# Patient Record
Sex: MALE | Race: BLACK OR AFRICAN AMERICAN | NOT HISPANIC OR LATINO | Employment: OTHER | RURAL
[De-identification: names, ages, dates, MRNs, and addresses within clinical notes are randomized per-mention and may not be internally consistent; named-entity substitution may affect disease eponyms.]

---

## 2020-01-14 ENCOUNTER — HISTORICAL (OUTPATIENT)
Dept: ADMINISTRATIVE | Facility: HOSPITAL | Age: 80
End: 2020-01-14

## 2020-03-12 ENCOUNTER — HISTORICAL (OUTPATIENT)
Dept: ADMINISTRATIVE | Facility: HOSPITAL | Age: 80
End: 2020-03-12

## 2020-03-12 LAB
BASOPHILS # BLD AUTO: 0.04 X10E3/UL (ref 0–0.2)
BASOPHILS NFR BLD AUTO: 0.7 % (ref 0–1)
EOSINOPHIL # BLD AUTO: 0.29 X10E3/UL (ref 0–0.5)
EOSINOPHIL NFR BLD AUTO: 5.2 % (ref 1–4)
ERYTHROCYTE [DISTWIDTH] IN BLOOD BY AUTOMATED COUNT: 14.8 % (ref 11.5–14.5)
EST. AVERAGE GLUCOSE BLD GHB EST-MCNC: 267 MG/DL
HBA1C MFR BLD HPLC: 10.6 %
HCT VFR BLD AUTO: 41.9 % (ref 40–54)
HGB BLD-MCNC: 13.3 G/DL (ref 13.5–18)
IMM GRANULOCYTES # BLD AUTO: 0.02 X10E3/UL (ref 0–0.04)
IMM GRANULOCYTES NFR BLD: 0.4 % (ref 0–0.4)
LYMPHOCYTES # BLD AUTO: 2.04 X10E3/UL (ref 1–4.8)
LYMPHOCYTES NFR BLD AUTO: 36.8 % (ref 27–41)
MCH RBC QN AUTO: 28.4 PG (ref 27–31)
MCHC RBC AUTO-ENTMCNC: 31.7 G/DL (ref 32–36)
MCV RBC AUTO: 89.5 FL (ref 80–96)
MONOCYTES # BLD AUTO: 0.54 X10E3/UL (ref 0–0.8)
MONOCYTES NFR BLD AUTO: 9.7 % (ref 2–6)
MPC BLD CALC-MCNC: 10 FL (ref 9.4–12.4)
NEUTROPHILS # BLD AUTO: 2.62 X10E3/UL (ref 1.8–7.7)
NEUTROPHILS NFR BLD AUTO: 47.2 % (ref 53–65)
NRBC # BLD AUTO: 0 X10E3/UL (ref 0–0)
NRBC, AUTO (.00): 0 /100 (ref 0–0)
PLATELET # BLD AUTO: 346 X10E3/UL (ref 150–400)
RBC # BLD AUTO: 4.68 X10E6/UL (ref 4.6–6.2)
WBC # BLD AUTO: 5.55 X10E3/UL (ref 4.5–11)

## 2020-05-04 LAB — TSH SERPL DL<=0.005 MIU/L-ACNC: NORMAL UIU/ML

## 2020-05-11 ENCOUNTER — HISTORICAL (OUTPATIENT)
Dept: ADMINISTRATIVE | Facility: HOSPITAL | Age: 80
End: 2020-05-11

## 2020-05-11 LAB
BASOPHILS # BLD AUTO: 0.02 X10E3/UL (ref 0–0.2)
BASOPHILS NFR BLD AUTO: 0.4 % (ref 0–1)
EOSINOPHIL # BLD AUTO: 0.25 X10E3/UL (ref 0–0.5)
EOSINOPHIL NFR BLD AUTO: 4.5 % (ref 1–4)
ERYTHROCYTE [DISTWIDTH] IN BLOOD BY AUTOMATED COUNT: 12.9 % (ref 11.5–14.5)
HCT VFR BLD AUTO: 42.4 % (ref 40–54)
HGB BLD-MCNC: 13.9 G/DL (ref 13.5–18)
IMM GRANULOCYTES # BLD AUTO: 0.01 X10E3/UL (ref 0–0.04)
IMM GRANULOCYTES NFR BLD: 0.2 % (ref 0–0.4)
LYMPHOCYTES # BLD AUTO: 2.24 X10E3/UL (ref 1–4.8)
LYMPHOCYTES NFR BLD AUTO: 40.3 % (ref 27–41)
MCH RBC QN AUTO: 28.9 PG (ref 27–31)
MCHC RBC AUTO-ENTMCNC: 32.8 G/DL (ref 32–36)
MCV RBC AUTO: 88.1 FL (ref 80–96)
MONOCYTES # BLD AUTO: 0.52 X10E3/UL (ref 0–0.8)
MONOCYTES NFR BLD AUTO: 9.4 % (ref 2–6)
MPC BLD CALC-MCNC: 9.8 FL (ref 9.4–12.4)
NEUTROPHILS # BLD AUTO: 2.52 X10E3/UL (ref 1.8–7.7)
NEUTROPHILS NFR BLD AUTO: 45.2 % (ref 53–65)
NRBC # BLD AUTO: 0 X10E3/UL (ref 0–0)
NRBC, AUTO (.00): 0 /100 (ref 0–0)
PLATELET # BLD AUTO: 327 X10E3/UL (ref 150–400)
RBC # BLD AUTO: 4.81 X10E6/UL (ref 4.6–6.2)
WBC # BLD AUTO: 5.56 X10E3/UL (ref 4.5–11)

## 2020-06-12 ENCOUNTER — HISTORICAL (OUTPATIENT)
Dept: ADMINISTRATIVE | Facility: HOSPITAL | Age: 80
End: 2020-06-12

## 2020-06-12 LAB
ALBUMIN SERPL BCP-MCNC: 3.5 G/DL (ref 3.5–5)
ALBUMIN/GLOB SERPL: 0.9 {RATIO}
ALP SERPL-CCNC: 117 U/L (ref 45–115)
ALT SERPL W P-5'-P-CCNC: 25 U/L (ref 16–61)
AST SERPL W P-5'-P-CCNC: 13 U/L (ref 15–37)
BILIRUB SERPL-MCNC: 0.3 MG/DL (ref 0–1.2)
BUN SERPL-MCNC: 10 MG/DL (ref 7–18)
BUN/CREAT SERPL: 13
CALCIUM SERPL-MCNC: 8.9 MG/DL (ref 8.5–10.1)
CHLORIDE SERPL-SCNC: 102 MMOL/L (ref 98–107)
CHOLEST SERPL-MCNC: 135 MG/DL
CHOLEST/HDLC SERPL: 4.4 {RATIO}
CO2 SERPL-SCNC: 29 MMOL/L (ref 21–32)
CREAT SERPL-MCNC: 0.8 MG/DL (ref 0.7–1.3)
EST. AVERAGE GLUCOSE BLD GHB EST-MCNC: 217 MG/DL
GLOBULIN SER-MCNC: 3.9 G/DL (ref 2–4)
GLUCOSE SERPL-MCNC: 164 MG/DL (ref 74–106)
HBA1C MFR BLD HPLC: 9.1 %
HDLC SERPL-MCNC: 31 MG/DL
LDLC SERPL CALC-MCNC: 88 MG/DL
POTASSIUM SERPL-SCNC: 4.2 MMOL/L (ref 3.5–5.1)
PROLACTIN SERPL-MCNC: 29.7 NG/ML
PROT SERPL-MCNC: 7.4 G/DL (ref 6.4–8.2)
SODIUM SERPL-SCNC: 135 MMOL/L (ref 136–145)
TRIGL SERPL-MCNC: 82 MG/DL

## 2020-06-23 ENCOUNTER — HISTORICAL (OUTPATIENT)
Dept: ADMINISTRATIVE | Facility: HOSPITAL | Age: 80
End: 2020-06-23

## 2020-06-24 LAB
BACTERIA #/AREA URNS HPF: ABNORMAL /HPF
BILIRUB UR QL STRIP: NEGATIVE MG/DL
CLARITY UR: CLEAR
COLOR UR: YELLOW
GLUCOSE UR STRIP-MCNC: NEGATIVE MG/DL
KETONES UR STRIP-SCNC: NEGATIVE MG/DL
LEUKOCYTE ESTERASE UR QL STRIP: NEGATIVE LEU/UL
NITRITE UR QL STRIP: NEGATIVE
PH UR STRIP: 5 PH UNITS (ref 5–8)
PROT UR QL STRIP: NEGATIVE MG/DL
RBC # UR STRIP: NEGATIVE ERY/UL
RBC #/AREA URNS HPF: ABNORMAL /HPF (ref 0–3)
SP GR UR STRIP: 1.02 (ref 1–1.03)
SQUAMOUS #/AREA URNS LPF: ABNORMAL /LPF
UROBILINOGEN UR STRIP-ACNC: 0.2 EU/DL
WBC #/AREA URNS HPF: ABNORMAL /HPF (ref 0–5)

## 2020-06-26 LAB
REPORT: NO GROWTH
REPORT: NORMAL

## 2020-07-07 ENCOUNTER — HISTORICAL (OUTPATIENT)
Dept: ADMINISTRATIVE | Facility: HOSPITAL | Age: 80
End: 2020-07-07

## 2020-07-07 LAB
ANION GAP SERPL CALCULATED.3IONS-SCNC: 12.2 MMOL/L (ref 7–16)
BASOPHILS # BLD AUTO: 0.04 X10E3/UL (ref 0–0.2)
BASOPHILS NFR BLD AUTO: 0.6 % (ref 0–1)
BUN SERPL-MCNC: 10 MG/DL (ref 7–18)
CALCIUM SERPL-MCNC: 9.4 MG/DL (ref 8.5–10.1)
CHLORIDE SERPL-SCNC: 100 MMOL/L (ref 98–107)
CO2 SERPL-SCNC: 25 MMOL/L (ref 21–32)
CREAT SERPL-MCNC: 1.01 MG/DL (ref 0.7–1.3)
EOSINOPHIL # BLD AUTO: 0.22 X10E3/UL (ref 0–0.5)
EOSINOPHIL NFR BLD AUTO: 3.4 % (ref 1–4)
ERYTHROCYTE [DISTWIDTH] IN BLOOD BY AUTOMATED COUNT: 12.5 % (ref 11.5–14.5)
GLUCOSE SERPL-MCNC: 192 MG/DL (ref 74–106)
HCT VFR BLD AUTO: 44.6 % (ref 40–54)
HGB BLD-MCNC: 15.1 G/DL (ref 13.5–18)
IMM GRANULOCYTES # BLD AUTO: 0.02 X10E3/UL (ref 0–0.04)
IMM GRANULOCYTES NFR BLD: 0.3 % (ref 0–0.4)
LYMPHOCYTES # BLD AUTO: 2.34 X10E3/UL (ref 1–4.8)
LYMPHOCYTES NFR BLD AUTO: 35.7 % (ref 27–41)
MCH RBC QN AUTO: 28.3 PG (ref 27–31)
MCHC RBC AUTO-ENTMCNC: 33.9 G/DL (ref 32–36)
MCV RBC AUTO: 83.7 FL (ref 80–96)
MONOCYTES # BLD AUTO: 0.52 X10E3/UL (ref 0–0.8)
MONOCYTES NFR BLD AUTO: 7.9 % (ref 2–6)
MPC BLD CALC-MCNC: 9.2 FL (ref 9.4–12.4)
NEUTROPHILS # BLD AUTO: 3.41 X10E3/UL (ref 1.8–7.7)
NEUTROPHILS NFR BLD AUTO: 52.1 % (ref 53–65)
NRBC # BLD AUTO: 0 X10E3/UL (ref 0–0)
NRBC, AUTO (.00): 0 /100 (ref 0–0)
PLATELET # BLD AUTO: 417 X10E3/UL (ref 150–400)
POTASSIUM SERPL-SCNC: 4.2 MMOL/L (ref 3.5–5.1)
RBC # BLD AUTO: 5.33 X10E6/UL (ref 4.6–6.2)
SODIUM SERPL-SCNC: 133 MMOL/L (ref 136–145)
WBC # BLD AUTO: 6.55 X10E3/UL (ref 4.5–11)

## 2020-07-16 ENCOUNTER — HISTORICAL (OUTPATIENT)
Dept: ADMINISTRATIVE | Facility: HOSPITAL | Age: 80
End: 2020-07-16

## 2020-07-16 LAB — TSH SERPL DL<=0.005 MIU/L-ACNC: 1.83 UIU/ML (ref 0.36–3.74)

## 2020-07-17 ENCOUNTER — HISTORICAL (OUTPATIENT)
Dept: ADMINISTRATIVE | Facility: HOSPITAL | Age: 80
End: 2020-07-17

## 2020-07-18 LAB — TROPONIN I SERPL-MCNC: 0.02 NG/ML (ref 0–0.06)

## 2020-07-26 ENCOUNTER — HISTORICAL (OUTPATIENT)
Dept: ADMINISTRATIVE | Facility: HOSPITAL | Age: 80
End: 2020-07-26

## 2020-07-26 LAB
ANION GAP SERPL CALCULATED.3IONS-SCNC: 11 MMOL/L
BASOPHILS # BLD AUTO: 0.03 X10E3/UL (ref 0–0.2)
BASOPHILS NFR BLD AUTO: 0.5 % (ref 0–1)
BUN SERPL-MCNC: 12 MG/DL (ref 7–18)
CALCIUM SERPL-MCNC: 8.8 MG/DL (ref 8.5–10.1)
CHLORIDE SERPL-SCNC: 99 MMOL/L (ref 98–107)
CO2 SERPL-SCNC: 27 MMOL/L (ref 21–32)
CREAT SERPL-MCNC: 1.15 MG/DL (ref 0.7–1.3)
EOSINOPHIL # BLD AUTO: 0.22 X10E3/UL (ref 0–0.5)
EOSINOPHIL NFR BLD AUTO: 3.8 % (ref 1–4)
ERYTHROCYTE [DISTWIDTH] IN BLOOD BY AUTOMATED COUNT: 13.3 % (ref 11.5–14.5)
GLUCOSE SERPL-MCNC: 237 MG/DL (ref 74–106)
HCT VFR BLD AUTO: 40.6 % (ref 40–54)
HGB BLD-MCNC: 13.5 G/DL (ref 13.5–18)
IMM GRANULOCYTES # BLD AUTO: 0.02 X10E3/UL (ref 0–0.04)
IMM GRANULOCYTES NFR BLD: 0.3 % (ref 0–0.4)
LACTATE SERPL-SCNC: 1.7 MMOL/L (ref 0.4–2)
LACTATE SERPL-SCNC: 2.3 MMOL/L (ref 0.4–2)
LYMPHOCYTES # BLD AUTO: 1.2 X10E3/UL (ref 1–4.8)
LYMPHOCYTES NFR BLD AUTO: 20.5 % (ref 27–41)
MCH RBC QN AUTO: 29.1 PG (ref 27–31)
MCHC RBC AUTO-ENTMCNC: 33.3 G/DL (ref 32–36)
MCV RBC AUTO: 87.5 FL (ref 80–96)
MONOCYTES # BLD AUTO: 0.54 X10E3/UL (ref 0–0.8)
MONOCYTES NFR BLD AUTO: 9.2 % (ref 2–6)
MPC BLD CALC-MCNC: 8.8 FL (ref 9.4–12.4)
NEUTROPHILS # BLD AUTO: 3.83 X10E3/UL (ref 1.8–7.7)
NEUTROPHILS NFR BLD AUTO: 65.7 % (ref 53–65)
NRBC # BLD AUTO: 0 X10E3/UL (ref 0–0)
NRBC, AUTO (.00): 0 /100 (ref 0–0)
PLATELET # BLD AUTO: 271 X10E3/UL (ref 150–400)
POTASSIUM SERPL-SCNC: 3.9 MMOL/L (ref 3.5–5.1)
RBC # BLD AUTO: 4.64 X10E6/UL (ref 4.6–6.2)
SODIUM SERPL-SCNC: 133 MMOL/L (ref 136–145)
WBC # BLD AUTO: 5.84 X10E3/UL (ref 4.5–11)

## 2020-08-01 LAB
REPORT: NORMAL

## 2020-08-10 ENCOUNTER — HISTORICAL (OUTPATIENT)
Dept: ADMINISTRATIVE | Facility: HOSPITAL | Age: 80
End: 2020-08-10

## 2020-08-10 LAB — SARS-COV+SARS-COV-2 AG RESP QL IA.RAPID: NEGATIVE

## 2020-09-04 ENCOUNTER — HISTORICAL (OUTPATIENT)
Dept: ADMINISTRATIVE | Facility: HOSPITAL | Age: 80
End: 2020-09-04

## 2020-09-04 LAB
EST. AVERAGE GLUCOSE BLD GHB EST-MCNC: 157 MG/DL
HBA1C MFR BLD HPLC: 7.3 %

## 2020-09-15 ENCOUNTER — HISTORICAL (OUTPATIENT)
Dept: ADMINISTRATIVE | Facility: HOSPITAL | Age: 80
End: 2020-09-15

## 2020-09-16 LAB — SARS-COV+SARS-COV-2 AG RESP QL IA.RAPID: NEGATIVE

## 2020-10-26 ENCOUNTER — HISTORICAL (OUTPATIENT)
Dept: ADMINISTRATIVE | Facility: HOSPITAL | Age: 80
End: 2020-10-26

## 2020-10-27 LAB — SARS-COV+SARS-COV-2 AG RESP QL IA.RAPID: NEGATIVE

## 2020-11-16 ENCOUNTER — HISTORICAL (OUTPATIENT)
Dept: ADMINISTRATIVE | Facility: HOSPITAL | Age: 80
End: 2020-11-16

## 2020-11-17 LAB — SARS-COV+SARS-COV-2 AG RESP QL IA.RAPID: NEGATIVE

## 2020-11-23 ENCOUNTER — HISTORICAL (OUTPATIENT)
Dept: ADMINISTRATIVE | Facility: HOSPITAL | Age: 80
End: 2020-11-23

## 2020-11-24 LAB — SARS-COV+SARS-COV-2 AG RESP QL IA.RAPID: NEGATIVE

## 2020-12-01 ENCOUNTER — HISTORICAL (OUTPATIENT)
Dept: ADMINISTRATIVE | Facility: HOSPITAL | Age: 80
End: 2020-12-01

## 2020-12-02 LAB — SARS-COV+SARS-COV-2 AG RESP QL IA.RAPID: NEGATIVE

## 2020-12-07 ENCOUNTER — HISTORICAL (OUTPATIENT)
Dept: ADMINISTRATIVE | Facility: HOSPITAL | Age: 80
End: 2020-12-07

## 2020-12-07 LAB — SARS-COV+SARS-COV-2 AG RESP QL IA.RAPID: NEGATIVE

## 2020-12-09 ENCOUNTER — HISTORICAL (OUTPATIENT)
Dept: ADMINISTRATIVE | Facility: HOSPITAL | Age: 80
End: 2020-12-09

## 2020-12-09 LAB
ALBUMIN SERPL BCP-MCNC: 3.3 G/DL (ref 3.5–5)
ALBUMIN/GLOB SERPL: 1 {RATIO}
ALP SERPL-CCNC: 104 U/L (ref 45–115)
ALT SERPL W P-5'-P-CCNC: 19 U/L (ref 16–61)
ANION GAP SERPL CALCULATED.3IONS-SCNC: 11.5 MMOL/L (ref 7–16)
AST SERPL W P-5'-P-CCNC: 17 U/L (ref 15–37)
BASOPHILS # BLD AUTO: 0.03 X10E3/UL (ref 0–0.2)
BASOPHILS NFR BLD AUTO: 0.5 % (ref 0–1)
BILIRUB SERPL-MCNC: 0.6 MG/DL (ref 0–1.2)
BUN SERPL-MCNC: 10 MG/DL (ref 7–18)
BUN/CREAT SERPL: 13
CALCIUM SERPL-MCNC: 8.5 MG/DL (ref 8.5–10.1)
CHLORIDE SERPL-SCNC: 96 MMOL/L (ref 98–107)
CHOLEST SERPL-MCNC: 123 MG/DL
CHOLEST/HDLC SERPL: 3.1 {RATIO}
CO2 SERPL-SCNC: 27 MMOL/L (ref 21–32)
CREAT SERPL-MCNC: 0.8 MG/DL (ref 0.7–1.3)
EOSINOPHIL # BLD AUTO: 0.16 X10E3/UL (ref 0–0.5)
EOSINOPHIL NFR BLD AUTO: 2.6 % (ref 1–4)
ERYTHROCYTE [DISTWIDTH] IN BLOOD BY AUTOMATED COUNT: 13.3 % (ref 11.5–14.5)
EST. AVERAGE GLUCOSE BLD GHB EST-MCNC: 150 MG/DL
GLOBULIN SER-MCNC: 3.3 G/DL (ref 2–4)
GLUCOSE SERPL-MCNC: 75 MG/DL (ref 74–106)
HBA1C MFR BLD HPLC: 7.1 %
HCT VFR BLD AUTO: 40.3 % (ref 40–54)
HDLC SERPL-MCNC: 40 MG/DL
HGB BLD-MCNC: 13.7 G/DL (ref 13.5–18)
IMM GRANULOCYTES # BLD AUTO: 0.02 X10E3/UL (ref 0–0.04)
IMM GRANULOCYTES NFR BLD: 0.3 % (ref 0–0.4)
LDLC SERPL CALC-MCNC: 69 MG/DL
LYMPHOCYTES # BLD AUTO: 2.17 X10E3/UL (ref 1–4.8)
LYMPHOCYTES NFR BLD AUTO: 35.9 % (ref 27–41)
MCH RBC QN AUTO: 29.1 PG (ref 27–31)
MCHC RBC AUTO-ENTMCNC: 34 G/DL (ref 32–36)
MCV RBC AUTO: 85.7 FL (ref 80–96)
MONOCYTES # BLD AUTO: 0.55 X10E3/UL (ref 0–0.8)
MONOCYTES NFR BLD AUTO: 9.1 % (ref 2–6)
MPC BLD CALC-MCNC: 9.3 FL (ref 9.4–12.4)
NEUTROPHILS # BLD AUTO: 3.12 X10E3/UL (ref 1.8–7.7)
NEUTROPHILS NFR BLD AUTO: 51.6 % (ref 53–65)
NRBC # BLD AUTO: 0 X10E3/UL (ref 0–0)
NRBC, AUTO (.00): 0 /100 (ref 0–0)
PLATELET # BLD AUTO: 337 X10E3/UL (ref 150–400)
POTASSIUM SERPL-SCNC: 4.5 MMOL/L (ref 3.5–5.1)
PROT SERPL-MCNC: 6.6 G/DL (ref 6.4–8.2)
RBC # BLD AUTO: 4.7 X10E6/UL (ref 4.6–6.2)
SODIUM SERPL-SCNC: 130 MMOL/L (ref 136–145)
TRIGL SERPL-MCNC: 69 MG/DL
WBC # BLD AUTO: 6.05 X10E3/UL (ref 4.5–11)

## 2020-12-14 ENCOUNTER — HISTORICAL (OUTPATIENT)
Dept: ADMINISTRATIVE | Facility: HOSPITAL | Age: 80
End: 2020-12-14

## 2020-12-15 LAB — SARS-COV+SARS-COV-2 AG RESP QL IA.RAPID: NEGATIVE

## 2020-12-16 ENCOUNTER — HISTORICAL (OUTPATIENT)
Dept: ADMINISTRATIVE | Facility: HOSPITAL | Age: 80
End: 2020-12-16

## 2020-12-16 LAB
ANION GAP SERPL CALCULATED.3IONS-SCNC: 13.3 MMOL/L (ref 7–16)
BUN SERPL-MCNC: 10 MG/DL (ref 7–18)
CALCIUM SERPL-MCNC: 9.2 MG/DL (ref 8.5–10.1)
CHLORIDE SERPL-SCNC: 102 MMOL/L (ref 98–107)
CO2 SERPL-SCNC: 26 MMOL/L (ref 21–32)
CREAT SERPL-MCNC: 0.79 MG/DL (ref 0.7–1.3)
GLUCOSE SERPL-MCNC: 83 MG/DL (ref 74–106)
POTASSIUM SERPL-SCNC: 4.3 MMOL/L (ref 3.5–5.1)
SODIUM SERPL-SCNC: 137 MMOL/L (ref 136–145)

## 2020-12-21 ENCOUNTER — HISTORICAL (OUTPATIENT)
Dept: ADMINISTRATIVE | Facility: HOSPITAL | Age: 80
End: 2020-12-21

## 2020-12-22 LAB — SARS-COV+SARS-COV-2 AG RESP QL IA.RAPID: NEGATIVE

## 2020-12-28 ENCOUNTER — HISTORICAL (OUTPATIENT)
Dept: ADMINISTRATIVE | Facility: HOSPITAL | Age: 80
End: 2020-12-28

## 2020-12-29 LAB — SARS-COV+SARS-COV-2 AG RESP QL IA.RAPID: POSITIVE

## 2021-01-04 ENCOUNTER — HISTORICAL (OUTPATIENT)
Dept: ADMINISTRATIVE | Facility: HOSPITAL | Age: 81
End: 2021-01-04

## 2021-01-04 LAB
ALBUMIN SERPL BCP-MCNC: 2.5 G/DL (ref 3.5–5)
ALBUMIN/GLOB SERPL: 0.6 {RATIO}
ALP SERPL-CCNC: 110 U/L (ref 45–115)
ALT SERPL W P-5'-P-CCNC: 39 U/L (ref 16–61)
ANION GAP SERPL CALCULATED.3IONS-SCNC: 11 MMOL/L (ref 7–16)
AST SERPL W P-5'-P-CCNC: 38 U/L (ref 15–37)
BASOPHILS # BLD AUTO: 0.01 X10E3/UL (ref 0–0.2)
BASOPHILS NFR BLD AUTO: 0.2 % (ref 0–1)
BILIRUB SERPL-MCNC: 0.4 MG/DL (ref 0–1.2)
BUN SERPL-MCNC: 15 MG/DL (ref 7–18)
BUN/CREAT SERPL: 20
CALCIUM SERPL-MCNC: 8.2 MG/DL (ref 8.5–10.1)
CHLORIDE SERPL-SCNC: 102 MMOL/L (ref 98–107)
CK SERPL-CCNC: 236 U/L (ref 39–308)
CO2 SERPL-SCNC: 28 MMOL/L (ref 21–32)
CREAT SERPL-MCNC: 0.76 MG/DL (ref 0.7–1.3)
CRP SERPL-MCNC: 18 MG/DL (ref 0–0.8)
EOSINOPHIL # BLD AUTO: 0.12 X10E3/UL (ref 0–0.5)
EOSINOPHIL NFR BLD AUTO: 1.9 % (ref 1–4)
ERYTHROCYTE [DISTWIDTH] IN BLOOD BY AUTOMATED COUNT: 13.5 % (ref 11.5–14.5)
FERRITIN SERPL-MCNC: 1315 NG/ML (ref 26–388)
GLOBULIN SER-MCNC: 3.9 G/DL (ref 2–4)
GLUCOSE SERPL-MCNC: 217 MG/DL (ref 74–106)
HCT VFR BLD AUTO: 40.8 % (ref 40–54)
HGB BLD-MCNC: 13.5 G/DL (ref 13.5–18)
IMM GRANULOCYTES # BLD AUTO: 0.03 X10E3/UL (ref 0–0.04)
IMM GRANULOCYTES NFR BLD: 0.5 % (ref 0–0.4)
LDH SERPL-CCNC: 283 U/L (ref 87–241)
LYMPHOCYTES # BLD AUTO: 1.17 X10E3/UL (ref 1–4.8)
LYMPHOCYTES NFR BLD AUTO: 18.7 % (ref 27–41)
MCH RBC QN AUTO: 28.6 PG (ref 27–31)
MCHC RBC AUTO-ENTMCNC: 33.1 G/DL (ref 32–36)
MCV RBC AUTO: 86.4 FL (ref 80–96)
MONOCYTES # BLD AUTO: 0.55 X10E3/UL (ref 0–0.8)
MONOCYTES NFR BLD AUTO: 8.8 % (ref 2–6)
MPC BLD CALC-MCNC: 9.5 FL (ref 9.4–12.4)
NEUTROPHILS # BLD AUTO: 4.37 X10E3/UL (ref 1.8–7.7)
NEUTROPHILS NFR BLD AUTO: 69.9 % (ref 53–65)
NRBC # BLD AUTO: 0 X10E3/UL (ref 0–0)
NRBC, AUTO (.00): 0 /100 (ref 0–0)
PLATELET # BLD AUTO: 414 X10E3/UL (ref 150–400)
POTASSIUM SERPL-SCNC: 4.9 MMOL/L (ref 3.5–5.1)
PROT SERPL-MCNC: 6.4 G/DL (ref 6.4–8.2)
RBC # BLD AUTO: 4.72 X10E6/UL (ref 4.6–6.2)
SODIUM SERPL-SCNC: 136 MMOL/L (ref 136–145)
WBC # BLD AUTO: 6.25 X10E3/UL (ref 4.5–11)

## 2021-01-29 ENCOUNTER — HISTORICAL (OUTPATIENT)
Dept: ADMINISTRATIVE | Facility: HOSPITAL | Age: 81
End: 2021-01-29

## 2021-01-29 LAB — TSH SERPL DL<=0.005 MIU/L-ACNC: 3.34 UIU/ML (ref 0.36–3.74)

## 2021-09-03 ENCOUNTER — HOSPITAL ENCOUNTER (EMERGENCY)
Facility: HOSPITAL | Age: 81
Discharge: NURSING, REHAB OR LONG-TERM CARE | End: 2021-09-03
Attending: EMERGENCY MEDICINE
Payer: MEDICARE

## 2021-09-03 VITALS
SYSTOLIC BLOOD PRESSURE: 137 MMHG | RESPIRATION RATE: 11 BRPM | WEIGHT: 213 LBS | DIASTOLIC BLOOD PRESSURE: 74 MMHG | BODY MASS INDEX: 31.55 KG/M2 | OXYGEN SATURATION: 96 % | HEART RATE: 42 BPM | HEIGHT: 69 IN

## 2021-09-03 DIAGNOSIS — F20.9 SCHIZOPHRENIA, UNSPECIFIED TYPE: Primary | ICD-10-CM

## 2021-09-03 DIAGNOSIS — R53.1 GENERALIZED WEAKNESS: ICD-10-CM

## 2021-09-03 LAB
ALBUMIN SERPL BCP-MCNC: 3 G/DL (ref 3.5–5)
ALBUMIN/GLOB SERPL: 0.8 {RATIO}
ALP SERPL-CCNC: 117 U/L (ref 45–115)
ALT SERPL W P-5'-P-CCNC: 23 U/L (ref 16–61)
ANION GAP SERPL CALCULATED.3IONS-SCNC: 11 MMOL/L (ref 7–16)
AST SERPL W P-5'-P-CCNC: 13 U/L (ref 15–37)
BASOPHILS # BLD AUTO: 0.04 K/UL (ref 0–0.2)
BASOPHILS NFR BLD AUTO: 0.5 % (ref 0–1)
BILIRUB SERPL-MCNC: 0.3 MG/DL (ref 0–1.2)
BILIRUB UR QL STRIP: NEGATIVE
BUN SERPL-MCNC: 14 MG/DL (ref 7–18)
BUN/CREAT SERPL: 16 (ref 6–20)
CALCIUM SERPL-MCNC: 8.7 MG/DL (ref 8.5–10.1)
CHLORIDE SERPL-SCNC: 96 MMOL/L (ref 98–107)
CLARITY UR: CLEAR
CO2 SERPL-SCNC: 30 MMOL/L (ref 21–32)
COLOR UR: NORMAL
CREAT SERPL-MCNC: 0.87 MG/DL (ref 0.7–1.3)
DIFFERENTIAL METHOD BLD: ABNORMAL
EOSINOPHIL # BLD AUTO: 0.26 K/UL (ref 0–0.5)
EOSINOPHIL NFR BLD AUTO: 3.5 % (ref 1–4)
ERYTHROCYTE [DISTWIDTH] IN BLOOD BY AUTOMATED COUNT: 13.4 % (ref 11.5–14.5)
GLOBULIN SER-MCNC: 3.9 G/DL (ref 2–4)
GLUCOSE SERPL-MCNC: 213 MG/DL (ref 74–106)
GLUCOSE UR STRIP-MCNC: NEGATIVE MG/DL
HCT VFR BLD AUTO: 38 % (ref 40–54)
HGB BLD-MCNC: 13 G/DL (ref 13.5–18)
IMM GRANULOCYTES # BLD AUTO: 0.03 K/UL (ref 0–0.04)
IMM GRANULOCYTES NFR BLD: 0.4 % (ref 0–0.4)
KETONES UR STRIP-SCNC: NEGATIVE MG/DL
LEUKOCYTE ESTERASE UR QL STRIP: NEGATIVE
LYMPHOCYTES # BLD AUTO: 1.92 K/UL (ref 1–4.8)
LYMPHOCYTES NFR BLD AUTO: 26.1 % (ref 27–41)
MAGNESIUM SERPL-MCNC: 1.7 MG/DL (ref 1.7–2.3)
MCH RBC QN AUTO: 29.4 PG (ref 27–31)
MCHC RBC AUTO-ENTMCNC: 34.2 G/DL (ref 32–36)
MCV RBC AUTO: 86 FL (ref 80–96)
MONOCYTES # BLD AUTO: 0.67 K/UL (ref 0–0.8)
MONOCYTES NFR BLD AUTO: 9.1 % (ref 2–6)
MPC BLD CALC-MCNC: 8.2 FL (ref 9.4–12.4)
NEUTROPHILS # BLD AUTO: 4.44 K/UL (ref 1.8–7.7)
NEUTROPHILS NFR BLD AUTO: 60.4 % (ref 53–65)
NITRITE UR QL STRIP: NEGATIVE
NRBC # BLD AUTO: 0 X10E3/UL
NRBC, AUTO (.00): 0 %
PH UR STRIP: 7 PH UNITS
PLATELET # BLD AUTO: 285 K/UL (ref 150–400)
POTASSIUM SERPL-SCNC: 3.8 MMOL/L (ref 3.5–5.1)
PROT SERPL-MCNC: 6.9 G/DL (ref 6.4–8.2)
PROT UR QL STRIP: NEGATIVE
RBC # BLD AUTO: 4.42 M/UL (ref 4.6–6.2)
RBC # UR STRIP: NEGATIVE /UL
SODIUM SERPL-SCNC: 133 MMOL/L (ref 136–145)
SP GR UR STRIP: 1.01
UROBILINOGEN UR STRIP-ACNC: 1 MG/DL
WBC # BLD AUTO: 7.36 K/UL (ref 4.5–11)

## 2021-09-03 PROCEDURE — 93005 ELECTROCARDIOGRAM TRACING: CPT

## 2021-09-03 PROCEDURE — 81003 URINALYSIS AUTO W/O SCOPE: CPT | Performed by: EMERGENCY MEDICINE

## 2021-09-03 PROCEDURE — 85025 COMPLETE CBC W/AUTO DIFF WBC: CPT | Performed by: EMERGENCY MEDICINE

## 2021-09-03 PROCEDURE — 99284 PR EMERGENCY DEPT VISIT,LEVEL IV: ICD-10-PCS | Mod: ,,, | Performed by: EMERGENCY MEDICINE

## 2021-09-03 PROCEDURE — 93010 ELECTROCARDIOGRAM REPORT: CPT | Mod: ,,, | Performed by: HOSPITALIST

## 2021-09-03 PROCEDURE — 36415 COLL VENOUS BLD VENIPUNCTURE: CPT | Performed by: EMERGENCY MEDICINE

## 2021-09-03 PROCEDURE — 83735 ASSAY OF MAGNESIUM: CPT | Performed by: EMERGENCY MEDICINE

## 2021-09-03 PROCEDURE — 80053 COMPREHEN METABOLIC PANEL: CPT | Performed by: EMERGENCY MEDICINE

## 2021-09-03 PROCEDURE — 93010 EKG 12-LEAD: ICD-10-PCS | Mod: ,,, | Performed by: HOSPITALIST

## 2021-09-03 PROCEDURE — 99285 EMERGENCY DEPT VISIT HI MDM: CPT

## 2021-09-03 PROCEDURE — 99284 EMERGENCY DEPT VISIT MOD MDM: CPT | Mod: ,,, | Performed by: EMERGENCY MEDICINE

## 2021-09-03 RX ORDER — LOSARTAN POTASSIUM 50 MG/1
50 TABLET ORAL DAILY
Status: ON HOLD | COMMUNITY
End: 2022-11-02 | Stop reason: HOSPADM

## 2021-09-03 RX ORDER — INSULIN LISPRO 100 [IU]/ML
1-5 INJECTION, SOLUTION INTRAVENOUS; SUBCUTANEOUS
Status: ON HOLD | COMMUNITY
End: 2022-05-16 | Stop reason: CLARIF

## 2021-09-03 RX ORDER — POTASSIUM CHLORIDE 20 MEQ/1
20 TABLET, EXTENDED RELEASE ORAL DAILY
Status: ON HOLD | COMMUNITY
End: 2022-01-28 | Stop reason: CLARIF

## 2021-09-03 RX ORDER — MEMANTINE HYDROCHLORIDE 10 MG/1
10 TABLET ORAL NIGHTLY
COMMUNITY

## 2021-09-03 RX ORDER — HYDRALAZINE HYDROCHLORIDE 10 MG/1
10 TABLET, FILM COATED ORAL EVERY 12 HOURS
Status: ON HOLD | COMMUNITY
End: 2022-02-08 | Stop reason: HOSPADM

## 2021-09-03 RX ORDER — OXCARBAZEPINE 150 MG/1
150 TABLET, FILM COATED ORAL 2 TIMES DAILY
Status: ON HOLD | COMMUNITY
End: 2022-01-28 | Stop reason: CLARIF

## 2021-09-03 RX ORDER — LEVOTHYROXINE SODIUM 50 UG/1
50 TABLET ORAL
Status: ON HOLD | COMMUNITY
End: 2022-02-08 | Stop reason: HOSPADM

## 2021-09-03 RX ORDER — POLYETHYLENE GLYCOL 3350 17 G/17G
17 POWDER, FOR SOLUTION ORAL 2 TIMES DAILY
COMMUNITY

## 2021-09-03 RX ORDER — FUROSEMIDE 40 MG/1
40 TABLET ORAL DAILY
Status: ON HOLD | COMMUNITY
Start: 2022-01-06 | End: 2022-06-29 | Stop reason: HOSPADM

## 2021-09-03 RX ORDER — RISPERIDONE 1 MG/1
1 TABLET ORAL 2 TIMES DAILY
COMMUNITY

## 2021-09-03 RX ORDER — OXCARBAZEPINE 300 MG/1
300 TABLET, FILM COATED ORAL DAILY
COMMUNITY

## 2021-10-25 ENCOUNTER — OFFICE VISIT (OUTPATIENT)
Dept: WOUND CARE | Facility: CLINIC | Age: 81
End: 2021-10-25
Attending: FAMILY MEDICINE
Payer: MEDICARE

## 2021-10-25 VITALS
HEART RATE: 76 BPM | DIASTOLIC BLOOD PRESSURE: 82 MMHG | TEMPERATURE: 98 F | RESPIRATION RATE: 18 BRPM | SYSTOLIC BLOOD PRESSURE: 140 MMHG

## 2021-10-25 DIAGNOSIS — L89.153 PRESSURE ULCER OF SACRAL REGION, STAGE 3: Primary | ICD-10-CM

## 2021-10-25 DIAGNOSIS — E11.9 TYPE 2 DIABETES MELLITUS WITHOUT COMPLICATION, WITHOUT LONG-TERM CURRENT USE OF INSULIN: ICD-10-CM

## 2021-10-25 DIAGNOSIS — F02.80 ALZHEIMER'S DISEASE: ICD-10-CM

## 2021-10-25 DIAGNOSIS — L89.90 PRESSURE INJURY OF SKIN, UNSPECIFIED INJURY STAGE, UNSPECIFIED LOCATION: ICD-10-CM

## 2021-10-25 DIAGNOSIS — F31.9 BIPOLAR AFFECTIVE DISORDER, REMISSION STATUS UNSPECIFIED: ICD-10-CM

## 2021-10-25 DIAGNOSIS — G30.9 ALZHEIMER'S DISEASE: ICD-10-CM

## 2021-10-25 PROCEDURE — 99214 OFFICE O/P EST MOD 30 MIN: CPT | Mod: S$PBB,,, | Performed by: FAMILY MEDICINE

## 2021-10-25 PROCEDURE — 99214 OFFICE O/P EST MOD 30 MIN: CPT | Mod: PBBFAC | Performed by: FAMILY MEDICINE

## 2021-10-25 PROCEDURE — 99214 PR OFFICE/OUTPT VISIT, EST, LEVL IV, 30-39 MIN: ICD-10-PCS | Mod: S$PBB,,, | Performed by: FAMILY MEDICINE

## 2021-11-13 ENCOUNTER — HOSPITAL ENCOUNTER (EMERGENCY)
Facility: HOSPITAL | Age: 81
Discharge: NURSING, REHAB OR LONG-TERM CARE | End: 2021-11-13
Attending: EMERGENCY MEDICINE
Payer: MEDICARE

## 2021-11-13 VITALS
BODY MASS INDEX: 29.62 KG/M2 | HEART RATE: 101 BPM | OXYGEN SATURATION: 99 % | HEIGHT: 69 IN | DIASTOLIC BLOOD PRESSURE: 95 MMHG | WEIGHT: 200 LBS | TEMPERATURE: 98 F | RESPIRATION RATE: 20 BRPM | SYSTOLIC BLOOD PRESSURE: 139 MMHG

## 2021-11-13 DIAGNOSIS — E11.9 DIABETES MELLITUS: ICD-10-CM

## 2021-11-13 DIAGNOSIS — G30.9 ALZHEIMER'S DISEASE: ICD-10-CM

## 2021-11-13 DIAGNOSIS — R33.9 URINARY RETENTION: Primary | ICD-10-CM

## 2021-11-13 DIAGNOSIS — F02.80 ALZHEIMER'S DISEASE: ICD-10-CM

## 2021-11-13 DIAGNOSIS — L89.90 PRESSURE ULCER: ICD-10-CM

## 2021-11-13 DIAGNOSIS — F31.9 BIPOLAR DISORDER: ICD-10-CM

## 2021-11-13 PROCEDURE — 99282 PR EMERGENCY DEPT VISIT,LEVEL II: ICD-10-PCS | Mod: ,,, | Performed by: EMERGENCY MEDICINE

## 2021-11-13 PROCEDURE — 99283 EMERGENCY DEPT VISIT LOW MDM: CPT

## 2021-11-13 PROCEDURE — 99282 EMERGENCY DEPT VISIT SF MDM: CPT | Mod: ,,, | Performed by: EMERGENCY MEDICINE

## 2021-11-15 ENCOUNTER — CLINICAL SUPPORT (OUTPATIENT)
Dept: WOUND CARE | Facility: CLINIC | Age: 81
End: 2021-11-15
Attending: FAMILY MEDICINE
Payer: MEDICARE

## 2021-11-15 VITALS
HEART RATE: 72 BPM | RESPIRATION RATE: 20 BRPM | SYSTOLIC BLOOD PRESSURE: 94 MMHG | DIASTOLIC BLOOD PRESSURE: 51 MMHG | TEMPERATURE: 97 F

## 2021-11-15 DIAGNOSIS — L89.90 PRESSURE INJURY OF SKIN, UNSPECIFIED INJURY STAGE, UNSPECIFIED LOCATION: ICD-10-CM

## 2021-11-15 DIAGNOSIS — L89.154 PRESSURE ULCER OF SACRAL REGION, STAGE 4: Primary | ICD-10-CM

## 2021-11-15 DIAGNOSIS — E11.9 TYPE 2 DIABETES MELLITUS WITHOUT COMPLICATION, WITHOUT LONG-TERM CURRENT USE OF INSULIN: ICD-10-CM

## 2021-11-15 DIAGNOSIS — F02.80 ALZHEIMER'S DISEASE: ICD-10-CM

## 2021-11-15 DIAGNOSIS — G30.9 ALZHEIMER'S DISEASE: ICD-10-CM

## 2021-11-15 DIAGNOSIS — F31.9 BIPOLAR AFFECTIVE DISORDER, REMISSION STATUS UNSPECIFIED: ICD-10-CM

## 2021-11-15 PROCEDURE — 99214 OFFICE O/P EST MOD 30 MIN: CPT | Mod: PBBFAC

## 2021-11-15 PROCEDURE — 99214 OFFICE O/P EST MOD 30 MIN: CPT | Mod: S$PBB,,, | Performed by: FAMILY MEDICINE

## 2021-11-15 PROCEDURE — 99214 PR OFFICE/OUTPT VISIT, EST, LEVL IV, 30-39 MIN: ICD-10-PCS | Mod: S$PBB,,, | Performed by: FAMILY MEDICINE

## 2021-12-11 ENCOUNTER — HOSPITAL ENCOUNTER (EMERGENCY)
Facility: HOSPITAL | Age: 81
Discharge: HOME | End: 2021-12-11
Payer: MEDICARE

## 2021-12-11 VITALS
SYSTOLIC BLOOD PRESSURE: 105 MMHG | HEART RATE: 76 BPM | TEMPERATURE: 99 F | DIASTOLIC BLOOD PRESSURE: 75 MMHG | OXYGEN SATURATION: 100 % | RESPIRATION RATE: 17 BRPM
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
## 2021-12-11 PROCEDURE — 99282 EMERGENCY DEPT VISIT SF MDM: CPT | Mod: ,,, | Performed by: NURSE PRACTITIONER

## 2021-12-11 PROCEDURE — 99282 PR EMERGENCY DEPT VISIT,LEVEL II: ICD-10-PCS | Mod: ,,, | Performed by: NURSE PRACTITIONER

## 2021-12-11 PROCEDURE — 99284 EMERGENCY DEPT VISIT MOD MDM: CPT | Mod: 25

## 2021-12-13 ENCOUNTER — OFFICE VISIT (OUTPATIENT)
Dept: WOUND CARE | Facility: CLINIC | Age: 81
End: 2021-12-13
Attending: FAMILY MEDICINE
Payer: MEDICARE

## 2021-12-13 VITALS
SYSTOLIC BLOOD PRESSURE: 110 MMHG | DIASTOLIC BLOOD PRESSURE: 77 MMHG | RESPIRATION RATE: 20 BRPM | TEMPERATURE: 98 F | HEART RATE: 123 BPM

## 2021-12-13 DIAGNOSIS — F02.80 ALZHEIMER'S DISEASE: Primary | ICD-10-CM

## 2021-12-13 DIAGNOSIS — I10 HYPERTENSION, UNSPECIFIED TYPE: ICD-10-CM

## 2021-12-13 DIAGNOSIS — G30.9 ALZHEIMER'S DISEASE: Primary | ICD-10-CM

## 2021-12-13 DIAGNOSIS — L89.90 PRESSURE INJURY OF SKIN, UNSPECIFIED INJURY STAGE, UNSPECIFIED LOCATION: ICD-10-CM

## 2021-12-13 DIAGNOSIS — F31.9 BIPOLAR AFFECTIVE DISORDER, REMISSION STATUS UNSPECIFIED: ICD-10-CM

## 2021-12-13 PROCEDURE — 99214 OFFICE O/P EST MOD 30 MIN: CPT | Mod: S$PBB,,, | Performed by: FAMILY MEDICINE

## 2021-12-13 PROCEDURE — 99214 PR OFFICE/OUTPT VISIT, EST, LEVL IV, 30-39 MIN: ICD-10-PCS | Mod: S$PBB,,, | Performed by: FAMILY MEDICINE

## 2021-12-13 PROCEDURE — 99214 OFFICE O/P EST MOD 30 MIN: CPT | Mod: PBBFAC | Performed by: FAMILY MEDICINE

## 2022-01-06 PROCEDURE — 85378 FIBRIN DEGRADE SEMIQUANT: CPT | Performed by: FAMILY MEDICINE

## 2022-01-07 ENCOUNTER — HOSPITAL ENCOUNTER (OUTPATIENT)
Dept: RADIOLOGY | Facility: HOSPITAL | Age: 82
Discharge: HOME | End: 2022-01-07
Attending: FAMILY MEDICINE
Payer: MEDICARE

## 2022-01-07 DIAGNOSIS — R79.89 ELEVATED D-DIMER: ICD-10-CM

## 2022-01-07 PROCEDURE — 93970 EXTREMITY STUDY: CPT | Mod: TC

## 2022-01-07 PROCEDURE — 93970 EXTREMITY STUDY: CPT | Mod: 26,,, | Performed by: STUDENT IN AN ORGANIZED HEALTH CARE EDUCATION/TRAINING PROGRAM

## 2022-01-07 PROCEDURE — 93970 US LOWER EXTREMITY VEINS BILATERAL: ICD-10-PCS | Mod: 26,,, | Performed by: STUDENT IN AN ORGANIZED HEALTH CARE EDUCATION/TRAINING PROGRAM

## 2022-01-19 RX ORDER — METOPROLOL SUCCINATE 25 MG/1
1 TABLET, EXTENDED RELEASE ORAL DAILY
COMMUNITY
Start: 2022-01-06 | End: 2022-05-31

## 2022-01-19 RX ORDER — TAMSULOSIN HYDROCHLORIDE 0.4 MG/1
1 CAPSULE ORAL DAILY
COMMUNITY
Start: 2022-01-04

## 2022-01-19 RX ORDER — NAPROXEN SODIUM 220 MG/1
81 TABLET, FILM COATED ORAL DAILY
Status: ON HOLD | COMMUNITY
End: 2022-01-28 | Stop reason: CLARIF

## 2022-01-19 RX ORDER — AMLODIPINE BESYLATE 2.5 MG/1
2.5 TABLET ORAL DAILY
Status: ON HOLD | COMMUNITY
End: 2022-01-28 | Stop reason: CLARIF

## 2022-01-19 RX ORDER — SILVER SULFADIAZINE 10 G/1000G
1 CREAM TOPICAL DAILY
Status: ON HOLD | COMMUNITY
Start: 2021-12-15 | End: 2022-01-28 | Stop reason: CLARIF

## 2022-01-19 RX ORDER — HYDROCHLOROTHIAZIDE 25 MG/1
25 TABLET ORAL 2 TIMES DAILY
Status: ON HOLD | COMMUNITY
End: 2022-01-28 | Stop reason: CLARIF

## 2022-01-19 RX ORDER — VERAPAMIL HYDROCHLORIDE 120 MG/1
120 TABLET, FILM COATED, EXTENDED RELEASE ORAL DAILY
Status: ON HOLD | COMMUNITY
End: 2022-01-28 | Stop reason: CLARIF

## 2022-01-19 NOTE — PATIENT INSTRUCTIONS
Clean with dakin's or baby shampoo and water    Apply skin barrier to wound edges    Pack with Dakins wet to dry     Cover with dry gauze,ABD,paper tape    Change daily and as needed    Turn protocol, elevate heels on pillows    Increase protein as tolerated     No sitting up in wheel chair

## 2022-01-20 ENCOUNTER — CLINICAL SUPPORT (OUTPATIENT)
Dept: WOUND CARE | Facility: CLINIC | Age: 82
End: 2022-01-20
Attending: SURGERY
Payer: MEDICARE

## 2022-01-20 VITALS
WEIGHT: 208 LBS | BODY MASS INDEX: 30.81 KG/M2 | SYSTOLIC BLOOD PRESSURE: 128 MMHG | HEIGHT: 69 IN | TEMPERATURE: 98 F | HEART RATE: 98 BPM | DIASTOLIC BLOOD PRESSURE: 70 MMHG

## 2022-01-20 DIAGNOSIS — E11.9 TYPE 2 DIABETES MELLITUS WITHOUT COMPLICATION, WITHOUT LONG-TERM CURRENT USE OF INSULIN: ICD-10-CM

## 2022-01-20 DIAGNOSIS — F02.80 ALZHEIMER'S DISEASE: ICD-10-CM

## 2022-01-20 DIAGNOSIS — L89.154 PRESSURE ULCER OF SACRAL REGION, STAGE 4: Primary | ICD-10-CM

## 2022-01-20 DIAGNOSIS — I10 HYPERTENSION, UNSPECIFIED TYPE: ICD-10-CM

## 2022-01-20 DIAGNOSIS — F31.9 BIPOLAR AFFECTIVE DISORDER, REMISSION STATUS UNSPECIFIED: ICD-10-CM

## 2022-01-20 DIAGNOSIS — G30.9 ALZHEIMER'S DISEASE: ICD-10-CM

## 2022-01-20 DIAGNOSIS — L89.90 PRESSURE INJURY OF SKIN, UNSPECIFIED INJURY STAGE, UNSPECIFIED LOCATION: ICD-10-CM

## 2022-01-20 PROCEDURE — 87070 CULTURE OTHR SPECIMN AEROBIC: CPT | Mod: ,,, | Performed by: CLINICAL MEDICAL LABORATORY

## 2022-01-20 PROCEDURE — 87070 CULTURE, WOUND: ICD-10-PCS | Mod: ,,, | Performed by: CLINICAL MEDICAL LABORATORY

## 2022-01-20 PROCEDURE — 99499 NO LOS: ICD-10-PCS | Mod: S$PBB,,, | Performed by: SURGERY

## 2022-01-20 PROCEDURE — 87075 CULTR BACTERIA EXCEPT BLOOD: CPT | Mod: ,,, | Performed by: CLINICAL MEDICAL LABORATORY

## 2022-01-20 PROCEDURE — 87075 CULTURE, ANAEROBE: ICD-10-PCS | Mod: ,,, | Performed by: CLINICAL MEDICAL LABORATORY

## 2022-01-20 PROCEDURE — 87186 CULTURE, WOUND: ICD-10-PCS | Mod: 91,,, | Performed by: CLINICAL MEDICAL LABORATORY

## 2022-01-20 PROCEDURE — 87076 CULTURE, ANAEROBE: ICD-10-PCS | Mod: ,,, | Performed by: CLINICAL MEDICAL LABORATORY

## 2022-01-20 PROCEDURE — 87077 CULTURE AEROBIC IDENTIFY: CPT | Mod: ,,, | Performed by: CLINICAL MEDICAL LABORATORY

## 2022-01-20 PROCEDURE — 11043 DBRDMT MUSC&/FSCA 1ST 20/<: CPT | Mod: S$PBB,,, | Performed by: SURGERY

## 2022-01-20 PROCEDURE — 11043 PR DEBRIDEMENT, SKIN, SUB-Q TISSUE,MUSCLE,=<20 SQ CM: ICD-10-PCS | Mod: S$PBB,,, | Performed by: SURGERY

## 2022-01-20 PROCEDURE — 99499 UNLISTED E&M SERVICE: CPT | Mod: S$PBB,,, | Performed by: SURGERY

## 2022-01-20 PROCEDURE — 11044 DBRDMT BONE 1ST 20 SQ CM/<: CPT | Mod: PBBFAC | Performed by: SURGERY

## 2022-01-20 PROCEDURE — 87186 SC STD MICRODIL/AGAR DIL: CPT | Mod: 91,,, | Performed by: CLINICAL MEDICAL LABORATORY

## 2022-01-20 PROCEDURE — 87076 CULTURE ANAEROBE IDENT EACH: CPT | Mod: ,,, | Performed by: CLINICAL MEDICAL LABORATORY

## 2022-01-20 PROCEDURE — 87077 CULTURE, WOUND: ICD-10-PCS | Mod: ,,, | Performed by: CLINICAL MEDICAL LABORATORY

## 2022-01-20 PROCEDURE — 99215 OFFICE O/P EST HI 40 MIN: CPT | Mod: PBBFAC | Performed by: SURGERY

## 2022-01-20 RX ORDER — ALBUTEROL SULFATE 90 UG/1
2 AEROSOL, METERED RESPIRATORY (INHALATION) 3 TIMES DAILY PRN
Status: ON HOLD | COMMUNITY
Start: 2021-12-16 | End: 2022-01-28 | Stop reason: CLARIF

## 2022-01-20 RX ORDER — INSULIN ASPART 100 [IU]/ML
4 INJECTION, SOLUTION INTRAVENOUS; SUBCUTANEOUS
COMMUNITY
Start: 2022-01-12 | End: 2022-01-20 | Stop reason: SDUPTHER

## 2022-01-20 RX ORDER — HYDROCODONE BITARTRATE AND ACETAMINOPHEN 7.5; 325 MG/1; MG/1
7.5 TABLET ORAL 2 TIMES DAILY PRN
Status: ON HOLD | COMMUNITY
Start: 2021-12-30 | End: 2022-02-08 | Stop reason: HOSPADM

## 2022-01-20 RX ORDER — PANTOPRAZOLE SODIUM 40 MG/1
1 TABLET, DELAYED RELEASE ORAL NIGHTLY
COMMUNITY
Start: 2022-01-07

## 2022-01-20 NOTE — PROGRESS NOTES
Debridement Performed for Assessment: Wound# 1  Performed By: Provider: Dr. Parker  Assistant: LUCERO Harding RN    Debridement: Surgical    Photo taken post procedure: Yes    Time-Out Taken: Yes  Level: Skin/Subcutaneous Tissue/ Muscle  Post Debridement Measurements  Length: (cm) 8.2  Width: (cm) 4.7  Depth: (cm) 2.8      Area: (cm²) 38.5  Percent Debrided: 100%  Total Area Debrided: (sq cm) 38.5    Tissue and other material debrided:  Adipose, Dermis, Epidermis, Subcutaneous, Muscle  Devitalized Tissue Debrided:Biofilm, Slough, Eschar, Fibrin  Instrument: Curette, Scissors, Forceps  Bleeding: Moderate  Hemostasis Achieved: Pressure  Procedural Pain: 3 of 10  Post Procedural Pain: 2 of 10  Response to Treatment: Procedure was tolerated well    Devitalized materials/tissue Removed  the following was removed during debridement  subcutaneous, muscle      Post Debridement Diagnosis  Post debridement diagnosis  Same as Pre-operative debridement diagnosis, No Complications noted.      Grafts or implants applied  Was a graft or implant applied?  No      Procedure assistant  Procedure assisted by:  Assistant is the same as nurse listed above      Complications related to procedure  Did any complication occure during procedure?  No complications noted during or after procedure.      Specimen  Specimen collect during procedure?  Culture specimens collected and sent to lab      Anaesthesia:  Anesthesia used  None      Blood Loss:  Blood loss during procedure  less than 6 cc

## 2022-01-21 NOTE — PROGRESS NOTES
General Surgery History and Physical      Patient ID: Bhargav Gao is a 81 y.o. male.    Chief Complaint: Pressure Ulcer (Coccyx Stage 4)      HPI:  81-year-old male with multiple medical problems including severe dementia.  Non mobile with a fairly large infected purulence sacral ulcer.  Foul smell currently there been putting a wet to dry in the area however area needs to be debrided today.  No fever chills per the nursing home.  Patient is a poor historian.    Review of Systems   Constitutional: Positive for activity change and appetite change. Negative for fatigue and fever.   HENT: Positive for trouble swallowing.    Respiratory: Negative for cough and shortness of breath.    Cardiovascular: Negative for chest pain and palpitations.   Gastrointestinal: Positive for constipation. Negative for abdominal distention, abdominal pain, blood in stool and diarrhea.   Genitourinary: Negative for flank pain.   Musculoskeletal: Negative for neck pain and neck stiffness.   Skin: Positive for wound.   Neurological: Positive for weakness.       Current Outpatient Medications   Medication Sig Dispense Refill    amLODIPine (NORVASC) 2.5 MG tablet Take 2.5 mg by mouth once daily.      aspirin 81 MG Chew Take 81 mg by mouth once daily.      calcium carbonate/vitamin D3 (CALTRATE 600 + D ORAL) Take by mouth 2 (two) times daily.      furosemide (LASIX) 40 MG tablet Take 40 mg by mouth 2 (two) times daily.      hydrALAZINE (APRESOLINE) 10 MG tablet Take 10 mg by mouth every 12 (twelve) hours.      hydroCHLOROthiazide (HYDRODIURIL) 25 MG tablet Take 25 mg by mouth 2 (two) times daily.      HYDROcodone-acetaminophen (NORCO) 7.5-325 mg per tablet Take 1 tablet by mouth 2 (two) times daily as needed for Pain. Take 30 min prior to dressing changes      insulin detemir U-100 (LEVEMIR) 100 unit/mL injection Inject 18 Units into the skin 2  (two) times daily.      insulin lispro 100 unit/mL injection Inject into the skin 3 (three) times daily before meals. Under 200 units - no units / 200-249 - 2 units / 250-299 3 units / 300-349 4 units / 350-399 6 units / 400 or > 8 units & notify medical      insulin  unit/mL injection Inject 15 Units into the skin once daily.      levothyroxine (SYNTHROID) 50 MCG tablet Take 50 mcg by mouth before breakfast.      losartan (COZAAR) 50 MG tablet Take 50 mg by mouth once daily.      memantine (NAMENDA) 10 MG Tab Take 10 mg by mouth nightly.      metoprolol succinate (TOPROL-XL) 25 MG 24 hr tablet Take 1 tablet by mouth once daily.      OXcarbazepine (TRILEPTAL) 150 MG Tab Take 150 mg by mouth 2 (two) times daily.      OXcarbazepine (TRILEPTAL) 300 MG Tab Take 150 mg by mouth 2 (two) times daily.      pantoprazole (PROTONIX) 40 MG tablet Take 1 tablet by mouth once daily.      polyethylene glycol (GLYCOLAX) 17 gram PwPk Take by mouth.      potassium chloride SA (K-DUR,KLOR-CON) 20 MEQ tablet Take 20 mEq by mouth once daily.      risperiDONE (RISPERDAL) 1 MG tablet Take 1 mg by mouth 2 (two) times daily.      saw palmetto frt/phytosterol 2 (PROSTATE SR ORAL) Take 60 mLs by mouth 2 (two) times daily.      SITagliptin (JANUVIA) 50 MG Tab Take by mouth once daily.      SSD 1 % cream Apply 1 application topically once daily.      tamsulosin (FLOMAX) 0.4 mg Cap Take 1 capsule by mouth once daily.      VENTOLIN HFA 90 mcg/actuation inhaler Inhale 2 puffs into the lungs 3 (three) times daily as needed for Shortness of Breath. Use with spacer      verapamiL (CALAN-SR) 120 MG CR tablet Take 120 mg by mouth once daily.       No current facility-administered medications for this visit.       Review of patient's allergies indicates:   Allergen Reactions    Metformin     Mycobacterium tuberculosis (tuberculin ppd)     Norvasc [amlodipine]        Past Medical History:   Diagnosis Date    Alzheimer's  disease     Bipolar disorder     BPH (benign prostatic hyperplasia)     Diabetes mellitus     Hyperlipidemia     Hypertension     Hypothyroidism     Idiopathic orofacial dystonia     Iron deficiency anemia secondary to blood loss (chronic)     Mild intellectual disabilities     Obesity     Pressure ulcer     Residual schizophrenia     Schizophrenia        History reviewed. No pertinent surgical history.    Family History   Family history unknown: Yes       Social History     Socioeconomic History    Marital status: Single   Occupational History    Occupation: disabled   Tobacco Use    Smoking status: Never Smoker    Smokeless tobacco: Never Used   Substance and Sexual Activity    Alcohol use: Not Currently    Drug use: Never    Sexual activity: Not Currently       Vitals:    01/20/22 1038   BP: 128/70   Pulse: 98   Temp: 97.9 °F (36.6 °C)       Physical Exam  Constitutional:       General: He is not in acute distress.     Appearance: He is ill-appearing.   HENT:      Head: Normocephalic.   Cardiovascular:      Rate and Rhythm: Normal rate and regular rhythm.      Pulses: Normal pulses.   Pulmonary:      Effort: Pulmonary effort is normal. No respiratory distress.      Breath sounds: Normal breath sounds.   Abdominal:      General: Abdomen is flat. There is no distension.      Palpations: Abdomen is soft.      Tenderness: There is no abdominal tenderness.   Skin:     General: Skin is warm.      Findings: Lesion (Sacral area has a large area of necrotic fibrinous material.  See the full wound care note for complete measurements) present.   Neurological:      Mental Status: Mental status is at baseline. He is disoriented.         Assessment & Plan:    Pressure ulcer of sacral region, stage 4  -     Culture, Anaerobe  -     Culture, Wound    Alzheimer's disease    Bipolar affective disorder, remission status unspecified    Pressure injury of skin, unspecified injury stage, unspecified  location    Hypertension, unspecified type    Type 2 diabetes mellitus without complication, without long-term current use of insulin         will debride the area today.  Risks and benefits explained.  All questions were answered

## 2022-01-24 LAB
BACTERIA SPEC ANAEROBE CULT: ABNORMAL
MICROORGANISM SPEC CULT: ABNORMAL

## 2022-01-27 ENCOUNTER — HOSPITAL ENCOUNTER (INPATIENT)
Facility: HOSPITAL | Age: 82
LOS: 12 days | DRG: 594 | End: 2022-02-08
Attending: FAMILY MEDICINE | Admitting: FAMILY MEDICINE
Payer: MEDICARE

## 2022-01-27 DIAGNOSIS — E11.9 DIABETES MELLITUS: ICD-10-CM

## 2022-01-27 DIAGNOSIS — F20.9 SCHIZOPHRENIA, UNSPECIFIED TYPE: ICD-10-CM

## 2022-01-27 DIAGNOSIS — F02.80 ALZHEIMER'S DISEASE: ICD-10-CM

## 2022-01-27 DIAGNOSIS — E11.9 TYPE 2 DIABETES MELLITUS WITHOUT COMPLICATION, WITHOUT LONG-TERM CURRENT USE OF INSULIN: ICD-10-CM

## 2022-01-27 DIAGNOSIS — L89.90 PRESSURE INJURY OF SKIN, UNSPECIFIED INJURY STAGE, UNSPECIFIED LOCATION: ICD-10-CM

## 2022-01-27 DIAGNOSIS — L89.159 PRESSURE INJURY OF SKIN OF SACRAL REGION, UNSPECIFIED INJURY STAGE: ICD-10-CM

## 2022-01-27 DIAGNOSIS — L89.90 PRESSURE ULCER: ICD-10-CM

## 2022-01-27 DIAGNOSIS — G30.9 ALZHEIMER'S DISEASE: ICD-10-CM

## 2022-01-27 DIAGNOSIS — S31.000A SACRAL WOUND: ICD-10-CM

## 2022-01-27 DIAGNOSIS — S31.000D WOUND OF SACRAL REGION, SUBSEQUENT ENCOUNTER: ICD-10-CM

## 2022-01-27 LAB — GLUCOSE SERPL-MCNC: 204 MG/DL (ref 70–105)

## 2022-01-27 PROCEDURE — 99222 PR INITIAL HOSPITAL CARE,LEVL II: ICD-10-PCS | Mod: ,,, | Performed by: HOSPITALIST

## 2022-01-27 PROCEDURE — C9399 UNCLASSIFIED DRUGS OR BIOLOG: HCPCS | Performed by: HOSPITALIST

## 2022-01-27 PROCEDURE — 82962 GLUCOSE BLOOD TEST: CPT

## 2022-01-27 PROCEDURE — 63600175 PHARM REV CODE 636 W HCPCS: Performed by: HOSPITALIST

## 2022-01-27 PROCEDURE — 99222 1ST HOSP IP/OBS MODERATE 55: CPT | Mod: ,,, | Performed by: HOSPITALIST

## 2022-01-27 PROCEDURE — 11000001 HC ACUTE MED/SURG PRIVATE ROOM

## 2022-01-27 RX ORDER — ACETAMINOPHEN 500 MG
1000 TABLET ORAL EVERY 6 HOURS PRN
Status: DISCONTINUED | OUTPATIENT
Start: 2022-01-27 | End: 2022-02-08 | Stop reason: HOSPADM

## 2022-01-27 RX ORDER — SIMETHICONE 80 MG
1 TABLET,CHEWABLE ORAL 3 TIMES DAILY PRN
Status: DISCONTINUED | OUTPATIENT
Start: 2022-01-27 | End: 2022-02-08 | Stop reason: HOSPADM

## 2022-01-27 RX ORDER — BISACODYL 5 MG
10 TABLET, DELAYED RELEASE (ENTERIC COATED) ORAL DAILY PRN
Status: DISCONTINUED | OUTPATIENT
Start: 2022-01-27 | End: 2022-02-08 | Stop reason: HOSPADM

## 2022-01-27 RX ORDER — ONDANSETRON 2 MG/ML
8 INJECTION INTRAMUSCULAR; INTRAVENOUS EVERY 6 HOURS PRN
Status: DISCONTINUED | OUTPATIENT
Start: 2022-01-27 | End: 2022-02-08 | Stop reason: HOSPADM

## 2022-01-27 RX ORDER — TRAZODONE HYDROCHLORIDE 50 MG/1
50 TABLET ORAL NIGHTLY PRN
Status: DISCONTINUED | OUTPATIENT
Start: 2022-01-27 | End: 2022-02-08 | Stop reason: HOSPADM

## 2022-01-27 RX ORDER — IBUPROFEN 200 MG
24 TABLET ORAL
Status: DISCONTINUED | OUTPATIENT
Start: 2022-01-27 | End: 2022-02-08 | Stop reason: HOSPADM

## 2022-01-27 RX ORDER — GUAIFENESIN/DEXTROMETHORPHAN 100-10MG/5
10 SYRUP ORAL EVERY 6 HOURS PRN
Status: DISCONTINUED | OUTPATIENT
Start: 2022-01-27 | End: 2022-02-08 | Stop reason: HOSPADM

## 2022-01-27 RX ORDER — INSULIN ASPART 100 [IU]/ML
1-10 INJECTION, SOLUTION INTRAVENOUS; SUBCUTANEOUS
Status: DISCONTINUED | OUTPATIENT
Start: 2022-01-27 | End: 2022-02-08 | Stop reason: HOSPADM

## 2022-01-27 RX ORDER — GLUCAGON 1 MG
1 KIT INJECTION
Status: DISCONTINUED | OUTPATIENT
Start: 2022-01-27 | End: 2022-02-08 | Stop reason: HOSPADM

## 2022-01-27 RX ORDER — IBUPROFEN 200 MG
16 TABLET ORAL
Status: DISCONTINUED | OUTPATIENT
Start: 2022-01-27 | End: 2022-02-08 | Stop reason: HOSPADM

## 2022-01-27 RX ADMIN — INSULIN DETEMIR 12 UNITS: 100 INJECTION, SOLUTION SUBCUTANEOUS at 10:01

## 2022-01-28 PROBLEM — F20.9 SCHIZOPHRENIA: Status: ACTIVE | Noted: 2022-01-28

## 2022-01-28 LAB
ANION GAP SERPL CALCULATED.3IONS-SCNC: 12 MMOL/L (ref 7–16)
BASOPHILS # BLD AUTO: 0.02 K/UL (ref 0–0.2)
BASOPHILS NFR BLD AUTO: 0.1 % (ref 0–1)
BUN SERPL-MCNC: 13 MG/DL (ref 7–18)
BUN/CREAT SERPL: 16 (ref 6–20)
CALCIUM SERPL-MCNC: 9.9 MG/DL (ref 8.5–10.1)
CHLORIDE SERPL-SCNC: 102 MMOL/L (ref 98–107)
CO2 SERPL-SCNC: 27 MMOL/L (ref 21–32)
CREAT SERPL-MCNC: 0.8 MG/DL (ref 0.7–1.3)
DIFFERENTIAL METHOD BLD: ABNORMAL
EOSINOPHIL # BLD AUTO: 0.03 K/UL (ref 0–0.5)
EOSINOPHIL NFR BLD AUTO: 0.2 % (ref 1–4)
ERYTHROCYTE [DISTWIDTH] IN BLOOD BY AUTOMATED COUNT: 14.4 % (ref 11.5–14.5)
GLUCOSE SERPL-MCNC: 193 MG/DL (ref 70–105)
GLUCOSE SERPL-MCNC: 195 MG/DL (ref 70–105)
GLUCOSE SERPL-MCNC: 204 MG/DL (ref 74–106)
GLUCOSE SERPL-MCNC: 239 MG/DL (ref 70–105)
GLUCOSE SERPL-MCNC: 278 MG/DL (ref 70–105)
HCT VFR BLD AUTO: 35.2 % (ref 40–54)
HGB BLD-MCNC: 11.7 G/DL (ref 13.5–18)
IMM GRANULOCYTES # BLD AUTO: 0.11 K/UL (ref 0–0.04)
IMM GRANULOCYTES NFR BLD: 0.8 % (ref 0–0.4)
LYMPHOCYTES # BLD AUTO: 2.11 K/UL (ref 1–4.8)
LYMPHOCYTES NFR BLD AUTO: 15.3 % (ref 27–41)
MCH RBC QN AUTO: 28.4 PG (ref 27–31)
MCHC RBC AUTO-ENTMCNC: 33.2 G/DL (ref 32–36)
MCV RBC AUTO: 85.4 FL (ref 80–96)
MONOCYTES # BLD AUTO: 0.96 K/UL (ref 0–0.8)
MONOCYTES NFR BLD AUTO: 7 % (ref 2–6)
MPC BLD CALC-MCNC: 7.7 FL (ref 9.4–12.4)
NEUTROPHILS # BLD AUTO: 10.56 K/UL (ref 1.8–7.7)
NEUTROPHILS NFR BLD AUTO: 76.6 % (ref 53–65)
NRBC # BLD AUTO: 0 X10E3/UL
NRBC, AUTO (.00): 0 %
PLATELET # BLD AUTO: 368 K/UL (ref 150–400)
POTASSIUM SERPL-SCNC: 4.5 MMOL/L (ref 3.5–5.1)
RBC # BLD AUTO: 4.12 M/UL (ref 4.6–6.2)
SODIUM SERPL-SCNC: 136 MMOL/L (ref 136–145)
WBC # BLD AUTO: 13.79 K/UL (ref 4.5–11)

## 2022-01-28 PROCEDURE — C9399 UNCLASSIFIED DRUGS OR BIOLOG: HCPCS | Performed by: FAMILY MEDICINE

## 2022-01-28 PROCEDURE — 85025 COMPLETE CBC W/AUTO DIFF WBC: CPT | Performed by: FAMILY MEDICINE

## 2022-01-28 PROCEDURE — 99232 PR SUBSEQUENT HOSPITAL CARE,LEVL II: ICD-10-PCS | Mod: ,,, | Performed by: FAMILY MEDICINE

## 2022-01-28 PROCEDURE — 99232 SBSQ HOSP IP/OBS MODERATE 35: CPT | Mod: ,,, | Performed by: FAMILY MEDICINE

## 2022-01-28 PROCEDURE — 63600175 PHARM REV CODE 636 W HCPCS: Performed by: FAMILY MEDICINE

## 2022-01-28 PROCEDURE — 80048 BASIC METABOLIC PNL TOTAL CA: CPT | Performed by: FAMILY MEDICINE

## 2022-01-28 PROCEDURE — A6212 FOAM DRG <=16 SQ IN W/BORDER: HCPCS

## 2022-01-28 PROCEDURE — 63600175 PHARM REV CODE 636 W HCPCS: Performed by: HOSPITALIST

## 2022-01-28 PROCEDURE — 25000003 PHARM REV CODE 250: Performed by: FAMILY MEDICINE

## 2022-01-28 PROCEDURE — 27200155 *HC SET PRIMARY NONVENTED

## 2022-01-28 PROCEDURE — 25000003 PHARM REV CODE 250: Performed by: HOSPITALIST

## 2022-01-28 PROCEDURE — 82962 GLUCOSE BLOOD TEST: CPT

## 2022-01-28 PROCEDURE — 11000001 HC ACUTE MED/SURG PRIVATE ROOM

## 2022-01-28 PROCEDURE — 36415 COLL VENOUS BLD VENIPUNCTURE: CPT | Performed by: FAMILY MEDICINE

## 2022-01-28 RX ORDER — POTASSIUM CHLORIDE 20 MEQ/1
20 TABLET, EXTENDED RELEASE ORAL DAILY
Status: DISCONTINUED | OUTPATIENT
Start: 2022-01-28 | End: 2022-02-08 | Stop reason: HOSPADM

## 2022-01-28 RX ORDER — PANTOPRAZOLE SODIUM 40 MG/1
40 TABLET, DELAYED RELEASE ORAL DAILY
Status: DISCONTINUED | OUTPATIENT
Start: 2022-01-28 | End: 2022-02-08 | Stop reason: HOSPADM

## 2022-01-28 RX ORDER — OXCARBAZEPINE 150 MG/1
150 TABLET, FILM COATED ORAL 2 TIMES DAILY
Status: DISCONTINUED | OUTPATIENT
Start: 2022-01-28 | End: 2022-02-08 | Stop reason: HOSPADM

## 2022-01-28 RX ORDER — METOPROLOL SUCCINATE 25 MG/1
25 TABLET, EXTENDED RELEASE ORAL DAILY
Status: DISCONTINUED | OUTPATIENT
Start: 2022-01-28 | End: 2022-02-08 | Stop reason: HOSPADM

## 2022-01-28 RX ORDER — AMLODIPINE BESYLATE 2.5 MG/1
2.5 TABLET ORAL DAILY
Status: DISCONTINUED | OUTPATIENT
Start: 2022-01-28 | End: 2022-01-28

## 2022-01-28 RX ORDER — TAMSULOSIN HYDROCHLORIDE 0.4 MG/1
1 CAPSULE ORAL DAILY
Status: DISCONTINUED | OUTPATIENT
Start: 2022-01-28 | End: 2022-02-08 | Stop reason: HOSPADM

## 2022-01-28 RX ORDER — POLYETHYLENE GLYCOL 3350 17 G/17G
17 POWDER, FOR SOLUTION ORAL ONCE
Status: COMPLETED | OUTPATIENT
Start: 2022-01-28 | End: 2022-01-28

## 2022-01-28 RX ORDER — LEVOTHYROXINE SODIUM 50 UG/1
50 TABLET ORAL
Status: DISCONTINUED | OUTPATIENT
Start: 2022-01-28 | End: 2022-02-08 | Stop reason: HOSPADM

## 2022-01-28 RX ORDER — RISPERIDONE 1 MG/1
1 TABLET ORAL 2 TIMES DAILY
Status: DISCONTINUED | OUTPATIENT
Start: 2022-01-28 | End: 2022-02-08 | Stop reason: HOSPADM

## 2022-01-28 RX ORDER — OXCARBAZEPINE 150 MG/1
150 TABLET, FILM COATED ORAL 2 TIMES DAILY
Status: DISCONTINUED | OUTPATIENT
Start: 2022-01-28 | End: 2022-01-28

## 2022-01-28 RX ORDER — MEMANTINE HYDROCHLORIDE 5 MG/1
10 TABLET ORAL NIGHTLY
Status: DISCONTINUED | OUTPATIENT
Start: 2022-01-28 | End: 2022-02-08 | Stop reason: HOSPADM

## 2022-01-28 RX ORDER — HYDRALAZINE HYDROCHLORIDE 10 MG/1
10 TABLET, FILM COATED ORAL EVERY 12 HOURS
Status: DISCONTINUED | OUTPATIENT
Start: 2022-01-28 | End: 2022-02-08 | Stop reason: HOSPADM

## 2022-01-28 RX ORDER — LOSARTAN POTASSIUM 50 MG/1
50 TABLET ORAL 2 TIMES DAILY
Status: DISCONTINUED | OUTPATIENT
Start: 2022-01-28 | End: 2022-02-08 | Stop reason: HOSPADM

## 2022-01-28 RX ORDER — NAPROXEN SODIUM 220 MG/1
81 TABLET, FILM COATED ORAL DAILY
Status: DISCONTINUED | OUTPATIENT
Start: 2022-01-28 | End: 2022-02-08 | Stop reason: HOSPADM

## 2022-01-28 RX ORDER — HYDROCHLOROTHIAZIDE 12.5 MG/1
25 TABLET ORAL 2 TIMES DAILY
Status: DISCONTINUED | OUTPATIENT
Start: 2022-01-28 | End: 2022-02-08 | Stop reason: HOSPADM

## 2022-01-28 RX ADMIN — POLYETHYLENE GLYCOL 3350 17 G: 17 POWDER, FOR SOLUTION ORAL at 02:01

## 2022-01-28 RX ADMIN — SITAGLIPTIN 50 MG: 25 TABLET, FILM COATED ORAL at 09:01

## 2022-01-28 RX ADMIN — RISPERIDONE 1 MG: 1 TABLET ORAL at 09:01

## 2022-01-28 RX ADMIN — VANCOMYCIN HYDROCHLORIDE 2000 MG: 1 INJECTION, POWDER, LYOPHILIZED, FOR SOLUTION INTRAVENOUS at 09:01

## 2022-01-28 RX ADMIN — PIPERACILLIN AND TAZOBACTAM 4.5 G: 4; .5 INJECTION, POWDER, FOR SOLUTION INTRAVENOUS at 05:01

## 2022-01-28 RX ADMIN — PIPERACILLIN AND TAZOBACTAM 4.5 G: 4; .5 INJECTION, POWDER, FOR SOLUTION INTRAVENOUS at 10:01

## 2022-01-28 RX ADMIN — LEVOTHYROXINE SODIUM 50 MCG: 25 TABLET ORAL at 05:01

## 2022-01-28 RX ADMIN — HYDRALAZINE HYDROCHLORIDE 10 MG: 10 TABLET ORAL at 09:01

## 2022-01-28 RX ADMIN — HYDROCHLOROTHIAZIDE 25 MG: 12.5 TABLET ORAL at 09:01

## 2022-01-28 RX ADMIN — LOSARTAN POTASSIUM 50 MG: 50 TABLET, FILM COATED ORAL at 02:01

## 2022-01-28 RX ADMIN — OXCARBAZEPINE 150 MG: 150 TABLET, FILM COATED ORAL at 09:01

## 2022-01-28 RX ADMIN — INSULIN ASPART 4 UNITS: 100 INJECTION, SOLUTION INTRAVENOUS; SUBCUTANEOUS at 05:01

## 2022-01-28 RX ADMIN — PIPERACILLIN AND TAZOBACTAM 4.5 G: 4; .5 INJECTION, POWDER, FOR SOLUTION INTRAVENOUS at 01:01

## 2022-01-28 RX ADMIN — INSULIN DETEMIR 15 UNITS: 100 INJECTION, SOLUTION SUBCUTANEOUS at 09:01

## 2022-01-28 RX ADMIN — INSULIN ASPART 6 UNITS: 100 INJECTION, SOLUTION INTRAVENOUS; SUBCUTANEOUS at 11:01

## 2022-01-28 RX ADMIN — INSULIN ASPART 2 UNITS: 100 INJECTION, SOLUTION INTRAVENOUS; SUBCUTANEOUS at 07:01

## 2022-01-28 RX ADMIN — MEMANTINE 10 MG: 5 TABLET ORAL at 02:01

## 2022-01-28 RX ADMIN — ASPIRIN 81 MG CHEWABLE TABLET 81 MG: 81 TABLET CHEWABLE at 09:01

## 2022-01-28 RX ADMIN — PIPERACILLIN SODIUM AND TAZOBACTAM SODIUM 4.5 G: 4; .5 INJECTION, POWDER, LYOPHILIZED, FOR SOLUTION INTRAVENOUS at 02:01

## 2022-01-28 RX ADMIN — MEMANTINE 10 MG: 5 TABLET ORAL at 09:01

## 2022-01-28 RX ADMIN — METOPROLOL SUCCINATE 25 MG: 25 TABLET, EXTENDED RELEASE ORAL at 09:01

## 2022-01-28 RX ADMIN — POTASSIUM CHLORIDE 20 MEQ: 20 TABLET, EXTENDED RELEASE ORAL at 09:01

## 2022-01-28 RX ADMIN — TAMSULOSIN HYDROCHLORIDE 0.4 MG: 0.4 CAPSULE ORAL at 09:01

## 2022-01-28 RX ADMIN — PANTOPRAZOLE SODIUM 40 MG: 40 TABLET, DELAYED RELEASE ORAL at 09:01

## 2022-01-28 RX ADMIN — LOSARTAN POTASSIUM 50 MG: 50 TABLET, FILM COATED ORAL at 09:01

## 2022-01-28 NOTE — HPI
80 yo (appears younger) M presents to [facility] as a direct admit from [facility] for worsening sacral decubiti.  Patient has been treated as OP by [facility] wound care but is not getting better.  Khan has been placed to help with healing.  Patient is alert but unable to give me any information due to his baseline mental deficiencies.  He says ouch but denies pain.  He shakes his head yes or no but not sure if appropriately answering my questions.  Shook his head no to wanting watch a basketball game on TV but also shook his head no to watching Jessica Bellamy dancing around and singing in [employer].  He seemed interested in [employer]  catching alligators but I'm just not sure how much he is comprehending.  He can life all of his ext to gravity but very weak and does not walk or feed himself.      [employer] but some mention of constipation in records so will start a stool softner due to his wounds.

## 2022-01-28 NOTE — ASSESSMENT & PLAN NOTE
Patient on multiple antihypertensive agents.  On both amlodopine and verapamil.  Will stop verapamil with patient on BB.

## 2022-01-28 NOTE — PROGRESS NOTES
[facility]  Progress Note    Patient Name: Bhargav Gao  MRN: [id number]  Patient Class: IP- Inpatient   Admission Date: 1/27/2022  Length of Stay: 1 days  Attending Physician: Donato Whitlock MD  Primary Care Provider: Kerry Lin MD        Subjective:     Principal Problem:Pressure ulcer        HPI:  80 yo (appears younger) M presents to [facility] as a direct admit from [facility] for worsening sacral decubiti.  Patient has been treated as OP by [facility] wound care but is not getting better.  Khan has been placed to help with healing.  Patient is alert but unable to give me any information due to his baseline mental deficiencies.  He says ouch but denies pain.  He shakes his head yes or no but not sure if appropriately answering my questions.  Shook his head no to wanting watch a basketball game on TV but also shook his head no to watching [employer] dancing around and singing in [employer].  He seemed interested in [employer]  catching alligators but I'm just not sure how much he is comprehending.  He can life all of his ext to gravity but very weak and does not walk or feed himself.      [facility] but some mention of constipation in records so will start a stool softner due to his wounds.        Overview/Hospital Course:  1/28 Patient nonverbal with me. Sacral wound. Surgery consult. IV Zosyn      Interval History: Patient without complaints.     Review of Systems   Constitutional: Negative for fatigue and fever.   Respiratory: Negative for shortness of breath.    Cardiovascular: Negative for chest pain.   Skin: Positive for wound.   Psychiatric/Behavioral: Positive for confusion.     Objective:     Vital Signs (Most Recent):  Temp: 97.6 °F (36.4 °C) (01/28/22 0400)  Pulse: 93 (01/28/22 0400)  Resp: 20 (01/28/22 0400)  BP: 99/62 (01/28/22 0400)  SpO2: 100 % (01/28/22 0400) Vital Signs (24h Range):  Temp:  [97.5 °F (36.4 °C)-98.7 °F (37.1 °C)] 97.6 °F (36.4 °C)  Pulse:   [85-93] 93  Resp:  [20-21] 20  SpO2:  [100 %] 100 %  BP: ()/(59-62) 99/62     Weight: 97.4 kg (214 lb 11.7 oz)  Body mass index is 31.71 kg/m².    Intake/Output Summary (Last 24 hours) at 1/28/2022 0657  Last data filed at 1/28/2022 0613  Gross per 24 hour   Intake --   Output 703 ml   Net -703 ml      Physical Exam  Vitals and nursing note reviewed.   Constitutional:       Appearance: Normal appearance.   Cardiovascular:      Rate and Rhythm: Regular rhythm.      Heart sounds: Normal heart sounds.   Pulmonary:      Breath sounds: Normal breath sounds.         Significant Labs:   All pertinent labs within the past 24 hours have been reviewed.  BMP:   Recent Labs   Lab 01/26/22  1100   *   *   K 4.1   CL 99   CO2 29   BUN 19*   CREATININE 0.66*   CALCIUM 9.3     CBC:   Recent Labs   Lab 01/26/22  1100   WBC 8.70   HGB 10.5*   HCT 32.4*   *       Significant Imaging: I have reviewed all pertinent imaging results/findings within the past 24 hours.  none      Assessment/Plan:      * Pressure ulcer  IV antibiotics  Wound care        Schizophrenia  Continue psychotropic medication        Hypertension  Patient on multiple antihypertensive agents.  On both amlodopine and verapamil.  Will stop verapamil with patient on BB.        Diabetes mellitus  Basal/bolus insulin while in hospital        Alzheimer's disease  Continue psychotropic meds      VTE Risk Mitigation (From admission, onward)    None          Discharge Planning   YOSSI:      Code Status: DNR   Is the patient medically ready for discharge?:     Reason for patient still in hospital (select all that apply): Other (specify) IV Zosyn. Wound care. Surgery consult                     Donato Whitlock MD  Department of Hospital Medicine   [facility]

## 2022-01-28 NOTE — SUBJECTIVE & OBJECTIVE
Past Medical History:   Diagnosis Date    Alzheimer's disease     Bipolar disorder     BPH (benign prostatic hyperplasia)     Diabetes mellitus     Hyperlipidemia     Hypertension     Hypothyroidism     Idiopathic orofacial dystonia     Iron deficiency anemia secondary to blood loss (chronic)     Mild intellectual disabilities     Obesity     Pressure ulcer     Residual schizophrenia     Schizophrenia        History reviewed. No pertinent surgical history.    Review of patient's allergies indicates:   Allergen Reactions    Metformin     Mycobacterium tuberculosis (tuberculin ppd)     Norvasc [amlodipine]        No current facility-administered medications on file prior to encounter.     Current Outpatient Medications on File Prior to Encounter   Medication Sig    calcium carbonate/vitamin D3 (CALTRATE 600 + D ORAL) Take by mouth 2 (two) times daily.    furosemide (LASIX) 40 MG tablet Take 40 mg by mouth once daily.    hydrALAZINE (APRESOLINE) 10 MG tablet Take 10 mg by mouth every 12 (twelve) hours.    insulin detemir U-100 (LEVEMIR) 100 unit/mL injection Inject 18 Units into the skin 2 (two) times daily.    levothyroxine (SYNTHROID) 50 MCG tablet Take 50 mcg by mouth before breakfast.    memantine (NAMENDA) 10 MG Tab Take 10 mg by mouth nightly.    OXcarbazepine (TRILEPTAL) 150 MG Tab Take 150 mg by mouth 2 (two) times daily.    OXcarbazepine (TRILEPTAL) 300 MG Tab Take 150 mg by mouth 2 (two) times daily.    pantoprazole (PROTONIX) 40 MG tablet Take 1 tablet by mouth once daily.    risperiDONE (RISPERDAL) 1 MG tablet Take 1 mg by mouth 2 (two) times daily.    SITagliptin (JANUVIA) 50 MG Tab Take by mouth once daily.    SSD 1 % cream Apply 1 application topically once daily.    tamsulosin (FLOMAX) 0.4 mg Cap Take 1 capsule by mouth once daily.    amLODIPine (NORVASC) 2.5 MG tablet Take 2.5 mg by mouth once daily.    aspirin 81 MG Chew Take 81 mg by mouth once daily.     hydroCHLOROthiazide (HYDRODIURIL) 25 MG tablet Take 25 mg by mouth 2 (two) times daily.    HYDROcodone-acetaminophen (NORCO) 7.5-325 mg per tablet Take 7.5 tablets by mouth 2 (two) times daily as needed for Pain.    insulin lispro 100 unit/mL injection Inject into the skin 3 (three) times daily before meals. Under 200 units - no units / 200-249 - 2 units / 250-299 3 units / 300-349 4 units / 350-399 6 units / 400 or > 8 units & notify medical    insulin  unit/mL injection Inject 15 Units into the skin once daily.    losartan (COZAAR) 50 MG tablet Take 50 mg by mouth 2 (two) times a day.    metoprolol succinate (TOPROL-XL) 25 MG 24 hr tablet Take 1 tablet by mouth once daily.    polyethylene glycol (GLYCOLAX) 17 gram PwPk Take by mouth.    potassium chloride SA (K-DUR,KLOR-CON) 20 MEQ tablet Take 20 mEq by mouth once daily.    saw palmetto frt/phytosterol 2 (PROSTATE SR ORAL) Take 60 mLs by mouth 2 (two) times daily.    VENTOLIN HFA 90 mcg/actuation inhaler Inhale 2 puffs into the lungs 3 (three) times daily as needed for Shortness of Breath. Use with spacer    verapamiL (CALAN-SR) 120 MG CR tablet Take 120 mg by mouth once daily.     Family History     Family history is unknown by patient.        Tobacco Use    Smoking status: Never Smoker    Smokeless tobacco: Never Used   Substance and Sexual Activity    Alcohol use: Not Currently    Drug use: Never    Sexual activity: Not Currently     Review of Systems   Unable to perform ROS: Dementia     Objective:     Vital Signs (Most Recent):  Temp: 98.7 °F (37.1 °C) (01/27/22 2337)  Pulse: 85 (01/27/22 2337)  Resp: (!) 21 (01/27/22 2337)  BP: 134/62 (01/27/22 2337)  SpO2: 100 % (01/27/22 2337) Vital Signs (24h Range):  Temp:  [97.5 °F (36.4 °C)-98.7 °F (37.1 °C)] 98.7 °F (37.1 °C)  Pulse:  [85] 85  Resp:  [20-21] 21  SpO2:  [100 %] 100 %  BP: (101-134)/(59-62) 134/62     Weight: 97.4 kg (214 lb 11.7 oz)  Body mass index is 31.71 kg/m².    Physical  Exam  Vitals and nursing note reviewed. Exam conducted with a chaperone present.   Constitutional:       Appearance: He is obese. He is not ill-appearing.   HENT:      Head: Atraumatic.      Mouth/Throat:      Mouth: Mucous membranes are moist.      Pharynx: Oropharynx is clear.   Eyes:      Conjunctiva/sclera: Conjunctivae normal.      Pupils: Pupils are equal, round, and reactive to light.   Cardiovascular:      Rate and Rhythm: Normal rate and regular rhythm.      Pulses: Normal pulses.      Heart sounds: Normal heart sounds.   Pulmonary:      Effort: Pulmonary effort is normal.      Breath sounds: Normal breath sounds.   Abdominal:      General: Abdomen is flat. Bowel sounds are normal. There is no distension.      Tenderness: There is no abdominal tenderness. There is no guarding.   Musculoskeletal:         General: Signs of injury present. No tenderness.      Cervical back: Neck supple.      Right lower leg: No edema.      Left lower leg: No edema.   Skin:     General: Skin is warm and dry.      Capillary Refill: Capillary refill takes 2 to 3 seconds.      Coloration: Skin is not jaundiced or pale.      Findings: Lesion present.   Neurological:      Mental Status: He is alert. He is disoriented.           CRANIAL NERVES     CN III, IV, VI   Pupils are equal, round, and reactive to light.       Significant Labs: All pertinent labs within the past 24 hours have been reviewed.    Significant Imaging: I have reviewed all pertinent imaging results/findings within the past 24 hours.

## 2022-01-28 NOTE — H&P
[facility] Medicine  History & Physical    Patient Name: Bhargav Gao  MRN: [id number]  Patient Class: IP- Inpatient  Admission Date: 1/27/2022  Attending Physician: Donato Whitlock MD   Primary Care Provider: Kerry Lin MD         Patient information was obtained from patient, past medical records and ER records.     Subjective:     Principal Problem:Pressure ulcer    Chief Complaint: No chief complaint on file.       HPI: 80 yo (appears younger) M presents to [facility] as a direct admit from [facility] for worsening sacral decubiti.  Patient has been treated as OP by [facility] wound care but is not getting better.  Khan has been placed to help with healing.  Patient is alert but unable to give me any information due to his baseline mental deficiencies.  He says ouch but denies pain.  He shakes his head yes or no but not sure if appropriately answering my questions.  Shook his head no to wanting watch a basketball game on TV but also shook his head no to watching Jessica Josesito dancing around and singing in [employer].  He seemed interested in [employer]  catching alligators but I'm just not sure how much he is comprehending.  He can life all of his ext to gravity but very weak and does not walk or feed himself.      ROS UTO but some mention of constipation in records so will start a stool softner due to his wounds.        Past Medical History:   Diagnosis Date    Alzheimer's disease     Bipolar disorder     BPH (benign prostatic hyperplasia)     Diabetes mellitus     Hyperlipidemia     Hypertension     Hypothyroidism     Idiopathic orofacial dystonia     Iron deficiency anemia secondary to blood loss (chronic)     Mild intellectual disabilities     Obesity     Pressure ulcer     Residual schizophrenia     Schizophrenia        History reviewed. No pertinent surgical history.    Review of patient's allergies indicates:   Allergen Reactions    Metformin     Mycobacterium  tuberculosis (tuberculin ppd)     Norvasc [amlodipine]        No current facility-administered medications on file prior to encounter.     Current Outpatient Medications on File Prior to Encounter   Medication Sig    calcium carbonate/vitamin D3 (CALTRATE 600 + D ORAL) Take by mouth 2 (two) times daily.    furosemide (LASIX) 40 MG tablet Take 40 mg by mouth once daily.    hydrALAZINE (APRESOLINE) 10 MG tablet Take 10 mg by mouth every 12 (twelve) hours.    insulin detemir U-100 (LEVEMIR) 100 unit/mL injection Inject 18 Units into the skin 2 (two) times daily.    levothyroxine (SYNTHROID) 50 MCG tablet Take 50 mcg by mouth before breakfast.    memantine (NAMENDA) 10 MG Tab Take 10 mg by mouth nightly.    OXcarbazepine (TRILEPTAL) 150 MG Tab Take 150 mg by mouth 2 (two) times daily.    OXcarbazepine (TRILEPTAL) 300 MG Tab Take 150 mg by mouth 2 (two) times daily.    pantoprazole (PROTONIX) 40 MG tablet Take 1 tablet by mouth once daily.    risperiDONE (RISPERDAL) 1 MG tablet Take 1 mg by mouth 2 (two) times daily.    SITagliptin (JANUVIA) 50 MG Tab Take by mouth once daily.    SSD 1 % cream Apply 1 application topically once daily.    tamsulosin (FLOMAX) 0.4 mg Cap Take 1 capsule by mouth once daily.    amLODIPine (NORVASC) 2.5 MG tablet Take 2.5 mg by mouth once daily.    aspirin 81 MG Chew Take 81 mg by mouth once daily.    hydroCHLOROthiazide (HYDRODIURIL) 25 MG tablet Take 25 mg by mouth 2 (two) times daily.    HYDROcodone-acetaminophen (NORCO) 7.5-325 mg per tablet Take 7.5 tablets by mouth 2 (two) times daily as needed for Pain.    insulin lispro 100 unit/mL injection Inject into the skin 3 (three) times daily before meals. Under 200 units - no units / 200-249 - 2 units / 250-299 3 units / 300-349 4 units / 350-399 6 units / 400 or > 8 units & notify medical    insulin  unit/mL injection Inject 15 Units into the skin once daily.    losartan (COZAAR) 50 MG tablet Take 50 mg by mouth 2  (two) times a day.    metoprolol succinate (TOPROL-XL) 25 MG 24 hr tablet Take 1 tablet by mouth once daily.    polyethylene glycol (GLYCOLAX) 17 gram PwPk Take by mouth.    potassium chloride SA (K-DUR,KLOR-CON) 20 MEQ tablet Take 20 mEq by mouth once daily.    saw palmetto frt/phytosterol 2 (PROSTATE SR ORAL) Take 60 mLs by mouth 2 (two) times daily.    VENTOLIN HFA 90 mcg/actuation inhaler Inhale 2 puffs into the lungs 3 (three) times daily as needed for Shortness of Breath. Use with spacer    verapamiL (CALAN-SR) 120 MG CR tablet Take 120 mg by mouth once daily.     Family History     Family history is unknown by patient.        Tobacco Use    Smoking status: Never Smoker    Smokeless tobacco: Never Used   Substance and Sexual Activity    Alcohol use: Not Currently    Drug use: Never    Sexual activity: Not Currently     Review of Systems   Unable to perform ROS: Dementia     Objective:     Vital Signs (Most Recent):  Temp: 98.7 °F (37.1 °C) (01/27/22 2337)  Pulse: 85 (01/27/22 2337)  Resp: (!) 21 (01/27/22 2337)  BP: 134/62 (01/27/22 2337)  SpO2: 100 % (01/27/22 2337) Vital Signs (24h Range):  Temp:  [97.5 °F (36.4 °C)-98.7 °F (37.1 °C)] 98.7 °F (37.1 °C)  Pulse:  [85] 85  Resp:  [20-21] 21  SpO2:  [100 %] 100 %  BP: (101-134)/(59-62) 134/62     Weight: 97.4 kg (214 lb 11.7 oz)  Body mass index is 31.71 kg/m².    Physical Exam  Vitals and nursing note reviewed. Exam conducted with a chaperone present.   Constitutional:       Appearance: He is obese. He is not ill-appearing.   HENT:      Head: Atraumatic.      Mouth/Throat:      Mouth: Mucous membranes are moist.      Pharynx: Oropharynx is clear.   Eyes:      Conjunctiva/sclera: Conjunctivae normal.      Pupils: Pupils are equal, round, and reactive to light.   Cardiovascular:      Rate and Rhythm: Normal rate and regular rhythm.      Pulses: Normal pulses.      Heart sounds: Normal heart sounds.   Pulmonary:      Effort: Pulmonary effort is normal.       Breath sounds: Normal breath sounds.   Abdominal:      General: Abdomen is flat. Bowel sounds are normal. There is no distension.      Tenderness: There is no abdominal tenderness. There is no guarding.   Musculoskeletal:         General: Signs of injury present. No tenderness.      Cervical back: Neck supple.      Right lower leg: No edema.      Left lower leg: No edema.   Skin:     General: Skin is warm and dry.      Capillary Refill: Capillary refill takes 2 to 3 seconds.      Coloration: Skin is not jaundiced or pale.      Findings: Lesion present.   Neurological:      Mental Status: He is alert. He is disoriented.           CRANIAL NERVES     CN III, IV, VI   Pupils are equal, round, and reactive to light.       Significant Labs: All pertinent labs within the past 24 hours have been reviewed.    Significant Imaging: I have reviewed all pertinent imaging results/findings within the past 24 hours.    Assessment/Plan:     * Pressure ulcer  IV antibiotics  Wound care    Hypertension  Patient is on multiple antihypertensives.  On both verapamil and amlodipine.  Will stop verapamil.  He is also on HCT, BB, hydralazine and lasix.      Schizophrenia with comorbidity of dementia  Continue currently psychotropic meds.         Diabetes mellitus  Basal/bolus insulin while in hospital  Continue Januvia.            VTE Risk Mitigation (From admission, onward)    None             Linda Davis MD  Department of Hospital Medicine   [facility]

## 2022-01-28 NOTE — HOSPITAL COURSE
1/28 Patient nonverbal with me. Sacral wound. Surgery consult. IV Zosyn  1/29 Patient speaks. Sacral wound. IV Zosyn and IV Vanc. Surgery/Wound consult  1/30 Patient being fed. Sacral wound. IV Zosyn and IV Vanc. Surgery and wound consults  02/07 Resting in bed. Chest is clear.  Appetite is good. Sacral wound looks clean. Antibiotics dcd. Probable dc tomorrow.  02/08 Patient will be discharged today to [facility]. Wounds look good.

## 2022-01-28 NOTE — PLAN OF CARE
[facility]  Initial Discharge Assessment       Primary Care Provider: Kerry Lin MD    Admission Diagnosis: Sacral wound [S31.000A]    Admission Date: 1/27/2022  Expected Discharge Date:     Discharge Barriers Identified: None    Payor: MEDICARE / Plan: MEDICARE PART A & B / Product Type: Government /     No emergency contact information on file.    Discharge Plan A: Return to nursing home  Discharge Plan B: Return to Nursing Home    No Pharmacies Listed    Initial Assessment (most recent)     Adult Discharge Assessment - 01/28/22 1027        Discharge Assessment    Assessment Type Discharge Planning Assessment     Source of Information family     If unable to respond/provide information was family/caregiver contacted? Yes     Contact Name/Number Jennifer dickinson [phone]     Communicated YOSSI with patient/caregiver Date not available/Unable to determine     Lives With facility resident     Do you expect to return to your current living situation? Yes     Equipment Currently Used at Home --   per NH    Discharge Plan A Return to nursing home     Discharge Plan B Return to Nursing Home     DME Needed Upon Discharge  none     Discharge Plan discussed with: --   alok    Discharge Barriers Identified None                 SS spoke with pt's Jennifer dickinson. Pt is a resident at [facility]. Discharge plan is to return back to [facility] at discharge. Password set and is Travis Jimenez Informed of IDT meeting and will try to attend if able. SS following.

## 2022-01-28 NOTE — SUBJECTIVE & OBJECTIVE
Interval History: Patient without complaints.     Review of Systems   Constitutional: Negative for fatigue and fever.   Respiratory: Negative for shortness of breath.    Cardiovascular: Negative for chest pain.   Skin: Positive for wound.   Psychiatric/Behavioral: Positive for confusion.     Objective:     Vital Signs (Most Recent):  Temp: 97.6 °F (36.4 °C) (01/28/22 0400)  Pulse: 93 (01/28/22 0400)  Resp: 20 (01/28/22 0400)  BP: 99/62 (01/28/22 0400)  SpO2: 100 % (01/28/22 0400) Vital Signs (24h Range):  Temp:  [97.5 °F (36.4 °C)-98.7 °F (37.1 °C)] 97.6 °F (36.4 °C)  Pulse:  [85-93] 93  Resp:  [20-21] 20  SpO2:  [100 %] 100 %  BP: ()/(59-62) 99/62     Weight: 97.4 kg (214 lb 11.7 oz)  Body mass index is 31.71 kg/m².    Intake/Output Summary (Last 24 hours) at 1/28/2022 0657  Last data filed at 1/28/2022 0613  Gross per 24 hour   Intake --   Output 703 ml   Net -703 ml      Physical Exam  Vitals and nursing note reviewed.   Constitutional:       Appearance: Normal appearance.   Cardiovascular:      Rate and Rhythm: Regular rhythm.      Heart sounds: Normal heart sounds.   Pulmonary:      Breath sounds: Normal breath sounds.         Significant Labs:   All pertinent labs within the past 24 hours have been reviewed.  BMP:   Recent Labs   Lab 01/26/22  1100   *   *   K 4.1   CL 99   CO2 29   BUN 19*   CREATININE 0.66*   CALCIUM 9.3     CBC:   Recent Labs   Lab 01/26/22  1100   WBC 8.70   HGB 10.5*   HCT 32.4*   *       Significant Imaging: I have reviewed all pertinent imaging results/findings within the past 24 hours.  none

## 2022-01-28 NOTE — NURSING
1900-Rec'd to room 145 via [facility] x 2. Transferred to room bed. On room air  Arms drawn toward body. Able to assist with straighten them out.Hosp. gown applied. Khan patent/draining w/o diff. Duoderms noted to R/L hips for protection. Sacral dsg noted-D/I. Foam boots noted to BLE. Able to follow simple commands/requests. Reposition for comfort. Safety measures on-going    1915-20g jelco inserted to R FA x 1 attempt. Good blood returrn-Flushes w/o -secured with taped. Safety measures on-going      2115-Dr. Davis-on unit-assess pt. No new order

## 2022-01-29 LAB
GLUCOSE SERPL-MCNC: 141 MG/DL (ref 70–105)
GLUCOSE SERPL-MCNC: 160 MG/DL (ref 70–105)
GLUCOSE SERPL-MCNC: 187 MG/DL (ref 70–105)
GLUCOSE SERPL-MCNC: 188 MG/DL (ref 70–105)
VANCOMYCIN TROUGH SERPL-MCNC: 8.7 ΜG/ML (ref 10–20)

## 2022-01-29 PROCEDURE — 11000001 HC ACUTE MED/SURG PRIVATE ROOM

## 2022-01-29 PROCEDURE — 80202 ASSAY OF VANCOMYCIN: CPT | Performed by: FAMILY MEDICINE

## 2022-01-29 PROCEDURE — 63600175 PHARM REV CODE 636 W HCPCS: Performed by: HOSPITALIST

## 2022-01-29 PROCEDURE — 99232 PR SUBSEQUENT HOSPITAL CARE,LEVL II: ICD-10-PCS | Mod: ,,, | Performed by: FAMILY MEDICINE

## 2022-01-29 PROCEDURE — 82962 GLUCOSE BLOOD TEST: CPT

## 2022-01-29 PROCEDURE — C9399 UNCLASSIFIED DRUGS OR BIOLOG: HCPCS | Performed by: FAMILY MEDICINE

## 2022-01-29 PROCEDURE — 25000003 PHARM REV CODE 250: Performed by: HOSPITALIST

## 2022-01-29 PROCEDURE — 99232 SBSQ HOSP IP/OBS MODERATE 35: CPT | Mod: ,,, | Performed by: FAMILY MEDICINE

## 2022-01-29 PROCEDURE — 96372 THER/PROPH/DIAG INJ SC/IM: CPT

## 2022-01-29 PROCEDURE — 63600175 PHARM REV CODE 636 W HCPCS: Performed by: FAMILY MEDICINE

## 2022-01-29 PROCEDURE — 25000003 PHARM REV CODE 250: Performed by: FAMILY MEDICINE

## 2022-01-29 RX ADMIN — RISPERIDONE 1 MG: 1 TABLET ORAL at 09:01

## 2022-01-29 RX ADMIN — POTASSIUM CHLORIDE 20 MEQ: 20 TABLET, EXTENDED RELEASE ORAL at 08:01

## 2022-01-29 RX ADMIN — PANTOPRAZOLE SODIUM 40 MG: 40 TABLET, DELAYED RELEASE ORAL at 08:01

## 2022-01-29 RX ADMIN — OXCARBAZEPINE 150 MG: 150 TABLET, FILM COATED ORAL at 08:01

## 2022-01-29 RX ADMIN — SITAGLIPTIN 50 MG: 25 TABLET, FILM COATED ORAL at 08:01

## 2022-01-29 RX ADMIN — HYDROCHLOROTHIAZIDE 25 MG: 12.5 TABLET ORAL at 08:01

## 2022-01-29 RX ADMIN — METOPROLOL SUCCINATE 25 MG: 25 TABLET, EXTENDED RELEASE ORAL at 08:01

## 2022-01-29 RX ADMIN — LOSARTAN POTASSIUM 50 MG: 50 TABLET, FILM COATED ORAL at 08:01

## 2022-01-29 RX ADMIN — OXCARBAZEPINE 150 MG: 150 TABLET, FILM COATED ORAL at 09:01

## 2022-01-29 RX ADMIN — PIPERACILLIN AND TAZOBACTAM 4.5 G: 4; .5 INJECTION, POWDER, FOR SOLUTION INTRAVENOUS at 03:01

## 2022-01-29 RX ADMIN — LOSARTAN POTASSIUM 50 MG: 50 TABLET, FILM COATED ORAL at 09:01

## 2022-01-29 RX ADMIN — PIPERACILLIN AND TAZOBACTAM 4.5 G: 4; .5 INJECTION, POWDER, FOR SOLUTION INTRAVENOUS at 08:01

## 2022-01-29 RX ADMIN — INSULIN DETEMIR 15 UNITS: 100 INJECTION, SOLUTION SUBCUTANEOUS at 09:01

## 2022-01-29 RX ADMIN — INSULIN ASPART 2 UNITS: 100 INJECTION, SOLUTION INTRAVENOUS; SUBCUTANEOUS at 05:01

## 2022-01-29 RX ADMIN — HYDRALAZINE HYDROCHLORIDE 10 MG: 10 TABLET ORAL at 09:01

## 2022-01-29 RX ADMIN — VANCOMYCIN HYDROCHLORIDE 2000 MG: 1 INJECTION, POWDER, LYOPHILIZED, FOR SOLUTION INTRAVENOUS at 02:01

## 2022-01-29 RX ADMIN — LEVOTHYROXINE SODIUM 50 MCG: 25 TABLET ORAL at 05:01

## 2022-01-29 RX ADMIN — INSULIN ASPART 1 UNITS: 100 INJECTION, SOLUTION INTRAVENOUS; SUBCUTANEOUS at 10:01

## 2022-01-29 RX ADMIN — ASPIRIN 81 MG CHEWABLE TABLET 81 MG: 81 TABLET CHEWABLE at 08:01

## 2022-01-29 RX ADMIN — MEMANTINE 10 MG: 5 TABLET ORAL at 09:01

## 2022-01-29 RX ADMIN — HYDROCHLOROTHIAZIDE 25 MG: 12.5 TABLET ORAL at 09:01

## 2022-01-29 RX ADMIN — RISPERIDONE 1 MG: 1 TABLET ORAL at 08:01

## 2022-01-29 RX ADMIN — HYDRALAZINE HYDROCHLORIDE 10 MG: 10 TABLET ORAL at 08:01

## 2022-01-29 RX ADMIN — PIPERACILLIN AND TAZOBACTAM 4.5 G: 4; .5 INJECTION, POWDER, FOR SOLUTION INTRAVENOUS at 10:01

## 2022-01-29 RX ADMIN — TAMSULOSIN HYDROCHLORIDE 0.4 MG: 0.4 CAPSULE ORAL at 08:01

## 2022-01-29 RX ADMIN — INSULIN ASPART 2 UNITS: 100 INJECTION, SOLUTION INTRAVENOUS; SUBCUTANEOUS at 01:01

## 2022-01-29 RX ADMIN — VANCOMYCIN HYDROCHLORIDE 2000 MG: 1 INJECTION, POWDER, LYOPHILIZED, FOR SOLUTION INTRAVENOUS at 09:01

## 2022-01-29 NOTE — PLAN OF CARE
Problem: Impaired Wound Healing  Goal: Optimal Wound Healing  Outcome: Ongoing, Progressing     Problem: Skin Injury Risk Increased  Goal: Skin Health and Integrity  Outcome: Ongoing, Progressing     Problem: Adult Inpatient Plan of Care  Goal: Plan of Care Review  Outcome: Ongoing, Progressing

## 2022-01-29 NOTE — PROGRESS NOTES
[facility] Medicine  Progress Note    Patient Name: Bhargav Gao  MRN: [id number]  Patient Class: IP- Inpatient   Admission Date: 1/27/2022  Length of Stay: 2 days  Attending Physician: Donato Whitlock MD  Primary Care Provider: Kerry Lin MD        Subjective:     Principal Problem:Pressure ulcer        HPI:  82 yo (appears younger) M presents to [facility] as a direct admit from [facility] for worsening sacral decubiti.  Patient has been treated as OP by [facility] wound care but is not getting better.  Khan has been placed to help with healing.  Patient is alert but unable to give me any information due to his baseline mental deficiencies.  He says ouch but denies pain.  He shakes his head yes or no but not sure if appropriately answering my questions.  Shook his head no to wanting watch a basketball game on TV but also shook his head no to watching JessicaBlued dancing around and singing in [employer].  He seemed interested in [employer]  catching alligators but I'm just not sure how much he is comprehending.  He can life all of his ext to gravity but very weak and does not walk or feed himself.      ROS UTO but some mention of constipation in records so will start a stool softner due to his wounds.        Overview/Hospital Course:  1/28 Patient nonverbal with me. Sacral wound. Surgery consult. IV Zosyn  1/29 Patient speaks. Sacral wound. IV Zosyn and IV Vanc. Surgery/Wound consult      Interval History: Patient speaks.     Review of Systems   Constitutional: Negative for fatigue and fever.   Respiratory: Negative for shortness of breath.    Cardiovascular: Negative for chest pain.   Skin: Positive for wound.     Objective:     Vital Signs (Most Recent):  Temp: 98.2 °F (36.8 °C) (01/29/22 0439)  Pulse: 65 (01/29/22 0439)  Resp: 20 (01/29/22 0439)  BP: 117/67 (01/29/22 0439)  SpO2: 99 % (01/29/22 0439) Vital Signs (24h Range):  Temp:  [97.8 °F (36.6 °C)-98.6 °F (37 °C)] 98.2 °F (36.8  °C)  Pulse:  [] 65  Resp:  [18-20] 20  SpO2:  [98 %-99 %] 99 %  BP: (100-122)/(50-77) 117/67     Weight: 97.4 kg (214 lb 11.7 oz)  Body mass index is 31.71 kg/m².    Intake/Output Summary (Last 24 hours) at 1/29/2022 0825  Last data filed at 1/28/2022 1900  Gross per 24 hour   Intake 1210 ml   Output 300 ml   Net 910 ml      Physical Exam  Vitals and nursing note reviewed.   Constitutional:       Appearance: Normal appearance.   Cardiovascular:      Rate and Rhythm: Regular rhythm.   Pulmonary:      Breath sounds: Normal breath sounds.   Musculoskeletal:         General: Deformity present.      Comments: Upper extremity contracture         Significant Labs:   All pertinent labs within the past 24 hours have been reviewed.  BMP:   Recent Labs   Lab 01/28/22  0855   *      K 4.5      CO2 27   BUN 13   CREATININE 0.80   CALCIUM 9.9     CBC:   Recent Labs   Lab 01/28/22  0856   WBC 13.79*   HGB 11.7*   HCT 35.2*          Significant Imaging: I have reviewed all pertinent imaging results/findings within the past 24 hours.  none      Assessment/Plan:      * Pressure ulcer  IV antibiotics  Wound care        Schizophrenia  Continue psychotropic medication        Hypertension  Patient on multiple antihypertensive agents.  On both amlodopine and verapamil.  Will stop verapamil with patient on BB.        Diabetes mellitus  Basal/bolus insulin while in hospital        Alzheimer's disease  Continue psychotropic meds        VTE Risk Mitigation (From admission, onward)    None          Discharge Planning   YOSSI:      Code Status: DNR   Is the patient medically ready for discharge?:     Reason for patient still in hospital (select all that apply): Other (specify) IV antibiotics, Wound care  Discharge Plan A: Return to nursing home                  Donato Whitlock MD  Department of Hospital Medicine   [facility]

## 2022-01-29 NOTE — PLAN OF CARE
Problem: Impaired Wound Healing  Goal: Optimal Wound Healing  Outcome: Ongoing, Progressing     Problem: Adult Inpatient Plan of Care  Goal: Plan of Care Review  Outcome: Ongoing, Progressing     Problem: Diabetes Comorbidity  Goal: Blood Glucose Level Within Targeted Range  Outcome: Ongoing, Progressing

## 2022-01-29 NOTE — SUBJECTIVE & OBJECTIVE
Interval History: Patient speaks.     Review of Systems   Constitutional: Negative for fatigue and fever.   Respiratory: Negative for shortness of breath.    Cardiovascular: Negative for chest pain.   Skin: Positive for wound.     Objective:     Vital Signs (Most Recent):  Temp: 98.2 °F (36.8 °C) (01/29/22 0439)  Pulse: 65 (01/29/22 0439)  Resp: 20 (01/29/22 0439)  BP: 117/67 (01/29/22 0439)  SpO2: 99 % (01/29/22 0439) Vital Signs (24h Range):  Temp:  [97.8 °F (36.6 °C)-98.6 °F (37 °C)] 98.2 °F (36.8 °C)  Pulse:  [] 65  Resp:  [18-20] 20  SpO2:  [98 %-99 %] 99 %  BP: (100-122)/(50-77) 117/67     Weight: 97.4 kg (214 lb 11.7 oz)  Body mass index is 31.71 kg/m².    Intake/Output Summary (Last 24 hours) at 1/29/2022 0825  Last data filed at 1/28/2022 1900  Gross per 24 hour   Intake 1210 ml   Output 300 ml   Net 910 ml      Physical Exam  Vitals and nursing note reviewed.   Constitutional:       Appearance: Normal appearance.   Cardiovascular:      Rate and Rhythm: Regular rhythm.   Pulmonary:      Breath sounds: Normal breath sounds.   Musculoskeletal:         General: Deformity present.      Comments: Upper extremity contracture         Significant Labs:   All pertinent labs within the past 24 hours have been reviewed.  BMP:   Recent Labs   Lab 01/28/22  0855   *      K 4.5      CO2 27   BUN 13   CREATININE 0.80   CALCIUM 9.9     CBC:   Recent Labs   Lab 01/28/22  0856   WBC 13.79*   HGB 11.7*   HCT 35.2*          Significant Imaging: I have reviewed all pertinent imaging results/findings within the past 24 hours.  none

## 2022-01-30 LAB
ANION GAP SERPL CALCULATED.3IONS-SCNC: 10 MMOL/L (ref 7–16)
BASOPHILS # BLD AUTO: 0.03 K/UL (ref 0–0.2)
BASOPHILS NFR BLD AUTO: 0.4 % (ref 0–1)
BUN SERPL-MCNC: 8 MG/DL (ref 7–18)
BUN/CREAT SERPL: 16 (ref 6–20)
CALCIUM SERPL-MCNC: 8.7 MG/DL (ref 8.5–10.1)
CHLORIDE SERPL-SCNC: 99 MMOL/L (ref 98–107)
CO2 SERPL-SCNC: 28 MMOL/L (ref 21–32)
CREAT SERPL-MCNC: 0.51 MG/DL (ref 0.7–1.3)
DIFFERENTIAL METHOD BLD: ABNORMAL
EOSINOPHIL # BLD AUTO: 0.26 K/UL (ref 0–0.5)
EOSINOPHIL NFR BLD AUTO: 3.4 % (ref 1–4)
ERYTHROCYTE [DISTWIDTH] IN BLOOD BY AUTOMATED COUNT: 14.3 % (ref 11.5–14.5)
GLUCOSE SERPL-MCNC: 121 MG/DL (ref 74–106)
GLUCOSE SERPL-MCNC: 139 MG/DL (ref 70–105)
GLUCOSE SERPL-MCNC: 179 MG/DL (ref 70–105)
GLUCOSE SERPL-MCNC: 201 MG/DL (ref 70–105)
GLUCOSE SERPL-MCNC: 234 MG/DL (ref 70–105)
HCT VFR BLD AUTO: 32.2 % (ref 40–54)
HGB BLD-MCNC: 10.7 G/DL (ref 13.5–18)
IMM GRANULOCYTES # BLD AUTO: 0.06 K/UL (ref 0–0.04)
IMM GRANULOCYTES NFR BLD: 0.8 % (ref 0–0.4)
LYMPHOCYTES # BLD AUTO: 2.14 K/UL (ref 1–4.8)
LYMPHOCYTES NFR BLD AUTO: 28.3 % (ref 27–41)
MCH RBC QN AUTO: 28.3 PG (ref 27–31)
MCHC RBC AUTO-ENTMCNC: 33.2 G/DL (ref 32–36)
MCV RBC AUTO: 85.2 FL (ref 80–96)
MONOCYTES # BLD AUTO: 0.7 K/UL (ref 0–0.8)
MONOCYTES NFR BLD AUTO: 9.2 % (ref 2–6)
MPC BLD CALC-MCNC: 8.3 FL (ref 9.4–12.4)
NEUTROPHILS # BLD AUTO: 4.38 K/UL (ref 1.8–7.7)
NEUTROPHILS NFR BLD AUTO: 57.9 % (ref 53–65)
NRBC # BLD AUTO: 0 X10E3/UL
NRBC, AUTO (.00): 0 %
PLATELET # BLD AUTO: 405 K/UL (ref 150–400)
POTASSIUM SERPL-SCNC: 3.6 MMOL/L (ref 3.5–5.1)
RBC # BLD AUTO: 3.78 M/UL (ref 4.6–6.2)
SODIUM SERPL-SCNC: 133 MMOL/L (ref 136–145)
WBC # BLD AUTO: 7.57 K/UL (ref 4.5–11)

## 2022-01-30 PROCEDURE — 99232 PR SUBSEQUENT HOSPITAL CARE,LEVL II: ICD-10-PCS | Mod: ,,, | Performed by: FAMILY MEDICINE

## 2022-01-30 PROCEDURE — 99232 SBSQ HOSP IP/OBS MODERATE 35: CPT | Mod: ,,, | Performed by: FAMILY MEDICINE

## 2022-01-30 PROCEDURE — 85025 COMPLETE CBC W/AUTO DIFF WBC: CPT | Performed by: FAMILY MEDICINE

## 2022-01-30 PROCEDURE — 25000003 PHARM REV CODE 250: Performed by: FAMILY MEDICINE

## 2022-01-30 PROCEDURE — 36415 COLL VENOUS BLD VENIPUNCTURE: CPT | Performed by: FAMILY MEDICINE

## 2022-01-30 PROCEDURE — 63600175 PHARM REV CODE 636 W HCPCS: Performed by: FAMILY MEDICINE

## 2022-01-30 PROCEDURE — C9399 UNCLASSIFIED DRUGS OR BIOLOG: HCPCS | Performed by: FAMILY MEDICINE

## 2022-01-30 PROCEDURE — 80048 BASIC METABOLIC PNL TOTAL CA: CPT | Performed by: FAMILY MEDICINE

## 2022-01-30 PROCEDURE — 63600175 PHARM REV CODE 636 W HCPCS: Performed by: HOSPITALIST

## 2022-01-30 PROCEDURE — 11000001 HC ACUTE MED/SURG PRIVATE ROOM

## 2022-01-30 PROCEDURE — 82962 GLUCOSE BLOOD TEST: CPT

## 2022-01-30 PROCEDURE — 96372 THER/PROPH/DIAG INJ SC/IM: CPT

## 2022-01-30 PROCEDURE — 25000003 PHARM REV CODE 250: Performed by: HOSPITALIST

## 2022-01-30 RX ADMIN — VANCOMYCIN HYDROCHLORIDE 2000 MG: 1 INJECTION, POWDER, LYOPHILIZED, FOR SOLUTION INTRAVENOUS at 10:01

## 2022-01-30 RX ADMIN — RISPERIDONE 1 MG: 1 TABLET ORAL at 08:01

## 2022-01-30 RX ADMIN — OXCARBAZEPINE 150 MG: 150 TABLET, FILM COATED ORAL at 09:01

## 2022-01-30 RX ADMIN — MEMANTINE 10 MG: 5 TABLET ORAL at 09:01

## 2022-01-30 RX ADMIN — METOPROLOL SUCCINATE 25 MG: 25 TABLET, EXTENDED RELEASE ORAL at 08:01

## 2022-01-30 RX ADMIN — PIPERACILLIN AND TAZOBACTAM 4.5 G: 4; .5 INJECTION, POWDER, FOR SOLUTION INTRAVENOUS at 10:01

## 2022-01-30 RX ADMIN — HYDRALAZINE HYDROCHLORIDE 10 MG: 10 TABLET ORAL at 08:01

## 2022-01-30 RX ADMIN — LOSARTAN POTASSIUM 50 MG: 50 TABLET, FILM COATED ORAL at 09:01

## 2022-01-30 RX ADMIN — PIPERACILLIN AND TAZOBACTAM 4.5 G: 4; .5 INJECTION, POWDER, FOR SOLUTION INTRAVENOUS at 03:01

## 2022-01-30 RX ADMIN — HYDROCHLOROTHIAZIDE 25 MG: 12.5 TABLET ORAL at 08:01

## 2022-01-30 RX ADMIN — LEVOTHYROXINE SODIUM 50 MCG: 25 TABLET ORAL at 05:01

## 2022-01-30 RX ADMIN — VANCOMYCIN HYDROCHLORIDE 2000 MG: 1 INJECTION, POWDER, LYOPHILIZED, FOR SOLUTION INTRAVENOUS at 09:01

## 2022-01-30 RX ADMIN — TAMSULOSIN HYDROCHLORIDE 0.4 MG: 0.4 CAPSULE ORAL at 08:01

## 2022-01-30 RX ADMIN — PIPERACILLIN AND TAZOBACTAM 4.5 G: 4; .5 INJECTION, POWDER, FOR SOLUTION INTRAVENOUS at 05:01

## 2022-01-30 RX ADMIN — INSULIN ASPART 2 UNITS: 100 INJECTION, SOLUTION INTRAVENOUS; SUBCUTANEOUS at 09:01

## 2022-01-30 RX ADMIN — LOSARTAN POTASSIUM 50 MG: 50 TABLET, FILM COATED ORAL at 08:01

## 2022-01-30 RX ADMIN — POTASSIUM CHLORIDE 20 MEQ: 20 TABLET, EXTENDED RELEASE ORAL at 08:01

## 2022-01-30 RX ADMIN — HYDROCHLOROTHIAZIDE 25 MG: 12.5 TABLET ORAL at 09:01

## 2022-01-30 RX ADMIN — INSULIN ASPART 2 UNITS: 100 INJECTION, SOLUTION INTRAVENOUS; SUBCUTANEOUS at 12:01

## 2022-01-30 RX ADMIN — PANTOPRAZOLE SODIUM 40 MG: 40 TABLET, DELAYED RELEASE ORAL at 08:01

## 2022-01-30 RX ADMIN — OXCARBAZEPINE 150 MG: 150 TABLET, FILM COATED ORAL at 08:01

## 2022-01-30 RX ADMIN — ASPIRIN 81 MG CHEWABLE TABLET 81 MG: 81 TABLET CHEWABLE at 08:01

## 2022-01-30 RX ADMIN — SITAGLIPTIN 50 MG: 25 TABLET, FILM COATED ORAL at 08:01

## 2022-01-30 RX ADMIN — INSULIN ASPART 4 UNITS: 100 INJECTION, SOLUTION INTRAVENOUS; SUBCUTANEOUS at 04:01

## 2022-01-30 RX ADMIN — RISPERIDONE 1 MG: 1 TABLET ORAL at 09:01

## 2022-01-30 RX ADMIN — INSULIN DETEMIR 15 UNITS: 100 INJECTION, SOLUTION SUBCUTANEOUS at 09:01

## 2022-01-30 RX ADMIN — HYDRALAZINE HYDROCHLORIDE 10 MG: 10 TABLET ORAL at 09:01

## 2022-01-30 NOTE — PLAN OF CARE
Problem: Impaired Wound Healing  Goal: Optimal Wound Healing  Outcome: Ongoing, Progressing     Problem: Skin Injury Risk Increased  Goal: Skin Health and Integrity  Outcome: Ongoing, Progressing     Problem: Fall Injury Risk  Goal: Absence of Fall and Fall-Related Injury  Outcome: Ongoing, Progressing     Problem: Infection  Goal: Absence of Infection Signs and Symptoms  Outcome: Ongoing, Progressing

## 2022-01-30 NOTE — SUBJECTIVE & OBJECTIVE
Interval History: Patient is nonverbal    Review of Systems   Constitutional: Negative for fatigue and fever.   Respiratory: Negative for shortness of breath.    Cardiovascular: Negative for chest pain.     Objective:     Vital Signs (Most Recent):  Temp: 97.9 °F (36.6 °C) (01/30/22 0410)  Pulse: (!) 57 (01/30/22 0822)  Resp: 18 (01/30/22 0410)  BP: (!) 144/75 (01/30/22 0822)  SpO2: 99 % (01/30/22 0410) Vital Signs (24h Range):  Temp:  [97.6 °F (36.4 °C)-98.9 °F (37.2 °C)] 97.9 °F (36.6 °C)  Pulse:  [57-81] 57  Resp:  [18-20] 18  SpO2:  [99 %-100 %] 99 %  BP: (117-165)/(58-86) 144/75     Weight: 97.4 kg (214 lb 11.7 oz)  Body mass index is 31.71 kg/m².    Intake/Output Summary (Last 24 hours) at 1/30/2022 0901  Last data filed at 1/30/2022 0300  Gross per 24 hour   Intake 480 ml   Output 2200 ml   Net -1720 ml      Physical Exam  Vitals and nursing note reviewed.   Constitutional:       Appearance: Normal appearance.   Cardiovascular:      Rate and Rhythm: Regular rhythm.      Heart sounds: Normal heart sounds.   Musculoskeletal:         General: Deformity present.      Comments: Upper extremity contracture         Significant Labs:   All pertinent labs within the past 24 hours have been reviewed.  BMP:   Recent Labs   Lab 01/30/22  0715   *   *   K 3.6   CL 99   CO2 28   BUN 8   CREATININE 0.51*   CALCIUM 8.7     CBC: No results for input(s): WBC, HGB, HCT, PLT in the last 48 hours.    Significant Imaging: I have reviewed all pertinent imaging results/findings within the past 24 hours.  none

## 2022-01-30 NOTE — PROGRESS NOTES
Pharmacy assisting in the management of vancomycin therapy for this 81 year-old male with suspected skin and soft tissue infection. Goal trough is 10-15 mcg/mL. Current estimated CrCl ~83, Scr=0.8, BUN=13. Patient was started on vancomycin 2000 mg (20 mg/kg) every 18 hours. Trough resulted 1/29 PM as 8.7. Will increase vancomycin frequency to 2000 mg IV every 12 hours. Trough ordered for 1/31 at 2100 before 4th dose of new interval. Pharmacy will continue to follow and make necessary adjustments.     Lilian Granados, PharmD  Ext. [phone]

## 2022-01-30 NOTE — PLAN OF CARE
Problem: Impaired Wound Healing  Goal: Optimal Wound Healing  Outcome: Ongoing, Progressing     Problem: Skin Injury Risk Increased  Goal: Skin Health and Integrity  Outcome: Ongoing, Progressing  Intervention: Optimize Skin Protection  Flowsheets (Taken 1/30/2022 8559)  Pressure Reduction Techniques: heels elevated off bed  Pressure Reduction Devices: positioning supports utilized  Skin Protection:   incontinence pads utilized   adhesive use limited   tubing/devices free from skin contact  Head of Bed (HOB) Positioning: HOB at 30 degrees     Problem: Adult Inpatient Plan of Care  Goal: Absence of Hospital-Acquired Illness or Injury  Outcome: Ongoing, Progressing

## 2022-01-30 NOTE — PROGRESS NOTES
[facility] Medicine  Progress Note    Patient Name: Bhargav Gao  MRN: [id number]  Patient Class: IP- Inpatient   Admission Date: 1/27/2022  Length of Stay: 3 days  Attending Physician: Donato Whitlock MD  Primary Care Provider: Kerry Lin MD        Subjective:     Principal Problem:Pressure ulcer        HPI:  80 yo (appears younger) M presents to [facility] as a direct admit from [facility] for worsening sacral decubiti.  Patient has been treated as OP by [facility] wound care but is not getting better.  Khan has been placed to help with healing.  Patient is alert but unable to give me any information due to his baseline mental deficiencies.  He says ouch but denies pain.  He shakes his head yes or no but not sure if appropriately answering my questions.  Shook his head no to wanting watch a basketball game on TV but also shook his head no to watching [employer] dancing around and singing in [employer].  He seemed interested in [employer]  catching alligators but I'm just not sure how much he is comprehending.  He can life all of his ext to gravity but very weak and does not walk or feed himself.      ROS UTO but some mention of constipation in records so will start a stool softner due to his wounds.        Overview/Hospital Course:  1/28 Patient nonverbal with me. Sacral wound. Surgery consult. IV Zosyn  1/29 Patient speaks. Sacral wound. IV Zosyn and IV Vanc. Surgery/Wound consult  1/30 Patient being fed. Sacral wound. IV Zosyn and IV Vanc. Surgery and wound consults      Interval History: Patient is nonverbal    Review of Systems   Constitutional: Negative for fatigue and fever.   Respiratory: Negative for shortness of breath.    Cardiovascular: Negative for chest pain.     Objective:     Vital Signs (Most Recent):  Temp: 97.9 °F (36.6 °C) (01/30/22 0410)  Pulse: (!) 57 (01/30/22 0822)  Resp: 18 (01/30/22 0410)  BP: (!) 144/75 (01/30/22 0822)  SpO2: 99 % (01/30/22 0410) Vital Signs  (24h Range):  Temp:  [97.6 °F (36.4 °C)-98.9 °F (37.2 °C)] 97.9 °F (36.6 °C)  Pulse:  [57-81] 57  Resp:  [18-20] 18  SpO2:  [99 %-100 %] 99 %  BP: (117-165)/(58-86) 144/75     Weight: 97.4 kg (214 lb 11.7 oz)  Body mass index is 31.71 kg/m².    Intake/Output Summary (Last 24 hours) at 1/30/2022 0901  Last data filed at 1/30/2022 0300  Gross per 24 hour   Intake 480 ml   Output 2200 ml   Net -1720 ml      Physical Exam  Vitals and nursing note reviewed.   Constitutional:       Appearance: Normal appearance.   Cardiovascular:      Rate and Rhythm: Regular rhythm.      Heart sounds: Normal heart sounds.   Musculoskeletal:         General: Deformity present.      Comments: Upper extremity contracture         Significant Labs:   All pertinent labs within the past 24 hours have been reviewed.  BMP:   Recent Labs   Lab 01/30/22  0715   *   *   K 3.6   CL 99   CO2 28   BUN 8   CREATININE 0.51*   CALCIUM 8.7     CBC: No results for input(s): WBC, HGB, HCT, PLT in the last 48 hours.    Significant Imaging: I have reviewed all pertinent imaging results/findings within the past 24 hours.  none      Assessment/Plan:      * Pressure ulcer  IV antibiotics  Wound care        Schizophrenia  Continue psychotropic medication        Hypertension  Patient on multiple antihypertensive agents.  On both amlodopine and verapamil.  Will stop verapamil with patient on BB.        Diabetes mellitus  Basal/bolus insulin while in hospital        Alzheimer's disease  Continue psychotropic meds        VTE Risk Mitigation (From admission, onward)    None          Discharge Planning   YOSSI:      Code Status: DNR   Is the patient medically ready for discharge?:     Reason for patient still in hospital (select all that apply): Other (specify) IV antibiotics and wound care.   Discharge Plan A: Return to nursing home                  Donato Whitlock MD  Department of Hospital Medicine   [facility]

## 2022-01-31 LAB
ANION GAP SERPL CALCULATED.3IONS-SCNC: 12 MMOL/L (ref 7–16)
BASOPHILS # BLD AUTO: 0.03 K/UL (ref 0–0.2)
BASOPHILS NFR BLD AUTO: 0.3 % (ref 0–1)
BUN SERPL-MCNC: 7 MG/DL (ref 7–18)
BUN/CREAT SERPL: 13 (ref 6–20)
CALCIUM SERPL-MCNC: 9 MG/DL (ref 8.5–10.1)
CHLORIDE SERPL-SCNC: 97 MMOL/L (ref 98–107)
CO2 SERPL-SCNC: 27 MMOL/L (ref 21–32)
CREAT SERPL-MCNC: 0.52 MG/DL (ref 0.7–1.3)
DIFFERENTIAL METHOD BLD: ABNORMAL
EOSINOPHIL # BLD AUTO: 0.19 K/UL (ref 0–0.5)
EOSINOPHIL NFR BLD AUTO: 2.2 % (ref 1–4)
ERYTHROCYTE [DISTWIDTH] IN BLOOD BY AUTOMATED COUNT: 14.2 % (ref 11.5–14.5)
GLUCOSE SERPL-MCNC: 152 MG/DL (ref 74–106)
GLUCOSE SERPL-MCNC: 161 MG/DL (ref 70–105)
GLUCOSE SERPL-MCNC: 182 MG/DL (ref 70–105)
GLUCOSE SERPL-MCNC: 201 MG/DL (ref 70–105)
GLUCOSE SERPL-MCNC: 256 MG/DL (ref 70–105)
HCT VFR BLD AUTO: 32.2 % (ref 40–54)
HGB BLD-MCNC: 10.5 G/DL (ref 13.5–18)
IMM GRANULOCYTES # BLD AUTO: 0.05 K/UL (ref 0–0.04)
IMM GRANULOCYTES NFR BLD: 0.6 % (ref 0–0.4)
LYMPHOCYTES # BLD AUTO: 2.61 K/UL (ref 1–4.8)
LYMPHOCYTES NFR BLD AUTO: 29.7 % (ref 27–41)
MCH RBC QN AUTO: 28.2 PG (ref 27–31)
MCHC RBC AUTO-ENTMCNC: 32.6 G/DL (ref 32–36)
MCV RBC AUTO: 86.6 FL (ref 80–96)
MONOCYTES # BLD AUTO: 0.83 K/UL (ref 0–0.8)
MONOCYTES NFR BLD AUTO: 9.4 % (ref 2–6)
MPC BLD CALC-MCNC: 8 FL (ref 9.4–12.4)
NEUTROPHILS # BLD AUTO: 5.09 K/UL (ref 1.8–7.7)
NEUTROPHILS NFR BLD AUTO: 57.8 % (ref 53–65)
NRBC # BLD AUTO: 0 X10E3/UL
NRBC, AUTO (.00): 0 %
PLATELET # BLD AUTO: 406 K/UL (ref 150–400)
POTASSIUM SERPL-SCNC: 3.9 MMOL/L (ref 3.5–5.1)
RBC # BLD AUTO: 3.72 M/UL (ref 4.6–6.2)
SODIUM SERPL-SCNC: 132 MMOL/L (ref 136–145)
WBC # BLD AUTO: 8.8 K/UL (ref 4.5–11)

## 2022-01-31 PROCEDURE — 85025 COMPLETE CBC W/AUTO DIFF WBC: CPT | Performed by: FAMILY MEDICINE

## 2022-01-31 PROCEDURE — 80048 BASIC METABOLIC PNL TOTAL CA: CPT | Performed by: FAMILY MEDICINE

## 2022-01-31 PROCEDURE — 99233 SBSQ HOSP IP/OBS HIGH 50: CPT | Mod: ,,, | Performed by: INTERNAL MEDICINE

## 2022-01-31 PROCEDURE — 63600175 PHARM REV CODE 636 W HCPCS: Performed by: HOSPITALIST

## 2022-01-31 PROCEDURE — 63600175 PHARM REV CODE 636 W HCPCS: Performed by: FAMILY MEDICINE

## 2022-01-31 PROCEDURE — 25000003 PHARM REV CODE 250: Performed by: FAMILY MEDICINE

## 2022-01-31 PROCEDURE — 36415 COLL VENOUS BLD VENIPUNCTURE: CPT | Performed by: FAMILY MEDICINE

## 2022-01-31 PROCEDURE — 11000001 HC ACUTE MED/SURG PRIVATE ROOM

## 2022-01-31 PROCEDURE — C9399 UNCLASSIFIED DRUGS OR BIOLOG: HCPCS | Performed by: FAMILY MEDICINE

## 2022-01-31 PROCEDURE — 25000003 PHARM REV CODE 250: Performed by: HOSPITALIST

## 2022-01-31 PROCEDURE — 99233 PR SUBSEQUENT HOSPITAL CARE,LEVL III: ICD-10-PCS | Mod: ,,, | Performed by: INTERNAL MEDICINE

## 2022-01-31 PROCEDURE — 82962 GLUCOSE BLOOD TEST: CPT

## 2022-01-31 RX ADMIN — LOSARTAN POTASSIUM 50 MG: 50 TABLET, FILM COATED ORAL at 08:01

## 2022-01-31 RX ADMIN — PIPERACILLIN AND TAZOBACTAM 4.5 G: 4; .5 INJECTION, POWDER, FOR SOLUTION INTRAVENOUS at 10:01

## 2022-01-31 RX ADMIN — INSULIN ASPART 3 UNITS: 100 INJECTION, SOLUTION INTRAVENOUS; SUBCUTANEOUS at 08:01

## 2022-01-31 RX ADMIN — RISPERIDONE 1 MG: 1 TABLET ORAL at 09:01

## 2022-01-31 RX ADMIN — TAMSULOSIN HYDROCHLORIDE 0.4 MG: 0.4 CAPSULE ORAL at 08:01

## 2022-01-31 RX ADMIN — HYDRALAZINE HYDROCHLORIDE 10 MG: 10 TABLET ORAL at 08:01

## 2022-01-31 RX ADMIN — PANTOPRAZOLE SODIUM 40 MG: 40 TABLET, DELAYED RELEASE ORAL at 08:01

## 2022-01-31 RX ADMIN — PIPERACILLIN AND TAZOBACTAM 4.5 G: 4; .5 INJECTION, POWDER, FOR SOLUTION INTRAVENOUS at 02:01

## 2022-01-31 RX ADMIN — VANCOMYCIN HYDROCHLORIDE 2000 MG: 1 INJECTION, POWDER, LYOPHILIZED, FOR SOLUTION INTRAVENOUS at 11:01

## 2022-01-31 RX ADMIN — INSULIN ASPART 2 UNITS: 100 INJECTION, SOLUTION INTRAVENOUS; SUBCUTANEOUS at 05:01

## 2022-01-31 RX ADMIN — ASPIRIN 81 MG CHEWABLE TABLET 81 MG: 81 TABLET CHEWABLE at 08:01

## 2022-01-31 RX ADMIN — HYDROCHLOROTHIAZIDE 25 MG: 12.5 TABLET ORAL at 08:01

## 2022-01-31 RX ADMIN — POTASSIUM CHLORIDE 20 MEQ: 20 TABLET, EXTENDED RELEASE ORAL at 08:01

## 2022-01-31 RX ADMIN — INSULIN ASPART 4 UNITS: 100 INJECTION, SOLUTION INTRAVENOUS; SUBCUTANEOUS at 06:01

## 2022-01-31 RX ADMIN — PIPERACILLIN AND TAZOBACTAM 4.5 G: 4; .5 INJECTION, POWDER, FOR SOLUTION INTRAVENOUS at 05:01

## 2022-01-31 RX ADMIN — INSULIN DETEMIR 15 UNITS: 100 INJECTION, SOLUTION SUBCUTANEOUS at 08:01

## 2022-01-31 RX ADMIN — OXCARBAZEPINE 150 MG: 150 TABLET, FILM COATED ORAL at 08:01

## 2022-01-31 RX ADMIN — MEMANTINE 10 MG: 5 TABLET ORAL at 08:01

## 2022-01-31 RX ADMIN — RISPERIDONE 1 MG: 1 TABLET ORAL at 08:01

## 2022-01-31 RX ADMIN — LEVOTHYROXINE SODIUM 50 MCG: 25 TABLET ORAL at 05:01

## 2022-01-31 RX ADMIN — METOPROLOL SUCCINATE 25 MG: 25 TABLET, EXTENDED RELEASE ORAL at 08:01

## 2022-01-31 RX ADMIN — SITAGLIPTIN 50 MG: 25 TABLET, FILM COATED ORAL at 08:01

## 2022-01-31 NOTE — PROGRESS NOTES
[facility] Medicine  Progress Note    Patient Name: Bhargav Gao  MRN: [id number]  Patient Class: IP- Inpatient   Admission Date: 1/27/2022  Length of Stay: 4 days  Attending Physician: Duyen Bermudez,*  Primary Care Provider: Kerry Lin MD        Subjective:     Principal Problem:Pressure ulcer        HPI:  80 yo (appears younger) M presents to [facility] as a direct admit from [facility] for worsening sacral decubiti.  Patient has been treated as OP by [facility] wound care but is not getting better.  Khan has been placed to help with healing.  Patient is alert but unable to give me any information due to his baseline mental deficiencies.  He says ouch but denies pain.  He shakes his head yes or no but not sure if appropriately answering my questions.  Shook his head no to wanting watch a basketball game on TV but also shook his head no to watching [employer] dancing around and singing in [employer].  He seemed interested in [employer]  catching alligators but I'm just not sure how much he is comprehending.  He can life all of his ext to gravity but very weak and does not walk or feed himself.      ROS UTO but some mention of constipation in records so will start a stool softner due to his wounds.        Overview/Hospital Course:  1/28 Patient nonverbal with me. Sacral wound. Surgery consult. IV Zosyn  1/29 Patient speaks. Sacral wound. IV Zosyn and IV Vanc. Surgery/Wound consult  1/30 Patient being fed. Sacral wound. IV Zosyn and IV Vanc. Surgery and wound consults      Interval History:  Patient is stable per his nurse, no acute issues, he has not had fever.  Eats when fed and has a good appetite.    Review of Systems   Unable to perform ROS: Dementia     Objective:     Vital Signs (Most Recent):  Temp: 96.5 °F (35.8 °C) (01/31/22 1200)  Pulse: 62 (01/31/22 0010)  Resp: 18 (01/31/22 1200)  BP: 113/60 (01/31/22 1200)  SpO2: (!) 92 % (01/31/22 1200) Vital Signs (24h Range):  Temp:   [96.1 °F (35.6 °C)-98.5 °F (36.9 °C)] 96.5 °F (35.8 °C)  Pulse:  [62-65] 62  Resp:  [18-20] 18  SpO2:  [92 %-100 %] 92 %  BP: (113-140)/(60-81) 113/60     Weight: 97.4 kg (214 lb 11.7 oz)  Body mass index is 31.71 kg/m².    Intake/Output Summary (Last 24 hours) at 1/31/2022 1615  Last data filed at 1/31/2022 1300  Gross per 24 hour   Intake 340 ml   Output 3500 ml   Net -3160 ml      Physical Exam  Constitutional:       General: He is not in acute distress (Patient nonverbal but he is awake and makes eye contact and smiles).     Appearance: He is obese. He is not toxic-appearing.   HENT:      Mouth/Throat:      Mouth: Mucous membranes are moist.      Pharynx: No oropharyngeal exudate.   Eyes:      General: No scleral icterus.        Right eye: No discharge.         Left eye: No discharge.   Cardiovascular:      Rate and Rhythm: Regular rhythm.      Heart sounds: Normal heart sounds. No murmur heard.      Pulmonary:      Effort: No respiratory distress.      Breath sounds: No wheezing, rhonchi or rales.   Abdominal:      General: Bowel sounds are normal. There is no distension.      Palpations: Abdomen is soft. There is no mass.      Tenderness: There is no abdominal tenderness.   Musculoskeletal:      Right lower leg: No edema.      Left lower leg: No edema.   Skin:     Coloration: Skin is not jaundiced.      Findings: Lesion present. No rash. Bruising:  photograph of sacral decubitus wound noted in chart, it appears to have been debrided, scant slough and mostly healthy pink granulating tissue.   Neurological:      Mental Status: He is alert.         Significant Labs:   All pertinent labs within the past 24 hours have been reviewed.  BMP:   Recent Labs   Lab 01/31/22  0547   *   *   K 3.9   CL 97*   CO2 27   BUN 7   CREATININE 0.52*   CALCIUM 9.0     CBC:   Recent Labs   Lab 01/30/22  0922 01/31/22  0937   WBC 7.57 8.80   HGB 10.7* 10.5*   HCT 32.2* 32.2*   * 406*     CMP:   Recent Labs   Lab  01/30/22  0715 01/31/22  0547   * 132*   K 3.6 3.9   CL 99 97*   CO2 28 27   * 152*   BUN 8 7   CREATININE 0.51* 0.52*   CALCIUM 8.7 9.0   ANIONGAP 10 12   EGFRNONAA 166 162       Significant Imaging: I have reviewed all pertinent imaging results/findings within the past 24 hours.      Assessment/Plan:      * Pressure ulcer  IV antibiotics  Wound care    1/31 sacral wound culture positive for numerous organisms including Bacteroides, Acinetobacter, Proteus, and Enterococcus faecalis which was ampicillin susceptible.  Based on all the susceptibility profile as we can discontinue vancomycin and continue the patient on Zosyn.  Continue local wound care.        Sacral wound  1/31 see above in pressure ulcer section      Schizophrenia  Continue psychotropic medication        Hypertension  Patient on multiple antihypertensive agents.  On both amlodopine and verapamil.  Will stop verapamil with patient on BB.      1/31 hypertension currently controlled, continue current management.      Diabetes mellitus  Basal/bolus insulin while in hospital    1/31 blood sugars have been acceptable, all order to 100. Continue current management.        Alzheimer's disease  Continue psychotropic meds        VTE Risk Mitigation (From admission, onward)    None          Discharge Planning   YOSSI:      Code Status: DNR   Is the patient medically ready for discharge?:     Reason for patient still in hospital (select all that apply): Treatment  Discharge Plan A: Return to nursing home                  Duyen Bermudez MD  Department of Hospital Medicine   [facility]

## 2022-01-31 NOTE — PLAN OF CARE
Problem: Impaired Wound Healing  Goal: Optimal Wound Healing  Outcome: Ongoing, Progressing     Problem: Skin Injury Risk Increased  Goal: Skin Health and Integrity  Outcome: Ongoing, Progressing     Problem: Diabetes Comorbidity  Goal: Blood Glucose Level Within Targeted Range  Outcome: Ongoing, Progressing     Problem: Fall Injury Risk  Goal: Absence of Fall and Fall-Related Injury  Outcome: Ongoing, Progressing

## 2022-01-31 NOTE — PROGRESS NOTES
Wound care note;  Patient skin was evaluated today  Patient in bed, eyes closed. Alert.   Sacral wound with pink tissue, scattered areas of tan slough noted at distal aspect. Outer edges dry with pink discoloration.Has undermining from 8 to 12 clock measuring 4 cm.  Khan catheter in place  Left lower buttock with abrasion, dry skin   Bilateral hips with dark brown discoloration  Bilateral heels with out pressure injury noted  Cont Dakins moist gauze to sacral wound.  Applied aquacel foam border to bilateral hips   Turn protocol  Waffle boots  On low air loss mattress   Wound care team to follow

## 2022-01-31 NOTE — ASSESSMENT & PLAN NOTE
IV antibiotics  Wound care    1/31 sacral wound culture positive for numerous organisms including Bacteroides, Acinetobacter, Proteus, and Enterococcus faecalis which was ampicillin susceptible.  Based on all the susceptibility profile as we can discontinue vancomycin and continue the patient on Zosyn.  Continue local wound care.

## 2022-01-31 NOTE — SUBJECTIVE & OBJECTIVE
Interval History:  Patient is stable per his nurse, no acute issues, he has not had fever.  Eats when fed and has a good appetite.    Review of Systems   Unable to perform ROS: Dementia     Objective:     Vital Signs (Most Recent):  Temp: 96.5 °F (35.8 °C) (01/31/22 1200)  Pulse: 62 (01/31/22 0010)  Resp: 18 (01/31/22 1200)  BP: 113/60 (01/31/22 1200)  SpO2: (!) 92 % (01/31/22 1200) Vital Signs (24h Range):  Temp:  [96.1 °F (35.6 °C)-98.5 °F (36.9 °C)] 96.5 °F (35.8 °C)  Pulse:  [62-65] 62  Resp:  [18-20] 18  SpO2:  [92 %-100 %] 92 %  BP: (113-140)/(60-81) 113/60     Weight: 97.4 kg (214 lb 11.7 oz)  Body mass index is 31.71 kg/m².    Intake/Output Summary (Last 24 hours) at 1/31/2022 1615  Last data filed at 1/31/2022 1300  Gross per 24 hour   Intake 340 ml   Output 3500 ml   Net -3160 ml      Physical Exam  Constitutional:       General: He is not in acute distress (Patient nonverbal but he is awake and makes eye contact and smiles).     Appearance: He is obese. He is not toxic-appearing.   HENT:      Mouth/Throat:      Mouth: Mucous membranes are moist.      Pharynx: No oropharyngeal exudate.   Eyes:      General: No scleral icterus.        Right eye: No discharge.         Left eye: No discharge.   Cardiovascular:      Rate and Rhythm: Regular rhythm.      Heart sounds: Normal heart sounds. No murmur heard.      Pulmonary:      Effort: No respiratory distress.      Breath sounds: No wheezing, rhonchi or rales.   Abdominal:      General: Bowel sounds are normal. There is no distension.      Palpations: Abdomen is soft. There is no mass.      Tenderness: There is no abdominal tenderness.   Musculoskeletal:      Right lower leg: No edema.      Left lower leg: No edema.   Skin:     Coloration: Skin is not jaundiced.      Findings: Lesion present. No rash. Bruising:  photograph of sacral decubitus wound noted in chart, it appears to have been debrided, scant slough and mostly healthy pink granulating tissue.    Neurological:      Mental Status: He is alert.         Significant Labs:   All pertinent labs within the past 24 hours have been reviewed.  BMP:   Recent Labs   Lab 01/31/22  0547   *   *   K 3.9   CL 97*   CO2 27   BUN 7   CREATININE 0.52*   CALCIUM 9.0     CBC:   Recent Labs   Lab 01/30/22  0922 01/31/22  0937   WBC 7.57 8.80   HGB 10.7* 10.5*   HCT 32.2* 32.2*   * 406*     CMP:   Recent Labs   Lab 01/30/22  0715 01/31/22  0547   * 132*   K 3.6 3.9   CL 99 97*   CO2 28 27   * 152*   BUN 8 7   CREATININE 0.51* 0.52*   CALCIUM 8.7 9.0   ANIONGAP 10 12   EGFRNONAA 166 162       Significant Imaging: I have reviewed all pertinent imaging results/findings within the past 24 hours.

## 2022-01-31 NOTE — ASSESSMENT & PLAN NOTE
Patient on multiple antihypertensive agents.  On both amlodopine and verapamil.  Will stop verapamil with patient on BB.      1/31 hypertension currently controlled, continue current management.

## 2022-01-31 NOTE — PROGRESS NOTES
[facility]  First Assessment Note         Reason for Assessment  Reason For Assessment: consult new admit  Nutrition Risk Screen: no indicators present  Malnutrition  Is Patient Malnourished: No  Nutrition Diagnosis  Increased protein Needs   related to Decreased/ impaired skin integrity as evidenced by wounds    Nutrition Diagnosis Status: Chronic/ continues      Nutrition Risk  Level of Risk/Frequency of Follow-up: high   Chewing or Swallowing Difficulty?: Swallowing difficulty  Estimated/Assessed Needs  RMR ([place]-St. Jeor Equation): 1669.38 Activity Factor: 1 Injury Factor: 1.2     Temp: 96.5 °F (35.8 °C)Axillary  Weight Used For Calorie Calculations: 97 kg (213 lb 13.5 oz)   Energy Need Method: [place]-St Jeor Energy Calorie Requirements (kcal): 1998  Weight Used For Protein Calculations: 79 kg (174 lb 2.6 oz) (adjusted body wt)  Protein Requirements:   Estimated Fluid Requirement Method: RDA Method Fluid Requirements (mL): 1998  RDA Method (mL): 1998     Nutrition Prescription / Recommendations  Recommendation/Intervention: continue pureed diet with nectar liquids  Goals: pt will meet estimated nutritional needs; wound healing  Nutrition Goal Status: new  Communication of RD Recs: reviewed with physician  Current Diet Order: pureed with nectar thick liquids  Nutrition Order Comments: 100% meal  intake documented  Recommended Diet: Puree  With nectar liquids  Recommended Oral Supplement: No Oral Supplements  Is Nutrition Support Recommended: No  Is Education Recommended: No  Monitor and Evaluation  % current Intake: P.O. intake of 75 - 100 %  % intake to meet estimated needs: 75 - 100 %  Food and Nutrient Intake: energy intake,food and beverage intake  Food and Nutrient Adminstration: diet order  Knowledge/Beliefs/Attitudes: food and nutrition knowledge/skill  Anthropometric Measurements: height/length,body mass index,weight,weight change  Biochemical Data, Medical Tests  "and Procedures: electrolyte and renal panel,glucose/endocrine profile  Nutrition-Focused Physical Findings: skin  Oral Calories (kcal): 2400  Oral Protein (gm): 100  Energy Calories Required: meeting needs  Protein Required: meeting needs  Tolerance: tolerating  Current Medical Diagnosis and Past Medical History  Diagnosis: diabetes diagnosis/complications,psychological disorder  Past Medical History:   Diagnosis Date    Alzheimer's disease     Bipolar disorder     BPH (benign prostatic hyperplasia)     Diabetes mellitus     Hyperlipidemia     Hypertension     Hypothyroidism     Idiopathic orofacial dystonia     Iron deficiency anemia secondary to blood loss (chronic)     Mild intellectual disabilities     Obesity     Pressure ulcer     Schizophrenia      Nutrition/Diet History  Food Allergies: NKFA  Lab/Procedures/Meds  Recent Labs   Lab 01/31/22  0547   *   K 3.9   BUN 7   CREATININE 0.52*   *   CALCIUM 9.0   CL 97*     Last A1c:   Lab Results   Component Value Date    HGBA1C 8.3 (H) 12/09/2021     Lab Results   Component Value Date    RBC 3.72 (L) 01/31/2022    HGB 10.5 (L) 01/31/2022    HCT 32.2 (L) 01/31/2022    MCV 86.6 01/31/2022    MCH 28.2 01/31/2022    MCHC 32.6 01/31/2022    TIBC 174 (L) 12/09/2021     Pertinent Labs Reviewed: reviewed  Pertinent Labs Comments: Hct 32.2, Na 132, Glu 152, Alb 2.2  Pertinent Medications Reviewed: reviewed  Pertinent Medications Comments: Hctz, insulin  Anthropometrics  Temp: 96.5 °F (35.8 °C)  Height Method: Stated  Height: 5' 9" (175.3 cm)  Height (inches): 69 in  Weight Method: Bed Scale  Weight: 97.4 kg (214 lb 11.7 oz)  Weight (lb): 214.73 lb  Ideal Body Weight (IBW), Male: 160 lb  % Ideal Body Weight, Male (lb): 134.21 %  BMI (Calculated): 31.7     Nutrition by Nursing     Intake (%): 100%  Diet/Feeding Assistance: assisted with feeding  Diet/Feeding Tolerance: good  Last Bowel Movement: 01/30/22              Nutrition Follow-Up     Assessment " and Plan  No new Assessment & Plan notes have been filed under this hospital service since the last note was generated.  Service: Nutrition

## 2022-01-31 NOTE — ASSESSMENT & PLAN NOTE
Basal/bolus insulin while in hospital    1/31 blood sugars have been acceptable, all order to 100. Continue current management.

## 2022-02-01 LAB
GLUCOSE SERPL-MCNC: 139 MG/DL (ref 70–105)
GLUCOSE SERPL-MCNC: 153 MG/DL (ref 70–105)
GLUCOSE SERPL-MCNC: 165 MG/DL (ref 70–105)
GLUCOSE SERPL-MCNC: 187 MG/DL (ref 70–105)

## 2022-02-01 PROCEDURE — 99232 PR SUBSEQUENT HOSPITAL CARE,LEVL II: ICD-10-PCS | Mod: ,,, | Performed by: INTERNAL MEDICINE

## 2022-02-01 PROCEDURE — 63600175 PHARM REV CODE 636 W HCPCS: Performed by: FAMILY MEDICINE

## 2022-02-01 PROCEDURE — 82962 GLUCOSE BLOOD TEST: CPT

## 2022-02-01 PROCEDURE — 11000001 HC ACUTE MED/SURG PRIVATE ROOM

## 2022-02-01 PROCEDURE — 99223 PR INITIAL HOSPITAL CARE,LEVL III: ICD-10-PCS | Mod: ,,, | Performed by: SURGERY

## 2022-02-01 PROCEDURE — 99223 1ST HOSP IP/OBS HIGH 75: CPT | Mod: ,,, | Performed by: SURGERY

## 2022-02-01 PROCEDURE — 99232 SBSQ HOSP IP/OBS MODERATE 35: CPT | Mod: ,,, | Performed by: INTERNAL MEDICINE

## 2022-02-01 PROCEDURE — 63600175 PHARM REV CODE 636 W HCPCS: Performed by: HOSPITALIST

## 2022-02-01 PROCEDURE — C9399 UNCLASSIFIED DRUGS OR BIOLOG: HCPCS | Performed by: FAMILY MEDICINE

## 2022-02-01 PROCEDURE — 25000003 PHARM REV CODE 250: Performed by: HOSPITALIST

## 2022-02-01 RX ADMIN — POTASSIUM CHLORIDE 20 MEQ: 20 TABLET, EXTENDED RELEASE ORAL at 09:02

## 2022-02-01 RX ADMIN — PANTOPRAZOLE SODIUM 40 MG: 40 TABLET, DELAYED RELEASE ORAL at 09:02

## 2022-02-01 RX ADMIN — SITAGLIPTIN 50 MG: 25 TABLET, FILM COATED ORAL at 09:02

## 2022-02-01 RX ADMIN — INSULIN ASPART 2 UNITS: 100 INJECTION, SOLUTION INTRAVENOUS; SUBCUTANEOUS at 05:02

## 2022-02-01 RX ADMIN — TAMSULOSIN HYDROCHLORIDE 0.4 MG: 0.4 CAPSULE ORAL at 09:02

## 2022-02-01 RX ADMIN — HYDRALAZINE HYDROCHLORIDE 10 MG: 10 TABLET ORAL at 08:02

## 2022-02-01 RX ADMIN — ASPIRIN 81 MG CHEWABLE TABLET 81 MG: 81 TABLET CHEWABLE at 09:02

## 2022-02-01 RX ADMIN — HYDROCHLOROTHIAZIDE 25 MG: 12.5 TABLET ORAL at 08:02

## 2022-02-01 RX ADMIN — PIPERACILLIN AND TAZOBACTAM 4.5 G: 4; .5 INJECTION, POWDER, FOR SOLUTION INTRAVENOUS at 05:02

## 2022-02-01 RX ADMIN — LEVOTHYROXINE SODIUM 50 MCG: 25 TABLET ORAL at 05:02

## 2022-02-01 RX ADMIN — LOSARTAN POTASSIUM 50 MG: 50 TABLET, FILM COATED ORAL at 09:02

## 2022-02-01 RX ADMIN — PIPERACILLIN AND TAZOBACTAM 4.5 G: 4; .5 INJECTION, POWDER, FOR SOLUTION INTRAVENOUS at 02:02

## 2022-02-01 RX ADMIN — RISPERIDONE 1 MG: 1 TABLET ORAL at 08:02

## 2022-02-01 RX ADMIN — METOPROLOL SUCCINATE 25 MG: 25 TABLET, EXTENDED RELEASE ORAL at 09:02

## 2022-02-01 RX ADMIN — LOSARTAN POTASSIUM 50 MG: 50 TABLET, FILM COATED ORAL at 08:02

## 2022-02-01 RX ADMIN — OXCARBAZEPINE 150 MG: 150 TABLET, FILM COATED ORAL at 08:02

## 2022-02-01 RX ADMIN — INSULIN ASPART 2 UNITS: 100 INJECTION, SOLUTION INTRAVENOUS; SUBCUTANEOUS at 12:02

## 2022-02-01 RX ADMIN — PIPERACILLIN AND TAZOBACTAM 4.5 G: 4; .5 INJECTION, POWDER, FOR SOLUTION INTRAVENOUS at 09:02

## 2022-02-01 RX ADMIN — INSULIN DETEMIR 15 UNITS: 100 INJECTION, SOLUTION SUBCUTANEOUS at 08:02

## 2022-02-01 RX ADMIN — HYDRALAZINE HYDROCHLORIDE 10 MG: 10 TABLET ORAL at 09:02

## 2022-02-01 RX ADMIN — HYDROCHLOROTHIAZIDE 25 MG: 12.5 TABLET ORAL at 09:02

## 2022-02-01 RX ADMIN — RISPERIDONE 1 MG: 1 TABLET ORAL at 09:02

## 2022-02-01 RX ADMIN — MEMANTINE 10 MG: 5 TABLET ORAL at 08:02

## 2022-02-01 RX ADMIN — OXCARBAZEPINE 150 MG: 150 TABLET, FILM COATED ORAL at 09:02

## 2022-02-01 NOTE — SUBJECTIVE & OBJECTIVE
Interval History:  Patient remains stable per his nurse, no acute issues, he has not had fever.  Continues to eat when fed.  Seen by wound care surgeon today and wound felt to be nice and clean with no need for debridement.    Review of Systems   Unable to perform ROS: Dementia     Objective:     Vital Signs (Most Recent):  Temp: 97.2 °F (36.2 °C) (02/01/22 1200)  Pulse: 71 (02/01/22 1200)  Resp: 14 (02/01/22 1200)  BP: (!) 143/83 (02/01/22 1200)  SpO2: 99 % (02/01/22 1200) Vital Signs (24h Range):  Temp:  [97.2 °F (36.2 °C)-97.6 °F (36.4 °C)] 97.2 °F (36.2 °C)  Pulse:  [64-97] 71  Resp:  [14-52] 14  SpO2:  [99 %] 99 %  BP: (115-143)/(52-83) 143/83     Weight: 93.4 kg (206 lb)  Body mass index is 30.42 kg/m².    Intake/Output Summary (Last 24 hours) at 2/1/2022 1336  Last data filed at 2/1/2022 0500  Gross per 24 hour   Intake --   Output 2100 ml   Net -2100 ml      Physical Exam  Constitutional:       General: He is not in acute distress (Patient said I am all right today when asked him how he was doing).     Appearance: He is obese. He is not toxic-appearing.   HENT:      Mouth/Throat:      Mouth: Mucous membranes are moist.      Pharynx: No oropharyngeal exudate.   Eyes:      General: No scleral icterus.        Right eye: No discharge.         Left eye: No discharge.   Cardiovascular:      Rate and Rhythm: Regular rhythm.      Heart sounds: Normal heart sounds. No murmur heard.      Pulmonary:      Effort: No respiratory distress.      Breath sounds: No wheezing, rhonchi or rales.   Abdominal:      General: Bowel sounds are normal. There is no distension.      Palpations: Abdomen is soft. There is no mass.      Tenderness: There is no abdominal tenderness.   Musculoskeletal:      Right lower leg: No edema.      Left lower leg: No edema.   Skin:     Coloration: Skin is not jaundiced.      Findings: Lesion present. No rash. Bruising:  photograph of sacral decubitus wound noted in chart, it appears to have been  debrided, scant slough and mostly healthy pink granulating tissue.   Neurological:      Mental Status: He is alert.         Significant Labs:   All pertinent labs within the past 24 hours have been reviewed.  BMP:   Recent Labs   Lab 01/31/22  0547   *   *   K 3.9   CL 97*   CO2 27   BUN 7   CREATININE 0.52*   CALCIUM 9.0     CBC:   Recent Labs   Lab 01/31/22  0937   WBC 8.80   HGB 10.5*   HCT 32.2*   *     CMP:   Recent Labs   Lab 01/31/22  0547   *   K 3.9   CL 97*   CO2 27   *   BUN 7   CREATININE 0.52*   CALCIUM 9.0   ANIONGAP 12   EGFRNONAA 162       Significant Imaging: I have reviewed all pertinent imaging results/findings within the past 24 hours.

## 2022-02-01 NOTE — PROGRESS NOTES
[facility] Medicine  Progress Note    Patient Name: Bhargav Gao  MRN: [id number]  Patient Class: IP- Inpatient   Admission Date: 1/27/2022  Length of Stay: 5 days  Attending Physician: Duyen Bermudez,*  Primary Care Provider: Kerry Lin MD        Subjective:     Principal Problem:Pressure ulcer        HPI:  82 yo (appears younger) M presents to [facility] as a direct admit from [facility] for worsening sacral decubiti.  Patient has been treated as OP by [facility] wound care but is not getting better.  Khan has been placed to help with healing.  Patient is alert but unable to give me any information due to his baseline mental deficiencies.  He says ouch but denies pain.  He shakes his head yes or no but not sure if appropriately answering my questions.  Shook his head no to wanting watch a basketball game on TV but also shook his head no to watching [employer] dancing around and singing in [employer] movie.  He seemed interested in [employer]  catching alligators but I'm just not sure how much he is comprehending.  He can life all of his ext to gravity but very weak and does not walk or feed himself.      ROS UTO but some mention of constipation in records so will start a stool softner due to his wounds.        Overview/Hospital Course:  1/28 Patient nonverbal with me. Sacral wound. Surgery consult. IV Zosyn  1/29 Patient speaks. Sacral wound. IV Zosyn and IV Vanc. Surgery/Wound consult  1/30 Patient being fed. Sacral wound. IV Zosyn and IV Vanc. Surgery and wound consults      Interval History:  Patient remains stable per his nurse, no acute issues, he has not had fever.  Continues to eat when fed.  Seen by wound care surgeon today and wound felt to be nice and clean with no need for debridement.    Review of Systems   Unable to perform ROS: Dementia     Objective:     Vital Signs (Most Recent):  Temp: 97.2 °F (36.2 °C) (02/01/22 1200)  Pulse: 71 (02/01/22 1200)  Resp: 14 (02/01/22  1200)  BP: (!) 143/83 (02/01/22 1200)  SpO2: 99 % (02/01/22 1200) Vital Signs (24h Range):  Temp:  [97.2 °F (36.2 °C)-97.6 °F (36.4 °C)] 97.2 °F (36.2 °C)  Pulse:  [64-97] 71  Resp:  [14-52] 14  SpO2:  [99 %] 99 %  BP: (115-143)/(52-83) 143/83     Weight: 93.4 kg (206 lb)  Body mass index is 30.42 kg/m².    Intake/Output Summary (Last 24 hours) at 2/1/2022 1336  Last data filed at 2/1/2022 0500  Gross per 24 hour   Intake --   Output 2100 ml   Net -2100 ml      Physical Exam  Constitutional:       General: He is not in acute distress (Patient said I am all right today when asked him how he was doing).     Appearance: He is obese. He is not toxic-appearing.   HENT:      Mouth/Throat:      Mouth: Mucous membranes are moist.      Pharynx: No oropharyngeal exudate.   Eyes:      General: No scleral icterus.        Right eye: No discharge.         Left eye: No discharge.   Cardiovascular:      Rate and Rhythm: Regular rhythm.      Heart sounds: Normal heart sounds. No murmur heard.      Pulmonary:      Effort: No respiratory distress.      Breath sounds: No wheezing, rhonchi or rales.   Abdominal:      General: Bowel sounds are normal. There is no distension.      Palpations: Abdomen is soft. There is no mass.      Tenderness: There is no abdominal tenderness.   Musculoskeletal:      Right lower leg: No edema.      Left lower leg: No edema.   Skin:     Coloration: Skin is not jaundiced.      Findings: Lesion present. No rash. Bruising:  photograph of sacral decubitus wound noted in chart, it appears to have been debrided, scant slough and mostly healthy pink granulating tissue.   Neurological:      Mental Status: He is alert.         Significant Labs:   All pertinent labs within the past 24 hours have been reviewed.  BMP:   Recent Labs   Lab 01/31/22  0547   *   *   K 3.9   CL 97*   CO2 27   BUN 7   CREATININE 0.52*   CALCIUM 9.0     CBC:   Recent Labs   Lab 01/31/22  0937   WBC 8.80   HGB 10.5*   HCT 32.2*    *     CMP:   Recent Labs   Lab 01/31/22  0547   *   K 3.9   CL 97*   CO2 27   *   BUN 7   CREATININE 0.52*   CALCIUM 9.0   ANIONGAP 12   EGFRNONAA 162       Significant Imaging: I have reviewed all pertinent imaging results/findings within the past 24 hours.      Assessment/Plan:      * Pressure ulcer  IV antibiotics  Wound care    1/31 sacral wound culture positive for numerous organisms including Bacteroides, Acinetobacter, Proteus, and Enterococcus faecalis which was ampicillin susceptible.  Based on all the susceptibility profile as we can discontinue vancomycin and continue the patient on Zosyn.  Continue local wound care.    2/1 continue wound care, antibiotic therapy for few more days.        Sacral wound  1/31 see above in pressure ulcer section      Schizophrenia  Continue psychotropic medication        Hypertension  Patient on multiple antihypertensive agents.  On both amlodopine and verapamil.  Will stop verapamil with patient on BB.      1/31 hypertension currently controlled, continue current management.      Diabetes mellitus  Basal/bolus insulin while in hospital    1/31 blood sugars have been acceptable, all order to 100. Continue current management.        Alzheimer's disease  Continue psychotropic meds        VTE Risk Mitigation (From admission, onward)    None          Discharge Planning   YOSSI:      Code Status: DNR   Is the patient medically ready for discharge?:     Reason for patient still in hospital (select all that apply): Treatment  Discharge Plan A: Return to nursing home                  Duyen Bermudez MD  Department of Hospital Medicine   [facility]

## 2022-02-01 NOTE — PLAN OF CARE
Problem: Impaired Wound Healing  Goal: Optimal Wound Healing  Outcome: Ongoing, Progressing     Problem: Skin Injury Risk Increased  Goal: Skin Health and Integrity  Outcome: Ongoing, Progressing  Continue current plan of care.

## 2022-02-01 NOTE — CONSULTS
[facility]  General Surgery  Consult Note    Inpatient consult to Wound Care (New Patient Evaluation)  Consult performed by: Sabas Parker MD  Consult ordered by: Donato Whitlock MD  Reason for consult: Sacral wound  Assessment/Recommendations: Will continue Dakin's wet to dry to the area.  Overall looks pretty clean and healthy no need for surgical debridement at this time.        Subjective:     Chief Complaint/Reason for Admission:  Infected sacral wound    History of Present Illness:  81-year-old male who was debrided in [facility] last week cultures came back multiple poly organisms [facility].  Patient is dementia with nursing home and is a poor historian.    No current facility-administered medications on file prior to encounter.     Current Outpatient Medications on File Prior to Encounter   Medication Sig    calcium carbonate/vitamin D3 (CALTRATE 600 + D ORAL) Take by mouth 2 (two) times daily.    furosemide (LASIX) 40 MG tablet Take 40 mg by mouth once daily.    hydrALAZINE (APRESOLINE) 10 MG tablet Take 10 mg by mouth every 12 (twelve) hours.    insulin detemir U-100 (LEVEMIR) 100 unit/mL injection Inject 18 Units into the skin 2 (two) times daily.    levothyroxine (SYNTHROID) 50 MCG tablet Take 50 mcg by mouth before breakfast.    losartan (COZAAR) 50 MG tablet Take 50 mg by mouth 2 (two) times a day.    memantine (NAMENDA) 10 MG Tab Take 10 mg by mouth nightly.    metoprolol succinate (TOPROL-XL) 25 MG 24 hr tablet Take 1 tablet by mouth once daily.    OXcarbazepine (TRILEPTAL) 150 MG Tab Take 450 mg by mouth 2 (two) times daily.    pantoprazole (PROTONIX) 40 MG tablet Take 1 tablet by mouth once daily.    polyethylene glycol (GLYCOLAX) 17 gram PwPk Take 17 g by mouth 2 (two) times a day.    risperiDONE (RISPERDAL) 1 MG tablet Take 1 mg by mouth 2 (two) times daily.    SITagliptin  (JANUVIA) 50 MG Tab Take by mouth once daily.    tamsulosin (FLOMAX) 0.4 mg Cap Take 1 capsule by mouth once daily.    apixaban (ELIQUIS) 5 mg Tab Take 5 mg by mouth 2 (two) times daily.    HYDROcodone-acetaminophen (NORCO) 7.5-325 mg per tablet Take 7.5 tablets by mouth 2 (two) times daily as needed for Pain.    insulin lispro 100 unit/mL injection Inject into the skin 3 (three) times daily before meals. Under 200 units - no units / 200-249 - 2 units / 250-299 3 units / 300-349 4 units / 350-399 6 units / 400 or > 8 units & notify medical       Review of patient's allergies indicates:   Allergen Reactions    Metformin     Mycobacterium tuberculosis (tuberculin ppd)     Norvasc [amlodipine]        Past Medical History:   Diagnosis Date    Alzheimer's disease     Bipolar disorder     BPH (benign prostatic hyperplasia)     Diabetes mellitus     Hyperlipidemia     Hypertension     Hypothyroidism     Idiopathic orofacial dystonia     Iron deficiency anemia secondary to blood loss (chronic)     Mild intellectual disabilities     Obesity     Pressure ulcer     Schizophrenia      History reviewed. No pertinent surgical history.  Family History     Family history is unknown by patient.        Tobacco Use    Smoking status: Never Smoker    Smokeless tobacco: Never Used   Substance and Sexual Activity    Alcohol use: Not Currently    Drug use: Never    Sexual activity: Not Currently     Review of Systems   Constitutional: Positive for unexpected weight change. Negative for activity change, appetite change, fatigue and fever.   HENT: Negative for trouble swallowing.    Respiratory: Negative for cough and shortness of breath.    Cardiovascular: Negative for chest pain and palpitations.   Gastrointestinal: Negative for abdominal distention, abdominal pain, blood in stool, constipation and diarrhea.   Genitourinary: Negative for flank pain.   Musculoskeletal: Negative for neck pain and neck stiffness.    Skin: Positive for wound.   Neurological: Positive for weakness.     Objective:     Vital Signs (Most Recent):  Temp: 97.5 °F (36.4 °C) (02/01/22 0800)  Pulse: 97 (02/01/22 0800)  Resp: (!) 52 (02/01/22 0800)  BP: (!) 141/70 (02/01/22 0800)  SpO2: 99 % (02/01/22 0800) Vital Signs (24h Range):  Temp:  [97.4 °F (36.3 °C)-97.6 °F (36.4 °C)] 97.5 °F (36.4 °C)  Pulse:  [64-97] 97  Resp:  [16-52] 52  SpO2:  [99 %] 99 %  BP: (115-141)/(52-77) 141/70     Weight: 93.4 kg (206 lb)  Body mass index is 30.42 kg/m².      Intake/Output Summary (Last 24 hours) at 2/1/2022 1219  Last data filed at 2/1/2022 0500  Gross per 24 hour   Intake 100 ml   Output 3300 ml   Net -3200 ml       Physical Exam  Constitutional:       General: He is not in acute distress.  HENT:      Head: Normocephalic.   Cardiovascular:      Rate and Rhythm: Normal rate and regular rhythm.      Pulses: Normal pulses.   Pulmonary:      Effort: Pulmonary effort is normal. No respiratory distress.      Breath sounds: Normal breath sounds.   Abdominal:      General: Abdomen is flat. There is no distension.      Palpations: Abdomen is soft.      Tenderness: There is no abdominal tenderness.   Musculoskeletal:         General: Normal range of motion.   Skin:     General: Skin is warm.      Findings: Lesion present.   Neurological:      General: No focal deficit present.      Mental Status: Mental status is at baseline. He is disoriented.         Significant Labs:  CBC:   Recent Labs   Lab 01/31/22  0937   WBC 8.80   RBC 3.72*   HGB 10.5*   HCT 32.2*   *   MCV 86.6   MCH 28.2   MCHC 32.6     CMP:   Recent Labs   Lab 01/31/22  0547   *   CALCIUM 9.0   *   K 3.9   CO2 27   CL 97*   BUN 7   CREATININE 0.52*       Significant Diagnostics:  None    Assessment/Plan:     Active Diagnoses:    Diagnosis Date Noted POA    PRINCIPAL PROBLEM:  Pressure ulcer [L89.90]  Yes    Schizophrenia [F20.9] 01/28/2022 Yes    Sacral wound [S31.000A]  Yes     Hypertension [I10]  Yes    Diabetes mellitus [E11.9]  Yes    Alzheimer's disease [G30.9, F02.80]  Yes      Problems Resolved During this Admission:       Thank you for your consult. I will follow-up with patient. Please contact us if you have any additional questions.     Continue Dakin's wet to dry to the sacral  area.  Continue antibiotics and local wound care.  No need for surgical intervention at this time    Sabas Parker MD  General Surgery  [facility]

## 2022-02-01 NOTE — ASSESSMENT & PLAN NOTE
IV antibiotics  Wound care    1/31 sacral wound culture positive for numerous organisms including Bacteroides, Acinetobacter, Proteus, and Enterococcus faecalis which was ampicillin susceptible.  Based on all the susceptibility profile as we can discontinue vancomycin and continue the patient on Zosyn.  Continue local wound care.    2/1 continue wound care, antibiotic therapy for few more days.

## 2022-02-01 NOTE — PLAN OF CARE
Problem: Impaired Wound Healing  Goal: Optimal Wound Healing  Outcome: Ongoing, Progressing  Intervention: Promote Wound Healing  Flowsheets (Taken 2/1/2022 1212)  Oral Nutrition Promotion:   safe use of adaptive equipment encouraged   rest periods promoted     Problem: Skin Injury Risk Increased  Goal: Skin Health and Integrity  Outcome: Ongoing, Progressing  Intervention: Optimize Skin Protection  Flowsheets (Taken 2/1/2022 1212)  Pressure Reduction Techniques:   pressure points protected   weight shift assistance provided  Pressure Reduction Devices: positioning supports utilized  Head of Bed (HOB) Positioning: HOB at 30-45 degrees     Problem: Adult Inpatient Plan of Care  Goal: Patient-Specific Goal (Individualized)  Outcome: Ongoing, Progressing

## 2022-02-01 NOTE — PLAN OF CARE
Problem: Fall Injury Risk  Goal: Absence of Fall and Fall-Related Injury  Outcome: Ongoing, Progressing     Problem: Infection  Goal: Absence of Infection Signs and Symptoms  Outcome: Ongoing, Progressing     Problem: Diabetes Comorbidity  Goal: Blood Glucose Level Within Targeted Range  Outcome: Ongoing, Progressing

## 2022-02-02 LAB
ANION GAP SERPL CALCULATED.3IONS-SCNC: 13 MMOL/L (ref 7–16)
BASOPHILS # BLD AUTO: 0.03 K/UL (ref 0–0.2)
BASOPHILS NFR BLD AUTO: 0.3 % (ref 0–1)
BUN SERPL-MCNC: 14 MG/DL (ref 7–18)
BUN/CREAT SERPL: 16 (ref 6–20)
CALCIUM SERPL-MCNC: 9.7 MG/DL (ref 8.5–10.1)
CHLORIDE SERPL-SCNC: 101 MMOL/L (ref 98–107)
CO2 SERPL-SCNC: 28 MMOL/L (ref 21–32)
CREAT SERPL-MCNC: 0.88 MG/DL (ref 0.7–1.3)
DIFFERENTIAL METHOD BLD: ABNORMAL
EOSINOPHIL # BLD AUTO: 0.23 K/UL (ref 0–0.5)
EOSINOPHIL NFR BLD AUTO: 2.5 % (ref 1–4)
ERYTHROCYTE [DISTWIDTH] IN BLOOD BY AUTOMATED COUNT: 14.7 % (ref 11.5–14.5)
GLUCOSE SERPL-MCNC: 113 MG/DL (ref 70–105)
GLUCOSE SERPL-MCNC: 160 MG/DL (ref 70–105)
GLUCOSE SERPL-MCNC: 163 MG/DL (ref 70–105)
GLUCOSE SERPL-MCNC: 180 MG/DL (ref 70–105)
GLUCOSE SERPL-MCNC: 96 MG/DL (ref 74–106)
HCT VFR BLD AUTO: 31.5 % (ref 40–54)
HGB BLD-MCNC: 10.7 G/DL (ref 13.5–18)
IMM GRANULOCYTES # BLD AUTO: 0.07 K/UL (ref 0–0.04)
IMM GRANULOCYTES NFR BLD: 0.7 % (ref 0–0.4)
LYMPHOCYTES # BLD AUTO: 2.69 K/UL (ref 1–4.8)
LYMPHOCYTES NFR BLD AUTO: 28.8 % (ref 27–41)
MCH RBC QN AUTO: 28.7 PG (ref 27–31)
MCHC RBC AUTO-ENTMCNC: 34 G/DL (ref 32–36)
MCV RBC AUTO: 84.5 FL (ref 80–96)
MONOCYTES # BLD AUTO: 0.98 K/UL (ref 0–0.8)
MONOCYTES NFR BLD AUTO: 10.5 % (ref 2–6)
MPC BLD CALC-MCNC: 8.1 FL (ref 9.4–12.4)
NEUTROPHILS # BLD AUTO: 5.34 K/UL (ref 1.8–7.7)
NEUTROPHILS NFR BLD AUTO: 57.2 % (ref 53–65)
NRBC # BLD AUTO: 0 X10E3/UL
NRBC, AUTO (.00): 0 %
PLATELET # BLD AUTO: 373 K/UL (ref 150–400)
POTASSIUM SERPL-SCNC: 3.7 MMOL/L (ref 3.5–5.1)
RBC # BLD AUTO: 3.73 M/UL (ref 4.6–6.2)
SODIUM SERPL-SCNC: 138 MMOL/L (ref 136–145)
WBC # BLD AUTO: 9.34 K/UL (ref 4.5–11)

## 2022-02-02 PROCEDURE — 25000003 PHARM REV CODE 250: Performed by: HOSPITALIST

## 2022-02-02 PROCEDURE — 99232 SBSQ HOSP IP/OBS MODERATE 35: CPT | Mod: ,,, | Performed by: INTERNAL MEDICINE

## 2022-02-02 PROCEDURE — 36415 COLL VENOUS BLD VENIPUNCTURE: CPT | Performed by: INTERNAL MEDICINE

## 2022-02-02 PROCEDURE — 11000001 HC ACUTE MED/SURG PRIVATE ROOM

## 2022-02-02 PROCEDURE — 99232 PR SUBSEQUENT HOSPITAL CARE,LEVL II: ICD-10-PCS | Mod: ,,, | Performed by: INTERNAL MEDICINE

## 2022-02-02 PROCEDURE — 85025 COMPLETE CBC W/AUTO DIFF WBC: CPT | Performed by: INTERNAL MEDICINE

## 2022-02-02 PROCEDURE — 63600175 PHARM REV CODE 636 W HCPCS: Performed by: FAMILY MEDICINE

## 2022-02-02 PROCEDURE — C9399 UNCLASSIFIED DRUGS OR BIOLOG: HCPCS | Performed by: FAMILY MEDICINE

## 2022-02-02 PROCEDURE — 82962 GLUCOSE BLOOD TEST: CPT

## 2022-02-02 PROCEDURE — 80048 BASIC METABOLIC PNL TOTAL CA: CPT | Performed by: INTERNAL MEDICINE

## 2022-02-02 PROCEDURE — 63600175 PHARM REV CODE 636 W HCPCS: Performed by: HOSPITALIST

## 2022-02-02 RX ADMIN — HYDROCHLOROTHIAZIDE 25 MG: 12.5 TABLET ORAL at 08:02

## 2022-02-02 RX ADMIN — INSULIN DETEMIR 15 UNITS: 100 INJECTION, SOLUTION SUBCUTANEOUS at 08:02

## 2022-02-02 RX ADMIN — ASPIRIN 81 MG CHEWABLE TABLET 81 MG: 81 TABLET CHEWABLE at 08:02

## 2022-02-02 RX ADMIN — LOSARTAN POTASSIUM 50 MG: 50 TABLET, FILM COATED ORAL at 08:02

## 2022-02-02 RX ADMIN — MEMANTINE 10 MG: 5 TABLET ORAL at 08:02

## 2022-02-02 RX ADMIN — INSULIN ASPART 2 UNITS: 100 INJECTION, SOLUTION INTRAVENOUS; SUBCUTANEOUS at 12:02

## 2022-02-02 RX ADMIN — LEVOTHYROXINE SODIUM 50 MCG: 25 TABLET ORAL at 06:02

## 2022-02-02 RX ADMIN — RISPERIDONE 1 MG: 1 TABLET ORAL at 08:02

## 2022-02-02 RX ADMIN — PIPERACILLIN AND TAZOBACTAM 4.5 G: 4; .5 INJECTION, POWDER, FOR SOLUTION INTRAVENOUS at 10:02

## 2022-02-02 RX ADMIN — HYDRALAZINE HYDROCHLORIDE 10 MG: 10 TABLET ORAL at 08:02

## 2022-02-02 RX ADMIN — POTASSIUM CHLORIDE 20 MEQ: 20 TABLET, EXTENDED RELEASE ORAL at 08:02

## 2022-02-02 RX ADMIN — INSULIN ASPART 2 UNITS: 100 INJECTION, SOLUTION INTRAVENOUS; SUBCUTANEOUS at 04:02

## 2022-02-02 RX ADMIN — PIPERACILLIN AND TAZOBACTAM 4.5 G: 4; .5 INJECTION, POWDER, FOR SOLUTION INTRAVENOUS at 02:02

## 2022-02-02 RX ADMIN — PIPERACILLIN AND TAZOBACTAM 4.5 G: 4; .5 INJECTION, POWDER, FOR SOLUTION INTRAVENOUS at 06:02

## 2022-02-02 RX ADMIN — PANTOPRAZOLE SODIUM 40 MG: 40 TABLET, DELAYED RELEASE ORAL at 08:02

## 2022-02-02 RX ADMIN — OXCARBAZEPINE 150 MG: 150 TABLET, FILM COATED ORAL at 08:02

## 2022-02-02 RX ADMIN — METOPROLOL SUCCINATE 25 MG: 25 TABLET, EXTENDED RELEASE ORAL at 08:02

## 2022-02-02 RX ADMIN — TAMSULOSIN HYDROCHLORIDE 0.4 MG: 0.4 CAPSULE ORAL at 08:02

## 2022-02-02 RX ADMIN — SITAGLIPTIN 50 MG: 25 TABLET, FILM COATED ORAL at 08:02

## 2022-02-02 NOTE — PLAN OF CARE
Problem: Impaired Wound Healing  Goal: Optimal Wound Healing  Outcome: Ongoing, Progressing     Problem: Skin Injury Risk Increased  Goal: Skin Health and Integrity  Outcome: Ongoing, Progressing     Problem: Adult Inpatient Plan of Care  Goal: Plan of Care Review  Outcome: Ongoing, Progressing  Goal: Patient-Specific Goal (Individualized)  Outcome: Ongoing, Progressing  Goal: Absence of Hospital-Acquired Illness or Injury  Outcome: Ongoing, Progressing  Goal: Optimal Comfort and Wellbeing  Outcome: Ongoing, Progressing  Goal: Readiness for Transition of Care  Outcome: Ongoing, Progressing     Problem: Diabetes Comorbidity  Goal: Blood Glucose Level Within Targeted Range  Outcome: Ongoing, Progressing     Problem: Fall Injury Risk  Goal: Absence of Fall and Fall-Related Injury  Outcome: Ongoing, Progressing     Problem: Infection  Goal: Absence of Infection Signs and Symptoms  Outcome: Ongoing, Progressing

## 2022-02-02 NOTE — PROGRESS NOTES
Wound care note;  Patient skin was evaluated today  Patient in bed, alert.  Dr Parker assessed sacral wound today.  Sacral with red slow granular wound bed, areas of tan slough at proximal aspect. Undermining at 12 clock. Cont Dakins moist gauze daily  Cont turn protocol  Waffle boots  On low air loss mattress  Wound care team cont to evaluate

## 2022-02-02 NOTE — PLAN OF CARE
Problem: Impaired Wound Healing  Goal: Optimal Wound Healing  Outcome: Ongoing, Progressing     Problem: Skin Injury Risk Increased  Goal: Skin Health and Integrity  Outcome: Ongoing, Progressing

## 2022-02-02 NOTE — ASSESSMENT & PLAN NOTE
Patient on multiple antihypertensive agents.  On both amlodopine and verapamil.  Will stop verapamil with patient on BB.      1/31 hypertension currently controlled, continue current management.    2/2 blood pressure well controlled

## 2022-02-02 NOTE — PROGRESS NOTES
[facility] Medicine  Progress Note    Patient Name: Bhargav Gao  MRN: [id number]  Patient Class: IP- Inpatient   Admission Date: 1/27/2022  Length of Stay: 6 days  Attending Physician: Duyen Bermudez,*  Primary Care Provider: Kerry Lin MD        Subjective:     Principal Problem:Pressure ulcer        HPI:  82 yo (appears younger) M presents to [facility] direct [facility] for worsening sacral decubiti.  Patient has been treated as OP by [facility] wound care but is not getting better.  Khan has been placed to help with healing.  Patient is alert but unable to give me any information due to his baseline mental deficiencies.  He says ouch but denies pain.  He shakes his head yes or no but not sure if appropriately answering my questions.  Shook his head no to wanting watch a basketball game on TV but also shook his head no to watching [employer] dancing around and singing in [employer].  He seemed interested in [employer]  catching alligators but I'm just not sure how much he is comprehending.  He can life all of his ext to gravity but very weak and does not walk or feed himself.      ROS UTO but some mention of constipation in records so will start a stool softner due to his wounds.        Overview/Hospital Course:  1/28 Patient nonverbal with me. Sacral wound. Surgery consult. IV Zosyn  1/29 Patient speaks. Sacral wound. IV Zosyn and IV Vanc. Surgery/Wound consult  1/30 Patient being fed. Sacral wound. IV Zosyn and IV Vanc. Surgery and wound consults      Interval History:  Patient indicates that he feels fine, no complaints.  Per nurse no issues.     Review of Systems   Unable to perform ROS: Dementia     Objective:     Vital Signs (Most Recent):  Temp: 98 °F (36.7 °C) (02/02/22 1100)  Pulse: 62 (02/02/22 1100)  Resp: 16 (02/02/22 1100)  BP: 93/60 (02/02/22 1100)  SpO2: 100 % (02/02/22 1100) Vital Signs (24h Range):  Temp:  [96.8 °F (36 °C)-98 °F (36.7 °C)] 98 °F  (36.7 °C)  Pulse:  [62-76] 62  Resp:  [16-19] 16  SpO2:  [94 %-100 %] 100 %  BP: ()/(60-98) 93/60     Weight: 93.4 kg (206 lb)  Body mass index is 30.42 kg/m².    Intake/Output Summary (Last 24 hours) at 2/2/2022 1418  Last data filed at 2/2/2022 1300  Gross per 24 hour   Intake 12 ml   Output 1650 ml   Net -1638 ml      Physical Exam  Constitutional:       General: He is not in acute distress (Patient said I am all right today when asked him how he was doing).     Appearance: He is obese. He is not toxic-appearing.   HENT:      Mouth/Throat:      Mouth: Mucous membranes are moist.      Pharynx: No oropharyngeal exudate.   Eyes:      General: No scleral icterus.        Right eye: No discharge.         Left eye: No discharge.   Cardiovascular:      Rate and Rhythm: Regular rhythm.      Heart sounds: Normal heart sounds. No murmur heard.      Pulmonary:      Effort: No respiratory distress.      Breath sounds: No wheezing, rhonchi or rales.   Abdominal:      General: Bowel sounds are normal. There is no distension.      Palpations: Abdomen is soft. There is no mass.      Tenderness: There is no abdominal tenderness.   Musculoskeletal:      Right lower leg: No edema.      Left lower leg: No edema.   Skin:     Coloration: Skin is not jaundiced.      Findings: Lesion present. No rash. Bruising:  photograph of sacral decubitus wound noted in chart, it appears to have been debrided, scant slough and mostly healthy pink granulating tissue.   Neurological:      Mental Status: He is alert.         Significant Labs:   All pertinent labs within the past 24 hours have been reviewed.  BMP:   Recent Labs   Lab 02/02/22 0827   GLU 96      K 3.7      CO2 28   BUN 14   CREATININE 0.88   CALCIUM 9.7     CBC:   Recent Labs   Lab 02/02/22 0827   WBC 9.34   HGB 10.7*   HCT 31.5*        CMP:   Recent Labs   Lab 02/02/22 0827      K 3.7      CO2 28   GLU 96   BUN 14   CREATININE 0.88   CALCIUM 9.7    ANIONGAP 13   EGFRNONAA 88       Significant Imaging: I have reviewed all pertinent imaging results/findings within the past 24 hours.      Assessment/Plan:      * Pressure ulcer  IV antibiotics  Wound care    1/31 sacral wound culture positive for numerous organisms including Bacteroides, Acinetobacter, Proteus, and Enterococcus faecalis which was ampicillin susceptible.  Based on all the susceptibility profile as we can discontinue vancomycin and continue the patient on Zosyn.  Continue local wound care.    2/1 continue wound care, antibiotic therapy for few more days.        Sacral wound  1/31 see above in pressure ulcer section      Schizophrenia  Continue psychotropic medication        Hypertension  Patient on multiple antihypertensive agents.  On both amlodopine and verapamil.  Will stop verapamil with patient on BB.      1/31 hypertension currently controlled, continue current management.    2/2 blood pressure well controlled      Diabetes mellitus  Basal/bolus insulin while in hospital    1/31 blood sugars have been acceptable, all order to 100. Continue current management.        Alzheimer's disease  Continue psychotropic meds        VTE Risk Mitigation (From admission, onward)    None          Discharge Planning   YOSSI:      Code Status: DNR   Is the patient medically ready for discharge?:     Reason for patient still in hospital (select all that apply): Treatment  Discharge Plan A: Return to nursing home                  Duyen Bermudez MD  Department of Hospital Medicine   [facility]

## 2022-02-02 NOTE — SUBJECTIVE & OBJECTIVE
Interval History:  Patient indicates that he feels fine, no complaints.  Per nurse no issues.     Review of Systems   Unable to perform ROS: Dementia     Objective:     Vital Signs (Most Recent):  Temp: 98 °F (36.7 °C) (02/02/22 1100)  Pulse: 62 (02/02/22 1100)  Resp: 16 (02/02/22 1100)  BP: 93/60 (02/02/22 1100)  SpO2: 100 % (02/02/22 1100) Vital Signs (24h Range):  Temp:  [96.8 °F (36 °C)-98 °F (36.7 °C)] 98 °F (36.7 °C)  Pulse:  [62-76] 62  Resp:  [16-19] 16  SpO2:  [94 %-100 %] 100 %  BP: ()/(60-98) 93/60     Weight: 93.4 kg (206 lb)  Body mass index is 30.42 kg/m².    Intake/Output Summary (Last 24 hours) at 2/2/2022 1418  Last data filed at 2/2/2022 1300  Gross per 24 hour   Intake 12 ml   Output 1650 ml   Net -1638 ml      Physical Exam  Constitutional:       General: He is not in acute distress (Patient said I am all right today when asked him how he was doing).     Appearance: He is obese. He is not toxic-appearing.   HENT:      Mouth/Throat:      Mouth: Mucous membranes are moist.      Pharynx: No oropharyngeal exudate.   Eyes:      General: No scleral icterus.        Right eye: No discharge.         Left eye: No discharge.   Cardiovascular:      Rate and Rhythm: Regular rhythm.      Heart sounds: Normal heart sounds. No murmur heard.      Pulmonary:      Effort: No respiratory distress.      Breath sounds: No wheezing, rhonchi or rales.   Abdominal:      General: Bowel sounds are normal. There is no distension.      Palpations: Abdomen is soft. There is no mass.      Tenderness: There is no abdominal tenderness.   Musculoskeletal:      Right lower leg: No edema.      Left lower leg: No edema.   Skin:     Coloration: Skin is not jaundiced.      Findings: Lesion present. No rash. Bruising:  photograph of sacral decubitus wound noted in chart, it appears to have been debrided, scant slough and mostly healthy pink granulating tissue.   Neurological:      Mental Status: He is alert.         Significant  Labs:   All pertinent labs within the past 24 hours have been reviewed.  BMP:   Recent Labs   Lab 02/02/22  0827   GLU 96      K 3.7      CO2 28   BUN 14   CREATININE 0.88   CALCIUM 9.7     CBC:   Recent Labs   Lab 02/02/22  0827   WBC 9.34   HGB 10.7*   HCT 31.5*        CMP:   Recent Labs   Lab 02/02/22  0827      K 3.7      CO2 28   GLU 96   BUN 14   CREATININE 0.88   CALCIUM 9.7   ANIONGAP 13   EGFRNONAA 88       Significant Imaging: I have reviewed all pertinent imaging results/findings within the past 24 hours.

## 2022-02-03 LAB
GLUCOSE SERPL-MCNC: 142 MG/DL (ref 70–105)
GLUCOSE SERPL-MCNC: 154 MG/DL (ref 70–105)
GLUCOSE SERPL-MCNC: 157 MG/DL (ref 70–105)
GLUCOSE SERPL-MCNC: 94 MG/DL (ref 70–105)

## 2022-02-03 PROCEDURE — 82962 GLUCOSE BLOOD TEST: CPT

## 2022-02-03 PROCEDURE — 99232 PR SUBSEQUENT HOSPITAL CARE,LEVL II: ICD-10-PCS | Mod: ,,, | Performed by: INTERNAL MEDICINE

## 2022-02-03 PROCEDURE — 11000001 HC ACUTE MED/SURG PRIVATE ROOM

## 2022-02-03 PROCEDURE — C9399 UNCLASSIFIED DRUGS OR BIOLOG: HCPCS | Performed by: FAMILY MEDICINE

## 2022-02-03 PROCEDURE — 99232 SBSQ HOSP IP/OBS MODERATE 35: CPT | Mod: ,,, | Performed by: INTERNAL MEDICINE

## 2022-02-03 PROCEDURE — 63600175 PHARM REV CODE 636 W HCPCS: Performed by: FAMILY MEDICINE

## 2022-02-03 PROCEDURE — 25000003 PHARM REV CODE 250: Performed by: HOSPITALIST

## 2022-02-03 PROCEDURE — 63600175 PHARM REV CODE 636 W HCPCS: Performed by: HOSPITALIST

## 2022-02-03 RX ADMIN — OXCARBAZEPINE 150 MG: 150 TABLET, FILM COATED ORAL at 09:02

## 2022-02-03 RX ADMIN — LOSARTAN POTASSIUM 50 MG: 50 TABLET, FILM COATED ORAL at 09:02

## 2022-02-03 RX ADMIN — HYDRALAZINE HYDROCHLORIDE 10 MG: 10 TABLET ORAL at 09:02

## 2022-02-03 RX ADMIN — PIPERACILLIN AND TAZOBACTAM 4.5 G: 4; .5 INJECTION, POWDER, FOR SOLUTION INTRAVENOUS at 10:02

## 2022-02-03 RX ADMIN — SITAGLIPTIN 50 MG: 25 TABLET, FILM COATED ORAL at 09:02

## 2022-02-03 RX ADMIN — INSULIN DETEMIR 15 UNITS: 100 INJECTION, SOLUTION SUBCUTANEOUS at 09:02

## 2022-02-03 RX ADMIN — PIPERACILLIN AND TAZOBACTAM 4.5 G: 4; .5 INJECTION, POWDER, FOR SOLUTION INTRAVENOUS at 06:02

## 2022-02-03 RX ADMIN — PIPERACILLIN AND TAZOBACTAM 4.5 G: 4; .5 INJECTION, POWDER, FOR SOLUTION INTRAVENOUS at 02:02

## 2022-02-03 RX ADMIN — RISPERIDONE 1 MG: 1 TABLET ORAL at 09:02

## 2022-02-03 RX ADMIN — METOPROLOL SUCCINATE 25 MG: 25 TABLET, EXTENDED RELEASE ORAL at 09:02

## 2022-02-03 RX ADMIN — ASPIRIN 81 MG CHEWABLE TABLET 81 MG: 81 TABLET CHEWABLE at 09:02

## 2022-02-03 RX ADMIN — LEVOTHYROXINE SODIUM 50 MCG: 25 TABLET ORAL at 06:02

## 2022-02-03 RX ADMIN — TAMSULOSIN HYDROCHLORIDE 0.4 MG: 0.4 CAPSULE ORAL at 09:02

## 2022-02-03 RX ADMIN — POTASSIUM CHLORIDE 20 MEQ: 20 TABLET, EXTENDED RELEASE ORAL at 09:02

## 2022-02-03 RX ADMIN — INSULIN ASPART 2 UNITS: 100 INJECTION, SOLUTION INTRAVENOUS; SUBCUTANEOUS at 12:02

## 2022-02-03 RX ADMIN — HYDROCHLOROTHIAZIDE 25 MG: 12.5 TABLET ORAL at 09:02

## 2022-02-03 RX ADMIN — MEMANTINE 10 MG: 5 TABLET ORAL at 09:02

## 2022-02-03 RX ADMIN — PANTOPRAZOLE SODIUM 40 MG: 40 TABLET, DELAYED RELEASE ORAL at 09:02

## 2022-02-03 NOTE — SUBJECTIVE & OBJECTIVE
Interval History:  Patient with no new issues indicated and nurse reports no problems.  He is afebrile.      Review of Systems   Unable to perform ROS: Dementia     Objective:     Vital Signs (Most Recent):  Temp: 98.4 °F (36.9 °C) (02/03/22 1100)  Pulse: 77 (02/03/22 1100)  Resp: 16 (02/03/22 1100)  BP: 114/66 (02/03/22 1100)  SpO2: 100 % (02/03/22 1100) Vital Signs (24h Range):  Temp:  [97.2 °F (36.2 °C)-98.4 °F (36.9 °C)] 98.4 °F (36.9 °C)  Pulse:  [59-95] 77  Resp:  [16-20] 16  SpO2:  [99 %-100 %] 100 %  BP: (102-135)/(58-71) 114/66     Weight: 93.4 kg (206 lb)  Body mass index is 30.42 kg/m².    Intake/Output Summary (Last 24 hours) at 2/3/2022 1410  Last data filed at 2/3/2022 0800  Gross per 24 hour   Intake 250 ml   Output 1550 ml   Net -1300 ml      Physical Exam  Constitutional:       General: He is not in acute distress (Patient said I am all right today when asked him how he was doing).     Appearance: He is obese. He is not toxic-appearing.   HENT:      Mouth/Throat:      Mouth: Mucous membranes are moist.      Pharynx: No oropharyngeal exudate.   Eyes:      General: No scleral icterus.        Right eye: No discharge.         Left eye: No discharge.   Cardiovascular:      Rate and Rhythm: Regular rhythm.      Heart sounds: Normal heart sounds. No murmur heard.      Pulmonary:      Effort: No respiratory distress.      Breath sounds: No wheezing, rhonchi or rales.   Abdominal:      General: Bowel sounds are normal. There is no distension.      Palpations: Abdomen is soft. There is no mass.      Tenderness: There is no abdominal tenderness.   Musculoskeletal:      Right lower leg: No edema.      Left lower leg: No edema.   Skin:     Coloration: Skin is not jaundiced.      Findings: Lesion present. No rash. Bruising:  photograph of sacral decubitus wound noted in chart, it appears to have been debrided, scant slough and mostly healthy pink granulating tissue.   Neurological:      Mental Status: He is  alert.         Significant Labs:   All pertinent labs within the past 24 hours have been reviewed.  BMP:   Recent Labs   Lab 02/02/22  0827   GLU 96      K 3.7      CO2 28   BUN 14   CREATININE 0.88   CALCIUM 9.7     CBC:   Recent Labs   Lab 02/02/22  0827   WBC 9.34   HGB 10.7*   HCT 31.5*        CMP:   Recent Labs   Lab 02/02/22  0827      K 3.7      CO2 28   GLU 96   BUN 14   CREATININE 0.88   CALCIUM 9.7   ANIONGAP 13   EGFRNONAA 88       Significant Imaging: I have reviewed all pertinent imaging results/findings within the past 24 hours.

## 2022-02-03 NOTE — PROGRESS NOTES
[facility] Medicine  Progress Note    Patient Name: Bhargav Gao  MRN: [id number]  Patient Class: IP- Inpatient   Admission Date: 1/27/2022  Length of Stay: 7 days  Attending Physician: Duyen Bermudez,*  Primary Care Provider: Kerry Lin MD        Subjective:     Principal Problem:Pressure ulcer        HPI:  80 yo (appears younger) M presents to [facility] as a direct admit [facility] for worsening sacral decubiti.  Patient has been treated as OP by [facility] wound care but is not getting better.  Khan has been placed to help with healing.  Patient is alert but unable to give me any information due to his baseline mental deficiencies.  He says ouch but denies pain.  He shakes his head yes or no but not sure if appropriately answering my questions.  Shook his head no to wanting watch a basketball game on TV but also shook his head no to watching [employer] dancing around and singing in "[employer] movie.  He seemed interested in [employer]  catching alligators but I'm just not sure how much he is comprehending.  He can life all of his ext to gravity but very weak and does not walk or feed himself.      ROS UTO but some mention of constipation in records so will start a stool softner due to his wounds.        Overview/Hospital Course:  1/28 Patient nonverbal with me. Sacral wound. Surgery consult. IV Zosyn  1/29 Patient speaks. Sacral wound. IV Zosyn and IV Vanc. Surgery/Wound consult  1/30 Patient being fed. Sacral wound. IV Zosyn and IV Vanc. Surgery and wound consults      Interval History:  Patient with no new issues indicated and nurse reports no problems.  He is afebrile.      Review of Systems   Unable to perform ROS: Dementia     Objective:     Vital Signs (Most Recent):  Temp: 98.4 °F (36.9 °C) (02/03/22 1100)  Pulse: 77 (02/03/22 1100)  Resp: 16 (02/03/22 1100)  BP: 114/66 (02/03/22 1100)  SpO2: 100 % (02/03/22 1100) Vital Signs (24h Range):  Temp:  [97.2 °F (36.2 °C)-98.4 °F  (36.9 °C)] 98.4 °F (36.9 °C)  Pulse:  [59-95] 77  Resp:  [16-20] 16  SpO2:  [99 %-100 %] 100 %  BP: (102-135)/(58-71) 114/66     Weight: 93.4 kg (206 lb)  Body mass index is 30.42 kg/m².    Intake/Output Summary (Last 24 hours) at 2/3/2022 1410  Last data filed at 2/3/2022 0800  Gross per 24 hour   Intake 250 ml   Output 1550 ml   Net -1300 ml      Physical Exam  Constitutional:       General: He is not in acute distress (Patient said I am all right today when asked him how he was doing).     Appearance: He is obese. He is not toxic-appearing.   HENT:      Mouth/Throat:      Mouth: Mucous membranes are moist.      Pharynx: No oropharyngeal exudate.   Eyes:      General: No scleral icterus.        Right eye: No discharge.         Left eye: No discharge.   Cardiovascular:      Rate and Rhythm: Regular rhythm.      Heart sounds: Normal heart sounds. No murmur heard.      Pulmonary:      Effort: No respiratory distress.      Breath sounds: No wheezing, rhonchi or rales.   Abdominal:      General: Bowel sounds are normal. There is no distension.      Palpations: Abdomen is soft. There is no mass.      Tenderness: There is no abdominal tenderness.   Musculoskeletal:      Right lower leg: No edema.      Left lower leg: No edema.   Skin:     Coloration: Skin is not jaundiced.      Findings: Lesion present. No rash. Bruising:  photograph of sacral decubitus wound noted in chart, it appears to have been debrided, scant slough and mostly healthy pink granulating tissue.   Neurological:      Mental Status: He is alert.         Significant Labs:   All pertinent labs within the past 24 hours have been reviewed.  BMP:   Recent Labs   Lab 02/02/22 0827   GLU 96      K 3.7      CO2 28   BUN 14   CREATININE 0.88   CALCIUM 9.7     CBC:   Recent Labs   Lab 02/02/22 0827   WBC 9.34   HGB 10.7*   HCT 31.5*        CMP:   Recent Labs   Lab 02/02/22 0827      K 3.7      CO2 28   GLU 96   BUN 14    CREATININE 0.88   CALCIUM 9.7   ANIONGAP 13   EGFRNONAA 88       Significant Imaging: I have reviewed all pertinent imaging results/findings within the past 24 hours.      Assessment/Plan:      * Pressure ulcer  IV antibiotics  Wound care    1/31 sacral wound culture positive for numerous organisms including Bacteroides, Acinetobacter, Proteus, and Enterococcus faecalis which was ampicillin susceptible.  Based on all the susceptibility profile as we can discontinue vancomycin and continue the patient on Zosyn.  Continue local wound care.    2/1 continue wound care, antibiotic therapy for few more days.    2/3 no change in treatment plan for now        Sacral wound  1/31 see above in pressure ulcer section      Schizophrenia  Continue psychotropic medication        Hypertension  Patient on multiple antihypertensive agents.  On both amlodopine and verapamil.  Will stop verapamil with patient on BB.      1/31 hypertension currently controlled, continue current management.    2/2 blood pressure well controlled      Diabetes mellitus  Basal/bolus insulin while in hospital    1/31 blood sugars have been acceptable, all order to 100. Continue current management.        Alzheimer's disease  Continue psychotropic meds        VTE Risk Mitigation (From admission, onward)    None          Discharge Planning   YOSSI:      Code Status: DNR   Is the patient medically ready for discharge?:     Reason for patient still in hospital (select all that apply): Treatment  Discharge Plan A: Return to nursing home                  Duyen Bermudez MD  Department of Hospital Medicine   [facility]

## 2022-02-04 LAB
GLUCOSE SERPL-MCNC: 112 MG/DL (ref 70–105)
GLUCOSE SERPL-MCNC: 124 MG/DL (ref 70–105)
GLUCOSE SERPL-MCNC: 278 MG/DL (ref 70–105)

## 2022-02-04 PROCEDURE — 25000003 PHARM REV CODE 250: Performed by: HOSPITALIST

## 2022-02-04 PROCEDURE — 99232 SBSQ HOSP IP/OBS MODERATE 35: CPT | Mod: ,,, | Performed by: INTERNAL MEDICINE

## 2022-02-04 PROCEDURE — 63600175 PHARM REV CODE 636 W HCPCS: Performed by: FAMILY MEDICINE

## 2022-02-04 PROCEDURE — C9399 UNCLASSIFIED DRUGS OR BIOLOG: HCPCS | Performed by: FAMILY MEDICINE

## 2022-02-04 PROCEDURE — 63600175 PHARM REV CODE 636 W HCPCS: Performed by: HOSPITALIST

## 2022-02-04 PROCEDURE — 99232 PR SUBSEQUENT HOSPITAL CARE,LEVL II: ICD-10-PCS | Mod: ,,, | Performed by: INTERNAL MEDICINE

## 2022-02-04 PROCEDURE — 82962 GLUCOSE BLOOD TEST: CPT

## 2022-02-04 PROCEDURE — 11000001 HC ACUTE MED/SURG PRIVATE ROOM

## 2022-02-04 RX ADMIN — PIPERACILLIN AND TAZOBACTAM 4.5 G: 4; .5 INJECTION, POWDER, FOR SOLUTION INTRAVENOUS at 10:02

## 2022-02-04 RX ADMIN — OXCARBAZEPINE 150 MG: 150 TABLET, FILM COATED ORAL at 09:02

## 2022-02-04 RX ADMIN — HYDROCHLOROTHIAZIDE 25 MG: 12.5 TABLET ORAL at 09:02

## 2022-02-04 RX ADMIN — PANTOPRAZOLE SODIUM 40 MG: 40 TABLET, DELAYED RELEASE ORAL at 09:02

## 2022-02-04 RX ADMIN — TAMSULOSIN HYDROCHLORIDE 0.4 MG: 0.4 CAPSULE ORAL at 09:02

## 2022-02-04 RX ADMIN — RISPERIDONE 1 MG: 1 TABLET ORAL at 09:02

## 2022-02-04 RX ADMIN — INSULIN DETEMIR 15 UNITS: 100 INJECTION, SOLUTION SUBCUTANEOUS at 09:02

## 2022-02-04 RX ADMIN — METOPROLOL SUCCINATE 25 MG: 25 TABLET, EXTENDED RELEASE ORAL at 09:02

## 2022-02-04 RX ADMIN — MEMANTINE 10 MG: 5 TABLET ORAL at 09:02

## 2022-02-04 RX ADMIN — POTASSIUM CHLORIDE 20 MEQ: 20 TABLET, EXTENDED RELEASE ORAL at 09:02

## 2022-02-04 RX ADMIN — HYDRALAZINE HYDROCHLORIDE 10 MG: 10 TABLET ORAL at 09:02

## 2022-02-04 RX ADMIN — PIPERACILLIN AND TAZOBACTAM 4.5 G: 4; .5 INJECTION, POWDER, FOR SOLUTION INTRAVENOUS at 03:02

## 2022-02-04 RX ADMIN — ASPIRIN 81 MG CHEWABLE TABLET 81 MG: 81 TABLET CHEWABLE at 09:02

## 2022-02-04 RX ADMIN — LOSARTAN POTASSIUM 50 MG: 50 TABLET, FILM COATED ORAL at 09:02

## 2022-02-04 RX ADMIN — LEVOTHYROXINE SODIUM 50 MCG: 25 TABLET ORAL at 06:02

## 2022-02-04 RX ADMIN — ACETAMINOPHEN 1000 MG: 500 TABLET ORAL at 03:02

## 2022-02-04 RX ADMIN — SITAGLIPTIN 50 MG: 25 TABLET, FILM COATED ORAL at 09:02

## 2022-02-04 RX ADMIN — PIPERACILLIN AND TAZOBACTAM 4.5 G: 4; .5 INJECTION, POWDER, FOR SOLUTION INTRAVENOUS at 06:02

## 2022-02-04 NOTE — SUBJECTIVE & OBJECTIVE
Interval History:  Patient doing well today, no new issues indicated by nurse.  He is afebrile.      Review of Systems   Unable to perform ROS: Dementia     Objective:     Vital Signs (Most Recent):  Temp: 96.9 °F (36.1 °C) (02/04/22 1200)  Pulse: 72 (02/04/22 1200)  Resp: 18 (02/04/22 1200)  BP: 134/74 (02/04/22 0500)  SpO2: 98 % (02/04/22 1200) Vital Signs (24h Range):  Temp:  [96.9 °F (36.1 °C)-97.9 °F (36.6 °C)] 96.9 °F (36.1 °C)  Pulse:  [61-72] 72  Resp:  [16-20] 18  SpO2:  [97 %-100 %] 98 %  BP: (134-139)/(70-75) 134/74     Weight: 94.8 kg (209 lb)  Body mass index is 30.86 kg/m².    Intake/Output Summary (Last 24 hours) at 2/4/2022 1344  Last data filed at 2/4/2022 0300  Gross per 24 hour   Intake --   Output 1200 ml   Net -1200 ml      Physical Exam  Constitutional:       General: He is not in acute distress (Patient said I am all right today when asked him how he was doing).     Appearance: He is obese. He is not toxic-appearing.   HENT:      Mouth/Throat:      Mouth: Mucous membranes are moist.      Pharynx: No oropharyngeal exudate.   Eyes:      General: No scleral icterus.        Right eye: No discharge.         Left eye: No discharge.   Cardiovascular:      Rate and Rhythm: Regular rhythm.      Heart sounds: Normal heart sounds. No murmur heard.      Pulmonary:      Effort: No respiratory distress.      Breath sounds: No wheezing, rhonchi or rales.   Abdominal:      General: Bowel sounds are normal. There is no distension.      Palpations: Abdomen is soft. There is no mass.      Tenderness: There is no abdominal tenderness.   Musculoskeletal:      Right lower leg: No edema.      Left lower leg: No edema.   Skin:     Coloration: Skin is not jaundiced.      Findings: Lesion present. No rash. Bruising:  photograph of sacral decubitus wound noted in chart, it appears to have been debrided, scant slough and mostly healthy pink granulating tissue.   Neurological:      Mental Status: He is alert.          Significant Labs:   All pertinent labs within the past 24 hours have been reviewed.  BMP:   No results for input(s): GLU, NA, K, CL, CO2, BUN, CREATININE, CALCIUM, MG in the last 48 hours.  CBC:   No results for input(s): WBC, HGB, HCT, PLT in the last 48 hours.  CMP:   No results for input(s): NA, K, CL, CO2, GLU, BUN, CREATININE, CALCIUM, PROT, ALBUMIN, BILITOT, ALKPHOS, AST, ALT, ANIONGAP, EGFRNONAA in the last 48 hours.    Invalid input(s): [employer]    Significant Imaging: I have reviewed all pertinent imaging results/findings within the past 24 hours.

## 2022-02-04 NOTE — PLAN OF CARE
"Pt hollering out off and on. When nurse asked pt if something was wrong, he nodded yes. I asked him where his pain was and he stated "my legs". Gave Tylenol 500 mg 2 by mouth crushed in apple sauce. He tolerated/swallowed well. Gave sips of water. Will continue to monitor.   "

## 2022-02-04 NOTE — CARE UPDATE
Secure chat with Mar SIMMONS RE Keeping IV as long as healthy.  IV flushes and infuses without any problems.  Mar SIMMONS okay IV to remain thru 02/05/2022.  Attempted x2 to obtain new site.  Infiltrated both times.

## 2022-02-04 NOTE — ASSESSMENT & PLAN NOTE
Basal/bolus insulin while in hospital    1/31 blood sugars have been acceptable, all order to 100. Continue current management.    2/4 Blood sugar well controlled.

## 2022-02-04 NOTE — PROGRESS NOTES
[facility] Hospital Medicine  Progress Note    Patient Name: Bhargav Gao  MRN: [id number]  Patient Class: IP- Inpatient   Admission Date: 1/27/2022  Length of Stay: 8 days  Attending Physician: Duyen Bermudez,*  Primary Care Provider: Kerry Lin MD        Subjective:     Principal Problem:Pressure ulcer        HPI:  80 yo (appears younger) M presents to [facility] as a direct admit from [facility] for worsening sacral decubiti.  Patient has been treated as OP by [facility] wound care but is not getting better.  Khan has been placed to help with healing.  Patient is alert but unable to give me any information due to his baseline mental deficiencies.  He says ouch but denies pain.  He shakes his head yes or no but not sure if appropriately answering my questions.  Shook his head no to wanting watch a basketball game on TV but also shook his head no to watching [employer] dancing around and singing in [employer].  He seemed interested in [employer]  catching alligators but I'm just not sure how much he is comprehending.  He can life all of his ext to gravity but very weak and does not walk or feed himself.      ROS UTO but some mention of constipation in records so will start a stool softner due to his wounds.        Overview/Hospital Course:  1/28 Patient nonverbal with me. Sacral wound. Surgery consult. IV Zosyn  1/29 Patient speaks. Sacral wound. IV Zosyn and IV Vanc. Surgery/Wound consult  1/30 Patient being fed. Sacral wound. IV Zosyn and IV Vanc. Surgery and wound consults      Interval History:  Patient doing well today, no new issues indicated by nurse.  He is afebrile.      Review of Systems   Unable to perform ROS: Dementia     Objective:     Vital Signs (Most Recent):  Temp: 96.9 °F (36.1 °C) (02/04/22 1200)  Pulse: 72 (02/04/22 1200)  Resp: 18 (02/04/22 1200)  BP: 134/74 (02/04/22 0500)  SpO2: 98 % (02/04/22 1200) Vital Signs (24h Range):  Temp:  [96.9 °F (36.1 °C)-97.9 °F (36.6  °C)] 96.9 °F (36.1 °C)  Pulse:  [61-72] 72  Resp:  [16-20] 18  SpO2:  [97 %-100 %] 98 %  BP: (134-139)/(70-75) 134/74     Weight: 94.8 kg (209 lb)  Body mass index is 30.86 kg/m².    Intake/Output Summary (Last 24 hours) at 2/4/2022 1344  Last data filed at 2/4/2022 0300  Gross per 24 hour   Intake --   Output 1200 ml   Net -1200 ml      Physical Exam  Constitutional:       General: He is not in acute distress (Patient said I am all right today when asked him how he was doing).     Appearance: He is obese. He is not toxic-appearing.   HENT:      Mouth/Throat:      Mouth: Mucous membranes are moist.      Pharynx: No oropharyngeal exudate.   Eyes:      General: No scleral icterus.        Right eye: No discharge.         Left eye: No discharge.   Cardiovascular:      Rate and Rhythm: Regular rhythm.      Heart sounds: Normal heart sounds. No murmur heard.      Pulmonary:      Effort: No respiratory distress.      Breath sounds: No wheezing, rhonchi or rales.   Abdominal:      General: Bowel sounds are normal. There is no distension.      Palpations: Abdomen is soft. There is no mass.      Tenderness: There is no abdominal tenderness.   Musculoskeletal:      Right lower leg: No edema.      Left lower leg: No edema.   Skin:     Coloration: Skin is not jaundiced.      Findings: Lesion present. No rash. Bruising:  photograph of sacral decubitus wound noted in chart, it appears to have been debrided, scant slough and mostly healthy pink granulating tissue.   Neurological:      Mental Status: He is alert.         Significant Labs:   All pertinent labs within the past 24 hours have been reviewed.  BMP:   No results for input(s): GLU, NA, K, CL, CO2, BUN, CREATININE, CALCIUM, MG in the last 48 hours.  CBC:   No results for input(s): WBC, HGB, HCT, PLT in the last 48 hours.  CMP:   No results for input(s): NA, K, CL, CO2, GLU, BUN, CREATININE, CALCIUM, PROT, ALBUMIN, BILITOT, ALKPHOS, AST, ALT, ANIONGAP, EGFRNONAA in the last  48 hours.    Invalid input(s): SHINEAARON    Significant Imaging: I have reviewed all pertinent imaging results/findings within the past 24 hours.      Assessment/Plan:      * Pressure ulcer  IV antibiotics  Wound care    1/31 sacral wound culture positive for numerous organisms including Bacteroides, Acinetobacter, Proteus, and Enterococcus faecalis which was ampicillin susceptible.  Based on all the susceptibility profile as we can discontinue vancomycin and continue the patient on Zosyn.  Continue local wound care.    2/1 continue wound care, antibiotic therapy for few more days.    2/3 no change in treatment plan for now        Sacral wound  1/31 see above in pressure ulcer section      Schizophrenia  Continue psychotropic medication        Hypertension  Patient on multiple antihypertensive agents.  On both amlodopine and verapamil.  Will stop verapamil with patient on BB.      1/31 hypertension currently controlled, continue current management.    2/2 blood pressure well controlled    2/4 BP remains well controlled.      Diabetes mellitus  Basal/bolus insulin while in hospital    1/31 blood sugars have been acceptable, all order to 100. Continue current management.    2/4 Blood sugar well controlled.        Alzheimer's disease  Continue psychotropic meds        VTE Risk Mitigation (From admission, onward)    None          Discharge Planning   YOSSI:      Code Status: DNR   Is the patient medically ready for discharge?:     Reason for patient still in hospital (select all that apply): Treatment  Discharge Plan A: Return to nursing home                  Duyen Bermudez MD  Department of Hospital Medicine   [facility]

## 2022-02-04 NOTE — ASSESSMENT & PLAN NOTE
Patient on multiple antihypertensive agents.  On both amlodopine and verapamil.  Will stop verapamil with patient on BB.      1/31 hypertension currently controlled, continue current management.    2/2 blood pressure well controlled    2/4 BP remains well controlled.

## 2022-02-04 NOTE — PLAN OF CARE
Problem: Impaired Wound Healing  Goal: Optimal Wound Healing  2/3/2022 1921 by Liseth Jefferson RN  Outcome: Ongoing, Progressing  2/3/2022 1823 by Liseth Jefferson RN  Outcome: Ongoing, Progressing

## 2022-02-05 LAB
GLUCOSE SERPL-MCNC: 111 MG/DL (ref 70–105)
GLUCOSE SERPL-MCNC: 144 MG/DL (ref 70–105)
GLUCOSE SERPL-MCNC: 152 MG/DL (ref 70–105)
GLUCOSE SERPL-MCNC: 158 MG/DL (ref 70–105)

## 2022-02-05 PROCEDURE — 11000001 HC ACUTE MED/SURG PRIVATE ROOM

## 2022-02-05 PROCEDURE — 99232 SBSQ HOSP IP/OBS MODERATE 35: CPT | Mod: ,,, | Performed by: INTERNAL MEDICINE

## 2022-02-05 PROCEDURE — 25000003 PHARM REV CODE 250: Performed by: HOSPITALIST

## 2022-02-05 PROCEDURE — 63600175 PHARM REV CODE 636 W HCPCS: Performed by: FAMILY MEDICINE

## 2022-02-05 PROCEDURE — 82962 GLUCOSE BLOOD TEST: CPT

## 2022-02-05 PROCEDURE — C9399 UNCLASSIFIED DRUGS OR BIOLOG: HCPCS | Performed by: FAMILY MEDICINE

## 2022-02-05 PROCEDURE — 63600175 PHARM REV CODE 636 W HCPCS: Performed by: HOSPITALIST

## 2022-02-05 PROCEDURE — 99232 PR SUBSEQUENT HOSPITAL CARE,LEVL II: ICD-10-PCS | Mod: ,,, | Performed by: INTERNAL MEDICINE

## 2022-02-05 RX ADMIN — SITAGLIPTIN 50 MG: 25 TABLET, FILM COATED ORAL at 08:02

## 2022-02-05 RX ADMIN — RISPERIDONE 1 MG: 1 TABLET ORAL at 08:02

## 2022-02-05 RX ADMIN — ASPIRIN 81 MG CHEWABLE TABLET 81 MG: 81 TABLET CHEWABLE at 08:02

## 2022-02-05 RX ADMIN — PIPERACILLIN AND TAZOBACTAM 4.5 G: 4; .5 INJECTION, POWDER, FOR SOLUTION INTRAVENOUS at 05:02

## 2022-02-05 RX ADMIN — PIPERACILLIN AND TAZOBACTAM 4.5 G: 4; .5 INJECTION, POWDER, FOR SOLUTION INTRAVENOUS at 09:02

## 2022-02-05 RX ADMIN — HYDRALAZINE HYDROCHLORIDE 10 MG: 10 TABLET ORAL at 08:02

## 2022-02-05 RX ADMIN — INSULIN DETEMIR 15 UNITS: 100 INJECTION, SOLUTION SUBCUTANEOUS at 08:02

## 2022-02-05 RX ADMIN — LEVOTHYROXINE SODIUM 50 MCG: 25 TABLET ORAL at 05:02

## 2022-02-05 RX ADMIN — INSULIN ASPART 2 UNITS: 100 INJECTION, SOLUTION INTRAVENOUS; SUBCUTANEOUS at 12:02

## 2022-02-05 RX ADMIN — METOPROLOL SUCCINATE 25 MG: 25 TABLET, EXTENDED RELEASE ORAL at 08:02

## 2022-02-05 RX ADMIN — PANTOPRAZOLE SODIUM 40 MG: 40 TABLET, DELAYED RELEASE ORAL at 08:02

## 2022-02-05 RX ADMIN — HYDROCHLOROTHIAZIDE 25 MG: 12.5 TABLET ORAL at 08:02

## 2022-02-05 RX ADMIN — TAMSULOSIN HYDROCHLORIDE 0.4 MG: 0.4 CAPSULE ORAL at 08:02

## 2022-02-05 RX ADMIN — POTASSIUM CHLORIDE 20 MEQ: 20 TABLET, EXTENDED RELEASE ORAL at 08:02

## 2022-02-05 RX ADMIN — MEMANTINE 10 MG: 5 TABLET ORAL at 08:02

## 2022-02-05 RX ADMIN — PIPERACILLIN AND TAZOBACTAM 4.5 G: 4; .5 INJECTION, POWDER, FOR SOLUTION INTRAVENOUS at 02:02

## 2022-02-05 RX ADMIN — OXCARBAZEPINE 150 MG: 150 TABLET, FILM COATED ORAL at 08:02

## 2022-02-05 RX ADMIN — LOSARTAN POTASSIUM 50 MG: 50 TABLET, FILM COATED ORAL at 08:02

## 2022-02-05 NOTE — ASSESSMENT & PLAN NOTE
IV antibiotics  Wound care    1/31 sacral wound culture positive for numerous organisms including Bacteroides, Acinetobacter, Proteus, and Enterococcus faecalis which was ampicillin susceptible.  Based on all the susceptibility profile as we can discontinue vancomycin and continue the patient on Zosyn.  Continue local wound care.    2/1 continue wound care, antibiotic therapy for few more days.    2/3 no change in treatment plan for now    2/5 continuing with Zosyn and local wound care.  Recheck labs tomorrow.

## 2022-02-05 NOTE — PROGRESS NOTES
[facility] Medicine  Progress Note    Patient Name: Bhargav Gao  MRN: [id number]  Patient Class: IP- Inpatient   Admission Date: 1/27/2022  Length of Stay: 9 days  Attending Physician: Duyen Bermudez,*  Primary Care Provider: Kerry Lin MD        Subjective:     Principal Problem:Pressure ulcer        HPI:  80 yo (appears younger) M presents to [facility] as a direct admit from [facility] for worsening sacral decubiti.  Patient has been treated as OP by [facility] wound care but is not getting better.  Khan has been placed to help with healing.  Patient is alert but unable to give me any information due to his baseline mental deficiencies.  He says ouch but denies pain.  He shakes his head yes or no but not sure if appropriately answering my questions.  Shook his head no to wanting watch a basketball game on TV but also shook his head no to watching [employer] dancing around and singing in [employer] movie.  He seemed interested in [employer]  catching alligators but I'm just not sure how much he is comprehending.  He can life all of his ext to gravity but very weak and does not walk or feed himself.      ROS UTO but some mention of constipation in records so will start a stool softner due to his wounds.        Overview/Hospital Course:  1/28 Patient nonverbal with me. Sacral wound. Surgery consult. IV Zosyn  1/29 Patient speaks. Sacral wound. IV Zosyn and IV Vanc. Surgery/Wound consult  1/30 Patient being fed. Sacral wound. IV Zosyn and IV Vanc. Surgery and wound consults      Interval History:  Patient stable per nurses, indicates that he is doing fine when I asked him.  No fever.  Continues have a good appetite once fed.      Review of Systems   Unable to perform ROS: Dementia     Objective:     Vital Signs (Most Recent):  Temp: 97.9 °F (36.6 °C) (02/05/22 0800)  Pulse: 88 (02/05/22 0800)  Resp: 17 (02/05/22 0800)  BP: 120/65 (02/05/22 0800)  SpO2: 100 % (02/05/22 0800) Vital Signs  (24h Range):  Temp:  [97.6 °F (36.4 °C)-98.4 °F (36.9 °C)] 97.9 °F (36.6 °C)  Pulse:  [76-88] 88  Resp:  [17-20] 17  SpO2:  [98 %-100 %] 100 %  BP: ()/(65-80) 120/65     Weight: 94.8 kg (209 lb)  Body mass index is 30.86 kg/m².    Intake/Output Summary (Last 24 hours) at 2/5/2022 1259  Last data filed at 2/5/2022 0937  Gross per 24 hour   Intake 340 ml   Output 900 ml   Net -560 ml      Physical Exam  Constitutional:       General: He is not in acute distress (Patient said I am all right today when asked him how he was doing).     Appearance: He is obese. He is not toxic-appearing.   HENT:      Mouth/Throat:      Mouth: Mucous membranes are moist.      Pharynx: No oropharyngeal exudate.   Eyes:      General: No scleral icterus.        Right eye: No discharge.         Left eye: No discharge.   Cardiovascular:      Rate and Rhythm: Regular rhythm.      Heart sounds: Normal heart sounds. No murmur heard.      Pulmonary:      Effort: No respiratory distress.      Breath sounds: No wheezing, rhonchi or rales.   Abdominal:      General: Bowel sounds are normal. There is no distension.      Palpations: Abdomen is soft. There is no mass.      Tenderness: There is no abdominal tenderness.   Musculoskeletal:      Right lower leg: No edema.      Left lower leg: No edema.   Skin:     Coloration: Skin is not jaundiced.      Findings: Lesion present. No rash. Bruising:  photograph of sacral decubitus wound noted in chart, it appears to have been debrided, scant slough and mostly healthy pink granulating tissue.   Neurological:      Mental Status: He is alert.         Significant Labs:   All pertinent labs within the past 24 hours have been reviewed.  BMP:   No results for input(s): GLU, NA, K, CL, CO2, BUN, CREATININE, CALCIUM, MG in the last 48 hours.  CBC:   No results for input(s): WBC, HGB, HCT, PLT in the last 48 hours.  CMP:   No results for input(s): NA, K, CL, CO2, GLU, BUN, CREATININE, CALCIUM, PROT, ALBUMIN,  BILITOT, ALKPHOS, AST, ALT, ANIONGAP, EGFRNONAA in the last 48 hours.    Invalid input(s): [employer]    Significant Imaging: I have reviewed all pertinent imaging results/findings within the past 24 hours.      Assessment/Plan:      * Pressure ulcer  IV antibiotics  Wound care    1/31 sacral wound culture positive for numerous organisms including Bacteroides, Acinetobacter, Proteus, and Enterococcus faecalis which was ampicillin susceptible.  Based on all the susceptibility profile as we can discontinue vancomycin and continue the patient on Zosyn.  Continue local wound care.    2/1 continue wound care, antibiotic therapy for few more days.    2/3 no change in treatment plan for now    2/5 continuing with Zosyn and local wound care.  Recheck labs tomorrow.        Sacral wound  1/31 see above in pressure ulcer section      Schizophrenia  Continue psychotropic medication        Hypertension  Patient on multiple antihypertensive agents.  On both amlodopine and verapamil.  Will stop verapamil with patient on BB.      1/31 hypertension currently controlled, continue current management.    2/2 blood pressure well controlled    2/4 BP remains well controlled.      Diabetes mellitus  Basal/bolus insulin while in hospital    1/31 blood sugars have been acceptable, all order to 100. Continue current management.    2/4 Blood sugar well controlled.        Alzheimer's disease  Continue psychotropic meds        VTE Risk Mitigation (From admission, onward)    None          Discharge Planning   YOSSI:      Code Status: DNR   Is the patient medically ready for discharge?:     Reason for patient still in hospital (select all that apply): Treatment  Discharge Plan A: Return to nursing home                  Duyen Bermudez MD  Department of Hospital Medicine   [facility]

## 2022-02-05 NOTE — SUBJECTIVE & OBJECTIVE
Interval History:  Patient stable per nurses, indicates that he is doing fine when I asked him.  No fever.  Continues have a good appetite once fed.      Review of Systems   Unable to perform ROS: Dementia     Objective:     Vital Signs (Most Recent):  Temp: 97.9 °F (36.6 °C) (02/05/22 0800)  Pulse: 88 (02/05/22 0800)  Resp: 17 (02/05/22 0800)  BP: 120/65 (02/05/22 0800)  SpO2: 100 % (02/05/22 0800) Vital Signs (24h Range):  Temp:  [97.6 °F (36.4 °C)-98.4 °F (36.9 °C)] 97.9 °F (36.6 °C)  Pulse:  [76-88] 88  Resp:  [17-20] 17  SpO2:  [98 %-100 %] 100 %  BP: ()/(65-80) 120/65     Weight: 94.8 kg (209 lb)  Body mass index is 30.86 kg/m².    Intake/Output Summary (Last 24 hours) at 2/5/2022 1259  Last data filed at 2/5/2022 0937  Gross per 24 hour   Intake 340 ml   Output 900 ml   Net -560 ml      Physical Exam  Constitutional:       General: He is not in acute distress (Patient said I am all right today when asked him how he was doing).     Appearance: He is obese. He is not toxic-appearing.   HENT:      Mouth/Throat:      Mouth: Mucous membranes are moist.      Pharynx: No oropharyngeal exudate.   Eyes:      General: No scleral icterus.        Right eye: No discharge.         Left eye: No discharge.   Cardiovascular:      Rate and Rhythm: Regular rhythm.      Heart sounds: Normal heart sounds. No murmur heard.      Pulmonary:      Effort: No respiratory distress.      Breath sounds: No wheezing, rhonchi or rales.   Abdominal:      General: Bowel sounds are normal. There is no distension.      Palpations: Abdomen is soft. There is no mass.      Tenderness: There is no abdominal tenderness.   Musculoskeletal:      Right lower leg: No edema.      Left lower leg: No edema.   Skin:     Coloration: Skin is not jaundiced.      Findings: Lesion present. No rash. Bruising:  photograph of sacral decubitus wound noted in chart, it appears to have been debrided, scant slough and mostly healthy pink granulating tissue.    Neurological:      Mental Status: He is alert.         Significant Labs:   All pertinent labs within the past 24 hours have been reviewed.  BMP:   No results for input(s): GLU, NA, K, CL, CO2, BUN, CREATININE, CALCIUM, MG in the last 48 hours.  CBC:   No results for input(s): WBC, HGB, HCT, PLT in the last 48 hours.  CMP:   No results for input(s): NA, K, CL, CO2, GLU, BUN, CREATININE, CALCIUM, PROT, ALBUMIN, BILITOT, ALKPHOS, AST, ALT, ANIONGAP, EGFRNONAA in the last 48 hours.    Invalid input(s): ESTGFA[place]    Significant Imaging: I have reviewed all pertinent imaging results/findings within the past 24 hours.

## 2022-02-06 LAB
ANION GAP SERPL CALCULATED.3IONS-SCNC: 11 MMOL/L (ref 7–16)
BASOPHILS # BLD AUTO: 0.04 K/UL (ref 0–0.2)
BASOPHILS NFR BLD AUTO: 0.6 % (ref 0–1)
BUN SERPL-MCNC: 12 MG/DL (ref 7–18)
BUN/CREAT SERPL: 17 (ref 6–20)
CALCIUM SERPL-MCNC: 9.3 MG/DL (ref 8.5–10.1)
CHLORIDE SERPL-SCNC: 100 MMOL/L (ref 98–107)
CO2 SERPL-SCNC: 30 MMOL/L (ref 21–32)
CREAT SERPL-MCNC: 0.72 MG/DL (ref 0.7–1.3)
DIFFERENTIAL METHOD BLD: ABNORMAL
EOSINOPHIL # BLD AUTO: 0.34 K/UL (ref 0–0.5)
EOSINOPHIL NFR BLD AUTO: 5 % (ref 1–4)
ERYTHROCYTE [DISTWIDTH] IN BLOOD BY AUTOMATED COUNT: 15 % (ref 11.5–14.5)
GLUCOSE SERPL-MCNC: 105 MG/DL (ref 70–105)
GLUCOSE SERPL-MCNC: 112 MG/DL (ref 74–106)
GLUCOSE SERPL-MCNC: 121 MG/DL (ref 70–105)
GLUCOSE SERPL-MCNC: 123 MG/DL (ref 70–105)
GLUCOSE SERPL-MCNC: 180 MG/DL (ref 70–105)
HCT VFR BLD AUTO: 34.4 % (ref 40–54)
HGB BLD-MCNC: 11.1 G/DL (ref 13.5–18)
IMM GRANULOCYTES # BLD AUTO: 0.04 K/UL (ref 0–0.04)
IMM GRANULOCYTES NFR BLD: 0.6 % (ref 0–0.4)
LYMPHOCYTES # BLD AUTO: 2.23 K/UL (ref 1–4.8)
LYMPHOCYTES NFR BLD AUTO: 32.9 % (ref 27–41)
MCH RBC QN AUTO: 28.2 PG (ref 27–31)
MCHC RBC AUTO-ENTMCNC: 32.3 G/DL (ref 32–36)
MCV RBC AUTO: 87.3 FL (ref 80–96)
MONOCYTES # BLD AUTO: 0.74 K/UL (ref 0–0.8)
MONOCYTES NFR BLD AUTO: 10.9 % (ref 2–6)
MPC BLD CALC-MCNC: 7.9 FL (ref 9.4–12.4)
NEUTROPHILS # BLD AUTO: 3.38 K/UL (ref 1.8–7.7)
NEUTROPHILS NFR BLD AUTO: 50 % (ref 53–65)
NRBC # BLD AUTO: 0 X10E3/UL
NRBC, AUTO (.00): 0 %
PLATELET # BLD AUTO: 368 K/UL (ref 150–400)
POTASSIUM SERPL-SCNC: 3.6 MMOL/L (ref 3.5–5.1)
RBC # BLD AUTO: 3.94 M/UL (ref 4.6–6.2)
SODIUM SERPL-SCNC: 137 MMOL/L (ref 136–145)
WBC # BLD AUTO: 6.77 K/UL (ref 4.5–11)

## 2022-02-06 PROCEDURE — 63600175 PHARM REV CODE 636 W HCPCS: Performed by: HOSPITALIST

## 2022-02-06 PROCEDURE — 25000003 PHARM REV CODE 250: Performed by: HOSPITALIST

## 2022-02-06 PROCEDURE — 80048 BASIC METABOLIC PNL TOTAL CA: CPT | Performed by: INTERNAL MEDICINE

## 2022-02-06 PROCEDURE — 82962 GLUCOSE BLOOD TEST: CPT

## 2022-02-06 PROCEDURE — 85025 COMPLETE CBC W/AUTO DIFF WBC: CPT | Performed by: INTERNAL MEDICINE

## 2022-02-06 PROCEDURE — 36415 COLL VENOUS BLD VENIPUNCTURE: CPT | Performed by: INTERNAL MEDICINE

## 2022-02-06 PROCEDURE — 99232 SBSQ HOSP IP/OBS MODERATE 35: CPT | Mod: ,,, | Performed by: INTERNAL MEDICINE

## 2022-02-06 PROCEDURE — 63600175 PHARM REV CODE 636 W HCPCS: Performed by: FAMILY MEDICINE

## 2022-02-06 PROCEDURE — 99232 PR SUBSEQUENT HOSPITAL CARE,LEVL II: ICD-10-PCS | Mod: ,,, | Performed by: INTERNAL MEDICINE

## 2022-02-06 PROCEDURE — C9399 UNCLASSIFIED DRUGS OR BIOLOG: HCPCS | Performed by: FAMILY MEDICINE

## 2022-02-06 PROCEDURE — 11000001 HC ACUTE MED/SURG PRIVATE ROOM

## 2022-02-06 RX ADMIN — LEVOTHYROXINE SODIUM 50 MCG: 25 TABLET ORAL at 05:02

## 2022-02-06 RX ADMIN — HYDROCHLOROTHIAZIDE 25 MG: 12.5 TABLET ORAL at 09:02

## 2022-02-06 RX ADMIN — PIPERACILLIN AND TAZOBACTAM 4.5 G: 4; .5 INJECTION, POWDER, FOR SOLUTION INTRAVENOUS at 10:02

## 2022-02-06 RX ADMIN — INSULIN ASPART 1 UNITS: 100 INJECTION, SOLUTION INTRAVENOUS; SUBCUTANEOUS at 10:02

## 2022-02-06 RX ADMIN — PANTOPRAZOLE SODIUM 40 MG: 40 TABLET, DELAYED RELEASE ORAL at 09:02

## 2022-02-06 RX ADMIN — INSULIN DETEMIR 15 UNITS: 100 INJECTION, SOLUTION SUBCUTANEOUS at 10:02

## 2022-02-06 RX ADMIN — OXCARBAZEPINE 150 MG: 150 TABLET, FILM COATED ORAL at 10:02

## 2022-02-06 RX ADMIN — SITAGLIPTIN 50 MG: 25 TABLET, FILM COATED ORAL at 09:02

## 2022-02-06 RX ADMIN — OXCARBAZEPINE 150 MG: 150 TABLET, FILM COATED ORAL at 09:02

## 2022-02-06 RX ADMIN — MEMANTINE 10 MG: 5 TABLET ORAL at 10:02

## 2022-02-06 RX ADMIN — METOPROLOL SUCCINATE 25 MG: 25 TABLET, EXTENDED RELEASE ORAL at 09:02

## 2022-02-06 RX ADMIN — TAMSULOSIN HYDROCHLORIDE 0.4 MG: 0.4 CAPSULE ORAL at 09:02

## 2022-02-06 RX ADMIN — RISPERIDONE 1 MG: 1 TABLET ORAL at 09:02

## 2022-02-06 RX ADMIN — LOSARTAN POTASSIUM 50 MG: 50 TABLET, FILM COATED ORAL at 09:02

## 2022-02-06 RX ADMIN — PIPERACILLIN AND TAZOBACTAM 4.5 G: 4; .5 INJECTION, POWDER, FOR SOLUTION INTRAVENOUS at 05:02

## 2022-02-06 RX ADMIN — HYDRALAZINE HYDROCHLORIDE 10 MG: 10 TABLET ORAL at 09:02

## 2022-02-06 RX ADMIN — ASPIRIN 81 MG CHEWABLE TABLET 81 MG: 81 TABLET CHEWABLE at 09:02

## 2022-02-06 RX ADMIN — RISPERIDONE 1 MG: 1 TABLET ORAL at 11:02

## 2022-02-06 RX ADMIN — PIPERACILLIN AND TAZOBACTAM 4.5 G: 4; .5 INJECTION, POWDER, FOR SOLUTION INTRAVENOUS at 02:02

## 2022-02-06 RX ADMIN — POTASSIUM CHLORIDE 20 MEQ: 20 TABLET, EXTENDED RELEASE ORAL at 09:02

## 2022-02-06 NOTE — PROGRESS NOTES
[facility] Medicine  Progress Note    Patient Name: Bhargav Gao  MRN: [id number]  Patient Class: IP- Inpatient   Admission Date: 1/27/2022  Length of Stay: 10 days  Attending Physician: Duyen Bermudez,*  Primary Care Provider: Kerry Lin MD        Subjective:     Principal Problem:Pressure ulcer        HPI:  82 yo (appears younger) M [facility] sacral decubiti.  Patient has been treated as OP by [facility] wound care but is not getting better.  Khan has been placed to help with healing.  Patient is alert but unable to give me any information due to his baseline mental deficiencies.  He says ouch but denies pain.  He shakes his head yes or no but not sure if appropriately answering my questions.  Shook his head no to wanting watch a basketball game on TV but also shook his head no to watching JessicaNOBOT dancing around and singing in [employer] movie.  He seemed interested in [employer]  catching alligators but I'm just not sure how much he is comprehending.  He can life all of his ext to gravity but very weak and does not walk or feed himself.      ROS UTO but some mention of constipation in records so will start a stool softner due to his wounds.        Overview/Hospital Course:  1/28 Patient nonverbal with me. Sacral wound. Surgery consult. IV Zosyn  1/29 Patient speaks. Sacral wound. IV Zosyn and IV Vanc. Surgery/Wound consult  1/30 Patient being fed. Sacral wound. IV Zosyn and IV Vanc. Surgery and wound consults      Interval History:  Patient continues to do well, no complaints at all, afebrile, vital signs have been stable.  He nods that he is feeling fine.    Review of Systems   Unable to perform ROS: Dementia     Objective:     Vital Signs (Most Recent):  Temp: 98.2 °F (36.8 °C) (02/06/22 1200)  Pulse: 63 (02/06/22 1200)  Resp: 17 (02/06/22 1200)  BP: 118/65 (02/06/22 1200)  SpO2: 100 % (02/06/22 1200) Vital Signs (24h  Range):  Temp:  [98.1 °F (36.7 °C)-98.2 °F (36.8 °C)] 98.2 °F (36.8 °C)  Pulse:  [63-88] 63  Resp:  [17-18] 17  SpO2:  [97 %-100 %] 100 %  BP: (107-120)/(62-72) 118/65     Weight: 93.5 kg (206 lb 3.2 oz)  Body mass index is 30.45 kg/m².    Intake/Output Summary (Last 24 hours) at 2/6/2022 1255  Last data filed at 2/6/2022 0536  Gross per 24 hour   Intake --   Output 700 ml   Net -700 ml      Physical Exam  Constitutional:       General: He is not in acute distress (Patient said I am all right today when asked him how he was doing).     Appearance: He is obese. He is not toxic-appearing.   HENT:      Mouth/Throat:      Mouth: Mucous membranes are moist.      Pharynx: No oropharyngeal exudate.   Eyes:      General: No scleral icterus.        Right eye: No discharge.         Left eye: No discharge.   Cardiovascular:      Rate and Rhythm: Regular rhythm.      Heart sounds: Normal heart sounds. No murmur heard.      Pulmonary:      Effort: No respiratory distress.      Breath sounds: No wheezing, rhonchi or rales.   Abdominal:      General: Bowel sounds are normal. There is no distension.      Palpations: Abdomen is soft. There is no mass.      Tenderness: There is no abdominal tenderness.   Musculoskeletal:      Right lower leg: No edema.      Left lower leg: No edema.   Skin:     Coloration: Skin is not jaundiced.      Findings: Lesion present. No rash. Bruising:  photograph of sacral decubitus wound noted in chart, it appears to have been debrided, scant slough and mostly healthy pink granulating tissue.   Neurological:      Mental Status: He is alert.         Significant Labs:   All pertinent labs within the past 24 hours have been reviewed.  BMP:   Recent Labs   Lab 02/06/22  0307   *      K 3.6      CO2 30   BUN 12   CREATININE 0.72   CALCIUM 9.3     CBC:   Recent Labs   Lab 02/06/22 0307   WBC 6.77   HGB 11.1*   HCT 34.4*        CMP:   Recent Labs   Lab 02/06/22 0307      K 3.6       CO2 30   *   BUN 12   CREATININE 0.72   CALCIUM 9.3   ANIONGAP 11   EGFRNONAA 111       Significant Imaging: I have reviewed all pertinent imaging results/findings within the past 24 hours.      Assessment/Plan:      * Pressure ulcer  IV antibiotics  Wound care    1/31 sacral wound culture positive for numerous organisms including Bacteroides, Acinetobacter, Proteus, and Enterococcus faecalis which was ampicillin susceptible.  Based on all the susceptibility profile as we can discontinue vancomycin and continue the patient on Zosyn.  Continue local wound care.    2/1 continue wound care, antibiotic therapy for few more days.    2/3 no change in treatment plan for now    2/5 continuing with Zosyn and local wound care.  Recheck labs tomorrow.    2/6 10 days antibiotic therapy completed so far.  Will review status of wound with wound care team tomorrow to decide whether not we need to continue Zosyn.  CBC and BMP today more less normal.      Sacral wound  1/31 see above in pressure ulcer section      Schizophrenia  Continue psychotropic medication        Hypertension  Patient on multiple antihypertensive agents.  On both amlodopine and verapamil.  Will stop verapamil with patient on BB.      1/31 hypertension currently controlled, continue current management.    2/2 blood pressure well controlled    2/4 BP remains well controlled.    2/6 blood pressure well controlled      Diabetes mellitus  Basal/bolus insulin while in hospital    1/31 blood sugars have been acceptable, all order to 100. Continue current management.    2/4 Blood sugar well controlled.        Alzheimer's disease  Continue psychotropic meds        VTE Risk Mitigation (From admission, onward)    None          Discharge Planning   YOSSI:      Code Status: DNR   Is the patient medically ready for discharge?:     Reason for patient still in hospital (select all that apply): Treatment  Discharge Plan A: Return to nursing home                   Duyen Bermudez MD  Department of Hospital Medicine   [facility]

## 2022-02-06 NOTE — ASSESSMENT & PLAN NOTE
Patient on multiple antihypertensive agents.  On both amlodopine and verapamil.  Will stop verapamil with patient on BB.      1/31 hypertension currently controlled, continue current management.    2/2 blood pressure well controlled    2/4 BP remains well controlled.    2/6 blood pressure well controlled

## 2022-02-06 NOTE — PLAN OF CARE
Problem: Infection  Goal: Absence of Infection Signs and Symptoms  Outcome: Ongoing, Progressing     Problem: Fall Injury Risk  Goal: Absence of Fall and Fall-Related Injury  Outcome: Ongoing, Progressing     Problem: Diabetes Comorbidity  Goal: Blood Glucose Level Within Targeted Range  Outcome: Ongoing, Progressing

## 2022-02-06 NOTE — ASSESSMENT & PLAN NOTE
IV antibiotics  Wound care    1/31 sacral wound culture positive for numerous organisms including Bacteroides, Acinetobacter, Proteus, and Enterococcus faecalis which was ampicillin susceptible.  Based on all the susceptibility profile as we can discontinue vancomycin and continue the patient on Zosyn.  Continue local wound care.    2/1 continue wound care, antibiotic therapy for few more days.    2/3 no change in treatment plan for now    2/5 continuing with Zosyn and local wound care.  Recheck labs tomorrow.    2/6 10 days antibiotic therapy completed so far.  Will review status of wound with wound care team tomorrow to decide whether not we need to continue Zosyn.  CBC and BMP today more less normal.

## 2022-02-06 NOTE — SUBJECTIVE & OBJECTIVE
Interval History:  Patient continues to do well, no complaints at all, afebrile, vital signs have been stable.  He nods that he is feeling fine.    Review of Systems   Unable to perform ROS: Dementia     Objective:     Vital Signs (Most Recent):  Temp: 98.2 °F (36.8 °C) (02/06/22 1200)  Pulse: 63 (02/06/22 1200)  Resp: 17 (02/06/22 1200)  BP: 118/65 (02/06/22 1200)  SpO2: 100 % (02/06/22 1200) Vital Signs (24h Range):  Temp:  [98.1 °F (36.7 °C)-98.2 °F (36.8 °C)] 98.2 °F (36.8 °C)  Pulse:  [63-88] 63  Resp:  [17-18] 17  SpO2:  [97 %-100 %] 100 %  BP: (107-120)/(62-72) 118/65     Weight: 93.5 kg (206 lb 3.2 oz)  Body mass index is 30.45 kg/m².    Intake/Output Summary (Last 24 hours) at 2/6/2022 1255  Last data filed at 2/6/2022 0536  Gross per 24 hour   Intake --   Output 700 ml   Net -700 ml      Physical Exam  Constitutional:       General: He is not in acute distress (Patient said I am all right today when asked him how he was doing).     Appearance: He is obese. He is not toxic-appearing.   HENT:      Mouth/Throat:      Mouth: Mucous membranes are moist.      Pharynx: No oropharyngeal exudate.   Eyes:      General: No scleral icterus.        Right eye: No discharge.         Left eye: No discharge.   Cardiovascular:      Rate and Rhythm: Regular rhythm.      Heart sounds: Normal heart sounds. No murmur heard.      Pulmonary:      Effort: No respiratory distress.      Breath sounds: No wheezing, rhonchi or rales.   Abdominal:      General: Bowel sounds are normal. There is no distension.      Palpations: Abdomen is soft. There is no mass.      Tenderness: There is no abdominal tenderness.   Musculoskeletal:      Right lower leg: No edema.      Left lower leg: No edema.   Skin:     Coloration: Skin is not jaundiced.      Findings: Lesion present. No rash. Bruising:  photograph of sacral decubitus wound noted in chart, it appears to have been debrided, scant slough and mostly healthy pink granulating tissue.    Neurological:      Mental Status: He is alert.         Significant Labs:   All pertinent labs within the past 24 hours have been reviewed.  BMP:   Recent Labs   Lab 02/06/22  0307   *      K 3.6      CO2 30   BUN 12   CREATININE 0.72   CALCIUM 9.3     CBC:   Recent Labs   Lab 02/06/22 0307   WBC 6.77   HGB 11.1*   HCT 34.4*        CMP:   Recent Labs   Lab 02/06/22  0307      K 3.6      CO2 30   *   BUN 12   CREATININE 0.72   CALCIUM 9.3   ANIONGAP 11   EGFRNONAA 111       Significant Imaging: I have reviewed all pertinent imaging results/findings within the past 24 hours.

## 2022-02-07 LAB
GLUCOSE SERPL-MCNC: 114 MG/DL (ref 70–105)
GLUCOSE SERPL-MCNC: 137 MG/DL (ref 70–105)
GLUCOSE SERPL-MCNC: 177 MG/DL (ref 70–105)

## 2022-02-07 PROCEDURE — C9399 UNCLASSIFIED DRUGS OR BIOLOG: HCPCS | Performed by: FAMILY MEDICINE

## 2022-02-07 PROCEDURE — 63600175 PHARM REV CODE 636 W HCPCS: Performed by: FAMILY MEDICINE

## 2022-02-07 PROCEDURE — 82962 GLUCOSE BLOOD TEST: CPT

## 2022-02-07 PROCEDURE — 63600175 PHARM REV CODE 636 W HCPCS: Performed by: HOSPITALIST

## 2022-02-07 PROCEDURE — 99232 PR SUBSEQUENT HOSPITAL CARE,LEVL II: ICD-10-PCS | Mod: ,,, | Performed by: INTERNAL MEDICINE

## 2022-02-07 PROCEDURE — 11000001 HC ACUTE MED/SURG PRIVATE ROOM

## 2022-02-07 PROCEDURE — 25000003 PHARM REV CODE 250: Performed by: HOSPITALIST

## 2022-02-07 PROCEDURE — 99232 SBSQ HOSP IP/OBS MODERATE 35: CPT | Mod: ,,, | Performed by: INTERNAL MEDICINE

## 2022-02-07 RX ADMIN — SITAGLIPTIN 50 MG: 25 TABLET, FILM COATED ORAL at 09:02

## 2022-02-07 RX ADMIN — HYDROCHLOROTHIAZIDE 25 MG: 12.5 TABLET ORAL at 09:02

## 2022-02-07 RX ADMIN — PIPERACILLIN AND TAZOBACTAM 4.5 G: 4; .5 INJECTION, POWDER, FOR SOLUTION INTRAVENOUS at 06:02

## 2022-02-07 RX ADMIN — TAMSULOSIN HYDROCHLORIDE 0.4 MG: 0.4 CAPSULE ORAL at 09:02

## 2022-02-07 RX ADMIN — PANTOPRAZOLE SODIUM 40 MG: 40 TABLET, DELAYED RELEASE ORAL at 09:02

## 2022-02-07 RX ADMIN — HYDRALAZINE HYDROCHLORIDE 10 MG: 10 TABLET ORAL at 10:02

## 2022-02-07 RX ADMIN — HYDRALAZINE HYDROCHLORIDE 10 MG: 10 TABLET ORAL at 09:02

## 2022-02-07 RX ADMIN — INSULIN ASPART 2 UNITS: 100 INJECTION, SOLUTION INTRAVENOUS; SUBCUTANEOUS at 01:02

## 2022-02-07 RX ADMIN — POTASSIUM CHLORIDE 20 MEQ: 20 TABLET, EXTENDED RELEASE ORAL at 09:02

## 2022-02-07 RX ADMIN — RISPERIDONE 1 MG: 1 TABLET ORAL at 09:02

## 2022-02-07 RX ADMIN — INSULIN DETEMIR 15 UNITS: 100 INJECTION, SOLUTION SUBCUTANEOUS at 10:02

## 2022-02-07 RX ADMIN — OXCARBAZEPINE 150 MG: 150 TABLET, FILM COATED ORAL at 09:02

## 2022-02-07 RX ADMIN — MEMANTINE 10 MG: 5 TABLET ORAL at 10:02

## 2022-02-07 RX ADMIN — PIPERACILLIN AND TAZOBACTAM 4.5 G: 4; .5 INJECTION, POWDER, FOR SOLUTION INTRAVENOUS at 01:02

## 2022-02-07 RX ADMIN — LOSARTAN POTASSIUM 50 MG: 50 TABLET, FILM COATED ORAL at 09:02

## 2022-02-07 RX ADMIN — OXCARBAZEPINE 150 MG: 150 TABLET, FILM COATED ORAL at 10:02

## 2022-02-07 RX ADMIN — RISPERIDONE 1 MG: 1 TABLET ORAL at 10:02

## 2022-02-07 RX ADMIN — METOPROLOL SUCCINATE 25 MG: 25 TABLET, EXTENDED RELEASE ORAL at 09:02

## 2022-02-07 RX ADMIN — ASPIRIN 81 MG CHEWABLE TABLET 81 MG: 81 TABLET CHEWABLE at 09:02

## 2022-02-07 RX ADMIN — INSULIN ASPART 2 UNITS: 100 INJECTION, SOLUTION INTRAVENOUS; SUBCUTANEOUS at 05:02

## 2022-02-07 RX ADMIN — LOSARTAN POTASSIUM 50 MG: 50 TABLET, FILM COATED ORAL at 10:02

## 2022-02-07 RX ADMIN — LEVOTHYROXINE SODIUM 50 MCG: 25 TABLET ORAL at 06:02

## 2022-02-07 RX ADMIN — HYDROCHLOROTHIAZIDE 25 MG: 12.5 TABLET ORAL at 10:02

## 2022-02-07 NOTE — PROGRESS NOTES
[facility]  Adult Nutrition  Follow-up Note         Reason for Assessment  Reason For Assessment: RD follow-up  Nutrition Risk Screen: no indicators present  Malnutrition  Is Patient Malnourished: No  Nutrition Diagnosis  Increased protein Needs   related to Decreased/ impaired skin integrity as evidenced by wounds    Nutrition Diagnosis Status: Improving      Nutrition Risk  Level of Risk/Frequency of Follow-up: high   Chewing or Swallowing Difficulty?: Swallowing difficulty  Estimated/Assessed Needs  RMR (Benton-St. Jeor Equation): 1630.7 Activity Factor: 1 Injury Factor: 1.2     Temp: 97.4 °F (36.3 °C)Axillary  Weight Used For Calorie Calculations: 93.5 kg (206 lb 2.1 oz)   Energy Need Method: Benton-St Jeor Energy Calorie Requirements (kcal): 1956  Weight Used For Protein Calculations: 79 kg (174 lb 2.6 oz) (adjusted body wt)  Protein Requirements:   Estimated Fluid Requirement Method: RDA Method Fluid Requirements (mL): 1998  RDA Method (mL): 1956     Nutrition Prescription / Recommendations  Recommendation/Intervention: continue pureed diet with nectar liquids  Goals: wound healing; pt will meet estimated nutritioal needs  Nutrition Goal Status: progressing towards goal  Communication of RD Recs: reviewed with physician  Current Diet Order: pureed with nectar thick liquids  Nutrition Order Comments: 100% meal intake documented  Recommended Diet: Puree  Recommended Oral Supplement: No Oral Supplements  Is Nutrition Support Recommended: No  Is Education Recommended: No  Monitor and Evaluation  % current Intake: P.O. intake of 75 - 100 %  % intake to meet estimated needs: 75 - 100 %  Food and Nutrient Intake: energy intake,food and beverage intake  Food and Nutrient Adminstration: diet order  Knowledge/Beliefs/Attitudes: food and nutrition knowledge/skill  Anthropometric Measurements: height/length,weight,weight change,body mass index  Biochemical Data, Medical Tests and Procedures:  "electrolyte and renal panel,glucose/endocrine profile  Nutrition-Focused Physical Findings: skin  Oral Calories (kcal): 2400  Oral Protein (gm): 100  Energy Calories Required: meeting needs  Protein Required: meeting needs  Fluid Required: meeting needs  Tolerance: tolerating  Current Medical Diagnosis and Past Medical History  Diagnosis: diabetes diagnosis/complications,psychological disorder  Past Medical History:   Diagnosis Date    Alzheimer's disease     Bipolar disorder     BPH (benign prostatic hyperplasia)     Diabetes mellitus     Hyperlipidemia     Hypertension     Hypothyroidism     Idiopathic orofacial dystonia     Iron deficiency anemia secondary to blood loss (chronic)     Mild intellectual disabilities     Obesity     Pressure ulcer     Schizophrenia      Nutrition/Diet History  Food Allergies: NKFA  Lab/Procedures/Meds  Recent Labs   Lab 02/06/22  0307      K 3.6   BUN 12   CREATININE 0.72   *   CALCIUM 9.3        Last A1c:   Lab Results   Component Value Date    HGBA1C 8.3 (H) 12/09/2021     Lab Results   Component Value Date    RBC 3.94 (L) 02/06/2022    HGB 11.1 (L) 02/06/2022    HCT 34.4 (L) 02/06/2022    MCV 87.3 02/06/2022    MCH 28.2 02/06/2022    MCHC 32.3 02/06/2022    TIBC 174 (L) 12/09/2021     Pertinent Labs Reviewed: reviewed  Pertinent Labs Comments: Hct 34.4, Glu 112, Alb 2.2  Pertinent Medications Reviewed: reviewed  Pertinent Medications Comments: Hctz, insulin  Anthropometrics  Temp: 97.4 °F (36.3 °C)  Height Method: Stated  Height: 5' 9" (175.3 cm)  Height (inches): 69 in  Weight Method: Bed Scale  Weight: 93.5 kg (206 lb 3.2 oz)  Weight (lb): 206.2 lb  Ideal Body Weight (IBW), Male: 160 lb  % Ideal Body Weight, Male (lb): 134.21 %  BMI (Calculated): 30.4     Nutrition by Nursing     Intake (%): 100%  Diet/Feeding Assistance: total feed  Diet/Feeding Tolerance: good  Last Bowel Movement: 02/07/22              Nutrition Follow-Up  RD Follow-up?: " Yes  Assessment and Plan  No new Assessment & Plan notes have been filed under this hospital service since the last note was generated.  Service: Nutrition

## 2022-02-08 VITALS
SYSTOLIC BLOOD PRESSURE: 125 MMHG | RESPIRATION RATE: 18 BRPM | TEMPERATURE: 97 F | OXYGEN SATURATION: 98 % | HEIGHT: 69 IN | DIASTOLIC BLOOD PRESSURE: 82 MMHG | HEART RATE: 67 BPM | WEIGHT: 206.19 LBS | BODY MASS INDEX: 30.54 KG/M2

## 2022-02-08 LAB
GLUCOSE SERPL-MCNC: 144 MG/DL (ref 70–105)
GLUCOSE SERPL-MCNC: 162 MG/DL (ref 70–105)
GLUCOSE SERPL-MCNC: 97 MG/DL (ref 70–105)

## 2022-02-08 PROCEDURE — 25000003 PHARM REV CODE 250: Performed by: HOSPITALIST

## 2022-02-08 PROCEDURE — 99239 HOSP IP/OBS DSCHRG MGMT >30: CPT | Mod: ,,, | Performed by: FAMILY MEDICINE

## 2022-02-08 PROCEDURE — 99239 PR HOSPITAL DISCHARGE DAY,>30 MIN: ICD-10-PCS | Mod: ,,, | Performed by: FAMILY MEDICINE

## 2022-02-08 PROCEDURE — 82962 GLUCOSE BLOOD TEST: CPT

## 2022-02-08 RX ORDER — LEVOTHYROXINE SODIUM 50 UG/1
50 TABLET ORAL
Qty: 30 TABLET | Refills: 11 | Status: SHIPPED | OUTPATIENT
Start: 2022-02-09 | End: 2023-02-09

## 2022-02-08 RX ORDER — POTASSIUM CHLORIDE 20 MEQ/1
20 TABLET, EXTENDED RELEASE ORAL DAILY
Qty: 30 TABLET | Refills: 0 | Status: ON HOLD | OUTPATIENT
Start: 2022-02-08 | End: 2022-05-16 | Stop reason: ALTCHOICE

## 2022-02-08 RX ADMIN — PANTOPRAZOLE SODIUM 40 MG: 40 TABLET, DELAYED RELEASE ORAL at 09:02

## 2022-02-08 RX ADMIN — LEVOTHYROXINE SODIUM 50 MCG: 25 TABLET ORAL at 06:02

## 2022-02-08 RX ADMIN — ACETAMINOPHEN 1000 MG: 500 TABLET ORAL at 10:02

## 2022-02-08 RX ADMIN — POTASSIUM CHLORIDE 20 MEQ: 20 TABLET, EXTENDED RELEASE ORAL at 09:02

## 2022-02-08 RX ADMIN — METOPROLOL SUCCINATE 25 MG: 25 TABLET, EXTENDED RELEASE ORAL at 09:02

## 2022-02-08 RX ADMIN — HYDRALAZINE HYDROCHLORIDE 10 MG: 10 TABLET ORAL at 09:02

## 2022-02-08 RX ADMIN — LOSARTAN POTASSIUM 50 MG: 50 TABLET, FILM COATED ORAL at 09:02

## 2022-02-08 RX ADMIN — ASPIRIN 81 MG CHEWABLE TABLET 81 MG: 81 TABLET CHEWABLE at 09:02

## 2022-02-08 RX ADMIN — TAMSULOSIN HYDROCHLORIDE 0.4 MG: 0.4 CAPSULE ORAL at 09:02

## 2022-02-08 RX ADMIN — HYDROCHLOROTHIAZIDE 25 MG: 12.5 TABLET ORAL at 09:02

## 2022-02-08 RX ADMIN — OXCARBAZEPINE 150 MG: 150 TABLET, FILM COATED ORAL at 09:02

## 2022-02-08 RX ADMIN — RISPERIDONE 1 MG: 1 TABLET ORAL at 09:02

## 2022-02-08 RX ADMIN — SITAGLIPTIN 50 MG: 25 TABLET, FILM COATED ORAL at 09:02

## 2022-02-08 NOTE — DISCHARGE INSTRUCTIONS
Nutrition:  Pureed diabetic diet with nectar thick liquids  Ensure pudding with meals  Valorie Ellison RD, LD

## 2022-02-08 NOTE — ASSESSMENT & PLAN NOTE
-- DO NOT REPLY / DO NOT REPLY ALL --  -- Message is from the [facility]--    General Patient Message      Reason for Call: Patient is requesting a call back in regards to a call she received from the provider.     Caller Information       Type Contact Phone    11/10/2021 12:36 PM CST Phone (Incoming) Michelle Maher (Self) [phone] (M)          Alternative phone number: n/a    Turnaround time given to caller:   \"This message will be sent to [state Provider's name]. The clinical team will fulfill your request as soon as they review your message.\"     IV antibiotics  Wound care    1/31 sacral wound culture positive for numerous organisms including Bacteroides, Acinetobacter, Proteus, and Enterococcus faecalis which was ampicillin susceptible.  Based on all the susceptibility profile as we can discontinue vancomycin and continue the patient on Zosyn.  Continue local wound care.    2/1 continue wound care, antibiotic therapy for few more days.    2/3 no change in treatment plan for now    2/5 continuing with Zosyn and local wound care.  Recheck labs tomorrow.    2/6 10 days antibiotic therapy completed so far.  Will review status of wound with wound care team tomorrow to decide whether not we need to continue Zosyn.  CBC and BMP today more less normal.    02/08 Improved wounds

## 2022-02-08 NOTE — PLAN OF CARE
Problem: Fall Injury Risk  Goal: Absence of Fall and Fall-Related Injury  Outcome: Ongoing, Progressing     Problem: Infection  Goal: Absence of Infection Signs and Symptoms  Outcome: Ongoing, Progressing

## 2022-02-08 NOTE — NURSING
[facility], safety measures in effect.

## 2022-02-08 NOTE — PROGRESS NOTES
Wound care note;  Sacral wound with area of dark discoloration noted at 6 clock, cont Dakins moist gauze. Edges dry.   On low air loss mattress  Khan catheter patent

## 2022-02-08 NOTE — PLAN OF CARE
[facility]  Discharge Final Note    Primary Care Provider: Kerry Lin MD    Expected Discharge Date: 2/8/2022    Final Discharge Note (most recent)     Final Note - 02/08/22 1023        Final Note    Assessment Type Final Discharge Note     Anticipated Discharge Disposition Discharged to Nursing Facility Certified under Medicaid but not Medicare        Post-Acute Status    Post-Acute Authorization Placement     Patient choice form signed by patient/caregiver List with quality metrics by geographic area provided;List from CMS Compare;List from System Post-Acute Care     Discharge Delays None known at this time                 Important Message from Medicare  Important Message from Medicare regarding Discharge Appeal Rights: Given to patient/caregiver,Explained to patient/caregiver,Signed/date by patient/caregiver     Date IMM was signed: 02/08/22  Time IMM was signed: 1020     Follow-up providers     [facility]   Specialty: Wound Care    [address] [place] [id number]   Phone: [phone]       Next Steps: Follow up in 2 week(s)    Instructions: Appt: 2/22/2022 @ 10:30    #[phone]          After-discharge care            Destination     [facility]   Service: Nursing Home    [facility] [address]   Phone: [phone]                     Pt to discharge back to NH today. Niece notified and IM obtained. Packet left with nurse

## 2022-02-08 NOTE — DISCHARGE SUMMARY
[facility] Medicine  Discharge Summary      Patient Name: Bhargav Gao  MRN: [id number]  Patient Class: IP- Inpatient  Admission Date: 1/27/2022  Hospital Length of Stay: 12 days  Discharge Date and Time: 02/08/2022  Attending Physician: Donato Whitlock MD   Discharging Provider: Donato Whitlock MD  Primary Care Provider: Kerry Lin MD      HPI:   82 yo (appears younger) M presents to [facility] as a direct admit from [facility] for worsening sacral decubiti.  Patient has been treated as OP by [facility] wound care but is not getting better.  Khan has been placed to help with healing.  Patient is alert but unable to give me any information due to his baseline mental deficiencies.  He says ouch but denies pain.  He shakes his head yes or no but not sure if appropriately answering my questions.  Shook his head no to wanting watch a basketball game on TV but also shook his head no to watching [employer] dancing around and singing in [employer].  He seemed interested in [employer]  catching alligators but I'm just not sure how much he is comprehending.  He can life all of his ext to gravity but very weak and does not walk or feed himself.      ROS UTO but some mention of constipation in records so will start a stool softner due to his wounds.        * No surgery found *      Hospital Course:   1/28 Patient nonverbal with me. Sacral wound. Surgery consult. IV Zosyn  1/29 Patient speaks. Sacral wound. IV Zosyn and IV Vanc. Surgery/Wound consult  1/30 Patient being fed. Sacral wound. IV Zosyn and IV Vanc. Surgery and wound consults  02/07 Resting in bed. Chest is clear.  Appetite is good. Sacral wound looks clean. Antibiotics dcd. Probable dc tomorrow.  02/08 Patient will be discharged today to [facility]. Wounds look good.        Goals of Care Treatment Preferences:  Code Status: DNR      Consults:   Consults (From admission, onward)        Status Ordering Provider     Inpatient  consult to General Surgery  Once        Provider:  (Not yet assigned)    Acknowledged TIM PRO consult to case management  Once        Provider:  (Not yet assigned)    Completed YUMIKO SOTO          * Pressure ulcer  IV antibiotics  Wound care    1/31 sacral wound culture positive for numerous organisms including Bacteroides, Acinetobacter, Proteus, and Enterococcus faecalis which was ampicillin susceptible.  Based on all the susceptibility profile as we can discontinue vancomycin and continue the patient on Zosyn.  Continue local wound care.    2/1 continue wound care, antibiotic therapy for few more days.    2/3 no change in treatment plan for now    2/5 continuing with Zosyn and local wound care.  Recheck labs tomorrow.    2/6 10 days antibiotic therapy completed so far.  Will review status of wound with wound care team tomorrow to decide whether not we need to continue Zosyn.  CBC and BMP today more less normal.    02/08 Improved wounds        Final Active Diagnoses:    Diagnosis Date Noted POA    PRINCIPAL PROBLEM:  Pressure ulcer [L89.90]  Yes    Schizophrenia [F20.9] 01/28/2022 Yes    Sacral wound [S31.000A]  Yes    Hypertension [I10]  Yes    Diabetes mellitus [E11.9]  Yes    Alzheimer's disease [G30.9, F02.80]  Yes      Problems Resolved During this Admission:       Discharged Condition: good    Disposition:     Follow Up:   Contact information for after-discharge care     Destination     [facility]    Service: Nursing Home  Contact information:  [address] [place] [address] [id number] [phone]                           Patient Instructions:   No discharge procedures on file.    Significant Diagnostic Studies: none    Pending Diagnostic Studies:     None         Medications:  Reconciled Home Medications:      Medication List      CHANGE how you take these medications    insulin detemir U-100 100 unit/mL injection  Commonly known as: Levemir  Inject  15 Units into the skin every evening.  What changed:   · how much to take  · when to take this     SITagliptin 50 MG Tab  Commonly known as: JANUVIA  Take 1 tablet (50 mg total) by mouth once daily.  What changed: how much to take        CONTINUE taking these medications    CALTRATE 600 + D ORAL  Take by mouth 2 (two) times daily.     ELIQUIS 5 mg Tab  Generic drug: apixaban  Take 5 mg by mouth 2 (two) times daily.     furosemide 40 MG tablet  Commonly known as: LASIX  Take 40 mg by mouth once daily.     insulin lispro 100 unit/mL injection  Inject into the skin 3 (three) times daily before meals. Under 200 units - no units / 200-249 - 2 units / 250-299 3 units / 300-349 4 units / 350-399 6 units / 400 or > 8 units & notify medical     levothyroxine 50 MCG tablet  Commonly known as: SYNTHROID  Take 1 tablet (50 mcg total) by mouth before breakfast.  Start taking on: February 9, 2022     losartan 50 MG tablet  Commonly known as: COZAAR  Take 50 mg by mouth 2 (two) times a day.     memantine 10 MG Tab  Commonly known as: NAMENDA  Take 10 mg by mouth nightly.     metoprolol succinate 25 MG 24 hr tablet  Commonly known as: TOPROL-XL  Take 1 tablet by mouth once daily.     OXcarbazepine 150 MG Tab  Commonly known as: TRILEPTAL  Take 450 mg by mouth 2 (two) times daily.     pantoprazole 40 MG tablet  Commonly known as: PROTONIX  Take 1 tablet by mouth once daily.     polyethylene glycol 17 gram Pwpk  Commonly known as: GLYCOLAX  Take 17 g by mouth 2 (two) times a day.     potassium chloride SA 20 MEQ tablet  Commonly known as: K-DUR,KLOR-CON  Take 1 tablet (20 mEq total) by mouth once daily.     risperiDONE 1 MG tablet  Commonly known as: RISPERDAL  Take 1 mg by mouth 2 (two) times daily.     tamsulosin 0.4 mg Cap  Commonly known as: FLOMAX  Take 1 capsule by mouth once daily.        STOP taking these medications    hydrALAZINE 10 MG tablet  Commonly known as: APRESOLINE     HYDROcodone-acetaminophen 7.5-325 mg per  tablet  Commonly known as: NORCO            Indwelling Lines/Drains at time of discharge:   Lines/Drains/Airways     Drain                 Urethral Catheter 02/07/22 1903 Silicone 16 Fr. <1 day                Time spent on the discharge of patient: 35 minutes         Donato Whitlock MD  Department of Hospital Medicine  [facility]

## 2022-02-10 NOTE — PROGRESS NOTES
Wound care note;  Patient skin was evaluated today  Patient in bed, alert, moaning   Sacral wound with red slow granular tissue, no odor noted. Cont Dakins moist gauze daily.  Left posterior achilles  heel with area of dark scab noted, open to air.  Khan catheter   Right heel with out pressure injury.  Has dark brown discoloration to bilateral hips, aquacel foam border intact  Cont turn protocol  Waffle boots  On low air loss mattress

## 2022-03-03 ENCOUNTER — HOSPITAL ENCOUNTER (EMERGENCY)
Facility: HOSPITAL | Age: 82
Discharge: NURSING, REHAB OR LONG-TERM CARE | End: 2022-03-04
Attending: EMERGENCY MEDICINE
Payer: MEDICARE

## 2022-03-03 DIAGNOSIS — R31.9 HEMATURIA SYNDROME: Primary | ICD-10-CM

## 2022-03-03 PROCEDURE — 99284 EMERGENCY DEPT VISIT MOD MDM: CPT

## 2022-03-03 PROCEDURE — 99283 PR EMERGENCY DEPT VISIT,LEVEL III: ICD-10-PCS | Mod: ,,, | Performed by: EMERGENCY MEDICINE

## 2022-03-03 PROCEDURE — 99283 EMERGENCY DEPT VISIT LOW MDM: CPT | Mod: ,,, | Performed by: EMERGENCY MEDICINE

## 2022-03-04 VITALS
BODY MASS INDEX: 30.07 KG/M2 | DIASTOLIC BLOOD PRESSURE: 74 MMHG | TEMPERATURE: 98 F | SYSTOLIC BLOOD PRESSURE: 125 MMHG | HEART RATE: 77 BPM | OXYGEN SATURATION: 99 % | WEIGHT: 203 LBS | HEIGHT: 69 IN | RESPIRATION RATE: 18 BRPM

## 2022-03-04 LAB
ALBUMIN SERPL BCP-MCNC: 3 G/DL (ref 3.5–5)
ALBUMIN/GLOB SERPL: 0.8 {RATIO}
ALP SERPL-CCNC: 119 U/L (ref 45–115)
ALT SERPL W P-5'-P-CCNC: 17 U/L (ref 16–61)
AMORPH PHOS CRY #/AREA URNS LPF: ABNORMAL /LPF
ANION GAP SERPL CALCULATED.3IONS-SCNC: 14 MMOL/L (ref 7–16)
AST SERPL W P-5'-P-CCNC: 14 U/L (ref 15–37)
BACTERIA #/AREA URNS HPF: ABNORMAL /HPF
BASOPHILS # BLD AUTO: 0.03 K/UL (ref 0–0.2)
BASOPHILS NFR BLD AUTO: 0.2 % (ref 0–1)
BILIRUB SERPL-MCNC: 0.3 MG/DL (ref 0–1.2)
BILIRUB UR QL STRIP: ABNORMAL
BUN SERPL-MCNC: 27 MG/DL (ref 7–18)
BUN/CREAT SERPL: 19 (ref 6–20)
CALCIUM SERPL-MCNC: 9.5 MG/DL (ref 8.5–10.1)
CHLORIDE SERPL-SCNC: 98 MMOL/L (ref 98–107)
CLARITY UR: CLEAR
CO2 SERPL-SCNC: 26 MMOL/L (ref 21–32)
COLOR UR: ABNORMAL
CREAT SERPL-MCNC: 1.41 MG/DL (ref 0.7–1.3)
DIFFERENTIAL METHOD BLD: ABNORMAL
EOSINOPHIL # BLD AUTO: 0.08 K/UL (ref 0–0.5)
EOSINOPHIL NFR BLD AUTO: 0.5 % (ref 1–4)
ERYTHROCYTE [DISTWIDTH] IN BLOOD BY AUTOMATED COUNT: 14.7 % (ref 11.5–14.5)
GLOBULIN SER-MCNC: 3.9 G/DL (ref 2–4)
GLUCOSE SERPL-MCNC: 206 MG/DL (ref 74–106)
GLUCOSE UR STRIP-MCNC: 100 MG/DL
HCT VFR BLD AUTO: 39.2 % (ref 40–54)
HGB BLD-MCNC: 12.7 G/DL (ref 13.5–18)
IMM GRANULOCYTES # BLD AUTO: 0.11 K/UL (ref 0–0.04)
IMM GRANULOCYTES NFR BLD: 0.7 % (ref 0–0.4)
KETONES UR STRIP-SCNC: 15 MG/DL
LEUKOCYTE ESTERASE UR QL STRIP: ABNORMAL
LYMPHOCYTES # BLD AUTO: 2.58 K/UL (ref 1–4.8)
LYMPHOCYTES NFR BLD AUTO: 16.6 % (ref 27–41)
MCH RBC QN AUTO: 28.6 PG (ref 27–31)
MCHC RBC AUTO-ENTMCNC: 32.4 G/DL (ref 32–36)
MCV RBC AUTO: 88.3 FL (ref 80–96)
MONOCYTES # BLD AUTO: 1.49 K/UL (ref 0–0.8)
MONOCYTES NFR BLD AUTO: 9.6 % (ref 2–6)
MPC BLD CALC-MCNC: 8.1 FL (ref 9.4–12.4)
NEUTROPHILS # BLD AUTO: 11.26 K/UL (ref 1.8–7.7)
NEUTROPHILS NFR BLD AUTO: 72.4 % (ref 53–65)
NITRITE UR QL STRIP: POSITIVE
NRBC # BLD AUTO: 0 X10E3/UL
NRBC, AUTO (.00): 0 %
PH UR STRIP: 8 PH UNITS
PLATELET # BLD AUTO: 409 K/UL (ref 150–400)
POTASSIUM SERPL-SCNC: 5 MMOL/L (ref 3.5–5.1)
PROT SERPL-MCNC: 6.9 G/DL (ref 6.4–8.2)
PROT UR QL STRIP: >=300
RBC # BLD AUTO: 4.44 M/UL (ref 4.6–6.2)
RBC # UR STRIP: ABNORMAL /UL
RBC #/AREA URNS HPF: ABNORMAL /HPF
SODIUM SERPL-SCNC: 133 MMOL/L (ref 136–145)
SP GR UR STRIP: 1.01
UROBILINOGEN UR STRIP-ACNC: 4 MG/DL
WBC # BLD AUTO: 15.55 K/UL (ref 4.5–11)
WBC #/AREA URNS HPF: ABNORMAL /HPF

## 2022-03-04 PROCEDURE — 36415 COLL VENOUS BLD VENIPUNCTURE: CPT | Performed by: EMERGENCY MEDICINE

## 2022-03-04 PROCEDURE — 87086 URINE CULTURE/COLONY COUNT: CPT | Performed by: EMERGENCY MEDICINE

## 2022-03-04 PROCEDURE — 81001 URINALYSIS AUTO W/SCOPE: CPT | Performed by: EMERGENCY MEDICINE

## 2022-03-04 PROCEDURE — 85025 COMPLETE CBC W/AUTO DIFF WBC: CPT | Performed by: EMERGENCY MEDICINE

## 2022-03-04 PROCEDURE — 80053 COMPREHEN METABOLIC PANEL: CPT | Performed by: EMERGENCY MEDICINE

## 2022-03-04 PROCEDURE — 87077 CULTURE AEROBIC IDENTIFY: CPT | Performed by: EMERGENCY MEDICINE

## 2022-03-04 NOTE — ED NOTES
Continuous irrigation complete at this time with pale pink to yellow color noted of UOP.  Made aware at this time.

## 2022-03-04 NOTE — ED NOTES
Report called to [facility] regarding pt d/c from ER and transport back to nursing facility and spoke with nurse Calderon Xavier.

## 2022-03-04 NOTE — ED NOTES
Continuous Irrigation initiated at this time after receiving supplies from supervisor with 0.9% NS irrigation bag connected to triple lumen urethral catheter and pink-tinged return noted to 4000cc Capacity Catheter Drainage Bag attached. Will continue to monitor.

## 2022-03-06 LAB — UA COMPLETE W REFLEX CULTURE PNL UR: ABNORMAL

## 2022-05-16 ENCOUNTER — HOSPITAL ENCOUNTER (INPATIENT)
Facility: HOSPITAL | Age: 82
LOS: 15 days | Discharge: NURSING, REHAB OR LONG-TERM CARE | DRG: 982 | End: 2022-05-31
Attending: HOSPITALIST | Admitting: HOSPITALIST
Payer: MEDICARE

## 2022-05-16 DIAGNOSIS — L89.154 PRESSURE INJURY OF SACRAL REGION, STAGE 4: ICD-10-CM

## 2022-05-16 DIAGNOSIS — S31.000A SACRAL WOUND: ICD-10-CM

## 2022-05-16 DIAGNOSIS — L89.153 PRESSURE INJURY OF SACRAL REGION, STAGE 3: ICD-10-CM

## 2022-05-16 DIAGNOSIS — S31.000A WOUND OF SACRAL REGION, INITIAL ENCOUNTER: ICD-10-CM

## 2022-05-16 DIAGNOSIS — T14.8XXA INFECTED WOUND: ICD-10-CM

## 2022-05-16 DIAGNOSIS — L08.9 INFECTED WOUND: ICD-10-CM

## 2022-05-16 DIAGNOSIS — S31.000D WOUND OF SACRAL REGION, SUBSEQUENT ENCOUNTER: ICD-10-CM

## 2022-05-16 DIAGNOSIS — E11.9 DIABETES MELLITUS: ICD-10-CM

## 2022-05-16 PROBLEM — E87.1 HYPONATREMIA: Status: ACTIVE | Noted: 2022-05-16

## 2022-05-16 LAB
GLUCOSE SERPL-MCNC: 114 MG/DL (ref 70–105)
GLUCOSE SERPL-MCNC: 85 MG/DL (ref 70–105)
GLUCOSE SERPL-MCNC: 93 MG/DL (ref 70–105)
NT-PROBNP SERPL-MCNC: 135 PG/ML (ref 1–450)

## 2022-05-16 PROCEDURE — 11000001 HC ACUTE MED/SURG PRIVATE ROOM

## 2022-05-16 PROCEDURE — 96372 THER/PROPH/DIAG INJ SC/IM: CPT

## 2022-05-16 PROCEDURE — 25000003 PHARM REV CODE 250: Performed by: INTERNAL MEDICINE

## 2022-05-16 PROCEDURE — 99223 PR INITIAL HOSPITAL CARE,LEVL III: ICD-10-PCS | Mod: ,,, | Performed by: FAMILY MEDICINE

## 2022-05-16 PROCEDURE — 83880 ASSAY OF NATRIURETIC PEPTIDE: CPT | Performed by: NURSE PRACTITIONER

## 2022-05-16 PROCEDURE — 99223 1ST HOSP IP/OBS HIGH 75: CPT | Mod: ,,, | Performed by: INTERNAL MEDICINE

## 2022-05-16 PROCEDURE — 25000003 PHARM REV CODE 250: Performed by: NURSE PRACTITIONER

## 2022-05-16 PROCEDURE — 36415 COLL VENOUS BLD VENIPUNCTURE: CPT | Performed by: NURSE PRACTITIONER

## 2022-05-16 PROCEDURE — 63700000 PHARM REV CODE 250 ALT 637 W/O HCPCS: Performed by: INTERNAL MEDICINE

## 2022-05-16 PROCEDURE — 82962 GLUCOSE BLOOD TEST: CPT

## 2022-05-16 PROCEDURE — 99223 PR INITIAL HOSPITAL CARE,LEVL III: ICD-10-PCS | Mod: ,,, | Performed by: INTERNAL MEDICINE

## 2022-05-16 PROCEDURE — 25000003 PHARM REV CODE 250: Performed by: HOSPITALIST

## 2022-05-16 PROCEDURE — 99223 1ST HOSP IP/OBS HIGH 75: CPT | Mod: ,,, | Performed by: FAMILY MEDICINE

## 2022-05-16 RX ORDER — TAMSULOSIN HYDROCHLORIDE 0.4 MG/1
1 CAPSULE ORAL DAILY
Status: DISCONTINUED | OUTPATIENT
Start: 2022-05-16 | End: 2022-05-31 | Stop reason: HOSPADM

## 2022-05-16 RX ORDER — MUPIROCIN 20 MG/G
OINTMENT TOPICAL 2 TIMES DAILY
Status: DISPENSED | OUTPATIENT
Start: 2022-05-16 | End: 2022-05-21

## 2022-05-16 RX ORDER — IBUPROFEN 200 MG
24 TABLET ORAL
Status: DISCONTINUED | OUTPATIENT
Start: 2022-05-16 | End: 2022-05-31 | Stop reason: HOSPADM

## 2022-05-16 RX ORDER — INSULIN LISPRO 100 [IU]/ML
1-5 INJECTION, SOLUTION INTRAVENOUS; SUBCUTANEOUS
Status: ON HOLD | COMMUNITY
End: 2022-05-31 | Stop reason: HOSPADM

## 2022-05-16 RX ORDER — MEMANTINE HYDROCHLORIDE 5 MG/1
10 TABLET ORAL NIGHTLY
Status: DISCONTINUED | OUTPATIENT
Start: 2022-05-16 | End: 2022-05-31 | Stop reason: HOSPADM

## 2022-05-16 RX ORDER — ACETAMINOPHEN 325 MG/1
650 TABLET ORAL EVERY 4 HOURS PRN
Status: DISCONTINUED | OUTPATIENT
Start: 2022-05-16 | End: 2022-05-31 | Stop reason: HOSPADM

## 2022-05-16 RX ORDER — LOSARTAN POTASSIUM 50 MG/1
50 TABLET ORAL DAILY
Status: DISCONTINUED | OUTPATIENT
Start: 2022-05-17 | End: 2022-05-31 | Stop reason: HOSPADM

## 2022-05-16 RX ORDER — HYDROCODONE BITARTRATE AND ACETAMINOPHEN 7.5; 325 MG/1; MG/1
1 TABLET ORAL 2 TIMES DAILY
Status: ON HOLD | COMMUNITY
End: 2022-05-31 | Stop reason: HOSPADM

## 2022-05-16 RX ORDER — METOPROLOL SUCCINATE 25 MG/1
25 TABLET, EXTENDED RELEASE ORAL DAILY
Status: DISCONTINUED | OUTPATIENT
Start: 2022-05-17 | End: 2022-05-25

## 2022-05-16 RX ORDER — POLYETHYLENE GLYCOL 3350 17 G/17G
17 POWDER, FOR SOLUTION ORAL 2 TIMES DAILY
Status: DISCONTINUED | OUTPATIENT
Start: 2022-05-16 | End: 2022-05-31 | Stop reason: HOSPADM

## 2022-05-16 RX ORDER — LACTULOSE 10 G/15ML
20 SOLUTION ORAL; RECTAL DAILY
Status: ON HOLD | COMMUNITY
End: 2022-11-02 | Stop reason: HOSPADM

## 2022-05-16 RX ORDER — HYDROCODONE BITARTRATE AND ACETAMINOPHEN 5; 325 MG/1; MG/1
1 TABLET ORAL 2 TIMES DAILY PRN
Status: DISCONTINUED | OUTPATIENT
Start: 2022-05-16 | End: 2022-05-27

## 2022-05-16 RX ORDER — PANTOPRAZOLE SODIUM 40 MG/1
40 TABLET, DELAYED RELEASE ORAL DAILY
Status: DISCONTINUED | OUTPATIENT
Start: 2022-05-16 | End: 2022-05-25

## 2022-05-16 RX ORDER — INSULIN ASPART 100 [IU]/ML
0-5 INJECTION, SOLUTION INTRAVENOUS; SUBCUTANEOUS
Status: DISCONTINUED | OUTPATIENT
Start: 2022-05-16 | End: 2022-05-31 | Stop reason: HOSPADM

## 2022-05-16 RX ORDER — LEVOTHYROXINE SODIUM 50 UG/1
50 TABLET ORAL
Status: DISCONTINUED | OUTPATIENT
Start: 2022-05-17 | End: 2022-05-31 | Stop reason: HOSPADM

## 2022-05-16 RX ORDER — FLUCONAZOLE 100 MG/1
200 TABLET ORAL DAILY
Status: COMPLETED | OUTPATIENT
Start: 2022-05-16 | End: 2022-05-16

## 2022-05-16 RX ORDER — RISPERIDONE 1 MG/1
1 TABLET ORAL 2 TIMES DAILY
Status: DISCONTINUED | OUTPATIENT
Start: 2022-05-16 | End: 2022-05-31 | Stop reason: HOSPADM

## 2022-05-16 RX ORDER — FAMOTIDINE 20 MG/1
20 TABLET, FILM COATED ORAL 2 TIMES DAILY
Status: DISCONTINUED | OUTPATIENT
Start: 2022-05-16 | End: 2022-05-16

## 2022-05-16 RX ORDER — HYDROCODONE BITARTRATE AND ACETAMINOPHEN 7.5; 325 MG/1; MG/1
1 TABLET ORAL DAILY
Status: ON HOLD | COMMUNITY
End: 2022-05-31 | Stop reason: HOSPADM

## 2022-05-16 RX ORDER — POLYETHYLENE GLYCOL 3350 17 G/17G
17 POWDER, FOR SOLUTION ORAL DAILY
Status: DISCONTINUED | OUTPATIENT
Start: 2022-05-16 | End: 2022-05-16

## 2022-05-16 RX ORDER — OXCARBAZEPINE 150 MG/1
450 TABLET, FILM COATED ORAL 2 TIMES DAILY
Status: DISCONTINUED | OUTPATIENT
Start: 2022-05-16 | End: 2022-05-31 | Stop reason: HOSPADM

## 2022-05-16 RX ORDER — GLUCAGON 1 MG
1 KIT INJECTION
Status: DISCONTINUED | OUTPATIENT
Start: 2022-05-16 | End: 2022-05-31 | Stop reason: HOSPADM

## 2022-05-16 RX ORDER — ACETAMINOPHEN 325 MG/1
650 TABLET ORAL EVERY 8 HOURS PRN
Status: DISCONTINUED | OUTPATIENT
Start: 2022-05-16 | End: 2022-05-31 | Stop reason: HOSPADM

## 2022-05-16 RX ORDER — NYSTATIN 100000 [USP'U]/ML
500000 SUSPENSION ORAL 4 TIMES DAILY
Status: COMPLETED | OUTPATIENT
Start: 2022-05-16 | End: 2022-05-21

## 2022-05-16 RX ORDER — L. ACIDOPHILUS/L.BULGARICUS 100MM CELL
1 GRANULES IN PACKET (EA) ORAL 3 TIMES DAILY
Status: DISCONTINUED | OUTPATIENT
Start: 2022-05-16 | End: 2022-05-17

## 2022-05-16 RX ORDER — LACTULOSE 10 G/15ML
20 SOLUTION ORAL DAILY
Status: DISCONTINUED | OUTPATIENT
Start: 2022-05-16 | End: 2022-05-31 | Stop reason: HOSPADM

## 2022-05-16 RX ORDER — IBUPROFEN 200 MG
16 TABLET ORAL
Status: DISCONTINUED | OUTPATIENT
Start: 2022-05-16 | End: 2022-05-31 | Stop reason: HOSPADM

## 2022-05-16 RX ORDER — FUROSEMIDE 40 MG/1
40 TABLET ORAL DAILY
Status: DISCONTINUED | OUTPATIENT
Start: 2022-05-17 | End: 2022-05-31 | Stop reason: HOSPADM

## 2022-05-16 RX ORDER — FAMOTIDINE 10 MG/ML
20 INJECTION INTRAVENOUS 2 TIMES DAILY
Status: DISCONTINUED | OUTPATIENT
Start: 2022-05-16 | End: 2022-05-16

## 2022-05-16 RX ADMIN — POLYETHYLENE GLYCOL 3350 17 G: 17 POWDER, FOR SOLUTION ORAL at 09:05

## 2022-05-16 RX ADMIN — APIXABAN 5 MG: 5 TABLET, FILM COATED ORAL at 02:05

## 2022-05-16 RX ADMIN — RISPERIDONE 1 MG: 1 TABLET ORAL at 09:05

## 2022-05-16 RX ADMIN — APIXABAN 5 MG: 5 TABLET, FILM COATED ORAL at 10:05

## 2022-05-16 RX ADMIN — PANTOPRAZOLE SODIUM 40 MG: 40 TABLET, DELAYED RELEASE ORAL at 02:05

## 2022-05-16 RX ADMIN — LACTULOSE 20 G: 20 SOLUTION ORAL at 02:05

## 2022-05-16 RX ADMIN — TAMSULOSIN HYDROCHLORIDE 0.4 MG: 0.4 CAPSULE ORAL at 02:05

## 2022-05-16 RX ADMIN — NYSTATIN 500000 UNITS: 100000 SUSPENSION ORAL at 06:05

## 2022-05-16 RX ADMIN — OXCARBAZEPINE 450 MG: 150 TABLET, FILM COATED ORAL at 09:05

## 2022-05-16 RX ADMIN — THERA TABS 1 TABLET: TAB at 02:05

## 2022-05-16 RX ADMIN — MEMANTINE 10 MG: 5 TABLET ORAL at 09:05

## 2022-05-16 RX ADMIN — NYSTATIN 500000 UNITS: 100000 SUSPENSION ORAL at 09:05

## 2022-05-16 RX ADMIN — POLYETHYLENE GLYCOL 3350 17 G: 17 POWDER, FOR SOLUTION ORAL at 02:05

## 2022-05-16 RX ADMIN — FLUCONAZOLE 200 MG: 100 TABLET ORAL at 06:05

## 2022-05-16 RX ADMIN — MUPIROCIN: 20 OINTMENT TOPICAL at 02:05

## 2022-05-16 RX ADMIN — MUPIROCIN: 20 OINTMENT TOPICAL at 09:05

## 2022-05-16 NOTE — NURSING
5/16/22@2863 Dr. Whitlock and EliotNP in room and aware patient has no iv access and states he will put in order for PICC/Midline placement d/t inability to gain peripheral iv access.

## 2022-05-16 NOTE — PLAN OF CARE
Problem: Adult Inpatient Plan of Care  Goal: Plan of Care Review  Outcome: Ongoing, Progressing  Goal: Patient-Specific Goal (Individualized)  Outcome: Ongoing, Progressing  Goal: Absence of Hospital-Acquired Illness or Injury  Outcome: Ongoing, Progressing  Goal: Optimal Comfort and Wellbeing  Outcome: Ongoing, Progressing  Goal: Readiness for Transition of Care  Outcome: Ongoing, Progressing     Problem: Diabetes Comorbidity  Goal: Blood Glucose Level Within Targeted Range  Outcome: Ongoing, Progressing     Problem: Impaired Wound Healing  Goal: Optimal Wound Healing  Outcome: Ongoing, Progressing     Problem: Skin Injury Risk Increased  Goal: Skin Health and Integrity  Outcome: Ongoing, Progressing     Problem: Fall Injury Risk  Goal: Absence of Fall and Fall-Related Injury  Outcome: Ongoing, Progressing     Problem: Infection  Goal: Absence of Infection Signs and Symptoms  Outcome: Ongoing, Progressing

## 2022-05-16 NOTE — SUBJECTIVE & OBJECTIVE
Past Medical History:   Diagnosis Date    Alzheimer's disease     Bipolar disorder     BPH (benign prostatic hyperplasia)     Diabetes mellitus     Hyperlipidemia     Hypertension     Hypothyroidism     Idiopathic orofacial dystonia     Iron deficiency anemia secondary to blood loss (chronic)     Mild intellectual disabilities     Obesity     Pressure ulcer     Schizophrenia        No past surgical history on file.    Review of patient's allergies indicates:   Allergen Reactions    Metformin     Mycobacterium tuberculosis (tuberculin ppd)     Norvasc [amlodipine]        No current facility-administered medications on file prior to encounter.     Current Outpatient Medications on File Prior to Encounter   Medication Sig    apixaban (ELIQUIS) 5 mg Tab Take 5 mg by mouth 2 (two) times daily.    calcium carbonate/vitamin D3 (CALTRATE 600 + D ORAL) Take 1 tablet by mouth 2 (two) times daily.    furosemide (LASIX) 40 MG tablet Take 40 mg by mouth once daily. Hold for SBP < 110 or DBP < 60    HYDROcodone-acetaminophen (NORCO) 7.5-325 mg per tablet Take 1 tablet by mouth 2 (two) times a day. Hold if increased drowsiness or O2 sat < 95%    HYDROcodone-acetaminophen (NORCO) 7.5-325 mg per tablet Take 1 tablet by mouth once daily. 30 minutes before wound care    insulin detemir U-100 (LEVEMIR) 100 unit/mL injection Inject 15 Units into the skin every evening.    insulin lispro 100 unit/mL injection Inject 1-5 Units into the skin 3 (three) times daily before meals. 201-250 = 1 unit, 251-300 = 2 units, 351-400 = 4 units, > 400 = 5 units    lactulose (CHRONULAC) 10 gram/15 mL solution Take 20 g by mouth once daily.    levothyroxine (SYNTHROID) 50 MCG tablet Take 1 tablet (50 mcg total) by mouth before breakfast.    losartan (COZAAR) 50 MG tablet Take 50 mg by mouth once daily. Hold for SBP < 110 or DBP < 60    memantine (NAMENDA) 10 MG Tab Take 10 mg by mouth nightly.    metoprolol succinate (TOPROL-XL) 25 MG 24 hr tablet Take 1  tablet by mouth once daily. Hold for SBP < 110 or DBP < 60 or pulse < 50    OXcarbazepine (TRILEPTAL) 150 MG Tab Take 450 mg by mouth 2 (two) times daily.    pantoprazole (PROTONIX) 40 MG tablet Take 1 tablet by mouth once daily.    polyethylene glycol (GLYCOLAX) 17 gram PwPk Take 17 g by mouth 2 (two) times a day.    risperiDONE (RISPERDAL) 1 MG tablet Take 1 mg by mouth 2 (two) times daily.    SITagliptin (JANUVIA) 50 MG Tab Take 1 tablet (50 mg total) by mouth once daily.    tamsulosin (FLOMAX) 0.4 mg Cap Take 1 capsule by mouth once daily.    [DISCONTINUED] insulin lispro 100 unit/mL injection Inject 1-5 Units into the skin 3 (three) times daily before meals. 201-250 = 1 unit, 251-300 = 2 units, 351-400 = 4 units, > 400 = 5 units    [DISCONTINUED] potassium chloride SA (K-DUR,KLOR-CON) 20 MEQ tablet Take 1 tablet (20 mEq total) by mouth once daily.     Family History       Family history is unknown by patient.          Tobacco Use    Smoking status: Never Smoker    Smokeless tobacco: Never Used   Substance and Sexual Activity    Alcohol use: Not Currently    Drug use: Never    Sexual activity: Not Currently     Review of Systems   Unable to perform ROS: Psychiatric disorder   Objective:     Vital Signs (Most Recent):  Temp: 96.2 °F (35.7 °C) (05/16/22 1120)  Pulse: 65 (05/16/22 1120)  Resp: 16 (05/16/22 1120)  BP: (!) 161/84 (05/16/22 1120)  SpO2: 100 % (05/16/22 1120)   Vital Signs (24h Range):  Temp:  [96.2 °F (35.7 °C)] 96.2 °F (35.7 °C)  Pulse:  [65] 65  Resp:  [16] 16  SpO2:  [100 %] 100 %  BP: (161)/(84) 161/84     Weight: 98.8 kg (217 lb 13 oz)  Body mass index is 37.39 kg/m².    Physical Exam  HENT:      Head: Normocephalic.      Mouth/Throat:      Mouth: Mucous membranes are moist.      Comments: Thrush noted  Eyes:      Pupils: Pupils are equal, round, and reactive to light.   Cardiovascular:      Rate and Rhythm: Normal rate and regular rhythm.      Heart sounds: Normal heart sounds.   Pulmonary:       Effort: Pulmonary effort is normal.      Breath sounds: Normal breath sounds.   Abdominal:      General: Bowel sounds are normal.      Palpations: Abdomen is soft.   Genitourinary:     Comments: Indwelling anne catheter         Skin:     General: Skin is warm and dry.      Comments: Sacral wound stage 3     No odor     Looks clean     Neurological:      Comments: Can say his name Bhargav    But he cannot answer any questions for ROS         CRANIAL NERVES     CN III, IV, VI   Pupils are equal, round, and reactive to light.     Significant Labs: All pertinent labs within the past 24 hours have been reviewed.  Recent Lab Results         05/16/22  1304   05/16/22  0900        Anion Gap   11       BUN   10       BUN/CREAT RATIO   23       Calcium   8.9       Chloride   90       CO2   29       Creatinine   0.44       eGFR if non    197       Glucose   63       POC Glucose 85         Potassium   4.0       Sodium   126               Significant Imaging: I have reviewed all pertinent imaging results/findings within the past 24 hours.

## 2022-05-16 NOTE — PROGRESS NOTES
Pharmacist Intervention IV to PO Note    Bhargav Gao is a 81 y.o. male being treated with IV medication famotidine    Patient Data:    Vital Signs (Most Recent):    Vital Signs (72h Range):        CBC:  No results for input(s): WBC, RBC, HGB, HCT, PLT, MCV, MCH, MCHC in the last 168 hours.  CMP:     Recent Labs   Lab 05/12/22  1025   GLU 99   CALCIUM 9.0   *   K 4.0   CO2 31   CL 89*   BUN 11   CREATININE 0.51*       Dietary Orders:  Diet Orders  Report           None            Based on the following criteria, this patient qualifies for intravenous to oral conversion:  [x] The patients gastrointestinal tract is functioning (tolerating medications via oral or enteral route for 24 hours and tolerating food or enteral feeds for 24 hours).    IV medication famotidine will be changed to oral medication famotidine    Pharmacist's Name: Henna Leigh  Pharmacist's Extension: [phone]

## 2022-05-16 NOTE — PROGRESS NOTES
Pharmacokinetic Initial Assessment: IV Vancomycin    Assessment/Plan:    Initiate intravenous vancomycin as 2000 mg IV q24h  Desired empiric serum trough concentration is 10 to 20 mcg/mL  Draw vancomycin trough level 30 min prior to fourth dose on 5/19 at approximately 1930  Pharmacy will continue to follow and monitor vancomycin.      Please contact pharmacy at extension [phone] with any questions regarding this assessment.     Patient brief summary:  Bhargav Gao is a 81 y.o. male initiated on antimicrobial therapy with IV Vancomycin for treatment of suspected skin & soft tissue infection    Drug Allergies:   Review of patient's allergies indicates:   Allergen Reactions    Metformin     Mycobacterium tuberculosis (tuberculin ppd)     Norvasc [amlodipine]        Actual Body Weight:   98.8 kg    Renal Function:   Estimated Creatinine Clearance: 139.7 mL/min (A) (based on SCr of 0.44 mg/dL (L)).,     Dialysis Method (if applicable):  N/A    CBC (last 72 hours):  No results for input(s): WHITE BLOOD CELL COUNT, HEMOGLOBIN, HEMATOCRIT, PLATELETS, GRAN%, LYMPH%, MONO%, EOSINOPHIL%, BASOPHIL%, DIFFERENTIAL METHOD in the last 72 hours.    Metabolic Panel (last 72 hours):  Recent Labs   Lab Result Units 05/16/22  0900   Sodium mmol/L 126*   Potassium mmol/L 4.0   Chloride mmol/L 90*   CO2 mmol/L 29   Glucose mg/dL 63*   BUN mg/dL 10   Creatinine mg/dL 0.44*       Drug levels (last 3 results):  No results for input(s): VANCOMYCINRA, VANCOMYCINPE, VANCOMYCINTR in the last 72 hours.    Microbiologic Results:  Microbiology Results (last 7 days)       ** No results found for the last 168 hours. **

## 2022-05-16 NOTE — HPI
Mr. Bhargav Gao is an 82 yo AA resident of [facility] with medical history of hypertension, diabetes mellitus, hypothyroidism, cataracts, DVT, schizophrenia, mild intellectual disabilities, drug induced subacute dyskinesia, bipolar disorder and sacral wound. Mr. Gao was sent for admission due to sacral wound. Mr. Gao can say his name but he is unable to provide any further info. Records show wound culture from 05/03 grew ecoli, acinetobacter baumannii ,K. Pneumoniae, MRSA and e. Faecalis. Was given 7 days of zyvox and invanz. Acinetobacter not sensitive to ertapenem. Sent to [facility] for IV abx and wound care.

## 2022-05-16 NOTE — CONSULTS
INFECTIOUS DISEASE CONSULT NOTE   Admit Date: 5/16/2022  CONSULT FOR :  Multiple organisms from sacral wound culture, advised need for antibiotics  Chief Complaint:  Sacral wound    HPI:      History obtained from review of the patient's records as he has advanced dementia and was not able to contribute to history.  He is a resident of a nursing facility and has a chronic sacral decubitus ulcer.  His also cultured positive for numerous organisms including MRSA and multidrug resistant Acinetobacter couple weeks ago.  At his nursing facility he was treated with a 7 day course of linezolid and ertapenem however it was felt that he may benefit from additional care in our facility and so he was transferred here today.  There is no report of any fever or other constitutional symptoms.  His appetite is fair.  He has been noted to have diarrhea in the hospital and also has been on both lactulose and MiraLax.  I am asked to advise on the need for antibiotic therapy as it is felt that his wound looks relatively  healthy.    ROS:      Unable to obtain due to dementia    PMHx:      Past Medical History:   Diagnosis Date    Alzheimer's disease     Bipolar disorder     BPH (benign prostatic hyperplasia)     Diabetes mellitus     Hyperlipidemia     Hypertension     Hypothyroidism     Idiopathic orofacial dystonia     Iron deficiency anemia secondary to blood loss (chronic)     Mild intellectual disabilities     Obesity     Pressure ulcer     Schizophrenia         PMSx:      No past surgical history on file.     Social Hx:      Social History     Socioeconomic History    Marital status: Single   Occupational History    Occupation: disabled   Tobacco Use    Smoking status: Never Smoker    Smokeless tobacco: Never Used   Substance and Sexual Activity    Alcohol use: Not Currently    Drug use: Never    Sexual activity: Not Currently        Family Hx:      Family History   Family history unknown: Yes     `  Review of patient's allergies indicates:   Allergen Reactions    Metformin     Mycobacterium tuberculosis (tuberculin ppd)     Norvasc [amlodipine]        Medications:      Current Facility-Administered Medications   Medication    acetaminophen tablet 650 mg    acetaminophen tablet 650 mg    apixaban tablet 5 mg    dextrose 50% injection 12.5 g    dextrose 50% injection 25 g    [START ON 5/17/2022] furosemide tablet 40 mg    glucagon (human recombinant) injection 1 mg    glucose chewable tablet 16 g    glucose chewable tablet 24 g    HYDROcodone-acetaminophen 5-325 mg per tablet 1 tablet    insulin aspart U-100 injection 0-5 Units    [START ON 5/17/2022] insulin detemir U-100 injection 15 Units    lactulose 20 gram/30 mL solution Soln 20 g    [START ON 5/17/2022] levothyroxine tablet 50 mcg    [START ON 5/17/2022] losartan tablet 50 mg    memantine tablet 10 mg    [START ON 5/17/2022] metoprolol succinate (TOPROL-XL) 24 hr tablet 25 mg    multivitamin tablet    mupirocin 2 % ointment    OXcarbazepine tablet 450 mg    pantoprazole EC tablet 40 mg    polyethylene glycol packet 17 g    risperiDONE tablet 1 mg    tamsulosin 24 hr capsule 0.4 mg       VITALS:      Vital Signs Range (Last 24H):  Temp:  [96.2 °F (35.7 °C)]   Pulse:  [65]   Resp:  [16]   BP: (161)/(84)   SpO2:  [100 %]     I & O (Last 24H):  No intake or output data in the 24 hours ending 05/16/22 1640    Physical Exam   Constitutional: Pt is oriented to person only, nontoxic appearing  Head: Normocephalic and atraumatic.   Eyes, nose and throat:  Pale moist mucosa, anicteric and acyanotic, DIANE, whitish exudates over his tongue, scant on buccal mucosa, no pharyngeal exudates  Neck: no cervical lymphadenopathy  Cardiovascular: Normal rate and regular rhythm.  S1 and S2 appreciated by ascultation, no murmurs  Pulmonary/Chest: Effort normal, no crepitations or wheezes auscultated   Abdomen:  Soft, not distended, normal bowel  sounds. Soft. Non-tender.  Neurological: Pt is alert and oriented to person, place, and time.  No obvious focal deficits.  Extremities: No edema. No clubbing or cyanosis  Skin: Warm and dry. No rashes.  Sacral ulcer examined it is probably 6 cm wide and healthy pink granulating tissue covering the entire base, no purulence or necrotic areas.  Psychiatric:  Confused    Laboratory Data:  Reviewed and noted in plan where applicable- Please see chart for full laboratory data.    No results for input(s): CPK, CPKMB, TROPONINI, MB in the last 24 hours. No results for input(s): POCTGLUCOSE in the last 24 hours.     No results found for: INR, PROTIME    Lab Results   Component Value Date    WBC 7.30 03/08/2022    HGB 11.1 (L) 03/08/2022    HCT 34.8 (L) 03/08/2022    MCV 89.0 03/08/2022     03/08/2022       BMP  Recent Labs   Lab 05/16/22  0900   GLU 63*   *   K 4.0   CL 90*   CO2 29   BUN 10   CREATININE 0.44*   CALCIUM 8.9         Radiology:  Reviewed and noted in plan where applicable- Please see chart for full radiology data.      ASSESSMENT/PLAN:     ACTIVE PROBLEMS:    Patient Active Problem List   Diagnosis    Alzheimer's disease    Pressure ulcer    Diabetes mellitus    Hypertension    Schizophrenia    Sacral wound    Hyponatremia       ASSESSMENT:  1. Chronic sacral decubitus ulcer which clinically is not infected.  2. Oral candidiasis      PLAN:  1. Systemic antibiotic therapy not indicated at this time  2. Fluconazole 200 mg x 1  3. Nystatin swish and swallow for the next 5 days  4. Add a probiotic    Thank you very much for the consult.  Call again p.r.n..    Discussed with Dr. Whitlock

## 2022-05-16 NOTE — ASSESSMENT & PLAN NOTE
05/16  -records show wound culture that grew  Culture, Wound [[id number]] (Abnormal)  Collected: 05/05/22 0920   Order Status: Completed Specimen: Wound from Sacral Updated: 05/11/22 1318    Culture, Wound/Abscess Light Growth Acinetobacter baumannii complex/haemolyticus Abnormal     Comment: Corrected result: Previously reported as Gram-negative Bacilli on 5/9/2022 at 1312 CDT.        Light Growth Escherichia coli Abnormal      Light Growth Klebsiella pneumoniae Abnormal     Comment: Corrected result: Previously reported as Gram-negative Bacilli on 5/8/2022 at 0843 CDT.        Heavy Growth Methicillin resistant Staphylococcus aureus Abnormal     Comment: Corrected result: Previously reported as Gram-positive Cocci on 5/8/2022 at 0843 CDT.   Gram-positive Cocci has been updated to reportable.        Heavy Growth Enterococcus faecalis Abnormal      Was treated with invanz and zyvox for 7 days  Acinetobacter not sensitive to invanz and zyvox    -ID consult placed for antibiotic recommendations

## 2022-05-16 NOTE — ASSESSMENT & PLAN NOTE
05/16  -continue home dose of levemir 15 units daily with low dose SSI coverage  -accuchecks ac and hs   -a1c on 03/02 was 6.5

## 2022-05-16 NOTE — H&P
[facility] Medicine  History & Physical    Patient Name: Bhargav Gao  MRN: [id number]  Patient Class: IP- Inpatient  Admission Date: 5/16/2022  Attending Physician: Donato Whitlock MD   Primary Care Provider: Kerry Lin MD         Patient information was obtained from past medical records and ER records.     Subjective:     Principal Problem:Sacral wound    Chief Complaint:   Chief Complaint   Patient presents with    Wound Infection        HPI: Mr. Bhargav Gao is an 80 yo AAM resident of [facility] with medical history of hypertension, diabetes mellitus, hypothyroidism, cataracts, DVT, schizophrenia, mild intellectual disabilities, drug induced subacute dyskinesia, bipolar disorder and sacral wound. Mr. Gao was sent for admission due to sacral wound. Mr. Gao can say his name but he is unable to provide any further info. Records show wound culture from 05/03 grew ecoli, acinetobacter baumannii ,K. Pneumoniae, MRSA and e. Faecalis. Was given 7 days of zyvox and invanz. Acinetobacter not sensitive to ertapenem. Sent to [facility] for IV abx and wound care.      Past Medical History:   Diagnosis Date    Alzheimer's disease     Bipolar disorder     BPH (benign prostatic hyperplasia)     Diabetes mellitus     Hyperlipidemia     Hypertension     Hypothyroidism     Idiopathic orofacial dystonia     Iron deficiency anemia secondary to blood loss (chronic)     Mild intellectual disabilities     Obesity     Pressure ulcer     Schizophrenia        No past surgical history on file.    Review of patient's allergies indicates:   Allergen Reactions    Metformin     Mycobacterium tuberculosis (tuberculin ppd)     Norvasc [amlodipine]        No current facility-administered medications on file prior to encounter.     Current Outpatient Medications on File Prior to Encounter   Medication Sig    apixaban (ELIQUIS) 5 mg Tab Take 5 mg by mouth 2 (two) times  daily.    calcium carbonate/vitamin D3 (CALTRATE 600 + D ORAL) Take 1 tablet by mouth 2 (two) times daily.    furosemide (LASIX) 40 MG tablet Take 40 mg by mouth once daily. Hold for SBP < 110 or DBP < 60    HYDROcodone-acetaminophen (NORCO) 7.5-325 mg per tablet Take 1 tablet by mouth 2 (two) times a day. Hold if increased drowsiness or O2 sat < 95%    HYDROcodone-acetaminophen (NORCO) 7.5-325 mg per tablet Take 1 tablet by mouth once daily. 30 minutes before wound care    insulin detemir U-100 (LEVEMIR) 100 unit/mL injection Inject 15 Units into the skin every evening.    insulin lispro 100 unit/mL injection Inject 1-5 Units into the skin 3 (three) times daily before meals. 201-250 = 1 unit, 251-300 = 2 units, 351-400 = 4 units, > 400 = 5 units    lactulose (CHRONULAC) 10 gram/15 mL solution Take 20 g by mouth once daily.    levothyroxine (SYNTHROID) 50 MCG tablet Take 1 tablet (50 mcg total) by mouth before breakfast.    losartan (COZAAR) 50 MG tablet Take 50 mg by mouth once daily. Hold for SBP < 110 or DBP < 60    memantine (NAMENDA) 10 MG Tab Take 10 mg by mouth nightly.    metoprolol succinate (TOPROL-XL) 25 MG 24 hr tablet Take 1 tablet by mouth once daily. Hold for SBP < 110 or DBP < 60 or pulse < 50    OXcarbazepine (TRILEPTAL) 150 MG Tab Take 450 mg by mouth 2 (two) times daily.    pantoprazole (PROTONIX) 40 MG tablet Take 1 tablet by mouth once daily.    polyethylene glycol (GLYCOLAX) 17 gram PwPk Take 17 g by mouth 2 (two) times a day.    risperiDONE (RISPERDAL) 1 MG tablet Take 1 mg by mouth 2 (two) times daily.    SITagliptin (JANUVIA) 50 MG Tab Take 1 tablet (50 mg total) by mouth once daily.    tamsulosin (FLOMAX) 0.4 mg Cap Take 1 capsule by mouth once daily.    [DISCONTINUED] insulin lispro 100 unit/mL injection Inject 1-5 Units into the skin 3 (three) times daily before meals. 201-250 = 1 unit, 251-300 = 2 units, 351-400 = 4 units, > 400 = 5 units    [DISCONTINUED] potassium  chloride SA (K-DUR,KLOR-CON) 20 MEQ tablet Take 1 tablet (20 mEq total) by mouth once daily.     Family History       Family history is unknown by patient.          Tobacco Use    Smoking status: Never Smoker    Smokeless tobacco: Never Used   Substance and Sexual Activity    Alcohol use: Not Currently    Drug use: Never    Sexual activity: Not Currently     Review of Systems   Unable to perform ROS: Psychiatric disorder   Objective:     Vital Signs (Most Recent):  Temp: 96.2 °F (35.7 °C) (05/16/22 1120)  Pulse: 65 (05/16/22 1120)  Resp: 16 (05/16/22 1120)  BP: (!) 161/84 (05/16/22 1120)  SpO2: 100 % (05/16/22 1120)   Vital Signs (24h Range):  Temp:  [96.2 °F (35.7 °C)] 96.2 °F (35.7 °C)  Pulse:  [65] 65  Resp:  [16] 16  SpO2:  [100 %] 100 %  BP: (161)/(84) 161/84     Weight: 98.8 kg (217 lb 13 oz)  Body mass index is 37.39 kg/m².    Physical Exam  HENT:      Head: Normocephalic.      Mouth/Throat:      Mouth: Mucous membranes are moist.      Comments: Thrush noted  Eyes:      Pupils: Pupils are equal, round, and reactive to light.   Cardiovascular:      Rate and Rhythm: Normal rate and regular rhythm.      Heart sounds: Normal heart sounds.   Pulmonary:      Effort: Pulmonary effort is normal.      Breath sounds: Normal breath sounds.   Abdominal:      General: Bowel sounds are normal.      Palpations: Abdomen is soft.   Genitourinary:     Comments: Indwelling anne catheter         Skin:     General: Skin is warm and dry.      Comments: Sacral wound stage 3     No odor     Looks clean     Neurological:      Comments: Can say his name Bhargav    But he cannot answer any questions for ROS         CRANIAL NERVES     CN III, IV, VI   Pupils are equal, round, and reactive to light.     Significant Labs: All pertinent labs within the past 24 hours have been reviewed.  Recent Lab Results         05/16/22  1304   05/16/22  0900        Anion Gap   11       BUN   10       BUN/CREAT RATIO   23       Calcium   8.9        Chloride   90       CO2   29       Creatinine   0.44       eGFR if non    197       Glucose   63       POC Glucose 85         Potassium   4.0       Sodium   126               Significant Imaging: I have reviewed all pertinent imaging results/findings within the past 24 hours.    Assessment/Plan:     * Sacral wound  05/16  -records show wound culture that grew  Culture, Wound [[id number]] (Abnormal)  Collected: 05/05/22 0920   Order Status: Completed Specimen: Wound from Sacral Updated: 05/11/22 1318    Culture, Wound/Abscess Light Growth Acinetobacter baumannii complex/haemolyticus Abnormal     Comment: Corrected result: Previously reported as Gram-negative Bacilli on 5/9/2022 at 1312 CDT.        Light Growth Escherichia coli Abnormal      Light Growth Klebsiella pneumoniae Abnormal     Comment: Corrected result: Previously reported as Gram-negative Bacilli on 5/8/2022 at 0843 CDT.        Heavy Growth Methicillin resistant Staphylococcus aureus Abnormal     Comment: Corrected result: Previously reported as Gram-positive Cocci on 5/8/2022 at 0843 CDT.   Gram-positive Cocci has been updated to reportable.        Heavy Growth Enterococcus faecalis Abnormal      Was treated with invanz and zyvox for 7 days  Acinetobacter not sensitive to invanz and zyvox    -ID consult placed for antibiotic recommendations    Hyponatremia  05/16   -records show chlorthalidone was recently discontinued   -NA is 126      Hypertension  05/16  -continue home meds of losartan and metoprolol    Diabetes mellitus  05/16  -continue home dose of levemir 15 units daily with low dose SSI coverage  -accuchecks ac and hs   -a1c on 03/02 was 6.5        Alzheimer's disease    05/16 continue namenda      VTE Risk Mitigation (From admission, onward)         Ordered     apixaban tablet 5 mg  2 times daily         05/16/22 1202     IP VTE HIGH RISK PATIENT  Once         05/16/22 1152     Place sequential compression device  Until  discontinued         05/16/22 1152                   LIEN Suarez  Department of Hospital Medicine   [facility]

## 2022-05-17 LAB
ALBUMIN SERPL BCP-MCNC: 2.8 G/DL (ref 3.5–5)
ALBUMIN/GLOB SERPL: 0.7 {RATIO}
ALP SERPL-CCNC: 94 U/L (ref 45–115)
ALT SERPL W P-5'-P-CCNC: 19 U/L (ref 16–61)
ANION GAP SERPL CALCULATED.3IONS-SCNC: 13 MMOL/L (ref 7–16)
AST SERPL W P-5'-P-CCNC: 24 U/L (ref 15–37)
BILIRUB SERPL-MCNC: 0.2 MG/DL (ref 0–1.2)
BUN SERPL-MCNC: 9 MG/DL (ref 7–18)
BUN/CREAT SERPL: 24 (ref 6–20)
CALCIUM SERPL-MCNC: 9.1 MG/DL (ref 8.5–10.1)
CHLORIDE SERPL-SCNC: 91 MMOL/L (ref 98–107)
CO2 SERPL-SCNC: 27 MMOL/L (ref 21–32)
CREAT SERPL-MCNC: 0.38 MG/DL (ref 0.7–1.3)
GLOBULIN SER-MCNC: 3.8 G/DL (ref 2–4)
GLUCOSE SERPL-MCNC: 104 MG/DL (ref 70–105)
GLUCOSE SERPL-MCNC: 106 MG/DL (ref 70–105)
GLUCOSE SERPL-MCNC: 78 MG/DL (ref 74–106)
GLUCOSE SERPL-MCNC: 87 MG/DL (ref 70–105)
GLUCOSE SERPL-MCNC: 88 MG/DL (ref 70–105)
POTASSIUM SERPL-SCNC: 3.8 MMOL/L (ref 3.5–5.1)
PROT SERPL-MCNC: 6.6 G/DL (ref 6.4–8.2)
SODIUM SERPL-SCNC: 127 MMOL/L (ref 136–145)

## 2022-05-17 PROCEDURE — 63700000 PHARM REV CODE 250 ALT 637 W/O HCPCS: Performed by: FAMILY MEDICINE

## 2022-05-17 PROCEDURE — 99232 SBSQ HOSP IP/OBS MODERATE 35: CPT | Mod: ,,, | Performed by: FAMILY MEDICINE

## 2022-05-17 PROCEDURE — 25000003 PHARM REV CODE 250: Performed by: INTERNAL MEDICINE

## 2022-05-17 PROCEDURE — C9399 UNCLASSIFIED DRUGS OR BIOLOG: HCPCS | Performed by: NURSE PRACTITIONER

## 2022-05-17 PROCEDURE — 99232 PR SUBSEQUENT HOSPITAL CARE,LEVL II: ICD-10-PCS | Mod: ,,, | Performed by: FAMILY MEDICINE

## 2022-05-17 PROCEDURE — 25000003 PHARM REV CODE 250: Performed by: FAMILY MEDICINE

## 2022-05-17 PROCEDURE — 25000003 PHARM REV CODE 250: Performed by: NURSE PRACTITIONER

## 2022-05-17 PROCEDURE — 36415 COLL VENOUS BLD VENIPUNCTURE: CPT | Performed by: NURSE PRACTITIONER

## 2022-05-17 PROCEDURE — 63600175 PHARM REV CODE 636 W HCPCS: Performed by: NURSE PRACTITIONER

## 2022-05-17 PROCEDURE — 99232 SBSQ HOSP IP/OBS MODERATE 35: CPT | Mod: ,,, | Performed by: NURSE PRACTITIONER

## 2022-05-17 PROCEDURE — 82962 GLUCOSE BLOOD TEST: CPT

## 2022-05-17 PROCEDURE — 99232 PR SUBSEQUENT HOSPITAL CARE,LEVL II: ICD-10-PCS | Mod: ,,, | Performed by: NURSE PRACTITIONER

## 2022-05-17 PROCEDURE — 96372 THER/PROPH/DIAG INJ SC/IM: CPT

## 2022-05-17 PROCEDURE — 11000001 HC ACUTE MED/SURG PRIVATE ROOM

## 2022-05-17 PROCEDURE — 80053 COMPREHEN METABOLIC PANEL: CPT | Performed by: NURSE PRACTITIONER

## 2022-05-17 RX ORDER — LACTOBACILLUS ACIDOPHILUS 500MM CELL
1 CAPSULE ORAL
Status: DISCONTINUED | OUTPATIENT
Start: 2022-05-17 | End: 2022-05-31 | Stop reason: HOSPADM

## 2022-05-17 RX ORDER — FLUCONAZOLE 100 MG/1
200 TABLET ORAL DAILY
Status: DISCONTINUED | OUTPATIENT
Start: 2022-05-17 | End: 2022-05-31 | Stop reason: HOSPADM

## 2022-05-17 RX ADMIN — NYSTATIN 500000 UNITS: 100000 SUSPENSION ORAL at 08:05

## 2022-05-17 RX ADMIN — NYSTATIN 500000 UNITS: 100000 SUSPENSION ORAL at 04:05

## 2022-05-17 RX ADMIN — FLUCONAZOLE 200 MG: 100 TABLET ORAL at 04:05

## 2022-05-17 RX ADMIN — MUPIROCIN: 20 OINTMENT TOPICAL at 08:05

## 2022-05-17 RX ADMIN — METOPROLOL SUCCINATE 25 MG: 25 TABLET, FILM COATED, EXTENDED RELEASE ORAL at 08:05

## 2022-05-17 RX ADMIN — Medication 1 CAPSULE: at 04:05

## 2022-05-17 RX ADMIN — LEVOTHYROXINE SODIUM 50 MCG: 50 TABLET ORAL at 05:05

## 2022-05-17 RX ADMIN — APIXABAN 5 MG: 5 TABLET, FILM COATED ORAL at 08:05

## 2022-05-17 RX ADMIN — POLYETHYLENE GLYCOL 3350 17 G: 17 POWDER, FOR SOLUTION ORAL at 08:05

## 2022-05-17 RX ADMIN — TAMSULOSIN HYDROCHLORIDE 0.4 MG: 0.4 CAPSULE ORAL at 09:05

## 2022-05-17 RX ADMIN — RISPERIDONE 1 MG: 1 TABLET ORAL at 08:05

## 2022-05-17 RX ADMIN — PANTOPRAZOLE SODIUM 40 MG: 40 TABLET, DELAYED RELEASE ORAL at 08:05

## 2022-05-17 RX ADMIN — NYSTATIN 500000 UNITS: 100000 SUSPENSION ORAL at 01:05

## 2022-05-17 RX ADMIN — LOSARTAN POTASSIUM 50 MG: 50 TABLET, FILM COATED ORAL at 08:05

## 2022-05-17 RX ADMIN — FUROSEMIDE 40 MG: 40 TABLET ORAL at 08:05

## 2022-05-17 RX ADMIN — THERA TABS 1 TABLET: TAB at 08:05

## 2022-05-17 RX ADMIN — MEMANTINE 10 MG: 5 TABLET ORAL at 08:05

## 2022-05-17 RX ADMIN — OXCARBAZEPINE 450 MG: 150 TABLET, FILM COATED ORAL at 08:05

## 2022-05-17 RX ADMIN — Medication 1 CAPSULE: at 08:05

## 2022-05-17 RX ADMIN — INSULIN DETEMIR 15 UNITS: 100 INJECTION, SOLUTION SUBCUTANEOUS at 08:05

## 2022-05-17 RX ADMIN — Medication 1 CAPSULE: at 01:05

## 2022-05-17 NOTE — CONSULTS
[facility]  Wound Care  Progress Note    Patient Name: Bhargav Gao  MRN: [id number]  Admission Date: 5/16/2022  Attending Physician: Donato Whitlock MD    Location of Wounds:  Stage 3 pressure ulcer     Past Medical History:   Diagnosis Date    Alzheimer's disease     Bipolar disorder     BPH (benign prostatic hyperplasia)     Diabetes mellitus     Hyperlipidemia     Hypertension     Hypothyroidism     Idiopathic orofacial dystonia     Iron deficiency anemia secondary to blood loss (chronic)     Mild intellectual disabilities     Obesity     Pressure ulcer     Schizophrenia         Subjective:     HPI:  Bhargav Gao is a 81 y.o. male with wound to sacral. Patient is incontinent of bowel.  Past medical history includes hypertension, diabetes mellitus, hypothyroidism, cataracts, DVT, schizophrenia, mild intellectual disabilities, drug induced subacute dyskinesia, bipolar disorder and sacral wound. He was admitted for IV antibiotics and wound care. Pertinent factors that delay wound healing include multiple co-morbidities, heavy drainage, excessive wound moisture and macerated tissue, advanced age, fragile skin and no protective sensation.     Review of Systems   Unable to perform ROS: Other     Objective:     Vital Signs (Most Recent):  Temp: (!) 95.9 °F (35.5 °C) (05/17/22 0800)  Pulse: 62 (05/17/22 0854)  Resp: 15 (05/17/22 0800)  BP: (!) 163/82 (05/17/22 0854)  SpO2: 100 % (05/17/22 0800) Vital Signs (24h Range):  Temp:  [95.9 °F (35.5 °C)-98.5 °F (36.9 °C)] 95.9 °F (35.5 °C)  Pulse:  [55-78] 62  Resp:  [15-18] 15  SpO2:  [98 %-100 %] 100 %  BP: (114-163)/(69-86) 163/82     Weight: 98.8 kg (217 lb 13 oz)  Body mass index is 37.39 kg/m².  No data recorded    Physical Exam  Vitals reviewed.   Constitutional:       Appearance: Normal appearance.   HENT:      Head: Normocephalic.   Cardiovascular:      Rate and Rhythm: Normal rate and regular rhythm.      Pulses: Normal pulses.       Heart sounds: Normal heart sounds.   Pulmonary:      Effort: Pulmonary effort is normal.      Breath sounds: Normal breath sounds.   Skin:     Findings: Erythema present.      Comments: Wound, see wound assessment and pictures   Neurological:      Mental Status: Mental status is at baseline.      Motor: Weakness present.            Altered Skin Integrity 05/16/22 1115 Sacral spine #1 (Active)   05/16/22 1115   Altered Skin Integrity Present on Admission: yes   Side:    Orientation:    Location: Sacral spine   Wound Number: #1   Is this injury device related?:    Primary Wound Type:    Description of Altered Skin Integrity:    Removal Indication and Assessment:    Wound Outcome:    (Retired) Wound Length (cm):    (Retired) Wound Width (cm):    (Retired) Depth (cm):    Wound Description (Comments):    Removal Indications:    Description of Altered Skin Integrity Full thickness tissue loss with exposed bone, tendon, or muscle. Often includes undermining and tunneling. May extend into muscle and/or supporting structures. 05/16/22 1622   Dressing Appearance Dry;Intact 05/16/22 1622   Drainage Amount Scant 05/16/22 1622   Drainage Characteristics/Odor No odor;Serosanguineous 05/16/22 1622   Appearance Red;Granulating 05/16/22 1622   Black (%), Wound Tissue Color 0 % 05/16/22 1622   Red (%), Wound Tissue Color 100 % 05/16/22 1622   Yellow (%), Wound Tissue Color 0 % 05/16/22 1622   Periwound Area Intact 05/16/22 1622   Wound Edges Defined 05/16/22 1622            Altered Skin Integrity 05/16/22 1115 Right anterior Greater trochanter Other (comment) Purple or maroon localized area of discolored intact skin or non-intact skin or a blood-filled blister. (Active)   05/16/22 1115   Altered Skin Integrity Present on Admission: yes   Side: Right   Orientation: anterior   Location: Greater trochanter   Wound Number:    Is this injury device related?: No   Primary Wound Type: Other   Description of Altered Skin Integrity: Purple or  maroon localized area of discolored intact skin or non-intact skin or a blood-filled blister.   Removal Indication and Assessment:    Wound Outcome:    (Retired) Wound Length (cm):    (Retired) Wound Width (cm):    (Retired) Depth (cm):    Wound Description (Comments):    Removal Indications:    Description of Altered Skin Integrity Purple or maroon localized area of discolored intact skin or non-intact skin or a blood-filled blister. 05/16/22 1620   Dressing Appearance Dry;Intact 05/16/22 1620   Drainage Characteristics/Odor No odor 05/16/22 1620   Appearance Purple;Maroon 05/16/22 1620   Tissue loss description Not applicable 05/16/22 1620            Altered Skin Integrity 05/16/22 1115 Left anterior Greater trochanter Other (comment) Purple or maroon localized area of discolored intact skin or non-intact skin or a blood-filled blister. (Active)   05/16/22 1115   Altered Skin Integrity Present on Admission: yes   Side: Left   Orientation: anterior   Location: Greater trochanter   Wound Number:    Is this injury device related?: No   Primary Wound Type: Other   Description of Altered Skin Integrity: Purple or maroon localized area of discolored intact skin or non-intact skin or a blood-filled blister.   Removal Indication and Assessment:    Wound Outcome:    (Retired) Wound Length (cm):    (Retired) Wound Width (cm):    (Retired) Depth (cm):    Wound Description (Comments):    Removal Indications:    Description of Altered Skin Integrity Purple or maroon localized area of discolored intact skin or non-intact skin or a blood-filled blister. 05/16/22 1621   Dressing Appearance Dry;Intact 05/16/22 1621   Drainage Amount None 05/16/22 1621   Appearance Purple;Maroon 05/16/22 1621            Altered Skin Integrity 05/16/22 1115 Left medial Foot #1 Intact skin with non-blanchable redness of localized area (Active)   05/16/22 1115   Altered Skin Integrity Present on Admission:    Side: Left   Orientation: medial    Location: Foot   Wound Number: #1   Is this injury device related?: No   Primary Wound Type:    Description of Altered Skin Integrity: Intact skin with non-blanchable redness of localized area   Removal Indication and Assessment:    Wound Outcome:    (Retired) Wound Length (cm):    (Retired) Wound Width (cm):    (Retired) Depth (cm):    Wound Description (Comments):    Removal Indications:    Description of Altered Skin Integrity Intact skin with non-blanchable redness of localized area 05/16/22 1115   Dressing Appearance Open to air 05/16/22 1115       Assessment and Plan      Wound Assessment:  Stage 3 pressure ulcer, sacral    Wound Care Plan:   1. Clean wound with baby shampoo and water or vashe  2. Apply sensicare around wound  3. Pack with vashe moistened 4x4s.   4. Cover and secure with abd pad and paper tape  5. Change daily  6. Turn every two hours  7. Low air loss mattress  8. Keep pressure off wound  9. TAP system   10. Consider NPWT and colostomy.           Signature:  LIEN Elise  Wound Care    Date of encounter: 05/17/2022

## 2022-05-17 NOTE — PLAN OF CARE
[facility]  Discharge Final Note    Primary Care Provider: Kerry Lin MD    Expected Discharge Date:     Final Discharge Note (most recent)     Final Note - 05/17/22 0846        Final Note    Assessment Type Final Discharge Note     Anticipated Discharge Disposition Discharged to Nursing Facility Certified under Medicaid but not Medicare        Post-Acute Status    Post-Acute Authorization Placement     Post-Acute Placement Status Set-up Complete/Auth obtained     Patient choice form signed by patient/caregiver List with quality metrics by geographic area provided;List from CMS Compare;List from System Post-Acute Care     Discharge Delays None known at this time                 Important Message from Medicare          Pt discharging back to NH today.

## 2022-05-17 NOTE — PLAN OF CARE
[facility]  Initial Discharge Assessment       Primary Care Provider: Kerry Lin MD    Admission Diagnosis: Infected wound [T14.8XXA, L08.9]    Admission Date: 5/16/2022  Expected Discharge Date:     Discharge Barriers Identified: None    Payor: MEDICARE / Plan: MEDICARE PART A & B / Product Type: Government /     Extended Emergency Contact Information  Primary Emergency Contact: Jennifer Rudolph  Mobile Phone: [phone]  Relation: Relative    Discharge Plan A: Return to nursing home  Discharge Plan B: Return to Nursing Home    No Pharmacies Listed    Initial Assessment (most recent)     Adult Discharge Assessment - 05/17/22 0839        Discharge Assessment    Assessment Type Discharge Planning Assessment     Source of Information family     Communicated YOSSI with patient/caregiver Date not available/Unable to determine     Lives With facility resident     Do you expect to return to your current living situation? Yes     Do you have help at home or someone to help you manage your care at home? Yes     Equipment Currently Used at Home --   per nursing home    Readmission within 30 days? No     Patient currently being followed by outpatient case management? No     Do you have prescription coverage? Yes     Do you have any problems affording any of your prescribed medications? Yes     Discharge Plan A Return to nursing home     Discharge Plan B Return to Nursing Home     DME Needed Upon Discharge  none     Discharge Plan discussed with: --   aguila Rudolph    Discharge Barriers Identified None                 SS was notified pt is ready for discharge today back to NH. SS spoke with pt's Jennifer dickinson and notified of discharge. SS called and spoke with Todd Rice. SS will take packet to floor.

## 2022-05-17 NOTE — PT/OT/SLP PROGRESS
Physical Therapy Screen      PT screen completed. Pt is a LTC resident where he has been completely dependent and requiring total care. PT not warranted at this time.          Erin Malhotra, PT, DPT

## 2022-05-17 NOTE — SUBJECTIVE & OBJECTIVE
Interval History: Patient nonverbal    Review of Systems   Constitutional:  Negative for fever.   Respiratory:  Negative for shortness of breath.    Cardiovascular:  Negative for chest pain.   Objective:     Vital Signs (Most Recent):  Temp: (!) 95.9 °F (35.5 °C) (05/17/22 0800)  Pulse: 62 (05/17/22 0854)  Resp: 15 (05/17/22 0800)  BP: (!) 163/82 (05/17/22 0854)  SpO2: 100 % (05/17/22 0800)   Vital Signs (24h Range):  Temp:  [95.9 °F (35.5 °C)-98.5 °F (36.9 °C)] 95.9 °F (35.5 °C)  Pulse:  [55-78] 62  Resp:  [15-18] 15  SpO2:  [98 %-100 %] 100 %  BP: (114-163)/(69-86) 163/82     Weight: 98.8 kg (217 lb 13 oz)  Body mass index is 37.39 kg/m².  No intake or output data in the 24 hours ending 05/17/22 1034   Physical Exam  Vitals and nursing note reviewed.   Cardiovascular:      Rate and Rhythm: Regular rhythm.      Heart sounds: Normal heart sounds.   Pulmonary:      Breath sounds: Normal breath sounds.   Abdominal:      Palpations: Abdomen is soft.   Skin:     Comments: Sacral ulcer       Significant Labs: All pertinent labs within the past 24 hours have been reviewed.  BMP:   Recent Labs   Lab 05/17/22  0304   GLU 78   *   K 3.8   CL 91*   CO2 27   BUN 9   CREATININE 0.38*   CALCIUM 9.1     CBC: No results for input(s): WBC, HGB, HCT, PLT in the last 48 hours.  POCT Glucose: No results for input(s): POCTGLUCOSE in the last 48 hours.    Significant Imaging:  none

## 2022-05-17 NOTE — PT/OT/SLP PROGRESS
Occupational Therapy Screen      Patient Name:  Bhargav Gao   MRN:  [id number]     OT screen performed instead of formal evaluation. Pt is a NH resident admitted with Sacral Wound. Pt requires total care per nursing staff and appears to be dependent at baseline due to limited use of (B) UE  . No skilled OT indicated.    5/17/2022

## 2022-05-17 NOTE — PROGRESS NOTES
[facility]  Progress Note    Patient Name: Bhargav Gao  MRN: [id number]  Patient Class: IP- Inpatient   Admission Date: 5/16/2022  Length of Stay: 1 days  Attending Physician: Donato Whitlock MD  Primary Care Provider: Kerry Lin MD        Subjective:     Principal Problem:Sacral wound        HPI:  Mr. Bhargav Gao is an 82 yo AAM resident of [facility] with medical history of hypertension, diabetes mellitus, hypothyroidism, cataracts, DVT, schizophrenia, mild intellectual disabilities, drug induced subacute dyskinesia, bipolar disorder and sacral wound. Mr. Gao was sent for admission due to sacral wound. Mr. Gao can say his name but he is unable to provide any further info. Records show wound culture from 05/03 grew ecoli, acinetobacter baumannii ,K. Pneumoniae, MRSA and e. Faecalis. Was given 7 days of zyvox and invanz. Acinetobacter not sensitive to ertapenem. Sent to [facility] for IV abx and wound care.      Overview/Hospital Course:  05/17 Patient without complaints. Sacral ulcer clean but gets contaminated with stool. Will speak with family about colostomy.      Interval History: Patient nonverbal    Review of Systems   Constitutional:  Negative for fever.   Respiratory:  Negative for shortness of breath.    Cardiovascular:  Negative for chest pain.   Objective:     Vital Signs (Most Recent):  Temp: (!) 95.9 °F (35.5 °C) (05/17/22 0800)  Pulse: 62 (05/17/22 0854)  Resp: 15 (05/17/22 0800)  BP: (!) 163/82 (05/17/22 0854)  SpO2: 100 % (05/17/22 0800)   Vital Signs (24h Range):  Temp:  [95.9 °F (35.5 °C)-98.5 °F (36.9 °C)] 95.9 °F (35.5 °C)  Pulse:  [55-78] 62  Resp:  [15-18] 15  SpO2:  [98 %-100 %] 100 %  BP: (114-163)/(69-86) 163/82     Weight: 98.8 kg (217 lb 13 oz)  Body mass index is 37.39 kg/m².  No intake or output data in the 24 hours ending 05/17/22 1034   Physical Exam  Vitals and nursing note reviewed.   Cardiovascular:      Rate and  Rhythm: Regular rhythm.      Heart sounds: Normal heart sounds.   Pulmonary:      Breath sounds: Normal breath sounds.   Abdominal:      Palpations: Abdomen is soft.   Skin:     Comments: Sacral ulcer       Significant Labs: All pertinent labs within the past 24 hours have been reviewed.  BMP:   Recent Labs   Lab 05/17/22  0304   GLU 78   *   K 3.8   CL 91*   CO2 27   BUN 9   CREATININE 0.38*   CALCIUM 9.1     CBC: No results for input(s): WBC, HGB, HCT, PLT in the last 48 hours.  POCT Glucose: No results for input(s): POCTGLUCOSE in the last 48 hours.    Significant Imaging:  none      Assessment/Plan:      * Sacral wound  05/16  -records show wound culture that grew  Culture, Wound [[id number]] (Abnormal)  Collected: 05/05/22 0920   Order Status: Completed Specimen: Wound from Sacral Updated: 05/11/22 1318    Culture, Wound/Abscess Light Growth Acinetobacter baumannii complex/haemolyticus Abnormal     Comment: Corrected result: Previously reported as Gram-negative Bacilli on 5/9/2022 at 1312 CDT.        Light Growth Escherichia coli Abnormal      Light Growth Klebsiella pneumoniae Abnormal     Comment: Corrected result: Previously reported as Gram-negative Bacilli on 5/8/2022 at 0843 CDT.        Heavy Growth Methicillin resistant Staphylococcus aureus Abnormal     Comment: Corrected result: Previously reported as Gram-positive Cocci on 5/8/2022 at 0843 CDT.   Gram-positive Cocci has been updated to reportable.        Heavy Growth Enterococcus faecalis Abnormal      Was treated with invanz and zyvox for 7 days  Acinetobacter not sensitive to invanz and zyvox    -ID consult placed for antibiotic recommendations    Hyponatremia  05/16   -records show chlorthalidone was recently discontinued   -NA is 126      Hypertension  05/16  -continue home meds of losartan and metoprolol    Diabetes mellitus  05/16  -continue home dose of levemir 15 units daily with low dose SSI coverage  -accuchecks ac and hs   -a1c on  03/02 was 6.5        Alzheimer's disease    05/16 continue namenda      VTE Risk Mitigation (From admission, onward)         Ordered     apixaban tablet 5 mg  2 times daily         05/16/22 1202     IP VTE HIGH RISK PATIENT  Once         05/16/22 1152     Place sequential compression device  Until discontinued         05/16/22 1152                Discharge Planning   YOSSI:      Code Status: DNR   Is the patient medically ready for discharge?:     Reason for patient still in hospital (select all that apply): Other (specify) woundf care       Discharge Plan A: Return to nursing home   Discharge Delays: None known at this time              Donato Whitlock MD  Department of Hospital Medicine   [facility]

## 2022-05-17 NOTE — HOSPITAL COURSE
05/17 Patient without complaints. Sacral ulcer clean but gets contaminated with stool. Will speak with family about colostomy.  5/18 Spoke with patient's niece. She is agreeable to colostomy. Will consult Dr. Felix  05/23 Awake and resting in bed. Smiles at me. No complaints. Chest is clear. Heart RRR. Continue wound care to sacrum.  05/30 Resting in bed. No distress noted. Nonverbal. CNA reports Mr. Gao eating 100% of meals served. Colostomy with stool noted. Indwelling anne catheter due to sacral wound. Continue wound care.  05/31 Mr. Gao is stable and able to discharge back to [facility] today. Continue Colostomy and wound care. Appts made for follow up with PCP, [facility] and with Dr. Marcelino Felix. Medication reconciliation done.

## 2022-05-17 NOTE — NURSING
Appetite  is excellent. No sign of aspiration with nectar thick liq. Cooperative. Noted active movements rue through the shift. No status changes noted since initial assess this shift.

## 2022-05-18 LAB
ALBUMIN SERPL BCP-MCNC: 2.7 G/DL (ref 3.5–5)
ALBUMIN/GLOB SERPL: 0.8 {RATIO}
ALP SERPL-CCNC: 90 U/L (ref 45–115)
ALT SERPL W P-5'-P-CCNC: 18 U/L (ref 16–61)
ANION GAP SERPL CALCULATED.3IONS-SCNC: 11 MMOL/L (ref 7–16)
AST SERPL W P-5'-P-CCNC: 23 U/L (ref 15–37)
BILIRUB SERPL-MCNC: 0.2 MG/DL (ref 0–1.2)
BUN SERPL-MCNC: 9 MG/DL (ref 7–18)
BUN/CREAT SERPL: 15 (ref 6–20)
CALCIUM SERPL-MCNC: 9 MG/DL (ref 8.5–10.1)
CHLORIDE SERPL-SCNC: 92 MMOL/L (ref 98–107)
CO2 SERPL-SCNC: 29 MMOL/L (ref 21–32)
CREAT SERPL-MCNC: 0.61 MG/DL (ref 0.7–1.3)
GLOBULIN SER-MCNC: 3.6 G/DL (ref 2–4)
GLUCOSE SERPL-MCNC: 129 MG/DL (ref 70–105)
GLUCOSE SERPL-MCNC: 131 MG/DL (ref 70–105)
GLUCOSE SERPL-MCNC: 83 MG/DL (ref 74–106)
GLUCOSE SERPL-MCNC: 97 MG/DL (ref 70–105)
POTASSIUM SERPL-SCNC: 4 MMOL/L (ref 3.5–5.1)
PROT SERPL-MCNC: 6.3 G/DL (ref 6.4–8.2)
SODIUM SERPL-SCNC: 128 MMOL/L (ref 136–145)

## 2022-05-18 PROCEDURE — 63700000 PHARM REV CODE 250 ALT 637 W/O HCPCS: Performed by: FAMILY MEDICINE

## 2022-05-18 PROCEDURE — 82962 GLUCOSE BLOOD TEST: CPT

## 2022-05-18 PROCEDURE — 99222 PR INITIAL HOSPITAL CARE,LEVL II: ICD-10-PCS | Mod: ,,, | Performed by: NURSE PRACTITIONER

## 2022-05-18 PROCEDURE — 63600175 PHARM REV CODE 636 W HCPCS: Performed by: FAMILY MEDICINE

## 2022-05-18 PROCEDURE — 63600175 PHARM REV CODE 636 W HCPCS: Performed by: NURSE PRACTITIONER

## 2022-05-18 PROCEDURE — 11000001 HC ACUTE MED/SURG PRIVATE ROOM

## 2022-05-18 PROCEDURE — 25000003 PHARM REV CODE 250: Performed by: NURSE PRACTITIONER

## 2022-05-18 PROCEDURE — 80053 COMPREHEN METABOLIC PANEL: CPT | Performed by: NURSE PRACTITIONER

## 2022-05-18 PROCEDURE — 99232 PR SUBSEQUENT HOSPITAL CARE,LEVL II: ICD-10-PCS | Mod: ,,, | Performed by: FAMILY MEDICINE

## 2022-05-18 PROCEDURE — 25000003 PHARM REV CODE 250: Performed by: FAMILY MEDICINE

## 2022-05-18 PROCEDURE — 82040 ASSAY OF SERUM ALBUMIN: CPT | Performed by: NURSE PRACTITIONER

## 2022-05-18 PROCEDURE — 25000003 PHARM REV CODE 250: Performed by: INTERNAL MEDICINE

## 2022-05-18 PROCEDURE — 99222 1ST HOSP IP/OBS MODERATE 55: CPT | Mod: ,,, | Performed by: NURSE PRACTITIONER

## 2022-05-18 PROCEDURE — C9399 UNCLASSIFIED DRUGS OR BIOLOG: HCPCS | Performed by: NURSE PRACTITIONER

## 2022-05-18 PROCEDURE — 36415 COLL VENOUS BLD VENIPUNCTURE: CPT | Performed by: FAMILY MEDICINE

## 2022-05-18 PROCEDURE — 36415 COLL VENOUS BLD VENIPUNCTURE: CPT | Performed by: NURSE PRACTITIONER

## 2022-05-18 PROCEDURE — 99232 SBSQ HOSP IP/OBS MODERATE 35: CPT | Mod: ,,, | Performed by: FAMILY MEDICINE

## 2022-05-18 RX ORDER — ENOXAPARIN SODIUM 100 MG/ML
40 INJECTION SUBCUTANEOUS EVERY 24 HOURS
Status: DISCONTINUED | OUTPATIENT
Start: 2022-05-18 | End: 2022-05-22

## 2022-05-18 RX ADMIN — Medication 1 CAPSULE: at 05:05

## 2022-05-18 RX ADMIN — LACTULOSE 20 G: 20 SOLUTION ORAL at 09:05

## 2022-05-18 RX ADMIN — NYSTATIN 500000 UNITS: 100000 SUSPENSION ORAL at 05:05

## 2022-05-18 RX ADMIN — LEVOTHYROXINE SODIUM 50 MCG: 50 TABLET ORAL at 06:05

## 2022-05-18 RX ADMIN — RISPERIDONE 1 MG: 1 TABLET ORAL at 08:05

## 2022-05-18 RX ADMIN — TAMSULOSIN HYDROCHLORIDE 0.4 MG: 0.4 CAPSULE ORAL at 09:05

## 2022-05-18 RX ADMIN — POLYETHYLENE GLYCOL 3350 17 G: 17 POWDER, FOR SOLUTION ORAL at 08:05

## 2022-05-18 RX ADMIN — RISPERIDONE 1 MG: 1 TABLET ORAL at 09:05

## 2022-05-18 RX ADMIN — NYSTATIN 500000 UNITS: 100000 SUSPENSION ORAL at 09:05

## 2022-05-18 RX ADMIN — INSULIN DETEMIR 15 UNITS: 100 INJECTION, SOLUTION SUBCUTANEOUS at 09:05

## 2022-05-18 RX ADMIN — NYSTATIN 500000 UNITS: 100000 SUSPENSION ORAL at 08:05

## 2022-05-18 RX ADMIN — THERA TABS 1 TABLET: TAB at 09:05

## 2022-05-18 RX ADMIN — LOSARTAN POTASSIUM 50 MG: 50 TABLET, FILM COATED ORAL at 09:05

## 2022-05-18 RX ADMIN — ENOXAPARIN SODIUM 40 MG: 40 INJECTION SUBCUTANEOUS at 05:05

## 2022-05-18 RX ADMIN — OXCARBAZEPINE 450 MG: 150 TABLET, FILM COATED ORAL at 08:05

## 2022-05-18 RX ADMIN — Medication 1 CAPSULE: at 12:05

## 2022-05-18 RX ADMIN — APIXABAN 5 MG: 5 TABLET, FILM COATED ORAL at 09:05

## 2022-05-18 RX ADMIN — METOPROLOL SUCCINATE 25 MG: 25 TABLET, FILM COATED, EXTENDED RELEASE ORAL at 09:05

## 2022-05-18 RX ADMIN — NYSTATIN 500000 UNITS: 100000 SUSPENSION ORAL at 12:05

## 2022-05-18 RX ADMIN — POLYETHYLENE GLYCOL 3350 17 G: 17 POWDER, FOR SOLUTION ORAL at 09:05

## 2022-05-18 RX ADMIN — OXCARBAZEPINE 450 MG: 150 TABLET, FILM COATED ORAL at 09:05

## 2022-05-18 RX ADMIN — PANTOPRAZOLE SODIUM 40 MG: 40 TABLET, DELAYED RELEASE ORAL at 09:05

## 2022-05-18 RX ADMIN — MUPIROCIN: 20 OINTMENT TOPICAL at 09:05

## 2022-05-18 RX ADMIN — MEMANTINE 10 MG: 5 TABLET ORAL at 08:05

## 2022-05-18 RX ADMIN — FUROSEMIDE 40 MG: 40 TABLET ORAL at 09:05

## 2022-05-18 RX ADMIN — Medication 1 CAPSULE: at 09:05

## 2022-05-18 RX ADMIN — FLUCONAZOLE 200 MG: 100 TABLET ORAL at 09:05

## 2022-05-18 NOTE — PROGRESS NOTES
[facility]  Adult Nutrition  First Assessment Note         Reason for Assessment  Reason For Assessment: consult  Nutrition Risk Screen: no indicators present  Malnutrition  Is Patient Malnourished: No  Nutrition Diagnosis  Increased protein Needs   related to Decreased/ impaired skin integrity as evidenced by wounds    Nutrition Diagnosis Status: New      Nutrition Risk  Level of Risk/Frequency of Follow-up: high   Chewing or Swallowing Difficulty?: Swallowing difficulty  Estimated/Assessed Needs  RMR (Venus-St. Jeor Equation): 1604 Activity Factor: 1 Injury Factor: 1.2     Temp: 97.9 °F (36.6 °C)Axillary  Weight Used For Calorie Calculations: 98.8 kg (217 lb 13 oz)   Energy Need Method: Venus-St Jeor Energy Calorie Requirements (kcal): 1925  Weight Used For Protein Calculations: 67 kg (147 lb 11.3 oz)  Protein Requirements:        RDA Method (mL): 1925     Nutrition Prescription / Recommendations  Recommendation/Intervention: continue pureed diet; will add glucerna and epi to all meal trays to  assist in meeting est nutr needs and wound healing  Goals: pt will  meet est nutr needs; wound healing  Nutrition Goal Status: new  Current Diet Order: pureed  Nutrition Order Comments: 50% meal intake documented  Recommended Diet: Puree  Recommended Oral Supplement: Epi [90 kcals, 2.5g Protein, 10g Carbs(3g Sugar), 7g L-Arginine, 7g L-Glutamine, Vitamin C 300mg, 9.5mg Zinc] three times daily and Glucerna Shake [220 kcals, 10g Protein, 26g Carbs(4g Fiber, 7g Sugar), 9g Fat] three times daily  Is Nutrition Support Recommended: No  Is Education Recommended: No  Monitor and Evaluation  % current Intake: P.O. intake of 50 - 75 %  % intake to meet estimated needs: 75 - 100 %  Food and Nutrient Intake: energy intake, food and beverage intake  Food and Nutrient Adminstration: diet order  Anthropometric Measurements: height/length, weight, weight change, body mass index  Biochemical Data, Medical Tests  "and Procedures: electrolyte and renal panel, glucose/endocrine profile  Nutrition-Focused Physical Findings: skin  Oral Calories (kcal): 1200  Oral Protein (gm): 50  Energy Calories Required: not meeting needs  Protein Required: not meeting needs  Tolerance: tolerating  Current Medical Diagnosis and Past Medical History  Diagnosis: psychological disorder, diabetes diagnosis/complications  Past Medical History:   Diagnosis Date    Alzheimer's disease     Bipolar disorder     BPH (benign prostatic hyperplasia)     Diabetes mellitus     Hyperlipidemia     Hypertension     Hypothyroidism     Idiopathic orofacial dystonia     Iron deficiency anemia secondary to blood loss (chronic)     Mild intellectual disabilities     Obesity     Pressure ulcer     Schizophrenia      Nutrition/Diet History  Food Allergies: NKFA  Lab/Procedures/Meds  Recent Labs   Lab 05/18/22  0403   *   K 4.0   BUN 9   CREATININE 0.61*   GLU 83   CALCIUM 9.0   ALBUMIN 2.7*   CL 92*   ALT 18   AST 23     Last A1c:   Lab Results   Component Value Date    HGBA1C 6.5 03/02/2022     Lab Results   Component Value Date    RBC 3.91 (L) 03/08/2022    HGB 11.1 (L) 03/08/2022    HCT 34.8 (L) 03/08/2022    MCV 89.0 03/08/2022    MCH 28.4 03/08/2022    MCHC 31.9 (L) 03/08/2022    TIBC 174 (L) 12/09/2021     Pertinent Labs Reviewed: reviewed  Pertinent Labs Comments: Na 128, Alb 2.7, Hct 34.8  Pertinent Medications Reviewed: reviewed  Pertinent Medications Comments: insulin, lasix, MVI  Anthropometrics  Temp: 97.9 °F (36.6 °C)  Height Method: Stated  Height: 5' 4" (162.6 cm)  Height (inches): 64 in  Weight Method: Bed Scale  Weight: 98.8 kg (217 lb 13 oz)  Weight (lb): 217.82 lb  Ideal Body Weight (IBW), Male: 130 lb  % Ideal Body Weight, Male (lb): 167.55 %  BMI (Calculated): 37.4  BMI Grade: 35 - 39.9 - obesity - grade II     Nutrition by Nursing  Diet/Nutrition Received: consistent carb/diabetic diet  Intake (%): 50%  Diet/Feeding Assistance: " total feed  Diet/Feeding Tolerance: good  Last Bowel Movement: 05/18/22              Nutrition Follow-Up  RD Follow-up?: Yes  Assessment and Plan  No new Assessment & Plan notes have been filed under this hospital service since the last note was generated.  Service: Nutrition

## 2022-05-18 NOTE — SUBJECTIVE & OBJECTIVE
Interval History: Patient speaks    Review of Systems   Constitutional:  Negative for fatigue and fever.   Respiratory:  Negative for cough and shortness of breath.    Cardiovascular:  Negative for chest pain.   Gastrointestinal:  Negative for abdominal distention.   Skin:  Positive for wound.   Objective:     Vital Signs (Most Recent):  Temp: 97.9 °F (36.6 °C) (05/18/22 0800)  Pulse: 63 (05/18/22 0800)  Resp: 18 (05/18/22 0800)  BP: (!) 147/74 (05/18/22 0800)  SpO2: 99 % (05/18/22 0800)   Vital Signs (24h Range):  Temp:  [96.6 °F (35.9 °C)-98.7 °F (37.1 °C)] 97.9 °F (36.6 °C)  Pulse:  [56-69] 63  Resp:  [14-18] 18  SpO2:  [98 %-100 %] 99 %  BP: (145-173)/(69-88) 147/74     Weight: 98.8 kg (217 lb 13 oz)  Body mass index is 37.39 kg/m².    Intake/Output Summary (Last 24 hours) at 5/18/2022 1044  Last data filed at 5/17/2022 2000  Gross per 24 hour   Intake --   Output 1050 ml   Net -1050 ml      Physical Exam  Vitals and nursing note reviewed.   Constitutional:       Appearance: Normal appearance.   Cardiovascular:      Rate and Rhythm: Regular rhythm.      Heart sounds: Normal heart sounds.   Pulmonary:      Breath sounds: Normal breath sounds.   Abdominal:      Palpations: Abdomen is soft.   Skin:     Findings: Lesion present.       Significant Labs: All pertinent labs within the past 24 hours have been reviewed.  BMP:   Recent Labs   Lab 05/18/22  0403   GLU 83   *   K 4.0   CL 92*   CO2 29   BUN 9   CREATININE 0.61*   CALCIUM 9.0     CBC: No results for input(s): WBC, HGB, HCT, PLT in the last 48 hours.    Significant Imaging:  none

## 2022-05-18 NOTE — ASSESSMENT & PLAN NOTE
05/18  Hold eliquis for 48 hours prior to lovenox, can bridge with theraputic lovenox if needed  Diverting colostomy timing per dr joseph  Will update alok [phone]

## 2022-05-18 NOTE — SUBJECTIVE & OBJECTIVE
No current facility-administered medications on file prior to encounter.     Current Outpatient Medications on File Prior to Encounter   Medication Sig    apixaban (ELIQUIS) 5 mg Tab Take 5 mg by mouth 2 (two) times daily.    calcium carbonate/vitamin D3 (CALTRATE 600 + D ORAL) Take 1 tablet by mouth 2 (two) times daily.    furosemide (LASIX) 40 MG tablet Take 40 mg by mouth once daily. Hold for SBP < 110 or DBP < 60    HYDROcodone-acetaminophen (NORCO) 7.5-325 mg per tablet Take 1 tablet by mouth 2 (two) times a day. Hold if increased drowsiness or O2 sat < 95%    HYDROcodone-acetaminophen (NORCO) 7.5-325 mg per tablet Take 1 tablet by mouth once daily. 30 minutes before wound care    insulin detemir U-100 (LEVEMIR) 100 unit/mL injection Inject 15 Units into the skin every evening.    insulin lispro 100 unit/mL injection Inject 1-5 Units into the skin 3 (three) times daily before meals. 201-250 = 1 unit, 251-300 = 2 units, 351-400 = 4 units, > 400 = 5 units    lactulose (CHRONULAC) 10 gram/15 mL solution Take 20 g by mouth once daily.    levothyroxine (SYNTHROID) 50 MCG tablet Take 1 tablet (50 mcg total) by mouth before breakfast.    losartan (COZAAR) 50 MG tablet Take 50 mg by mouth once daily. Hold for SBP < 110 or DBP < 60    memantine (NAMENDA) 10 MG Tab Take 10 mg by mouth nightly.    metoprolol succinate (TOPROL-XL) 25 MG 24 hr tablet Take 1 tablet by mouth once daily. Hold for SBP < 110 or DBP < 60 or pulse < 50    OXcarbazepine (TRILEPTAL) 150 MG Tab Take 450 mg by mouth 2 (two) times daily.    pantoprazole (PROTONIX) 40 MG tablet Take 1 tablet by mouth once daily.    polyethylene glycol (GLYCOLAX) 17 gram PwPk Take 17 g by mouth 2 (two) times a day.    risperiDONE (RISPERDAL) 1 MG tablet Take 1 mg by mouth 2 (two) times daily.    SITagliptin (JANUVIA) 50 MG Tab Take 1 tablet (50 mg total) by mouth once daily.    tamsulosin (FLOMAX) 0.4 mg Cap Take 1 capsule by mouth once daily.       Review of patient's  allergies indicates:   Allergen Reactions    Metformin     Mycobacterium tuberculosis (tuberculin ppd)     Norvasc [amlodipine]        Past Medical History:   Diagnosis Date    Alzheimer's disease     Bipolar disorder     BPH (benign prostatic hyperplasia)     Diabetes mellitus     Hyperlipidemia     Hypertension     Hypothyroidism     Idiopathic orofacial dystonia     Iron deficiency anemia secondary to blood loss (chronic)     Mild intellectual disabilities     Obesity     Pressure ulcer     Schizophrenia      No past surgical history on file.  Family History       Family history is unknown by patient.          Tobacco Use    Smoking status: Never Smoker    Smokeless tobacco: Never Used   Substance and Sexual Activity    Alcohol use: Not Currently    Drug use: Never    Sexual activity: Not Currently     Review of Systems   Unable to perform ROS: Dementia   Objective:     Vital Signs (Most Recent):  Temp: 97.3 °F (36.3 °C) (05/18/22 1200)  Pulse: 75 (05/18/22 1200)  Resp: 18 (05/18/22 1200)  BP: (!) 163/78 (05/18/22 1200)  SpO2: 98 % (05/18/22 1200)   Vital Signs (24h Range):  Temp:  [96.8 °F (36 °C)-98.7 °F (37.1 °C)] 97.3 °F (36.3 °C)  Pulse:  [56-75] 75  Resp:  [14-18] 18  SpO2:  [98 %-100 %] 98 %  BP: (145-173)/(69-84) 163/78     Weight: 98.8 kg (217 lb 13 oz)  Body mass index is 37.39 kg/m².    Physical Exam  Vitals and nursing note reviewed. Exam conducted with a chaperone present.   Constitutional:       Appearance: He is ill-appearing.   HENT:      Head: Normocephalic.      Mouth/Throat:      Mouth: Mucous membranes are moist.   Pulmonary:      Effort: Pulmonary effort is normal.   Abdominal:      Palpations: Abdomen is soft.      Tenderness: There is no abdominal tenderness.   Genitourinary:     Comments: anne  Musculoskeletal:      Comments: Bilateral foot drop  Limited ROM   Skin:     General: Skin is warm and dry.   Neurological:      Mental Status: He is disoriented.      Comments: Verbalizes very  little        Significant Labs:  I have reviewed all pertinent lab results within the past 24 hours.  CBC: No results for input(s): WBC, RBC, HGB, HCT, PLT, MCV, MCH, MCHC in the last 168 hours.  BMP:   Recent Labs   Lab 05/18/22  0403   GLU 83   *   K 4.0   CL 92*   CO2 29   BUN 9   CREATININE 0.61*   CALCIUM 9.0     CMP:   Recent Labs   Lab 05/18/22  0403   GLU 83   CALCIUM 9.0   ALBUMIN 2.7*   PROT 6.3*   *   K 4.0   CO2 29   CL 92*   BUN 9   CREATININE 0.61*   ALKPHOS 90   ALT 18   AST 23   BILITOT 0.2     LFTs:   Recent Labs   Lab 05/18/22  0403   ALT 18   AST 23   ALKPHOS 90   BILITOT 0.2   PROT 6.3*   ALBUMIN 2.7*     Coagulation: No results for input(s): LABPROT, INR, APTT in the last 168 hours.    Significant Diagnostics:  I have reviewed all pertinent imaging results/findings within the past 24 hours.

## 2022-05-18 NOTE — PROGRESS NOTES
[facility]  Progress Note    Patient Name: Bhargav Gao  MRN: [id number]  Patient Class: IP- Inpatient   Admission Date: 5/16/2022  Length of Stay: 2 days  Attending Physician: Donato Whitlock MD  Primary Care Provider: Kerry Lin MD        Subjective:     Principal Problem:Sacral wound        HPI:  Mr. Bhargav Gao is an 82 yo AAM resident of [facility] with medical history of hypertension, diabetes mellitus, hypothyroidism, cataracts, DVT, schizophrenia, mild intellectual disabilities, drug induced subacute dyskinesia, bipolar disorder and sacral wound. Mr. Gao was sent for admission due to sacral wound. Mr. Gao can say his name but he is unable to provide any further info. Records show wound culture from 05/03 grew ecoli, acinetobacter baumannii ,K. Pneumoniae, MRSA and e. Faecalis. Was given 7 days of zyvox and invanz. Acinetobacter not sensitive to ertapenem. Sent to [facility] for IV abx and wound care.      Overview/Hospital Course:  05/17 Patient without complaints. Sacral ulcer clean but gets contaminated with stool. Will speak with family about colostomy.  5/18 Spoke with patient's niece. She is agreeable to colostomy. Will consult Dr. Felix      Interval History: Patient speaks    Review of Systems   Constitutional:  Negative for fatigue and fever.   Respiratory:  Negative for cough and shortness of breath.    Cardiovascular:  Negative for chest pain.   Gastrointestinal:  Negative for abdominal distention.   Skin:  Positive for wound.   Objective:     Vital Signs (Most Recent):  Temp: 97.9 °F (36.6 °C) (05/18/22 0800)  Pulse: 63 (05/18/22 0800)  Resp: 18 (05/18/22 0800)  BP: (!) 147/74 (05/18/22 0800)  SpO2: 99 % (05/18/22 0800)   Vital Signs (24h Range):  Temp:  [96.6 °F (35.9 °C)-98.7 °F (37.1 °C)] 97.9 °F (36.6 °C)  Pulse:  [56-69] 63  Resp:  [14-18] 18  SpO2:  [98 %-100 %] 99 %  BP: (145-173)/(69-88) 147/74     Weight: 98.8 kg (217 lb  13 oz)  Body mass index is 37.39 kg/m².    Intake/Output Summary (Last 24 hours) at 5/18/2022 1044  Last data filed at 5/17/2022 2000  Gross per 24 hour   Intake --   Output 1050 ml   Net -1050 ml      Physical Exam  Vitals and nursing note reviewed.   Constitutional:       Appearance: Normal appearance.   Cardiovascular:      Rate and Rhythm: Regular rhythm.      Heart sounds: Normal heart sounds.   Pulmonary:      Breath sounds: Normal breath sounds.   Abdominal:      Palpations: Abdomen is soft.   Skin:     Findings: Lesion present.       Significant Labs: All pertinent labs within the past 24 hours have been reviewed.  BMP:   Recent Labs   Lab 05/18/22  0403   GLU 83   *   K 4.0   CL 92*   CO2 29   BUN 9   CREATININE 0.61*   CALCIUM 9.0     CBC: No results for input(s): WBC, HGB, HCT, PLT in the last 48 hours.    Significant Imaging:  none      Assessment/Plan:      * Sacral wound  05/16  -records show wound culture that grew  Culture, Wound [[id number]] (Abnormal)  Collected: 05/05/22 0920   Order Status: Completed Specimen: Wound from Sacral Updated: 05/11/22 1318    Culture, Wound/Abscess Light Growth Acinetobacter baumannii complex/haemolyticus Abnormal     Comment: Corrected result: Previously reported as Gram-negative Bacilli on 5/9/2022 at 1312 CDT.        Light Growth Escherichia coli Abnormal      Light Growth Klebsiella pneumoniae Abnormal     Comment: Corrected result: Previously reported as Gram-negative Bacilli on 5/8/2022 at 0843 CDT.        Heavy Growth Methicillin resistant Staphylococcus aureus Abnormal     Comment: Corrected result: Previously reported as Gram-positive Cocci on 5/8/2022 at 0843 CDT.   Gram-positive Cocci has been updated to reportable.        Heavy Growth Enterococcus faecalis Abnormal      Was treated with invanz and zyvox for 7 days  Acinetobacter not sensitive to invanz and zyvox    -ID consult placed for antibiotic recommendations    Hyponatremia  05/16   -records show  chlorthalidone was recently discontinued   -NA is 126      Hypertension  05/16  -continue home meds of losartan and metoprolol    Diabetes mellitus  05/16  -continue home dose of levemir 15 units daily with low dose SSI coverage  -accuchecks ac and hs   -a1c on 03/02 was 6.5        Alzheimer's disease    05/16 continue namenda      VTE Risk Mitigation (From admission, onward)         Ordered     apixaban tablet 5 mg  2 times daily         05/16/22 1202     IP VTE HIGH RISK PATIENT  Once         05/16/22 1152     Place sequential compression device  Until discontinued         05/16/22 1152                Discharge Planning   YOSSI:      Code Status: DNR   Is the patient medically ready for discharge?:     Reason for patient still in hospital (select all that apply): Other (specify) Wound care. Surg consult  Discharge Plan A: Return to nursing home   Discharge Delays: None known at this time              Donato Whitlock MD  Department of Hospital Medicine   [facility]

## 2022-05-18 NOTE — HPI
Nursing home resident  admitted with sacral wound for iv abx, incontinent bedbound with DM, HTN, Schizophrenia, DVT on eliquis  General surgery consulted for diverting colostomy

## 2022-05-19 LAB
ALBUMIN SERPL BCP-MCNC: 2.7 G/DL (ref 3.5–5)
ALBUMIN/GLOB SERPL: 0.7 {RATIO}
ALP SERPL-CCNC: 89 U/L (ref 45–115)
ALT SERPL W P-5'-P-CCNC: 19 U/L (ref 16–61)
ANION GAP SERPL CALCULATED.3IONS-SCNC: 17 MMOL/L (ref 7–16)
AST SERPL W P-5'-P-CCNC: 36 U/L (ref 15–37)
BILIRUB SERPL-MCNC: 0.3 MG/DL (ref 0–1.2)
BUN SERPL-MCNC: 9 MG/DL (ref 7–18)
BUN/CREAT SERPL: 14 (ref 6–20)
CALCIUM SERPL-MCNC: 8.2 MG/DL (ref 8.5–10.1)
CHLORIDE SERPL-SCNC: 96 MMOL/L (ref 98–107)
CO2 SERPL-SCNC: 20 MMOL/L (ref 21–32)
CREAT SERPL-MCNC: 0.64 MG/DL (ref 0.7–1.3)
GLOBULIN SER-MCNC: 4 G/DL (ref 2–4)
GLUCOSE SERPL-MCNC: 103 MG/DL (ref 74–106)
GLUCOSE SERPL-MCNC: 104 MG/DL (ref 70–105)
GLUCOSE SERPL-MCNC: 117 MG/DL (ref 70–105)
GLUCOSE SERPL-MCNC: 136 MG/DL (ref 70–105)
GLUCOSE SERPL-MCNC: 150 MG/DL (ref 70–105)
GLUCOSE SERPL-MCNC: 183 MG/DL (ref 70–105)
GLUCOSE SERPL-MCNC: 193 MG/DL (ref 70–105)
POTASSIUM SERPL-SCNC: 4.8 MMOL/L (ref 3.5–5.1)
PROT SERPL-MCNC: 6.7 G/DL (ref 6.4–8.2)
SODIUM SERPL-SCNC: 128 MMOL/L (ref 136–145)

## 2022-05-19 PROCEDURE — 25000003 PHARM REV CODE 250: Performed by: FAMILY MEDICINE

## 2022-05-19 PROCEDURE — 63600175 PHARM REV CODE 636 W HCPCS: Performed by: NURSE PRACTITIONER

## 2022-05-19 PROCEDURE — 25000003 PHARM REV CODE 250: Performed by: NURSE PRACTITIONER

## 2022-05-19 PROCEDURE — C9399 UNCLASSIFIED DRUGS OR BIOLOG: HCPCS | Performed by: NURSE PRACTITIONER

## 2022-05-19 PROCEDURE — 82962 GLUCOSE BLOOD TEST: CPT

## 2022-05-19 PROCEDURE — 63600175 PHARM REV CODE 636 W HCPCS: Performed by: FAMILY MEDICINE

## 2022-05-19 PROCEDURE — 80053 COMPREHEN METABOLIC PANEL: CPT | Performed by: NURSE PRACTITIONER

## 2022-05-19 PROCEDURE — 99232 PR SUBSEQUENT HOSPITAL CARE,LEVL II: ICD-10-PCS | Mod: ,,, | Performed by: FAMILY MEDICINE

## 2022-05-19 PROCEDURE — 63700000 PHARM REV CODE 250 ALT 637 W/O HCPCS: Performed by: FAMILY MEDICINE

## 2022-05-19 PROCEDURE — 25000003 PHARM REV CODE 250: Performed by: INTERNAL MEDICINE

## 2022-05-19 PROCEDURE — 11000001 HC ACUTE MED/SURG PRIVATE ROOM

## 2022-05-19 PROCEDURE — 99232 SBSQ HOSP IP/OBS MODERATE 35: CPT | Mod: ,,, | Performed by: FAMILY MEDICINE

## 2022-05-19 PROCEDURE — 36415 COLL VENOUS BLD VENIPUNCTURE: CPT

## 2022-05-19 RX ADMIN — ENOXAPARIN SODIUM 40 MG: 40 INJECTION SUBCUTANEOUS at 06:05

## 2022-05-19 RX ADMIN — FUROSEMIDE 40 MG: 40 TABLET ORAL at 08:05

## 2022-05-19 RX ADMIN — OXCARBAZEPINE 450 MG: 150 TABLET, FILM COATED ORAL at 08:05

## 2022-05-19 RX ADMIN — Medication 1 CAPSULE: at 12:05

## 2022-05-19 RX ADMIN — FLUCONAZOLE 200 MG: 100 TABLET ORAL at 08:05

## 2022-05-19 RX ADMIN — MUPIROCIN: 20 OINTMENT TOPICAL at 08:05

## 2022-05-19 RX ADMIN — INSULIN DETEMIR 15 UNITS: 100 INJECTION, SOLUTION SUBCUTANEOUS at 09:05

## 2022-05-19 RX ADMIN — POLYETHYLENE GLYCOL 3350 17 G: 17 POWDER, FOR SOLUTION ORAL at 08:05

## 2022-05-19 RX ADMIN — NYSTATIN 500000 UNITS: 100000 SUSPENSION ORAL at 12:05

## 2022-05-19 RX ADMIN — HYDROCODONE BITARTRATE AND ACETAMINOPHEN 1 TABLET: 5; 325 TABLET ORAL at 10:05

## 2022-05-19 RX ADMIN — NYSTATIN 500000 UNITS: 100000 SUSPENSION ORAL at 08:05

## 2022-05-19 RX ADMIN — RISPERIDONE 1 MG: 1 TABLET ORAL at 08:05

## 2022-05-19 RX ADMIN — LEVOTHYROXINE SODIUM 50 MCG: 50 TABLET ORAL at 05:05

## 2022-05-19 RX ADMIN — NYSTATIN 500000 UNITS: 100000 SUSPENSION ORAL at 06:05

## 2022-05-19 RX ADMIN — THERA TABS 1 TABLET: TAB at 08:05

## 2022-05-19 RX ADMIN — PANTOPRAZOLE SODIUM 40 MG: 40 TABLET, DELAYED RELEASE ORAL at 08:05

## 2022-05-19 RX ADMIN — LOSARTAN POTASSIUM 50 MG: 50 TABLET, FILM COATED ORAL at 08:05

## 2022-05-19 RX ADMIN — Medication 1 CAPSULE: at 08:05

## 2022-05-19 RX ADMIN — TAMSULOSIN HYDROCHLORIDE 0.4 MG: 0.4 CAPSULE ORAL at 08:05

## 2022-05-19 RX ADMIN — Medication 1 CAPSULE: at 06:05

## 2022-05-19 RX ADMIN — MEMANTINE 10 MG: 5 TABLET ORAL at 08:05

## 2022-05-19 RX ADMIN — METOPROLOL SUCCINATE 25 MG: 25 TABLET, FILM COATED, EXTENDED RELEASE ORAL at 08:05

## 2022-05-19 RX ADMIN — LACTULOSE 20 G: 20 SOLUTION ORAL at 08:05

## 2022-05-20 LAB
ALBUMIN SERPL BCP-MCNC: 3 G/DL (ref 3.5–5)
ALBUMIN/GLOB SERPL: 0.8 {RATIO}
ALP SERPL-CCNC: 77 U/L (ref 45–115)
ALT SERPL W P-5'-P-CCNC: 26 U/L (ref 16–61)
ANION GAP SERPL CALCULATED.3IONS-SCNC: 15 MMOL/L (ref 7–16)
AST SERPL W P-5'-P-CCNC: 22 U/L (ref 15–37)
BILIRUB SERPL-MCNC: 0.1 MG/DL (ref 0–1.2)
BUN SERPL-MCNC: 9 MG/DL (ref 7–18)
BUN/CREAT SERPL: 20 (ref 6–20)
CALCIUM SERPL-MCNC: 9.4 MG/DL (ref 8.5–10.1)
CHLORIDE SERPL-SCNC: 97 MMOL/L (ref 98–107)
CO2 SERPL-SCNC: 26 MMOL/L (ref 21–32)
CREAT SERPL-MCNC: 0.46 MG/DL (ref 0.7–1.3)
GLOBULIN SER-MCNC: 3.8 G/DL (ref 2–4)
GLUCOSE SERPL-MCNC: 112 MG/DL (ref 70–105)
GLUCOSE SERPL-MCNC: 112 MG/DL (ref 74–106)
GLUCOSE SERPL-MCNC: 123 MG/DL (ref 70–105)
GLUCOSE SERPL-MCNC: 159 MG/DL (ref 70–105)
GLUCOSE SERPL-MCNC: 203 MG/DL (ref 70–105)
POTASSIUM SERPL-SCNC: 4.6 MMOL/L (ref 3.5–5.1)
PROT SERPL-MCNC: 6.8 G/DL (ref 6.4–8.2)
SODIUM SERPL-SCNC: 133 MMOL/L (ref 136–145)

## 2022-05-20 PROCEDURE — 63600175 PHARM REV CODE 636 W HCPCS: Performed by: NURSE PRACTITIONER

## 2022-05-20 PROCEDURE — 11000001 HC ACUTE MED/SURG PRIVATE ROOM

## 2022-05-20 PROCEDURE — 36415 COLL VENOUS BLD VENIPUNCTURE: CPT | Performed by: NURSE PRACTITIONER

## 2022-05-20 PROCEDURE — C9399 UNCLASSIFIED DRUGS OR BIOLOG: HCPCS | Performed by: NURSE PRACTITIONER

## 2022-05-20 PROCEDURE — 87070 CULTURE OTHR SPECIMN AEROBIC: CPT | Performed by: NURSE PRACTITIONER

## 2022-05-20 PROCEDURE — 25000003 PHARM REV CODE 250: Performed by: NURSE PRACTITIONER

## 2022-05-20 PROCEDURE — 99232 SBSQ HOSP IP/OBS MODERATE 35: CPT | Mod: ,,, | Performed by: FAMILY MEDICINE

## 2022-05-20 PROCEDURE — 25000003 PHARM REV CODE 250: Performed by: INTERNAL MEDICINE

## 2022-05-20 PROCEDURE — 82962 GLUCOSE BLOOD TEST: CPT

## 2022-05-20 PROCEDURE — 87075 CULTR BACTERIA EXCEPT BLOOD: CPT | Performed by: NURSE PRACTITIONER

## 2022-05-20 PROCEDURE — 25000003 PHARM REV CODE 250: Performed by: FAMILY MEDICINE

## 2022-05-20 PROCEDURE — 63700000 PHARM REV CODE 250 ALT 637 W/O HCPCS: Performed by: FAMILY MEDICINE

## 2022-05-20 PROCEDURE — 99232 PR SUBSEQUENT HOSPITAL CARE,LEVL II: ICD-10-PCS | Mod: ,,, | Performed by: FAMILY MEDICINE

## 2022-05-20 PROCEDURE — 80053 COMPREHEN METABOLIC PANEL: CPT | Performed by: NURSE PRACTITIONER

## 2022-05-20 PROCEDURE — 63600175 PHARM REV CODE 636 W HCPCS: Performed by: FAMILY MEDICINE

## 2022-05-20 RX ADMIN — FLUCONAZOLE 200 MG: 100 TABLET ORAL at 09:05

## 2022-05-20 RX ADMIN — NYSTATIN 500000 UNITS: 100000 SUSPENSION ORAL at 09:05

## 2022-05-20 RX ADMIN — Medication 1 CAPSULE: at 12:05

## 2022-05-20 RX ADMIN — MEMANTINE 10 MG: 5 TABLET ORAL at 09:05

## 2022-05-20 RX ADMIN — FUROSEMIDE 40 MG: 40 TABLET ORAL at 09:05

## 2022-05-20 RX ADMIN — LEVOTHYROXINE SODIUM 50 MCG: 50 TABLET ORAL at 05:05

## 2022-05-20 RX ADMIN — NYSTATIN 500000 UNITS: 100000 SUSPENSION ORAL at 12:05

## 2022-05-20 RX ADMIN — Medication 1 CAPSULE: at 05:05

## 2022-05-20 RX ADMIN — METOPROLOL SUCCINATE 25 MG: 25 TABLET, FILM COATED, EXTENDED RELEASE ORAL at 09:05

## 2022-05-20 RX ADMIN — MUPIROCIN: 20 OINTMENT TOPICAL at 09:05

## 2022-05-20 RX ADMIN — HYDROCODONE BITARTRATE AND ACETAMINOPHEN 1 TABLET: 5; 325 TABLET ORAL at 09:05

## 2022-05-20 RX ADMIN — THERA TABS 1 TABLET: TAB at 09:05

## 2022-05-20 RX ADMIN — Medication 1 CAPSULE: at 09:05

## 2022-05-20 RX ADMIN — ENOXAPARIN SODIUM 40 MG: 40 INJECTION SUBCUTANEOUS at 05:05

## 2022-05-20 RX ADMIN — LOSARTAN POTASSIUM 50 MG: 50 TABLET, FILM COATED ORAL at 09:05

## 2022-05-20 RX ADMIN — INSULIN ASPART 1 UNITS: 100 INJECTION, SOLUTION INTRAVENOUS; SUBCUTANEOUS at 10:05

## 2022-05-20 RX ADMIN — OXCARBAZEPINE 450 MG: 150 TABLET, FILM COATED ORAL at 09:05

## 2022-05-20 RX ADMIN — RISPERIDONE 1 MG: 1 TABLET ORAL at 09:05

## 2022-05-20 RX ADMIN — INSULIN DETEMIR 15 UNITS: 100 INJECTION, SOLUTION SUBCUTANEOUS at 09:05

## 2022-05-20 RX ADMIN — PANTOPRAZOLE SODIUM 40 MG: 40 TABLET, DELAYED RELEASE ORAL at 09:05

## 2022-05-20 RX ADMIN — POLYETHYLENE GLYCOL 3350 17 G: 17 POWDER, FOR SOLUTION ORAL at 09:05

## 2022-05-20 RX ADMIN — TAMSULOSIN HYDROCHLORIDE 0.4 MG: 0.4 CAPSULE ORAL at 09:05

## 2022-05-20 RX ADMIN — NYSTATIN 500000 UNITS: 100000 SUSPENSION ORAL at 05:05

## 2022-05-20 NOTE — ASSESSMENT & PLAN NOTE
05/16  -records show wound culture that grew  Culture, Wound [[id number]] (Abnormal)  Collected: 05/05/22 0920   Order Status: Completed Specimen: Wound from Sacral Updated: 05/11/22 1318    Culture, Wound/Abscess Light Growth Acinetobacter baumannii complex/haemolyticus Abnormal     Comment: Corrected result: Previously reported as Gram-negative Bacilli on 5/9/2022 at 1312 CDT.        Light Growth Escherichia coli Abnormal      Light Growth Klebsiella pneumoniae Abnormal     Comment: Corrected result: Previously reported as Gram-negative Bacilli on 5/8/2022 at 0843 CDT.        Heavy Growth Methicillin resistant Staphylococcus aureus Abnormal     Comment: Corrected result: Previously reported as Gram-positive Cocci on 5/8/2022 at 0843 CDT.   Gram-positive Cocci has been updated to reportable.        Heavy Growth Enterococcus faecalis Abnormal      Was treated with invanz and zyvox for 7 days  Acinetobacter not sensitive to invanz and zyvox    -ID consult placed for antibiotic recommendations    5/19  -plans for colostomy to aid in wound care. Will discuss with surgery. Holding eliquis.

## 2022-05-20 NOTE — PROGRESS NOTES
[facility] - [facility] Medicine  Progress Note    Patient Name: Bhargav Gao  MRN: [id number]  Patient Class: IP- Inpatient   Admission Date: 5/16/2022  Length of Stay: 3 days  Attending Physician: Hipolito Nance Jr., MD  Primary Care Provider: Kerry Lin MD        Subjective:     Principal Problem:Sacral wound        HPI:  Mr. Bhargav Gao is an 82 yo AAM resident of [facility] with medical history of hypertension, diabetes mellitus, hypothyroidism, cataracts, DVT, schizophrenia, mild intellectual disabilities, drug induced subacute dyskinesia, bipolar disorder and sacral wound. Mr. Gao was sent for admission due to sacral wound. Mr. Gao can say his name but he is unable to provide any further info. Records show wound culture from 05/03 grew ecoli, acinetobacter baumannii ,K. Pneumoniae, MRSA and e. Faecalis. Was given 7 days of zyvox and invanz. Acinetobacter not sensitive to ertapenem. Sent to [facility] for IV abx and wound care.      Overview/Hospital Course:  05/17 Patient without complaints. Sacral ulcer clean but gets contaminated with stool. Will speak with family about colostomy.  5/18 Spoke with patient's niece. She is agreeable to colostomy. Will consult Dr. Felix      Interval History: Poorly interactive for me today.     Review of Systems   Unable to perform ROS: Other Sleeping soundly  Objective:     Vital Signs (Most Recent):  Temp: 96.6 °F (35.9 °C) (05/19/22 1600)  Pulse: 75 (05/19/22 1600)  Resp: 19 (05/19/22 1600)  BP: 119/60 (05/19/22 1600)  SpO2: 100 % (05/19/22 1600) Vital Signs (24h Range):  Temp:  [96.6 °F (35.9 °C)-97.9 °F (36.6 °C)] 96.6 °F (35.9 °C)  Pulse:  [53-75] 75  Resp:  [18-19] 19  SpO2:  [94 %-100 %] 100 %  BP: (119-198)/(60-93) 119/60     Weight: 99.3 kg (219 lb)  Body mass index is 37.59 kg/m².    Intake/Output Summary (Last 24 hours) at 5/19/2022 2031  Last data filed at 5/19/2022 1800  Gross per 24 hour   Intake --   Output 625 ml    Net -625 ml      Physical Exam  Vitals reviewed.   Constitutional:       Comments: somnolent   Cardiovascular:      Rate and Rhythm: Normal rate and regular rhythm.      Heart sounds: Normal heart sounds.   Pulmonary:      Effort: Pulmonary effort is normal. No respiratory distress.      Breath sounds: Normal breath sounds.   Abdominal:      General: Abdomen is flat. Bowel sounds are normal. There is no distension.      Palpations: Abdomen is soft.      Tenderness: There is no abdominal tenderness.   Skin:     General: Skin is warm and dry.       Significant Labs: All pertinent labs within the past 24 hours have been reviewed.  BMP:   Recent Labs   Lab 05/19/22  0417      *   K 4.8   CL 96*   CO2 20*   BUN 9   CREATININE 0.64*   CALCIUM 8.2*     CBC: No results for input(s): WBC, HGB, HCT, PLT in the last 48 hours.    Significant Imaging: I have reviewed all pertinent imaging results/findings within the past 24 hours.      Assessment/Plan:      * Sacral wound  05/16  -records show wound culture that grew  Culture, Wound [[id number]] (Abnormal)  Collected: 05/05/22 0920   Order Status: Completed Specimen: Wound from Sacral Updated: 05/11/22 1318    Culture, Wound/Abscess Light Growth Acinetobacter baumannii complex/haemolyticus Abnormal     Comment: Corrected result: Previously reported as Gram-negative Bacilli on 5/9/2022 at 1312 CDT.        Light Growth Escherichia coli Abnormal      Light Growth Klebsiella pneumoniae Abnormal     Comment: Corrected result: Previously reported as Gram-negative Bacilli on 5/8/2022 at 0843 CDT.        Heavy Growth Methicillin resistant Staphylococcus aureus Abnormal     Comment: Corrected result: Previously reported as Gram-positive Cocci on 5/8/2022 at 0843 CDT.   Gram-positive Cocci has been updated to reportable.        Heavy Growth Enterococcus faecalis Abnormal      Was treated with invanz and zyvox for 7 days  Acinetobacter not sensitive to invanz and zyvox    -ID  consult placed for antibiotic recommendations    5/19  -plans for colostomy to aid in wound care. Will discuss with surgery. Holding eliquis.     Hyponatremia  Up to 128 from 126. Chlorthalidone recently d/david.       Hypertension  05/16  -continue home meds of losartan and metoprolol    Diabetes mellitus  05/16  -continue home dose of levemir 15 units daily with low dose SSI coverage  -accuchecks ac and hs   -a1c on 03/02 was 6.5        Alzheimer's disease    05/16 continue namenda      VTE Risk Mitigation (From admission, onward)         Ordered     enoxaparin injection 40 mg  Daily         05/18/22 1457     IP VTE HIGH RISK PATIENT  Once         05/16/22 1152     Place sequential compression device  Until discontinued         05/16/22 1152                Discharge Planning   YOSSI:      Code Status: DNR   Is the patient medically ready for discharge?:     Reason for patient still in hospital (select all that apply): Treatment  Discharge Plan A: Return to nursing home   Discharge Delays: None known at this time              Hipolito Nance Jr, MD  Department of Hospital Medicine   [facility]

## 2022-05-20 NOTE — SUBJECTIVE & OBJECTIVE
Interval History: Poorly interactive for me today.     Review of Systems   Unable to perform ROS: Other Sleeping soundly  Objective:     Vital Signs (Most Recent):  Temp: 96.6 °F (35.9 °C) (05/19/22 1600)  Pulse: 75 (05/19/22 1600)  Resp: 19 (05/19/22 1600)  BP: 119/60 (05/19/22 1600)  SpO2: 100 % (05/19/22 1600) Vital Signs (24h Range):  Temp:  [96.6 °F (35.9 °C)-97.9 °F (36.6 °C)] 96.6 °F (35.9 °C)  Pulse:  [53-75] 75  Resp:  [18-19] 19  SpO2:  [94 %-100 %] 100 %  BP: (119-198)/(60-93) 119/60     Weight: 99.3 kg (219 lb)  Body mass index is 37.59 kg/m².    Intake/Output Summary (Last 24 hours) at 5/19/2022 2031  Last data filed at 5/19/2022 1800  Gross per 24 hour   Intake --   Output 625 ml   Net -625 ml      Physical Exam  Vitals reviewed.   Constitutional:       Comments: somnolent   Cardiovascular:      Rate and Rhythm: Normal rate and regular rhythm.      Heart sounds: Normal heart sounds.   Pulmonary:      Effort: Pulmonary effort is normal. No respiratory distress.      Breath sounds: Normal breath sounds.   Abdominal:      General: Abdomen is flat. Bowel sounds are normal. There is no distension.      Palpations: Abdomen is soft.      Tenderness: There is no abdominal tenderness.   Skin:     General: Skin is warm and dry.       Significant Labs: All pertinent labs within the past 24 hours have been reviewed.  BMP:   Recent Labs   Lab 05/19/22  0417      *   K 4.8   CL 96*   CO2 20*   BUN 9   CREATININE 0.64*   CALCIUM 8.2*     CBC: No results for input(s): WBC, HGB, HCT, PLT in the last 48 hours.    Significant Imaging: I have reviewed all pertinent imaging results/findings within the past 24 hours.

## 2022-05-20 NOTE — NURSING
Wound care note: green drainage noted on old dressing from sacral. Wound cultures ordered per LIEN Parmar.

## 2022-05-21 PROBLEM — E87.1 HYPONATREMIA: Status: RESOLVED | Noted: 2022-05-16 | Resolved: 2022-05-21

## 2022-05-21 LAB
ALBUMIN SERPL BCP-MCNC: 3.2 G/DL (ref 3.5–5)
ALBUMIN/GLOB SERPL: 0.8 {RATIO}
ALP SERPL-CCNC: 79 U/L (ref 45–115)
ALT SERPL W P-5'-P-CCNC: 29 U/L (ref 16–61)
ANION GAP SERPL CALCULATED.3IONS-SCNC: 17 MMOL/L (ref 7–16)
AST SERPL W P-5'-P-CCNC: 21 U/L (ref 15–37)
BILIRUB SERPL-MCNC: 0.2 MG/DL (ref 0–1.2)
BUN SERPL-MCNC: 15 MG/DL (ref 7–18)
BUN/CREAT SERPL: 27 (ref 6–20)
CALCIUM SERPL-MCNC: 9.7 MG/DL (ref 8.5–10.1)
CHLORIDE SERPL-SCNC: 97 MMOL/L (ref 98–107)
CO2 SERPL-SCNC: 27 MMOL/L (ref 21–32)
CREAT SERPL-MCNC: 0.56 MG/DL (ref 0.7–1.3)
GLOBULIN SER-MCNC: 3.9 G/DL (ref 2–4)
GLUCOSE SERPL-MCNC: 105 MG/DL (ref 74–106)
GLUCOSE SERPL-MCNC: 110 MG/DL (ref 70–105)
GLUCOSE SERPL-MCNC: 148 MG/DL (ref 70–105)
GLUCOSE SERPL-MCNC: 158 MG/DL (ref 70–105)
GLUCOSE SERPL-MCNC: 170 MG/DL (ref 70–105)
GLUCOSE SERPL-MCNC: 214 MG/DL (ref 70–105)
POTASSIUM SERPL-SCNC: 4.3 MMOL/L (ref 3.5–5.1)
PROT SERPL-MCNC: 7.1 G/DL (ref 6.4–8.2)
SODIUM SERPL-SCNC: 137 MMOL/L (ref 136–145)

## 2022-05-21 PROCEDURE — 84075 ASSAY ALKALINE PHOSPHATASE: CPT | Performed by: NURSE PRACTITIONER

## 2022-05-21 PROCEDURE — 25000003 PHARM REV CODE 250: Performed by: FAMILY MEDICINE

## 2022-05-21 PROCEDURE — 63600175 PHARM REV CODE 636 W HCPCS: Performed by: FAMILY MEDICINE

## 2022-05-21 PROCEDURE — 82040 ASSAY OF SERUM ALBUMIN: CPT | Performed by: NURSE PRACTITIONER

## 2022-05-21 PROCEDURE — 36415 COLL VENOUS BLD VENIPUNCTURE: CPT | Performed by: NURSE PRACTITIONER

## 2022-05-21 PROCEDURE — 63600175 PHARM REV CODE 636 W HCPCS: Performed by: NURSE PRACTITIONER

## 2022-05-21 PROCEDURE — 25000003 PHARM REV CODE 250: Performed by: NURSE PRACTITIONER

## 2022-05-21 PROCEDURE — 11000001 HC ACUTE MED/SURG PRIVATE ROOM

## 2022-05-21 PROCEDURE — 25000003 PHARM REV CODE 250: Performed by: INTERNAL MEDICINE

## 2022-05-21 PROCEDURE — 82962 GLUCOSE BLOOD TEST: CPT

## 2022-05-21 PROCEDURE — 99232 SBSQ HOSP IP/OBS MODERATE 35: CPT | Mod: ,,, | Performed by: FAMILY MEDICINE

## 2022-05-21 PROCEDURE — C9399 UNCLASSIFIED DRUGS OR BIOLOG: HCPCS | Performed by: NURSE PRACTITIONER

## 2022-05-21 PROCEDURE — 63700000 PHARM REV CODE 250 ALT 637 W/O HCPCS: Performed by: FAMILY MEDICINE

## 2022-05-21 PROCEDURE — 36415 COLL VENOUS BLD VENIPUNCTURE: CPT | Performed by: FAMILY MEDICINE

## 2022-05-21 PROCEDURE — 80053 COMPREHEN METABOLIC PANEL: CPT | Performed by: NURSE PRACTITIONER

## 2022-05-21 PROCEDURE — 99232 PR SUBSEQUENT HOSPITAL CARE,LEVL II: ICD-10-PCS | Mod: ,,, | Performed by: FAMILY MEDICINE

## 2022-05-21 RX ADMIN — OXCARBAZEPINE 450 MG: 150 TABLET, FILM COATED ORAL at 10:05

## 2022-05-21 RX ADMIN — NYSTATIN 500000 UNITS: 100000 SUSPENSION ORAL at 02:05

## 2022-05-21 RX ADMIN — INSULIN DETEMIR 15 UNITS: 100 INJECTION, SOLUTION SUBCUTANEOUS at 10:05

## 2022-05-21 RX ADMIN — RISPERIDONE 1 MG: 1 TABLET ORAL at 10:05

## 2022-05-21 RX ADMIN — HYDROCODONE BITARTRATE AND ACETAMINOPHEN 1 TABLET: 5; 325 TABLET ORAL at 10:05

## 2022-05-21 RX ADMIN — RISPERIDONE 1 MG: 1 TABLET ORAL at 09:05

## 2022-05-21 RX ADMIN — FLUCONAZOLE 200 MG: 100 TABLET ORAL at 10:05

## 2022-05-21 RX ADMIN — LOSARTAN POTASSIUM 50 MG: 50 TABLET, FILM COATED ORAL at 10:05

## 2022-05-21 RX ADMIN — Medication 1 CAPSULE: at 04:05

## 2022-05-21 RX ADMIN — POLYETHYLENE GLYCOL 3350 17 G: 17 POWDER, FOR SOLUTION ORAL at 09:05

## 2022-05-21 RX ADMIN — LEVOTHYROXINE SODIUM 50 MCG: 50 TABLET ORAL at 06:05

## 2022-05-21 RX ADMIN — OXCARBAZEPINE 450 MG: 150 TABLET, FILM COATED ORAL at 09:05

## 2022-05-21 RX ADMIN — NYSTATIN 500000 UNITS: 100000 SUSPENSION ORAL at 10:05

## 2022-05-21 RX ADMIN — POLYETHYLENE GLYCOL 3350 17 G: 17 POWDER, FOR SOLUTION ORAL at 10:05

## 2022-05-21 RX ADMIN — THERA TABS 1 TABLET: TAB at 10:05

## 2022-05-21 RX ADMIN — LACTULOSE 20 G: 20 SOLUTION ORAL at 10:05

## 2022-05-21 RX ADMIN — METOPROLOL SUCCINATE 25 MG: 25 TABLET, FILM COATED, EXTENDED RELEASE ORAL at 10:05

## 2022-05-21 RX ADMIN — Medication 1 CAPSULE: at 10:05

## 2022-05-21 RX ADMIN — TAMSULOSIN HYDROCHLORIDE 0.4 MG: 0.4 CAPSULE ORAL at 10:05

## 2022-05-21 RX ADMIN — PANTOPRAZOLE SODIUM 40 MG: 40 TABLET, DELAYED RELEASE ORAL at 10:05

## 2022-05-21 RX ADMIN — FUROSEMIDE 40 MG: 40 TABLET ORAL at 10:05

## 2022-05-21 RX ADMIN — Medication 1 CAPSULE: at 02:05

## 2022-05-21 RX ADMIN — ACETAMINOPHEN 650 MG: 325 TABLET, FILM COATED ORAL at 02:05

## 2022-05-21 RX ADMIN — MEMANTINE 10 MG: 5 TABLET ORAL at 09:05

## 2022-05-21 RX ADMIN — ENOXAPARIN SODIUM 40 MG: 40 INJECTION SUBCUTANEOUS at 04:05

## 2022-05-21 NOTE — PROGRESS NOTES
[facility] - [facility] Medicine  Progress Note    Patient Name: Bhargav Gao  MRN: [id number]  Patient Class: IP- Inpatient   Admission Date: 5/16/2022  Length of Stay: 5 days  Attending Physician: Hipolito Nance Jr., MD  Primary Care Provider: Kerry Lin MD        Subjective:     Principal Problem:Sacral wound        HPI:  Mr. Bhargav Gao is an 82 yo AAM resident of [facility] with medical history of hypertension, diabetes mellitus, hypothyroidism, cataracts, DVT, schizophrenia, mild intellectual disabilities, drug induced subacute dyskinesia, bipolar disorder and sacral wound. Mr. Gao was sent for admission due to sacral wound. Mr. Gao can say his name but he is unable to provide any further info. Records show wound culture from 05/03 grew ecoli, acinetobacter baumannii ,K. Pneumoniae, MRSA and e. Faecalis. Was given 7 days of zyvox and invanz. Acinetobacter not sensitive to ertapenem. Sent to [facility] for IV abx and wound care.      Overview/Hospital Course:  05/17 Patient without complaints. Sacral ulcer clean but gets contaminated with stool. Will speak with family about colostomy.  5/18 Spoke with patient's niece. She is agreeable to colostomy. Will consult Dr. Isidro Holland History: Alert and talking today. No complaints.     Review of Systems   Respiratory:  Negative for shortness of breath.    Cardiovascular:  Negative for chest pain.   Gastrointestinal:  Negative for abdominal pain.   Objective:     Vital Signs (Most Recent):  Temp: 98.4 °F (36.9 °C) (05/21/22 1600)  Pulse: 83 (05/21/22 1600)  Resp: 16 (05/21/22 1600)  BP: 113/69 (05/21/22 1600)  SpO2: 99 % (05/21/22 1600)   Vital Signs (24h Range):  Temp:  [97 °F (36.1 °C)-98.4 °F (36.9 °C)] 98.4 °F (36.9 °C)  Pulse:  [73-88] 83  Resp:  [16-18] 16  SpO2:  [95 %-99 %] 99 %  BP: (113-188)/(69-86) 113/69     Weight: 99.3 kg (219 lb)  Body mass index is 37.59 kg/m².    Intake/Output Summary (Last 24 hours)  at 5/21/2022 1730  Last data filed at 5/21/2022 0600  Gross per 24 hour   Intake --   Output 1100 ml   Net -1100 ml        Physical Exam  Vitals reviewed.   Constitutional:       General: He is not in acute distress.     Appearance: He is obese. He is not toxic-appearing.   Cardiovascular:      Rate and Rhythm: Normal rate and regular rhythm.      Heart sounds: Normal heart sounds.   Pulmonary:      Effort: Pulmonary effort is normal. No respiratory distress.      Breath sounds: Normal breath sounds. No wheezing.   Abdominal:      General: Abdomen is flat. Bowel sounds are normal. There is no distension.      Palpations: Abdomen is soft.      Tenderness: There is no abdominal tenderness.   Skin:     General: Skin is warm and dry.   Neurological:      Mental Status: He is alert.       Significant Labs: All pertinent labs within the past 24 hours have been reviewed.  BMP:   Recent Labs   Lab 05/21/22  0426         K 4.3   CL 97*   CO2 27   BUN 15   CREATININE 0.56*   CALCIUM 9.7       CBC: No results for input(s): WBC, HGB, HCT, PLT in the last 48 hours.    Significant Imaging: I have reviewed all pertinent imaging results/findings within the past 24 hours.      Assessment/Plan:      * Sacral wound  05/16  -records show wound culture that grew  Culture, Wound [[id number]] (Abnormal)  Collected: 05/05/22 0920   Order Status: Completed Specimen: Wound from Sacral Updated: 05/11/22 1318    Culture, Wound/Abscess Light Growth Acinetobacter baumannii complex/haemolyticus Abnormal     Comment: Corrected result: Previously reported as Gram-negative Bacilli on 5/9/2022 at 1312 CDT.        Light Growth Escherichia coli Abnormal      Light Growth Klebsiella pneumoniae Abnormal     Comment: Corrected result: Previously reported as Gram-negative Bacilli on 5/8/2022 at 0843 CDT.        Heavy Growth Methicillin resistant Staphylococcus aureus Abnormal     Comment: Corrected result: Previously reported as Gram-positive  Cocci on 5/8/2022 at 0843 CDT.   Gram-positive Cocci has been updated to reportable.        Heavy Growth Enterococcus faecalis Abnormal      Was treated with invanz and zyvox for 7 days  Acinetobacter not sensitive to invanz and zyvox    -ID consult placed for antibiotic recommendations    5/19  -plans for colostomy to aid in wound care. Will discuss with surgery.    5/21 - discussed with surgery. Plan colostomy Monday.     Hypertension  05/16  -continue home meds of losartan and metoprolol    Diabetes mellitus  05/16  -continue home dose of levemir 15 units daily with low dose SSI coverage  -accuchecks ac and hs   -a1c on 03/02 was 6.5        Alzheimer's disease    05/16 continue namenda      VTE Risk Mitigation (From admission, onward)         Ordered     enoxaparin injection 40 mg  Daily         05/18/22 1457     IP VTE HIGH RISK PATIENT  Once         05/16/22 1152     Place sequential compression device  Until discontinued         05/16/22 1152                Discharge Planning   YOSSI:      Code Status: DNR   Is the patient medically ready for discharge?:     Reason for patient still in hospital (select all that apply): Treatment  Discharge Plan A: Return to nursing home   Discharge Delays: None known at this time              Hipolito Nance Jr, MD  Department of Hospital Medicine   [facility] - [facility]

## 2022-05-21 NOTE — SUBJECTIVE & OBJECTIVE
Interval History: Alert and talking today. No complaints.     Review of Systems   Respiratory:  Negative for shortness of breath.    Cardiovascular:  Negative for chest pain.   Gastrointestinal:  Negative for abdominal pain.   Objective:     Vital Signs (Most Recent):  Temp: 98.4 °F (36.9 °C) (05/21/22 1600)  Pulse: 83 (05/21/22 1600)  Resp: 16 (05/21/22 1600)  BP: 113/69 (05/21/22 1600)  SpO2: 99 % (05/21/22 1600)   Vital Signs (24h Range):  Temp:  [97 °F (36.1 °C)-98.4 °F (36.9 °C)] 98.4 °F (36.9 °C)  Pulse:  [73-88] 83  Resp:  [16-18] 16  SpO2:  [95 %-99 %] 99 %  BP: (113-188)/(69-86) 113/69     Weight: 99.3 kg (219 lb)  Body mass index is 37.59 kg/m².    Intake/Output Summary (Last 24 hours) at 5/21/2022 1730  Last data filed at 5/21/2022 0600  Gross per 24 hour   Intake --   Output 1100 ml   Net -1100 ml        Physical Exam  Vitals reviewed.   Constitutional:       General: He is not in acute distress.     Appearance: He is obese. He is not toxic-appearing.   Cardiovascular:      Rate and Rhythm: Normal rate and regular rhythm.      Heart sounds: Normal heart sounds.   Pulmonary:      Effort: Pulmonary effort is normal. No respiratory distress.      Breath sounds: Normal breath sounds. No wheezing.   Abdominal:      General: Abdomen is flat. Bowel sounds are normal. There is no distension.      Palpations: Abdomen is soft.      Tenderness: There is no abdominal tenderness.   Skin:     General: Skin is warm and dry.   Neurological:      Mental Status: He is alert.       Significant Labs: All pertinent labs within the past 24 hours have been reviewed.  BMP:   Recent Labs   Lab 05/21/22  0426         K 4.3   CL 97*   CO2 27   BUN 15   CREATININE 0.56*   CALCIUM 9.7       CBC: No results for input(s): WBC, HGB, HCT, PLT in the last 48 hours.    Significant Imaging: I have reviewed all pertinent imaging results/findings within the past 24 hours.

## 2022-05-21 NOTE — ASSESSMENT & PLAN NOTE
05/16  -records show wound culture that grew  Culture, Wound [[id number]] (Abnormal)  Collected: 05/05/22 0920   Order Status: Completed Specimen: Wound from Sacral Updated: 05/11/22 1318    Culture, Wound/Abscess Light Growth Acinetobacter baumannii complex/haemolyticus Abnormal     Comment: Corrected result: Previously reported as Gram-negative Bacilli on 5/9/2022 at 1312 CDT.        Light Growth Escherichia coli Abnormal      Light Growth Klebsiella pneumoniae Abnormal     Comment: Corrected result: Previously reported as Gram-negative Bacilli on 5/8/2022 at 0843 CDT.        Heavy Growth Methicillin resistant Staphylococcus aureus Abnormal     Comment: Corrected result: Previously reported as Gram-positive Cocci on 5/8/2022 at 0843 CDT.   Gram-positive Cocci has been updated to reportable.        Heavy Growth Enterococcus faecalis Abnormal      Was treated with invanz and zyvox for 7 days  Acinetobacter not sensitive to invanz and zyvox    -ID consult placed for antibiotic recommendations    5/19  -plans for colostomy to aid in wound care. Will discuss with surgery.    5/21 - discussed with surgery. Plan colostomy Monday.

## 2022-05-21 NOTE — ASSESSMENT & PLAN NOTE
05/16  -records show wound culture that grew  Culture, Wound [[id number]] (Abnormal)  Collected: 05/05/22 0920   Order Status: Completed Specimen: Wound from Sacral Updated: 05/11/22 1318    Culture, Wound/Abscess Light Growth Acinetobacter baumannii complex/haemolyticus Abnormal     Comment: Corrected result: Previously reported as Gram-negative Bacilli on 5/9/2022 at 1312 CDT.        Light Growth Escherichia coli Abnormal      Light Growth Klebsiella pneumoniae Abnormal     Comment: Corrected result: Previously reported as Gram-negative Bacilli on 5/8/2022 at 0843 CDT.        Heavy Growth Methicillin resistant Staphylococcus aureus Abnormal     Comment: Corrected result: Previously reported as Gram-positive Cocci on 5/8/2022 at 0843 CDT.   Gram-positive Cocci has been updated to reportable.        Heavy Growth Enterococcus faecalis Abnormal      Was treated with invanz and zyvox for 7 days  Acinetobacter not sensitive to invanz and zyvox    -ID consult placed for antibiotic recommendations    5/19  -plans for colostomy to aid in wound care. Will discuss with surgery. Holding eliquis.

## 2022-05-21 NOTE — SUBJECTIVE & OBJECTIVE
Interval History: Alert and talking today. No complaints.     Review of Systems   Respiratory:  Negative for shortness of breath.    Cardiovascular:  Negative for chest pain.   Gastrointestinal:  Negative for abdominal pain.   Objective:     Vital Signs (Most Recent):  Temp: 97 °F (36.1 °C) (05/20/22 1600)  Pulse: 67 (05/20/22 1600)  Resp: 14 (05/20/22 1600)  BP: (!) 160/94 (05/20/22 1600)  SpO2: 99 % (05/20/22 1600)   Vital Signs (24h Range):  Temp:  [96.8 °F (36 °C)-97.5 °F (36.4 °C)] 97 °F (36.1 °C)  Pulse:  [66-82] 67  Resp:  [14-18] 14  SpO2:  [95 %-99 %] 99 %  BP: (116-160)/(67-94) 160/94     Weight: 99.3 kg (219 lb)  Body mass index is 37.59 kg/m².    Intake/Output Summary (Last 24 hours) at 5/20/2022 2020  Last data filed at 5/20/2022 0400  Gross per 24 hour   Intake --   Output 300 ml   Net -300 ml      Physical Exam  Vitals reviewed.   Constitutional:       General: He is not in acute distress.     Appearance: He is obese. He is not toxic-appearing.   Cardiovascular:      Rate and Rhythm: Normal rate and regular rhythm.      Heart sounds: Normal heart sounds.   Pulmonary:      Effort: Pulmonary effort is normal. No respiratory distress.      Breath sounds: Normal breath sounds. No wheezing.   Abdominal:      General: Abdomen is flat. Bowel sounds are normal. There is no distension.      Palpations: Abdomen is soft.      Tenderness: There is no abdominal tenderness.   Skin:     General: Skin is warm and dry.   Neurological:      Mental Status: He is alert.       Significant Labs: All pertinent labs within the past 24 hours have been reviewed.  BMP:   Recent Labs   Lab 05/20/22  0540   *   *   K 4.6   CL 97*   CO2 26   BUN 9   CREATININE 0.46*   CALCIUM 9.4     CBC: No results for input(s): WBC, HGB, HCT, PLT in the last 48 hours.    Significant Imaging: I have reviewed all pertinent imaging results/findings within the past 24 hours.

## 2022-05-21 NOTE — PROGRESS NOTES
[facility] - [facility] Medicine  Progress Note    Patient Name: Bhargav Gao  MRN: [id number]  Patient Class: IP- Inpatient   Admission Date: 5/16/2022  Length of Stay: 4 days  Attending Physician: Hipolito Nance Jr., MD  Primary Care Provider: Kerry Lin MD        Subjective:     Principal Problem:Sacral wound        HPI:  Mr. Bhargav Gao is an 82 yo AAM resident of [facility] with medical history of hypertension, diabetes mellitus, hypothyroidism, cataracts, DVT, schizophrenia, mild intellectual disabilities, drug induced subacute dyskinesia, bipolar disorder and sacral wound. Mr. Gao was sent for admission due to sacral wound. Mr. Gao can say his name but he is unable to provide any further info. Records show wound culture from 05/03 grew ecoli, acinetobacter baumannii ,K. Pneumoniae, MRSA and e. Faecalis. Was given 7 days of zyvox and invanz. Acinetobacter not sensitive to ertapenem. Sent to [facility] for IV abx and wound care.      Overview/Hospital Course:  05/17 Patient without complaints. Sacral ulcer clean but gets contaminated with stool. Will speak with family about colostomy.  5/18 Spoke with patient's niece. She is agreeable to colostomy. Will consult Dr. Isidro Holland History: Alert and talking today. No complaints.     Review of Systems   Respiratory:  Negative for shortness of breath.    Cardiovascular:  Negative for chest pain.   Gastrointestinal:  Negative for abdominal pain.   Objective:     Vital Signs (Most Recent):  Temp: 97 °F (36.1 °C) (05/20/22 1600)  Pulse: 67 (05/20/22 1600)  Resp: 14 (05/20/22 1600)  BP: (!) 160/94 (05/20/22 1600)  SpO2: 99 % (05/20/22 1600)   Vital Signs (24h Range):  Temp:  [96.8 °F (36 °C)-97.5 °F (36.4 °C)] 97 °F (36.1 °C)  Pulse:  [66-82] 67  Resp:  [14-18] 14  SpO2:  [95 %-99 %] 99 %  BP: (116-160)/(67-94) 160/94     Weight: 99.3 kg (219 lb)  Body mass index is 37.59 kg/m².    Intake/Output Summary (Last 24 hours)  at 5/20/2022 2020  Last data filed at 5/20/2022 0400  Gross per 24 hour   Intake --   Output 300 ml   Net -300 ml      Physical Exam  Vitals reviewed.   Constitutional:       General: He is not in acute distress.     Appearance: He is obese. He is not toxic-appearing.   Cardiovascular:      Rate and Rhythm: Normal rate and regular rhythm.      Heart sounds: Normal heart sounds.   Pulmonary:      Effort: Pulmonary effort is normal. No respiratory distress.      Breath sounds: Normal breath sounds. No wheezing.   Abdominal:      General: Abdomen is flat. Bowel sounds are normal. There is no distension.      Palpations: Abdomen is soft.      Tenderness: There is no abdominal tenderness.   Skin:     General: Skin is warm and dry.   Neurological:      Mental Status: He is alert.       Significant Labs: All pertinent labs within the past 24 hours have been reviewed.  BMP:   Recent Labs   Lab 05/20/22  0540   *   *   K 4.6   CL 97*   CO2 26   BUN 9   CREATININE 0.46*   CALCIUM 9.4     CBC: No results for input(s): WBC, HGB, HCT, PLT in the last 48 hours.    Significant Imaging: I have reviewed all pertinent imaging results/findings within the past 24 hours.      Assessment/Plan:      * Sacral wound  05/16  -records show wound culture that grew  Culture, Wound [[id number]] (Abnormal)  Collected: 05/05/22 0920   Order Status: Completed Specimen: Wound from Sacral Updated: 05/11/22 1318    Culture, Wound/Abscess Light Growth Acinetobacter baumannii complex/haemolyticus Abnormal     Comment: Corrected result: Previously reported as Gram-negative Bacilli on 5/9/2022 at 1312 CDT.        Light Growth Escherichia coli Abnormal      Light Growth Klebsiella pneumoniae Abnormal     Comment: Corrected result: Previously reported as Gram-negative Bacilli on 5/8/2022 at 0843 CDT.        Heavy Growth Methicillin resistant Staphylococcus aureus Abnormal     Comment: Corrected result: Previously reported as Gram-positive Cocci  on 5/8/2022 at 0843 CDT.   Gram-positive Cocci has been updated to reportable.        Heavy Growth Enterococcus faecalis Abnormal      Was treated with invanz and zyvox for 7 days  Acinetobacter not sensitive to invanz and zyvox    -ID consult placed for antibiotic recommendations    5/19  -plans for colostomy to aid in wound care. Will discuss with surgery. Holding eliquis.     Hyponatremia  Up to 128 from 126. Chlorthalidone recently d/david.       Hypertension  05/16  -continue home meds of losartan and metoprolol    Diabetes mellitus  05/16  -continue home dose of levemir 15 units daily with low dose SSI coverage  -accuchecks ac and hs   -a1c on 03/02 was 6.5        Alzheimer's disease    05/16 continue namenda      VTE Risk Mitigation (From admission, onward)         Ordered     enoxaparin injection 40 mg  Daily         05/18/22 1457     IP VTE HIGH RISK PATIENT  Once         05/16/22 1152     Place sequential compression device  Until discontinued         05/16/22 1152                Discharge Planning   YOSSI:      Code Status: DNR   Is the patient medically ready for discharge?:     Reason for patient still in hospital (select all that apply): Treatment  Discharge Plan A: Return to nursing home   Discharge Delays: None known at this time              Hipolito Nance Jr, MD  Department of Hospital Medicine   [facility]

## 2022-05-22 LAB
GLUCOSE SERPL-MCNC: 128 MG/DL (ref 70–105)
GLUCOSE SERPL-MCNC: 154 MG/DL (ref 70–105)
GLUCOSE SERPL-MCNC: 158 MG/DL (ref 70–105)
GLUCOSE SERPL-MCNC: 81 MG/DL (ref 70–105)

## 2022-05-22 PROCEDURE — 99232 SBSQ HOSP IP/OBS MODERATE 35: CPT | Mod: ,,, | Performed by: FAMILY MEDICINE

## 2022-05-22 PROCEDURE — 11000001 HC ACUTE MED/SURG PRIVATE ROOM

## 2022-05-22 PROCEDURE — 25000003 PHARM REV CODE 250: Performed by: NURSE PRACTITIONER

## 2022-05-22 PROCEDURE — 63600175 PHARM REV CODE 636 W HCPCS: Performed by: NURSE PRACTITIONER

## 2022-05-22 PROCEDURE — 99232 PR SUBSEQUENT HOSPITAL CARE,LEVL II: ICD-10-PCS | Mod: ,,, | Performed by: FAMILY MEDICINE

## 2022-05-22 PROCEDURE — 63700000 PHARM REV CODE 250 ALT 637 W/O HCPCS: Performed by: FAMILY MEDICINE

## 2022-05-22 PROCEDURE — 25000003 PHARM REV CODE 250: Performed by: FAMILY MEDICINE

## 2022-05-22 PROCEDURE — C9399 UNCLASSIFIED DRUGS OR BIOLOG: HCPCS | Performed by: NURSE PRACTITIONER

## 2022-05-22 PROCEDURE — 82962 GLUCOSE BLOOD TEST: CPT

## 2022-05-22 RX ADMIN — INSULIN DETEMIR 15 UNITS: 100 INJECTION, SOLUTION SUBCUTANEOUS at 09:05

## 2022-05-22 RX ADMIN — FUROSEMIDE 40 MG: 40 TABLET ORAL at 09:05

## 2022-05-22 RX ADMIN — THERA TABS 1 TABLET: TAB at 09:05

## 2022-05-22 RX ADMIN — POLYETHYLENE GLYCOL 3350 17 G: 17 POWDER, FOR SOLUTION ORAL at 09:05

## 2022-05-22 RX ADMIN — RISPERIDONE 1 MG: 1 TABLET ORAL at 09:05

## 2022-05-22 RX ADMIN — MEMANTINE 10 MG: 5 TABLET ORAL at 09:05

## 2022-05-22 RX ADMIN — Medication 1 CAPSULE: at 09:05

## 2022-05-22 RX ADMIN — METOPROLOL SUCCINATE 25 MG: 25 TABLET, FILM COATED, EXTENDED RELEASE ORAL at 09:05

## 2022-05-22 RX ADMIN — Medication 1 CAPSULE: at 05:05

## 2022-05-22 RX ADMIN — LACTULOSE 20 G: 20 SOLUTION ORAL at 09:05

## 2022-05-22 RX ADMIN — PANTOPRAZOLE SODIUM 40 MG: 40 TABLET, DELAYED RELEASE ORAL at 09:05

## 2022-05-22 RX ADMIN — LEVOTHYROXINE SODIUM 50 MCG: 50 TABLET ORAL at 05:05

## 2022-05-22 RX ADMIN — LOSARTAN POTASSIUM 50 MG: 50 TABLET, FILM COATED ORAL at 09:05

## 2022-05-22 RX ADMIN — OXCARBAZEPINE 450 MG: 150 TABLET, FILM COATED ORAL at 09:05

## 2022-05-22 RX ADMIN — FLUCONAZOLE 200 MG: 100 TABLET ORAL at 09:05

## 2022-05-22 RX ADMIN — TAMSULOSIN HYDROCHLORIDE 0.4 MG: 0.4 CAPSULE ORAL at 09:05

## 2022-05-22 RX ADMIN — Medication 1 CAPSULE: at 02:05

## 2022-05-22 NOTE — SUBJECTIVE & OBJECTIVE
Interval History: Alert but not much to say to me today.     Review of Systems   Unable to perform ROS: Psychiatric disorder   Objective:     Vital Signs (Most Recent):  Temp: 98.1 °F (36.7 °C) (05/22/22 1600)  Pulse: 89 (05/22/22 1600)  Resp: 18 (05/22/22 1600)  BP: 126/76 (05/22/22 1600)  SpO2: 97 % (05/22/22 1600) Vital Signs (24h Range):  Temp:  [97 °F (36.1 °C)-98.6 °F (37 °C)] 98.1 °F (36.7 °C)  Pulse:  [63-89] 89  Resp:  [15-18] 18  SpO2:  [95 %-99 %] 97 %  BP: (105-193)/() 126/76     Weight: 99.3 kg (219 lb)  Body mass index is 37.59 kg/m².    Intake/Output Summary (Last 24 hours) at 5/22/2022 1722  Last data filed at 5/22/2022 0200  Gross per 24 hour   Intake --   Output 300 ml   Net -300 ml      Physical Exam  Vitals reviewed.   Constitutional:       Appearance: He is not ill-appearing or toxic-appearing.   Cardiovascular:      Rate and Rhythm: Normal rate and regular rhythm.      Heart sounds: No murmur heard.  Pulmonary:      Effort: Pulmonary effort is normal. No respiratory distress.      Breath sounds: Normal breath sounds. No wheezing.   Abdominal:      General: Abdomen is flat. Bowel sounds are normal. There is no distension.      Palpations: Abdomen is soft.   Skin:     General: Skin is warm and dry.   Neurological:      Mental Status: He is alert.       Significant Labs: All pertinent labs within the past 24 hours have been reviewed.  BMP:   Recent Labs   Lab 05/21/22  0426         K 4.3   CL 97*   CO2 27   BUN 15   CREATININE 0.56*   CALCIUM 9.7     CBC: No results for input(s): WBC, HGB, HCT, PLT in the last 48 hours.    Significant Imaging: I have reviewed all pertinent imaging results/findings within the past 24 hours.

## 2022-05-22 NOTE — PROGRESS NOTES
[facility] Medicine  Progress Note    Patient Name: Bhargav Gao  MRN: [id number]  Patient Class: IP- Inpatient   Admission Date: 5/16/2022  Length of Stay: 6 days  Attending Physician: Hipolito Nance Jr., MD  Primary Care Provider: Kerry Lin MD        Subjective:     Principal Problem:Sacral wound        HPI:  Mr. Bhargav Gao is an 82 yo AAM resident of [facility] with medical history of hypertension, diabetes mellitus, hypothyroidism, cataracts, DVT, schizophrenia, mild intellectual disabilities, drug induced subacute dyskinesia, bipolar disorder and sacral wound. Mr. Gao was sent for admission due to sacral wound. Mr. Gao can say his name but he is unable to provide any further info. Records show wound culture from 05/03 grew ecoli, acinetobacter baumannii ,K. Pneumoniae, MRSA and e. Faecalis. Was given 7 days of zyvox and invanz. Acinetobacter not sensitive to ertapenem. Sent to [facility] for IV abx and wound care.      Overview/Hospital Course:  05/17 Patient without complaints. Sacral ulcer clean but gets contaminated with stool. Will speak with family about colostomy.  5/18 Spoke with patient's niece. She is agreeable to colostomy. Will consult Dr. Felix      Interval History: Alert but not much to say to me today.     Review of Systems   Unable to perform ROS: Psychiatric disorder   Objective:     Vital Signs (Most Recent):  Temp: 98.1 °F (36.7 °C) (05/22/22 1600)  Pulse: 89 (05/22/22 1600)  Resp: 18 (05/22/22 1600)  BP: 126/76 (05/22/22 1600)  SpO2: 97 % (05/22/22 1600) Vital Signs (24h Range):  Temp:  [97 °F (36.1 °C)-98.6 °F (37 °C)] 98.1 °F (36.7 °C)  Pulse:  [63-89] 89  Resp:  [15-18] 18  SpO2:  [95 %-99 %] 97 %  BP: (105-193)/() 126/76     Weight: 99.3 kg (219 lb)  Body mass index is 37.59 kg/m².    Intake/Output Summary (Last 24 hours) at 5/22/2022 1722  Last data filed at 5/22/2022 0200  Gross per 24 hour   Intake --   Output 300 ml    Net -300 ml      Physical Exam  Vitals reviewed.   Constitutional:       Appearance: He is not ill-appearing or toxic-appearing.   Cardiovascular:      Rate and Rhythm: Normal rate and regular rhythm.      Heart sounds: No murmur heard.  Pulmonary:      Effort: Pulmonary effort is normal. No respiratory distress.      Breath sounds: Normal breath sounds. No wheezing.   Abdominal:      General: Abdomen is flat. Bowel sounds are normal. There is no distension.      Palpations: Abdomen is soft.   Skin:     General: Skin is warm and dry.   Neurological:      Mental Status: He is alert.       Significant Labs: All pertinent labs within the past 24 hours have been reviewed.  BMP:   Recent Labs   Lab 05/21/22  0426         K 4.3   CL 97*   CO2 27   BUN 15   CREATININE 0.56*   CALCIUM 9.7     CBC: No results for input(s): WBC, HGB, HCT, PLT in the last 48 hours.    Significant Imaging: I have reviewed all pertinent imaging results/findings within the past 24 hours.      Assessment/Plan:      * Sacral wound  05/16  -records show wound culture that grew  Culture, Wound [[id number]] (Abnormal)  Collected: 05/05/22 0920   Order Status: Completed Specimen: Wound from Sacral Updated: 05/11/22 1318    Culture, Wound/Abscess Light Growth Acinetobacter baumannii complex/haemolyticus Abnormal     Comment: Corrected result: Previously reported as Gram-negative Bacilli on 5/9/2022 at 1312 CDT.        Light Growth Escherichia coli Abnormal      Light Growth Klebsiella pneumoniae Abnormal     Comment: Corrected result: Previously reported as Gram-negative Bacilli on 5/8/2022 at 0843 CDT.        Heavy Growth Methicillin resistant Staphylococcus aureus Abnormal     Comment: Corrected result: Previously reported as Gram-positive Cocci on 5/8/2022 at 0843 CDT.   Gram-positive Cocci has been updated to reportable.        Heavy Growth Enterococcus faecalis Abnormal      Was treated with invanz and zyvox for 7 days  Acinetobacter  not sensitive to invanz and zyvox    -ID consult placed for antibiotic recommendations    5/19  -plans for colostomy to aid in wound care. Will discuss with surgery.    5/21 - discussed with surgery. Plan colostomy Monday.     5/22 - colostomy tomorrow.     Hypertension  05/16  -continue home meds of losartan and metoprolol    Diabetes mellitus  05/16  -continue home dose of levemir 15 units daily with low dose SSI coverage  -accuchecks ac and hs   -a1c on 03/02 was 6.5    5/22 - glucoses remain acceptable.       Alzheimer's disease    05/16 continue namenda      VTE Risk Mitigation (From admission, onward)         Ordered     enoxaparin injection 40 mg  Daily         05/18/22 1457     IP VTE HIGH RISK PATIENT  Once         05/16/22 1152     Place sequential compression device  Until discontinued         05/16/22 1152                Discharge Planning   YOSSI:      Code Status: DNR   Is the patient medically ready for discharge?:     Reason for patient still in hospital (select all that apply): Treatment  Discharge Plan A: Return to nursing home   Discharge Delays: None known at this time              Hipolito Nance Jr, MD  Department of Hospital Medicine   [facility]

## 2022-05-22 NOTE — ASSESSMENT & PLAN NOTE
05/16  -continue home dose of levemir 15 units daily with low dose SSI coverage  -accuchecks ac and hs   -a1c on 03/02 was 6.5    5/22 - glucoses remain acceptable.

## 2022-05-22 NOTE — ASSESSMENT & PLAN NOTE
05/16  -records show wound culture that grew  Culture, Wound [[id number]] (Abnormal)  Collected: 05/05/22 0920   Order Status: Completed Specimen: Wound from Sacral Updated: 05/11/22 1318    Culture, Wound/Abscess Light Growth Acinetobacter baumannii complex/haemolyticus Abnormal     Comment: Corrected result: Previously reported as Gram-negative Bacilli on 5/9/2022 at 1312 CDT.        Light Growth Escherichia coli Abnormal      Light Growth Klebsiella pneumoniae Abnormal     Comment: Corrected result: Previously reported as Gram-negative Bacilli on 5/8/2022 at 0843 CDT.        Heavy Growth Methicillin resistant Staphylococcus aureus Abnormal     Comment: Corrected result: Previously reported as Gram-positive Cocci on 5/8/2022 at 0843 CDT.   Gram-positive Cocci has been updated to reportable.        Heavy Growth Enterococcus faecalis Abnormal      Was treated with invanz and zyvox for 7 days  Acinetobacter not sensitive to invanz and zyvox    -ID consult placed for antibiotic recommendations    5/19  -plans for colostomy to aid in wound care. Will discuss with surgery.    5/21 - discussed with surgery. Plan colostomy Monday.     5/22 - colostomy tomorrow.

## 2022-05-23 LAB
GLUCOSE SERPL-MCNC: 100 MG/DL (ref 70–105)
GLUCOSE SERPL-MCNC: 119 MG/DL (ref 70–105)
GLUCOSE SERPL-MCNC: 143 MG/DL (ref 70–105)
GLUCOSE SERPL-MCNC: 82 MG/DL (ref 70–105)
GLUCOSE SERPL-MCNC: 99 MG/DL (ref 70–105)

## 2022-05-23 PROCEDURE — C9399 UNCLASSIFIED DRUGS OR BIOLOG: HCPCS | Performed by: NURSE PRACTITIONER

## 2022-05-23 PROCEDURE — 99232 PR SUBSEQUENT HOSPITAL CARE,LEVL II: ICD-10-PCS | Mod: ,,, | Performed by: FAMILY MEDICINE

## 2022-05-23 PROCEDURE — 25000003 PHARM REV CODE 250: Performed by: NURSE PRACTITIONER

## 2022-05-23 PROCEDURE — 82962 GLUCOSE BLOOD TEST: CPT

## 2022-05-23 PROCEDURE — 99232 SBSQ HOSP IP/OBS MODERATE 35: CPT | Mod: ,,, | Performed by: FAMILY MEDICINE

## 2022-05-23 PROCEDURE — 11000001 HC ACUTE MED/SURG PRIVATE ROOM

## 2022-05-23 PROCEDURE — 25000003 PHARM REV CODE 250: Performed by: FAMILY MEDICINE

## 2022-05-23 PROCEDURE — 63600175 PHARM REV CODE 636 W HCPCS: Performed by: NURSE PRACTITIONER

## 2022-05-23 PROCEDURE — 63700000 PHARM REV CODE 250 ALT 637 W/O HCPCS: Performed by: FAMILY MEDICINE

## 2022-05-23 RX ADMIN — FUROSEMIDE 40 MG: 40 TABLET ORAL at 08:05

## 2022-05-23 RX ADMIN — METOPROLOL SUCCINATE 25 MG: 25 TABLET, FILM COATED, EXTENDED RELEASE ORAL at 08:05

## 2022-05-23 RX ADMIN — Medication 1 CAPSULE: at 08:05

## 2022-05-23 RX ADMIN — Medication 1 CAPSULE: at 05:05

## 2022-05-23 RX ADMIN — TAMSULOSIN HYDROCHLORIDE 0.4 MG: 0.4 CAPSULE ORAL at 08:05

## 2022-05-23 RX ADMIN — POLYETHYLENE GLYCOL 3350 17 G: 17 POWDER, FOR SOLUTION ORAL at 08:05

## 2022-05-23 RX ADMIN — Medication 1 CAPSULE: at 12:05

## 2022-05-23 RX ADMIN — MEMANTINE 10 MG: 5 TABLET ORAL at 09:05

## 2022-05-23 RX ADMIN — HYDROCODONE BITARTRATE AND ACETAMINOPHEN 1 TABLET: 5; 325 TABLET ORAL at 12:05

## 2022-05-23 RX ADMIN — OXCARBAZEPINE 450 MG: 150 TABLET, FILM COATED ORAL at 09:05

## 2022-05-23 RX ADMIN — FLUCONAZOLE 200 MG: 100 TABLET ORAL at 08:05

## 2022-05-23 RX ADMIN — RISPERIDONE 1 MG: 1 TABLET ORAL at 08:05

## 2022-05-23 RX ADMIN — LEVOTHYROXINE SODIUM 50 MCG: 50 TABLET ORAL at 05:05

## 2022-05-23 RX ADMIN — THERA TABS 1 TABLET: TAB at 08:05

## 2022-05-23 RX ADMIN — LOSARTAN POTASSIUM 50 MG: 50 TABLET, FILM COATED ORAL at 08:05

## 2022-05-23 RX ADMIN — PANTOPRAZOLE SODIUM 40 MG: 40 TABLET, DELAYED RELEASE ORAL at 08:05

## 2022-05-23 RX ADMIN — LACTULOSE 20 G: 20 SOLUTION ORAL at 08:05

## 2022-05-23 RX ADMIN — POLYETHYLENE GLYCOL 3350 17 G: 17 POWDER, FOR SOLUTION ORAL at 09:05

## 2022-05-23 RX ADMIN — OXCARBAZEPINE 450 MG: 150 TABLET, FILM COATED ORAL at 08:05

## 2022-05-23 RX ADMIN — RISPERIDONE 1 MG: 1 TABLET ORAL at 09:05

## 2022-05-23 RX ADMIN — INSULIN DETEMIR 15 UNITS: 100 INJECTION, SOLUTION SUBCUTANEOUS at 09:05

## 2022-05-23 NOTE — SUBJECTIVE & OBJECTIVE
Interval History: 05/23 Awake and resting in bed. Smiles at me. No complaints. Chest is clear. Heart RRR. Continue wound care to sacrum.    Review of Systems   Reason unable to perform ROS: psychiatric disorder.   Objective:     Vital Signs (Most Recent):  Temp: 98.2 °F (36.8 °C) (05/23/22 1200)  Pulse: 94 (05/23/22 1200)  Resp: 18 (05/23/22 1200)  BP: (!) 157/85 (05/23/22 1200)  SpO2: 100 % (05/23/22 1200)   Vital Signs (24h Range):  Temp:  [97.5 °F (36.4 °C)-98.3 °F (36.8 °C)] 98.2 °F (36.8 °C)  Pulse:  [68-94] 94  Resp:  [16-18] 18  SpO2:  [97 %-100 %] 100 %  BP: (126-166)/() 157/85     Weight: 100 kg (220 lb 7.4 oz)  Body mass index is 37.84 kg/m².    Intake/Output Summary (Last 24 hours) at 5/23/2022 1236  Last data filed at 5/23/2022 0600  Gross per 24 hour   Intake --   Output 900 ml   Net -900 ml      Physical Exam  HENT:      Head: Normocephalic.      Mouth/Throat:      Mouth: Mucous membranes are moist.   Cardiovascular:      Rate and Rhythm: Normal rate.      Heart sounds: Normal heart sounds.   Pulmonary:      Breath sounds: Normal breath sounds.   Abdominal:      General: Bowel sounds are normal.      Palpations: Abdomen is soft.   Skin:     Comments: Sacral ulcer         Significant Labs: All pertinent labs within the past 24 hours have been reviewed.  Recent Lab Results  (Last 5 results in the past 24 hours)        05/23/22  1132   05/23/22  0905   05/23/22  0543   05/22/22  2122   05/22/22  1447        POC Glucose 100   119   143   158   154                              Significant Imaging: I have reviewed all pertinent imaging results/findings within the past 24 hours.

## 2022-05-23 NOTE — SUBJECTIVE & OBJECTIVE
Interval History: No reported problems. Colostomy today.     Review of Systems   Unable to perform ROS: Psychiatric disorder   Objective:     Vital Signs (Most Recent):  Temp: 97.5 °F (36.4 °C) (05/23/22 0800)  Pulse: 68 (05/23/22 0800)  Resp: 18 (05/23/22 0800)  BP: (!) 164/87 (05/23/22 0800)  SpO2: 100 % (05/23/22 0800)   Vital Signs (24h Range):  Temp:  [97.5 °F (36.4 °C)-98.3 °F (36.8 °C)] 97.5 °F (36.4 °C)  Pulse:  [68-89] 68  Resp:  [15-18] 18  SpO2:  [95 %-100 %] 100 %  BP: (126-193)/() 164/87     Weight: 100 kg (220 lb 7.4 oz)  Body mass index is 37.84 kg/m².    Intake/Output Summary (Last 24 hours) at 5/23/2022 1033  Last data filed at 5/23/2022 0600  Gross per 24 hour   Intake --   Output 900 ml   Net -900 ml      Physical Exam  Vitals reviewed.   Constitutional:       Appearance: He is not ill-appearing or toxic-appearing.   Cardiovascular:      Rate and Rhythm: Normal rate and regular rhythm.      Heart sounds: No murmur heard.  Pulmonary:      Effort: Pulmonary effort is normal. No respiratory distress.      Breath sounds: Normal breath sounds. No wheezing.   Abdominal:      General: Abdomen is flat. Bowel sounds are normal. There is no distension.      Palpations: Abdomen is soft.   Skin:     General: Skin is warm and dry.   Neurological:      Mental Status: He is alert.       Significant Labs: All pertinent labs within the past 24 hours have been reviewed.  BMP: No results for input(s): GLU, NA, K, CL, CO2, BUN, CREATININE, CALCIUM, MG in the last 48 hours.  CBC: No results for input(s): WBC, HGB, HCT, PLT in the last 48 hours.    Significant Imaging: I have reviewed all pertinent imaging results/findings within the past 24 hours.

## 2022-05-23 NOTE — PROGRESS NOTES
[facility]  Progress Note    Patient Name: Bhargav Gao  MRN: [id number]  Patient Class: IP- Inpatient   Admission Date: 5/16/2022  Length of Stay: 7 days  Attending Physician: Hipolito Nance Jr., MD  Primary Care Provider: Kerry Lin MD        Subjective:     Principal Problem:Sacral wound        HPI:  Mr. Bhargav Gao is an 82 yo AAM resident of [facility] with medical history of hypertension, diabetes mellitus, hypothyroidism, cataracts, DVT, schizophrenia, mild intellectual disabilities, drug induced subacute dyskinesia, bipolar disorder and sacral wound. Mr. Gao was sent for admission due to sacral wound. Mr. Gao can say his name but he is unable to provide any further info. Records show wound culture from 05/03 grew ecoli, acinetobacter baumannii ,K. Pneumoniae, MRSA and e. Faecalis. Was given 7 days of zyvox and invanz. Acinetobacter not sensitive to ertapenem. Sent to [facility] for IV abx and wound care.      Overview/Hospital Course:  05/17 Patient without complaints. Sacral ulcer clean but gets contaminated with stool. Will speak with family about colostomy.  5/18 Spoke with patient's niece. She is agreeable to colostomy. Will consult Dr. Felix      Interval History: No reported problems. Colostomy today.     Review of Systems   Unable to perform ROS: Psychiatric disorder   Objective:     Vital Signs (Most Recent):  Temp: 97.5 °F (36.4 °C) (05/23/22 0800)  Pulse: 68 (05/23/22 0800)  Resp: 18 (05/23/22 0800)  BP: (!) 164/87 (05/23/22 0800)  SpO2: 100 % (05/23/22 0800)   Vital Signs (24h Range):  Temp:  [97.5 °F (36.4 °C)-98.3 °F (36.8 °C)] 97.5 °F (36.4 °C)  Pulse:  [68-89] 68  Resp:  [15-18] 18  SpO2:  [95 %-100 %] 100 %  BP: (126-193)/() 164/87     Weight: 100 kg (220 lb 7.4 oz)  Body mass index is 37.84 kg/m².    Intake/Output Summary (Last 24 hours) at 5/23/2022 1033  Last data filed at 5/23/2022 0600  Gross per 24 hour   Intake --    Output 900 ml   Net -900 ml      Physical Exam  Vitals reviewed.   Constitutional:       Appearance: He is not ill-appearing or toxic-appearing.   Cardiovascular:      Rate and Rhythm: Normal rate and regular rhythm.      Heart sounds: No murmur heard.  Pulmonary:      Effort: Pulmonary effort is normal. No respiratory distress.      Breath sounds: Normal breath sounds. No wheezing.   Abdominal:      General: Abdomen is flat. Bowel sounds are normal. There is no distension.      Palpations: Abdomen is soft.   Skin:     General: Skin is warm and dry.   Neurological:      Mental Status: He is alert.       Significant Labs: All pertinent labs within the past 24 hours have been reviewed.  BMP: No results for input(s): GLU, NA, K, CL, CO2, BUN, CREATININE, CALCIUM, MG in the last 48 hours.  CBC: No results for input(s): WBC, HGB, HCT, PLT in the last 48 hours.    Significant Imaging: I have reviewed all pertinent imaging results/findings within the past 24 hours.      Assessment/Plan:      * Sacral wound  05/16  -records show wound culture that grew  Culture, Wound [[id number]] (Abnormal)  Collected: 05/05/22 0920   Order Status: Completed Specimen: Wound from Sacral Updated: 05/11/22 1318    Culture, Wound/Abscess Light Growth Acinetobacter baumannii complex/haemolyticus Abnormal     Comment: Corrected result: Previously reported as Gram-negative Bacilli on 5/9/2022 at 1312 CDT.        Light Growth Escherichia coli Abnormal      Light Growth Klebsiella pneumoniae Abnormal     Comment: Corrected result: Previously reported as Gram-negative Bacilli on 5/8/2022 at 0843 CDT.        Heavy Growth Methicillin resistant Staphylococcus aureus Abnormal     Comment: Corrected result: Previously reported as Gram-positive Cocci on 5/8/2022 at 0843 CDT.   Gram-positive Cocci has been updated to reportable.        Heavy Growth Enterococcus faecalis Abnormal      Was treated with invanz and zyvox for 7 days  Acinetobacter not  sensitive to invanz and zyvox    -ID consult placed for antibiotic recommendations    5/19  -plans for colostomy to aid in wound care. Will discuss with surgery.    5/21 - discussed with surgery. Plan colostomy Monday.     5/22 - colostomy tomorrow.     5/23 - colostomy today. Continue wound care.    Hypertension  05/16  -continue home meds of losartan and metoprolol    Diabetes mellitus  05/16  -continue home dose of levemir 15 units daily with low dose SSI coverage  -accuchecks ac and hs   -a1c on 03/02 was 6.5    5/22 - glucoses remain acceptable.       Alzheimer's disease    05/16 continue namenda      VTE Risk Mitigation (From admission, onward)         Ordered     IP VTE HIGH RISK PATIENT  Once         05/16/22 1152     Place sequential compression device  Until discontinued         05/16/22 1152                Discharge Planning   YOSSI:      Code Status: DNR   Is the patient medically ready for discharge?:     Reason for patient still in hospital (select all that apply): Treatment  Discharge Plan A: Return to nursing home   Discharge Delays: None known at this time              Hipolito Nance Jr, MD  Department of Hospital Medicine   [facility]

## 2022-05-23 NOTE — ASSESSMENT & PLAN NOTE
05/16  -records show wound culture that grew  Culture, Wound [[id number]] (Abnormal)  Collected: 05/05/22 0920   Order Status: Completed Specimen: Wound from Sacral Updated: 05/11/22 1318    Culture, Wound/Abscess Light Growth Acinetobacter baumannii complex/haemolyticus Abnormal     Comment: Corrected result: Previously reported as Gram-negative Bacilli on 5/9/2022 at 1312 CDT.        Light Growth Escherichia coli Abnormal      Light Growth Klebsiella pneumoniae Abnormal     Comment: Corrected result: Previously reported as Gram-negative Bacilli on 5/8/2022 at 0843 CDT.        Heavy Growth Methicillin resistant Staphylococcus aureus Abnormal     Comment: Corrected result: Previously reported as Gram-positive Cocci on 5/8/2022 at 0843 CDT.   Gram-positive Cocci has been updated to reportable.        Heavy Growth Enterococcus faecalis Abnormal      Was treated with invanz and zyvox for 7 days  Acinetobacter not sensitive to invanz and zyvox    -ID consult placed for antibiotic recommendations    5/19  -plans for colostomy to aid in wound care. Will discuss with surgery.    5/21 - discussed with surgery. Plan colostomy Monday.     5/22 - colostomy tomorrow.     5/23 - colostomy today. Continue wound care.

## 2022-05-24 ENCOUNTER — ANESTHESIA (OUTPATIENT)
Dept: SURGERY | Facility: HOSPITAL | Age: 82
End: 2022-05-24
Payer: MEDICARE

## 2022-05-24 ENCOUNTER — ANESTHESIA EVENT (OUTPATIENT)
Dept: SURGERY | Facility: HOSPITAL | Age: 82
End: 2022-05-24
Payer: MEDICARE

## 2022-05-24 LAB
BACTERIA SPEC ANAEROBE CULT: NORMAL
GLUCOSE SERPL-MCNC: 145 MG/DL (ref 70–105)
GLUCOSE SERPL-MCNC: 79 MG/DL (ref 70–105)
MICROORGANISM SPEC CULT: ABNORMAL

## 2022-05-24 PROCEDURE — 27000510 HC BLANKET BAIR HUGGER ANY SIZE: Performed by: ANESTHESIOLOGY

## 2022-05-24 PROCEDURE — 27000260 *HC AIRWAY ORAL: Performed by: ANESTHESIOLOGY

## 2022-05-24 PROCEDURE — S0030 INJECTION, METRONIDAZOLE: HCPCS | Performed by: NURSE ANESTHETIST, CERTIFIED REGISTERED

## 2022-05-24 PROCEDURE — 63600175 PHARM REV CODE 636 W HCPCS: Performed by: NURSE ANESTHETIST, CERTIFIED REGISTERED

## 2022-05-24 PROCEDURE — D9220A PRA ANESTHESIA: ICD-10-PCS | Mod: CRNA,,, | Performed by: NURSE ANESTHETIST, CERTIFIED REGISTERED

## 2022-05-24 PROCEDURE — 63700000 PHARM REV CODE 250 ALT 637 W/O HCPCS: Performed by: FAMILY MEDICINE

## 2022-05-24 PROCEDURE — 27000655: Performed by: ANESTHESIOLOGY

## 2022-05-24 PROCEDURE — 27000177 HC AIRWAY, LARYNGEAL MASK: Performed by: ANESTHESIOLOGY

## 2022-05-24 PROCEDURE — D9220A PRA ANESTHESIA: Mod: ANES,,, | Performed by: ANESTHESIOLOGY

## 2022-05-24 PROCEDURE — 25000003 PHARM REV CODE 250: Performed by: NURSE ANESTHETIST, CERTIFIED REGISTERED

## 2022-05-24 PROCEDURE — 27000165 HC TUBE, ETT CUFFED: Performed by: ANESTHESIOLOGY

## 2022-05-24 PROCEDURE — 99232 SBSQ HOSP IP/OBS MODERATE 35: CPT | Mod: ,,, | Performed by: FAMILY MEDICINE

## 2022-05-24 PROCEDURE — 27000689 HC BLADE LARYNGOSCOPE ANY SIZE: Performed by: ANESTHESIOLOGY

## 2022-05-24 PROCEDURE — 25000003 PHARM REV CODE 250: Performed by: NURSE PRACTITIONER

## 2022-05-24 PROCEDURE — 82962 GLUCOSE BLOOD TEST: CPT

## 2022-05-24 PROCEDURE — C9399 UNCLASSIFIED DRUGS OR BIOLOG: HCPCS | Performed by: NURSE PRACTITIONER

## 2022-05-24 PROCEDURE — 99223 1ST HOSP IP/OBS HIGH 75: CPT | Mod: ,,, | Performed by: SURGERY

## 2022-05-24 PROCEDURE — 63600175 PHARM REV CODE 636 W HCPCS: Performed by: NURSE PRACTITIONER

## 2022-05-24 PROCEDURE — D9220A PRA ANESTHESIA: Mod: CRNA,,, | Performed by: NURSE ANESTHETIST, CERTIFIED REGISTERED

## 2022-05-24 PROCEDURE — 25000003 PHARM REV CODE 250: Performed by: FAMILY MEDICINE

## 2022-05-24 PROCEDURE — 27000716 HC OXISENSOR PROBE, ANY SIZE: Performed by: ANESTHESIOLOGY

## 2022-05-24 PROCEDURE — 99232 PR SUBSEQUENT HOSPITAL CARE,LEVL II: ICD-10-PCS | Mod: ,,, | Performed by: FAMILY MEDICINE

## 2022-05-24 PROCEDURE — D9220A PRA ANESTHESIA: ICD-10-PCS | Mod: ANES,,, | Performed by: ANESTHESIOLOGY

## 2022-05-24 PROCEDURE — 11000001 HC ACUTE MED/SURG PRIVATE ROOM

## 2022-05-24 PROCEDURE — 99223 PR INITIAL HOSPITAL CARE,LEVL III: ICD-10-PCS | Mod: ,,, | Performed by: SURGERY

## 2022-05-24 RX ORDER — ONDANSETRON 2 MG/ML
INJECTION INTRAMUSCULAR; INTRAVENOUS
Status: DISCONTINUED | OUTPATIENT
Start: 2022-05-24 | End: 2022-05-24

## 2022-05-24 RX ORDER — ROCURONIUM BROMIDE 10 MG/ML
INJECTION, SOLUTION INTRAVENOUS
Status: DISCONTINUED | OUTPATIENT
Start: 2022-05-24 | End: 2022-05-24

## 2022-05-24 RX ORDER — PHENYLEPHRINE HYDROCHLORIDE 10 MG/ML
INJECTION INTRAVENOUS
Status: DISCONTINUED | OUTPATIENT
Start: 2022-05-24 | End: 2022-05-24

## 2022-05-24 RX ORDER — EPHEDRINE SULFATE 50 MG/ML
INJECTION, SOLUTION INTRAVENOUS
Status: DISCONTINUED | OUTPATIENT
Start: 2022-05-24 | End: 2022-05-24

## 2022-05-24 RX ORDER — FENTANYL CITRATE 50 UG/ML
INJECTION, SOLUTION INTRAMUSCULAR; INTRAVENOUS
Status: DISCONTINUED | OUTPATIENT
Start: 2022-05-24 | End: 2022-05-24

## 2022-05-24 RX ORDER — CEFAZOLIN SODIUM 1 G/3ML
INJECTION, POWDER, FOR SOLUTION INTRAMUSCULAR; INTRAVENOUS
Status: DISCONTINUED | OUTPATIENT
Start: 2022-05-24 | End: 2022-05-24

## 2022-05-24 RX ORDER — LIDOCAINE HYDROCHLORIDE 20 MG/ML
INJECTION, SOLUTION EPIDURAL; INFILTRATION; INTRACAUDAL; PERINEURAL
Status: DISCONTINUED | OUTPATIENT
Start: 2022-05-24 | End: 2022-05-24

## 2022-05-24 RX ORDER — PROPOFOL 10 MG/ML
VIAL (ML) INTRAVENOUS
Status: DISCONTINUED | OUTPATIENT
Start: 2022-05-24 | End: 2022-05-24

## 2022-05-24 RX ORDER — METRONIDAZOLE 500 MG/100ML
INJECTION, SOLUTION INTRAVENOUS
Status: DISCONTINUED | OUTPATIENT
Start: 2022-05-24 | End: 2022-05-24

## 2022-05-24 RX ADMIN — CEFAZOLIN 2 G: 1 INJECTION, POWDER, FOR SOLUTION INTRAMUSCULAR; INTRAVENOUS; PARENTERAL at 11:05

## 2022-05-24 RX ADMIN — FENTANYL CITRATE 50 MCG: 50 INJECTION INTRAMUSCULAR; INTRAVENOUS at 11:05

## 2022-05-24 RX ADMIN — SUGAMMADEX 200 MG: 100 INJECTION, SOLUTION INTRAVENOUS at 12:05

## 2022-05-24 RX ADMIN — TAMSULOSIN HYDROCHLORIDE 0.4 MG: 0.4 CAPSULE ORAL at 08:05

## 2022-05-24 RX ADMIN — FUROSEMIDE 40 MG: 40 TABLET ORAL at 08:05

## 2022-05-24 RX ADMIN — Medication 1 CAPSULE: at 06:05

## 2022-05-24 RX ADMIN — POLYETHYLENE GLYCOL 3350 17 G: 17 POWDER, FOR SOLUTION ORAL at 09:05

## 2022-05-24 RX ADMIN — RISPERIDONE 1 MG: 1 TABLET ORAL at 09:05

## 2022-05-24 RX ADMIN — PANTOPRAZOLE SODIUM 40 MG: 40 TABLET, DELAYED RELEASE ORAL at 08:05

## 2022-05-24 RX ADMIN — FLUCONAZOLE 200 MG: 100 TABLET ORAL at 08:05

## 2022-05-24 RX ADMIN — METRONIDAZOLE 500 MG: 500 INJECTION, SOLUTION INTRAVENOUS at 11:05

## 2022-05-24 RX ADMIN — PHENYLEPHRINE HYDROCHLORIDE 100 MCG: 10 INJECTION INTRAVENOUS at 11:05

## 2022-05-24 RX ADMIN — Medication 1 CAPSULE: at 08:05

## 2022-05-24 RX ADMIN — INSULIN DETEMIR 15 UNITS: 100 INJECTION, SOLUTION SUBCUTANEOUS at 08:05

## 2022-05-24 RX ADMIN — SODIUM CHLORIDE: 9 INJECTION, SOLUTION INTRAVENOUS at 11:05

## 2022-05-24 RX ADMIN — PROPOFOL 150 MG: 10 INJECTION, EMULSION INTRAVENOUS at 11:05

## 2022-05-24 RX ADMIN — LOSARTAN POTASSIUM 50 MG: 50 TABLET, FILM COATED ORAL at 08:05

## 2022-05-24 RX ADMIN — EPHEDRINE SULFATE 25 MG: 50 INJECTION INTRAVENOUS at 12:05

## 2022-05-24 RX ADMIN — ROCURONIUM BROMIDE 50 MG: 10 INJECTION, SOLUTION INTRAVENOUS at 11:05

## 2022-05-24 RX ADMIN — OXCARBAZEPINE 450 MG: 150 TABLET, FILM COATED ORAL at 08:05

## 2022-05-24 RX ADMIN — METOPROLOL SUCCINATE 25 MG: 25 TABLET, FILM COATED, EXTENDED RELEASE ORAL at 08:05

## 2022-05-24 RX ADMIN — LACTULOSE 20 G: 20 SOLUTION ORAL at 08:05

## 2022-05-24 RX ADMIN — EPHEDRINE SULFATE 25 MG: 50 INJECTION INTRAVENOUS at 11:05

## 2022-05-24 RX ADMIN — MEMANTINE 10 MG: 5 TABLET ORAL at 09:05

## 2022-05-24 RX ADMIN — THERA TABS 1 TABLET: TAB at 08:05

## 2022-05-24 RX ADMIN — OXCARBAZEPINE 450 MG: 150 TABLET, FILM COATED ORAL at 09:05

## 2022-05-24 RX ADMIN — RISPERIDONE 1 MG: 1 TABLET ORAL at 08:05

## 2022-05-24 RX ADMIN — POLYETHYLENE GLYCOL 3350 17 G: 17 POWDER, FOR SOLUTION ORAL at 08:05

## 2022-05-24 RX ADMIN — ONDANSETRON 4 MG: 2 INJECTION INTRAMUSCULAR; INTRAVENOUS at 11:05

## 2022-05-24 RX ADMIN — LIDOCAINE HYDROCHLORIDE 100 MG: 20 INJECTION, SOLUTION INTRAVENOUS at 11:05

## 2022-05-24 NOTE — ANESTHESIA PREPROCEDURE EVALUATION
"                                                                                                             05/24/2022  Bhargav Gao is a 81 y.o., male.      Pre-op Assessment    I have reviewed the Patient Summary Reports.    I have reviewed the NPO Status.   I have reviewed the Medications.     Review of Systems         Anesthesia Plan  Type of Anesthesia, risks & benefits discussed:    Anesthesia Type: Gen ETT  Intra-op Monitoring Plan: Standard ASA Monitors  Post Op Pain Control Plan: IV/PO Opioids PRN  Induction:  IV  Informed Consent: Informed consent signed with the Patient representative and all parties understand the risks and agree with anesthesia plan.  All questions answered.   ASA Score: 4    Ready For Surgery From Anesthesia Perspective.     .  No known anesthetic complications  Allergies   Metformin  Not Specified  9/3/2021    Mycobacterium Tuberculosis (tuberculin Ppd)  Not Specified  9/3/2021    Norvasc [Amlodipine]  Not Specified  9/3/2021      Hct 35  5/16/22 CXR: "  1. Mildly increasing bilateral infiltrates versus edema versus progression of chronic interstitial lung disease.  Upright PA and lateral chest x-ray recommended when feasible  2. Trachea is again displaced to the right near the thoracic inlet possibly related to thyroid pathology as seen on multiple prior studies    Hypertension Schizophrenia   Hypothyroidism Idiopathic orofacial dystonia   Mild intellectual disabilities Pressure ulcer   Iron deficiency anemia secondary to blood loss (chronic) BPH (benign prostatic hyperplasia)   Alzheimer's disease Hyperlipidemia   Obesity Bipolar disorder   Diabetes mellitus      Airway exam deferred (COVID precautions)  "

## 2022-05-24 NOTE — NURSING
Left floor via hospital stretcher to be transported to surgery, no distress noted at the present time.

## 2022-05-24 NOTE — SUBJECTIVE & OBJECTIVE
Interval History: No reported problems. For colostomy today. Was unable to get done yesterday due to emergencies.     Review of Systems   Unable to perform ROS: Psychiatric disorder   Objective:     Vital Signs (Most Recent):  Temp: 98.2 °F (36.8 °C) (05/24/22 0800)  Pulse: 66 (05/24/22 0800)  Resp: 18 (05/24/22 0800)  BP: (!) 151/86 (05/24/22 0800)  SpO2: 98 % (05/24/22 0800)   Vital Signs (24h Range):  Temp:  [97.6 °F (36.4 °C)-98.6 °F (37 °C)] 97.6 °F (36.4 °C)  Pulse:  [61-81] 67  Resp:  [0-18] 11  SpO2:  [92 %-100 %] 99 %  BP: ()/(35-86) 140/71     Weight: 100 kg (220 lb 7.4 oz)  Body mass index is 37.84 kg/m².    Intake/Output Summary (Last 24 hours) at 5/24/2022 1536  Last data filed at 5/24/2022 1140  Gross per 24 hour   Intake 220 ml   Output 1400 ml   Net -1180 ml      Physical Exam  HENT:      Head: Normocephalic.      Mouth/Throat:      Mouth: Mucous membranes are moist.   Cardiovascular:      Rate and Rhythm: Normal rate.      Heart sounds: Normal heart sounds.   Pulmonary:      Breath sounds: Normal breath sounds.   Abdominal:      General: Bowel sounds are normal.      Palpations: Abdomen is soft.   Skin:     Comments: Sacral ulcer         Significant Labs: All pertinent labs within the past 24 hours have been reviewed.  BMP: No results for input(s): GLU, NA, K, CL, CO2, BUN, CREATININE, CALCIUM, MG in the last 48 hours.  CBC: No results for input(s): WBC, HGB, HCT, PLT in the last 48 hours.    Significant Imaging: I have reviewed all pertinent imaging results/findings within the past 24 hours.

## 2022-05-24 NOTE — HOSPITAL COURSE
Patient has a sacral wound    Needs diverting colostomy for appropriate wound care and keep keeping sacral wounds clean    Family agreeable

## 2022-05-24 NOTE — ANESTHESIA PROCEDURE NOTES
Intubation    Date/Time: 5/24/2022 11:43 AM  Performed by: Alex Linda CRNA  Authorized by: Alden Babcock MD     Intubation:     Induction:  Intravenous    Intubated:  Postinduction    Mask Ventilation:  Easy mask    Attempts:  1    Attempted By:  CRNA    Method of Intubation:  Direct    Blade:  Nikki 4    Laryngeal View Grade: Grade I - full view of cords      Difficult Airway Encountered?: No      Complications:  None    Airway Device:  Oral endotracheal tube    Airway Device Size:  7.0    Style/Cuff Inflation:  Cuffed    Inflation Amount (mL):  7    Tube secured:  21    Secured at:  The lips    Placement Verified By:  Capnometry    Complicating Factors:  None    Findings Post-Intubation:  BS equal bilateral and atraumatic/condition of teeth unchanged

## 2022-05-24 NOTE — PROGRESS NOTES
[facility] Medicine  Progress Note    Patient Name: Bhargav Gao  MRN: [id number]  Patient Class: IP- Inpatient   Admission Date: 5/16/2022  Length of Stay: 8 days  Attending Physician: Hipolito Nance Jr., MD  Primary Care Provider: Kerry Lin MD        Subjective:     Principal Problem:Sacral wound        HPI:  Mr. Bhargav Gao is an 80 yo AAM resident of [facility] with medical history of hypertension, diabetes mellitus, hypothyroidism, cataracts, DVT, schizophrenia, mild intellectual disabilities, drug induced subacute dyskinesia, bipolar disorder and sacral wound. Mr. Gao was sent for admission due to sacral wound. Mr. Gao can say his name but he is unable to provide any further info. Records show wound culture from 05/03 grew ecoli, acinetobacter baumannii ,K. Pneumoniae, MRSA and e. Faecalis. Was given 7 days of zyvox and invanz. Acinetobacter not sensitive to ertapenem. Sent to [facility] for IV abx and wound care.      Overview/Hospital Course:  05/17 Patient without complaints. Sacral ulcer clean but gets contaminated with stool. Will speak with family about colostomy.  5/18 Spoke with patient's niece. She is agreeable to colostomy. Will consult Dr. Felix  05/23 Awake and resting in bed. Smiles at me. No complaints. Chest is clear. Heart RRR. Continue wound care to sacrum.      Interval History: 05/23 Awake and resting in bed. Smiles at me. No complaints. Chest is clear. Heart RRR. Continue wound care to sacrum.    Review of Systems   Reason unable to perform ROS: psychiatric disorder.   Objective:     Vital Signs (Most Recent):  Temp: 98.2 °F (36.8 °C) (05/23/22 1200)  Pulse: 94 (05/23/22 1200)  Resp: 18 (05/23/22 1200)  BP: (!) 157/85 (05/23/22 1200)  SpO2: 100 % (05/23/22 1200)   Vital Signs (24h Range):  Temp:  [97.5 °F (36.4 °C)-98.3 °F (36.8 °C)] 98.2 °F (36.8 °C)  Pulse:  [68-94] 94  Resp:  [16-18] 18  SpO2:  [97 %-100 %] 100 %  BP:  (126-166)/() 157/85     Weight: 100 kg (220 lb 7.4 oz)  Body mass index is 37.84 kg/m².    Intake/Output Summary (Last 24 hours) at 5/23/2022 1236  Last data filed at 5/23/2022 0600  Gross per 24 hour   Intake --   Output 900 ml   Net -900 ml      Physical Exam  HENT:      Head: Normocephalic.      Mouth/Throat:      Mouth: Mucous membranes are moist.   Cardiovascular:      Rate and Rhythm: Normal rate.      Heart sounds: Normal heart sounds.   Pulmonary:      Breath sounds: Normal breath sounds.   Abdominal:      General: Bowel sounds are normal.      Palpations: Abdomen is soft.   Skin:     Comments: Sacral ulcer         Significant Labs: All pertinent labs within the past 24 hours have been reviewed.  Recent Lab Results  (Last 5 results in the past 24 hours)        05/23/22  1132   05/23/22  0905   05/23/22  0543   05/22/22  2122   05/22/22  1447        POC Glucose 100   119   143   158   154                              Significant Imaging: I have reviewed all pertinent imaging results/findings within the past 24 hours.      Assessment/Plan:      * Sacral wound  05/16  -records show wound culture that grew  Culture, Wound [[id number]] (Abnormal)  Collected: 05/05/22 0920   Order Status: Completed Specimen: Wound from Sacral Updated: 05/11/22 1318    Culture, Wound/Abscess Light Growth Acinetobacter baumannii complex/haemolyticus Abnormal     Comment: Corrected result: Previously reported as Gram-negative Bacilli on 5/9/2022 at 1312 CDT.        Light Growth Escherichia coli Abnormal      Light Growth Klebsiella pneumoniae Abnormal     Comment: Corrected result: Previously reported as Gram-negative Bacilli on 5/8/2022 at 0843 CDT.        Heavy Growth Methicillin resistant Staphylococcus aureus Abnormal     Comment: Corrected result: Previously reported as Gram-positive Cocci on 5/8/2022 at 0843 CDT.   Gram-positive Cocci has been updated to reportable.        Heavy Growth Enterococcus faecalis Abnormal       Was treated with invanz and zyvox for 7 days  Acinetobacter not sensitive to invanz and zyvox    -ID consult placed for antibiotic recommendations    5/19  -plans for colostomy to aid in wound care. Will discuss with surgery.    5/21 - discussed with surgery. Plan colostomy Monday.     5/22 - colostomy tomorrow.     5/23 - colostomy today. Continue wound care.    Hypertension  05/16  -continue home meds of losartan and metoprolol    Diabetes mellitus  05/16  -continue home dose of levemir 15 units daily with low dose SSI coverage  -accuchecks ac and hs   -a1c on 03/02 was 6.5    5/22 - glucoses remain acceptable.       Pressure ulcer        Alzheimer's disease    05/16 continue namenda      VTE Risk Mitigation (From admission, onward)         Ordered     IP VTE HIGH RISK PATIENT  Once         05/16/22 1152     Place sequential compression device  Until discontinued         05/16/22 1152                Discharge Planning   YOSSI:      Code Status: Prior   Is the patient medically ready for discharge?:     Reason for patient still in hospital (select all that apply): Treatment  Discharge Plan A: Return to nursing home   Discharge Delays: None known at this time              Hipolito Nance Jr, MD  Department of Hospital Medicine   [facility]

## 2022-05-24 NOTE — CONSULTS
[facility]  General Surgery  Consult Note    Patient Name: Bhargav Gao  MRN: [id number]  Code Status: Prior  Admission Date: 5/16/2022  Hospital Length of Stay: 8 days  Attending Physician: Hipolito Nance Jr., MD  Primary Care Provider: Kerry Lin MD    Patient information was obtained from relative(s) and ER records.     Consults  Subjective:     Principal Problem: Sacral wound    History of Present Illness: Nursing home resident  admitted with sacral wound for iv abx, incontinent bedbound with DM, HTN, Schizophrenia, DVT on eliquis  General surgery consulted for diverting colostomy      No current facility-administered medications on file prior to encounter.     Current Outpatient Medications on File Prior to Encounter   Medication Sig    apixaban (ELIQUIS) 5 mg Tab Take 5 mg by mouth 2 (two) times daily.    calcium carbonate/vitamin D3 (CALTRATE 600 + D ORAL) Take 1 tablet by mouth 2 (two) times daily.    furosemide (LASIX) 40 MG tablet Take 40 mg by mouth once daily. Hold for SBP < 110 or DBP < 60    HYDROcodone-acetaminophen (NORCO) 7.5-325 mg per tablet Take 1 tablet by mouth 2 (two) times a day. Hold if increased drowsiness or O2 sat < 95%    HYDROcodone-acetaminophen (NORCO) 7.5-325 mg per tablet Take 1 tablet by mouth once daily. 30 minutes before wound care    insulin detemir U-100 (LEVEMIR) 100 unit/mL injection Inject 15 Units into the skin every evening.    insulin lispro 100 unit/mL injection Inject 1-5 Units into the skin 3 (three) times daily before meals. 201-250 = 1 unit, 251-300 = 2 units, 351-400 = 4 units, > 400 = 5 units    lactulose (CHRONULAC) 10 gram/15 mL solution Take 20 g by mouth once daily.    levothyroxine (SYNTHROID) 50 MCG tablet Take 1 tablet (50 mcg total) by mouth before breakfast.    losartan (COZAAR) 50 MG tablet Take 50 mg by mouth once daily. Hold for SBP < 110 or DBP < 60    memantine (NAMENDA) 10 MG Tab Take 10 mg by mouth nightly.     metoprolol succinate (TOPROL-XL) 25 MG 24 hr tablet Take 1 tablet by mouth once daily. Hold for SBP < 110 or DBP < 60 or pulse < 50    OXcarbazepine (TRILEPTAL) 150 MG Tab Take 450 mg by mouth 2 (two) times daily.    pantoprazole (PROTONIX) 40 MG tablet Take 1 tablet by mouth once daily.    polyethylene glycol (GLYCOLAX) 17 gram PwPk Take 17 g by mouth 2 (two) times a day.    risperiDONE (RISPERDAL) 1 MG tablet Take 1 mg by mouth 2 (two) times daily.    SITagliptin (JANUVIA) 50 MG Tab Take 1 tablet (50 mg total) by mouth once daily.    tamsulosin (FLOMAX) 0.4 mg Cap Take 1 capsule by mouth once daily.       Review of patient's allergies indicates:   Allergen Reactions    Metformin     Mycobacterium tuberculosis (tuberculin ppd)     Norvasc [amlodipine]        Past Medical History:   Diagnosis Date    Alzheimer's disease     Bipolar disorder     BPH (benign prostatic hyperplasia)     Diabetes mellitus     Hyperlipidemia     Hypertension     Hypothyroidism     Idiopathic orofacial dystonia     Iron deficiency anemia secondary to blood loss (chronic)     Mild intellectual disabilities     Obesity     Pressure ulcer     Schizophrenia      No past surgical history on file.  Family History       Family history is unknown by patient.          Tobacco Use    Smoking status: Never Smoker    Smokeless tobacco: Never Used   Substance and Sexual Activity    Alcohol use: Not Currently    Drug use: Never    Sexual activity: Not Currently     Review of Systems  Objective:     Vital Signs (Most Recent):  Temp: 98.2 °F (36.8 °C) (05/24/22 0800)  Pulse: 66 (05/24/22 0800)  Resp: 18 (05/24/22 0800)  BP: (!) 151/86 (05/24/22 0800)  SpO2: 98 % (05/24/22 0800)   Vital Signs (24h Range):  Temp:  [98.2 °F (36.8 °C)-98.6 °F (37 °C)] 98.2 °F (36.8 °C)  Pulse:  [66-94] 66  Resp:  [18] 18  SpO2:  [95 %-100 %] 98 %  BP: (108-157)/(72-86) 151/86     Weight: 100 kg (220 lb 7.4 oz)  Body mass index is 37.84  kg/m².    Physical Exam  Constitutional:       Comments: Makes eye contact but will not communicate   HENT:      Head: Normocephalic.   Cardiovascular:      Rate and Rhythm: Normal rate.   Pulmonary:      Effort: No respiratory distress.      Breath sounds: No stridor.   Abdominal:      Comments: No previous abdominal incisions   Skin:     General: Skin is warm.       Significant Labs:  I have reviewed all pertinent lab results within the past 24 hours.  CBC: No results for input(s): WBC, RBC, HGB, HCT, PLT, MCV, MCH, MCHC in the last 168 hours.  BMP:   Recent Labs   Lab 05/21/22  0426         K 4.3   CL 97*   CO2 27   BUN 15   CREATININE 0.56*   CALCIUM 9.7       Significant Diagnostics:  I have reviewed all pertinent imaging results/findings within the past 24 hours.      Assessment/Plan:     * Sacral wound  05/18  Hold eliquis for 48 hours prior to lovenox, can bridge with theraputic lovenox if needed  Diverting colostomy timing per dr felix  Will update niece [phone]    Pressure ulcer  Plan is placement of colostomy      VTE Risk Mitigation (From admission, onward)         Ordered     [Order Hold - Suspended Admission]  IP VTE HIGH RISK PATIENT  Once   (On hold at [facility] since 05/24/22 1026)    05/16/22 1152     [Order Hold - Suspended Admission]  Place sequential compression device  Until discontinued   (On hold at [facility] since 05/24/22 1026)    05/16/22 1152                Thank you for your consult. I will follow-up with patient. Please contact us if you have any additional questions.    Edilma Felix MD  General Surgery  [facility]

## 2022-05-24 NOTE — ANESTHESIA POSTPROCEDURE EVALUATION
Anesthesia Post Evaluation    Patient: Bhargav Gao    Procedure(s) Performed: Procedure(s) (LRB):  CREATION, COLOSTOMY (N/A)    Final Anesthesia Type: general      Patient location during evaluation: PACU  Patient participation: Yes- Able to Participate  Level of consciousness: awake and alert  Post-procedure vital signs: reviewed and stable  Pain management: adequate  Airway patency: patent    PONV status at discharge: No PONV  Anesthetic complications: no      Cardiovascular status: hemodynamically stable  Respiratory status: unassisted  Hydration status: euvolemic  Follow-up not needed.          Vitals Value Taken Time   /71 05/24/22 1350   Temp 36.4 °C (97.6 °F) 05/24/22 1239   Pulse 67 05/24/22 1355   Resp 11 05/24/22 1355   SpO2 99 % 05/24/22 1355         Event Time   Out of Recovery 13:55:00         Pain/Helen Score: Pain Rating Prior to Med Admin: 4 (5/23/2022 12:51 AM)  Pain Rating Post Med Admin: 0 (5/23/2022  1:51 AM)  Helen Score: 10 (5/24/2022  1:45 PM)

## 2022-05-24 NOTE — SUBJECTIVE & OBJECTIVE
No current facility-administered medications on file prior to encounter.     Current Outpatient Medications on File Prior to Encounter   Medication Sig    apixaban (ELIQUIS) 5 mg Tab Take 5 mg by mouth 2 (two) times daily.    calcium carbonate/vitamin D3 (CALTRATE 600 + D ORAL) Take 1 tablet by mouth 2 (two) times daily.    furosemide (LASIX) 40 MG tablet Take 40 mg by mouth once daily. Hold for SBP < 110 or DBP < 60    HYDROcodone-acetaminophen (NORCO) 7.5-325 mg per tablet Take 1 tablet by mouth 2 (two) times a day. Hold if increased drowsiness or O2 sat < 95%    HYDROcodone-acetaminophen (NORCO) 7.5-325 mg per tablet Take 1 tablet by mouth once daily. 30 minutes before wound care    insulin detemir U-100 (LEVEMIR) 100 unit/mL injection Inject 15 Units into the skin every evening.    insulin lispro 100 unit/mL injection Inject 1-5 Units into the skin 3 (three) times daily before meals. 201-250 = 1 unit, 251-300 = 2 units, 351-400 = 4 units, > 400 = 5 units    lactulose (CHRONULAC) 10 gram/15 mL solution Take 20 g by mouth once daily.    levothyroxine (SYNTHROID) 50 MCG tablet Take 1 tablet (50 mcg total) by mouth before breakfast.    losartan (COZAAR) 50 MG tablet Take 50 mg by mouth once daily. Hold for SBP < 110 or DBP < 60    memantine (NAMENDA) 10 MG Tab Take 10 mg by mouth nightly.    metoprolol succinate (TOPROL-XL) 25 MG 24 hr tablet Take 1 tablet by mouth once daily. Hold for SBP < 110 or DBP < 60 or pulse < 50    OXcarbazepine (TRILEPTAL) 150 MG Tab Take 450 mg by mouth 2 (two) times daily.    pantoprazole (PROTONIX) 40 MG tablet Take 1 tablet by mouth once daily.    polyethylene glycol (GLYCOLAX) 17 gram PwPk Take 17 g by mouth 2 (two) times a day.    risperiDONE (RISPERDAL) 1 MG tablet Take 1 mg by mouth 2 (two) times daily.    SITagliptin (JANUVIA) 50 MG Tab Take 1 tablet (50 mg total) by mouth once daily.    tamsulosin (FLOMAX) 0.4 mg Cap Take 1 capsule by mouth once daily.       Review of patient's  allergies indicates:   Allergen Reactions    Metformin     Mycobacterium tuberculosis (tuberculin ppd)     Norvasc [amlodipine]        Past Medical History:   Diagnosis Date    Alzheimer's disease     Bipolar disorder     BPH (benign prostatic hyperplasia)     Diabetes mellitus     Hyperlipidemia     Hypertension     Hypothyroidism     Idiopathic orofacial dystonia     Iron deficiency anemia secondary to blood loss (chronic)     Mild intellectual disabilities     Obesity     Pressure ulcer     Schizophrenia      No past surgical history on file.  Family History       Family history is unknown by patient.          Tobacco Use    Smoking status: Never Smoker    Smokeless tobacco: Never Used   Substance and Sexual Activity    Alcohol use: Not Currently    Drug use: Never    Sexual activity: Not Currently     Review of Systems  Objective:     Vital Signs (Most Recent):  Temp: 98.2 °F (36.8 °C) (05/24/22 0800)  Pulse: 66 (05/24/22 0800)  Resp: 18 (05/24/22 0800)  BP: (!) 151/86 (05/24/22 0800)  SpO2: 98 % (05/24/22 0800)   Vital Signs (24h Range):  Temp:  [98.2 °F (36.8 °C)-98.6 °F (37 °C)] 98.2 °F (36.8 °C)  Pulse:  [66-94] 66  Resp:  [18] 18  SpO2:  [95 %-100 %] 98 %  BP: (108-157)/(72-86) 151/86     Weight: 100 kg (220 lb 7.4 oz)  Body mass index is 37.84 kg/m².    Physical Exam  Constitutional:       Comments: Makes eye contact but will not communicate   HENT:      Head: Normocephalic.   Cardiovascular:      Rate and Rhythm: Normal rate.   Pulmonary:      Effort: No respiratory distress.      Breath sounds: No stridor.   Abdominal:      Comments: No previous abdominal incisions   Skin:     General: Skin is warm.       Significant Labs:  I have reviewed all pertinent lab results within the past 24 hours.  CBC: No results for input(s): WBC, RBC, HGB, HCT, PLT, MCV, MCH, MCHC in the last 168 hours.  BMP:   Recent Labs   Lab 05/21/22  0426         K 4.3   CL 97*   CO2 27   BUN 15   CREATININE 0.56*   CALCIUM  9.7       Significant Diagnostics:  I have reviewed all pertinent imaging results/findings within the past 24 hours.

## 2022-05-24 NOTE — NURSING
Returned to room from Surgery, no distress noted. Colostomy bag noted to left lower quadrant. Scant amount of blood in colostomy bag. Comfort measures offered.

## 2022-05-24 NOTE — TRANSFER OF CARE
Anesthesia Transfer of Care Note    Patient: Bhargav Gao    Procedure(s) Performed: Procedure(s) (LRB):  CREATION, COLOSTOMY (N/A)    Patient location: PACU    Anesthesia Type: general    Transport from OR: Transported from OR on room air with adequate spontaneous ventilation    Post pain: adequate analgesia    Post assessment: no apparent anesthetic complications and tolerated procedure well    Post vital signs: stable    Level of consciousness: responds to stimulation    Nausea/Vomiting: no nausea/vomiting    Complications: none    Transfer of care protocol was followedComments: Report Given to PACU rn VSS      Last vitals:   Visit Vitals  BP (!) 99/48   Pulse 68   Temp 36.4 °C (97.6 °F) (Oral)   SpO2 95%

## 2022-05-24 NOTE — PROGRESS NOTES
Wound care note;  Patient skin evaluated today  Patient in bed, alert, looking around.  Sacral wound with mild odor noted. Small amount of serous drainage, scant amount of green drainage from old dressing.  Has LLQ colostomy , surgery today. Dark red stoma with red tubing noted . No stool noted in pouch  Cont vashe moist gauze   Cont turn protocol  Waffle boots  On low air loss mattress  Wound care team to follow

## 2022-05-25 LAB
GLUCOSE SERPL-MCNC: 179 MG/DL (ref 70–105)
GLUCOSE SERPL-MCNC: 186 MG/DL (ref 70–105)
GLUCOSE SERPL-MCNC: 210 MG/DL (ref 70–105)
GLUCOSE SERPL-MCNC: 218 MG/DL (ref 70–105)
GLUCOSE SERPL-MCNC: 263 MG/DL (ref 70–105)

## 2022-05-25 PROCEDURE — 11000001 HC ACUTE MED/SURG PRIVATE ROOM

## 2022-05-25 PROCEDURE — 99232 PR SUBSEQUENT HOSPITAL CARE,LEVL II: ICD-10-PCS | Mod: ,,, | Performed by: FAMILY MEDICINE

## 2022-05-25 PROCEDURE — 25000003 PHARM REV CODE 250: Performed by: NURSE PRACTITIONER

## 2022-05-25 PROCEDURE — 63700000 PHARM REV CODE 250 ALT 637 W/O HCPCS: Performed by: FAMILY MEDICINE

## 2022-05-25 PROCEDURE — 82962 GLUCOSE BLOOD TEST: CPT

## 2022-05-25 PROCEDURE — 25000003 PHARM REV CODE 250: Performed by: FAMILY MEDICINE

## 2022-05-25 PROCEDURE — 99232 SBSQ HOSP IP/OBS MODERATE 35: CPT | Mod: ,,, | Performed by: FAMILY MEDICINE

## 2022-05-25 PROCEDURE — C9399 UNCLASSIFIED DRUGS OR BIOLOG: HCPCS | Performed by: NURSE PRACTITIONER

## 2022-05-25 PROCEDURE — 92610 EVALUATE SWALLOWING FUNCTION: CPT

## 2022-05-25 PROCEDURE — 63600175 PHARM REV CODE 636 W HCPCS: Performed by: NURSE PRACTITIONER

## 2022-05-25 RX ORDER — PANTOPRAZOLE SODIUM 40 MG/1
40 FOR SUSPENSION ORAL DAILY
Status: DISCONTINUED | OUTPATIENT
Start: 2022-05-25 | End: 2022-05-31 | Stop reason: HOSPADM

## 2022-05-25 RX ORDER — METOPROLOL TARTRATE 25 MG/1
12.5 TABLET ORAL 2 TIMES DAILY
Status: DISCONTINUED | OUTPATIENT
Start: 2022-05-25 | End: 2022-05-31 | Stop reason: HOSPADM

## 2022-05-25 RX ADMIN — Medication 1 CAPSULE: at 12:05

## 2022-05-25 RX ADMIN — METOPROLOL TARTRATE 12.5 MG: 25 TABLET, FILM COATED ORAL at 08:05

## 2022-05-25 RX ADMIN — INSULIN DETEMIR 15 UNITS: 100 INJECTION, SOLUTION SUBCUTANEOUS at 09:05

## 2022-05-25 RX ADMIN — POLYETHYLENE GLYCOL 3350 17 G: 17 POWDER, FOR SOLUTION ORAL at 08:05

## 2022-05-25 RX ADMIN — INSULIN ASPART 1 UNITS: 100 INJECTION, SOLUTION INTRAVENOUS; SUBCUTANEOUS at 01:05

## 2022-05-25 RX ADMIN — THERA TABS 1 TABLET: TAB at 08:05

## 2022-05-25 RX ADMIN — FUROSEMIDE 40 MG: 40 TABLET ORAL at 08:05

## 2022-05-25 RX ADMIN — FLUCONAZOLE 200 MG: 100 TABLET ORAL at 09:05

## 2022-05-25 RX ADMIN — OXCARBAZEPINE 450 MG: 150 TABLET, FILM COATED ORAL at 08:05

## 2022-05-25 RX ADMIN — LEVOTHYROXINE SODIUM 50 MCG: 50 TABLET ORAL at 06:05

## 2022-05-25 RX ADMIN — RISPERIDONE 1 MG: 1 TABLET ORAL at 08:05

## 2022-05-25 RX ADMIN — LACTULOSE 20 G: 20 SOLUTION ORAL at 08:05

## 2022-05-25 RX ADMIN — Medication 1 CAPSULE: at 04:05

## 2022-05-25 RX ADMIN — MEMANTINE 10 MG: 5 TABLET ORAL at 08:05

## 2022-05-25 RX ADMIN — PANTOPRAZOLE SODIUM 40 MG: 40 GRANULE, DELAYED RELEASE ORAL at 10:05

## 2022-05-25 RX ADMIN — INSULIN ASPART 2 UNITS: 100 INJECTION, SOLUTION INTRAVENOUS; SUBCUTANEOUS at 06:05

## 2022-05-25 RX ADMIN — LOSARTAN POTASSIUM 50 MG: 50 TABLET, FILM COATED ORAL at 08:05

## 2022-05-25 RX ADMIN — INSULIN ASPART 2 UNITS: 100 INJECTION, SOLUTION INTRAVENOUS; SUBCUTANEOUS at 12:05

## 2022-05-25 RX ADMIN — TAMSULOSIN HYDROCHLORIDE 0.4 MG: 0.4 CAPSULE ORAL at 08:05

## 2022-05-25 RX ADMIN — Medication 1 CAPSULE: at 08:05

## 2022-05-25 NOTE — NURSING
Metoprolol was held due to patient can not take medication in whole, per Nurse practioner Santiago Valencia

## 2022-05-25 NOTE — PT/OT/SLP EVAL
Speech Language Pathology Evaluation  Bedside Swallow    Patient Name:  Bhargav Gao   MRN:  [id number]  Admitting Diagnosis: Sacral wound    Recommendations:                 General Recommendations:  NPO; Repeat BSE in AM  Diet recommendations:  NPO, NPO   Aspiration Precautions: NPO   General Precautions: Standard, contact, NPO  Communication strategies:  yes/no questions only and provide increased time to answer    History:     Past Medical History:   Diagnosis Date    Alzheimer's disease     Bipolar disorder     BPH (benign prostatic hyperplasia)     Diabetes mellitus     Hyperlipidemia     Hypertension     Hypothyroidism     Idiopathic orofacial dystonia     Iron deficiency anemia secondary to blood loss (chronic)     Mild intellectual disabilities     Obesity     Pressure ulcer     Schizophrenia        Past Surgical History:   Procedure Laterality Date    COLOSTOMY N/A 5/24/2022    Procedure: CREATION, COLOSTOMY;  Surgeon: Edilma Felix MD;  Location: [facility];  Service: General;  Laterality: N/A;  diverting colostomy dr felix tuesday       Social History: Patient lives at nursing facility.    Prior Intubation HX:  None reported    Modified Barium Swallow: N/A    Chest X-Rays: See chart    Prior diet: Pureed diet with nectar consistency liquids.    Occupation/hobbies/homemaking: None reported.    Subjective     Patient lying in bed.  HOB elevated by SLP.  Patient goals: None stated     Pain/Comfort:  · Pain Rating 1: 0/10    Respiratory Status: Room air    Objective:     Oral Musculature Evaluation  · Oral Musculature Comments: Unable to complete oral-motor examination due to patient did not follow commands.  Pt is edentulous.    Bedside Swallow Eval:   Consistencies Assessed:  · Puree Pt exhibited repeated coughs with second administration of pudding consistency.     Oral Phase:   · WFL    Pharyngeal Phase:   · coughing/choking   · Wet vocal quality after swallow    Compensatory  Strategies  · None    Treatment: ST not recommended. Recommend repeat BSE in am; NPO until further assessment of swallowing.     Assessment:     Bhargav Gao is a 81 y.o. male with an SLP diagnosis of Dysphagia. Nurse reported patient tolerated crushed meds and diet this morning.  He presents with excessive coughing with pudding consistency.  Rec NPO and repeat BSE in am.     Goals:   Multidisciplinary Problems     SLP Goals     Not on file                Plan:     · Patient to be seen:      · Plan of Care expires:     · Plan of Care reviewed with:  other (see comments) (Nurse)   · SLP Follow-Up:  Yes       Discharge recommendations:  nursing facility, basic   Barriers to Discharge:  Level of Skilled Assistance Needed Pt requires extensive assistance for ADLS.     Time Tracking:     SLP Treatment Date:      Speech Start Time:     Speech Stop Time:        Speech Total Time (min):       Billable Minutes: Eval Swallow and Oral Function 10 minutes    05/25/2022

## 2022-05-26 LAB
GLUCOSE SERPL-MCNC: 111 MG/DL (ref 70–105)
GLUCOSE SERPL-MCNC: 119 MG/DL (ref 70–105)
GLUCOSE SERPL-MCNC: 159 MG/DL (ref 70–105)
GLUCOSE SERPL-MCNC: 177 MG/DL (ref 70–105)

## 2022-05-26 PROCEDURE — 25000003 PHARM REV CODE 250: Performed by: FAMILY MEDICINE

## 2022-05-26 PROCEDURE — 92610 EVALUATE SWALLOWING FUNCTION: CPT

## 2022-05-26 PROCEDURE — 11000001 HC ACUTE MED/SURG PRIVATE ROOM

## 2022-05-26 PROCEDURE — 63600175 PHARM REV CODE 636 W HCPCS: Performed by: NURSE PRACTITIONER

## 2022-05-26 PROCEDURE — 99232 PR SUBSEQUENT HOSPITAL CARE,LEVL II: ICD-10-PCS | Mod: ,,, | Performed by: NURSE PRACTITIONER

## 2022-05-26 PROCEDURE — 63700000 PHARM REV CODE 250 ALT 637 W/O HCPCS: Performed by: FAMILY MEDICINE

## 2022-05-26 PROCEDURE — C9399 UNCLASSIFIED DRUGS OR BIOLOG: HCPCS | Performed by: NURSE PRACTITIONER

## 2022-05-26 PROCEDURE — 82962 GLUCOSE BLOOD TEST: CPT

## 2022-05-26 PROCEDURE — 99232 PR SUBSEQUENT HOSPITAL CARE,LEVL II: ICD-10-PCS | Mod: ,,, | Performed by: FAMILY MEDICINE

## 2022-05-26 PROCEDURE — 25000003 PHARM REV CODE 250: Performed by: NURSE PRACTITIONER

## 2022-05-26 PROCEDURE — 99232 SBSQ HOSP IP/OBS MODERATE 35: CPT | Mod: ,,, | Performed by: FAMILY MEDICINE

## 2022-05-26 PROCEDURE — 99232 SBSQ HOSP IP/OBS MODERATE 35: CPT | Mod: ,,, | Performed by: NURSE PRACTITIONER

## 2022-05-26 RX ORDER — ACETIC ACID 0.25 G/100ML
IRRIGANT IRRIGATION DAILY PRN
Status: DISCONTINUED | OUTPATIENT
Start: 2022-05-27 | End: 2022-05-27

## 2022-05-26 RX ORDER — MICONAZOLE NITRATE 2 %
POWDER (GRAM) TOPICAL 2 TIMES DAILY
Status: DISCONTINUED | OUTPATIENT
Start: 2022-05-26 | End: 2022-05-31 | Stop reason: HOSPADM

## 2022-05-26 RX ADMIN — Medication 1 CAPSULE: at 08:05

## 2022-05-26 RX ADMIN — RISPERIDONE 1 MG: 1 TABLET ORAL at 08:05

## 2022-05-26 RX ADMIN — LOSARTAN POTASSIUM 50 MG: 50 TABLET, FILM COATED ORAL at 08:05

## 2022-05-26 RX ADMIN — OXCARBAZEPINE 450 MG: 150 TABLET, FILM COATED ORAL at 08:05

## 2022-05-26 RX ADMIN — Medication 1 CAPSULE: at 04:05

## 2022-05-26 RX ADMIN — LACTULOSE 20 G: 20 SOLUTION ORAL at 08:05

## 2022-05-26 RX ADMIN — FLUCONAZOLE 200 MG: 100 TABLET ORAL at 08:05

## 2022-05-26 RX ADMIN — TAMSULOSIN HYDROCHLORIDE 0.4 MG: 0.4 CAPSULE ORAL at 08:05

## 2022-05-26 RX ADMIN — METOPROLOL TARTRATE 12.5 MG: 25 TABLET, FILM COATED ORAL at 08:05

## 2022-05-26 RX ADMIN — THERA TABS 1 TABLET: TAB at 08:05

## 2022-05-26 RX ADMIN — PANTOPRAZOLE SODIUM 40 MG: 40 GRANULE, DELAYED RELEASE ORAL at 08:05

## 2022-05-26 RX ADMIN — INSULIN DETEMIR 15 UNITS: 100 INJECTION, SOLUTION SUBCUTANEOUS at 08:05

## 2022-05-26 RX ADMIN — LEVOTHYROXINE SODIUM 50 MCG: 50 TABLET ORAL at 05:05

## 2022-05-26 RX ADMIN — Medication 1 CAPSULE: at 12:05

## 2022-05-26 RX ADMIN — FUROSEMIDE 40 MG: 40 TABLET ORAL at 08:05

## 2022-05-26 RX ADMIN — MICONAZOLE NITRATE: 20 POWDER TOPICAL at 09:05

## 2022-05-26 RX ADMIN — POLYETHYLENE GLYCOL 3350 17 G: 17 POWDER, FOR SOLUTION ORAL at 08:05

## 2022-05-26 RX ADMIN — MEMANTINE 10 MG: 5 TABLET ORAL at 08:05

## 2022-05-26 NOTE — SUBJECTIVE & OBJECTIVE
Interval History: S/p colostomy, Doing well.     Review of Systems   Unable to perform ROS: Psychiatric disorder   Objective:     Vital Signs (Most Recent):  Temp: 97.2 °F (36.2 °C) (05/25/22 1600)  Pulse: 82 (05/25/22 1600)  Resp: 20 (05/25/22 1600)  BP: (!) 161/82 (05/25/22 1600)  SpO2: 98 % (05/25/22 1600)   Vital Signs (24h Range):  Temp:  [97.2 °F (36.2 °C)-98.6 °F (37 °C)] 97.2 °F (36.2 °C)  Pulse:  [80-84] 82  Resp:  [20] 20  SpO2:  [98 %-100 %] 98 %  BP: (152-167)/(80-91) 161/82     Weight: 100 kg (220 lb 7.4 oz)  Body mass index is 37.84 kg/m².    Intake/Output Summary (Last 24 hours) at 5/25/2022 2011  Last data filed at 5/25/2022 1715  Gross per 24 hour   Intake 240 ml   Output 600 ml   Net -360 ml      Physical Exam  Vitals reviewed.   Constitutional:       Appearance: He is not ill-appearing.   Cardiovascular:      Rate and Rhythm: Normal rate and regular rhythm.      Heart sounds: Normal heart sounds.   Pulmonary:      Effort: Pulmonary effort is normal. No respiratory distress.      Breath sounds: Normal breath sounds.   Abdominal:      General: Abdomen is flat. Bowel sounds are normal. There is no distension.      Palpations: Abdomen is soft.      Tenderness: There is no abdominal tenderness. There is no guarding.      Comments: Gas in colostomy bag.    Neurological:      Mental Status: He is alert.       Significant Labs: All pertinent labs within the past 24 hours have been reviewed.  BMP: No results for input(s): GLU, NA, K, CL, CO2, BUN, CREATININE, CALCIUM, MG in the last 48 hours.  CBC: No results for input(s): WBC, HGB, HCT, PLT in the last 48 hours.    Significant Imaging: I have reviewed all pertinent imaging results/findings within the past 24 hours.

## 2022-05-26 NOTE — PROGRESS NOTES
[facility]  Wound Care  Progress Note    Patient Name: Bhargav Gao  MRN: [id number]  Admission Date: 5/16/2022  Attending Physician: Hipolito Nance Jr., MD    Past Medical History:   Diagnosis Date    Alzheimer's disease     Bipolar disorder     BPH (benign prostatic hyperplasia)     Diabetes mellitus     Hyperlipidemia     Hypertension     Hypothyroidism     Idiopathic orofacial dystonia     Iron deficiency anemia secondary to blood loss (chronic)     Mild intellectual disabilities     Obesity     Pressure ulcer     Schizophrenia         Subjective:     HPI:  Bhargav Gao is a 80 yo male with infected sacral wound. Past medical history includes hypertension, diabetes mellitus, hypothyroidism, cataracts, DVT, schizophrenia, mild intellectual disabilities, drug induced subacute dyskinesia, bipolar disorder and sacral wound. He was admitted for IV antibiotics and wound care. Pertinent factors that delay wound healing include multiple co-morbidities, heavy drainage, excessive wound moisture and macerated tissue, advanced age, fragile skin and no protective sensation.        Review of Systems   Constitutional: Positive for activity change. Negative for chills and fever.   Respiratory: Negative for chest tightness and shortness of breath.    Cardiovascular: Positive for leg swelling. Negative for chest pain and palpitations.   Musculoskeletal: Positive for arthralgias and joint swelling.   Skin: Positive for wound.        wound   Neurological: Positive for weakness and numbness.   Psychiatric/Behavioral: Negative for agitation, behavioral problems, confusion and self-injury.     Objective:     Vital Signs (Most Recent):  Temp: 97.9 °F (36.6 °C) (05/26/22 0800)  Pulse: 63 (05/26/22 0847)  Resp: 15 (05/26/22 0800)  BP: (!) 177/89 (05/26/22 0847)  SpO2: 97 % (05/26/22 0800) Vital Signs (24h Range):  Temp:  [97.2 °F (36.2 °C)-98.4 °F (36.9 °C)] 97.9 °F (36.6 °C)  Pulse:  [] 63  Resp:   [15-20] 15  SpO2:  [96 %-98 %] 97 %  BP: (139-177)/(73-90) 177/89     Weight: 100 kg (220 lb 7.4 oz)  Body mass index is 37.84 kg/m².  No data recorded    Physical Exam  Vitals reviewed.   Constitutional:       Appearance: Normal appearance.   HENT:      Head: Normocephalic.   Cardiovascular:      Rate and Rhythm: Normal rate and regular rhythm.      Pulses: Normal pulses.      Heart sounds: Normal heart sounds.   Pulmonary:      Effort: Pulmonary effort is normal.      Breath sounds: Normal breath sounds.   Skin:     Findings: Erythema present.      Comments: Wound, see wound assessment and pictures   Neurological:      Mental Status: He is alert. Mental status is at baseline.      Motor: Weakness present.               Altered Skin Integrity 05/16/22 1115 Sacral spine #1 (Active)   05/16/22 1115   Altered Skin Integrity Present on Admission: yes   Side:    Orientation:    Location: Sacral spine   Wound Number: #1   Is this injury device related?:    Primary Wound Type:    Description of Altered Skin Integrity:    Removal Indication and Assessment:    Wound Outcome:    (Retired) Wound Length (cm):    (Retired) Wound Width (cm):    (Retired) Depth (cm):    Wound Description (Comments):    Removal Indications:    Wound Image    05/26/22 1400   Description of Altered Skin Integrity Full thickness tissue loss. Subcutaneous fat may be visible but bone, tendon or muscle are not exposed 05/26/22 1400   Dressing Appearance Moist drainage 05/27/22 1200   Drainage Amount Small 05/27/22 1200   Drainage Characteristics/Odor Green;Serous;Other (see comments) 05/27/22 1200   Appearance Dressing in place, unable to visualize 05/31/22 0900   Tissue loss description Full thickness 05/27/22 1200   Black (%), Wound Tissue Color 0 % 05/26/22 1400   Red (%), Wound Tissue Color 98 % 05/26/22 1400   Yellow (%), Wound Tissue Color 0 % 05/26/22 1400   Periwound Area Moist;Excoriated 05/27/22 1200   Wound Edges Defined;Open 05/27/22 1200    Wound Length (cm) 7 cm 05/26/22 1400   Wound Width (cm) 4 cm 05/26/22 1400   Wound Depth (cm) 1 cm 05/26/22 1400   Wound Volume (cm^3) 28 cm^3 05/26/22 1400   Wound Surface Area (cm^2) 28 cm^2 05/26/22 1400   Care Cleansed with:;Wound cleanser 05/30/22 1730   Dressing Changed;Gauze, wet to dry;Gauze 05/30/22 1730   Periwound Care Moisture barrier applied 05/27/22 1200   Dressing Change Due 05/28/22 05/27/22 1200   Number of days: 15            Altered Skin Integrity 05/17/22 1000 Left medial Thigh Incontinence associated dermatitis Intact skin with non-blanchable redness of localized area (Active)   05/17/22 1000   Altered Skin Integrity Present on Admission: yes   Side: Left   Orientation: medial   Location: Thigh   Wound Number:    Is this injury device related?:    Primary Wound Type: Incontinence   Description of Altered Skin Integrity: Intact skin with non-blanchable redness of localized area   Removal Indication and Assessment:    Wound Outcome:    (Retired) Wound Length (cm):    (Retired) Wound Width (cm):    (Retired) Depth (cm):    Wound Description (Comments):    Removal Indications:    Wound Image    05/26/22 1400   Description of Altered Skin Integrity Intact skin with non-blanchable redness of localized area 05/25/22 2010   Dressing Appearance Open to air;Dry 05/31/22 0900   Appearance Moist 05/27/22 1200   Periwound Area Moist;Redness 05/27/22 1200   Care Soap and water;Cleansed with: 05/27/22 1200   Dressing Changed 05/21/22 0701   Number of days: 14            Altered Skin Integrity 05/17/22 1000 Right medial Thigh Incontinence associated dermatitis Intact skin with non-blanchable redness of localized area (Active)   05/17/22 1000   Altered Skin Integrity Present on Admission: yes   Side: Right   Orientation: medial   Location: Thigh   Wound Number:    Is this injury device related?:    Primary Wound Type: Incontinence   Description of Altered Skin Integrity: Intact skin with non-blanchable redness  of localized area   Removal Indication and Assessment:    Wound Outcome:    (Retired) Wound Length (cm):    (Retired) Wound Width (cm):    (Retired) Depth (cm):    Wound Description (Comments):    Removal Indications:    Wound Image    05/26/22 1400   Description of Altered Skin Integrity Intact skin with non-blanchable redness of localized area 05/17/22 1806   Dressing Appearance Open to air;Dry 05/31/22 0900   Appearance Moist 05/27/22 1200   Periwound Area Redness;Moist 05/27/22 1200   Care Cleansed with:;Soap and water 05/26/22 1400   Dressing Changed 05/21/22 0701   Number of days: 14            Incision/Site 05/24/22 1227 Left Abdomen medial (Active)   05/24/22 1227   Present Prior to Hospital Arrival?:    Side: Left   Location: Abdomen   Orientation: medial   Incision Type:    Closure Method:    Additional Comments:    Removal Indication and Assessment:    Wound Outcome:    Removal Indications:    Incision WDL WDL 05/28/22 0730   Dressing Appearance Dry;Intact;Clean 05/31/22 0900   Drainage Amount None 05/25/22 2010   Appearance Intact;Pink 05/25/22 2010   Number of days: 7         Assessment and Plan    Pressure injury of sacral region, stage 4  Clean wound with baby shampoo and water or vashe  Pack with vashe soaked 4x4s  Cover and secure with abd pad and paper tape  Change daily  Turn every two hours  Low air loss mattress  Keep pressure off wound  TAP system   IV antibiotics  Colostomy        Signature:  LIEN Elise  Wound Care    Date of encounter: 05/26/2022

## 2022-05-26 NOTE — PROGRESS NOTES
Wound care note;  Patient skin was evaluated today with BOUBACAR EMMANUEL.  Sacral wound with pale pink tissue center dark , Mild odor noted, Lite green drainage from old dressing   Applied vashe gauze today. To start Acetic acid in am.  Bilateral heels with out pressure injury noted.   LLQ colostomy with 4 in wafer/pouch, Dark serosanguinous drainage noted in pouch. No stool at present.   Cont turn protocol  Waffle boots  On low air loss mattress  Wound care team to follow

## 2022-05-26 NOTE — PROGRESS NOTES
[facility]  Adult Nutrition  Follow-up Note         Reason for Assessment  Reason For Assessment: RD follow-up  Nutrition Risk Screen: large or nonhealing wound, burn or pressure injury  Malnutrition  Is Patient Malnourished: No  Nutrition Diagnosis  Inadequate protein intake   related to Decreased/ impaired skin integrity as evidenced by wounds    Nutrition Diagnosis Status: Improving      Nutrition Risk  Level of Risk/Frequency of Follow-up: high   Chewing or Swallowing Difficulty?: Swallowing difficulty  Estimated/Assessed Needs  RMR ([place]-St. Jeor Equation): 1616 Activity Factor: 1 Injury Factor: 1.2     Temp: 97.2 °F (36.2 °C)Axillary  Weight Used For Calorie Calculations: 98.8 kg (217 lb 13 oz)   Energy Need Method: [place]-St Jeor Energy Calorie Requirements (kcal): 1925  Weight Used For Protein Calculations: 67 kg (147 lb 11.3 oz)  Protein Requirements:        RDA Method (mL): 1925     Nutrition Prescription / Recommendations  Recommendation/Intervention: continue current diet and supplements  Goals: pt will meet est  utr needs; wound healing  Nutrition Goal Status: progressing towards goal  Communication of RD Recs: discussed on rounds  Current Diet Order: pureed  Nutrition Order Comments: 50-75% meal intake  Current Nutrition Support Frequency Ordered: glucerna and epi  Recommended Diet: Puree  Recommended Oral Supplement: Epi [90 kcals, 2.5g Protein, 10g Carbs(3g Sugar), 7g L-Arginine, 7g L-Glutamine, Vitamin C 300mg, 9.5mg Zinc] three times daily and Glucerna Shake [220 kcals, 10g Protein, 26g Carbs(4g Fiber, 7g Sugar), 9g Fat] three times daily  Is Nutrition Support Recommended: No  Is Education Recommended: No  Monitor and Evaluation  % current Intake: P.O. intake of 50 - 75 %  % intake to meet estimated needs: 50 - 75 %  Food and Nutrient Intake: energy intake, food and beverage intake  Food and Nutrient Adminstration: diet order  Anthropometric Measurements:  "height/length, weight, weight change, body mass index  Biochemical Data, Medical Tests and Procedures: electrolyte and renal panel, glucose/endocrine profile  Nutrition-Focused Physical Findings: skin  Oral Calories (kcal): 2000  Oral Protein (gm): 80  Energy Calories Required: meeting needs  Protein Required: meeting needs  Tolerance: tolerating  Current Medical Diagnosis and Past Medical History  Diagnosis: psychological disorder, diabetes diagnosis/complications  Past Medical History:   Diagnosis Date    Alzheimer's disease     Bipolar disorder     BPH (benign prostatic hyperplasia)     Diabetes mellitus     Hyperlipidemia     Hypertension     Hypothyroidism     Idiopathic orofacial dystonia     Iron deficiency anemia secondary to blood loss (chronic)     Mild intellectual disabilities     Obesity     Pressure ulcer     Schizophrenia      Nutrition/Diet History  Spiritual, Cultural Beliefs, Jewish Practices, Values that Affect Care: no  Food Allergies: NKFA  Lab/Procedures/Meds  No results for input(s): NA, K, BUN, CREATININE, GLU, CALCIUM, ALBUMIN, CL, ALT, AST, PHOS in the last 72 hours.  Last A1c:   Lab Results   Component Value Date    HGBA1C 6.5 03/02/2022     Lab Results   Component Value Date    RBC 3.91 (L) 03/08/2022    HGB 11.1 (L) 03/08/2022    HCT 34.8 (L) 03/08/2022    MCV 89.0 03/08/2022    MCH 28.4 03/08/2022    MCHC 31.9 (L) 03/08/2022    TIBC 174 (L) 12/09/2021     Pertinent Labs Reviewed: reviewed  Pertinent Labs Comments: Na 128, Alb 2.7, Hct 34.8  Pertinent Medications Reviewed: reviewed  Pertinent Medications Comments: insulin, lasix, MVI  Anthropometrics  Temp: 97.2 °F (36.2 °C)  Height Method: Stated  Height: 5' 4" (162.6 cm)  Height (inches): 64 in  Weight Method: Bed Scale  Weight: 100 kg (220 lb 7.4 oz)  Weight (lb): 220.46 lb  Ideal Body Weight (IBW), Male: 130 lb  % Ideal Body Weight, Male (lb): 167.55 %  BMI (Calculated): 37.8  BMI Grade: 35 - 39.9 - obesity - grade " II     Nutrition by Nursing  Diet/Nutrition Received: consistent carb/diabetic diet  Intake (%): 100%  Diet/Feeding Assistance:  (npo)  Diet/Feeding Tolerance: good  Last Bowel Movement: 05/25/22              Nutrition Follow-Up  RD Follow-up?: Yes  Assessment and Plan  No new Assessment & Plan notes have been filed under this hospital service since the last note was generated.  Service: Nutrition

## 2022-05-26 NOTE — ASSESSMENT & PLAN NOTE
05/16  -records show wound culture that grew  Culture, Wound [[id number]] (Abnormal)  Collected: 05/05/22 0920   Order Status: Completed Specimen: Wound from Sacral Updated: 05/11/22 1318    Culture, Wound/Abscess Light Growth Acinetobacter baumannii complex/haemolyticus Abnormal     Comment: Corrected result: Previously reported as Gram-negative Bacilli on 5/9/2022 at 1312 CDT.        Light Growth Escherichia coli Abnormal      Light Growth Klebsiella pneumoniae Abnormal     Comment: Corrected result: Previously reported as Gram-negative Bacilli on 5/8/2022 at 0843 CDT.        Heavy Growth Methicillin resistant Staphylococcus aureus Abnormal     Comment: Corrected result: Previously reported as Gram-positive Cocci on 5/8/2022 at 0843 CDT.   Gram-positive Cocci has been updated to reportable.        Heavy Growth Enterococcus faecalis Abnormal      Was treated with invanz and zyvox for 7 days  Acinetobacter not sensitive to invanz and zyvox    -ID consult placed for antibiotic recommendations    5/19  -plans for colostomy to aid in wound care. Will discuss with surgery.    5/21 - discussed with surgery. Plan colostomy Monday.     5/22 - colostomy tomorrow.     5/23 - colostomy today. Continue wound care.    5/25 - POD 1 colostomy, doing well.

## 2022-05-26 NOTE — PLAN OF CARE
Problem: Adult Inpatient Plan of Care  Goal: Plan of Care Review  Outcome: Ongoing, Not Progressing  Goal: Patient-Specific Goal (Individualized)  Outcome: Ongoing, Not Progressing  Goal: Absence of Hospital-Acquired Illness or Injury  Outcome: Ongoing, Not Progressing  Goal: Optimal Comfort and Wellbeing  Outcome: Ongoing, Not Progressing  Goal: Readiness for Transition of Care  Outcome: Ongoing, Not Progressing

## 2022-05-26 NOTE — PT/OT/SLP EVAL
Speech Language Pathology Evaluation  Bedside Swallow    Patient Name:  Bhargav Gao   MRN:  [id number]  Admitting Diagnosis: Sacral wound    Recommendations:                 General Recommendations:  Follow-up not indicated  Diet recommendations:  Puree, Honey Thick   Aspiration Precautions: 1 bite/sip at a time, Alternating bites/sips, Assistance with meals and Assistance with thickening liquids, Check for pocketing/oral residue, Feed only when awake/alert, HOB to 90 degrees, Meds crushed in puree, Meds whole buried in puree, Remain upright 30 minutes post meal and Small bites/sips   General Precautions: Standard, contact, NPO  Communication strategies:  Pt did not answer questions    History:     Past Medical History:   Diagnosis Date    Alzheimer's disease     Bipolar disorder     BPH (benign prostatic hyperplasia)     Diabetes mellitus     Hyperlipidemia     Hypertension     Hypothyroidism     Idiopathic orofacial dystonia     Iron deficiency anemia secondary to blood loss (chronic)     Mild intellectual disabilities     Obesity     Pressure ulcer     Schizophrenia        Past Surgical History:   Procedure Laterality Date    COLOSTOMY N/A 5/24/2022    Procedure: CREATION, COLOSTOMY;  Surgeon: Edilma Felix MD;  Location: [facility] [place];  Service: General;  Laterality: N/A;  diverting colostomy dr felix tuesday       Social History: Patient lives in nursing home.    Prior Intubation HX:  n/a    Modified Barium Swallow: n/a    Chest X-Rays: See chart    Prior diet: Pureed diet.    Occupation/hobbies/homemaking: none stated.    Subjective     Patient lying in bed awake. Patient shook his head no when presented with food and water. Patient required encouragement to accept trials.   Patient goals: none stated     Pain/Comfort:  · Pain Rating 1:  (Pt did not answer)    Respiratory Status: Room air    Objective:     Oral Musculature Evaluation  · Oral Musculature: unable to assess due to poor  participation/comprehension  · Dentition: edentulous  · Oral Musculature Comments: Unable to complete oral-motor examination due to patient did not follow commands.  Pt is edentulous.    Bedside Swallow Eval:   Consistencies Assessed:  · Honey thick liquids    · Puree       Oral Phase:   · WFL  · Poor oral acceptance    Pharyngeal Phase:   · no overt clinical signs/symptoms of aspiration  · no overt clinical signs/symptoms of pharyngeal dysphagia    Compensatory Strategies  · None    Treatment: Recommend Pureed diet with honey thickened liquids. Therapy not indicated.     Assessment:     Bhargav Gao is a 81 y.o. male with an SLP diagnosis of Dysphagia.  He presents with trouble swallowing pills. Recommend crush pills.    Goals:   Multidisciplinary Problems     SLP Goals     Not on file                Plan:     · Patient to be seen:      · Plan of Care expires:     · Plan of Care reviewed with:  patient   · SLP Follow-Up:  No       Discharge recommendations:  nursing facility, basic   Barriers to Discharge:  Decreased Care Giver Support    Time Tracking:     SLP Treatment Date:      Speech Start Time:  1015  Speech Stop Time:  1035     Speech Total Time (min):  20 min    Billable Minutes: Eval Swallow and Oral Function 20 05/26/2022

## 2022-05-26 NOTE — PROGRESS NOTES
[facility]  Progress Note    Patient Name: Bhargav Gao  MRN: [id number]  Patient Class: IP- Inpatient   Admission Date: 5/16/2022  Length of Stay: 9 days  Attending Physician: Hipolito Nance Jr., MD  Primary Care Provider: Kerry Lin MD        Subjective:     Principal Problem:Sacral wound        HPI:  Mr. Bhargav Gao is an 80 yo AAM resident of [facility] with medical history of hypertension, diabetes mellitus, hypothyroidism, cataracts, DVT, schizophrenia, mild intellectual disabilities, drug induced subacute dyskinesia, bipolar disorder and sacral wound. Mr. Gao was sent for admission due to sacral wound. Mr. Gao can say his name but he is unable to provide any further info. Records show wound culture from 05/03 grew ecoli, acinetobacter baumannii ,K. Pneumoniae, MRSA and e. Faecalis. Was given 7 days of zyvox and invanz. Acinetobacter not sensitive to ertapenem. Sent to [facility] for IV abx and wound care.      Overview/Hospital Course:  05/17 Patient without complaints. Sacral ulcer clean but gets contaminated with stool. Will speak with family about colostomy.  5/18 Spoke with patient's niece. She is agreeable to colostomy. Will consult Dr. Felix  05/23 Awake and resting in bed. Smiles at me. No complaints. Chest is clear. Heart RRR. Continue wound care to sacrum.      Interval History: S/p colostomy, Doing well.     Review of Systems   Unable to perform ROS: Psychiatric disorder   Objective:     Vital Signs (Most Recent):  Temp: 97.2 °F (36.2 °C) (05/25/22 1600)  Pulse: 82 (05/25/22 1600)  Resp: 20 (05/25/22 1600)  BP: (!) 161/82 (05/25/22 1600)  SpO2: 98 % (05/25/22 1600)   Vital Signs (24h Range):  Temp:  [97.2 °F (36.2 °C)-98.6 °F (37 °C)] 97.2 °F (36.2 °C)  Pulse:  [80-84] 82  Resp:  [20] 20  SpO2:  [98 %-100 %] 98 %  BP: (152-167)/(80-91) 161/82     Weight: 100 kg (220 lb 7.4 oz)  Body mass index is 37.84 kg/m².    Intake/Output Summary  (Last 24 hours) at 5/25/2022 2011  Last data filed at 5/25/2022 1715  Gross per 24 hour   Intake 240 ml   Output 600 ml   Net -360 ml      Physical Exam  Vitals reviewed.   Constitutional:       Appearance: He is not ill-appearing.   Cardiovascular:      Rate and Rhythm: Normal rate and regular rhythm.      Heart sounds: Normal heart sounds.   Pulmonary:      Effort: Pulmonary effort is normal. No respiratory distress.      Breath sounds: Normal breath sounds.   Abdominal:      General: Abdomen is flat. Bowel sounds are normal. There is no distension.      Palpations: Abdomen is soft.      Tenderness: There is no abdominal tenderness. There is no guarding.      Comments: Gas in colostomy bag.    Neurological:      Mental Status: He is alert.       Significant Labs: All pertinent labs within the past 24 hours have been reviewed.  BMP: No results for input(s): GLU, NA, K, CL, CO2, BUN, CREATININE, CALCIUM, MG in the last 48 hours.  CBC: No results for input(s): WBC, HGB, HCT, PLT in the last 48 hours.    Significant Imaging: I have reviewed all pertinent imaging results/findings within the past 24 hours.      Assessment/Plan:      * Sacral wound  05/16  -records show wound culture that grew  Culture, Wound [[id number]] (Abnormal)  Collected: 05/05/22 0920   Order Status: Completed Specimen: Wound from Sacral Updated: 05/11/22 1318    Culture, Wound/Abscess Light Growth Acinetobacter baumannii complex/haemolyticus Abnormal     Comment: Corrected result: Previously reported as Gram-negative Bacilli on 5/9/2022 at 1312 CDT.        Light Growth Escherichia coli Abnormal      Light Growth Klebsiella pneumoniae Abnormal     Comment: Corrected result: Previously reported as Gram-negative Bacilli on 5/8/2022 at 0843 CDT.        Heavy Growth Methicillin resistant Staphylococcus aureus Abnormal     Comment: Corrected result: Previously reported as Gram-positive Cocci on 5/8/2022 at 0843 CDT.   Gram-positive Cocci has been  updated to reportable.        Heavy Growth Enterococcus faecalis Abnormal      Was treated with invanz and zyvox for 7 days  Acinetobacter not sensitive to invanz and zyvox    -ID consult placed for antibiotic recommendations    5/19  -plans for colostomy to aid in wound care. Will discuss with surgery.    5/21 - discussed with surgery. Plan colostomy Monday.     5/22 - colostomy tomorrow.     5/23 - colostomy today. Continue wound care.    5/25 - POD 1 colostomy, doing well.     Hypertension  05/16  -continue home meds of losartan and metoprolol    Diabetes mellitus  05/16  -continue home dose of levemir 15 units daily with low dose SSI coverage  -accuchecks ac and hs   -a1c on 03/02 was 6.5    5/22 - glucoses remain acceptable.       Pressure ulcer        Alzheimer's disease    05/16 continue namenda      VTE Risk Mitigation (From admission, onward)         Ordered     IP VTE HIGH RISK PATIENT  Once         05/16/22 1152     Place sequential compression device  Until discontinued         05/16/22 1152                Discharge Planning   YOSSI:      Code Status: Prior   Is the patient medically ready for discharge?:     Reason for patient still in hospital (select all that apply): Treatment  Discharge Plan A: Return to nursing home   Discharge Delays: None known at this time              Hipolito Nance Jr, MD  Department of Hospital Medicine   [facility]

## 2022-05-26 NOTE — PROGRESS NOTES
[facility] Medicine  Progress Note    Patient Name: Bhargav Gao  MRN: [id number]  Patient Class: IP- Inpatient   Admission Date: 5/16/2022  Length of Stay: 9 days  Attending Physician: Hipolito Nance Jr., MD  Primary Care Provider: Kerry Lin MD        Subjective:     Principal Problem:Sacral wound        HPI:  Mr. Bhargav Gao is an 82 yo AAM resident of [facility] with medical history of hypertension, diabetes mellitus, hypothyroidism, cataracts, DVT, schizophrenia, mild intellectual disabilities, drug induced subacute dyskinesia, bipolar disorder and sacral wound. Mr. Gao was sent for admission due to sacral wound. Mr. Gao can say his name but he is unable to provide any further info. Records show wound culture from 05/03 grew ecoli, acinetobacter baumannii ,K. Pneumoniae, MRSA and e. Faecalis. Was given 7 days of zyvox and invanz. Acinetobacter not sensitive to ertapenem. Sent to [facility] for IV abx and wound care.      Overview/Hospital Course:  05/17 Patient without complaints. Sacral ulcer clean but gets contaminated with stool. Will speak with family about colostomy.  5/18 Spoke with patient's niece. She is agreeable to colostomy. Will consult Dr. Felix  05/23 Awake and resting in bed. Smiles at me. No complaints. Chest is clear. Heart RRR. Continue wound care to sacrum.      Interval History: No reported problems. For colostomy today. Was unable to get done yesterday due to emergencies.     Review of Systems   Unable to perform ROS: Psychiatric disorder   Objective:     Vital Signs (Most Recent):  Temp: 98.2 °F (36.8 °C) (05/24/22 0800)  Pulse: 66 (05/24/22 0800)  Resp: 18 (05/24/22 0800)  BP: (!) 151/86 (05/24/22 0800)  SpO2: 98 % (05/24/22 0800)   Vital Signs (24h Range):  Temp:  [97.6 °F (36.4 °C)-98.6 °F (37 °C)] 97.6 °F (36.4 °C)  Pulse:  [61-81] 67  Resp:  [0-18] 11  SpO2:  [92 %-100 %] 99 %  BP: ()/(35-86) 140/71     Weight: 100 kg (220  lb 7.4 oz)  Body mass index is 37.84 kg/m².    Intake/Output Summary (Last 24 hours) at 5/24/2022 1536  Last data filed at 5/24/2022 1140  Gross per 24 hour   Intake 220 ml   Output 1400 ml   Net -1180 ml      Physical Exam  HENT:      Head: Normocephalic.      Mouth/Throat:      Mouth: Mucous membranes are moist.   Cardiovascular:      Rate and Rhythm: Normal rate.      Heart sounds: Normal heart sounds.   Pulmonary:      Breath sounds: Normal breath sounds.   Abdominal:      General: Bowel sounds are normal.      Palpations: Abdomen is soft.   Skin:     Comments: Sacral ulcer         Significant Labs: All pertinent labs within the past 24 hours have been reviewed.  BMP: No results for input(s): GLU, NA, K, CL, CO2, BUN, CREATININE, CALCIUM, MG in the last 48 hours.  CBC: No results for input(s): WBC, HGB, HCT, PLT in the last 48 hours.    Significant Imaging: I have reviewed all pertinent imaging results/findings within the past 24 hours.      Assessment/Plan:      * Sacral wound  05/16  -records show wound culture that grew  Culture, Wound [[id number]] (Abnormal)  Collected: 05/05/22 0920   Order Status: Completed Specimen: Wound from Sacral Updated: 05/11/22 1318    Culture, Wound/Abscess Light Growth Acinetobacter baumannii complex/haemolyticus Abnormal     Comment: Corrected result: Previously reported as Gram-negative Bacilli on 5/9/2022 at 1312 CDT.        Light Growth Escherichia coli Abnormal      Light Growth Klebsiella pneumoniae Abnormal     Comment: Corrected result: Previously reported as Gram-negative Bacilli on 5/8/2022 at 0843 CDT.        Heavy Growth Methicillin resistant Staphylococcus aureus Abnormal     Comment: Corrected result: Previously reported as Gram-positive Cocci on 5/8/2022 at 0843 CDT.   Gram-positive Cocci has been updated to reportable.        Heavy Growth Enterococcus faecalis Abnormal      Was treated with invanz and zyvox for 7 days  Acinetobacter not sensitive to invanz and  zyvox    -ID consult placed for antibiotic recommendations    5/19  -plans for colostomy to aid in wound care. Will discuss with surgery.    5/21 - discussed with surgery. Plan colostomy Monday.     5/22 - colostomy tomorrow.     5/23 - colostomy today. Continue wound care.    Hypertension  05/16  -continue home meds of losartan and metoprolol    Diabetes mellitus  05/16  -continue home dose of levemir 15 units daily with low dose SSI coverage  -accuchecks ac and hs   -a1c on 03/02 was 6.5    5/22 - glucoses remain acceptable.       Pressure ulcer        Alzheimer's disease    05/16 continue namenda      VTE Risk Mitigation (From admission, onward)         Ordered     IP VTE HIGH RISK PATIENT  Once         05/16/22 1152     Place sequential compression device  Until discontinued         05/16/22 1152                Discharge Planning   YOSSI:      Code Status: Prior   Is the patient medically ready for discharge?:     Reason for patient still in hospital (select all that apply): Treatment  Discharge Plan A: Return to nursing home   Discharge Delays: None known at this time              Hipolito Nance Jr, MD  Department of Hospital Medicine   [facility]

## 2022-05-27 LAB
GLUCOSE SERPL-MCNC: 123 MG/DL (ref 70–105)
GLUCOSE SERPL-MCNC: 154 MG/DL (ref 70–105)
GLUCOSE SERPL-MCNC: 166 MG/DL (ref 70–105)
GLUCOSE SERPL-MCNC: 204 MG/DL (ref 70–105)

## 2022-05-27 PROCEDURE — 63600175 PHARM REV CODE 636 W HCPCS: Performed by: NURSE PRACTITIONER

## 2022-05-27 PROCEDURE — 82962 GLUCOSE BLOOD TEST: CPT

## 2022-05-27 PROCEDURE — 99232 PR SUBSEQUENT HOSPITAL CARE,LEVL II: ICD-10-PCS | Mod: ,,, | Performed by: INTERNAL MEDICINE

## 2022-05-27 PROCEDURE — 25000003 PHARM REV CODE 250: Performed by: FAMILY MEDICINE

## 2022-05-27 PROCEDURE — 25000003 PHARM REV CODE 250: Performed by: NURSE PRACTITIONER

## 2022-05-27 PROCEDURE — C9399 UNCLASSIFIED DRUGS OR BIOLOG: HCPCS | Performed by: NURSE PRACTITIONER

## 2022-05-27 PROCEDURE — 63700000 PHARM REV CODE 250 ALT 637 W/O HCPCS: Performed by: FAMILY MEDICINE

## 2022-05-27 PROCEDURE — 99232 SBSQ HOSP IP/OBS MODERATE 35: CPT | Mod: ,,, | Performed by: INTERNAL MEDICINE

## 2022-05-27 PROCEDURE — 11000001 HC ACUTE MED/SURG PRIVATE ROOM

## 2022-05-27 PROCEDURE — 25000003 PHARM REV CODE 250: Performed by: INTERNAL MEDICINE

## 2022-05-27 RX ORDER — ACETIC ACID 0.25 G/100ML
IRRIGANT IRRIGATION DAILY PRN
Status: DISCONTINUED | OUTPATIENT
Start: 2022-05-27 | End: 2022-05-31 | Stop reason: HOSPADM

## 2022-05-27 RX ADMIN — RISPERIDONE 1 MG: 1 TABLET ORAL at 10:05

## 2022-05-27 RX ADMIN — METOPROLOL TARTRATE 12.5 MG: 25 TABLET, FILM COATED ORAL at 08:05

## 2022-05-27 RX ADMIN — PANTOPRAZOLE SODIUM 40 MG: 40 GRANULE, DELAYED RELEASE ORAL at 10:05

## 2022-05-27 RX ADMIN — OXCARBAZEPINE 450 MG: 150 TABLET, FILM COATED ORAL at 10:05

## 2022-05-27 RX ADMIN — MEMANTINE 10 MG: 5 TABLET ORAL at 08:05

## 2022-05-27 RX ADMIN — LOSARTAN POTASSIUM 50 MG: 50 TABLET, FILM COATED ORAL at 10:05

## 2022-05-27 RX ADMIN — LACTULOSE 20 G: 20 SOLUTION ORAL at 10:05

## 2022-05-27 RX ADMIN — FUROSEMIDE 40 MG: 40 TABLET ORAL at 10:05

## 2022-05-27 RX ADMIN — RISPERIDONE 1 MG: 1 TABLET ORAL at 08:05

## 2022-05-27 RX ADMIN — POLYETHYLENE GLYCOL 3350 17 G: 17 POWDER, FOR SOLUTION ORAL at 10:05

## 2022-05-27 RX ADMIN — ACETIC ACID: 250 IRRIGANT IRRIGATION at 11:05

## 2022-05-27 RX ADMIN — MICONAZOLE NITRATE: 20 POWDER TOPICAL at 11:05

## 2022-05-27 RX ADMIN — Medication 1 CAPSULE: at 10:05

## 2022-05-27 RX ADMIN — Medication 1 CAPSULE: at 06:05

## 2022-05-27 RX ADMIN — OXCARBAZEPINE 450 MG: 150 TABLET, FILM COATED ORAL at 08:05

## 2022-05-27 RX ADMIN — METOPROLOL TARTRATE 12.5 MG: 25 TABLET, FILM COATED ORAL at 10:05

## 2022-05-27 RX ADMIN — Medication 1 CAPSULE: at 01:05

## 2022-05-27 RX ADMIN — TAMSULOSIN HYDROCHLORIDE 0.4 MG: 0.4 CAPSULE ORAL at 10:05

## 2022-05-27 RX ADMIN — POLYETHYLENE GLYCOL 3350 17 G: 17 POWDER, FOR SOLUTION ORAL at 08:05

## 2022-05-27 RX ADMIN — ACETIC ACID: 250 IRRIGANT IRRIGATION at 04:05

## 2022-05-27 RX ADMIN — THERA TABS 1 TABLET: TAB at 10:05

## 2022-05-27 RX ADMIN — FLUCONAZOLE 200 MG: 100 TABLET ORAL at 10:05

## 2022-05-27 RX ADMIN — MICONAZOLE NITRATE: 20 POWDER TOPICAL at 09:05

## 2022-05-27 RX ADMIN — LEVOTHYROXINE SODIUM 50 MCG: 50 TABLET ORAL at 05:05

## 2022-05-27 RX ADMIN — INSULIN DETEMIR 15 UNITS: 100 INJECTION, SOLUTION SUBCUTANEOUS at 10:05

## 2022-05-27 NOTE — PROGRESS NOTES
Wound care note;  Patient skin was evaluated today  Sacral wound with lite green drainage from old dressing. Started Acetic acid moist gauze today  LLQ colostomy with dark red moist stoma, red bumper noted . Re pouched with 2 3/4 wafer and pouch . No stool noted in pouch, dark water drainage noted   Cont turn protocol  Waffle boots  On low air loss mattress

## 2022-05-27 NOTE — PROGRESS NOTES
[facility] Medicine  Progress Note    Patient Name: Bhargav Gao  MRN: [id number]  Patient Class: IP- Inpatient   Admission Date: 5/16/2022  Length of Stay: 10 days  Attending Physician: Hipolito Nance Jr., MD  Primary Care Provider: Kerry Lin MD        Subjective:     Principal Problem:Sacral wound        HPI:  Mr. Bhargav Gao is an 80 yo AAM resident of [facility] with medical history of hypertension, diabetes mellitus, hypothyroidism, cataracts, DVT, schizophrenia, mild intellectual disabilities, drug induced subacute dyskinesia, bipolar disorder and sacral wound. Mr. Gao was sent for admission due to sacral wound. Mr. Gao can say his name but he is unable to provide any further info. Records show wound culture from 05/03 grew ecoli, acinetobacter baumannii ,K. Pneumoniae, MRSA and e. Faecalis. Was given 7 days of zyvox and invanz. Acinetobacter not sensitive to ertapenem. Sent to [facility] for IV abx and wound care.      Overview/Hospital Course:  05/17 Patient without complaints. Sacral ulcer clean but gets contaminated with stool. Will speak with family about colostomy.  5/18 Spoke with patient's niece. She is agreeable to colostomy. Will consult Dr. Felix  05/23 Awake and resting in bed. Smiles at me. No complaints. Chest is clear. Heart RRR. Continue wound care to sacrum.      Interval History: No reported problems. Minimal ostomy output so far.     Review of Systems   Unable to perform ROS: Psychiatric disorder   Objective:     Vital Signs (Most Recent):  Temp: 97.2 °F (36.2 °C) (05/26/22 1200)  Pulse: 63 (05/26/22 1200)  Resp: 16 (05/26/22 1200)  BP: (!) 152/81 (05/26/22 1200)  SpO2: 100 % (05/26/22 1200)   Vital Signs (24h Range):  Temp:  [97.2 °F (36.2 °C)-98.4 °F (36.9 °C)] 97.2 °F (36.2 °C)  Pulse:  [] 63  Resp:  [15-18] 16  SpO2:  [96 %-100 %] 100 %  BP: (139-177)/(73-90) 152/81     Weight: 100 kg (220 lb 7.4 oz)  Body mass index is 37.84  kg/m².    Intake/Output Summary (Last 24 hours) at 5/26/2022 1944  Last data filed at 5/26/2022 1800  Gross per 24 hour   Intake 0 ml   Output 1150 ml   Net -1150 ml      Physical Exam  Vitals reviewed.   Constitutional:       Appearance: He is not ill-appearing.   Cardiovascular:      Rate and Rhythm: Normal rate and regular rhythm.      Heart sounds: Normal heart sounds.   Pulmonary:      Effort: Pulmonary effort is normal. No respiratory distress.      Breath sounds: Normal breath sounds.   Abdominal:      General: Abdomen is flat. Bowel sounds are normal. There is no distension.      Palpations: Abdomen is soft.      Tenderness: There is no abdominal tenderness. There is no guarding.      Comments: Slight serosanguinous drainage in bag.    Neurological:      Mental Status: He is alert.       Significant Labs: All pertinent labs within the past 24 hours have been reviewed.  BMP: No results for input(s): GLU, NA, K, CL, CO2, BUN, CREATININE, CALCIUM, MG in the last 48 hours.  CBC: No results for input(s): WBC, HGB, HCT, PLT in the last 48 hours.    Significant Imaging: I have reviewed all pertinent imaging results/findings within the past 24 hours.      Assessment/Plan:      * Sacral wound  05/16  -records show wound culture that grew  Culture, Wound [[id number]] (Abnormal)  Collected: 05/05/22 0920   Order Status: Completed Specimen: Wound from Sacral Updated: 05/11/22 1318    Culture, Wound/Abscess Light Growth Acinetobacter baumannii complex/haemolyticus Abnormal     Comment: Corrected result: Previously reported as Gram-negative Bacilli on 5/9/2022 at 1312 CDT.        Light Growth Escherichia coli Abnormal      Light Growth Klebsiella pneumoniae Abnormal     Comment: Corrected result: Previously reported as Gram-negative Bacilli on 5/8/2022 at 0843 CDT.        Heavy Growth Methicillin resistant Staphylococcus aureus Abnormal     Comment: Corrected result: Previously reported as Gram-positive Cocci on 5/8/2022  at 0843 CDT.   Gram-positive Cocci has been updated to reportable.        Heavy Growth Enterococcus faecalis Abnormal      Was treated with invanz and zyvox for 7 days  Acinetobacter not sensitive to invanz and zyvox    -ID consult placed for antibiotic recommendations    5/19  -plans for colostomy to aid in wound care. Will discuss with surgery.    5/21 - discussed with surgery. Plan colostomy Monday.     5/22 - colostomy tomorrow.     5/23 - colostomy today. Continue wound care.    5/25 - POD 1 colostomy, doing well.     5/26 - No stool output in ostomy bag yet. Wound care to sacral wound continues.     Hypertension  05/16  -continue home meds of losartan and metoprolol    Diabetes mellitus  05/16  -continue home dose of levemir 15 units daily with low dose SSI coverage  -accuchecks ac and hs   -a1c on 03/02 was 6.5    5/22 - glucoses remain acceptable.       Pressure ulcer        Alzheimer's disease    05/16 continue namenda      VTE Risk Mitigation (From admission, onward)         Ordered     IP VTE HIGH RISK PATIENT  Once         05/16/22 1152     Place sequential compression device  Until discontinued         05/16/22 1152                Discharge Planning   YOSSI:      Code Status: Prior   Is the patient medically ready for discharge?:     Reason for patient still in hospital (select all that apply): Treatment  Discharge Plan A: Return to nursing home   Discharge Delays: None known at this time              Hipolito Nance Jr, MD  Department of Hospital Medicine   [facility]

## 2022-05-27 NOTE — PROGRESS NOTES
[facility] Medicine  Progress Note    Patient Name: Bhargav Gao  MRN: [id number]  Patient Class: IP- Inpatient   Admission Date: 5/16/2022  Length of Stay: 11 days  Attending Physician: Hipolito Epps MD  Primary Care Provider: Kerry Lin MD        Subjective:     Principal Problem:Sacral wound        HPI:  Mr. Bhargav Gao is an 82 yo AAM resident of [facility] with medical history of hypertension, diabetes mellitus, hypothyroidism, cataracts, DVT, schizophrenia, mild intellectual disabilities, drug induced subacute dyskinesia, bipolar disorder and sacral wound. Mr. Gao was sent for admission due to sacral wound. Mr. Gao can say his name but he is unable to provide any further info. Records show wound culture from 05/03 grew ecoli, acinetobacter baumannii ,K. Pneumoniae, MRSA and e. Faecalis. Was given 7 days of zyvox and invanz. Acinetobacter not sensitive to ertapenem. Sent to [facility] for IV abx and wound care.      Overview/Hospital Course:  05/17 Patient without complaints. Sacral ulcer clean but gets contaminated with stool. Will speak with family about colostomy.  5/18 Spoke with patient's niece. She is agreeable to colostomy. Will consult Dr. Felix  05/23 Awake and resting in bed. Smiles at me. No complaints. Chest is clear. Heart RRR. Continue wound care to sacrum.    05/27/2022.  Patient seen and evaluated at the bedside today.  Patient basically nonverbal.  Lungs are clear with poor effort  Cardiovascular S1-S2 regular rate rhythm  Abdomen is soft positive bowel sounds nontender  Extremities nonedematous.  Patient is status post colostomy bag due to sacral wound.  Patient continues to have psychological issues, and patient may need a mental health evaluation early next week.  Otherwise will continue current treatment.      Assessment/Plan:      * Sacral wound  05/16  -records show wound culture that grew  Culture, Wound [[id number]] (Abnormal)   Collected: 05/05/22 0920   Order Status: Completed Specimen: Wound from Sacral Updated: 05/11/22 1318    Culture, Wound/Abscess Light Growth Acinetobacter baumannii complex/haemolyticus Abnormal     Comment: Corrected result: Previously reported as Gram-negative Bacilli on 5/9/2022 at 1312 CDT.        Light Growth Escherichia coli Abnormal      Light Growth Klebsiella pneumoniae Abnormal     Comment: Corrected result: Previously reported as Gram-negative Bacilli on 5/8/2022 at 0843 CDT.        Heavy Growth Methicillin resistant Staphylococcus aureus Abnormal     Comment: Corrected result: Previously reported as Gram-positive Cocci on 5/8/2022 at 0843 CDT.   Gram-positive Cocci has been updated to reportable.        Heavy Growth Enterococcus faecalis Abnormal      Was treated with invanz and zyvox for 7 days  Acinetobacter not sensitive to invanz and zyvox    -ID consult placed for antibiotic recommendations    5/19  -plans for colostomy to aid in wound care. Will discuss with surgery.    5/21 - discussed with surgery. Plan colostomy Monday.     5/22 - colostomy tomorrow.     5/23 - colostomy today. Continue wound care.    5/25 - POD 1 colostomy, doing well.     5/26 - No stool output in ostomy bag yet. Wound care to sacral wound continues.     Hypertension  05/16  -continue home meds of losartan and metoprolol    Diabetes mellitus  05/16  -continue home dose of levemir 15 units daily with low dose SSI coverage  -accuchecks ac and hs   -a1c on 03/02 was 6.5    5/22 - glucoses remain acceptable.       Pressure ulcer        Alzheimer's disease    05/16 continue namenda    VTE Risk Mitigation (From admission, onward)         Ordered     IP VTE HIGH RISK PATIENT  Once         05/16/22 1152     Place sequential compression device  Until discontinued         05/16/22 1152                Discharge Planning   YOSSI:      Code Status: Prior   Is the patient medically ready for discharge?:     Reason for patient still in  hospital (select all that apply): Treatment  Discharge Plan A: Return to nursing home   Discharge Delays: None known at this time              Hipolito Epps MD  Department of Hospital Medicine    - [facility]

## 2022-05-27 NOTE — ASSESSMENT & PLAN NOTE
05/16  -records show wound culture that grew  Culture, Wound [[id number]] (Abnormal)  Collected: 05/05/22 0920   Order Status: Completed Specimen: Wound from Sacral Updated: 05/11/22 1318    Culture, Wound/Abscess Light Growth Acinetobacter baumannii complex/haemolyticus Abnormal     Comment: Corrected result: Previously reported as Gram-negative Bacilli on 5/9/2022 at 1312 CDT.        Light Growth Escherichia coli Abnormal      Light Growth Klebsiella pneumoniae Abnormal     Comment: Corrected result: Previously reported as Gram-negative Bacilli on 5/8/2022 at 0843 CDT.        Heavy Growth Methicillin resistant Staphylococcus aureus Abnormal     Comment: Corrected result: Previously reported as Gram-positive Cocci on 5/8/2022 at 0843 CDT.   Gram-positive Cocci has been updated to reportable.        Heavy Growth Enterococcus faecalis Abnormal      Was treated with invanz and zyvox for 7 days  Acinetobacter not sensitive to invanz and zyvox    -ID consult placed for antibiotic recommendations    5/19  -plans for colostomy to aid in wound care. Will discuss with surgery.    5/21 - discussed with surgery. Plan colostomy Monday.     5/22 - colostomy tomorrow.     5/23 - colostomy today. Continue wound care.    5/25 - POD 1 colostomy, doing well.     5/26 - No stool output in ostomy bag yet. Wound care to sacral wound continues.

## 2022-05-27 NOTE — SUBJECTIVE & OBJECTIVE
Interval History: No reported problems. Minimal ostomy output so far.     Review of Systems   Unable to perform ROS: Psychiatric disorder   Objective:     Vital Signs (Most Recent):  Temp: 97.2 °F (36.2 °C) (05/26/22 1200)  Pulse: 63 (05/26/22 1200)  Resp: 16 (05/26/22 1200)  BP: (!) 152/81 (05/26/22 1200)  SpO2: 100 % (05/26/22 1200)   Vital Signs (24h Range):  Temp:  [97.2 °F (36.2 °C)-98.4 °F (36.9 °C)] 97.2 °F (36.2 °C)  Pulse:  [] 63  Resp:  [15-18] 16  SpO2:  [96 %-100 %] 100 %  BP: (139-177)/(73-90) 152/81     Weight: 100 kg (220 lb 7.4 oz)  Body mass index is 37.84 kg/m².    Intake/Output Summary (Last 24 hours) at 5/26/2022 1944  Last data filed at 5/26/2022 1800  Gross per 24 hour   Intake 0 ml   Output 1150 ml   Net -1150 ml      Physical Exam  Vitals reviewed.   Constitutional:       Appearance: He is not ill-appearing.   Cardiovascular:      Rate and Rhythm: Normal rate and regular rhythm.      Heart sounds: Normal heart sounds.   Pulmonary:      Effort: Pulmonary effort is normal. No respiratory distress.      Breath sounds: Normal breath sounds.   Abdominal:      General: Abdomen is flat. Bowel sounds are normal. There is no distension.      Palpations: Abdomen is soft.      Tenderness: There is no abdominal tenderness. There is no guarding.      Comments: Slight serosanguinous drainage in bag.    Neurological:      Mental Status: He is alert.       Significant Labs: All pertinent labs within the past 24 hours have been reviewed.  BMP: No results for input(s): GLU, NA, K, CL, CO2, BUN, CREATININE, CALCIUM, MG in the last 48 hours.  CBC: No results for input(s): WBC, HGB, HCT, PLT in the last 48 hours.    Significant Imaging: I have reviewed all pertinent imaging results/findings within the past 24 hours.

## 2022-05-28 LAB
GLUCOSE SERPL-MCNC: 121 MG/DL (ref 70–105)
GLUCOSE SERPL-MCNC: 145 MG/DL (ref 70–105)
GLUCOSE SERPL-MCNC: 177 MG/DL (ref 70–105)
GLUCOSE SERPL-MCNC: 185 MG/DL (ref 70–105)

## 2022-05-28 PROCEDURE — 99232 SBSQ HOSP IP/OBS MODERATE 35: CPT | Mod: ,,, | Performed by: INTERNAL MEDICINE

## 2022-05-28 PROCEDURE — C9399 UNCLASSIFIED DRUGS OR BIOLOG: HCPCS | Performed by: NURSE PRACTITIONER

## 2022-05-28 PROCEDURE — 25000003 PHARM REV CODE 250: Performed by: FAMILY MEDICINE

## 2022-05-28 PROCEDURE — 11000001 HC ACUTE MED/SURG PRIVATE ROOM

## 2022-05-28 PROCEDURE — 82962 GLUCOSE BLOOD TEST: CPT

## 2022-05-28 PROCEDURE — 63600175 PHARM REV CODE 636 W HCPCS: Performed by: NURSE PRACTITIONER

## 2022-05-28 PROCEDURE — 63700000 PHARM REV CODE 250 ALT 637 W/O HCPCS: Performed by: FAMILY MEDICINE

## 2022-05-28 PROCEDURE — 25000003 PHARM REV CODE 250: Performed by: NURSE PRACTITIONER

## 2022-05-28 PROCEDURE — 99232 PR SUBSEQUENT HOSPITAL CARE,LEVL II: ICD-10-PCS | Mod: ,,, | Performed by: INTERNAL MEDICINE

## 2022-05-28 RX ADMIN — TAMSULOSIN HYDROCHLORIDE 0.4 MG: 0.4 CAPSULE ORAL at 09:05

## 2022-05-28 RX ADMIN — OXCARBAZEPINE 450 MG: 150 TABLET, FILM COATED ORAL at 09:05

## 2022-05-28 RX ADMIN — LACTULOSE 20 G: 20 SOLUTION ORAL at 09:05

## 2022-05-28 RX ADMIN — Medication 1 CAPSULE: at 09:05

## 2022-05-28 RX ADMIN — RISPERIDONE 1 MG: 1 TABLET ORAL at 09:05

## 2022-05-28 RX ADMIN — MICONAZOLE NITRATE: 20 POWDER TOPICAL at 10:05

## 2022-05-28 RX ADMIN — FLUCONAZOLE 200 MG: 100 TABLET ORAL at 09:05

## 2022-05-28 RX ADMIN — Medication 1 CAPSULE: at 01:05

## 2022-05-28 RX ADMIN — PANTOPRAZOLE SODIUM 40 MG: 40 GRANULE, DELAYED RELEASE ORAL at 09:05

## 2022-05-28 RX ADMIN — METOPROLOL TARTRATE 12.5 MG: 25 TABLET, FILM COATED ORAL at 09:05

## 2022-05-28 RX ADMIN — POLYETHYLENE GLYCOL 3350 17 G: 17 POWDER, FOR SOLUTION ORAL at 09:05

## 2022-05-28 RX ADMIN — LEVOTHYROXINE SODIUM 50 MCG: 50 TABLET ORAL at 05:05

## 2022-05-28 RX ADMIN — Medication 1 CAPSULE: at 05:05

## 2022-05-28 RX ADMIN — INSULIN DETEMIR 15 UNITS: 100 INJECTION, SOLUTION SUBCUTANEOUS at 09:05

## 2022-05-28 RX ADMIN — THERA TABS 1 TABLET: TAB at 09:05

## 2022-05-28 RX ADMIN — MICONAZOLE NITRATE: 20 POWDER TOPICAL at 09:05

## 2022-05-28 RX ADMIN — MEMANTINE 10 MG: 5 TABLET ORAL at 09:05

## 2022-05-28 RX ADMIN — FUROSEMIDE 40 MG: 40 TABLET ORAL at 09:05

## 2022-05-28 RX ADMIN — LOSARTAN POTASSIUM 50 MG: 50 TABLET, FILM COATED ORAL at 09:05

## 2022-05-28 NOTE — PLAN OF CARE
Problem: Adult Inpatient Plan of Care  Goal: Absence of Hospital-Acquired Illness or Injury  Intervention: Identify and Manage Fall Risk  5/28/2022 1217 by JACOB Stratton (Taken 5/28/2022 1217)  Safety Promotion/Fall Prevention:   assistive device/personal item within reach   medications reviewed  5/28/2022 1213 by JACOB Strattonheets (Taken 5/28/2022 1213)  Safety Promotion/Fall Prevention:   assistive device/personal item within reach   medications reviewed     Problem: Diabetes Comorbidity  Goal: Blood Glucose Level Within Targeted Range  Intervention: Monitor and Manage Glycemia  5/28/2022 1217 by JACOB Stratton (Taken 5/28/2022 1217)  Glycemic Management: blood glucose monitored  5/28/2022 1213 by JACOB Stratton (Taken 5/28/2022 1213)  Glycemic Management: blood glucose monitored     Problem: Skin Injury Risk Increased  Goal: Skin Health and Integrity  Intervention: Optimize Skin Protection  5/28/2022 1217 by JACOB Stratton (Taken 5/28/2022 1217)  Pressure Reduction Techniques: pressure points protected  Pressure Reduction Devices: specialty bed utilized  Skin Protection: incontinence pads utilized  Head of Bed (HOB) Positioning: HOB at 30-45 degrees  5/28/2022 1213 by JACOB Stratton (Taken 5/28/2022 1213)  Pressure Reduction Techniques:   positioned off wounds   pressure points protected  Pressure Reduction Devices: specialty bed utilized  Skin Protection: incontinence pads utilized  Head of Bed (HOB) Positioning: HOB at 30 degrees     Problem: Fall Injury Risk  Goal: Absence of Fall and Fall-Related Injury  Intervention: Identify and Manage Contributors  5/28/2022 1217 by JACOB Stratton (Taken 5/28/2022 1217)  Self-Care Promotion: BADL personal objects within reach  Medication Review/Management: medications reviewed  5/28/2022 1213 by JACOB Stratton (Taken  5/28/2022 1213)  Self-Care Promotion: BADL personal objects within reach  Medication Review/Management: medications reviewed

## 2022-05-28 NOTE — PROGRESS NOTES
[facility]  Progress Note    Patient Name: Bhargav Gao  MRN: [id number]  Patient Class: IP- Inpatient   Admission Date: 5/16/2022  Length of Stay: 12 days  Attending Physician: Hipolito Epps MD  Primary Care Provider: Kerry Lin MD        Subjective:     Principal Problem:Sacral wound        HPI:  Mr. Bhargav Gao is an 80 yo AAM resident of [facility] with medical history of hypertension, diabetes mellitus, hypothyroidism, cataracts, DVT, schizophrenia, mild intellectual disabilities, drug induced subacute dyskinesia, bipolar disorder and sacral wound. Mr. Gao was sent for admission due to sacral wound. Mr. Gao can say his name but he is unable to provide any further info. Records show wound culture from 05/03 grew ecoli, acinetobacter baumannii ,K. Pneumoniae, MRSA and e. Faecalis. Was given 7 days of zyvox and invanz. Acinetobacter not sensitive to ertapenem. Sent to [facility] for IV abx and wound care.      Overview/Hospital Course:  05/17 Patient without complaints. Sacral ulcer clean but gets contaminated with stool. Will speak with family about colostomy.  5/18 Spoke with patient's niece. She is agreeable to colostomy. Will consult Dr. Felix  05/23 Awake and resting in bed. Smiles at me. No complaints. Chest is clear. Heart RRR. Continue wound care to sacrum.    05/27/2022.  Patient seen and evaluated at the bedside today.  Patient basically nonverbal.  Lungs are clear with poor effort  Cardiovascular S1-S2 regular rate rhythm  Abdomen is soft positive bowel sounds nontender  Extremities nonedematous.  Patient is status post colostomy bag due to sacral wound.  Patient continues to have psychological issues, and patient may need a mental health evaluation early next week.  Otherwise will continue current treatment.        Date of service is 05/28/2022  Patient is confuse and attempting to speak but I can understand with the seen.  He does  have some psychological issues that may need to be addressed at the [facility]  Lungs are clear no crackles or wheezing  Cardiovascular S1-S2 regular rate rhythm  Abdomen is soft positive bowel sounds obese nontender extremities are nonedematous  Patient with a history of a full-thickness infected decubitus ulcer, he status post wound care, and he is status post colostomy.  Patient should be ready for discharge next week            Assessment/Plan:      * Sacral wound  05/16  -records show wound culture that grew  Culture, Wound [[id number]] (Abnormal)  Collected: 05/05/22 0920   Order Status: Completed Specimen: Wound from Sacral Updated: 05/11/22 1318    Culture, Wound/Abscess Light Growth Acinetobacter baumannii complex/haemolyticus Abnormal     Comment: Corrected result: Previously reported as Gram-negative Bacilli on 5/9/2022 at 1312 CDT.        Light Growth Escherichia coli Abnormal      Light Growth Klebsiella pneumoniae Abnormal     Comment: Corrected result: Previously reported as Gram-negative Bacilli on 5/8/2022 at 0843 CDT.        Heavy Growth Methicillin resistant Staphylococcus aureus Abnormal     Comment: Corrected result: Previously reported as Gram-positive Cocci on 5/8/2022 at 0843 CDT.   Gram-positive Cocci has been updated to reportable.        Heavy Growth Enterococcus faecalis Abnormal      Was treated with invanz and zyvox for 7 days  Acinetobacter not sensitive to invanz and zyvox    -ID consult placed for antibiotic recommendations    5/19  -plans for colostomy to aid in wound care. Will discuss with surgery.    5/21 - discussed with surgery. Plan colostomy Monday.     5/22 - colostomy tomorrow.     5/23 - colostomy today. Continue wound care.    5/25 - POD 1 colostomy, doing well.     5/26 - No stool output in ostomy bag yet. Wound care to sacral wound continues.     Hypertension  05/16  -continue home meds of losartan and metoprolol    Diabetes mellitus  05/16  -continue home dose of  levemir 15 units daily with low dose SSI coverage  -accuchecks ac and hs   -a1c on 03/02 was 6.5    5/22 - glucoses remain acceptable.       Pressure ulcer        Alzheimer's disease    05/16 continue namenda    VTE Risk Mitigation (From admission, onward)         Ordered     IP VTE HIGH RISK PATIENT  Once         05/16/22 1152     Place sequential compression device  Until discontinued         05/16/22 1152                Discharge Planning   YOSSI:      Code Status: Prior   Is the patient medically ready for discharge?:     Reason for patient still in hospital (select all that apply): Treatment  Discharge Plan A: Return to nursing home   Discharge Delays: None known at this time              Hipolito Epps MD  Department of Hospital Medicine   [facility]

## 2022-05-28 NOTE — PLAN OF CARE
Problem: Adult Inpatient Plan of Care  Goal: Absence of Hospital-Acquired Illness or Injury  Intervention: Identify and Manage Fall Risk  Flowsheets (Taken 5/28/2022 1213)  Safety Promotion/Fall Prevention:   assistive device/personal item within reach   medications reviewed     Problem: Diabetes Comorbidity  Goal: Blood Glucose Level Within Targeted Range  Intervention: Monitor and Manage Glycemia  Flowsheets (Taken 5/28/2022 1213)  Glycemic Management: blood glucose monitored     Problem: Skin Injury Risk Increased  Goal: Skin Health and Integrity  Intervention: Optimize Skin Protection  Flowsheets (Taken 5/28/2022 1213)  Pressure Reduction Techniques:   positioned off wounds   pressure points protected  Pressure Reduction Devices: specialty bed utilized  Skin Protection: incontinence pads utilized  Head of Bed (HOB) Positioning: HOB at 30 degrees     Problem: Fall Injury Risk  Goal: Absence of Fall and Fall-Related Injury  Intervention: Identify and Manage Contributors  Flowsheets (Taken 5/28/2022 1213)  Self-Care Promotion: BADL personal objects within reach  Medication Review/Management: medications reviewed

## 2022-05-29 LAB
GLUCOSE SERPL-MCNC: 111 MG/DL (ref 70–105)
GLUCOSE SERPL-MCNC: 130 MG/DL (ref 70–105)
GLUCOSE SERPL-MCNC: 150 MG/DL (ref 70–105)
GLUCOSE SERPL-MCNC: 174 MG/DL (ref 70–105)

## 2022-05-29 PROCEDURE — 99232 PR SUBSEQUENT HOSPITAL CARE,LEVL II: ICD-10-PCS | Mod: ,,, | Performed by: INTERNAL MEDICINE

## 2022-05-29 PROCEDURE — 82962 GLUCOSE BLOOD TEST: CPT

## 2022-05-29 PROCEDURE — 25000003 PHARM REV CODE 250: Performed by: FAMILY MEDICINE

## 2022-05-29 PROCEDURE — C9399 UNCLASSIFIED DRUGS OR BIOLOG: HCPCS | Performed by: NURSE PRACTITIONER

## 2022-05-29 PROCEDURE — 11000001 HC ACUTE MED/SURG PRIVATE ROOM

## 2022-05-29 PROCEDURE — 63600175 PHARM REV CODE 636 W HCPCS: Performed by: NURSE PRACTITIONER

## 2022-05-29 PROCEDURE — 25000003 PHARM REV CODE 250: Performed by: NURSE PRACTITIONER

## 2022-05-29 PROCEDURE — 63700000 PHARM REV CODE 250 ALT 637 W/O HCPCS: Performed by: FAMILY MEDICINE

## 2022-05-29 PROCEDURE — 99232 SBSQ HOSP IP/OBS MODERATE 35: CPT | Mod: ,,, | Performed by: INTERNAL MEDICINE

## 2022-05-29 RX ADMIN — MICONAZOLE NITRATE: 20 POWDER TOPICAL at 08:05

## 2022-05-29 RX ADMIN — RISPERIDONE 1 MG: 1 TABLET ORAL at 08:05

## 2022-05-29 RX ADMIN — Medication 1 CAPSULE: at 08:05

## 2022-05-29 RX ADMIN — PANTOPRAZOLE SODIUM 40 MG: 40 GRANULE, DELAYED RELEASE ORAL at 08:05

## 2022-05-29 RX ADMIN — METOPROLOL TARTRATE 12.5 MG: 25 TABLET, FILM COATED ORAL at 08:05

## 2022-05-29 RX ADMIN — LACTULOSE 20 G: 20 SOLUTION ORAL at 08:05

## 2022-05-29 RX ADMIN — POLYETHYLENE GLYCOL 3350 17 G: 17 POWDER, FOR SOLUTION ORAL at 08:05

## 2022-05-29 RX ADMIN — TAMSULOSIN HYDROCHLORIDE 0.4 MG: 0.4 CAPSULE ORAL at 08:05

## 2022-05-29 RX ADMIN — Medication 1 CAPSULE: at 12:05

## 2022-05-29 RX ADMIN — FUROSEMIDE 40 MG: 40 TABLET ORAL at 08:05

## 2022-05-29 RX ADMIN — OXCARBAZEPINE 450 MG: 150 TABLET, FILM COATED ORAL at 08:05

## 2022-05-29 RX ADMIN — THERA TABS 1 TABLET: TAB at 08:05

## 2022-05-29 RX ADMIN — FLUCONAZOLE 200 MG: 100 TABLET ORAL at 08:05

## 2022-05-29 RX ADMIN — LOSARTAN POTASSIUM 50 MG: 50 TABLET, FILM COATED ORAL at 08:05

## 2022-05-29 RX ADMIN — ACETAMINOPHEN 650 MG: 325 TABLET, FILM COATED ORAL at 08:05

## 2022-05-29 RX ADMIN — LEVOTHYROXINE SODIUM 50 MCG: 50 TABLET ORAL at 05:05

## 2022-05-29 RX ADMIN — Medication 1 CAPSULE: at 06:05

## 2022-05-29 RX ADMIN — MEMANTINE 10 MG: 5 TABLET ORAL at 08:05

## 2022-05-29 RX ADMIN — INSULIN DETEMIR 15 UNITS: 100 INJECTION, SOLUTION SUBCUTANEOUS at 08:05

## 2022-05-29 NOTE — PLAN OF CARE
Problem: Adult Inpatient Plan of Care  Goal: Absence of Hospital-Acquired Illness or Injury  Intervention: Identify and Manage Fall Risk  Flowsheets (Taken 5/29/2022 1430)  Safety Promotion/Fall Prevention:   assistive device/personal item within reach   medications reviewed     Problem: Diabetes Comorbidity  Goal: Blood Glucose Level Within Targeted Range  Intervention: Monitor and Manage Glycemia  Flowsheets (Taken 5/29/2022 1430)  Glycemic Management: blood glucose monitored     Problem: Skin Injury Risk Increased  Goal: Skin Health and Integrity  Intervention: Optimize Skin Protection  Flowsheets (Taken 5/29/2022 1430)  Pressure Reduction Techniques: pressure points protected  Pressure Reduction Devices: specialty bed utilized  Skin Protection: incontinence pads utilized  Head of Bed (HOB) Positioning: HOB at 30 degrees     Problem: Fall Injury Risk  Goal: Absence of Fall and Fall-Related Injury  Intervention: Identify and Manage Contributors  Flowsheets (Taken 5/29/2022 1430)  Self-Care Promotion: BADL personal objects within reach  Medication Review/Management: medications reviewed

## 2022-05-29 NOTE — PROGRESS NOTES
[facility]  Progress Note    Patient Name: Bhargav Gao  MRN: [id number]  Patient Class: IP- Inpatient   Admission Date: 5/16/2022  Length of Stay: 13 days  Attending Physician: Hipolito Epps MD  Primary Care Provider: Kerry Lin MD        Subjective:     Principal Problem:Sacral wound        HPI:  Mr. Bhargav Gao is an 80 yo AAM resident of [facility] with medical history of hypertension, diabetes mellitus, hypothyroidism, cataracts, DVT, schizophrenia, mild intellectual disabilities, drug induced subacute dyskinesia, bipolar disorder and sacral wound. Mr. Gao was sent for admission due to sacral wound. Mr. Gao can say his name but he is unable to provide any further info. Records show wound culture from 05/03 grew ecoli, acinetobacter baumannii ,K. Pneumoniae, MRSA and e. Faecalis. Was given 7 days of zyvox and invanz. Acinetobacter not sensitive to ertapenem. Sent to [facility] for IV abx and wound care.      Overview/Hospital Course:  05/17 Patient without complaints. Sacral ulcer clean but gets contaminated with stool. Will speak with family about colostomy.  5/18 Spoke with patient's niece. She is agreeable to colostomy. Will consult Dr. Felix  05/23 Awake and resting in bed. Smiles at me. No complaints. Chest is clear. Heart RRR. Continue wound care to sacrum.    05/27/2022.  Patient seen and evaluated at the bedside today.  Patient basically nonverbal.  Lungs are clear with poor effort  Cardiovascular S1-S2 regular rate rhythm  Abdomen is soft positive bowel sounds nontender  Extremities nonedematous.  Patient is status post colostomy bag due to sacral wound.  Patient continues to have psychological issues, and patient may need a mental health evaluation early next week.  Otherwise will continue current treatment.        Date of service is 05/29/2022  Patient less confused today.    He is afebrile hemodynamically stable..  He does have some  psychological issues that may need to be addressed at the [facility]  Lungs are clear no crackles or wheezing  Cardiovascular S1-S2 regular rate rhythm  Abdomen is soft positive bowel sounds obese nontender extremities are nonedematous  Patient with a history of a full-thickness infected decubitus ulcer, he status post wound care, and he is status post colostomy.  Patient should be ready for discharge next week.  Patient may need an evaluation by Mental Health due to psychological issues.            Assessment/Plan:      * Sacral wound  05/16  -records show wound culture that grew  Culture, Wound [[id number]] (Abnormal)  Collected: 05/05/22 0920   Order Status: Completed Specimen: Wound from Sacral Updated: 05/11/22 1318    Culture, Wound/Abscess Light Growth Acinetobacter baumannii complex/haemolyticus Abnormal     Comment: Corrected result: Previously reported as Gram-negative Bacilli on 5/9/2022 at 1312 CDT.        Light Growth Escherichia coli Abnormal      Light Growth Klebsiella pneumoniae Abnormal     Comment: Corrected result: Previously reported as Gram-negative Bacilli on 5/8/2022 at 0843 CDT.        Heavy Growth Methicillin resistant Staphylococcus aureus Abnormal     Comment: Corrected result: Previously reported as Gram-positive Cocci on 5/8/2022 at 0843 CDT.   Gram-positive Cocci has been updated to reportable.        Heavy Growth Enterococcus faecalis Abnormal      Was treated with invanz and zyvox for 7 days  Acinetobacter not sensitive to invanz and zyvox    -ID consult placed for antibiotic recommendations    5/19  -plans for colostomy to aid in wound care. Will discuss with surgery.    5/21 - discussed with surgery. Plan colostomy Monday.     5/22 - colostomy tomorrow.     5/23 - colostomy today. Continue wound care.    5/25 - POD 1 colostomy, doing well.     5/26 - No stool output in ostomy bag yet. Wound care to sacral wound continues.     Hypertension  05/16  -continue home meds of losartan  and metoprolol    Diabetes mellitus  05/16  -continue home dose of levemir 15 units daily with low dose SSI coverage  -accuchecks ac and hs   -a1c on 03/02 was 6.5    5/22 - glucoses remain acceptable.       Pressure ulcer        Alzheimer's disease    05/16 continue namenda    VTE Risk Mitigation (From admission, onward)         Ordered     IP VTE HIGH RISK PATIENT  Once         05/16/22 1152     Place sequential compression device  Until discontinued         05/16/22 1152                Discharge Planning   YOSSI:      Code Status: Prior   Is the patient medically ready for discharge?:     Reason for patient still in hospital (select all that apply): Treatment  Discharge Plan A: Return to nursing home   Discharge Delays: None known at this time              Hipolito Epps MD  Department of Hospital Medicine   [facility]

## 2022-05-30 LAB
GLUCOSE SERPL-MCNC: 107 MG/DL (ref 70–105)
GLUCOSE SERPL-MCNC: 159 MG/DL (ref 70–105)
GLUCOSE SERPL-MCNC: 182 MG/DL (ref 70–105)
GLUCOSE SERPL-MCNC: 229 MG/DL (ref 70–105)

## 2022-05-30 PROCEDURE — 63600175 PHARM REV CODE 636 W HCPCS: Performed by: NURSE PRACTITIONER

## 2022-05-30 PROCEDURE — 82962 GLUCOSE BLOOD TEST: CPT

## 2022-05-30 PROCEDURE — 63700000 PHARM REV CODE 250 ALT 637 W/O HCPCS: Performed by: FAMILY MEDICINE

## 2022-05-30 PROCEDURE — C9399 UNCLASSIFIED DRUGS OR BIOLOG: HCPCS | Performed by: NURSE PRACTITIONER

## 2022-05-30 PROCEDURE — 25000003 PHARM REV CODE 250: Performed by: FAMILY MEDICINE

## 2022-05-30 PROCEDURE — 11000001 HC ACUTE MED/SURG PRIVATE ROOM

## 2022-05-30 PROCEDURE — 99232 PR SUBSEQUENT HOSPITAL CARE,LEVL II: ICD-10-PCS | Mod: ,,, | Performed by: FAMILY MEDICINE

## 2022-05-30 PROCEDURE — 99232 SBSQ HOSP IP/OBS MODERATE 35: CPT | Mod: ,,, | Performed by: FAMILY MEDICINE

## 2022-05-30 PROCEDURE — 25000003 PHARM REV CODE 250: Performed by: NURSE PRACTITIONER

## 2022-05-30 RX ADMIN — METOPROLOL TARTRATE 12.5 MG: 25 TABLET, FILM COATED ORAL at 09:05

## 2022-05-30 RX ADMIN — LACTULOSE 20 G: 20 SOLUTION ORAL at 08:05

## 2022-05-30 RX ADMIN — FLUCONAZOLE 200 MG: 100 TABLET ORAL at 08:05

## 2022-05-30 RX ADMIN — RISPERIDONE 1 MG: 1 TABLET ORAL at 08:05

## 2022-05-30 RX ADMIN — LOSARTAN POTASSIUM 50 MG: 50 TABLET, FILM COATED ORAL at 08:05

## 2022-05-30 RX ADMIN — OXCARBAZEPINE 450 MG: 150 TABLET, FILM COATED ORAL at 08:05

## 2022-05-30 RX ADMIN — Medication 1 CAPSULE: at 04:05

## 2022-05-30 RX ADMIN — MICONAZOLE NITRATE: 20 POWDER TOPICAL at 09:05

## 2022-05-30 RX ADMIN — Medication 1 CAPSULE: at 12:05

## 2022-05-30 RX ADMIN — OXCARBAZEPINE 450 MG: 150 TABLET, FILM COATED ORAL at 09:05

## 2022-05-30 RX ADMIN — INSULIN DETEMIR 15 UNITS: 100 INJECTION, SOLUTION SUBCUTANEOUS at 08:05

## 2022-05-30 RX ADMIN — POLYETHYLENE GLYCOL 3350 17 G: 17 POWDER, FOR SOLUTION ORAL at 08:05

## 2022-05-30 RX ADMIN — POLYETHYLENE GLYCOL 3350 17 G: 17 POWDER, FOR SOLUTION ORAL at 09:05

## 2022-05-30 RX ADMIN — Medication 1 CAPSULE: at 08:05

## 2022-05-30 RX ADMIN — METOPROLOL TARTRATE 12.5 MG: 25 TABLET, FILM COATED ORAL at 08:05

## 2022-05-30 RX ADMIN — MEMANTINE 10 MG: 5 TABLET ORAL at 09:05

## 2022-05-30 RX ADMIN — TAMSULOSIN HYDROCHLORIDE 0.4 MG: 0.4 CAPSULE ORAL at 08:05

## 2022-05-30 RX ADMIN — MICONAZOLE NITRATE: 20 POWDER TOPICAL at 08:05

## 2022-05-30 RX ADMIN — THERA TABS 1 TABLET: TAB at 08:05

## 2022-05-30 RX ADMIN — PANTOPRAZOLE SODIUM 40 MG: 40 GRANULE, DELAYED RELEASE ORAL at 08:05

## 2022-05-30 RX ADMIN — FUROSEMIDE 40 MG: 40 TABLET ORAL at 08:05

## 2022-05-30 RX ADMIN — RISPERIDONE 1 MG: 1 TABLET ORAL at 09:05

## 2022-05-30 RX ADMIN — LEVOTHYROXINE SODIUM 50 MCG: 50 TABLET ORAL at 05:05

## 2022-05-30 NOTE — PLAN OF CARE
Problem: Adult Inpatient Plan of Care  Goal: Absence of Hospital-Acquired Illness or Injury  Intervention: Identify and Manage Fall Risk  Flowsheets (Taken 5/30/2022 1341)  Safety Promotion/Fall Prevention:   assistive device/personal item within reach   medications reviewed   bed alarm set     Problem: Diabetes Comorbidity  Goal: Blood Glucose Level Within Targeted Range  Intervention: Monitor and Manage Glycemia  Flowsheets (Taken 5/30/2022 1341)  Glycemic Management: blood glucose monitored     Problem: Skin Injury Risk Increased  Goal: Skin Health and Integrity  Intervention: Optimize Skin Protection  Flowsheets (Taken 5/30/2022 1341)  Pressure Reduction Techniques: pressure points protected  Pressure Reduction Devices:   specialty bed utilized   heel offloading device utilized  Skin Protection: incontinence pads utilized  Head of Bed (HOB) Positioning: HOB at 30 degrees     Problem: Fall Injury Risk  Goal: Absence of Fall and Fall-Related Injury  Intervention: Identify and Manage Contributors  Flowsheets (Taken 5/30/2022 1341)  Self-Care Promotion: BADL personal objects within reach  Medication Review/Management: medications reviewed

## 2022-05-30 NOTE — PROGRESS NOTES
[facility]  Progress Note    Patient Name: Bhargav Gao  MRN: [id number]  Patient Class: IP- Inpatient   Admission Date: 5/16/2022  Length of Stay: 14 days  Attending Physician: Donato Whitlock MD  Primary Care Provider: Kerry Lin MD        Subjective:     Principal Problem:Sacral wound        HPI:  Mr. Bhargav Gao is an 82 yo AAM resident of [facility] with medical history of hypertension, diabetes mellitus, hypothyroidism, cataracts, DVT, schizophrenia, mild intellectual disabilities, drug induced subacute dyskinesia, bipolar disorder and sacral wound. Mr. Gao was sent for admission due to sacral wound. Mr. Gao can say his name but he is unable to provide any further info. Records show wound culture from 05/03 grew ecoli, acinetobacter baumannii ,K. Pneumoniae, MRSA and e. Faecalis. Was given 7 days of zyvox and invanz. Acinetobacter not sensitive to ertapenem. Sent to [facility] for IV abx and wound care.      Overview/Hospital Course:  05/17 Patient without complaints. Sacral ulcer clean but gets contaminated with stool. Will speak with family about colostomy.  5/18 Spoke with patient's niece. She is agreeable to colostomy. Will consult Dr. Felix  05/23 Awake and resting in bed. Smiles at me. No complaints. Chest is clear. Heart RRR. Continue wound care to sacrum.  05/30 Resting in bed. No distress noted. Nonverbal. CNA reports Mr. Gao eating 100% of meals served. Colostomy with stool noted. Indwelling anne catheter due to sacral wound. Continue wound care.      No new subjective & objective note has been filed under this hospital service since the last note was generated.      Assessment/Plan:      * Sacral wound  05/16  -records show wound culture that grew  Culture, Wound [[id number]] (Abnormal)  Collected: 05/05/22 0920   Order Status: Completed Specimen: Wound from Sacral Updated: 05/11/22 1318    Culture, Wound/Abscess Light Growth  Acinetobacter baumannii complex/haemolyticus Abnormal     Comment: Corrected result: Previously reported as Gram-negative Bacilli on 5/9/2022 at 1312 CDT.        Light Growth Escherichia coli Abnormal      Light Growth Klebsiella pneumoniae Abnormal     Comment: Corrected result: Previously reported as Gram-negative Bacilli on 5/8/2022 at 0843 CDT.        Heavy Growth Methicillin resistant Staphylococcus aureus Abnormal     Comment: Corrected result: Previously reported as Gram-positive Cocci on 5/8/2022 at 0843 CDT.   Gram-positive Cocci has been updated to reportable.        Heavy Growth Enterococcus faecalis Abnormal      Was treated with invanz and zyvox for 7 days  Acinetobacter not sensitive to invanz and zyvox    -ID consult placed for antibiotic recommendations    5/19  -plans for colostomy to aid in wound care. Will discuss with surgery.    5/21 - discussed with surgery. Plan colostomy Monday.     5/22 - colostomy tomorrow.     5/23 - colostomy today. Continue wound care.    5/25 - POD 1 colostomy, doing well.     5/26 - No stool output in ostomy bag yet. Wound care to sacral wound continues.     Hypertension  05/16  -continue home meds of losartan and metoprolol    Diabetes mellitus  05/16  -continue home dose of levemir 15 units daily with low dose SSI coverage  -accuchecks ac and hs   -a1c on 03/02 was 6.5    5/22 - glucoses remain acceptable.       Pressure ulcer        Alzheimer's disease    05/16 continue namenda      VTE Risk Mitigation (From admission, onward)         Ordered     IP VTE HIGH RISK PATIENT  Once         05/16/22 1152     Place sequential compression device  Until discontinued         05/16/22 1152                Discharge Planning   YOSSI:      Code Status: Prior   Is the patient medically ready for discharge?:     Reason for patient still in hospital (select all that apply): Treatment  Discharge Plan A: Return to nursing home   Discharge Delays: None known at this  time              Annie Marsh, SUSSYP  Department of [facility] Medicine   [facility]

## 2022-05-31 VITALS
HEIGHT: 64 IN | OXYGEN SATURATION: 96 % | RESPIRATION RATE: 20 BRPM | TEMPERATURE: 97 F | BODY MASS INDEX: 34.4 KG/M2 | HEART RATE: 66 BPM | SYSTOLIC BLOOD PRESSURE: 157 MMHG | DIASTOLIC BLOOD PRESSURE: 67 MMHG | WEIGHT: 201.5 LBS

## 2022-05-31 LAB
GLUCOSE SERPL-MCNC: 126 MG/DL (ref 70–105)
GLUCOSE SERPL-MCNC: 175 MG/DL (ref 70–105)

## 2022-05-31 PROCEDURE — 25000003 PHARM REV CODE 250: Performed by: NURSE PRACTITIONER

## 2022-05-31 PROCEDURE — 63600175 PHARM REV CODE 636 W HCPCS: Performed by: FAMILY MEDICINE

## 2022-05-31 PROCEDURE — 82962 GLUCOSE BLOOD TEST: CPT

## 2022-05-31 PROCEDURE — 25000003 PHARM REV CODE 250: Performed by: FAMILY MEDICINE

## 2022-05-31 PROCEDURE — 63700000 PHARM REV CODE 250 ALT 637 W/O HCPCS: Performed by: FAMILY MEDICINE

## 2022-05-31 PROCEDURE — 99239 PR HOSPITAL DISCHARGE DAY,>30 MIN: ICD-10-PCS | Mod: ,,, | Performed by: FAMILY MEDICINE

## 2022-05-31 PROCEDURE — 99239 HOSP IP/OBS DSCHRG MGMT >30: CPT | Mod: ,,, | Performed by: FAMILY MEDICINE

## 2022-05-31 PROCEDURE — C9399 UNCLASSIFIED DRUGS OR BIOLOG: HCPCS | Performed by: FAMILY MEDICINE

## 2022-05-31 RX ORDER — METOPROLOL TARTRATE 25 MG/1
12.5 TABLET, FILM COATED ORAL 2 TIMES DAILY
Qty: 30 TABLET | Refills: 11 | Status: ON HOLD
Start: 2022-05-31 | End: 2022-11-02 | Stop reason: HOSPADM

## 2022-05-31 RX ORDER — ACETAMINOPHEN 325 MG/1
650 TABLET ORAL EVERY 4 HOURS PRN
Refills: 0 | Status: ON HOLD
Start: 2022-05-31 | End: 2022-06-29 | Stop reason: HOSPADM

## 2022-05-31 RX ORDER — INSULIN ASPART 100 [IU]/ML
0-5 INJECTION, SOLUTION INTRAVENOUS; SUBCUTANEOUS
Status: ON HOLD
Start: 2022-05-31 | End: 2022-10-25 | Stop reason: CLARIF

## 2022-05-31 RX ORDER — ACETAMINOPHEN 325 MG/1
650 TABLET ORAL EVERY 8 HOURS PRN
Refills: 0 | Status: ON HOLD
Start: 2022-05-31 | End: 2022-07-05 | Stop reason: HOSPADM

## 2022-05-31 RX ADMIN — FUROSEMIDE 40 MG: 40 TABLET ORAL at 08:05

## 2022-05-31 RX ADMIN — Medication 1 CAPSULE: at 12:05

## 2022-05-31 RX ADMIN — PANTOPRAZOLE SODIUM 40 MG: 40 GRANULE, DELAYED RELEASE ORAL at 08:05

## 2022-05-31 RX ADMIN — LOSARTAN POTASSIUM 50 MG: 50 TABLET, FILM COATED ORAL at 08:05

## 2022-05-31 RX ADMIN — Medication 1 CAPSULE: at 08:05

## 2022-05-31 RX ADMIN — RISPERIDONE 1 MG: 1 TABLET ORAL at 08:05

## 2022-05-31 RX ADMIN — OXCARBAZEPINE 450 MG: 150 TABLET, FILM COATED ORAL at 08:05

## 2022-05-31 RX ADMIN — LACTULOSE 20 G: 20 SOLUTION ORAL at 08:05

## 2022-05-31 RX ADMIN — METOPROLOL TARTRATE 12.5 MG: 25 TABLET, FILM COATED ORAL at 08:05

## 2022-05-31 RX ADMIN — THERA TABS 1 TABLET: TAB at 08:05

## 2022-05-31 RX ADMIN — TAMSULOSIN HYDROCHLORIDE 0.4 MG: 0.4 CAPSULE ORAL at 08:05

## 2022-05-31 RX ADMIN — FLUCONAZOLE 200 MG: 100 TABLET ORAL at 08:05

## 2022-05-31 RX ADMIN — LEVOTHYROXINE SODIUM 50 MCG: 50 TABLET ORAL at 06:05

## 2022-05-31 RX ADMIN — POLYETHYLENE GLYCOL 3350 17 G: 17 POWDER, FOR SOLUTION ORAL at 08:05

## 2022-05-31 RX ADMIN — MICONAZOLE NITRATE: 20 POWDER TOPICAL at 08:05

## 2022-05-31 RX ADMIN — INSULIN DETEMIR 20 UNITS: 100 INJECTION, SOLUTION SUBCUTANEOUS at 08:05

## 2022-05-31 NOTE — DISCHARGE SUMMARY
[facility] Medicine  Discharge Summary      Patient Name: Bhargav Gao  MRN: [id number]  Patient Class: IP- Inpatient  Admission Date: 5/16/2022  Hospital Length of Stay: 15 days  Discharge Date and Time:  05/31/2022 10:34 AM  Attending Physician: Donato Whitlock MD   Discharging Provider: LIEN Suarez  Primary Care Provider: Kerry Lin MD      HPI:   Mr. Bhargav Gao is an 82 yo AAM resident of [facility] with medical history of hypertension, diabetes mellitus, hypothyroidism, cataracts, DVT, schizophrenia, mild intellectual disabilities, drug induced subacute dyskinesia, bipolar disorder and sacral wound. Mr. Gao was sent for admission due to sacral wound. Mr. Gao can say his name but he is unable to provide any further info. Records show wound culture from 05/03 grew ecoli, acinetobacter baumannii ,K. Pneumoniae, MRSA and e. Faecalis. Was given 7 days of zyvox and invanz. Acinetobacter not sensitive to ertapenem. Sent to [facility] for IV abx and wound care.      * No surgery found *      Hospital Course:   05/17 Patient without complaints. Sacral ulcer clean but gets contaminated with stool. Will speak with family about colostomy.  5/18 Spoke with patient's niece. She is agreeable to colostomy. Will consult Dr. Felix  05/23 Awake and resting in bed. Smiles at me. No complaints. Chest is clear. Heart RRR. Continue wound care to sacrum.  05/30 Resting in bed. No distress noted. Nonverbal. CNA reports Mr. Gao eating 100% of meals served. Colostomy with stool noted. Indwelling anne catheter due to sacral wound. Continue wound care.  05/31 Mr. Gao is stable and able to discharge back to [facility] today. Continue Colostomy and wound care. Appts made for follow up with PCP, [facility] and with Dr. Marcelino Felix. Medication reconciliation done.       Goals of Care Treatment Preferences:  Code Status: DNR      Consults:    Consults (From admission, onward)        Status Ordering Provider     Inpatient consult to General Surgery  Once        Provider:  Edilma Felix MD    Completed YUMIKO SOTO     Inpatient consult to Infectious Diseases  Once        Provider:  (Not yet assigned)    CRUZ Ashley          * Sacral wound  05/16  -records show wound culture that grew  Culture, Wound [[id number]] (Abnormal)  Collected: 05/05/22 0920   Order Status: Completed Specimen: Wound from Sacral Updated: 05/11/22 1318    Culture, Wound/Abscess Light Growth Acinetobacter baumannii complex/haemolyticus Abnormal     Comment: Corrected result: Previously reported as Gram-negative Bacilli on 5/9/2022 at 1312 CDT.        Light Growth Escherichia coli Abnormal      Light Growth Klebsiella pneumoniae Abnormal     Comment: Corrected result: Previously reported as Gram-negative Bacilli on 5/8/2022 at 0843 CDT.        Heavy Growth Methicillin resistant Staphylococcus aureus Abnormal     Comment: Corrected result: Previously reported as Gram-positive Cocci on 5/8/2022 at 0843 CDT.   Gram-positive Cocci has been updated to reportable.        Heavy Growth Enterococcus faecalis Abnormal      Was treated with invanz and zyvox for 7 days  Acinetobacter not sensitive to invanz and zyvox    -ID consult placed for antibiotic recommendations    5/19  -plans for colostomy to aid in wound care. Will discuss with surgery.    5/21 - discussed with surgery. Plan colostomy Monday.     5/22 - colostomy tomorrow.     5/23 - colostomy today. Continue wound care.    5/25 - POD 1 colostomy, doing well.     5/26 - No stool output in ostomy bag yet. Wound care to sacral wound continues.     05/31 Continue wound care- continue colostomy care    Pressure ulcer    05/31 s/p diverting colostomy  Continue wound care per [facility]  Follow up appts made with [facility] and with Dr. Marcelino Felix re retention sutures/colostomy      Final Active  Diagnoses:    Diagnosis Date Noted POA    PRINCIPAL PROBLEM:  Sacral wound [S31.000A]  Yes    Hypertension [I10]  Yes    Diabetes mellitus [E11.9]  Yes    Alzheimer's disease [G30.9, F02.80]  Yes    Pressure ulcer [L89.90]  Yes      Problems Resolved During this Admission:    Diagnosis Date Noted Date Resolved POA    Hyponatremia [E87.1] 05/16/2022 05/21/2022 Yes       Discharged Condition: stable    Disposition: Skilled Nursing Facility    Follow Up:   Follow-up Information     Kerry Lin MD Follow up in 1 week(s).    Specialty: Family Medicine  Contact information:  [address] [facility] [address]  [phone]             [facility] - [facility]ound Care Follow up.    Specialty: Wound Care  Why: make appt  Contact information:  [address] [place] [id number] [phone]           Edilma Felix MD Follow up.    Specialty: General Surgery  Why: make appt  Contact information:  [address] [place] [address] [facility] [address]  [phone]                       Patient Instructions:   No discharge procedures on file.    Significant Diagnostic Studies: Labs: All labs within the past 24 hours have been reviewed    Pending Diagnostic Studies:     Procedure Component Value Units Date/Time    EXTRA TUBES [[id number]] Collected: 05/21/22 0546    Order Status: Sent Lab Status: In process Updated: 05/21/22 0546    Specimen: Blood, Venous     Narrative:      The following orders were created for panel order EXTRA TUBES.  Procedure                               Abnormality         Status                     ---------                               -----------         ------                     Lavender Top Hold[[id number]]                                In process                   Please view results for these tests on the individual orders.    EXTRA TUBES [[id number]] Collected: 05/18/22 0444    Order Status: Sent Lab Status: In process Updated: 05/18/22 0444     Specimen: Blood, Venous     Narrative:      The following orders were created for panel order EXTRA TUBES.  Procedure                               Abnormality         Status                     ---------                               -----------         ------                     Lavender Top Hold[[id number]]                                In process                   Please view results for these tests on the individual orders.         Medications:  Reconciled Home Medications:      Medication List      START taking these medications    * acetaminophen 325 MG tablet  Commonly known as: TYLENOL  Take 2 tablets (650 mg total) by mouth every 4 (four) hours as needed.     * acetaminophen 325 MG tablet  Commonly known as: TYLENOL  Take 2 tablets (650 mg total) by mouth every 8 (eight) hours as needed for Temperature greater than (or equal to 101 degree F).     insulin aspart U-100 100 unit/mL injection  Commonly known as: NovoLOG  Inject 0-5 Units into the skin before meals and at bedtime as needed. **LOW CORRECTION DOSE**  Blood Glucose  mg/dL                  Pre-meal                2200  151-200                0 unit                      0 unit  201-250                2 units                    1 unit  251-300                3 units                    1 unit  301-350                4 units                    2 units  >350                     5 units                    3 units  Administer subcutaneously if needed at times designated by monitoring schedule.   DO NOT HOLD correction dose insulin for patients who are  NPO.     metoprolol tartrate 25 MG tablet  Commonly known as: LOPRESSOR  Take 0.5 tablets (12.5 mg total) by mouth 2 (two) times daily.     multivitamin Tab  Take 1 tablet by mouth once daily.  Start taking on: June 1, 2022         * This list has 2 medication(s) that are the same as other medications prescribed for you. Read the directions carefully, and ask your doctor or other care provider to review  them with you.            CHANGE how you take these medications    insulin detemir U-100 100 unit/mL injection  Commonly known as: Levemir  Inject 20 Units into the skin once daily.  Start taking on: June 1, 2022  What changed:   · how much to take  · when to take this        CONTINUE taking these medications    CALTRATE 600 + D ORAL  Take 1 tablet by mouth 2 (two) times daily.     ELIQUIS 5 mg Tab  Generic drug: apixaban  Take 5 mg by mouth 2 (two) times daily.     furosemide 40 MG tablet  Commonly known as: LASIX  Take 40 mg by mouth once daily. Hold for SBP < 110 or DBP < 60     lactulose 10 gram/15 mL solution  Commonly known as: CHRONULAC  Take 20 g by mouth once daily.     levothyroxine 50 MCG tablet  Commonly known as: SYNTHROID  Take 1 tablet (50 mcg total) by mouth before breakfast.     losartan 50 MG tablet  Commonly known as: COZAAR  Take 50 mg by mouth once daily. Hold for SBP < 110 or DBP < 60     memantine 10 MG Tab  Commonly known as: NAMENDA  Take 10 mg by mouth nightly.     OXcarbazepine 150 MG Tab  Commonly known as: TRILEPTAL  Take 450 mg by mouth 2 (two) times daily.     pantoprazole 40 MG tablet  Commonly known as: PROTONIX  Take 1 tablet by mouth once daily.     polyethylene glycol 17 gram Pwpk  Commonly known as: GLYCOLAX  Take 17 g by mouth 2 (two) times a day.     risperiDONE 1 MG tablet  Commonly known as: RISPERDAL  Take 1 mg by mouth 2 (two) times daily.     tamsulosin 0.4 mg Cap  Commonly known as: FLOMAX  Take 1 capsule by mouth once daily.        STOP taking these medications    HYDROcodone-acetaminophen 7.5-325 mg per tablet  Commonly known as: NORCO     insulin lispro 100 unit/mL injection     metoprolol succinate 25 MG 24 hr tablet  Commonly known as: TOPROL-XL     SITagliptin 50 MG Tab  Commonly known as: SAMANTHAUVIA            Indwelling Lines/Drains at time of discharge:   Lines/Drains/Airways     Drain  Duration                Urethral Catheter 03/03/22 7360 Triple-lumen 20 Fr. 88  days                Time spent on the discharge of patient: 35 minutes         LIEN Suarez  Department of Hospital Medicine  [facility]

## 2022-05-31 NOTE — PROGRESS NOTES
Patient discharged to [facility]. Patient has dressing to sacrum, anne, and colostomy. Patient is stable. NAD noted. Patient taken off unit via stretcher by [facility] Staff.

## 2022-05-31 NOTE — ASSESSMENT & PLAN NOTE
05/31 s/p diverting colostomy  Continue wound care per [facility]  Follow up appts made with [facility] and with Dr. Marcelino Felix re retention sutures/colostomy

## 2022-05-31 NOTE — ASSESSMENT & PLAN NOTE
Clean wound with baby shampoo and water or vashe  Pack with vashe soaked 4x4s  Cover and secure with abd pad and paper tape  Change daily  Turn every two hours  Low air loss mattress  Keep pressure off wound  TAP system   IV antibiotics  Colostomy

## 2022-05-31 NOTE — ASSESSMENT & PLAN NOTE
05/16  -records show wound culture that grew  Culture, Wound [[id number]] (Abnormal)  Collected: 05/05/22 0920   Order Status: Completed Specimen: Wound from Sacral Updated: 05/11/22 1318    Culture, Wound/Abscess Light Growth Acinetobacter baumannii complex/haemolyticus Abnormal     Comment: Corrected result: Previously reported as Gram-negative Bacilli on 5/9/2022 at 1312 CDT.        Light Growth Escherichia coli Abnormal      Light Growth Klebsiella pneumoniae Abnormal     Comment: Corrected result: Previously reported as Gram-negative Bacilli on 5/8/2022 at 0843 CDT.        Heavy Growth Methicillin resistant Staphylococcus aureus Abnormal     Comment: Corrected result: Previously reported as Gram-positive Cocci on 5/8/2022 at 0843 CDT.   Gram-positive Cocci has been updated to reportable.        Heavy Growth Enterococcus faecalis Abnormal      Was treated with invanz and zyvox for 7 days  Acinetobacter not sensitive to invanz and zyvox    -ID consult placed for antibiotic recommendations    5/19  -plans for colostomy to aid in wound care. Will discuss with surgery.    5/21 - discussed with surgery. Plan colostomy Monday.     5/22 - colostomy tomorrow.     5/23 - colostomy today. Continue wound care.    5/25 - POD 1 colostomy, doing well.     5/26 - No stool output in ostomy bag yet. Wound care to sacral wound continues.     05/31 Continue wound care- continue colostomy care

## 2022-05-31 NOTE — PLAN OF CARE
Problem: Adult Inpatient Plan of Care  Goal: Absence of Hospital-Acquired Illness or Injury  Intervention: Identify and Manage Fall Risk  5/31/2022 1123 by JACOB Stratton (Taken 5/31/2022 1123)  Safety Promotion/Fall Prevention:   assistive device/personal item within reach   medications reviewed  5/31/2022 1120 by JACOB Stratton (Taken 5/31/2022 1120)  Safety Promotion/Fall Prevention:   assistive device/personal item within reach   medications reviewed     Problem: Diabetes Comorbidity  Goal: Blood Glucose Level Within Targeted Range  Intervention: Monitor and Manage Glycemia  5/31/2022 1123 by JACOB Stratton (Taken 5/31/2022 1123)  Glycemic Management: blood glucose monitored  5/31/2022 1120 by JACOB Stratton (Taken 5/31/2022 1120)  Glycemic Management: blood glucose monitored     Problem: Skin Injury Risk Increased  Goal: Skin Health and Integrity  Intervention: Optimize Skin Protection  5/31/2022 1123 by JACOB Stratton (Taken 5/31/2022 1123)  Pressure Reduction Techniques: pressure points protected  Pressure Reduction Devices: specialty bed utilized  Skin Protection: incontinence pads utilized  Head of Bed (HOB) Positioning: HOB at 30 degrees  5/31/2022 1120 by JACOB Stratton (Taken 5/31/2022 1120)  Pressure Reduction Techniques: pressure points protected  Pressure Reduction Devices: specialty bed utilized  Skin Protection: incontinence pads utilized  Head of Bed (HOB) Positioning: HOB at 30 degrees     Problem: Fall Injury Risk  Goal: Absence of Fall and Fall-Related Injury  Intervention: Identify and Manage Contributors  5/31/2022 1123 by JACOB Stratton (Taken 5/31/2022 1123)  Self-Care Promotion: BADL personal objects within reach  Medication Review/Management: medications reviewed  5/31/2022 1120 by JACOB Stratton (Taken 5/31/2022 1120)  Self-Care  Promotion: BADL personal objects within reach  Medication Review/Management: medications reviewed

## 2022-05-31 NOTE — NURSING
Patient discharged to [facility]. Patient has dressing to sacrum, has anne and colostomy. Patient is stable. NAD noted. Patient is taken off unit via stretcher by [facility] Staff.

## 2022-05-31 NOTE — PLAN OF CARE
Problem: Adult Inpatient Plan of Care  Goal: Absence of Hospital-Acquired Illness or Injury  Intervention: Identify and Manage Fall Risk  Flowsheets (Taken 5/31/2022 1120)  Safety Promotion/Fall Prevention:   assistive device/personal item within reach   medications reviewed     Problem: Diabetes Comorbidity  Goal: Blood Glucose Level Within Targeted Range  Intervention: Monitor and Manage Glycemia  Flowsheets (Taken 5/31/2022 1120)  Glycemic Management: blood glucose monitored     Problem: Skin Injury Risk Increased  Goal: Skin Health and Integrity  Intervention: Optimize Skin Protection  Flowsheets (Taken 5/31/2022 1120)  Pressure Reduction Techniques: pressure points protected  Pressure Reduction Devices: specialty bed utilized  Skin Protection: incontinence pads utilized  Head of Bed (HOB) Positioning: HOB at 30 degrees     Problem: Fall Injury Risk  Goal: Absence of Fall and Fall-Related Injury  Intervention: Identify and Manage Contributors  Flowsheets (Taken 5/31/2022 1120)  Self-Care Promotion: BADL personal objects within reach  Medication Review/Management: medications reviewed

## 2022-06-28 ENCOUNTER — HOSPITAL ENCOUNTER (EMERGENCY)
Facility: HOSPITAL | Age: 82
Discharge: ANOTHER HOSPITAL | End: 2022-06-28
Attending: EMERGENCY MEDICINE
Payer: MEDICARE

## 2022-06-28 ENCOUNTER — HOSPITAL ENCOUNTER (INPATIENT)
Facility: HOSPITAL | Age: 82
LOS: 1 days | Discharge: OTHER INSTITUTION | DRG: 690 | End: 2022-06-29
Attending: EMERGENCY MEDICINE | Admitting: EMERGENCY MEDICINE
Payer: MEDICARE

## 2022-06-28 VITALS
SYSTOLIC BLOOD PRESSURE: 106 MMHG | WEIGHT: 203 LBS | DIASTOLIC BLOOD PRESSURE: 58 MMHG | HEART RATE: 84 BPM | TEMPERATURE: 98 F | HEIGHT: 64 IN | OXYGEN SATURATION: 99 % | RESPIRATION RATE: 11 BRPM | BODY MASS INDEX: 34.66 KG/M2

## 2022-06-28 DIAGNOSIS — N39.0 ACUTE URINARY TRACT INFECTION: ICD-10-CM

## 2022-06-28 DIAGNOSIS — R53.1 GENERALIZED WEAKNESS: ICD-10-CM

## 2022-06-28 DIAGNOSIS — E86.0 DEHYDRATION: ICD-10-CM

## 2022-06-28 DIAGNOSIS — N39.0 URINARY TRACT INFECTION WITHOUT HEMATURIA, SITE UNSPECIFIED: Primary | ICD-10-CM

## 2022-06-28 DIAGNOSIS — R50.9 FEVER: ICD-10-CM

## 2022-06-28 DIAGNOSIS — N39.0 UTI (URINARY TRACT INFECTION): ICD-10-CM

## 2022-06-28 LAB
ALBUMIN SERPL BCP-MCNC: 2.3 G/DL (ref 3.5–5)
ALBUMIN/GLOB SERPL: 0.6 {RATIO}
ALP SERPL-CCNC: 109 U/L (ref 45–115)
ALT SERPL W P-5'-P-CCNC: 24 U/L (ref 16–61)
ANION GAP SERPL CALCULATED.3IONS-SCNC: 11 MMOL/L (ref 7–16)
ANISOCYTOSIS BLD QL SMEAR: ABNORMAL
AST SERPL W P-5'-P-CCNC: 29 U/L (ref 15–37)
BACTERIA #/AREA URNS HPF: ABNORMAL /HPF
BASOPHILS # BLD AUTO: 0.04 K/UL (ref 0–0.2)
BASOPHILS NFR BLD AUTO: 0.2 % (ref 0–1)
BILIRUB SERPL-MCNC: 0.3 MG/DL (ref 0–1.2)
BILIRUB UR QL STRIP: NEGATIVE
BUN SERPL-MCNC: 25 MG/DL (ref 7–18)
BUN/CREAT SERPL: 38 (ref 6–20)
CALCIUM SERPL-MCNC: 9.2 MG/DL (ref 8.5–10.1)
CHLORIDE SERPL-SCNC: 99 MMOL/L (ref 98–107)
CLARITY UR: ABNORMAL
CO2 SERPL-SCNC: 28 MMOL/L (ref 21–32)
COLOR UR: YELLOW
CREAT SERPL-MCNC: 0.65 MG/DL (ref 0.7–1.3)
DIFFERENTIAL METHOD BLD: ABNORMAL
EOSINOPHIL # BLD AUTO: 0.16 K/UL (ref 0–0.5)
EOSINOPHIL NFR BLD AUTO: 1 % (ref 1–4)
ERYTHROCYTE [DISTWIDTH] IN BLOOD BY AUTOMATED COUNT: 15.3 % (ref 11.5–14.5)
FLUAV AG UPPER RESP QL IA.RAPID: NEGATIVE
FLUBV AG UPPER RESP QL IA.RAPID: NEGATIVE
GLOBULIN SER-MCNC: 4.1 G/DL (ref 2–4)
GLUCOSE SERPL-MCNC: 80 MG/DL (ref 74–106)
GLUCOSE SERPL-MCNC: 88 MG/DL (ref 70–105)
GLUCOSE UR STRIP-MCNC: NEGATIVE MG/DL
HCT VFR BLD AUTO: 33.3 % (ref 40–54)
HGB BLD-MCNC: 10.8 G/DL (ref 13.5–18)
IMM GRANULOCYTES # BLD AUTO: 0.09 K/UL (ref 0–0.04)
IMM GRANULOCYTES NFR BLD: 0.5 % (ref 0–0.4)
KETONES UR STRIP-SCNC: NEGATIVE MG/DL
LACTATE SERPL-SCNC: 0.9 MMOL/L (ref 0.4–2)
LEUKOCYTE ESTERASE UR QL STRIP: ABNORMAL
LYMPHOCYTES # BLD AUTO: 1.69 K/UL (ref 1–4.8)
LYMPHOCYTES NFR BLD AUTO: 10.2 % (ref 27–41)
LYMPHOCYTES NFR BLD MANUAL: 6 % (ref 27–41)
MAGNESIUM SERPL-MCNC: 2.1 MG/DL (ref 1.7–2.3)
MCH RBC QN AUTO: 27.3 PG (ref 27–31)
MCHC RBC AUTO-ENTMCNC: 32.4 G/DL (ref 32–36)
MCV RBC AUTO: 84.1 FL (ref 80–96)
MONOCYTES # BLD AUTO: 0.7 K/UL (ref 0–0.8)
MONOCYTES NFR BLD AUTO: 4.2 % (ref 2–6)
MONOCYTES NFR BLD MANUAL: 4 % (ref 2–6)
MPC BLD CALC-MCNC: 8.1 FL (ref 9.4–12.4)
MUCOUS THREADS #/AREA URNS HPF: ABNORMAL /HPF
NEUTROPHILS # BLD AUTO: 13.85 K/UL (ref 1.8–7.7)
NEUTROPHILS NFR BLD AUTO: 83.9 % (ref 53–65)
NEUTS BAND NFR BLD MANUAL: 10 % (ref 1–5)
NEUTS SEG NFR BLD MANUAL: 80 % (ref 50–62)
NITRITE UR QL STRIP: POSITIVE
NRBC # BLD AUTO: 0 X10E3/UL
NRBC, AUTO (.00): 0 %
NT-PROBNP SERPL-MCNC: 186 PG/ML (ref 1–450)
PH UR STRIP: 8.5 PH UNITS
PLATELET # BLD AUTO: 256 K/UL (ref 150–400)
PLATELET MORPHOLOGY: ABNORMAL
POTASSIUM SERPL-SCNC: 4.3 MMOL/L (ref 3.5–5.1)
PROT SERPL-MCNC: 6.4 G/DL (ref 6.4–8.2)
PROT UR QL STRIP: 30
RBC # BLD AUTO: 3.96 M/UL (ref 4.6–6.2)
RBC # UR STRIP: ABNORMAL /UL
RBC #/AREA URNS HPF: ABNORMAL /HPF
SARS-COV+SARS-COV-2 AG RESP QL IA.RAPID: NEGATIVE
SODIUM SERPL-SCNC: 134 MMOL/L (ref 136–145)
SP GR UR STRIP: 1.01
SQUAMOUS #/AREA URNS LPF: ABNORMAL /LPF
TROPONIN I SERPL HS-MCNC: 15.9 PG/ML
UROBILINOGEN UR STRIP-ACNC: 0.2 MG/DL
WBC # BLD AUTO: 16.53 K/UL (ref 4.5–11)
WBC #/AREA URNS HPF: ABNORMAL /HPF
WBC CLUMPS, UA: ABNORMAL /HPF

## 2022-06-28 PROCEDURE — 99285 EMERGENCY DEPT VISIT HI MDM: CPT | Mod: 25

## 2022-06-28 PROCEDURE — 83880 ASSAY OF NATRIURETIC PEPTIDE: CPT | Performed by: EMERGENCY MEDICINE

## 2022-06-28 PROCEDURE — 93010 EKG 12-LEAD: ICD-10-PCS | Mod: ,,, | Performed by: INTERNAL MEDICINE

## 2022-06-28 PROCEDURE — 87428 SARSCOV & INF VIR A&B AG IA: CPT | Performed by: EMERGENCY MEDICINE

## 2022-06-28 PROCEDURE — 25000003 PHARM REV CODE 250: Performed by: EMERGENCY MEDICINE

## 2022-06-28 PROCEDURE — 87149 DNA/RNA DIRECT PROBE: CPT | Mod: 59 | Performed by: EMERGENCY MEDICINE

## 2022-06-28 PROCEDURE — 83735 ASSAY OF MAGNESIUM: CPT | Performed by: EMERGENCY MEDICINE

## 2022-06-28 PROCEDURE — 87186 SC STD MICRODIL/AGAR DIL: CPT | Performed by: EMERGENCY MEDICINE

## 2022-06-28 PROCEDURE — 96365 THER/PROPH/DIAG IV INF INIT: CPT

## 2022-06-28 PROCEDURE — 81001 URINALYSIS AUTO W/SCOPE: CPT | Performed by: EMERGENCY MEDICINE

## 2022-06-28 PROCEDURE — 99284 EMERGENCY DEPT VISIT MOD MDM: CPT | Mod: ,,, | Performed by: EMERGENCY MEDICINE

## 2022-06-28 PROCEDURE — 93010 ELECTROCARDIOGRAM REPORT: CPT | Mod: ,,, | Performed by: INTERNAL MEDICINE

## 2022-06-28 PROCEDURE — 36415 COLL VENOUS BLD VENIPUNCTURE: CPT | Performed by: EMERGENCY MEDICINE

## 2022-06-28 PROCEDURE — 85025 COMPLETE CBC W/AUTO DIFF WBC: CPT | Performed by: EMERGENCY MEDICINE

## 2022-06-28 PROCEDURE — 99284 PR EMERGENCY DEPT VISIT,LEVEL IV: ICD-10-PCS | Mod: ,,, | Performed by: EMERGENCY MEDICINE

## 2022-06-28 PROCEDURE — 83605 ASSAY OF LACTIC ACID: CPT | Performed by: EMERGENCY MEDICINE

## 2022-06-28 PROCEDURE — 82962 GLUCOSE BLOOD TEST: CPT

## 2022-06-28 PROCEDURE — 11000001 HC ACUTE MED/SURG PRIVATE ROOM

## 2022-06-28 PROCEDURE — 93005 ELECTROCARDIOGRAM TRACING: CPT

## 2022-06-28 PROCEDURE — 63600175 PHARM REV CODE 636 W HCPCS: Performed by: EMERGENCY MEDICINE

## 2022-06-28 PROCEDURE — 84484 ASSAY OF TROPONIN QUANT: CPT | Performed by: EMERGENCY MEDICINE

## 2022-06-28 PROCEDURE — 87040 BLOOD CULTURE FOR BACTERIA: CPT | Performed by: EMERGENCY MEDICINE

## 2022-06-28 PROCEDURE — 80053 COMPREHEN METABOLIC PANEL: CPT | Performed by: EMERGENCY MEDICINE

## 2022-06-28 PROCEDURE — 87077 CULTURE AEROBIC IDENTIFY: CPT | Performed by: EMERGENCY MEDICINE

## 2022-06-28 RX ORDER — LEVOTHYROXINE SODIUM 50 UG/1
50 TABLET ORAL
Status: DISCONTINUED | OUTPATIENT
Start: 2022-06-29 | End: 2022-06-29 | Stop reason: HOSPADM

## 2022-06-28 RX ORDER — MUPIROCIN 20 MG/G
OINTMENT TOPICAL DAILY
Status: DISCONTINUED | OUTPATIENT
Start: 2022-06-29 | End: 2022-06-29 | Stop reason: HOSPADM

## 2022-06-28 RX ORDER — LACTULOSE 10 G/15ML
10 SOLUTION ORAL DAILY
Status: DISCONTINUED | OUTPATIENT
Start: 2022-06-29 | End: 2022-06-29

## 2022-06-28 RX ORDER — ONDANSETRON 2 MG/ML
4 INJECTION INTRAMUSCULAR; INTRAVENOUS EVERY 8 HOURS PRN
Status: DISCONTINUED | OUTPATIENT
Start: 2022-06-28 | End: 2022-06-29 | Stop reason: HOSPADM

## 2022-06-28 RX ORDER — IBUPROFEN 200 MG
24 TABLET ORAL
Status: DISCONTINUED | OUTPATIENT
Start: 2022-06-28 | End: 2022-06-29 | Stop reason: HOSPADM

## 2022-06-28 RX ORDER — INSULIN ASPART 100 [IU]/ML
1-10 INJECTION, SOLUTION INTRAVENOUS; SUBCUTANEOUS
Status: DISCONTINUED | OUTPATIENT
Start: 2022-06-28 | End: 2022-06-29 | Stop reason: HOSPADM

## 2022-06-28 RX ORDER — SODIUM CHLORIDE 9 MG/ML
INJECTION, SOLUTION INTRAVENOUS CONTINUOUS
Status: DISPENSED | OUTPATIENT
Start: 2022-06-28 | End: 2022-06-29

## 2022-06-28 RX ORDER — HYDROCODONE BITARTRATE AND ACETAMINOPHEN 5; 325 MG/1; MG/1
1 TABLET ORAL EVERY 6 HOURS PRN
Status: DISCONTINUED | OUTPATIENT
Start: 2022-06-28 | End: 2022-06-29

## 2022-06-28 RX ORDER — LOSARTAN POTASSIUM 50 MG/1
50 TABLET ORAL DAILY
Status: DISCONTINUED | OUTPATIENT
Start: 2022-06-29 | End: 2022-06-29 | Stop reason: HOSPADM

## 2022-06-28 RX ORDER — HYDROCODONE BITARTRATE AND ACETAMINOPHEN 5; 325 MG/1; MG/1
1 TABLET ORAL EVERY 12 HOURS
Status: ON HOLD | COMMUNITY
End: 2022-11-02 | Stop reason: HOSPADM

## 2022-06-28 RX ORDER — TAMSULOSIN HYDROCHLORIDE 0.4 MG/1
1 CAPSULE ORAL DAILY
Status: DISCONTINUED | OUTPATIENT
Start: 2022-06-29 | End: 2022-06-29 | Stop reason: HOSPADM

## 2022-06-28 RX ORDER — IBUPROFEN 200 MG
16 TABLET ORAL
Status: DISCONTINUED | OUTPATIENT
Start: 2022-06-28 | End: 2022-06-29 | Stop reason: HOSPADM

## 2022-06-28 RX ORDER — GLUCAGON 1 MG
1 KIT INJECTION
Status: DISCONTINUED | OUTPATIENT
Start: 2022-06-28 | End: 2022-06-29 | Stop reason: HOSPADM

## 2022-06-28 RX ORDER — SODIUM CHLORIDE 0.9 % (FLUSH) 0.9 %
10 SYRINGE (ML) INJECTION
Status: DISCONTINUED | OUTPATIENT
Start: 2022-06-28 | End: 2022-06-29 | Stop reason: HOSPADM

## 2022-06-28 RX ORDER — METOPROLOL TARTRATE 25 MG/1
12.5 TABLET ORAL 2 TIMES DAILY
Status: DISCONTINUED | OUTPATIENT
Start: 2022-06-28 | End: 2022-06-29 | Stop reason: HOSPADM

## 2022-06-28 RX ORDER — TALC
6 POWDER (GRAM) TOPICAL NIGHTLY PRN
Status: DISCONTINUED | OUTPATIENT
Start: 2022-06-28 | End: 2022-06-29 | Stop reason: HOSPADM

## 2022-06-28 RX ORDER — MEMANTINE HYDROCHLORIDE 5 MG/1
10 TABLET ORAL NIGHTLY
Status: DISCONTINUED | OUTPATIENT
Start: 2022-06-28 | End: 2022-06-29 | Stop reason: HOSPADM

## 2022-06-28 RX ORDER — POLYETHYLENE GLYCOL 3350 17 G/17G
17 POWDER, FOR SOLUTION ORAL 2 TIMES DAILY PRN
Status: DISCONTINUED | OUTPATIENT
Start: 2022-06-28 | End: 2022-06-29 | Stop reason: HOSPADM

## 2022-06-28 RX ORDER — PANTOPRAZOLE SODIUM 40 MG/1
40 TABLET, DELAYED RELEASE ORAL DAILY
Status: DISCONTINUED | OUTPATIENT
Start: 2022-06-29 | End: 2022-06-29 | Stop reason: HOSPADM

## 2022-06-28 RX ORDER — RISPERIDONE 0.5 MG/1
1 TABLET ORAL 2 TIMES DAILY
Status: DISCONTINUED | OUTPATIENT
Start: 2022-06-28 | End: 2022-06-29 | Stop reason: HOSPADM

## 2022-06-28 RX ORDER — ACETAMINOPHEN 325 MG/1
650 TABLET ORAL EVERY 6 HOURS PRN
Status: DISCONTINUED | OUTPATIENT
Start: 2022-06-28 | End: 2022-06-29 | Stop reason: HOSPADM

## 2022-06-28 RX ADMIN — PIPERACILLIN AND TAZOBACTAM 4.5 G: 4; .5 INJECTION, POWDER, LYOPHILIZED, FOR SOLUTION INTRAVENOUS; PARENTERAL at 01:06

## 2022-06-28 NOTE — ED NOTES
Pictures of sacral and right hip wound photos uploaded to chart.     Khan catheter bag changed and secured to right leg.  Khan to gravity.

## 2022-06-28 NOTE — ED PROVIDER NOTES
Encounter Date: 6/28/2022    SCRIBE #1 NOTE: I, Umu Abhilash, am scribing for, and in the presence of,  Emmanuel Hamm. I have scribed the entire note.       History     Chief Complaint   Patient presents with    Fever    Abnormal Lab     The patient is an 81 year old male with complaints of abnormal labs and fever. The patient came from [facility] via EMS. The patient had labs drawn yesterday and found a WBC of 18,000. Upon EMS arrival the patient had a blood glucose level in the 80's. The history is limited due to the patient being poorly communicative. The patient has a history of Alzheimer's disease, diabetes, hypertension, and schizophrenia.     The history is provided by the patient, the nursing home and the EMS personnel. History limited by: Alzheimer's. No  was used.     Review of patient's allergies indicates:   Allergen Reactions    Metformin     Mycobacterium tuberculosis (tuberculin ppd)     Norvasc [amlodipine]      Past Medical History:   Diagnosis Date    Alzheimer's disease     Bipolar disorder     BPH (benign prostatic hyperplasia)     Diabetes mellitus     Hyperlipidemia     Hypertension     Hypothyroidism     Idiopathic orofacial dystonia     Iron deficiency anemia secondary to blood loss (chronic)     Mild intellectual disabilities     Obesity     Pressure ulcer     Schizophrenia      Past Surgical History:   Procedure Laterality Date    COLOSTOMY N/A 5/24/2022    Procedure: CREATION, COLOSTOMY;  Surgeon: Edilma Felix MD;  Location: [facility] [place];  Service: General;  Laterality: N/A;  diverting colostomy dr felix tuesday     Family History   Family history unknown: Yes     Social History     Tobacco Use    Smoking status: Never Smoker    Smokeless tobacco: Never Used   Substance Use Topics    Alcohol use: Not Currently    Drug use: Never     Review of Systems   Unable to perform ROS: Other   Constitutional: Positive for fever.        Abnormal  labs   Respiratory: Negative for shortness of breath.    All other systems reviewed and are negative.      Physical Exam     Initial Vitals [06/28/22 1157]   BP Pulse Resp Temp SpO2   (!) 101/52 90 15 98 °F (36.7 °C) 100 %      MAP       --         Physical Exam    Constitutional: He appears well-developed and well-nourished. He is Obese .   HENT:   Head: Normocephalic and atraumatic.   Eyes: Conjunctivae and EOM are normal. Pupils are equal, round, and reactive to light.   Neck: Neck supple.   Normal range of motion.  Cardiovascular: Normal rate and regular rhythm.   Pulmonary/Chest: Breath sounds normal.   Musculoskeletal:         General: Edema (Pedal edema) present. Normal range of motion.      Cervical back: Normal range of motion and neck supple.     Neurological: He is alert and oriented to person, place, and time.   Skin: Skin is warm and dry. Capillary refill takes less than 2 seconds.   Sacral decubitus    Psychiatric: He has a normal mood and affect. Thought content normal.   Minimally communicative at baseline         ED Course   Procedures  Labs Reviewed   COMPREHENSIVE METABOLIC PANEL - Abnormal; Notable for the following components:       Result Value    Sodium 134 (*)     BUN 25 (*)     Creatinine 0.65 (*)     BUN/Creatinine Ratio 38 (*)     Albumin 2.3 (*)     Globulin 4.1 (*)     All other components within normal limits   URINALYSIS, REFLEX TO URINE CULTURE - Abnormal; Notable for the following components:    Clarity, UA Cloudy (*)     pH, UA 8.5 (*)     Leukocytes, UA Moderate (*)     Nitrites, UA Positive (*)     Protein, UA 30  (*)     Blood, UA Moderate (*)     All other components within normal limits   CBC WITH DIFFERENTIAL - Abnormal; Notable for the following components:    WBC 16.53 (*)     RBC 3.96 (*)     Hemoglobin 10.8 (*)     Hematocrit 33.3 (*)     RDW 15.3 (*)     MPV 8.1 (*)     Neutrophils % 83.9 (*)     Lymphocytes % 10.2 (*)     Immature Granulocytes % 0.5 (*)     Neutrophils,  Abs 13.85 (*)     Immature Granulocytes, Absolute 0.09 (*)     All other components within normal limits   URINALYSIS, MICROSCOPIC - Abnormal; Notable for the following components:    WBC, UA Too Numerous To Count (*)     RBC, UA 3-5 (*)     Bacteria, UA Loaded (*)     Squamous Epithelial Cells, UA Occasional (*)     WBC Clumps Few (*)     Mucus, UA Few (*)     All other components within normal limits   MANUAL DIFFERENTIAL - Abnormal; Notable for the following components:    Segmented Neutrophils, Man % 80 (*)     Bands, Man % 10 (*)     Lymphocytes, Man % 6 (*)     Platelet Morphology Few Large Platelets (*)     All other components within normal limits   SARS-COV2 (COVID) W/ FLU ANTIGEN - Normal    Narrative:     Negative SARS-CoV results should not be used as the sole basis for treatment or patient management decisions; negative results should be considered in the context of a patient's recent exposures, history and the presene of clinical signs and symptoms consistent with COVID-19.  Negative results should be treated as presumptive and confirmed by molecular assay, if necessary for patient management.   NT-PRO NATRIURETIC PEPTIDE - Normal   LACTIC ACID, PLASMA - Normal   MAGNESIUM - Normal   TROPONIN I - Normal   CULTURE, BLOOD   CULTURE, BLOOD   CULTURE, URINE   CBC W/ AUTO DIFFERENTIAL    Narrative:     The following orders were created for panel order CBC auto differential.  Procedure                               Abnormality         Status                     ---------                               -----------         ------                     CBC with Differential[[id number]]        Abnormal            Final result               Manual Differential[[id number]]          Abnormal            Final result                 Please view results for these tests on the individual orders.        ECG Results          EKG 12-lead (In process)  Result time 06/28/22 13:19:47    In process by Interface, Lab In [facility]  (06/28/22 13:19:47)                 Narrative:    Test Reason : R50.9,    Vent. Rate : 089 BPM     Atrial Rate : 000 BPM     P-R Int : 156 ms          QRS Dur : 090 ms      QT Int : 354 ms       P-R-T Axes : 037 -15 066 degrees     QTc Int : 405 ms    Sinus rhythm  Normal ECG      Referred By: [email or web address]   SELF           Confirmed By:                             Imaging Results          X-Ray Chest AP Portable (Final result)  Result time 06/28/22 13:01:33    Final result by Ilsa Contreras MD (06/28/22 13:01:33)                 Impression:      Chronic appearing changes similar on prior studies      Electronically signed by: Ilsa Contreras  Date:    06/28/2022  Time:    13:01             Narrative:    EXAMINATION:  XR CHEST AP PORTABLE    CLINICAL HISTORY:  Fever, unspecified    FINDINGS:  Comparison 05/16/2022    Heart is mildly enlarged    Mild reticular left lung opacities similar on multiple prior studies.  No more focal consolidation seen.                                 Medications   piperacillin-tazobactam (ZOSYN) 4.5 g in dextrose 5 % in water (D5W) 5 % 100 mL IVPB (MB+) (0 g Intravenous Stopped 6/28/22 1411)                Attending Attestation:           Physician Attestation for Scribe:  Physician Attestation Statement for Scribe #1: I, Emmanuel Hamm, reviewed documentation, as scribed by Umu Hung in my presence, and it is both accurate and complete.                      Clinical Impression:   Final diagnoses:  [R50.9] Fever  [N39.0] Urinary tract infection without hematuria, site unspecified (Primary)  [E86.0] Dehydration  [R53.1] Generalized weakness          ED Disposition Condition    Transfer to Another Facility Stable              Emmanuel Hamm MD  06/28/22 3512

## 2022-06-28 NOTE — ED PROVIDER NOTES
Encounter Date: 6/28/2022      SCRIBE #2 NOTE: I, Xena Strong, am scribing for, and in the presence of,  Rishi King MD . I have scribed the following portions of the note - the EKG reading.     History     Chief Complaint   Patient presents with    Fever    Abnormal Lab     HPI  Review of patient's allergies indicates:   Allergen Reactions    Metformin     Mycobacterium tuberculosis (tuberculin ppd)     Norvasc [amlodipine]      Past Medical History:   Diagnosis Date    Alzheimer's disease     Bipolar disorder     BPH (benign prostatic hyperplasia)     Diabetes mellitus     Hyperlipidemia     Hypertension     Hypothyroidism     Idiopathic orofacial dystonia     Iron deficiency anemia secondary to blood loss (chronic)     Mild intellectual disabilities     Obesity     Pressure ulcer     Schizophrenia      Past Surgical History:   Procedure Laterality Date    COLOSTOMY N/A 5/24/2022    Procedure: CREATION, COLOSTOMY;  Surgeon: Edilma Felix MD;  Location: [facility];  Service: General;  Laterality: N/A;  diverting colostomy dr felix tuesday     Family History   Family history unknown: Yes     Social History     Tobacco Use    Smoking status: Never Smoker    Smokeless tobacco: Never Used   Substance Use Topics    Alcohol use: Not Currently    Drug use: Never     Review of Systems    Physical Exam     Initial Vitals [06/28/22 1157]   BP Pulse Resp Temp SpO2   (!) 101/52 90 15 98 °F (36.7 °C) 100 %      MAP       --         Physical Exam    ED Course   Procedures  Labs Reviewed   COMPREHENSIVE METABOLIC PANEL - Abnormal; Notable for the following components:       Result Value    Sodium 134 (*)     BUN 25 (*)     Creatinine 0.65 (*)     BUN/Creatinine Ratio 38 (*)     Albumin 2.3 (*)     Globulin 4.1 (*)     All other components within normal limits   URINALYSIS, REFLEX TO URINE CULTURE - Abnormal; Notable for the following components:    Clarity, UA Cloudy (*)     pH, UA 8.5 (*)      Leukocytes, UA Moderate (*)     Nitrites, UA Positive (*)     Protein, UA 30  (*)     Blood, UA Moderate (*)     All other components within normal limits   CBC WITH DIFFERENTIAL - Abnormal; Notable for the following components:    WBC 16.53 (*)     RBC 3.96 (*)     Hemoglobin 10.8 (*)     Hematocrit 33.3 (*)     RDW 15.3 (*)     MPV 8.1 (*)     Neutrophils % 83.9 (*)     Lymphocytes % 10.2 (*)     Immature Granulocytes % 0.5 (*)     Neutrophils, Abs 13.85 (*)     Immature Granulocytes, Absolute 0.09 (*)     All other components within normal limits   URINALYSIS, MICROSCOPIC - Abnormal; Notable for the following components:    WBC, UA Too Numerous To Count (*)     RBC, UA 3-5 (*)     Bacteria, UA Loaded (*)     Squamous Epithelial Cells, UA Occasional (*)     WBC Clumps Few (*)     Mucus, UA Few (*)     All other components within normal limits   MANUAL DIFFERENTIAL - Abnormal; Notable for the following components:    Segmented Neutrophils, Man % 80 (*)     Bands, Man % 10 (*)     Lymphocytes, Man % 6 (*)     Platelet Morphology Few Large Platelets (*)     All other components within normal limits   SARS-COV2 (COVID) W/ FLU ANTIGEN - Normal    Narrative:     Negative SARS-CoV results should not be used as the sole basis for treatment or patient management decisions; negative results should be considered in the context of a patient's recent exposures, history and the presene of clinical signs and symptoms consistent with COVID-19.  Negative results should be treated as presumptive and confirmed by molecular assay, if necessary for patient management.   NT-PRO NATRIURETIC PEPTIDE - Normal   LACTIC ACID, PLASMA - Normal   MAGNESIUM - Normal   TROPONIN I - Normal   CULTURE, BLOOD   CULTURE, BLOOD   CULTURE, URINE   CBC W/ AUTO DIFFERENTIAL    Narrative:     The following orders were created for panel order CBC auto differential.  Procedure                               Abnormality         Status                      ---------                               -----------         ------                     CBC with Differential[[id number]]        Abnormal            Final result               Manual Differential[[id number]]          Abnormal            Final result                 Please view results for these tests on the individual orders.   POCT GLUCOSE MONITORING CONTINUOUS     EKG Readings: (Independently Interpreted)   Heart Rate: 124.   Intrepreted by Dr. King:   Non STEMI  Probable atrial tachycardia  Poor R wave progression   Inferior/lateral ST-T abnormality may be due to myocardial ischemia       ECG Results          EKG 12-lead (In process)  Result time 06/28/22 13:19:47    In process by Interface, Lab In [facility] (06/28/22 13:19:47)                 Narrative:    Test Reason : R50.9,    Vent. Rate : 089 BPM     Atrial Rate : 000 BPM     P-R Int : 156 ms          QRS Dur : 090 ms      QT Int : 354 ms       P-R-T Axes : 037 -15 066 degrees     QTc Int : 405 ms    Sinus rhythm  Normal ECG      Referred By: [email or web address]   SELF           Confirmed By:                             Imaging Results          X-Ray Chest AP Portable (Final result)  Result time 06/28/22 13:01:33    Final result by Ilsa Contreras MD (06/28/22 13:01:33)                 Impression:      Chronic appearing changes similar on prior studies      Electronically signed by: Ilsa Contreras  Date:    06/28/2022  Time:    13:01             Narrative:    EXAMINATION:  XR CHEST AP PORTABLE    CLINICAL HISTORY:  Fever, unspecified    FINDINGS:  Comparison 05/16/2022    Heart is mildly enlarged    Mild reticular left lung opacities similar on multiple prior studies.  No more focal consolidation seen.                                 Medications   piperacillin-tazobactam (ZOSYN) 4.5 g in dextrose 5 % in water (D5W) 5 % 100 mL IVPB (MB+) (0 g Intravenous Stopped 6/28/22 1411)                          Clinical Impression:   Final diagnoses:  [R50.9] Fever  [N39.0] Urinary  tract infection without hematuria, site unspecified (Primary)  [E86.0] Dehydration  [R53.1] Generalized weakness          ED Disposition Condition    Transfer to Another Facility Stable

## 2022-06-28 NOTE — ED NOTES
Phone consent for transport was given by pt sister Travis Trevor.  Verified with second nurse Richa JAMES

## 2022-06-29 ENCOUNTER — HOSPITAL ENCOUNTER (INPATIENT)
Facility: HOSPITAL | Age: 82
LOS: 6 days | Discharge: HOME | DRG: 689 | End: 2022-07-05
Attending: EMERGENCY MEDICINE | Admitting: EMERGENCY MEDICINE
Payer: MEDICARE

## 2022-06-29 VITALS
OXYGEN SATURATION: 98 % | TEMPERATURE: 98 F | DIASTOLIC BLOOD PRESSURE: 50 MMHG | WEIGHT: 185.19 LBS | BODY MASS INDEX: 25.08 KG/M2 | RESPIRATION RATE: 18 BRPM | SYSTOLIC BLOOD PRESSURE: 110 MMHG | HEART RATE: 62 BPM | HEIGHT: 72 IN

## 2022-06-29 DIAGNOSIS — E11.9 TYPE 2 DIABETES MELLITUS WITHOUT COMPLICATION, WITHOUT LONG-TERM CURRENT USE OF INSULIN: ICD-10-CM

## 2022-06-29 DIAGNOSIS — F20.9 SCHIZOPHRENIA, UNSPECIFIED TYPE: ICD-10-CM

## 2022-06-29 DIAGNOSIS — N39.0 UTI (URINARY TRACT INFECTION): ICD-10-CM

## 2022-06-29 DIAGNOSIS — F02.80 ALZHEIMER'S DISEASE: ICD-10-CM

## 2022-06-29 DIAGNOSIS — E86.0 DEHYDRATION: ICD-10-CM

## 2022-06-29 DIAGNOSIS — G30.9 ALZHEIMER'S DISEASE: ICD-10-CM

## 2022-06-29 DIAGNOSIS — R53.1 GENERALIZED WEAKNESS: ICD-10-CM

## 2022-06-29 DIAGNOSIS — S31.000D WOUND OF SACRAL REGION, SUBSEQUENT ENCOUNTER: ICD-10-CM

## 2022-06-29 DIAGNOSIS — L89.154 PRESSURE INJURY OF SACRAL REGION, STAGE 4: Primary | ICD-10-CM

## 2022-06-29 DIAGNOSIS — N39.0 ACUTE URINARY TRACT INFECTION: ICD-10-CM

## 2022-06-29 PROBLEM — E87.1 HYPONATREMIA: Status: RESOLVED | Noted: 2022-05-16 | Resolved: 2022-06-29

## 2022-06-29 LAB
ALBUMIN SERPL BCP-MCNC: 1.9 G/DL (ref 3.5–5)
ALBUMIN/GLOB SERPL: 0.4 {RATIO}
ALP SERPL-CCNC: 98 U/L (ref 45–115)
ALT SERPL W P-5'-P-CCNC: 24 U/L (ref 16–61)
ANION GAP SERPL CALCULATED.3IONS-SCNC: 8 MMOL/L (ref 7–16)
ANISOCYTOSIS BLD QL SMEAR: ABNORMAL
AST SERPL W P-5'-P-CCNC: 24 U/L (ref 15–37)
BASOPHILS # BLD AUTO: 0.02 K/UL (ref 0–0.2)
BASOPHILS NFR BLD AUTO: 0.1 % (ref 0–1)
BILIRUB SERPL-MCNC: 0.4 MG/DL (ref 0–1.2)
BUN SERPL-MCNC: 21 MG/DL (ref 7–18)
BUN/CREAT SERPL: 27 (ref 6–20)
CALCIUM SERPL-MCNC: 8.9 MG/DL (ref 8.5–10.1)
CHLORIDE SERPL-SCNC: 99 MMOL/L (ref 98–107)
CO2 SERPL-SCNC: 32 MMOL/L (ref 21–32)
CREAT SERPL-MCNC: 0.79 MG/DL (ref 0.7–1.3)
DIFFERENTIAL METHOD BLD: ABNORMAL
EOSINOPHIL # BLD AUTO: 0.24 K/UL (ref 0–0.5)
EOSINOPHIL NFR BLD AUTO: 1.5 % (ref 1–4)
EOSINOPHIL NFR BLD MANUAL: 2 % (ref 1–4)
ERYTHROCYTE [DISTWIDTH] IN BLOOD BY AUTOMATED COUNT: 15.1 % (ref 11.5–14.5)
EST. AVERAGE GLUCOSE BLD GHB EST-MCNC: 134 MG/DL
GLOBULIN SER-MCNC: 4.5 G/DL (ref 2–4)
GLUCOSE SERPL-MCNC: 156 MG/DL (ref 70–105)
GLUCOSE SERPL-MCNC: 160 MG/DL (ref 70–105)
GLUCOSE SERPL-MCNC: 172 MG/DL (ref 70–105)
GLUCOSE SERPL-MCNC: 90 MG/DL (ref 74–106)
GLUCOSE SERPL-MCNC: 92 MG/DL (ref 70–105)
HBA1C MFR BLD HPLC: 6.6 % (ref 4.5–6.6)
HCT VFR BLD AUTO: 30.7 % (ref 40–54)
HGB BLD-MCNC: 10 G/DL (ref 13.5–18)
LYMPHOCYTES # BLD AUTO: 2.01 K/UL (ref 1–4.8)
LYMPHOCYTES NFR BLD AUTO: 12.5 % (ref 27–41)
LYMPHOCYTES NFR BLD MANUAL: 10 % (ref 27–41)
MCH RBC QN AUTO: 27.7 PG (ref 27–31)
MCHC RBC AUTO-ENTMCNC: 32.6 G/DL (ref 32–36)
MCV RBC AUTO: 85 FL (ref 80–96)
MONOCYTES # BLD AUTO: 0.86 K/UL (ref 0–0.8)
MONOCYTES NFR BLD AUTO: 5.3 % (ref 2–6)
MONOCYTES NFR BLD MANUAL: 3 % (ref 2–6)
MPC BLD CALC-MCNC: 8.2 FL (ref 9.4–12.4)
NEUTROPHILS # BLD AUTO: 12.99 K/UL (ref 1.8–7.7)
NEUTROPHILS NFR BLD AUTO: 80.6 % (ref 53–65)
NEUTS BAND NFR BLD MANUAL: 5 % (ref 1–5)
NEUTS SEG NFR BLD MANUAL: 80 % (ref 50–62)
NRBC BLD MANUAL-RTO: 1 /100 WBC
PLATELET # BLD AUTO: 255 K/UL (ref 150–400)
PLATELET MORPHOLOGY: NORMAL
POTASSIUM SERPL-SCNC: 4 MMOL/L (ref 3.5–5.1)
PROT SERPL-MCNC: 6.4 G/DL (ref 6.4–8.2)
RBC # BLD AUTO: 3.61 M/UL (ref 4.6–6.2)
SODIUM SERPL-SCNC: 135 MMOL/L (ref 136–145)
VERIGENE RESULT: ABNORMAL
WBC # BLD AUTO: 16.12 K/UL (ref 4.5–11)

## 2022-06-29 PROCEDURE — 36415 COLL VENOUS BLD VENIPUNCTURE: CPT | Performed by: NURSE PRACTITIONER

## 2022-06-29 PROCEDURE — 11000004 HC SNF PRIVATE

## 2022-06-29 PROCEDURE — 36415 COLL VENOUS BLD VENIPUNCTURE: CPT | Performed by: EMERGENCY MEDICINE

## 2022-06-29 PROCEDURE — 63600175 PHARM REV CODE 636 W HCPCS: Performed by: EMERGENCY MEDICINE

## 2022-06-29 PROCEDURE — 80053 COMPREHEN METABOLIC PANEL: CPT | Performed by: NURSE PRACTITIONER

## 2022-06-29 PROCEDURE — 25000003 PHARM REV CODE 250: Performed by: NURSE PRACTITIONER

## 2022-06-29 PROCEDURE — 84630 ASSAY OF ZINC: CPT | Performed by: EMERGENCY MEDICINE

## 2022-06-29 PROCEDURE — 94761 N-INVAS EAR/PLS OXIMETRY MLT: CPT

## 2022-06-29 PROCEDURE — 83036 HEMOGLOBIN GLYCOSYLATED A1C: CPT | Performed by: NURSE PRACTITIONER

## 2022-06-29 PROCEDURE — C9399 UNCLASSIFIED DRUGS OR BIOLOG: HCPCS | Performed by: NURSE PRACTITIONER

## 2022-06-29 PROCEDURE — 85025 COMPLETE CBC W/AUTO DIFF WBC: CPT | Performed by: NURSE PRACTITIONER

## 2022-06-29 PROCEDURE — 82962 GLUCOSE BLOOD TEST: CPT

## 2022-06-29 PROCEDURE — 63600175 PHARM REV CODE 636 W HCPCS: Performed by: NURSE PRACTITIONER

## 2022-06-29 PROCEDURE — 25000003 PHARM REV CODE 250: Performed by: EMERGENCY MEDICINE

## 2022-06-29 PROCEDURE — 27000944

## 2022-06-29 RX ORDER — IBUPROFEN 200 MG
24 TABLET ORAL
Status: DISCONTINUED | OUTPATIENT
Start: 2022-06-29 | End: 2022-06-30

## 2022-06-29 RX ORDER — ASCORBIC ACID 500 MG
1000 TABLET ORAL DAILY
Status: DISCONTINUED | OUTPATIENT
Start: 2022-06-29 | End: 2022-06-29 | Stop reason: HOSPADM

## 2022-06-29 RX ORDER — LACTULOSE 10 G/15ML
20 SOLUTION ORAL DAILY
Status: DISCONTINUED | OUTPATIENT
Start: 2022-06-29 | End: 2022-06-29 | Stop reason: HOSPADM

## 2022-06-29 RX ORDER — SODIUM CHLORIDE 0.9 % (FLUSH) 0.9 %
10 SYRINGE (ML) INJECTION
Status: DISCONTINUED | OUTPATIENT
Start: 2022-06-29 | End: 2022-07-05 | Stop reason: HOSPADM

## 2022-06-29 RX ORDER — ONDANSETRON 2 MG/ML
4 INJECTION INTRAMUSCULAR; INTRAVENOUS EVERY 8 HOURS PRN
Status: DISCONTINUED | OUTPATIENT
Start: 2022-06-29 | End: 2022-07-05 | Stop reason: HOSPADM

## 2022-06-29 RX ORDER — METOPROLOL TARTRATE 25 MG/1
12.5 TABLET ORAL 2 TIMES DAILY
Status: DISCONTINUED | OUTPATIENT
Start: 2022-06-29 | End: 2022-07-05 | Stop reason: HOSPADM

## 2022-06-29 RX ORDER — TAMSULOSIN HYDROCHLORIDE 0.4 MG/1
1 CAPSULE ORAL DAILY
Status: DISCONTINUED | OUTPATIENT
Start: 2022-06-30 | End: 2022-07-05 | Stop reason: HOSPADM

## 2022-06-29 RX ORDER — ASCORBIC ACID 500 MG
1000 TABLET ORAL DAILY
Status: DISCONTINUED | OUTPATIENT
Start: 2022-06-30 | End: 2022-07-05 | Stop reason: HOSPADM

## 2022-06-29 RX ORDER — MUPIROCIN 20 MG/G
OINTMENT TOPICAL DAILY
Status: COMPLETED | OUTPATIENT
Start: 2022-06-30 | End: 2022-07-03

## 2022-06-29 RX ORDER — LACTULOSE 10 G/15ML
20 SOLUTION ORAL DAILY
Status: DISCONTINUED | OUTPATIENT
Start: 2022-06-30 | End: 2022-07-05 | Stop reason: HOSPADM

## 2022-06-29 RX ORDER — HYDROCODONE BITARTRATE AND ACETAMINOPHEN 5; 325 MG/1; MG/1
1 TABLET ORAL EVERY 12 HOURS PRN
Status: DISCONTINUED | OUTPATIENT
Start: 2022-06-29 | End: 2022-07-05 | Stop reason: HOSPADM

## 2022-06-29 RX ORDER — RISPERIDONE 0.5 MG/1
1 TABLET ORAL 2 TIMES DAILY
Status: DISCONTINUED | OUTPATIENT
Start: 2022-06-29 | End: 2022-07-05 | Stop reason: HOSPADM

## 2022-06-29 RX ORDER — HYDROCODONE BITARTRATE AND ACETAMINOPHEN 5; 325 MG/1; MG/1
1 TABLET ORAL EVERY 12 HOURS PRN
Status: DISCONTINUED | OUTPATIENT
Start: 2022-06-29 | End: 2022-06-29 | Stop reason: HOSPADM

## 2022-06-29 RX ORDER — LOSARTAN POTASSIUM 50 MG/1
50 TABLET ORAL DAILY
Status: DISCONTINUED | OUTPATIENT
Start: 2022-06-30 | End: 2022-07-05 | Stop reason: HOSPADM

## 2022-06-29 RX ORDER — GLUCAGON 1 MG
1 KIT INJECTION
Status: DISCONTINUED | OUTPATIENT
Start: 2022-06-29 | End: 2022-06-30

## 2022-06-29 RX ORDER — INSULIN ASPART 100 [IU]/ML
1-10 INJECTION, SOLUTION INTRAVENOUS; SUBCUTANEOUS
Status: DISCONTINUED | OUTPATIENT
Start: 2022-06-29 | End: 2022-06-30

## 2022-06-29 RX ORDER — IBUPROFEN 200 MG
16 TABLET ORAL
Status: DISCONTINUED | OUTPATIENT
Start: 2022-06-29 | End: 2022-06-30

## 2022-06-29 RX ORDER — MEMANTINE HYDROCHLORIDE 5 MG/1
10 TABLET ORAL NIGHTLY
Status: DISCONTINUED | OUTPATIENT
Start: 2022-06-29 | End: 2022-07-05 | Stop reason: HOSPADM

## 2022-06-29 RX ORDER — POLYETHYLENE GLYCOL 3350 17 G/17G
17 POWDER, FOR SOLUTION ORAL 2 TIMES DAILY PRN
Status: DISCONTINUED | OUTPATIENT
Start: 2022-06-29 | End: 2022-07-05 | Stop reason: HOSPADM

## 2022-06-29 RX ORDER — ACETAMINOPHEN 325 MG/1
650 TABLET ORAL EVERY 6 HOURS PRN
Status: DISCONTINUED | OUTPATIENT
Start: 2022-06-29 | End: 2022-07-05 | Stop reason: HOSPADM

## 2022-06-29 RX ORDER — LEVOTHYROXINE SODIUM 50 UG/1
50 TABLET ORAL
Status: DISCONTINUED | OUTPATIENT
Start: 2022-06-30 | End: 2022-07-05 | Stop reason: HOSPADM

## 2022-06-29 RX ORDER — PANTOPRAZOLE SODIUM 40 MG/1
40 TABLET, DELAYED RELEASE ORAL DAILY
Status: DISCONTINUED | OUTPATIENT
Start: 2022-06-30 | End: 2022-07-05 | Stop reason: HOSPADM

## 2022-06-29 RX ORDER — TALC
6 POWDER (GRAM) TOPICAL NIGHTLY PRN
Status: DISCONTINUED | OUTPATIENT
Start: 2022-06-29 | End: 2022-07-05 | Stop reason: HOSPADM

## 2022-06-29 RX ADMIN — METOPROLOL TARTRATE 12.5 MG: 25 TABLET, FILM COATED ORAL at 12:06

## 2022-06-29 RX ADMIN — RISPERIDONE 1 MG: 0.5 TABLET ORAL at 12:06

## 2022-06-29 RX ADMIN — SODIUM CHLORIDE: 9 INJECTION, SOLUTION INTRAVENOUS at 12:06

## 2022-06-29 RX ADMIN — LEVOTHYROXINE SODIUM 50 MCG: 0.05 TABLET ORAL at 05:06

## 2022-06-29 RX ADMIN — LOSARTAN POTASSIUM 50 MG: 50 TABLET, FILM COATED ORAL at 10:06

## 2022-06-29 RX ADMIN — MEMANTINE 10 MG: 5 TABLET ORAL at 12:06

## 2022-06-29 RX ADMIN — PANTOPRAZOLE SODIUM 40 MG: 40 TABLET, DELAYED RELEASE ORAL at 10:06

## 2022-06-29 RX ADMIN — APIXABAN 5 MG: 2.5 TABLET, FILM COATED ORAL at 08:06

## 2022-06-29 RX ADMIN — HYDROCODONE BITARTRATE AND ACETAMINOPHEN 1 TABLET: 5; 325 TABLET ORAL at 10:06

## 2022-06-29 RX ADMIN — RISPERIDONE 1 MG: 0.5 TABLET ORAL at 08:06

## 2022-06-29 RX ADMIN — PIPERACILLIN AND TAZOBACTAM 4.5 G: 4; .5 INJECTION, POWDER, LYOPHILIZED, FOR SOLUTION INTRAVENOUS at 12:06

## 2022-06-29 RX ADMIN — OXCARBAZEPINE 450 MG: 300 TABLET, FILM COATED ORAL at 10:06

## 2022-06-29 RX ADMIN — METOPROLOL TARTRATE 12.5 MG: 25 TABLET, FILM COATED ORAL at 10:06

## 2022-06-29 RX ADMIN — MULTIPLE VITAMINS W/ MINERALS TAB 1 TABLET: TAB at 10:06

## 2022-06-29 RX ADMIN — OXYCODONE HYDROCHLORIDE AND ACETAMINOPHEN 1000 MG: 500 TABLET ORAL at 10:06

## 2022-06-29 RX ADMIN — MEMANTINE 10 MG: 5 TABLET ORAL at 08:06

## 2022-06-29 RX ADMIN — MUPIROCIN: 20 OINTMENT TOPICAL at 10:06

## 2022-06-29 RX ADMIN — INSULIN DETEMIR 20 UNITS: 100 INJECTION, SOLUTION SUBCUTANEOUS at 10:06

## 2022-06-29 RX ADMIN — OXCARBAZEPINE 450 MG: 300 TABLET, FILM COATED ORAL at 12:06

## 2022-06-29 RX ADMIN — PIPERACILLIN SODIUM AND TAZOBACTAM SODIUM 4.5 G: 4; 500 INJECTION, POWDER, FOR SOLUTION INTRAVENOUS at 08:06

## 2022-06-29 RX ADMIN — RISPERIDONE 1 MG: 0.5 TABLET ORAL at 10:06

## 2022-06-29 RX ADMIN — TAMSULOSIN HYDROCHLORIDE 0.4 MG: 0.4 CAPSULE ORAL at 10:06

## 2022-06-29 RX ADMIN — OXCARBAZEPINE 450 MG: 300 TABLET, FILM COATED ORAL at 08:06

## 2022-06-29 RX ADMIN — METOPROLOL TARTRATE 12.5 MG: 25 TABLET, FILM COATED ORAL at 08:06

## 2022-06-29 RX ADMIN — APIXABAN 5 MG: 2.5 TABLET, FILM COATED ORAL at 12:06

## 2022-06-29 RX ADMIN — PIPERACILLIN AND TAZOBACTAM 4.5 G: 4; .5 INJECTION, POWDER, LYOPHILIZED, FOR SOLUTION INTRAVENOUS at 06:06

## 2022-06-29 RX ADMIN — PIPERACILLIN AND TAZOBACTAM 4.5 G: 4; .5 INJECTION, POWDER, LYOPHILIZED, FOR SOLUTION INTRAVENOUS at 03:06

## 2022-06-29 RX ADMIN — LACTULOSE 20 G: 20 SOLUTION ORAL at 10:06

## 2022-06-29 RX ADMIN — APIXABAN 5 MG: 2.5 TABLET, FILM COATED ORAL at 10:06

## 2022-06-29 RX ADMIN — INSULIN ASPART 1 UNITS: 100 INJECTION, SOLUTION INTRAVENOUS; SUBCUTANEOUS at 09:06

## 2022-06-29 NOTE — PROGRESS NOTES
[facility] - Medical Surgical Unit  Adult Nutrition  Consult Note    SUMMARY     Recommendations    Recommendation/Intervention: 1. Check Zinc level 2. One carton Glucenra po BID 3. Continue Epi 4. Add DM diet to diet order  Goals: Meet EEN to aid in WND healing and improve nutritional status  Nutrition Goal Status: new    Assessment and Plan    No new Assessment & Plan notes have been filed under this hospital service since the last note was generated.  Service: Nutrition       Malnutrition Assessment  Malnutrition Type: chronic illness (Alb 1.9 complicated by WNDs)  Skin (Micronutrient): wounds unhealed                                 Reason for Assessment    Reason For Assessment: consult (swing bed patient)  Diagnosis:  (UTI, fever, dehydration, sacral WND and R hip WND)  Relevant Medical History: Alzheimers, Bipolar, DVT, HLD, HTN, Hypothyroidism, Schizophrenia, Idiopathic orofacial dystonia, Anemia, Intellectual disability, Obesity  Interdisciplinary Rounds: attended  General Information Comments: RDN visited pt this p.m. and reviewed WND pics. He is tolerating Pureed diet and is receiving Epi BID.  Staff is assisting pt with meals. He is drinking Epi BID.    Nutrition Risk Screen    Nutrition Risk Screen: large or nonhealing wound, burn or pressure injury    Nutrition/Diet History    Patient Reported Diet/Restrictions/Preferences: pureed  Spiritual, Cultural Beliefs, Hoahaoism Practices, Values that Affect Care: no  Supplemental Drinks or Food Habits: Epi  Food Allergies: NKFA  Factors Affecting Nutritional Intake: chewing difficulties/inability to chew food, inability to feed self    Anthropometrics    Temp: 98.2 °F (36.8 °C)  Height: 6' (182.9 cm)  Height (inches): 72 in  Weight Method: Bed Scale  Weight: 84 kg (185 lb 3 oz)  Weight (lb): 185.19 lb  Ideal Body Weight (IBW), Male: 178 lb  % Ideal Body Weight, Male (lb): 104.04 %  BMI (Calculated): 25.1  BMI Grade: 25 - 29.9 - overweight        Lab/Procedures/Meds    Pertinent Labs Reviewed: reviewed  Pertinent Labs Comments: Alb 1.9, WBC 16.12, BUN 21, B-172  Pertinent Medications Reviewed: reviewed  Pertinent Medications Comments: Zosyn, Risperdal, Namenda, MVM, Protonix, Vit C 1000mg daily, Insulin, Lactulose, Synthroid    Physical Findings/Assessment         Estimated/Assessed Needs    Weight Used For Calorie Calculations: 84 kg (185 lb 3 oz)  Energy Calorie Requirements (kcal): 2520  Energy Need Method: Kcal/kg  Protein Requirements: 101-126  Weight Used For Protein Calculations: 84 kg (185 lb 3 oz)     Estimated Fluid Requirement Method: RDA Method  RDA Method (mL): 2520         Nutrition Prescription Ordered    Current Diet Order: Pureed    Evaluation of Received Nutrient/Fluid Intake    Energy Calories Required: not meeting needs  Protein Required: not meeting needs  Fluid Required: not meeting needs  Tolerance: tolerating  % Intake of Estimated Energy Needs: 50 - 75 %  % Meal Intake: 50 - 75 %    Nutrition Risk    Level of Risk/Frequency of Follow-up: high       Monitor and Evaluation    Food and Nutrient Intake: food and beverage intake  Anthropometric Measurements: weight  Biochemical Data, Medical Tests and Procedures: electrolyte and renal panel, glucose/endocrine profile  Nutrition-Focused Physical Findings: overall appearance, skin       Nutrition Follow-Up    RD Follow-up?: Yes

## 2022-06-29 NOTE — HPI
Mr. Gao is an 81 year-old  male who is admitted to [facility] acute care services due to acute urinary tract infection, dehydration, fever and generalized weakness. He is a resident of [facility]. He was evaluated in the ED at [facility] today after the SNF noted a WBC of > 14888 and fever as of yesterday. Today, in the ED, his WBC was 26928 with 83.9% neutrophils and his temp was 98F. His urine showed moderate leukocytes, positive for nitrites, and loaded with bacteria. He has a chronic indwelling Khan cath due to urinary retention. A urine culture and blood cultures are pending. He is slightly dehydrated with BUN 25 and creatinine is 0.65. His LFTs and electrolytes are WNL. His H&H is 10.8 and 33.3. His chest xray showed mildly enlarged heart with left lung opacities that are similar on multiple previous studies and no more focal consolidation. BNP, Troponin, lactic acid, are all WNL. His O2 sats have ranged % on RA. He was given his first dose of Zosyn in the ED at [facility] as indicated for complicated UTI. Dr. Pike accepted him for transfer here for his continued treatment.     DNR  VTE prophylaxis: Eliquis/Mary

## 2022-06-29 NOTE — PLAN OF CARE
Problem: Adult Inpatient Plan of Care  Goal: Plan of Care Review  Outcome: Ongoing, Progressing  Goal: Patient-Specific Goal (Individualized)  Outcome: Ongoing, Progressing  Goal: Absence of Hospital-Acquired Illness or Injury  Outcome: Ongoing, Progressing  Goal: Optimal Comfort and Wellbeing  Outcome: Ongoing, Progressing  Goal: Readiness for Transition of Care  Outcome: Ongoing, Progressing     Problem: Diabetes Comorbidity  Goal: Blood Glucose Level Within Targeted Range  Outcome: Ongoing, Progressing     Problem: Impaired Wound Healing  Goal: Optimal Wound Healing  Outcome: Ongoing, Progressing     Problem: Skin Injury Risk Increased  Goal: Skin Health and Integrity  Outcome: Ongoing, Progressing     Problem: Infection  Goal: Absence of Infection Signs and Symptoms  Outcome: Ongoing, Progressing

## 2022-06-29 NOTE — ASSESSMENT & PLAN NOTE
Wound care consult  Turn q2h  Wound care daily  Vitamin c  Epi  06/29/22 Wound care orders received from nursing home and updated.

## 2022-06-29 NOTE — ASSESSMENT & PLAN NOTE
Glucose 88  Continue home med Levemir 20 units daily  Accuchecks AC and HS with SSI mod coverage  Diabetic pureed diet  06/29/22   BG range

## 2022-06-29 NOTE — ASSESSMENT & PLAN NOTE
Glucose 88  Continue home med Levemir 20 units daily  Accuchecks AC and HS with SSI mod coverage  Diabetic pureed diet

## 2022-06-29 NOTE — HOSPITAL COURSE
06/29/22  Hospital Day 2  Patient awake this morning on rounds, patient will continue to need IV antibiotics for 6 days and will be admitted to swing bed to complete his antibiotics. Patient was admitted to [facility] yesterday from [facility] after he presented there for fever and generalized weakness. ED evaluation revealed a UTI and dehydration. Patient is a resident of [facility] and the discharge plan is for patient to return to the nursing home once antibiotics are completed. Dr. Pike evaluated the patient on rounds this morning and agrees with plan of care with patient being admitted to swing bed for IV antibiotic therapy.

## 2022-06-29 NOTE — NURSING
Dressing change done to patient sacral area.   Photos and measurements done on admit.   Dr. Pike in to see patient wounds.  Cleaned with Dankins solution, applied AG to wound bed because acetic acid was not available and patient tolerated it well.  Applied 4x4s and ABD pads to cover wound.  Secured with paper tape and skin prep barrier applied to skin prior to dressing.   Changed the colostomy bag.  Cleansed area with mild soap and water.   Patted dry.  Applied skin prep to area.  Applied a 1 piece colosomy bag to the area and sealed with additional duoderm to edges to prevent any leaking.  Patient tolerated it well.   2 pink drain lines intact and noted at incision site with sutures holding it in place. No bleeding noted.

## 2022-06-29 NOTE — PLAN OF CARE
[facility] - Medical Surgical Unit  Initial Discharge Assessment       Primary Care Provider: Kerry Lin MD    Admission Diagnosis: UTI (urinary tract infection) [N39.0]    Admission Date: 6/28/2022  Expected Discharge Date: 6/29/2022         Payor: MEDICARE / Plan: MEDICARE PART A & B / Product Type: Government /     Extended Emergency Contact Information  Primary Emergency Contact: Jennifer Rudolph  Mobile Phone: [phone]  Relation: Relative    Discharge Plan A: (P) Return to nursing home       No Pharmacies Listed    Initial Assessment (most recent)       Adult Discharge Assessment - 06/29/22 1149          Discharge Assessment    Assessment Type Discharge Planning Assessment (P)      Source of Information facility verbal report (P)      Lives With facility resident (P)      Discharge Plan A Return to nursing home (P)      Discharge Plan discussed with: Caregiver (P)                    Pt is a resident at [facility] in [place], [place]. I have spoke to Mrs Morlaes there and the contact number for his xavier (Alma Xavier) is [phone]. Pt will return there when his IV Abx are finished in 5-7 days.

## 2022-06-29 NOTE — H&P
[facility] - Medical Surgical Unit  [facility] Medicine  History & Physical    Patient Name: Bhargav Gao  MRN: [id number]  Patient Class: IP- Inpatient  Admission Date: 6/28/2022  Attending Physician: Chelsie Pike MD   Primary Care Provider: Kerry Lin MD         Patient information was obtained from ER records.     Subjective:     Principal Problem:Acute urinary tract infection    Chief Complaint:   Chief Complaint   Patient presents with    Fever    leukocytosis        HPI: Mr. Gao is an 81 year-old  male who is admitted to [facility] acute care services due to acute urinary tract infection, dehydration, fever and generalized weakness. He is a resident of [facility]. He was evaluated in the ED at [facility] today after the SNF noted a WBC of > 51646 and fever as of yesterday. Today, in the ED, his WBC was 92646 with 83.9% neutrophils and his temp was 98F. His urine showed moderate leukocytes, positive for nitrites, and loaded with bacteria. He has a chronic indwelling Khan cath due to urinary retention. A urine culture and blood cultures are pending. He is slightly dehydrated with BUN 25 and creatinine is 0.65. His LFTs and electrolytes are WNL. His H&H is 10.8 and 33.3. His chest xray showed mildly enlarged heart with left lung opacities that are similar on multiple previous studies and no more focal consolidation. BNP, Troponin, lactic acid, are all WNL. His O2 sats have ranged % on RA. He was given his first dose of Zosyn in the ED at [facility] as indicated for complicated UTI. Dr. Pike accepted him for transfer here for his continued treatment.     DNR  VTE prophylaxis: Eliquis/TEDs      Past Medical History:   Diagnosis Date    Alzheimer's disease     Anticoagulant long-term use     Bipolar disorder     BPH (benign prostatic hyperplasia)     Deep vein thrombosis (DVT) of popliteal vein of right lower extremity     Diabetes mellitus     DVT of deep femoral vein,  left     Hyperlipidemia     Hypertension     Hypothyroidism     Idiopathic orofacial dystonia     Iron deficiency anemia secondary to blood loss (chronic)     Mild intellectual disabilities     Obesity     Pressure ulcer     Schizophrenia        Past Surgical History:   Procedure Laterality Date    COLOSTOMY N/A 5/24/2022    Procedure: CREATION, COLOSTOMY;  Surgeon: Edilma Felix MD;  Location: [facility];  Service: General;  Laterality: N/A;  diverting colostomy dr felix tuesday       Review of patient's allergies indicates:   Allergen Reactions    Metformin     Mycobacterium tuberculosis (tuberculin ppd)     Norvasc [amlodipine]        Current Facility-Administered Medications on File Prior to Encounter   Medication    diphenhydrAMINE injection 25 mg    HYDROmorphone (PF) injection 0.5 mg    morphine injection 4 mg    ondansetron injection 4 mg    [COMPLETED] piperacillin-tazobactam (ZOSYN) 4.5 g in dextrose 5 % in water (D5W) 5 % 100 mL IVPB (MB+)     Current Outpatient Medications on File Prior to Encounter   Medication Sig    apixaban (ELIQUIS) 5 mg Tab Take 5 mg by mouth 2 (two) times daily.    calcium carbonate/vitamin D3 (CALTRATE 600 + D ORAL) Take 1 tablet by mouth 2 (two) times daily.    furosemide (LASIX) 40 MG tablet Take 40 mg by mouth once daily. Hold for SBP < 110 or DBP < 60    HYDROcodone-acetaminophen (NORCO) 5-325 mg per tablet Take 1 tablet by mouth every 6 (six) hours as needed for Pain.    insulin aspart U-100 (NOVOLOG) 100 unit/mL injection Inject 0-5 Units into the skin before meals and at bedtime as needed. **LOW CORRECTION DOSE**  Blood Glucose  mg/dL                  Pre-meal                2200  151-200                0 unit                      0 unit  201-250                2 units                    1 unit  251-300                3 units                    1 unit  301-350                4 units                    2 units  >350                     5 units                     3 units  Administer subcutaneously if needed at times designated by monitoring schedule.   DO NOT HOLD correction dose insulin for patients who are  NPO.    insulin detemir U-100 (LEVEMIR) 100 unit/mL injection Inject 20 Units into the skin once daily.    lactulose (CHRONULAC) 10 gram/15 mL solution Take 20 g by mouth once daily.    levothyroxine (SYNTHROID) 50 MCG tablet Take 1 tablet (50 mcg total) by mouth before breakfast.    losartan (COZAAR) 50 MG tablet Take 50 mg by mouth once daily. Hold for SBP < 110 or DBP < 60    memantine (NAMENDA) 10 MG Tab Take 10 mg by mouth nightly.    metoprolol tartrate (LOPRESSOR) 25 MG tablet Take 0.5 tablets (12.5 mg total) by mouth 2 (two) times daily.    OXcarbazepine (TRILEPTAL) 150 MG Tab Take 450 mg by mouth 2 (two) times daily.    pantoprazole (PROTONIX) 40 MG tablet Take 1 tablet by mouth once daily.    polyethylene glycol (GLYCOLAX) 17 gram PwPk Take 17 g by mouth 2 (two) times a day.    risperiDONE (RISPERDAL) 1 MG tablet Take 1 mg by mouth 2 (two) times daily.    tamsulosin (FLOMAX) 0.4 mg Cap Take 1 capsule by mouth once daily.    [DISCONTINUED] SITagliptin (JANUVIA) 50 MG Tab Take 1 tablet (50 mg total) by mouth once daily.    acetaminophen (TYLENOL) 325 MG tablet Take 2 tablets (650 mg total) by mouth every 4 (four) hours as needed.    acetaminophen (TYLENOL) 325 MG tablet Take 2 tablets (650 mg total) by mouth every 8 (eight) hours as needed for Temperature greater than (or equal to 101 degree F).    multivitamin Tab Take 1 tablet by mouth once daily.    [DISCONTINUED] metoprolol succinate (TOPROL-XL) 25 MG 24 hr tablet Take 1 tablet by mouth once daily. Hold for SBP < 110 or DBP < 60 or pulse < 50     Family History       Family history is unknown by patient.          Tobacco Use    Smoking status: Never Smoker    Smokeless tobacco: Never Used   Substance and Sexual Activity    Alcohol use: Not Currently    Drug use: Never     Sexual activity: Not Currently     Review of Systems   Unable to perform ROS: Dementia   Objective:     Vital Signs (Most Recent):  Temp: 98.6 °F (37 °C) (06/28/22 2100)  Pulse: 89 (06/28/22 2100)  Resp: 18 (06/28/22 2100)  BP: (!) 126/40 (06/28/22 2100)  SpO2: 96 % (06/28/22 2100)   Vital Signs (24h Range):  Temp:  [98 °F (36.7 °C)-98.6 °F (37 °C)] 98.6 °F (37 °C)  Pulse:  [84-91] 89  Resp:  [10-19] 18  SpO2:  [96 %-100 %] 96 %  BP: (101-126)/(40-61) 126/40     Weight: 84.1 kg (185 lb 6.4 oz)  Body mass index is 25.14 kg/m².    Physical Exam  Constitutional:       General: He is not in acute distress.     Appearance: He is ill-appearing. He is not toxic-appearing.      Comments: Eyes open. Can answer some limited questions but oriented to person only. Although he says that he is in the nursing home.   HENT:      Head: Normocephalic.      Right Ear: External ear normal.      Left Ear: External ear normal.      Mouth/Throat:      Mouth: Mucous membranes are dry.      Pharynx: Oropharynx is clear.   Eyes:      Extraocular Movements: Extraocular movements intact.      Comments: Ptosis noted bilat but more so on right     Neck:      Comments: Limited movement of neck.   Cardiovascular:      Rate and Rhythm: Normal rate and regular rhythm.      Pulses: Normal pulses.      Heart sounds: Normal heart sounds.   Pulmonary:      Effort: Pulmonary effort is normal.      Comments: Coarse breath sounds in lower lobes bilat  Abdominal:      General: Bowel sounds are normal.      Palpations: Abdomen is soft.      Tenderness: There is no abdominal tenderness.      Comments: Colostomy noted with small amt of soft stool in bag     Genitourinary:     Comments: Khan cath in place  Musculoskeletal:      Right lower leg: No edema.      Left lower leg: No edema.      Comments: Contractures to both hand with arms in extended position. Unable to lift arms or legs.   Skin:     General: Skin is warm and dry.      Findings: Lesion (sacral  ulcer, bilat hip ulcer see media for photos) present.   Neurological:      Mental Status: He is alert. Mental status is at baseline.      Comments: Oriented to person. Say that Bush is president. Aware that he is in the SNF.           Significant Labs: All pertinent labs within the past 24 hours have been reviewed.    Significant Imaging: I have reviewed all pertinent imaging results/findings within the past 24 hours.    Assessment/Plan:     * Acute urinary tract infection  Urine and blood culture pending  Zosyn 4.5 Gm IVPB q8h      Dehydration  NS at 75 ml/h  BUN 25, creatinine 0.65 upon adm  CMP daily      Sacral wound  Wound care consult  Turn q2h  Wound care daily  Vitamin c  Epi      Hypertension  /46  Continue Losartan and metoprolol  Hold Lasix due to dehydration      Diabetes mellitus  Glucose 88  Continue home med Levemir 20 units daily  Accuchecks AC and HS with SSI mod coverage  Diabetic pureed diet        VTE Risk Mitigation (From admission, onward)         Ordered     apixaban tablet 5 mg  2 times daily         06/29/22 0124     Place ALEX hose  Until discontinued         06/28/22 2242     IP VTE HIGH RISK PATIENT  Once         06/28/22 2242                   Anne Marie Dickinson NP  Department of Hospital Medicine   [facility] - Medical Surgical Unit

## 2022-06-29 NOTE — SUBJECTIVE & OBJECTIVE
Past Medical History:   Diagnosis Date    Alzheimer's disease     Anticoagulant long-term use     Bipolar disorder     BPH (benign prostatic hyperplasia)     Deep vein thrombosis (DVT) of popliteal vein of right lower extremity     Diabetes mellitus     DVT of deep femoral vein, left     Hyperlipidemia     Hypertension     Hypothyroidism     Idiopathic orofacial dystonia     Iron deficiency anemia secondary to blood loss (chronic)     Mild intellectual disabilities     Obesity     Pressure ulcer     Schizophrenia        Past Surgical History:   Procedure Laterality Date    COLOSTOMY N/A 5/24/2022    Procedure: CREATION, COLOSTOMY;  Surgeon: Edilma Felix MD;  Location: [facility];  Service: General;  Laterality: N/A;  diverting colostomy dr felix tuesday       Review of patient's allergies indicates:   Allergen Reactions    Metformin     Mycobacterium tuberculosis (tuberculin ppd)     Norvasc [amlodipine]        Current Facility-Administered Medications on File Prior to Encounter   Medication    diphenhydrAMINE injection 25 mg    HYDROmorphone (PF) injection 0.5 mg    morphine injection 4 mg    ondansetron injection 4 mg    [COMPLETED] piperacillin-tazobactam (ZOSYN) 4.5 g in dextrose 5 % in water (D5W) 5 % 100 mL IVPB (MB+)     Current Outpatient Medications on File Prior to Encounter   Medication Sig    apixaban (ELIQUIS) 5 mg Tab Take 5 mg by mouth 2 (two) times daily.    calcium carbonate/vitamin D3 (CALTRATE 600 + D ORAL) Take 1 tablet by mouth 2 (two) times daily.    furosemide (LASIX) 40 MG tablet Take 40 mg by mouth once daily. Hold for SBP < 110 or DBP < 60    HYDROcodone-acetaminophen (NORCO) 5-325 mg per tablet Take 1 tablet by mouth every 6 (six) hours as needed for Pain.    insulin aspart U-100 (NOVOLOG) 100 unit/mL injection Inject 0-5 Units into the skin before meals and at bedtime as needed. **LOW CORRECTION DOSE**  Blood Glucose  mg/dL                  Pre-meal                2200  151-200                 0 unit                      0 unit  201-250                2 units                    1 unit  251-300                3 units                    1 unit  301-350                4 units                    2 units  >350                     5 units                    3 units  Administer subcutaneously if needed at times designated by monitoring schedule.   DO NOT HOLD correction dose insulin for patients who are  NPO.    insulin detemir U-100 (LEVEMIR) 100 unit/mL injection Inject 20 Units into the skin once daily.    lactulose (CHRONULAC) 10 gram/15 mL solution Take 20 g by mouth once daily.    levothyroxine (SYNTHROID) 50 MCG tablet Take 1 tablet (50 mcg total) by mouth before breakfast.    losartan (COZAAR) 50 MG tablet Take 50 mg by mouth once daily. Hold for SBP < 110 or DBP < 60    memantine (NAMENDA) 10 MG Tab Take 10 mg by mouth nightly.    metoprolol tartrate (LOPRESSOR) 25 MG tablet Take 0.5 tablets (12.5 mg total) by mouth 2 (two) times daily.    OXcarbazepine (TRILEPTAL) 150 MG Tab Take 450 mg by mouth 2 (two) times daily.    pantoprazole (PROTONIX) 40 MG tablet Take 1 tablet by mouth once daily.    polyethylene glycol (GLYCOLAX) 17 gram PwPk Take 17 g by mouth 2 (two) times a day.    risperiDONE (RISPERDAL) 1 MG tablet Take 1 mg by mouth 2 (two) times daily.    tamsulosin (FLOMAX) 0.4 mg Cap Take 1 capsule by mouth once daily.    [DISCONTINUED] SITagliptin (JANUVIA) 50 MG Tab Take 1 tablet (50 mg total) by mouth once daily.    acetaminophen (TYLENOL) 325 MG tablet Take 2 tablets (650 mg total) by mouth every 4 (four) hours as needed.    acetaminophen (TYLENOL) 325 MG tablet Take 2 tablets (650 mg total) by mouth every 8 (eight) hours as needed for Temperature greater than (or equal to 101 degree F).    multivitamin Tab Take 1 tablet by mouth once daily.    [DISCONTINUED] metoprolol succinate (TOPROL-XL) 25 MG 24 hr tablet Take 1 tablet by mouth once daily. Hold for SBP < 110 or DBP < 60 or pulse < 50      Family History       Family history is unknown by patient.          Tobacco Use    Smoking status: Never Smoker    Smokeless tobacco: Never Used   Substance and Sexual Activity    Alcohol use: Not Currently    Drug use: Never    Sexual activity: Not Currently     Review of Systems   Unable to perform ROS: Dementia   Objective:     Vital Signs (Most Recent):  Temp: 98.6 °F (37 °C) (06/28/22 2100)  Pulse: 89 (06/28/22 2100)  Resp: 18 (06/28/22 2100)  BP: (!) 126/40 (06/28/22 2100)  SpO2: 96 % (06/28/22 2100)   Vital Signs (24h Range):  Temp:  [98 °F (36.7 °C)-98.6 °F (37 °C)] 98.6 °F (37 °C)  Pulse:  [84-91] 89  Resp:  [10-19] 18  SpO2:  [96 %-100 %] 96 %  BP: (101-126)/(40-61) 126/40     Weight: 84.1 kg (185 lb 6.4 oz)  Body mass index is 25.14 kg/m².    Physical Exam  Constitutional:       General: He is not in acute distress.     Appearance: He is ill-appearing. He is not toxic-appearing.      Comments: Eyes open. Can answer some limited questions but oriented to person only. Although he says that he is in the nursing home.   HENT:      Head: Normocephalic.      Right Ear: External ear normal.      Left Ear: External ear normal.      Mouth/Throat:      Mouth: Mucous membranes are dry.      Pharynx: Oropharynx is clear.   Eyes:      Extraocular Movements: Extraocular movements intact.      Comments: Ptosis noted bilat but more so on right     Neck:      Comments: Limited movement of neck.   Cardiovascular:      Rate and Rhythm: Normal rate and regular rhythm.      Pulses: Normal pulses.      Heart sounds: Normal heart sounds.   Pulmonary:      Effort: Pulmonary effort is normal.      Comments: Coarse breath sounds in lower lobes bilat  Abdominal:      General: Bowel sounds are normal.      Palpations: Abdomen is soft.      Tenderness: There is no abdominal tenderness.      Comments: Colostomy noted with small amt of soft stool in bag     Genitourinary:     Comments: Khan cath in place  Musculoskeletal:       Right lower leg: No edema.      Left lower leg: No edema.      Comments: Contractures to both hand with arms in extended position. Unable to lift arms or legs.   Skin:     General: Skin is warm and dry.      Findings: Lesion (sacral ulcer, bilat hip ulcer see media for photos) present.   Neurological:      Mental Status: He is alert. Mental status is at baseline.      Comments: Oriented to person. Say that Bush is president. Aware that he is in the SNF.           Significant Labs: All pertinent labs within the past 24 hours have been reviewed.    Significant Imaging: I have reviewed all pertinent imaging results/findings within the past 24 hours.

## 2022-06-29 NOTE — DISCHARGE INSTRUCTIONS
Patient is from [facility] and is cognitively impaired and teaching and discharge instructions are deferred at this time.  Patient is changed status to swing bed for 5 to 7 days.

## 2022-06-29 NOTE — PLAN OF CARE
Called [facility] and set up non-emergent transport to [facility] for appointment with Dr. Felix tomorrow at 0945. [facility] to transport patient to appointment.

## 2022-06-29 NOTE — DISCHARGE SUMMARY
[facility] - Medical Surgical Unit  [facility] Medicine  Discharge Summary      Patient Name: Bhargav Gao  MRN: [id number]  Patient Class: IP- Inpatient  Admission Date: 6/28/2022  Hospital Length of Stay: 1 days  Discharge Date and Time:  06/29/2022 3:10 PM  Attending Physician: Chelsie Pike MD   Discharging Provider: LIEN Mike  Primary Care Provider: Kerry Lin MD      HPI:   Mr. Gao is an 81 year-old  male who is admitted to [facility] acute care services due to acute urinary tract infection, dehydration, fever and generalized weakness. He is a resident of [facility]. He was evaluated in the ED at [facility] today after the SNF noted a WBC of > 55985 and fever as of yesterday. Today, in the ED, his WBC was 20542 with 83.9% neutrophils and his temp was 98F. His urine showed moderate leukocytes, positive for nitrites, and loaded with bacteria. He has a chronic indwelling Khan cath due to urinary retention. A urine culture and blood cultures are pending. He is slightly dehydrated with BUN 25 and creatinine is 0.65. His LFTs and electrolytes are WNL. His H&H is 10.8 and 33.3. His chest xray showed mildly enlarged heart with left lung opacities that are similar on multiple previous studies and no more focal consolidation. BNP, Troponin, lactic acid, are all WNL. His O2 sats have ranged % on RA. He was given his first dose of Zosyn in the ED at [facility] as indicated for complicated UTI. Dr. Pike accepted him for transfer here for his continued treatment.     DNR  VTE prophylaxis: Eliquis/TEDs      * No surgery found *      Hospital Course:   06/29/22  Hospital Day 2  Patient awake this morning on rounds, patient will continue to need IV antibiotics for 6 days and will be admitted to swing bed to complete his antibiotics. Patient was admitted to [facility] yesterday from [facility] after he presented there for fever and generalized weakness. ED evaluation revealed a UTI and  dehydration. Patient is a resident of [facility] and the discharge plan is for patient to return to the nursing home once antibiotics are completed. Dr. Irene evaluated the patient on rounds this morning and agrees with plan of care with patient being admitted to swing bed for IV antibiotic therapy.        Goals of Care Treatment Preferences:  Code Status: DNR      Consults:   Consults (From admission, onward)        Status Ordering Provider     Inpatient consult to Social Work/Case Management  Once        Provider:  (Not yet assigned)    Acknowledged DIOMEDES IRENE     IP consult to case management  Once        Provider:  (Not yet assigned)    Acknowledged DIOMEDES IRENE          * Acute urinary tract infection  Urine and blood culture pending  Zosyn 4.5 Gm IVPB q8h  06/29/22 - Continue Zosyn in swing bed     Dehydration  NS at 75 ml/h  BUN 25, creatinine 0.65 upon adm  CMP daily  06/29/22   Creat 0.79, BUN 21    Sacral wound  Wound care consult  Turn q2h  Wound care daily  Vitamin c  Epi  06/29/22 Wound care orders received from nursing home and updated.    Hypertension  /46  Continue Losartan and metoprolol  Hold Lasix due to dehydration  06/29/22 /50      Diabetes mellitus  Glucose 88  Continue home med Levemir 20 units daily  Accuchecks AC and HS with SSI mod coverage  Diabetic pureed diet  06/29/22   BG range         Final Active Diagnoses:    Diagnosis Date Noted POA    PRINCIPAL PROBLEM:  Acute urinary tract infection [N39.0] 06/28/2022 Yes    Dehydration [E86.0] 06/28/2022 Yes    Sacral wound [S31.000A]  Yes    Hypertension [I10]  Yes    Diabetes mellitus [E11.9]  Yes      Problems Resolved During this Admission:       Discharged Condition: good    Disposition: Another Health Care Inst*    Follow Up:    Patient Instructions:   No discharge procedures on file.    Significant Diagnostic Studies: Labs: All labs within the past 24 hours have been reviewed    Pending  Diagnostic Studies:     Procedure Component Value Units Date/Time    Hemoglobin A1c if not done in past 3 months [[id number]] Collected: 06/29/22 0430    Order Status: Sent Lab Status: In process Updated: 06/29/22 0436    Specimen: Blood     Zinc [[id number]] Collected: 06/29/22 1442    Order Status: Sent Lab Status: In process Updated: 06/29/22 1444    Specimen: Blood          Medications:  Reconciled Home Medications:      Medication List      CHANGE how you take these medications    acetaminophen 325 MG tablet  Commonly known as: TYLENOL  Take 2 tablets (650 mg total) by mouth every 8 (eight) hours as needed for Temperature greater than (or equal to 101 degree F).  What changed: Another medication with the same name was removed. Continue taking this medication, and follow the directions you see here.        CONTINUE taking these medications    apixaban 5 mg Tab  Commonly known as: ELIQUIS  Take 5 mg by mouth 2 (two) times daily.     HYDROcodone-acetaminophen 5-325 mg per tablet  Commonly known as: NORCO  Take 1 tablet by mouth every 6 (six) hours as needed for Pain.     insulin aspart U-100 100 unit/mL injection  Commonly known as: NovoLOG  Inject 0-5 Units into the skin before meals and at bedtime as needed. **LOW CORRECTION DOSE**  Blood Glucose  mg/dL                  Pre-meal                2200  151-200                0 unit                      0 unit  201-250                2 units                    1 unit  251-300                3 units                    1 unit  301-350                4 units                    2 units  >350                     5 units                    3 units  Administer subcutaneously if needed at times designated by monitoring schedule.   DO NOT HOLD correction dose insulin for patients who are  NPO.     insulin detemir U-100 100 unit/mL injection  Commonly known as: Levemir  Inject 20 Units into the skin once daily.     lactulose 10 gram/15 mL solution  Commonly known as:  CHRONULAC  Take 20 g by mouth once daily.     levothyroxine 50 MCG tablet  Commonly known as: SYNTHROID  Take 1 tablet (50 mcg total) by mouth before breakfast.     losartan 50 MG tablet  Commonly known as: COZAAR  Take 50 mg by mouth once daily. Hold for SBP < 110 or DBP < 60     memantine 10 MG Tab  Commonly known as: NAMENDA  Take 10 mg by mouth nightly.     metoprolol tartrate 25 MG tablet  Commonly known as: LOPRESSOR  Take 0.5 tablets (12.5 mg total) by mouth 2 (two) times daily.     multivitamin Tab  Take 1 tablet by mouth once daily.     OXcarbazepine 150 MG Tab  Commonly known as: TRILEPTAL  Take 450 mg by mouth 2 (two) times daily.     pantoprazole 40 MG tablet  Commonly known as: PROTONIX  Take 1 tablet by mouth once daily.     polyethylene glycol 17 gram Pwpk  Commonly known as: GLYCOLAX  Take 17 g by mouth 2 (two) times a day.     risperiDONE 1 MG tablet  Commonly known as: RISPERDAL  Take 1 mg by mouth 2 (two) times daily.     tamsulosin 0.4 mg Cap  Commonly known as: FLOMAX  Take 1 capsule by mouth once daily.        STOP taking these medications    CALTRATE 600 + D ORAL     furosemide 40 MG tablet  Commonly known as: LASIX     metoprolol succinate 25 MG 24 hr tablet  Commonly known as: TOPROL-XL     SITagliptin 50 MG Tab  Commonly known as: SAMANTHAUVIA            Indwelling Lines/Drains at time of discharge:   Lines/Drains/Airways     Drain  Duration                Urethral Catheter 03/03/22 2335 Triple-lumen 20 Fr. 117 days         Colostomy 05/24/22 0000 LLQ 36 days                Time spent on the discharge of patient: 45 minutes         LIEN Mike  Department of Hospital Medicine  [facility] - Medical Surgical Unit

## 2022-06-30 ENCOUNTER — OFFICE VISIT (OUTPATIENT)
Dept: SURGERY | Facility: CLINIC | Age: 82
End: 2022-06-30
Payer: MEDICARE

## 2022-06-30 DIAGNOSIS — Z09 SURGERY FOLLOW-UP: Primary | ICD-10-CM

## 2022-06-30 PROBLEM — F20.9 SCHIZOPHRENIA: Status: RESOLVED | Noted: 2022-01-28 | Resolved: 2022-06-30

## 2022-06-30 LAB
ALBUMIN SERPL BCP-MCNC: 1.7 G/DL (ref 3.5–5)
ALBUMIN/GLOB SERPL: 0.4 {RATIO}
ALP SERPL-CCNC: 79 U/L (ref 45–115)
ALT SERPL W P-5'-P-CCNC: 22 U/L (ref 16–61)
ANION GAP SERPL CALCULATED.3IONS-SCNC: 8 MMOL/L (ref 7–16)
ANISOCYTOSIS BLD QL SMEAR: ABNORMAL
AST SERPL W P-5'-P-CCNC: 18 U/L (ref 15–37)
BASOPHILS # BLD AUTO: 0.01 K/UL (ref 0–0.2)
BASOPHILS NFR BLD AUTO: 0.1 % (ref 0–1)
BILIRUB SERPL-MCNC: 0.3 MG/DL (ref 0–1.2)
BUN SERPL-MCNC: 17 MG/DL (ref 7–18)
BUN/CREAT SERPL: 26 (ref 6–20)
CALCIUM SERPL-MCNC: 8.5 MG/DL (ref 8.5–10.1)
CHLORIDE SERPL-SCNC: 101 MMOL/L (ref 98–107)
CO2 SERPL-SCNC: 31 MMOL/L (ref 21–32)
CREAT SERPL-MCNC: 0.65 MG/DL (ref 0.7–1.3)
DIFFERENTIAL METHOD BLD: ABNORMAL
EOSINOPHIL # BLD AUTO: 0.44 K/UL (ref 0–0.5)
EOSINOPHIL NFR BLD AUTO: 4.9 % (ref 1–4)
EOSINOPHIL NFR BLD MANUAL: 6 % (ref 1–4)
ERYTHROCYTE [DISTWIDTH] IN BLOOD BY AUTOMATED COUNT: 14.9 % (ref 11.5–14.5)
GLOBULIN SER-MCNC: 4.2 G/DL (ref 2–4)
GLUCOSE SERPL-MCNC: 101 MG/DL (ref 70–105)
GLUCOSE SERPL-MCNC: 117 MG/DL (ref 70–105)
GLUCOSE SERPL-MCNC: 74 MG/DL (ref 70–105)
GLUCOSE SERPL-MCNC: 74 MG/DL (ref 74–106)
HCT VFR BLD AUTO: 27.6 % (ref 40–54)
HGB BLD-MCNC: 8.9 G/DL (ref 13.5–18)
HYPOCHROMIA BLD QL SMEAR: ABNORMAL
LYMPHOCYTES # BLD AUTO: 2.04 K/UL (ref 1–4.8)
LYMPHOCYTES NFR BLD AUTO: 22.6 % (ref 27–41)
LYMPHOCYTES NFR BLD MANUAL: 21 % (ref 27–41)
MCH RBC QN AUTO: 27.3 PG (ref 27–31)
MCHC RBC AUTO-ENTMCNC: 32.2 G/DL (ref 32–36)
MCV RBC AUTO: 84.7 FL (ref 80–96)
MONOCYTES # BLD AUTO: 0.65 K/UL (ref 0–0.8)
MONOCYTES NFR BLD AUTO: 7.2 % (ref 2–6)
MONOCYTES NFR BLD MANUAL: 6 % (ref 2–6)
MPC BLD CALC-MCNC: 8 FL (ref 9.4–12.4)
NEUTROPHILS # BLD AUTO: 5.88 K/UL (ref 1.8–7.7)
NEUTROPHILS NFR BLD AUTO: 65.2 % (ref 53–65)
NEUTS BAND NFR BLD MANUAL: 1 % (ref 1–5)
NEUTS SEG NFR BLD MANUAL: 66 % (ref 50–62)
NRBC BLD MANUAL-RTO: ABNORMAL %
PLATELET # BLD AUTO: 246 K/UL (ref 150–400)
PLATELET MORPHOLOGY: NORMAL
POTASSIUM SERPL-SCNC: 3.6 MMOL/L (ref 3.5–5.1)
PROT SERPL-MCNC: 5.9 G/DL (ref 6.4–8.2)
RBC # BLD AUTO: 3.26 M/UL (ref 4.6–6.2)
SODIUM SERPL-SCNC: 136 MMOL/L (ref 136–145)
TARGETS BLD QL SMEAR: ABNORMAL
UA COMPLETE W REFLEX CULTURE PNL UR: ABNORMAL
WBC # BLD AUTO: 9.02 K/UL (ref 4.5–11)

## 2022-06-30 PROCEDURE — 85025 COMPLETE CBC W/AUTO DIFF WBC: CPT | Performed by: EMERGENCY MEDICINE

## 2022-06-30 PROCEDURE — 94761 N-INVAS EAR/PLS OXIMETRY MLT: CPT

## 2022-06-30 PROCEDURE — 63600175 PHARM REV CODE 636 W HCPCS: Performed by: EMERGENCY MEDICINE

## 2022-06-30 PROCEDURE — 82962 GLUCOSE BLOOD TEST: CPT

## 2022-06-30 PROCEDURE — 25000003 PHARM REV CODE 250: Performed by: EMERGENCY MEDICINE

## 2022-06-30 PROCEDURE — 99212 OFFICE O/P EST SF 10 MIN: CPT | Mod: PBBFAC | Performed by: SURGERY

## 2022-06-30 PROCEDURE — 80053 COMPREHEN METABOLIC PANEL: CPT | Performed by: EMERGENCY MEDICINE

## 2022-06-30 PROCEDURE — 27000944

## 2022-06-30 PROCEDURE — C9399 UNCLASSIFIED DRUGS OR BIOLOG: HCPCS | Performed by: EMERGENCY MEDICINE

## 2022-06-30 PROCEDURE — 99024 POSTOP FOLLOW-UP VISIT: CPT | Mod: ,,, | Performed by: SURGERY

## 2022-06-30 PROCEDURE — 36415 COLL VENOUS BLD VENIPUNCTURE: CPT | Performed by: EMERGENCY MEDICINE

## 2022-06-30 PROCEDURE — 99024 PR POST-OP FOLLOW-UP VISIT: ICD-10-PCS | Mod: ,,, | Performed by: SURGERY

## 2022-06-30 PROCEDURE — 11000004 HC SNF PRIVATE

## 2022-06-30 RX ORDER — POLYETHYLENE GLYCOL 3350 17 G/17G
17 POWDER, FOR SOLUTION ORAL 2 TIMES DAILY
Status: DISCONTINUED | OUTPATIENT
Start: 2022-06-30 | End: 2022-06-30

## 2022-06-30 RX ORDER — PANTOPRAZOLE SODIUM 40 MG/1
40 TABLET, DELAYED RELEASE ORAL DAILY
Status: DISCONTINUED | OUTPATIENT
Start: 2022-06-30 | End: 2022-06-30

## 2022-06-30 RX ORDER — MEMANTINE HYDROCHLORIDE 5 MG/1
10 TABLET ORAL NIGHTLY
Status: DISCONTINUED | OUTPATIENT
Start: 2022-06-30 | End: 2022-06-30

## 2022-06-30 RX ORDER — HYDROCODONE BITARTRATE AND ACETAMINOPHEN 5; 325 MG/1; MG/1
1 TABLET ORAL EVERY 6 HOURS PRN
Status: DISCONTINUED | OUTPATIENT
Start: 2022-06-30 | End: 2022-06-30

## 2022-06-30 RX ORDER — INSULIN ASPART 100 [IU]/ML
0-5 INJECTION, SOLUTION INTRAVENOUS; SUBCUTANEOUS
Status: DISCONTINUED | OUTPATIENT
Start: 2022-06-30 | End: 2022-07-02

## 2022-06-30 RX ORDER — LEVOTHYROXINE SODIUM 50 UG/1
50 TABLET ORAL
Status: DISCONTINUED | OUTPATIENT
Start: 2022-07-01 | End: 2022-06-30

## 2022-06-30 RX ORDER — LACTULOSE 10 G/15ML
20 SOLUTION ORAL DAILY
Status: DISCONTINUED | OUTPATIENT
Start: 2022-06-30 | End: 2022-06-30

## 2022-06-30 RX ORDER — TAMSULOSIN HYDROCHLORIDE 0.4 MG/1
1 CAPSULE ORAL DAILY
Status: DISCONTINUED | OUTPATIENT
Start: 2022-06-30 | End: 2022-06-30

## 2022-06-30 RX ORDER — ACETAMINOPHEN 325 MG/1
650 TABLET ORAL EVERY 8 HOURS PRN
Status: DISCONTINUED | OUTPATIENT
Start: 2022-06-30 | End: 2022-06-30

## 2022-06-30 RX ORDER — LOSARTAN POTASSIUM 50 MG/1
50 TABLET ORAL DAILY
Status: DISCONTINUED | OUTPATIENT
Start: 2022-06-30 | End: 2022-06-30

## 2022-06-30 RX ORDER — RISPERIDONE 0.5 MG/1
1 TABLET ORAL 2 TIMES DAILY
Status: DISCONTINUED | OUTPATIENT
Start: 2022-06-30 | End: 2022-06-30

## 2022-06-30 RX ORDER — METOPROLOL TARTRATE 25 MG/1
12.5 TABLET ORAL 2 TIMES DAILY
Status: DISCONTINUED | OUTPATIENT
Start: 2022-06-30 | End: 2022-06-30

## 2022-06-30 RX ADMIN — PIPERACILLIN SODIUM AND TAZOBACTAM SODIUM 4.5 G: 4; 500 INJECTION, POWDER, FOR SOLUTION INTRAVENOUS at 01:06

## 2022-06-30 RX ADMIN — MUPIROCIN: 20 OINTMENT TOPICAL at 08:06

## 2022-06-30 RX ADMIN — RISPERIDONE 1 MG: 0.5 TABLET ORAL at 08:06

## 2022-06-30 RX ADMIN — PIPERACILLIN SODIUM AND TAZOBACTAM SODIUM 4.5 G: 4; 500 INJECTION, POWDER, FOR SOLUTION INTRAVENOUS at 04:06

## 2022-06-30 RX ADMIN — METOPROLOL TARTRATE 12.5 MG: 25 TABLET, FILM COATED ORAL at 08:06

## 2022-06-30 RX ADMIN — TAMSULOSIN HYDROCHLORIDE 0.4 MG: 0.4 CAPSULE ORAL at 08:06

## 2022-06-30 RX ADMIN — HYDROCODONE BITARTRATE AND ACETAMINOPHEN 1 TABLET: 5; 325 TABLET ORAL at 05:06

## 2022-06-30 RX ADMIN — APIXABAN 5 MG: 2.5 TABLET, FILM COATED ORAL at 08:06

## 2022-06-30 RX ADMIN — OXYCODONE HYDROCHLORIDE AND ACETAMINOPHEN 1000 MG: 500 TABLET ORAL at 08:06

## 2022-06-30 RX ADMIN — LOSARTAN POTASSIUM 50 MG: 50 TABLET, FILM COATED ORAL at 08:06

## 2022-06-30 RX ADMIN — LEVOTHYROXINE SODIUM 50 MCG: 0.05 TABLET ORAL at 05:06

## 2022-06-30 RX ADMIN — MEMANTINE 10 MG: 5 TABLET ORAL at 08:06

## 2022-06-30 RX ADMIN — PANTOPRAZOLE SODIUM 40 MG: 40 TABLET, DELAYED RELEASE ORAL at 08:06

## 2022-06-30 RX ADMIN — MULTIPLE VITAMINS W/ MINERALS TAB 1 TABLET: TAB at 08:06

## 2022-06-30 RX ADMIN — PIPERACILLIN SODIUM AND TAZOBACTAM SODIUM 4.5 G: 4; 500 INJECTION, POWDER, FOR SOLUTION INTRAVENOUS at 08:06

## 2022-06-30 RX ADMIN — OXCARBAZEPINE 450 MG: 300 TABLET, FILM COATED ORAL at 08:06

## 2022-06-30 RX ADMIN — INSULIN DETEMIR 20 UNITS: 100 INJECTION, SOLUTION SUBCUTANEOUS at 08:06

## 2022-06-30 RX ADMIN — LACTULOSE 20 G: 20 SOLUTION ORAL at 08:06

## 2022-06-30 NOTE — PLAN OF CARE
Problem: Adult Inpatient Plan of Care  Goal: Absence of Hospital-Acquired Illness or Injury  Outcome: Ongoing, Progressing  Goal: Optimal Comfort and Wellbeing  Outcome: Ongoing, Progressing     Problem: Diabetes Comorbidity  Goal: Blood Glucose Level Within Targeted Range  Outcome: Ongoing, Progressing

## 2022-06-30 NOTE — NURSING
Changed out colostomy bag and cleaned area surrounding stoma at 1630.  Red tubes intact.  Patient went out to his visit to Dr. Edilma Felix today and returned around 11 am today.   Patient transported via ambulance.      Patient tolerated dressing change to sacral area well.  Applied Aquacel AG to wound bed and covered with ABD pad and secured with paper tape.

## 2022-06-30 NOTE — PROGRESS NOTES
General surgeon    Loop colostomy no problems functioning properly    Follow up [email or web address] [email or web address].

## 2022-07-01 LAB
ALBUMIN SERPL BCP-MCNC: 1.7 G/DL (ref 3.5–5)
ALBUMIN/GLOB SERPL: 0.4 {RATIO}
ALP SERPL-CCNC: 71 U/L (ref 45–115)
ALT SERPL W P-5'-P-CCNC: 23 U/L (ref 16–61)
ANION GAP SERPL CALCULATED.3IONS-SCNC: 6 MMOL/L (ref 7–16)
AST SERPL W P-5'-P-CCNC: 19 U/L (ref 15–37)
BASOPHILS # BLD AUTO: 0.01 K/UL (ref 0–0.2)
BASOPHILS NFR BLD AUTO: 0.1 % (ref 0–1)
BILIRUB SERPL-MCNC: 0.2 MG/DL (ref 0–1.2)
BUN SERPL-MCNC: 22 MG/DL (ref 7–18)
BUN/CREAT SERPL: 35 (ref 6–20)
CALCIUM SERPL-MCNC: 8.5 MG/DL (ref 8.5–10.1)
CHLORIDE SERPL-SCNC: 103 MMOL/L (ref 98–107)
CO2 SERPL-SCNC: 30 MMOL/L (ref 21–32)
CREAT SERPL-MCNC: 0.62 MG/DL (ref 0.7–1.3)
DIFFERENTIAL METHOD BLD: ABNORMAL
EOSINOPHIL # BLD AUTO: 0.46 K/UL (ref 0–0.5)
EOSINOPHIL NFR BLD AUTO: 6.2 % (ref 1–4)
ERYTHROCYTE [DISTWIDTH] IN BLOOD BY AUTOMATED COUNT: 14.9 % (ref 11.5–14.5)
GLOBULIN SER-MCNC: 4.2 G/DL (ref 2–4)
GLUCOSE SERPL-MCNC: 123 MG/DL (ref 70–105)
GLUCOSE SERPL-MCNC: 134 MG/DL (ref 70–105)
GLUCOSE SERPL-MCNC: 155 MG/DL (ref 70–105)
GLUCOSE SERPL-MCNC: 79 MG/DL (ref 70–105)
GLUCOSE SERPL-MCNC: 80 MG/DL (ref 74–106)
HCT VFR BLD AUTO: 27.2 % (ref 40–54)
HGB BLD-MCNC: 8.9 G/DL (ref 13.5–18)
LYMPHOCYTES # BLD AUTO: 2.2 K/UL (ref 1–4.8)
LYMPHOCYTES NFR BLD AUTO: 29.5 % (ref 27–41)
MCH RBC QN AUTO: 27.3 PG (ref 27–31)
MCHC RBC AUTO-ENTMCNC: 32.7 G/DL (ref 32–36)
MCV RBC AUTO: 83.4 FL (ref 80–96)
MONOCYTES # BLD AUTO: 0.51 K/UL (ref 0–0.8)
MONOCYTES NFR BLD AUTO: 6.8 % (ref 2–6)
MPC BLD CALC-MCNC: 8.2 FL (ref 9.4–12.4)
NEUTROPHILS # BLD AUTO: 4.29 K/UL (ref 1.8–7.7)
NEUTROPHILS NFR BLD AUTO: 57.4 % (ref 53–65)
PLATELET # BLD AUTO: 256 K/UL (ref 150–400)
POTASSIUM SERPL-SCNC: 3.6 MMOL/L (ref 3.5–5.1)
PROT SERPL-MCNC: 5.9 G/DL (ref 6.4–8.2)
RBC # BLD AUTO: 3.26 M/UL (ref 4.6–6.2)
SODIUM SERPL-SCNC: 135 MMOL/L (ref 136–145)
WBC # BLD AUTO: 7.47 K/UL (ref 4.5–11)

## 2022-07-01 PROCEDURE — 27000944

## 2022-07-01 PROCEDURE — 25000003 PHARM REV CODE 250: Performed by: EMERGENCY MEDICINE

## 2022-07-01 PROCEDURE — 82962 GLUCOSE BLOOD TEST: CPT

## 2022-07-01 PROCEDURE — 85025 COMPLETE CBC W/AUTO DIFF WBC: CPT | Performed by: EMERGENCY MEDICINE

## 2022-07-01 PROCEDURE — C9399 UNCLASSIFIED DRUGS OR BIOLOG: HCPCS | Performed by: EMERGENCY MEDICINE

## 2022-07-01 PROCEDURE — 36415 COLL VENOUS BLD VENIPUNCTURE: CPT | Performed by: EMERGENCY MEDICINE

## 2022-07-01 PROCEDURE — 80053 COMPREHEN METABOLIC PANEL: CPT | Performed by: EMERGENCY MEDICINE

## 2022-07-01 PROCEDURE — 63600175 PHARM REV CODE 636 W HCPCS: Performed by: EMERGENCY MEDICINE

## 2022-07-01 PROCEDURE — 11000004 HC SNF PRIVATE

## 2022-07-01 PROCEDURE — 94761 N-INVAS EAR/PLS OXIMETRY MLT: CPT

## 2022-07-01 RX ADMIN — LEVOTHYROXINE SODIUM 50 MCG: 0.05 TABLET ORAL at 06:07

## 2022-07-01 RX ADMIN — RISPERIDONE 1 MG: 0.5 TABLET ORAL at 09:07

## 2022-07-01 RX ADMIN — TAMSULOSIN HYDROCHLORIDE 0.4 MG: 0.4 CAPSULE ORAL at 09:07

## 2022-07-01 RX ADMIN — PIPERACILLIN SODIUM AND TAZOBACTAM SODIUM 4.5 G: 4; 500 INJECTION, POWDER, FOR SOLUTION INTRAVENOUS at 04:07

## 2022-07-01 RX ADMIN — APIXABAN 5 MG: 2.5 TABLET, FILM COATED ORAL at 09:07

## 2022-07-01 RX ADMIN — LOSARTAN POTASSIUM 50 MG: 50 TABLET, FILM COATED ORAL at 09:07

## 2022-07-01 RX ADMIN — MULTIPLE VITAMINS W/ MINERALS TAB 1 TABLET: TAB at 09:07

## 2022-07-01 RX ADMIN — CEFTRIAXONE 1 G: 1 INJECTION, POWDER, FOR SOLUTION INTRAMUSCULAR; INTRAVENOUS at 03:07

## 2022-07-01 RX ADMIN — OXCARBAZEPINE 450 MG: 300 TABLET, FILM COATED ORAL at 09:07

## 2022-07-01 RX ADMIN — METOPROLOL TARTRATE 12.5 MG: 25 TABLET, FILM COATED ORAL at 09:07

## 2022-07-01 RX ADMIN — MEMANTINE 10 MG: 5 TABLET ORAL at 09:07

## 2022-07-01 RX ADMIN — INSULIN DETEMIR 20 UNITS: 100 INJECTION, SOLUTION SUBCUTANEOUS at 09:07

## 2022-07-01 RX ADMIN — LACTULOSE 20 G: 20 SOLUTION ORAL at 09:07

## 2022-07-01 RX ADMIN — OXYCODONE HYDROCHLORIDE AND ACETAMINOPHEN 1000 MG: 500 TABLET ORAL at 09:07

## 2022-07-01 RX ADMIN — MUPIROCIN: 20 OINTMENT TOPICAL at 09:07

## 2022-07-01 RX ADMIN — LACTOBACILLUS TAB 4 TABLET: TAB at 04:07

## 2022-07-01 RX ADMIN — PANTOPRAZOLE SODIUM 40 MG: 40 TABLET, DELAYED RELEASE ORAL at 09:07

## 2022-07-01 RX ADMIN — HYDROCODONE BITARTRATE AND ACETAMINOPHEN 1 TABLET: 5; 325 TABLET ORAL at 12:07

## 2022-07-01 NOTE — SUBJECTIVE & OBJECTIVE
Past Medical History:   Diagnosis Date    Alzheimer's disease     Anticoagulant long-term use     Bipolar disorder     BPH (benign prostatic hyperplasia)     Deep vein thrombosis (DVT) of popliteal vein of right lower extremity     Diabetes mellitus     DVT of deep femoral vein, left     Hyperlipidemia     Hypertension     Hypothyroidism     Idiopathic orofacial dystonia     Iron deficiency anemia secondary to blood loss (chronic)     Mild intellectual disabilities     Obesity     Pressure ulcer     Schizophrenia        Past Surgical History:   Procedure Laterality Date    COLOSTOMY N/A 5/24/2022    Procedure: CREATION, COLOSTOMY;  Surgeon: Edilma Felix MD;  Location: [facility];  Service: General;  Laterality: N/A;  diverting colostomy dr felix tuesday       Review of patient's allergies indicates:   Allergen Reactions    Metformin     Mycobacterium tuberculosis (tuberculin ppd)     Norvasc [amlodipine]        No current facility-administered medications on file prior to encounter.     Current Outpatient Medications on File Prior to Encounter   Medication Sig    apixaban (ELIQUIS) 5 mg Tab Take 5 mg by mouth 2 (two) times daily.    HYDROcodone-acetaminophen (NORCO) 5-325 mg per tablet Take 1 tablet by mouth every 6 (six) hours as needed for Pain.    insulin detemir U-100 (LEVEMIR) 100 unit/mL injection Inject 20 Units into the skin once daily.    lactulose (CHRONULAC) 10 gram/15 mL solution Take 20 g by mouth once daily.    levothyroxine (SYNTHROID) 50 MCG tablet Take 1 tablet (50 mcg total) by mouth before breakfast.    losartan (COZAAR) 50 MG tablet Take 50 mg by mouth once daily. Hold for SBP < 110 or DBP < 60    metoprolol tartrate (LOPRESSOR) 25 MG tablet Take 0.5 tablets (12.5 mg total) by mouth 2 (two) times daily.    multivitamin Tab Take 1 tablet by mouth once daily.    OXcarbazepine (TRILEPTAL) 150 MG Tab Take 450 mg by mouth 2 (two) times daily.    pantoprazole (PROTONIX) 40 MG tablet Take 1 tablet  by mouth once daily.    polyethylene glycol (GLYCOLAX) 17 gram PwPk Take 17 g by mouth 2 (two) times a day.    risperiDONE (RISPERDAL) 1 MG tablet Take 1 mg by mouth 2 (two) times daily.    tamsulosin (FLOMAX) 0.4 mg Cap Take 1 capsule by mouth once daily.    acetaminophen (TYLENOL) 325 MG tablet Take 2 tablets (650 mg total) by mouth every 8 (eight) hours as needed for Temperature greater than (or equal to 101 degree F).    insulin aspart U-100 (NOVOLOG) 100 unit/mL injection Inject 0-5 Units into the skin before meals and at bedtime as needed. **LOW CORRECTION DOSE**  Blood Glucose  mg/dL                  Pre-meal                2200  151-200                0 unit                      0 unit  201-250                2 units                    1 unit  251-300                3 units                    1 unit  301-350                4 units                    2 units  >350                     5 units                    3 units  Administer subcutaneously if needed at times designated by monitoring schedule.   DO NOT HOLD correction dose insulin for patients who are  NPO.    memantine (NAMENDA) 10 MG Tab Take 10 mg by mouth nightly.    [DISCONTINUED] furosemide (LASIX) 40 MG tablet Take 40 mg by mouth once daily. Hold for SBP < 110 or DBP < 60    [DISCONTINUED] metoprolol succinate (TOPROL-XL) 25 MG 24 hr tablet Take 1 tablet by mouth once daily. Hold for SBP < 110 or DBP < 60 or pulse < 50    [DISCONTINUED] SITagliptin (JANUVIA) 50 MG Tab Take 1 tablet (50 mg total) by mouth once daily.     Family History       Family history is unknown by patient.          Tobacco Use    Smoking status: Never Smoker    Smokeless tobacco: Never Used   Substance and Sexual Activity    Alcohol use: Not Currently    Drug use: Never    Sexual activity: Not Currently     Review of Systems   Unable to perform ROS: Dementia   Objective:     Vital Signs (Most Recent):  Temp: 97.8 °F (36.6 °C) (06/30/22 1915)  Pulse: 71 (06/30/22 2028)  Resp:  20 (06/30/22 1915)  BP: (!) 155/78 (06/30/22 2028)  SpO2: 98 % (06/30/22 1915)   Vital Signs (24h Range):  Temp:  [97.7 °F (36.5 °C)-97.8 °F (36.6 °C)] 97.8 °F (36.6 °C)  Pulse:  [58-71] 71  Resp:  [18-20] 20  SpO2:  [97 %-99 %] 98 %  BP: (125-157)/(62-78) 155/78     Weight: 84 kg (185 lb 3 oz)  Body mass index is 25.12 kg/m².    Physical Exam  Vitals and nursing note reviewed. Exam conducted with a chaperone present.   Constitutional:       General: He is not in acute distress.     Appearance: He is ill-appearing. He is not toxic-appearing.   HENT:      Head: Normocephalic and atraumatic.      Right Ear: Tympanic membrane normal.      Left Ear: Tympanic membrane normal.      Nose: Nose normal.      Mouth/Throat:      Mouth: Mucous membranes are dry.   Eyes:      Extraocular Movements: Extraocular movements intact.      Pupils: Pupils are equal, round, and reactive to light.   Cardiovascular:      Rate and Rhythm: Normal rate and regular rhythm.      Pulses: Normal pulses.      Heart sounds: Murmur (2/6 LACHO) heard.   Pulmonary:      Effort: Pulmonary effort is normal. No respiratory distress.      Breath sounds: Normal breath sounds.   Abdominal:      General: Bowel sounds are normal. There is no distension.      Palpations: Abdomen is soft.      Tenderness: There is no abdominal tenderness.   Musculoskeletal:         General: No swelling or tenderness. Normal range of motion.      Cervical back: Normal range of motion and neck supple.   Skin:     General: Skin is warm and dry.      Capillary Refill: Capillary refill takes less than 2 seconds.      Findings: Lesion (SACRAL DECUBITUS AND B HIP SEE MEDIA) present.   Neurological:      Mental Status: He is alert. Mental status is at baseline.      Motor: Weakness present.      Comments: PT DOES RESPOND TO SIMPLE QUESTIONS  ORIENTED TO NAME   Psychiatric:         Mood and Affect: Mood normal.      Comments: MOOD IS CALM         CRANIAL NERVES     CN III, IV, VI   Pupils  are equal, round, and reactive to light.     Significant Labs: All pertinent labs within the past 24 hours have been reviewed.  CBC:   Recent Labs   Lab 06/29/22  0430 06/30/22  0429   WBC 16.12* 9.02   HGB 10.0* 8.9*   HCT 30.7* 27.6*    246     CMP:   Recent Labs   Lab 06/29/22  0430 06/30/22  0429   * 136   K 4.0 3.6   CL 99 101   CO2 32 31   GLU 90 74   BUN 21* 17   CREATININE 0.79 0.65*   CALCIUM 8.9 8.5   PROT 6.4 5.9*   ALBUMIN 1.9* 1.7*   BILITOT 0.4 0.3   ALKPHOS 98 79   AST 24 18   ALT 24 22   ANIONGAP 8 8   EGFRNONAA 100 125     UA POSITIVE FOR UTI  E COLI  BC POS AND PENDING    Significant Imaging: I have reviewed all pertinent imaging results/findings within the past 24 hours.  CXR: I have reviewed all pertinent results/findings within the past 24 hours and my personal findings are:  NO  ACUTE CHANGE

## 2022-07-01 NOTE — HPI
PT IS A 81 YR OLD BM ADMITTED TO [facility] SWING BED FOR PT/OT/REHABILITATION FOR IV ANTIBIOTICS FOR UTI AND WEAKNESS AND MEDICAL MANAGEMENT. PT WAS REFERRED FROM [facility] WHERE HE WAS ADMITTED FOR DEHYDRATION, FEVER, WEAKNESS AND UTI ON 06/28/2022 HAVING BEEN TRANSFERRED PER [facility] ED AS RECOMMENDED PER DR WOODARD. PT IMPROVED; BUT WILL NEED CONTINUED IV ZOSYN WITH CULTURE REVEALING E COLI  AND BC PENDING,  MEDICAL MANAGEMENT AND WILL REQUIRE SWING BED FOR TREATMENT. 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 PT IS A RESIDENT AT LOCAL SNF IN [facility] AND WILL BE DISCHARGED BACK TO SNF AFTER COMPLETING TREATMENT.   THE PHARMACIST WILL REVIEW MEDICATIONS AND MAKE RECOMMENDATIONS. PT WILL SEE THE DIETICIAN  FOR NUTRITIONAL SUPPORT WITH WEIGHT 84 KG AND ALBUMIN OF 1.7. PT'S ABNORMAL ADMITTING LABS ARE: CMP: PROT 5.9, ALB 1.7, CBC: H/H 8.9/27.6, UA:POS FOR UTI. PT WILL HAVE WEEKLY LABS.    Past Medical History:   Diagnosis Date    Alzheimer's disease     Anticoagulant long-term use     Bipolar disorder     BPH (benign prostatic hyperplasia)     Deep vein thrombosis (DVT) of popliteal vein of right lower extremity     Diabetes mellitus     DVT of deep femoral vein, left     Hyperlipidemia     Hypertension     Hypothyroidism     Idiopathic orofacial dystonia     Iron deficiency anemia secondary to blood loss (chronic)     Mild intellectual disabilities     Obesity     Pressure ulcer     Schizophrenia       CXR NO ACUTE CHANGE      PT CODE STATUS IS DNR  PT COVID STATUS IS NEG  PT HAS HAD COVID VACCINE   PT VTE PPX IS ELIQUIS/KATE

## 2022-07-01 NOTE — ASSESSMENT & PLAN NOTE
PT HAD DEHYDRATION ON ADMISSION TO ACUTE CARE  IVF COMPLETED AND BUN/CREAT IMPROVED 17/0.65  PT IS TOLERATING A DIET, WILL CONTINUE TO ENCOURAGE FLUIDS

## 2022-07-01 NOTE — ASSESSMENT & PLAN NOTE
PT HS UTI  CULTURE E COLI  WILL NEED ABX 7 DAYS OR 10  WITH POS BLOOD CULTURE WITH INITIAL BC POSITIVE AND PENDING

## 2022-07-01 NOTE — H&P
[facility] - Medical Surgical Unit  [facility] Medicine  History & Physical    Patient Name: Bhargav Gao  MRN: [id number]  Patient Class: IP- Swing  Admission Date: 6/29/2022  Attending Physician: Chelsie Pike MD  Primary Care Provider: Kerry Lin MD         Patient information was obtained from patient, nursing home, past medical records and ER records.     Subjective:     Principal Problem:Acute urinary tract infection    Chief Complaint:   Chief Complaint   Patient presents with    Urinary Tract Infection        HPI:   PT IS A 81 YR OLD BM ADMITTED TO [facility] SWING BED FOR PT/OT/REHABILITATION FOR IV ANTIBIOTICS FOR UTI AND WEAKNESS AND MEDICAL MANAGEMENT. PT WAS REFERRED FROM [facility] WHERE HE WAS ADMITTED FOR DEHYDRATION, FEVER, WEAKNESS AND UTI ON 06/28/2022 HAVING BEEN TRANSFERRED PER [facility] ED AS RECOMMENDED PER DR WOODARD. PT IMPROVED; BUT WILL NEED CONTINUED IV ZOSYN WITH CULTURE REVEALING E COLI  AND BC PENDING,  MEDICAL MANAGEMENT AND WILL REQUIRE SWING BED FOR TREATMENT. 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 PT IS A RESIDENT AT LOCAL SNF IN [facility] AND WILL BE DISCHARGED BACK TO SNF AFTER COMPLETING TREATMENT.   THE PHARMACIST WILL REVIEW MEDICATIONS AND MAKE RECOMMENDATIONS. PT WILL SEE THE DIETICIAN  FOR NUTRITIONAL SUPPORT WITH WEIGHT 84 KG AND ALBUMIN OF 1.7. PT'S ABNORMAL ADMITTING LABS ARE: CMP: PROT 5.9, ALB 1.7, CBC: H/H 8.9/27.6, UA:POS FOR UTI. PT WILL HAVE WEEKLY LABS.    Past Medical History:   Diagnosis Date    Alzheimer's disease     Anticoagulant long-term use     Bipolar disorder     BPH (benign prostatic hyperplasia)     Deep vein thrombosis (DVT) of popliteal vein of right lower extremity     Diabetes mellitus     DVT of deep femoral vein, left     Hyperlipidemia     Hypertension     Hypothyroidism     Idiopathic orofacial dystonia     Iron deficiency anemia secondary to blood loss (chronic)     Mild intellectual disabilities      Obesity     Pressure ulcer     Schizophrenia       CXR NO ACUTE CHANGE      PT CODE STATUS IS DNR  PT COVID STATUS IS NEG  PT HAS HAD COVID VACCINE   PT VTE PPX IS ELIQUIS/TEDS        Past Medical History:   Diagnosis Date    Alzheimer's disease     Anticoagulant long-term use     Bipolar disorder     BPH (benign prostatic hyperplasia)     Deep vein thrombosis (DVT) of popliteal vein of right lower extremity     Diabetes mellitus     DVT of deep femoral vein, left     Hyperlipidemia     Hypertension     Hypothyroidism     Idiopathic orofacial dystonia     Iron deficiency anemia secondary to blood loss (chronic)     Mild intellectual disabilities     Obesity     Pressure ulcer     Schizophrenia        Past Surgical History:   Procedure Laterality Date    COLOSTOMY N/A 5/24/2022    Procedure: CREATION, COLOSTOMY;  Surgeon: Edilma Felix MD;  Location: [facility];  Service: General;  Laterality: N/A;  diverting colostomy dr felix tuesday       Review of patient's allergies indicates:   Allergen Reactions    Metformin     Mycobacterium tuberculosis (tuberculin ppd)     Norvasc [amlodipine]        No current facility-administered medications on file prior to encounter.     Current Outpatient Medications on File Prior to Encounter   Medication Sig    apixaban (ELIQUIS) 5 mg Tab Take 5 mg by mouth 2 (two) times daily.    HYDROcodone-acetaminophen (NORCO) 5-325 mg per tablet Take 1 tablet by mouth every 6 (six) hours as needed for Pain.    insulin detemir U-100 (LEVEMIR) 100 unit/mL injection Inject 20 Units into the skin once daily.    lactulose (CHRONULAC) 10 gram/15 mL solution Take 20 g by mouth once daily.    levothyroxine (SYNTHROID) 50 MCG tablet Take 1 tablet (50 mcg total) by mouth before breakfast.    losartan (COZAAR) 50 MG tablet Take 50 mg by mouth once daily. Hold for SBP < 110 or DBP < 60    metoprolol tartrate (LOPRESSOR) 25 MG tablet Take 0.5 tablets (12.5 mg total) by mouth  2 (two) times daily.    multivitamin Tab Take 1 tablet by mouth once daily.    OXcarbazepine (TRILEPTAL) 150 MG Tab Take 450 mg by mouth 2 (two) times daily.    pantoprazole (PROTONIX) 40 MG tablet Take 1 tablet by mouth once daily.    polyethylene glycol (GLYCOLAX) 17 gram PwPk Take 17 g by mouth 2 (two) times a day.    risperiDONE (RISPERDAL) 1 MG tablet Take 1 mg by mouth 2 (two) times daily.    tamsulosin (FLOMAX) 0.4 mg Cap Take 1 capsule by mouth once daily.    acetaminophen (TYLENOL) 325 MG tablet Take 2 tablets (650 mg total) by mouth every 8 (eight) hours as needed for Temperature greater than (or equal to 101 degree F).    insulin aspart U-100 (NOVOLOG) 100 unit/mL injection Inject 0-5 Units into the skin before meals and at bedtime as needed. **LOW CORRECTION DOSE**  Blood Glucose  mg/dL                  Pre-meal                2200  151-200                0 unit                      0 unit  201-250                2 units                    1 unit  251-300                3 units                    1 unit  301-350                4 units                    2 units  >350                     5 units                    3 units  Administer subcutaneously if needed at times designated by monitoring schedule.   DO NOT HOLD correction dose insulin for patients who are  NPO.    memantine (NAMENDA) 10 MG Tab Take 10 mg by mouth nightly.    [DISCONTINUED] furosemide (LASIX) 40 MG tablet Take 40 mg by mouth once daily. Hold for SBP < 110 or DBP < 60    [DISCONTINUED] metoprolol succinate (TOPROL-XL) 25 MG 24 hr tablet Take 1 tablet by mouth once daily. Hold for SBP < 110 or DBP < 60 or pulse < 50    [DISCONTINUED] SITagliptin (JANUVIA) 50 MG Tab Take 1 tablet (50 mg total) by mouth once daily.     Family History       Family history is unknown by patient.          Tobacco Use    Smoking status: Never Smoker    Smokeless tobacco: Never Used   Substance and Sexual Activity    Alcohol use: Not Currently     Drug use: Never    Sexual activity: Not Currently     Review of Systems   Unable to perform ROS: Dementia   Objective:     Vital Signs (Most Recent):  Temp: 97.8 °F (36.6 °C) (06/30/22 1915)  Pulse: 71 (06/30/22 2028)  Resp: 20 (06/30/22 1915)  BP: (!) 155/78 (06/30/22 2028)  SpO2: 98 % (06/30/22 1915)   Vital Signs (24h Range):  Temp:  [97.7 °F (36.5 °C)-97.8 °F (36.6 °C)] 97.8 °F (36.6 °C)  Pulse:  [58-71] 71  Resp:  [18-20] 20  SpO2:  [97 %-99 %] 98 %  BP: (125-157)/(62-78) 155/78     Weight: 84 kg (185 lb 3 oz)  Body mass index is 25.12 kg/m².    Physical Exam  Vitals and nursing note reviewed. Exam conducted with a chaperone present.   Constitutional:       General: He is not in acute distress.     Appearance: He is ill-appearing. He is not toxic-appearing.   HENT:      Head: Normocephalic and atraumatic.      Right Ear: Tympanic membrane normal.      Left Ear: Tympanic membrane normal.      Nose: Nose normal.      Mouth/Throat:      Mouth: Mucous membranes are dry.   Eyes:      Extraocular Movements: Extraocular movements intact.      Pupils: Pupils are equal, round, and reactive to light.   Cardiovascular:      Rate and Rhythm: Normal rate and regular rhythm.      Pulses: Normal pulses.      Heart sounds: Murmur (2/6 LACHO) heard.   Pulmonary:      Effort: Pulmonary effort is normal. No respiratory distress.      Breath sounds: Normal breath sounds.   Abdominal:      General: Bowel sounds are normal. There is no distension.      Palpations: Abdomen is soft.      Tenderness: There is no abdominal tenderness.   Musculoskeletal:         General: No swelling or tenderness. Normal range of motion.      Cervical back: Normal range of motion and neck supple.   Skin:     General: Skin is warm and dry.      Capillary Refill: Capillary refill takes less than 2 seconds.      Findings: Lesion (SACRAL DECUBITUS AND B HIP SEE MEDIA) present.   Neurological:      Mental Status: He is alert. Mental status is at baseline.       Motor: Weakness present.      Comments: PT DOES RESPOND TO SIMPLE QUESTIONS  ORIENTED TO NAME   Psychiatric:         Mood and Affect: Mood normal.      Comments: MOOD IS CALM         CRANIAL NERVES     CN III, IV, VI   Pupils are equal, round, and reactive to light.     Significant Labs: All pertinent labs within the past 24 hours have been reviewed.  CBC:   Recent Labs   Lab 06/29/22 0430 06/30/22  0429   WBC 16.12* 9.02   HGB 10.0* 8.9*   HCT 30.7* 27.6*    246     CMP:   Recent Labs   Lab 06/29/22 0430 06/30/22  0429   * 136   K 4.0 3.6   CL 99 101   CO2 32 31   GLU 90 74   BUN 21* 17   CREATININE 0.79 0.65*   CALCIUM 8.9 8.5   PROT 6.4 5.9*   ALBUMIN 1.9* 1.7*   BILITOT 0.4 0.3   ALKPHOS 98 79   AST 24 18   ALT 24 22   ANIONGAP 8 8   EGFRNONAA 100 125     UA POSITIVE FOR UTI  E COLI  BC POS AND PENDING    Significant Imaging: I have reviewed all pertinent imaging results/findings within the past 24 hours.  CXR: I have reviewed all pertinent results/findings within the past 24 hours and my personal findings are:  NO  ACUTE CHANGE    Assessment/Plan:     * Acute urinary tract infection  PT HS UTI  CULTURE E COLI  WILL NEED ABX 7 DAYS OR 10  WITH POS BLOOD CULTURE WITH INITIAL BC POSITIVE AND PENDING        Generalized weakness  PT HAS MUSCLE WEAKNESS EXACERBATED BY UTI AND DEHYDRATION  PT IS NOT A CANDIDATE FOR PT/OT    Dehydration  PT HAD DEHYDRATION ON ADMISSION TO ACUTE CARE  IVF COMPLETED AND BUN/CREAT IMPROVED 17/0.65  PT IS TOLERATING A DIET, WILL CONTINUE TO ENCOURAGE FLUIDS    Hypertension  PT HAS HTN  PT IS ON LOSARTAN AND LOPRESSOR    PT IS ALLERGIC TO NORVASC      Diabetes mellitus  PT HAS DM2  SSI  INSULIN DETEMIR DAILY  GLU RANGE   HGB A1C IS 6.6      Pressure injury of sacral region, stage 4  PT HAS SACRAL DECUBITUS STAGE 4  WOUND CARE TEAM WILL TREAT   ORDERS RESUMED FROM SNF      Alzheimer's disease  PT HAS DEMENTIA AND IS CALM  DELIRIUM PRECAUTIONS      VTE Risk  Mitigation (From admission, onward)         Ordered     apixaban tablet 5 mg  2 times daily         06/29/22 1807     Place ALEX hose  Until discontinued         06/29/22 1807                   Chelsie Pike MD  Department of Hospital Medicine   [facility] - Medical Surgical Unit

## 2022-07-02 LAB
ALBUMIN SERPL BCP-MCNC: 2 G/DL (ref 3.5–5)
ALBUMIN/GLOB SERPL: 0.4 {RATIO}
ALP SERPL-CCNC: 79 U/L (ref 45–115)
ALT SERPL W P-5'-P-CCNC: 23 U/L (ref 16–61)
ANION GAP SERPL CALCULATED.3IONS-SCNC: 6 MMOL/L (ref 7–16)
AST SERPL W P-5'-P-CCNC: 14 U/L (ref 15–37)
BACTERIA BLD CULT: ABNORMAL
BASOPHILS # BLD AUTO: 0.01 K/UL (ref 0–0.2)
BASOPHILS NFR BLD AUTO: 0.1 % (ref 0–1)
BILIRUB SERPL-MCNC: 0.1 MG/DL (ref 0–1.2)
BUN SERPL-MCNC: 13 MG/DL (ref 7–18)
BUN/CREAT SERPL: 25 (ref 6–20)
CALCIUM SERPL-MCNC: 8.8 MG/DL (ref 8.5–10.1)
CHLORIDE SERPL-SCNC: 103 MMOL/L (ref 98–107)
CO2 SERPL-SCNC: 29 MMOL/L (ref 21–32)
CREAT SERPL-MCNC: 0.53 MG/DL (ref 0.7–1.3)
DIFFERENTIAL METHOD BLD: ABNORMAL
EOSINOPHIL # BLD AUTO: 0.38 K/UL (ref 0–0.5)
EOSINOPHIL NFR BLD AUTO: 5.4 % (ref 1–4)
ERYTHROCYTE [DISTWIDTH] IN BLOOD BY AUTOMATED COUNT: 15.3 % (ref 11.5–14.5)
GLOBULIN SER-MCNC: 4.6 G/DL (ref 2–4)
GLUCOSE SERPL-MCNC: 125 MG/DL (ref 70–105)
GLUCOSE SERPL-MCNC: 163 MG/DL (ref 70–105)
GLUCOSE SERPL-MCNC: 92 MG/DL (ref 70–105)
GLUCOSE SERPL-MCNC: 94 MG/DL (ref 74–106)
GRAM STN SPEC: ABNORMAL
HCT VFR BLD AUTO: 29.7 % (ref 40–54)
HGB BLD-MCNC: 9.5 G/DL (ref 13.5–18)
LYMPHOCYTES # BLD AUTO: 1.87 K/UL (ref 1–4.8)
LYMPHOCYTES NFR BLD AUTO: 26.6 % (ref 27–41)
MCH RBC QN AUTO: 27.1 PG (ref 27–31)
MCHC RBC AUTO-ENTMCNC: 32 G/DL (ref 32–36)
MCV RBC AUTO: 84.9 FL (ref 80–96)
MONOCYTES # BLD AUTO: 0.49 K/UL (ref 0–0.8)
MONOCYTES NFR BLD AUTO: 7 % (ref 2–6)
MPC BLD CALC-MCNC: 8 FL (ref 9.4–12.4)
NEUTROPHILS # BLD AUTO: 4.29 K/UL (ref 1.8–7.7)
NEUTROPHILS NFR BLD AUTO: 60.9 % (ref 53–65)
PLATELET # BLD AUTO: 325 K/UL (ref 150–400)
POTASSIUM SERPL-SCNC: 3.9 MMOL/L (ref 3.5–5.1)
PROT SERPL-MCNC: 6.6 G/DL (ref 6.4–8.2)
RBC # BLD AUTO: 3.5 M/UL (ref 4.6–6.2)
SODIUM SERPL-SCNC: 134 MMOL/L (ref 136–145)
WBC # BLD AUTO: 7.04 K/UL (ref 4.5–11)

## 2022-07-02 PROCEDURE — 85025 COMPLETE CBC W/AUTO DIFF WBC: CPT | Performed by: EMERGENCY MEDICINE

## 2022-07-02 PROCEDURE — 11000004 HC SNF PRIVATE

## 2022-07-02 PROCEDURE — 25000003 PHARM REV CODE 250: Performed by: NURSE PRACTITIONER

## 2022-07-02 PROCEDURE — 27000944

## 2022-07-02 PROCEDURE — 63600175 PHARM REV CODE 636 W HCPCS

## 2022-07-02 PROCEDURE — 36415 COLL VENOUS BLD VENIPUNCTURE: CPT | Performed by: EMERGENCY MEDICINE

## 2022-07-02 PROCEDURE — 80053 COMPREHEN METABOLIC PANEL: CPT | Performed by: EMERGENCY MEDICINE

## 2022-07-02 PROCEDURE — 63600175 PHARM REV CODE 636 W HCPCS: Performed by: NURSE PRACTITIONER

## 2022-07-02 PROCEDURE — C9399 UNCLASSIFIED DRUGS OR BIOLOG: HCPCS | Performed by: NURSE PRACTITIONER

## 2022-07-02 PROCEDURE — 94761 N-INVAS EAR/PLS OXIMETRY MLT: CPT

## 2022-07-02 PROCEDURE — 82962 GLUCOSE BLOOD TEST: CPT

## 2022-07-02 RX ORDER — GLUCAGON 1 MG
1 KIT INJECTION
Status: DISCONTINUED | OUTPATIENT
Start: 2022-07-02 | End: 2022-07-05 | Stop reason: HOSPADM

## 2022-07-02 RX ORDER — IBUPROFEN 200 MG
24 TABLET ORAL
Status: DISCONTINUED | OUTPATIENT
Start: 2022-07-02 | End: 2022-07-05 | Stop reason: HOSPADM

## 2022-07-02 RX ORDER — IBUPROFEN 200 MG
16 TABLET ORAL
Status: DISCONTINUED | OUTPATIENT
Start: 2022-07-02 | End: 2022-07-05 | Stop reason: HOSPADM

## 2022-07-02 RX ORDER — INSULIN ASPART 100 [IU]/ML
1-10 INJECTION, SOLUTION INTRAVENOUS; SUBCUTANEOUS
Status: DISCONTINUED | OUTPATIENT
Start: 2022-07-02 | End: 2022-07-05 | Stop reason: HOSPADM

## 2022-07-02 RX ADMIN — RISPERIDONE 1 MG: 0.5 TABLET ORAL at 09:07

## 2022-07-02 RX ADMIN — MEMANTINE 10 MG: 5 TABLET ORAL at 09:07

## 2022-07-02 RX ADMIN — LEVOTHYROXINE SODIUM 50 MCG: 0.05 TABLET ORAL at 06:07

## 2022-07-02 RX ADMIN — LACTOBACILLUS TAB 4 TABLET: TAB at 07:07

## 2022-07-02 RX ADMIN — PANTOPRAZOLE SODIUM 40 MG: 40 TABLET, DELAYED RELEASE ORAL at 09:07

## 2022-07-02 RX ADMIN — LACTULOSE 20 G: 20 SOLUTION ORAL at 09:07

## 2022-07-02 RX ADMIN — INSULIN ASPART 2 UNITS: 100 INJECTION, SOLUTION INTRAVENOUS; SUBCUTANEOUS at 09:07

## 2022-07-02 RX ADMIN — INSULIN DETEMIR 20 UNITS: 100 INJECTION, SOLUTION SUBCUTANEOUS at 09:07

## 2022-07-02 RX ADMIN — MUPIROCIN: 20 OINTMENT TOPICAL at 09:07

## 2022-07-02 RX ADMIN — APIXABAN 5 MG: 2.5 TABLET, FILM COATED ORAL at 09:07

## 2022-07-02 RX ADMIN — HYDROCODONE BITARTRATE AND ACETAMINOPHEN 1 TABLET: 5; 325 TABLET ORAL at 07:07

## 2022-07-02 RX ADMIN — METOPROLOL TARTRATE 12.5 MG: 25 TABLET, FILM COATED ORAL at 09:07

## 2022-07-02 RX ADMIN — LOSARTAN POTASSIUM 50 MG: 50 TABLET, FILM COATED ORAL at 09:07

## 2022-07-02 RX ADMIN — MULTIPLE VITAMINS W/ MINERALS TAB 1 TABLET: TAB at 09:07

## 2022-07-02 RX ADMIN — OXYCODONE HYDROCHLORIDE AND ACETAMINOPHEN 1000 MG: 500 TABLET ORAL at 09:07

## 2022-07-02 RX ADMIN — CEFTRIAXONE 1 G: 1 INJECTION, POWDER, FOR SOLUTION INTRAMUSCULAR; INTRAVENOUS at 01:07

## 2022-07-02 RX ADMIN — LACTOBACILLUS TAB 4 TABLET: TAB at 11:07

## 2022-07-02 RX ADMIN — OXCARBAZEPINE 450 MG: 300 TABLET, FILM COATED ORAL at 09:07

## 2022-07-02 RX ADMIN — TAMSULOSIN HYDROCHLORIDE 0.4 MG: 0.4 CAPSULE ORAL at 09:07

## 2022-07-02 RX ADMIN — LACTOBACILLUS TAB 4 TABLET: TAB at 05:07

## 2022-07-02 NOTE — NURSING
Received pt from ANITHA Hdz RN at this time. NAD noted. Pt resting in bed asleep. Awakes to arousal. No acute needs at this time.

## 2022-07-02 NOTE — NURSING
"Edema noted to scrotum. Patient says "OW" when area is touched. Reported it to Janneth Blanco NP. She said she would come over and see patient. Will cont to monitor.   "

## 2022-07-03 LAB
ALBUMIN SERPL BCP-MCNC: 1.9 G/DL (ref 3.5–5)
ALBUMIN/GLOB SERPL: 0.4 {RATIO}
ALP SERPL-CCNC: 81 U/L (ref 45–115)
ALT SERPL W P-5'-P-CCNC: 21 U/L (ref 16–61)
ANION GAP SERPL CALCULATED.3IONS-SCNC: 10 MMOL/L (ref 7–16)
ANISOCYTOSIS BLD QL SMEAR: ABNORMAL
AST SERPL W P-5'-P-CCNC: 17 U/L (ref 15–37)
BASOPHILS # BLD AUTO: 0.01 K/UL (ref 0–0.2)
BASOPHILS NFR BLD AUTO: 0.1 % (ref 0–1)
BILIRUB SERPL-MCNC: 0.1 MG/DL (ref 0–1.2)
BUN SERPL-MCNC: 16 MG/DL (ref 7–18)
BUN/CREAT SERPL: 31 (ref 6–20)
CALCIUM SERPL-MCNC: 8.7 MG/DL (ref 8.5–10.1)
CHLORIDE SERPL-SCNC: 103 MMOL/L (ref 98–107)
CO2 SERPL-SCNC: 26 MMOL/L (ref 21–32)
CREAT SERPL-MCNC: 0.51 MG/DL (ref 0.7–1.3)
DIFFERENTIAL METHOD BLD: ABNORMAL
EOSINOPHIL # BLD AUTO: 0.36 K/UL (ref 0–0.5)
EOSINOPHIL NFR BLD AUTO: 4.6 % (ref 1–4)
EOSINOPHIL NFR BLD MANUAL: 7 % (ref 1–4)
ERYTHROCYTE [DISTWIDTH] IN BLOOD BY AUTOMATED COUNT: 15.4 % (ref 11.5–14.5)
GLOBULIN SER-MCNC: 4.8 G/DL (ref 2–4)
GLUCOSE SERPL-MCNC: 100 MG/DL (ref 70–105)
GLUCOSE SERPL-MCNC: 102 MG/DL (ref 70–105)
GLUCOSE SERPL-MCNC: 107 MG/DL (ref 70–105)
GLUCOSE SERPL-MCNC: 78 MG/DL (ref 70–105)
GLUCOSE SERPL-MCNC: 81 MG/DL (ref 74–106)
HCT VFR BLD AUTO: 31.3 % (ref 40–54)
HGB BLD-MCNC: 10.1 G/DL (ref 13.5–18)
HYPOCHROMIA BLD QL SMEAR: ABNORMAL
LYMPHOCYTES # BLD AUTO: 2.51 K/UL (ref 1–4.8)
LYMPHOCYTES NFR BLD AUTO: 32.2 % (ref 27–41)
LYMPHOCYTES NFR BLD MANUAL: 29 % (ref 27–41)
MCH RBC QN AUTO: 27.2 PG (ref 27–31)
MCHC RBC AUTO-ENTMCNC: 32.3 G/DL (ref 32–36)
MCV RBC AUTO: 84.1 FL (ref 80–96)
MONOCYTES # BLD AUTO: 0.63 K/UL (ref 0–0.8)
MONOCYTES NFR BLD AUTO: 8.1 % (ref 2–6)
MONOCYTES NFR BLD MANUAL: 6 % (ref 2–6)
MPC BLD CALC-MCNC: 8 FL (ref 9.4–12.4)
NEUTROPHILS # BLD AUTO: 4.29 K/UL (ref 1.8–7.7)
NEUTROPHILS NFR BLD AUTO: 55 % (ref 53–65)
NEUTS BAND NFR BLD MANUAL: 1 % (ref 1–5)
NEUTS SEG NFR BLD MANUAL: 57 % (ref 50–62)
NRBC BLD MANUAL-RTO: ABNORMAL %
PLATELET # BLD AUTO: 336 K/UL (ref 150–400)
PLATELET MORPHOLOGY: NORMAL
POTASSIUM SERPL-SCNC: 4.6 MMOL/L (ref 3.5–5.1)
PROT SERPL-MCNC: 6.7 G/DL (ref 6.4–8.2)
RBC # BLD AUTO: 3.72 M/UL (ref 4.6–6.2)
SODIUM SERPL-SCNC: 134 MMOL/L (ref 136–145)
WBC # BLD AUTO: 7.8 K/UL (ref 4.5–11)

## 2022-07-03 PROCEDURE — 25000003 PHARM REV CODE 250: Performed by: NURSE PRACTITIONER

## 2022-07-03 PROCEDURE — 85025 COMPLETE CBC W/AUTO DIFF WBC: CPT | Performed by: NURSE PRACTITIONER

## 2022-07-03 PROCEDURE — C9399 UNCLASSIFIED DRUGS OR BIOLOG: HCPCS | Performed by: NURSE PRACTITIONER

## 2022-07-03 PROCEDURE — 27000944

## 2022-07-03 PROCEDURE — 36415 COLL VENOUS BLD VENIPUNCTURE: CPT | Performed by: NURSE PRACTITIONER

## 2022-07-03 PROCEDURE — 11000004 HC SNF PRIVATE

## 2022-07-03 PROCEDURE — 82962 GLUCOSE BLOOD TEST: CPT

## 2022-07-03 PROCEDURE — 80053 COMPREHEN METABOLIC PANEL: CPT | Performed by: NURSE PRACTITIONER

## 2022-07-03 PROCEDURE — 94761 N-INVAS EAR/PLS OXIMETRY MLT: CPT

## 2022-07-03 PROCEDURE — 63600175 PHARM REV CODE 636 W HCPCS: Performed by: NURSE PRACTITIONER

## 2022-07-03 RX ADMIN — LACTOBACILLUS TAB 4 TABLET: TAB at 09:07

## 2022-07-03 RX ADMIN — APIXABAN 5 MG: 2.5 TABLET, FILM COATED ORAL at 09:07

## 2022-07-03 RX ADMIN — RISPERIDONE 1 MG: 0.5 TABLET ORAL at 09:07

## 2022-07-03 RX ADMIN — PANTOPRAZOLE SODIUM 40 MG: 40 TABLET, DELAYED RELEASE ORAL at 09:07

## 2022-07-03 RX ADMIN — LACTOBACILLUS TAB 4 TABLET: TAB at 11:07

## 2022-07-03 RX ADMIN — LACTULOSE 20 G: 20 SOLUTION ORAL at 09:07

## 2022-07-03 RX ADMIN — MULTIPLE VITAMINS W/ MINERALS TAB 1 TABLET: TAB at 09:07

## 2022-07-03 RX ADMIN — METOPROLOL TARTRATE 12.5 MG: 25 TABLET, FILM COATED ORAL at 09:07

## 2022-07-03 RX ADMIN — LOSARTAN POTASSIUM 50 MG: 50 TABLET, FILM COATED ORAL at 09:07

## 2022-07-03 RX ADMIN — MUPIROCIN: 20 OINTMENT TOPICAL at 09:07

## 2022-07-03 RX ADMIN — OXCARBAZEPINE 450 MG: 300 TABLET, FILM COATED ORAL at 09:07

## 2022-07-03 RX ADMIN — OXYCODONE HYDROCHLORIDE AND ACETAMINOPHEN 1000 MG: 500 TABLET ORAL at 09:07

## 2022-07-03 RX ADMIN — MEMANTINE 10 MG: 5 TABLET ORAL at 09:07

## 2022-07-03 RX ADMIN — LACTOBACILLUS TAB 4 TABLET: TAB at 05:07

## 2022-07-03 RX ADMIN — CEFTRIAXONE 1 G: 1 INJECTION, POWDER, FOR SOLUTION INTRAMUSCULAR; INTRAVENOUS at 01:07

## 2022-07-03 RX ADMIN — INSULIN DETEMIR 20 UNITS: 100 INJECTION, SOLUTION SUBCUTANEOUS at 09:07

## 2022-07-03 RX ADMIN — LEVOTHYROXINE SODIUM 50 MCG: 0.05 TABLET ORAL at 06:07

## 2022-07-03 RX ADMIN — TAMSULOSIN HYDROCHLORIDE 0.4 MG: 0.4 CAPSULE ORAL at 09:07

## 2022-07-03 NOTE — PLAN OF CARE
Problem: Diabetes Comorbidity  Goal: Blood Glucose Level Within Targeted Range  Outcome: Ongoing, Progressing  Intervention: Monitor and Manage Glycemia  Flowsheets (Taken 7/3/2022 0527)  Glycemic Management:   blood glucose monitored   supplemental insulin given     Problem: Impaired Wound Healing  Goal: Optimal Wound Healing  Outcome: Ongoing, Progressing     Problem: Skin Injury Risk Increased  Goal: Skin Health and Integrity  Intervention: Optimize Skin Protection  Flowsheets (Taken 7/3/2022 0527)  Pressure Reduction Techniques: weight shift assistance provided  Pressure Reduction Devices:   heel offloading device utilized   positioning supports utilized  Skin Protection: adhesive use limited

## 2022-07-04 LAB
BACTERIA BLD CULT: NORMAL
GLUCOSE SERPL-MCNC: 161 MG/DL (ref 70–105)
GLUCOSE SERPL-MCNC: 165 MG/DL (ref 70–105)
GLUCOSE SERPL-MCNC: 85 MG/DL (ref 70–105)

## 2022-07-04 PROCEDURE — 94761 N-INVAS EAR/PLS OXIMETRY MLT: CPT

## 2022-07-04 PROCEDURE — C9399 UNCLASSIFIED DRUGS OR BIOLOG: HCPCS | Performed by: NURSE PRACTITIONER

## 2022-07-04 PROCEDURE — 63600175 PHARM REV CODE 636 W HCPCS

## 2022-07-04 PROCEDURE — 27000944

## 2022-07-04 PROCEDURE — 25000003 PHARM REV CODE 250: Performed by: NURSE PRACTITIONER

## 2022-07-04 PROCEDURE — 63600175 PHARM REV CODE 636 W HCPCS: Performed by: NURSE PRACTITIONER

## 2022-07-04 PROCEDURE — 82962 GLUCOSE BLOOD TEST: CPT

## 2022-07-04 PROCEDURE — 11000004 HC SNF PRIVATE

## 2022-07-04 RX ADMIN — METOPROLOL TARTRATE 12.5 MG: 25 TABLET, FILM COATED ORAL at 09:07

## 2022-07-04 RX ADMIN — INSULIN DETEMIR 20 UNITS: 100 INJECTION, SOLUTION SUBCUTANEOUS at 10:07

## 2022-07-04 RX ADMIN — CEFTRIAXONE 1 G: 1 INJECTION, POWDER, FOR SOLUTION INTRAMUSCULAR; INTRAVENOUS at 01:07

## 2022-07-04 RX ADMIN — LEVOTHYROXINE SODIUM 50 MCG: 0.05 TABLET ORAL at 05:07

## 2022-07-04 RX ADMIN — OXCARBAZEPINE 450 MG: 300 TABLET, FILM COATED ORAL at 09:07

## 2022-07-04 RX ADMIN — INSULIN ASPART 2 UNITS: 100 INJECTION, SOLUTION INTRAVENOUS; SUBCUTANEOUS at 05:07

## 2022-07-04 RX ADMIN — TAMSULOSIN HYDROCHLORIDE 0.4 MG: 0.4 CAPSULE ORAL at 09:07

## 2022-07-04 RX ADMIN — MEMANTINE 10 MG: 5 TABLET ORAL at 09:07

## 2022-07-04 RX ADMIN — LACTOBACILLUS TAB 4 TABLET: TAB at 10:07

## 2022-07-04 RX ADMIN — MULTIPLE VITAMINS W/ MINERALS TAB 1 TABLET: TAB at 09:07

## 2022-07-04 RX ADMIN — RISPERIDONE 1 MG: 0.5 TABLET ORAL at 09:07

## 2022-07-04 RX ADMIN — LOSARTAN POTASSIUM 50 MG: 50 TABLET, FILM COATED ORAL at 09:07

## 2022-07-04 RX ADMIN — LACTOBACILLUS TAB 4 TABLET: TAB at 05:07

## 2022-07-04 RX ADMIN — APIXABAN 5 MG: 2.5 TABLET, FILM COATED ORAL at 09:07

## 2022-07-04 RX ADMIN — PANTOPRAZOLE SODIUM 40 MG: 40 TABLET, DELAYED RELEASE ORAL at 09:07

## 2022-07-04 RX ADMIN — LACTULOSE 20 G: 20 SOLUTION ORAL at 09:07

## 2022-07-04 RX ADMIN — OXYCODONE HYDROCHLORIDE AND ACETAMINOPHEN 1000 MG: 500 TABLET ORAL at 09:07

## 2022-07-04 RX ADMIN — INSULIN ASPART 1 UNITS: 100 INJECTION, SOLUTION INTRAVENOUS; SUBCUTANEOUS at 09:07

## 2022-07-04 RX ADMIN — LACTOBACILLUS TAB 4 TABLET: TAB at 01:07

## 2022-07-04 NOTE — PLAN OF CARE
Problem: Adult Inpatient Plan of Care  Goal: Plan of Care Review  7/4/2022 0514 by Desiree Rey RN  Outcome: Ongoing, Progressing  7/4/2022 0514 by Desiree Rey, RN  Outcome: Ongoing, Progressing     Problem: Skin Injury Risk Increased  Goal: Skin Health and Integrity  Outcome: Ongoing, Progressing

## 2022-07-05 ENCOUNTER — CLINICAL SUPPORT (OUTPATIENT)
Dept: WOUND CARE | Facility: HOSPITAL | Age: 82
DRG: 689 | End: 2022-07-05
Attending: EMERGENCY MEDICINE
Payer: MEDICARE

## 2022-07-05 VITALS
RESPIRATION RATE: 20 BRPM | TEMPERATURE: 98 F | BODY MASS INDEX: 25.06 KG/M2 | SYSTOLIC BLOOD PRESSURE: 152 MMHG | WEIGHT: 185 LBS | DIASTOLIC BLOOD PRESSURE: 82 MMHG | HEIGHT: 72 IN | OXYGEN SATURATION: 100 % | HEART RATE: 70 BPM

## 2022-07-05 DIAGNOSIS — L89.212: ICD-10-CM

## 2022-07-05 DIAGNOSIS — L89.154 PRESSURE INJURY OF SACRAL REGION, STAGE 4: Primary | ICD-10-CM

## 2022-07-05 PROBLEM — E86.0 DEHYDRATION: Status: RESOLVED | Noted: 2022-06-28 | Resolved: 2022-07-05

## 2022-07-05 PROBLEM — N39.0 ACUTE URINARY TRACT INFECTION: Status: RESOLVED | Noted: 2022-06-28 | Resolved: 2022-07-05

## 2022-07-05 LAB
GLUCOSE SERPL-MCNC: 122 MG/DL (ref 70–105)
GLUCOSE SERPL-MCNC: 93 MG/DL (ref 70–105)
MAYO GENERIC ORDERABLE RESULT: ABNORMAL

## 2022-07-05 PROCEDURE — 63600175 PHARM REV CODE 636 W HCPCS: Performed by: NURSE PRACTITIONER

## 2022-07-05 PROCEDURE — 25000003 PHARM REV CODE 250: Performed by: NURSE PRACTITIONER

## 2022-07-05 PROCEDURE — 82962 GLUCOSE BLOOD TEST: CPT

## 2022-07-05 PROCEDURE — 94761 N-INVAS EAR/PLS OXIMETRY MLT: CPT

## 2022-07-05 PROCEDURE — C9399 UNCLASSIFIED DRUGS OR BIOLOG: HCPCS | Performed by: NURSE PRACTITIONER

## 2022-07-05 PROCEDURE — 11042 DBRDMT SUBQ TIS 1ST 20SQCM/<: CPT

## 2022-07-05 PROCEDURE — 27000944

## 2022-07-05 PROCEDURE — 99213 OFFICE O/P EST LOW 20 MIN: CPT | Mod: 25

## 2022-07-05 RX ADMIN — PANTOPRAZOLE SODIUM 40 MG: 40 TABLET, DELAYED RELEASE ORAL at 09:07

## 2022-07-05 RX ADMIN — OXCARBAZEPINE 450 MG: 300 TABLET, FILM COATED ORAL at 09:07

## 2022-07-05 RX ADMIN — RISPERIDONE 1 MG: 0.5 TABLET ORAL at 09:07

## 2022-07-05 RX ADMIN — LACTOBACILLUS TAB 4 TABLET: TAB at 09:07

## 2022-07-05 RX ADMIN — LACTOBACILLUS TAB 4 TABLET: TAB at 12:07

## 2022-07-05 RX ADMIN — LOSARTAN POTASSIUM 50 MG: 50 TABLET, FILM COATED ORAL at 09:07

## 2022-07-05 RX ADMIN — HYDROCODONE BITARTRATE AND ACETAMINOPHEN 1 TABLET: 5; 325 TABLET ORAL at 12:07

## 2022-07-05 RX ADMIN — TAMSULOSIN HYDROCHLORIDE 0.4 MG: 0.4 CAPSULE ORAL at 09:07

## 2022-07-05 RX ADMIN — MULTIPLE VITAMINS W/ MINERALS TAB 1 TABLET: TAB at 09:07

## 2022-07-05 RX ADMIN — APIXABAN 5 MG: 2.5 TABLET, FILM COATED ORAL at 09:07

## 2022-07-05 RX ADMIN — OXYCODONE HYDROCHLORIDE AND ACETAMINOPHEN 1000 MG: 500 TABLET ORAL at 09:07

## 2022-07-05 RX ADMIN — METOPROLOL TARTRATE 12.5 MG: 25 TABLET, FILM COATED ORAL at 09:07

## 2022-07-05 RX ADMIN — LEVOTHYROXINE SODIUM 50 MCG: 0.05 TABLET ORAL at 05:07

## 2022-07-05 RX ADMIN — INSULIN DETEMIR 20 UNITS: 100 INJECTION, SOLUTION SUBCUTANEOUS at 10:07

## 2022-07-05 RX ADMIN — LACTULOSE 20 G: 20 SOLUTION ORAL at 09:07

## 2022-07-05 NOTE — ASSESSMENT & PLAN NOTE
PT HAS MUSCLE WEAKNESS EXACERBATED BY UTI AND DEHYDRATION  PT IS NOT A CANDIDATE FOR PT/OT  Chronic weakness

## 2022-07-05 NOTE — HOSPITAL COURSE
07/05/22  Patient is being discharged home today. Patient has completed his IV antibiotics that he was receiving for his UTI. Patient is chronically pleasantly confused (long-standing Alzheimer's). Patient has a chronic sacral decubitus that wound care is following and assessed prior to patient being discharged. Wound care reports they will continue to follow patient on outpatient basis. MaxT 99.1, HR 85, /82, oxygen sats 100% RA. Patient will discharged back to nursing home in [place], [place]. Dr. Pike evaluated the patient on rounds this morning and participated in all aspects of care.

## 2022-07-05 NOTE — ASSESSMENT & PLAN NOTE
PT HAS SACRAL DECUBITUS STAGE 4  WOUND CARE TEAM WILL TREAT   ORDERS RESUMED FROM [facility]    07/05/22 Wound care will be continued at nursing home

## 2022-07-05 NOTE — ASSESSMENT & PLAN NOTE
PT HAS HTN  PT IS ON LOSARTAN AND LOPRESSOR    PT IS ALLERGIC TO NORVASC  Continue Losartan and Lopressor on discharge

## 2022-07-05 NOTE — NURSING
CALLED REPORT TO [facility] [place] AND REPORTED THAT PATIENT IS RETURNING TODAY.

## 2022-07-05 NOTE — DISCHARGE SUMMARY
[facility] - Medical Surgical Unit  Hospital Medicine  Discharge Summary      Patient Name: Bhargav Gao  MRN: [id number]  Patient Class: IP- Swing  Admission Date: 6/29/2022  Hospital Length of Stay: 6 days  Discharge Date and Time:  07/05/2022 2:16 PM  Attending Physician: Chelsie Pike MD   Discharging Provider: LIEN Mike  Primary Care Provider: Kerry Lin MD      HPI:   PT IS A 81 YR OLD BM ADMITTED TO [facility] SWING BED FOR PT/OT/REHABILITATION FOR IV ANTIBIOTICS FOR UTI AND WEAKNESS AND MEDICAL MANAGEMENT. PT WAS REFERRED FROM [facility] WHERE HE WAS ADMITTED FOR DEHYDRATION, FEVER, WEAKNESS AND UTI ON 06/28/2022 HAVING BEEN TRANSFERRED PER [facility] ED AS RECOMMENDED PER DR WOODARD. PT IMPROVED; BUT WILL NEED CONTINUED IV ZOSYN WITH CULTURE REVEALING E COLI  AND BC PENDING,  MEDICAL MANAGEMENT AND WILL REQUIRE SWING BED FOR TREATMENT. 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 PT IS A RESIDENT AT LOCAL SNF IN [facility] AND WILL BE DISCHARGED BACK TO SNF AFTER COMPLETING TREATMENT.   THE PHARMACIST WILL REVIEW MEDICATIONS AND MAKE RECOMMENDATIONS. PT WILL SEE THE DIETICIAN  FOR NUTRITIONAL SUPPORT WITH WEIGHT 84 KG AND ALBUMIN OF 1.7. PT'S ABNORMAL ADMITTING LABS ARE: CMP: PROT 5.9, ALB 1.7, CBC: H/H 8.9/27.6, UA:POS FOR UTI. PT WILL HAVE WEEKLY LABS.    Past Medical History:   Diagnosis Date    Alzheimer's disease     Anticoagulant long-term use     Bipolar disorder     BPH (benign prostatic hyperplasia)     Deep vein thrombosis (DVT) of popliteal vein of right lower extremity     Diabetes mellitus     DVT of deep femoral vein, left     Hyperlipidemia     Hypertension     Hypothyroidism     Idiopathic orofacial dystonia     Iron deficiency anemia secondary to blood loss (chronic)     Mild intellectual disabilities     Obesity     Pressure ulcer     Schizophrenia       CXR NO ACUTE CHANGE      PT CODE STATUS IS DNR  PT COVID STATUS IS NEG  PT HAS HAD COVID  VACCINE   PT VTE PPX IS ELIQUIS/TEDS        * No surgery found *      Hospital Course:   07/05/22  Patient is being discharged home today. Patient has completed his IV antibiotics that he was receiving for his UTI. Patient is chronically pleasantly confused (long-standing Alzheimer's). Patient has a chronic sacral decubitus that wound care is following and assessed prior to patient being discharged. Wound care reports they will continue to follow patient on outpatient basis. MaxT 99.1, HR 85, /82, oxygen sats 100% RA. Patient will discharged back to nursing home in [place], [place]. Dr. Pike evaluated the patient on rounds this morning and participated in all aspects of care.        Goals of Care Treatment Preferences:  Code Status: DNR      Consults:   Consults (From admission, onward)        Status Ordering Provider     Inpatient consult to Social Work/Case Management  Once        Provider:  (Not yet assigned)    Acknowledged AKIRA NORTON     IP consult to case management  Once        Provider:  (Not yet assigned)    Acknowledged AKIRA NORTON          Generalized weakness  PT HAS MUSCLE WEAKNESS EXACERBATED BY UTI AND DEHYDRATION  PT IS NOT A CANDIDATE FOR PT/OT  Chronic weakness     Hypertension  PT HAS HTN  PT IS ON LOSARTAN AND LOPRESSOR    PT IS ALLERGIC TO NORVASC  Continue Losartan and Lopressor on discharge    Diabetes mellitus  PT HAS DM2  SSI  INSULIN DETEMIR DAILY  GLU RANGE   HGB A1C IS 6.6  Patient will discharged back to the nursing home with continuation of his insulin      Pressure injury of sacral region, stage 4  PT HAS SACRAL DECUBITUS STAGE 4  WOUND CARE TEAM WILL TREAT   ORDERS RESUMED FROM SNF    07/05/22 Wound care will be continued at nursing home      Alzheimer's disease  PT HAS DEMENTIA AND IS CALM  DELIRIUM PRECAUTIONS  Discharge home on memantine        Final Active Diagnoses:    Diagnosis Date Noted POA    Generalized weakness [R53.1] 06/28/2022 Yes    Hypertension  [I10]  Yes    Alzheimer's disease [G30.9, F02.80]  Yes    Pressure injury of sacral region, stage 4 [L89.154]  Yes    Diabetes mellitus [E11.9]  Yes      Problems Resolved During this Admission:    Diagnosis Date Noted Date Resolved POA    PRINCIPAL PROBLEM:  Acute urinary tract infection [N39.0] 06/28/2022 07/05/2022 Yes    Dehydration [E86.0] 06/28/2022 07/05/2022 Yes    Schizophrenia [F20.9] 01/28/2022 06/30/2022 Yes    Sacral wound [S31.000A]  06/30/2022 Yes       Discharged Condition: good    Disposition: Home or Self Care    Follow Up:    Patient Instructions:      Diet Dysphagia Pureed   Order Comments: TAMIKO AND GLUCERNA     Notify your health care provider if you experience any of the following:  temperature >100.4     Notify your health care provider if you experience any of the following:  persistent nausea and vomiting or diarrhea     Notify your health care provider if you experience any of the following:  severe uncontrolled pain     Notify your health care provider if you experience any of the following:  redness, tenderness, or signs of infection (pain, swelling, redness, odor or green/yellow discharge around incision site)     Notify your health care provider if you experience any of the following:  difficulty breathing or increased cough     Notify your health care provider if you experience any of the following:  increased confusion or weakness     Remove dressing in 24 hours   Order Comments: REC EVERY OTHER DAY DRESSING CHANGE  SILVER ALGINATE APPLIED TODAY AND REC EVERY OTHER DAY       Significant Diagnostic Studies: Labs: All labs within the past 24 hours have been reviewed    Pending Diagnostic Studies:     None         Medications:  Reconciled Home Medications:      Medication List      CONTINUE taking these medications    apixaban 5 mg Tab  Commonly known as: ELIQUIS  Take 5 mg by mouth 2 (two) times daily.     HYDROcodone-acetaminophen 5-325 mg per tablet  Commonly known as:  NORCO  Take 1 tablet by mouth every 6 (six) hours as needed for Pain.     insulin aspart U-100 100 unit/mL injection  Commonly known as: NovoLOG  Inject 0-5 Units into the skin before meals and at bedtime as needed. **LOW CORRECTION DOSE**  Blood Glucose  mg/dL                  Pre-meal                2200  151-200                0 unit                      0 unit  201-250                2 units                    1 unit  251-300                3 units                    1 unit  301-350                4 units                    2 units  >350                     5 units                    3 units  Administer subcutaneously if needed at times designated by monitoring schedule.   DO NOT HOLD correction dose insulin for patients who are  NPO.     insulin detemir U-100 100 unit/mL injection  Commonly known as: Levemir  Inject 20 Units into the skin once daily.     lactulose 10 gram/15 mL solution  Commonly known as: CHRONULAC  Take 20 g by mouth once daily.     levothyroxine 50 MCG tablet  Commonly known as: SYNTHROID  Take 1 tablet (50 mcg total) by mouth before breakfast.     losartan 50 MG tablet  Commonly known as: COZAAR  Take 50 mg by mouth once daily. Hold for SBP < 110 or DBP < 60     memantine 10 MG Tab  Commonly known as: NAMENDA  Take 10 mg by mouth nightly.     metoprolol tartrate 25 MG tablet  Commonly known as: LOPRESSOR  Take 0.5 tablets (12.5 mg total) by mouth 2 (two) times daily.     multivitamin Tab  Take 1 tablet by mouth once daily.     OXcarbazepine 150 MG Tab  Commonly known as: TRILEPTAL  Take 450 mg by mouth 2 (two) times daily.     pantoprazole 40 MG tablet  Commonly known as: PROTONIX  Take 1 tablet by mouth once daily.     polyethylene glycol 17 gram Pwpk  Commonly known as: GLYCOLAX  Take 17 g by mouth 2 (two) times a day.     risperiDONE 1 MG tablet  Commonly known as: RISPERDAL  Take 1 mg by mouth 2 (two) times daily.     tamsulosin 0.4 mg Cap  Commonly known as: FLOMAX  Take 1 capsule by  mouth once daily.        STOP taking these medications    acetaminophen 325 MG tablet  Commonly known as: TYLENOL     CALTRATE 600 + D ORAL     furosemide 40 MG tablet  Commonly known as: LASIX     metoprolol succinate 25 MG 24 hr tablet  Commonly known as: TOPROL-XL     SITagliptin 50 MG Tab  Commonly known as: JANUVIA            Indwelling Lines/Drains at time of discharge:   Lines/Drains/Airways     Drain  Duration                Urethral Catheter 03/03/22 2335 Triple-lumen 20 Fr. 123 days         Colostomy 05/24/22 0000 LLQ 42 days                Time spent on the discharge of patient: 45 minutes         LIEN Mike  Department of Hospital Medicine  [facility] - Medical Surgical Unit

## 2022-07-05 NOTE — ASSESSMENT & PLAN NOTE
PT HAS DM2  SSI  INSULIN DETEMIR DAILY  GLU RANGE   HGB A1C IS 6.6  Patient will discharged back to the nursing home with continuation of his insulin

## 2022-07-05 NOTE — PROGRESS NOTES
Dressing changed and cleansed dankins solution.  Patted periwound areas dry and applied skin barrier to edges.  Applied AG to wound bed and covered with ABD pads and secured with paper tape.

## 2022-07-06 NOTE — NURSING
Report called to LUCERO Ortega RN at [facility] in [facility], [place].    Notified patient family member Jennifer Rudolph of his return to the facility in [place].

## 2022-07-28 ENCOUNTER — OFFICE VISIT (OUTPATIENT)
Dept: SURGERY | Facility: CLINIC | Age: 82
DRG: 698 | End: 2022-07-28
Attending: SURGERY
Payer: MEDICARE

## 2022-07-28 DIAGNOSIS — L89.324 DECUBITUS ULCER OF ISCHIAL AREA, LEFT, STAGE IV: ICD-10-CM

## 2022-07-28 DIAGNOSIS — L89.154 PRESSURE INJURY OF SACRAL REGION, STAGE 4: Primary | ICD-10-CM

## 2022-07-28 PROCEDURE — 99024 PR POST-OP FOLLOW-UP VISIT: ICD-10-PCS | Mod: S$PBB,,, | Performed by: SURGERY

## 2022-07-28 PROCEDURE — 99024 POSTOP FOLLOW-UP VISIT: CPT | Mod: S$PBB,,, | Performed by: SURGERY

## 2022-07-28 PROCEDURE — 99214 OFFICE O/P EST MOD 30 MIN: CPT | Mod: PBBFAC | Performed by: SURGERY

## 2022-07-29 NOTE — PROGRESS NOTES
General Surgery History and Physical      Patient ID: Bhargav Gao is a 81 y.o. male.    Chief Complaint: Wound Check      HPI:  He 1-year-old nursing home patient multiple medical issues including diabetes, dementia, nonambulatory.  He has had some bilateral ulcers to his ischial region and the sacral region for many years now.  Unable to give a good history most of it is obtained from the chart.  There was a concern for possible osteomyelitis Um on MRI however patient did denies any have any fevers no chills no severe white count no signs of sepsis.    Review of Systems   Constitutional: Positive for activity change and unexpected weight change.   Gastrointestinal: Negative for abdominal pain and vomiting.   Skin: Positive for wound.   Neurological: Positive for weakness.       Current Outpatient Medications   Medication Sig Dispense Refill    apixaban (ELIQUIS) 5 mg Tab Take 5 mg by mouth 2 (two) times daily.      HYDROcodone-acetaminophen (NORCO) 5-325 mg per tablet Take 1 tablet by mouth every 6 (six) hours as needed for Pain.      insulin aspart U-100 (NOVOLOG) 100 unit/mL injection Inject 0-5 Units into the skin before meals and at bedtime as needed. **LOW CORRECTION DOSE**  Blood Glucose  mg/dL                  Pre-meal                2200  151-200                0 unit                      0 unit  201-250                2 units                    1 unit  251-300                3 units                    1 unit  301-350                4 units                    2 units  >350                     5 units                    3 units  Administer subcutaneously if needed at times designated by monitoring schedule.   DO NOT HOLD correction dose insulin for patients who are  NPO.      insulin detemir U-100 (LEVEMIR) 100 unit/mL injection Inject 20 Units into the skin once daily. 72 mL 0    lactulose (CHRONULAC) 10  gram/15 mL solution Take 20 g by mouth once daily.      levothyroxine (SYNTHROID) 50 MCG tablet Take 1 tablet (50 mcg total) by mouth before breakfast. 30 tablet 11    losartan (COZAAR) 50 MG tablet Take 50 mg by mouth once daily. Hold for SBP < 110 or DBP < 60      memantine (NAMENDA) 10 MG Tab Take 10 mg by mouth nightly.      metoprolol tartrate (LOPRESSOR) 25 MG tablet Take 0.5 tablets (12.5 mg total) by mouth 2 (two) times daily. 30 tablet 11    multivitamin Tab Take 1 tablet by mouth once daily.      OXcarbazepine (TRILEPTAL) 150 MG Tab Take 450 mg by mouth 2 (two) times daily.      pantoprazole (PROTONIX) 40 MG tablet Take 1 tablet by mouth once daily.      polyethylene glycol (GLYCOLAX) 17 gram PwPk Take 17 g by mouth 2 (two) times a day.      risperiDONE (RISPERDAL) 1 MG tablet Take 1 mg by mouth 2 (two) times daily.      tamsulosin (FLOMAX) 0.4 mg Cap Take 1 capsule by mouth once daily.       No current facility-administered medications for this visit.       Review of patient's allergies indicates:   Allergen Reactions    Metformin     Mycobacterium tuberculosis (tuberculin ppd)     Norvasc [amlodipine]        Past Medical History:   Diagnosis Date    Alzheimer's disease     Anticoagulant long-term use     Bipolar disorder     BPH (benign prostatic hyperplasia)     Deep vein thrombosis (DVT) of popliteal vein of right lower extremity     Diabetes mellitus     DVT of deep femoral vein, left     Hyperlipidemia     Hypertension     Hypothyroidism     Idiopathic orofacial dystonia     Iron deficiency anemia secondary to blood loss (chronic)     Mild intellectual disabilities     Obesity     Pressure ulcer     Sacral wound     05/16 records show culture of wound grew   Collected: 05/05/22 0923 Order Status: Completed Specimen: Wound from Sacral Updated: 05/11/22 1318  Culture, Wound/Abscess Light Growth Acinetobacter baumannii complex/haemolyticus Abnormal    Comment: Corrected  result: Previously reported as Gram-negative Bacilli on 5/9/2022 at 1312 CDT.    Light Growth Escherichia coli Abnormal    Light Growth Klebsiel    Schizophrenia        Past Surgical History:   Procedure Laterality Date    COLOSTOMY N/A 5/24/2022    Procedure: CREATION, COLOSTOMY;  Surgeon: Edilma Felix MD;  Location: [facility];  Service: General;  Laterality: N/A;  diverting colostomy dr felix tuesday       Family History   Family history unknown: Yes       Social History     Socioeconomic History    Marital status: Single   Occupational History    Occupation: disabled   Tobacco Use    Smoking status: Never Smoker    Smokeless tobacco: Never Used   Substance and Sexual Activity    Alcohol use: Not Currently    Drug use: Never    Sexual activity: Not Currently       There were no vitals filed for this visit.    Physical Exam  Constitutional:       Appearance: He is obese. He is not ill-appearing or toxic-appearing.   Pulmonary:      Effort: No respiratory distress.   Abdominal:      General: There is no distension.   Skin:     Findings: Lesion (Sacral area has an approximately 8 x 10 x 3 cm area with good granulation tissue.  No exposed bone and no areas of purulence.  Bilateral ischial wounds are smaller still stage 3 but with no exposed deep area) present.         Assessment & Plan:    Pressure injury of sacral region, stage 4    Decubitus ulcer of ischial area, left, stage IV         overall wounds look fairly clean dry.  Will have referral for wound care for continued dressing changes no signs of osteomyelitis.  Continue packing the wounds for now with wet to dry.  No need to come back and see me.

## 2022-07-30 ENCOUNTER — HOSPITAL ENCOUNTER (INPATIENT)
Facility: HOSPITAL | Age: 82
LOS: 4 days | Discharge: NURSING, REHAB OR LONG-TERM CARE | DRG: 698 | End: 2022-08-03
Attending: FAMILY MEDICINE | Admitting: HOSPITALIST
Payer: MEDICARE

## 2022-07-30 DIAGNOSIS — A41.9 SEPSIS: ICD-10-CM

## 2022-07-30 DIAGNOSIS — E11.9 TYPE 2 DIABETES MELLITUS WITHOUT COMPLICATION, WITHOUT LONG-TERM CURRENT USE OF INSULIN: ICD-10-CM

## 2022-07-30 DIAGNOSIS — F02.80 ALZHEIMER'S DISEASE: ICD-10-CM

## 2022-07-30 DIAGNOSIS — L89.154 PRESSURE INJURY OF SACRAL REGION, STAGE 4: ICD-10-CM

## 2022-07-30 DIAGNOSIS — I10 PRIMARY HYPERTENSION: ICD-10-CM

## 2022-07-30 DIAGNOSIS — A41.9 SEVERE SEPSIS: ICD-10-CM

## 2022-07-30 DIAGNOSIS — N39.0 URINARY TRACT INFECTION ASSOCIATED WITH INDWELLING URETHRAL CATHETER, INITIAL ENCOUNTER: ICD-10-CM

## 2022-07-30 DIAGNOSIS — T83.511A URINARY TRACT INFECTION ASSOCIATED WITH INDWELLING URETHRAL CATHETER, INITIAL ENCOUNTER: ICD-10-CM

## 2022-07-30 DIAGNOSIS — N40.1 BENIGN PROSTATIC HYPERPLASIA WITH LOWER URINARY TRACT SYMPTOMS, SYMPTOM DETAILS UNSPECIFIED: ICD-10-CM

## 2022-07-30 DIAGNOSIS — R07.9 CHEST PAIN: ICD-10-CM

## 2022-07-30 DIAGNOSIS — G30.9 ALZHEIMER'S DISEASE: ICD-10-CM

## 2022-07-30 DIAGNOSIS — I82.4Z3 ACUTE DEEP VEIN THROMBOSIS (DVT) OF DISTAL VEIN OF BOTH LOWER EXTREMITIES: ICD-10-CM

## 2022-07-30 DIAGNOSIS — R65.20 SEVERE SEPSIS: ICD-10-CM

## 2022-07-30 DIAGNOSIS — S31.000D WOUND OF SACRAL REGION, SUBSEQUENT ENCOUNTER: Primary | ICD-10-CM

## 2022-07-30 DIAGNOSIS — F31.9 BIPOLAR AFFECTIVE DISORDER, REMISSION STATUS UNSPECIFIED: ICD-10-CM

## 2022-07-30 PROBLEM — I82.433 ACUTE DEEP VEIN THROMBOSIS (DVT) OF POPLITEAL VEIN OF BOTH LOWER EXTREMITIES: Status: ACTIVE | Noted: 2022-07-30

## 2022-07-30 PROBLEM — N40.0 BPH (BENIGN PROSTATIC HYPERPLASIA): Status: ACTIVE | Noted: 2022-07-30

## 2022-07-30 LAB
ALBUMIN SERPL BCP-MCNC: 2.4 G/DL (ref 3.5–5)
ALBUMIN/GLOB SERPL: 0.6 {RATIO}
ALP SERPL-CCNC: 97 U/L (ref 45–115)
ALT SERPL W P-5'-P-CCNC: 20 U/L (ref 16–61)
ANION GAP SERPL CALCULATED.3IONS-SCNC: 14 MMOL/L (ref 7–16)
AST SERPL W P-5'-P-CCNC: 24 U/L (ref 15–37)
BACTERIA #/AREA URNS HPF: ABNORMAL /HPF
BASOPHILS # BLD AUTO: 0.02 K/UL (ref 0–0.2)
BASOPHILS NFR BLD AUTO: 0.4 % (ref 0–1)
BILIRUB SERPL-MCNC: 0.1 MG/DL (ref 0–1.2)
BILIRUB UR QL STRIP: NEGATIVE
BUN SERPL-MCNC: 10 MG/DL (ref 7–18)
BUN/CREAT SERPL: 23 (ref 6–20)
CALCIUM SERPL-MCNC: 9 MG/DL (ref 8.5–10.1)
CAOX CRY URNS QL MICRO: ABNORMAL /HPF
CHLORIDE SERPL-SCNC: 99 MMOL/L (ref 98–107)
CLARITY UR: ABNORMAL
CO2 SERPL-SCNC: 28 MMOL/L (ref 21–32)
COLOR UR: YELLOW
CREAT SERPL-MCNC: 0.43 MG/DL (ref 0.7–1.3)
DIFFERENTIAL METHOD BLD: ABNORMAL
EOSINOPHIL # BLD AUTO: 0.56 K/UL (ref 0–0.5)
EOSINOPHIL NFR BLD AUTO: 10.4 % (ref 1–4)
ERYTHROCYTE [DISTWIDTH] IN BLOOD BY AUTOMATED COUNT: 15.5 % (ref 11.5–14.5)
GLOBULIN SER-MCNC: 3.8 G/DL (ref 2–4)
GLUCOSE SERPL-MCNC: 69 MG/DL (ref 70–105)
GLUCOSE SERPL-MCNC: 82 MG/DL (ref 74–106)
GLUCOSE SERPL-MCNC: 94 MG/DL (ref 70–105)
GLUCOSE UR STRIP-MCNC: NORMAL MG/DL
HCT VFR BLD AUTO: 34.1 % (ref 40–54)
HGB BLD-MCNC: 11.3 G/DL (ref 13.5–18)
IMM GRANULOCYTES # BLD AUTO: 0.02 K/UL (ref 0–0.04)
IMM GRANULOCYTES NFR BLD: 0.4 % (ref 0–0.4)
KETONES UR STRIP-SCNC: NEGATIVE MG/DL
LACTATE SERPL-SCNC: 1.3 MMOL/L (ref 0.4–2)
LEUKOCYTE ESTERASE UR QL STRIP: ABNORMAL
LYMPHOCYTES # BLD AUTO: 1.88 K/UL (ref 1–4.8)
LYMPHOCYTES NFR BLD AUTO: 34.9 % (ref 27–41)
MAGNESIUM SERPL-MCNC: 1.7 MG/DL (ref 1.7–2.3)
MCH RBC QN AUTO: 27.6 PG (ref 27–31)
MCHC RBC AUTO-ENTMCNC: 33.1 G/DL (ref 32–36)
MCV RBC AUTO: 83.2 FL (ref 80–96)
MONOCYTES # BLD AUTO: 0.39 K/UL (ref 0–0.8)
MONOCYTES NFR BLD AUTO: 7.2 % (ref 2–6)
MPC BLD CALC-MCNC: 8.6 FL (ref 9.4–12.4)
NEUTROPHILS # BLD AUTO: 2.51 K/UL (ref 1.8–7.7)
NEUTROPHILS NFR BLD AUTO: 46.7 % (ref 53–65)
NITRITE UR QL STRIP: POSITIVE
NRBC # BLD AUTO: 0 X10E3/UL
NRBC, AUTO (.00): 0 %
PH UR STRIP: 6.5 PH UNITS
PLATELET # BLD AUTO: 273 K/UL (ref 150–400)
POTASSIUM SERPL-SCNC: 4.1 MMOL/L (ref 3.5–5.1)
PROT SERPL-MCNC: 6.2 G/DL (ref 6.4–8.2)
PROT UR QL STRIP: 100
RBC # BLD AUTO: 4.1 M/UL (ref 4.6–6.2)
RBC # UR STRIP: ABNORMAL /UL
RBC #/AREA URNS HPF: 2 /HPF
SARS-COV-2 RDRP RESP QL NAA+PROBE: NEGATIVE
SODIUM SERPL-SCNC: 137 MMOL/L (ref 136–145)
SP GR UR STRIP: 1.01
UROBILINOGEN UR STRIP-ACNC: NORMAL MG/DL
WBC # BLD AUTO: 5.38 K/UL (ref 4.5–11)
WBC #/AREA URNS HPF: 26 /HPF

## 2022-07-30 PROCEDURE — 25000003 PHARM REV CODE 250: Performed by: FAMILY MEDICINE

## 2022-07-30 PROCEDURE — 25000003 PHARM REV CODE 250

## 2022-07-30 PROCEDURE — 99223 PR INITIAL HOSPITAL CARE,LEVL III: ICD-10-PCS | Mod: AI,,, | Performed by: HOSPITALIST

## 2022-07-30 PROCEDURE — 82962 GLUCOSE BLOOD TEST: CPT

## 2022-07-30 PROCEDURE — 81001 URINALYSIS AUTO W/SCOPE: CPT | Performed by: FAMILY MEDICINE

## 2022-07-30 PROCEDURE — 99223 1ST HOSP IP/OBS HIGH 75: CPT | Mod: AI,,, | Performed by: HOSPITALIST

## 2022-07-30 PROCEDURE — 87635 SARS-COV-2 COVID-19 AMP PRB: CPT | Performed by: FAMILY MEDICINE

## 2022-07-30 PROCEDURE — 93005 ELECTROCARDIOGRAM TRACING: CPT

## 2022-07-30 PROCEDURE — 99284 EMERGENCY DEPT VISIT MOD MDM: CPT | Mod: ,,, | Performed by: FAMILY MEDICINE

## 2022-07-30 PROCEDURE — 83735 ASSAY OF MAGNESIUM: CPT | Performed by: FAMILY MEDICINE

## 2022-07-30 PROCEDURE — 96361 HYDRATE IV INFUSION ADD-ON: CPT

## 2022-07-30 PROCEDURE — 87086 URINE CULTURE/COLONY COUNT: CPT | Performed by: FAMILY MEDICINE

## 2022-07-30 PROCEDURE — 93010 ELECTROCARDIOGRAM REPORT: CPT | Mod: ,,, | Performed by: INTERNAL MEDICINE

## 2022-07-30 PROCEDURE — 25000003 PHARM REV CODE 250: Performed by: INTERNAL MEDICINE

## 2022-07-30 PROCEDURE — 93010 EKG 12-LEAD: ICD-10-PCS | Mod: ,,, | Performed by: INTERNAL MEDICINE

## 2022-07-30 PROCEDURE — 20000000 HC ICU ROOM

## 2022-07-30 PROCEDURE — 99284 PR EMERGENCY DEPT VISIT,LEVEL IV: ICD-10-PCS | Mod: ,,, | Performed by: FAMILY MEDICINE

## 2022-07-30 PROCEDURE — 85025 COMPLETE CBC W/AUTO DIFF WBC: CPT | Performed by: FAMILY MEDICINE

## 2022-07-30 PROCEDURE — 63600175 PHARM REV CODE 636 W HCPCS

## 2022-07-30 PROCEDURE — 25000003 PHARM REV CODE 250: Performed by: HOSPITALIST

## 2022-07-30 PROCEDURE — 94761 N-INVAS EAR/PLS OXIMETRY MLT: CPT

## 2022-07-30 PROCEDURE — 99285 EMERGENCY DEPT VISIT HI MDM: CPT | Mod: 25,CS

## 2022-07-30 PROCEDURE — 87040 BLOOD CULTURE FOR BACTERIA: CPT | Performed by: FAMILY MEDICINE

## 2022-07-30 PROCEDURE — 36415 COLL VENOUS BLD VENIPUNCTURE: CPT | Performed by: FAMILY MEDICINE

## 2022-07-30 PROCEDURE — 96365 THER/PROPH/DIAG IV INF INIT: CPT

## 2022-07-30 PROCEDURE — 63600175 PHARM REV CODE 636 W HCPCS: Performed by: FAMILY MEDICINE

## 2022-07-30 PROCEDURE — 83605 ASSAY OF LACTIC ACID: CPT | Performed by: FAMILY MEDICINE

## 2022-07-30 PROCEDURE — 96375 TX/PRO/DX INJ NEW DRUG ADDON: CPT

## 2022-07-30 PROCEDURE — 87077 CULTURE AEROBIC IDENTIFY: CPT | Performed by: FAMILY MEDICINE

## 2022-07-30 PROCEDURE — 63600175 PHARM REV CODE 636 W HCPCS: Performed by: HOSPITALIST

## 2022-07-30 PROCEDURE — 80053 COMPREHEN METABOLIC PANEL: CPT | Performed by: FAMILY MEDICINE

## 2022-07-30 RX ORDER — ONDANSETRON 2 MG/ML
8 INJECTION INTRAMUSCULAR; INTRAVENOUS EVERY 6 HOURS PRN
Status: DISCONTINUED | OUTPATIENT
Start: 2022-07-30 | End: 2022-08-03 | Stop reason: HOSPADM

## 2022-07-30 RX ORDER — PANTOPRAZOLE SODIUM 40 MG/1
40 TABLET, DELAYED RELEASE ORAL DAILY
Status: DISCONTINUED | OUTPATIENT
Start: 2022-07-31 | End: 2022-08-03 | Stop reason: HOSPADM

## 2022-07-30 RX ORDER — LEVOTHYROXINE SODIUM 50 UG/1
50 TABLET ORAL
Status: DISCONTINUED | OUTPATIENT
Start: 2022-07-31 | End: 2022-08-03 | Stop reason: HOSPADM

## 2022-07-30 RX ORDER — MUPIROCIN 20 MG/G
OINTMENT TOPICAL 2 TIMES DAILY
Status: DISCONTINUED | OUTPATIENT
Start: 2022-07-30 | End: 2022-08-03 | Stop reason: HOSPADM

## 2022-07-30 RX ORDER — TAMSULOSIN HYDROCHLORIDE 0.4 MG/1
1 CAPSULE ORAL DAILY
Status: DISCONTINUED | OUTPATIENT
Start: 2022-07-31 | End: 2022-08-03 | Stop reason: HOSPADM

## 2022-07-30 RX ORDER — LACTULOSE 10 G/15ML
20 SOLUTION ORAL DAILY
Status: DISCONTINUED | OUTPATIENT
Start: 2022-07-31 | End: 2022-08-03 | Stop reason: HOSPADM

## 2022-07-30 RX ORDER — RISPERIDONE 1 MG/1
1 TABLET ORAL 2 TIMES DAILY
Status: DISCONTINUED | OUTPATIENT
Start: 2022-07-30 | End: 2022-08-03 | Stop reason: HOSPADM

## 2022-07-30 RX ORDER — SODIUM CHLORIDE 9 MG/ML
INJECTION, SOLUTION INTRAVENOUS
Status: COMPLETED
Start: 2022-07-30 | End: 2022-07-30

## 2022-07-30 RX ORDER — GLUCAGON 1 MG
1 KIT INJECTION
Status: DISCONTINUED | OUTPATIENT
Start: 2022-07-30 | End: 2022-08-03 | Stop reason: HOSPADM

## 2022-07-30 RX ORDER — NALOXONE HCL 0.4 MG/ML
0.02 VIAL (ML) INJECTION
Status: DISCONTINUED | OUTPATIENT
Start: 2022-07-30 | End: 2022-08-03 | Stop reason: HOSPADM

## 2022-07-30 RX ORDER — BISACODYL 5 MG
10 TABLET, DELAYED RELEASE (ENTERIC COATED) ORAL DAILY PRN
Status: DISCONTINUED | OUTPATIENT
Start: 2022-07-30 | End: 2022-08-03 | Stop reason: HOSPADM

## 2022-07-30 RX ORDER — SODIUM CHLORIDE 0.9 % (FLUSH) 0.9 %
10 SYRINGE (ML) INJECTION EVERY 12 HOURS PRN
Status: DISCONTINUED | OUTPATIENT
Start: 2022-07-30 | End: 2022-08-03 | Stop reason: HOSPADM

## 2022-07-30 RX ORDER — INSULIN ASPART 100 [IU]/ML
0-5 INJECTION, SOLUTION INTRAVENOUS; SUBCUTANEOUS
Status: DISCONTINUED | OUTPATIENT
Start: 2022-07-30 | End: 2022-08-03 | Stop reason: HOSPADM

## 2022-07-30 RX ORDER — SIMETHICONE 80 MG
1 TABLET,CHEWABLE ORAL 3 TIMES DAILY PRN
Status: DISCONTINUED | OUTPATIENT
Start: 2022-07-30 | End: 2022-08-03 | Stop reason: HOSPADM

## 2022-07-30 RX ORDER — OXCARBAZEPINE 150 MG/1
450 TABLET, FILM COATED ORAL 2 TIMES DAILY
Status: DISCONTINUED | OUTPATIENT
Start: 2022-07-30 | End: 2022-08-03 | Stop reason: HOSPADM

## 2022-07-30 RX ORDER — TRAZODONE HYDROCHLORIDE 50 MG/1
50 TABLET ORAL NIGHTLY PRN
Status: DISCONTINUED | OUTPATIENT
Start: 2022-07-30 | End: 2022-08-03 | Stop reason: HOSPADM

## 2022-07-30 RX ORDER — ACETAMINOPHEN 500 MG
1000 TABLET ORAL EVERY 6 HOURS PRN
Status: DISCONTINUED | OUTPATIENT
Start: 2022-07-30 | End: 2022-08-03 | Stop reason: HOSPADM

## 2022-07-30 RX ORDER — POLYVINYL ALCOHOL 14 MG/ML
2 SOLUTION/ DROPS OPHTHALMIC 2 TIMES DAILY
Status: DISCONTINUED | OUTPATIENT
Start: 2022-07-30 | End: 2022-07-30

## 2022-07-30 RX ORDER — SODIUM CHLORIDE 9 MG/ML
INJECTION, SOLUTION INTRAVENOUS CONTINUOUS
Status: DISCONTINUED | OUTPATIENT
Start: 2022-07-30 | End: 2022-07-31

## 2022-07-30 RX ORDER — GUAIFENESIN/DEXTROMETHORPHAN 100-10MG/5
10 SYRUP ORAL EVERY 6 HOURS PRN
Status: DISCONTINUED | OUTPATIENT
Start: 2022-07-30 | End: 2022-08-03 | Stop reason: HOSPADM

## 2022-07-30 RX ORDER — MEMANTINE HYDROCHLORIDE 10 MG/1
10 TABLET ORAL NIGHTLY
Status: DISCONTINUED | OUTPATIENT
Start: 2022-07-30 | End: 2022-08-03 | Stop reason: HOSPADM

## 2022-07-30 RX ORDER — POLYETHYLENE GLYCOL 3350 17 G/17G
17 POWDER, FOR SOLUTION ORAL DAILY
Status: DISCONTINUED | OUTPATIENT
Start: 2022-07-31 | End: 2022-08-03 | Stop reason: HOSPADM

## 2022-07-30 RX ADMIN — SODIUM CHLORIDE: 9 INJECTION, SOLUTION INTRAVENOUS at 06:07

## 2022-07-30 RX ADMIN — APIXABAN 5 MG: 5 TABLET, FILM COATED ORAL at 11:07

## 2022-07-30 RX ADMIN — PIPERACILLIN AND TAZOBACTAM 4.5 G: 4; .5 INJECTION, POWDER, FOR SOLUTION INTRAVENOUS at 06:07

## 2022-07-30 RX ADMIN — SODIUM CHLORIDE: 9 INJECTION, SOLUTION INTRAVENOUS at 08:07

## 2022-07-30 RX ADMIN — MEMANTINE 10 MG: 10 TABLET ORAL at 11:07

## 2022-07-30 RX ADMIN — NOREPINEPHRINE BITARTRATE 0.05 MCG/KG/MIN: 1 INJECTION, SOLUTION, CONCENTRATE INTRAVENOUS at 06:07

## 2022-07-30 RX ADMIN — RISPERIDONE 1 MG: 1 TABLET ORAL at 11:07

## 2022-07-30 RX ADMIN — PIPERACILLIN SODIUM AND TAZOBACTAM SODIUM 4.5 G: 4; .5 INJECTION, POWDER, LYOPHILIZED, FOR SOLUTION INTRAVENOUS at 11:07

## 2022-07-30 RX ADMIN — MUPIROCIN: 20 OINTMENT TOPICAL at 08:07

## 2022-07-30 RX ADMIN — SODIUM CHLORIDE 1000 ML: 9 INJECTION, SOLUTION INTRAVENOUS at 04:07

## 2022-07-30 RX ADMIN — OXCARBAZEPINE 450 MG: 150 TABLET, FILM COATED ORAL at 11:07

## 2022-07-30 RX ADMIN — VANCOMYCIN HYDROCHLORIDE 1750 MG: 5 INJECTION, POWDER, LYOPHILIZED, FOR SOLUTION INTRAVENOUS at 09:07

## 2022-07-30 NOTE — ED PROVIDER NOTES
Encounter Date: 7/30/2022    SCRIBE #1 NOTE: I, Shelia Burger, am scribing for, and in the presence of,  Kateryna Mccall MD. I have scribed the entire note.       History     Chief Complaint   Patient presents with    Bradycardia    Hypothermia     Patient is a 81 y.o. male who has been brought to the emergency department via EMS due to hypothermia and bradycardia. Patient is from nursing home and daughter is present at bedside. Daughter explains that she received a call from [facility] staff this afternoon explaining that the patient was being transported to the ED, no symptoms of illness were reported. Patient has a hx of alzheimers and diabetes. Patient has 2 sacral wounds present that appear well healing.     The history is provided by a relative. No  was used.     Review of patient's allergies indicates:   Allergen Reactions    Metformin     Mycobacterium tuberculosis (tuberculin ppd)     Norvasc [amlodipine]      Past Medical History:   Diagnosis Date    Alzheimer's disease     Anticoagulant long-term use     Bipolar disorder     BPH (benign prostatic hyperplasia)     Deep vein thrombosis (DVT) of popliteal vein of right lower extremity     Diabetes mellitus     DVT of deep femoral vein, left     Hyperlipidemia     Hypertension     Hypothyroidism     Idiopathic orofacial dystonia     Iron deficiency anemia secondary to blood loss (chronic)     Mild intellectual disabilities     Obesity     Sacral wound     05/16 records show culture of wound grew   Collected: 05/05/22 0920 Order Status: Completed Specimen: Wound from Sacral Updated: 05/11/22 1318  Culture, Wound/Abscess Light Growth Acinetobacter baumannii complex/haemolyticus Abnormal    Comment: Corrected result: Previously reported as Gram-negative Bacilli on 5/9/2022 at 1312 CDT.    Light Growth Escherichia coli Abnormal    Light Growth Klebsiel    Schizophrenia 1/28/2022 05/16 -continue trileptal and  risperidone     Past Surgical History:   Procedure Laterality Date    COLOSTOMY N/A 5/24/2022    Procedure: CREATION, COLOSTOMY;  Surgeon: Edilma Felix MD;  Location: [facility];  Service: General;  Laterality: N/A;  diverting colostomy dr felix tuesday     Family History   Family history unknown: Yes     Social History     Tobacco Use    Smoking status: Never Smoker    Smokeless tobacco: Never Used   Substance Use Topics    Alcohol use: Not Currently    Drug use: Never     Review of Systems   Unable to perform ROS: Dementia       Physical Exam     Initial Vitals [07/30/22 1548]   BP Pulse Resp Temp SpO2   (!) 155/70 (!) 50 18 (!) 94.6 °F (34.8 °C) 97 %      MAP       --         Physical Exam    Vitals reviewed.  Constitutional: He appears well-developed and well-nourished. No distress.   Hypothermic   HENT:   Head: Normocephalic.   Eyes: Conjunctivae are normal. Pupils are equal, round, and reactive to light.   Cardiovascular: Regular rhythm, normal heart sounds and intact distal pulses. Bradycardia present.    Pulmonary/Chest: Breath sounds normal.   Abdominal: Abdomen is soft. Bowel sounds are normal.     Neurological: He is alert and oriented to person, place, and time.   Skin: Skin is warm and dry.   2 sacral wounds present that appear well healing, see media   Psychiatric: He has a normal mood and affect.         ED Course   Procedures  Labs Reviewed   COMPREHENSIVE METABOLIC PANEL - Abnormal; Notable for the following components:       Result Value    Creatinine 0.43 (*)     BUN/Creatinine Ratio 23 (*)     Total Protein 6.2 (*)     Albumin 2.4 (*)     All other components within normal limits   URINALYSIS, REFLEX TO URINE CULTURE - Abnormal; Notable for the following components:    Leukocytes, UA Large (*)     Nitrites, UA Positive (*)     Protein,   (*)     Blood, UA Small (*)     All other components within normal limits   CBC WITH DIFFERENTIAL - Abnormal; Notable for the following  components:    RBC 4.10 (*)     Hemoglobin 11.3 (*)     Hematocrit 34.1 (*)     RDW 15.5 (*)     MPV 8.6 (*)     Neutrophils % 46.7 (*)     Monocytes % 7.2 (*)     Eosinophils % 10.4 (*)     Eosinophils, Absolute 0.56 (*)     All other components within normal limits   URINALYSIS, MICROSCOPIC - Abnormal; Notable for the following components:    WBC, UA 26 (*)     Bacteria, UA Moderate (*)     Calcium Oxalate Crystals, UA Occasional (*)     All other components within normal limits   LACTIC ACID, PLASMA - Normal   MAGNESIUM - Normal   SARS-COV-2 RNA AMPLIFICATION, QUAL - Normal    Narrative:     Negative SARS-CoV results should not be used as the sole basis for treatment or patient management decisions; negative results should be considered in the context of a patient's recent exposures, history and the presene of clinical signs and symptoms consistent with COVID-19.  Negative results should be treated as presumptive and confirmed by molecular assay, if necessary for patient management.   CULTURE, BLOOD   CULTURE, BLOOD   CBC W/ AUTO DIFFERENTIAL    Narrative:     The following orders were created for panel order CBC auto differential.  Procedure                               Abnormality         Status                     ---------                               -----------         ------                     CBC with Differential[[id number]]        Abnormal            Final result                 Please view results for these tests on the individual orders.   POCT GLUCOSE MONITORING CONTINUOUS        ECG Results    None       Imaging Results          X-Ray Chest 1 View (Final result)  Result time 07/30/22 17:00:49    Final result by Fidel Herr DO (07/30/22 17:00:49)                 Impression:      Similar imaging appearance of the chest compared to June 28, 2022.    Point of Service: [facility]      Electronically signed by: Fidel Herr  Date:    07/30/2022  Time:    17:00             Narrative:     EXAMINATION:  XR CHEST 1 VIEW    CLINICAL HISTORY:  Sepsis;    COMPARISON:  Chest x-ray June 28, 2022    TECHNIQUE:  Frontal view/views of the chest.    FINDINGS:  Continued mild to moderate cardiomegaly.  Chronic reticulation of the left lung.  No new focal pulmonary consolidation.  Visualized osseous and surrounding soft tissue structures appear grossly unchanged.                                 Medications   NORepinephrine 4 mg in dextrose 5 % 250 mL infusion (0 mcg/kg/min × 94.8 kg Intravenous Stopped 7/30/22 2210)   acetaminophen tablet 1,000 mg (has no administration in time range)   bisacodyL EC tablet 10 mg (has no administration in time range)   dextromethorphan-guaiFENesin  mg/5 ml liquid 10 mL (has no administration in time range)   ondansetron injection 8 mg (has no administration in time range)   simethicone chewable tablet 80 mg (has no administration in time range)   traZODone tablet 50 mg (has no administration in time range)   mupirocin 2 % ointment ( Nasal Given 7/31/22 0927)   piperacillin-tazobactam (ZOSYN) 4.5 g in dextrose 5 % in water (D5W) 5 % 100 mL IVPB (MB+) (4.5 g Intravenous New Bag 7/31/22 1644)   vancomycin (VANCOCIN) 1,750 mg in dextrose 5 % 500 mL IVPB (0 mg Intravenous Stopped 7/30/22 2326)   vancomycin - pharmacy to dose (has no administration in time range)   apixaban tablet 5 mg (5 mg Oral Given 7/31/22 0927)   levothyroxine tablet 50 mcg (50 mcg Oral Given 7/31/22 0600)   memantine tablet 10 mg (10 mg Oral Given 7/30/22 2316)   pantoprazole EC tablet 40 mg (40 mg Oral Given 7/31/22 0927)   OXcarbazepine tablet 450 mg (450 mg Oral Given 7/31/22 0926)   risperiDONE tablet 1 mg (1 mg Oral Given 7/31/22 0927)   tamsulosin 24 hr capsule 0.4 mg (0.4 mg Oral Given 7/31/22 0927)   sodium chloride 0.9% flush 10 mL (has no administration in time range)   naloxone 0.4 mg/mL injection 0.02 mg (has no administration in time range)   dextrose 50% injection 12.5 g (12.5 g Intravenous  Given 7/31/22 0411)   dextrose 50% injection 25 g (25 g Intravenous Given 7/31/22 0717)   glucagon (human recombinant) injection 1 mg (has no administration in time range)   insulin detemir U-100 injection 15 Units (0 Units Subcutaneous Hold 7/30/22 2345)   insulin aspart U-100 injection 0-5 Units (has no administration in time range)   lactulose 20 gram/30 mL solution Soln 20 g (20 g Oral Given 7/31/22 0927)   polyethylene glycol packet 17 g (17 g Oral Given 7/31/22 0926)   polyvinyl alcohol-povidone 0.5-0.6 % Drop 2 drop (2 drops Ophthalmic Given 7/31/22 0927)   HYDROcodone-acetaminophen 5-325 mg per tablet 1 tablet (has no administration in time range)   dextrose 5 % and 0.45 % NaCl infusion ( Intravenous Verify Only 7/31/22 1215)   sodium chloride 0.9% bolus 1,000 mL (0 mLs Intravenous Stopped 7/30/22 1742)   piperacillin-tazobactam (ZOSYN) 4.5 g in dextrose 5 % in water (D5W) 5 % 100 mL IVPB (MB+) (0 g Intravenous Stopped 7/30/22 1924)                Attending Attestation:           Physician Attestation for Scribe:  Physician Attestation Statement for Scribe #1: I, Kateryna Mccall MD, reviewed documentation, as scribed by Shelia Burger in my presence, and it is both accurate and complete.                      Clinical Impression:   Final diagnoses:  [A41.9] Sepsis  [S31.000D] Wound of sacral region, subsequent encounter (Primary)  [T83.511A, N39.0] Urinary tract infection associated with indwelling urethral catheter, initial encounter          ED Disposition Condition    Admit               Kateryna Celaya MD  07/31/22 1745

## 2022-07-31 PROBLEM — I82.403 ACUTE DEEP VEIN THROMBOSIS (DVT) OF BOTH LOWER EXTREMITIES: Status: ACTIVE | Noted: 2022-07-30

## 2022-07-31 LAB
ANION GAP SERPL CALCULATED.3IONS-SCNC: 10 MMOL/L (ref 7–16)
BASOPHILS # BLD AUTO: 0.02 K/UL (ref 0–0.2)
BASOPHILS NFR BLD AUTO: 0.3 % (ref 0–1)
BUN SERPL-MCNC: 7 MG/DL (ref 7–18)
BUN/CREAT SERPL: 18 (ref 6–20)
CALCIUM SERPL-MCNC: 8.4 MG/DL (ref 8.5–10.1)
CHLORIDE SERPL-SCNC: 102 MMOL/L (ref 98–107)
CO2 SERPL-SCNC: 29 MMOL/L (ref 21–32)
CREAT SERPL-MCNC: 0.39 MG/DL (ref 0.7–1.3)
DIFFERENTIAL METHOD BLD: ABNORMAL
EOSINOPHIL # BLD AUTO: 0.6 K/UL (ref 0–0.5)
EOSINOPHIL NFR BLD AUTO: 9.4 % (ref 1–4)
ERYTHROCYTE [DISTWIDTH] IN BLOOD BY AUTOMATED COUNT: 15.9 % (ref 11.5–14.5)
GLUCOSE SERPL-MCNC: 105 MG/DL (ref 70–105)
GLUCOSE SERPL-MCNC: 110 MG/DL (ref 70–105)
GLUCOSE SERPL-MCNC: 155 MG/DL (ref 70–105)
GLUCOSE SERPL-MCNC: 52 MG/DL (ref 74–106)
GLUCOSE SERPL-MCNC: 57 MG/DL (ref 70–105)
GLUCOSE SERPL-MCNC: 59 MG/DL (ref 70–105)
GLUCOSE SERPL-MCNC: 72 MG/DL (ref 70–105)
GLUCOSE SERPL-MCNC: 95 MG/DL (ref 70–105)
GLUCOSE SERPL-MCNC: 97 MG/DL (ref 70–105)
HCT VFR BLD AUTO: 30.6 % (ref 40–54)
HGB BLD-MCNC: 10.2 G/DL (ref 13.5–18)
IMM GRANULOCYTES # BLD AUTO: 0.02 K/UL (ref 0–0.04)
IMM GRANULOCYTES NFR BLD: 0.3 % (ref 0–0.4)
LYMPHOCYTES # BLD AUTO: 2.13 K/UL (ref 1–4.8)
LYMPHOCYTES NFR BLD AUTO: 33.2 % (ref 27–41)
MCH RBC QN AUTO: 27.6 PG (ref 27–31)
MCHC RBC AUTO-ENTMCNC: 33.3 G/DL (ref 32–36)
MCV RBC AUTO: 82.9 FL (ref 80–96)
MONOCYTES # BLD AUTO: 0.51 K/UL (ref 0–0.8)
MONOCYTES NFR BLD AUTO: 8 % (ref 2–6)
MPC BLD CALC-MCNC: 9.4 FL (ref 9.4–12.4)
NEUTROPHILS # BLD AUTO: 3.13 K/UL (ref 1.8–7.7)
NEUTROPHILS NFR BLD AUTO: 48.8 % (ref 53–65)
NRBC # BLD AUTO: 0 X10E3/UL
NRBC, AUTO (.00): 0 %
PLATELET # BLD AUTO: 225 K/UL (ref 150–400)
POTASSIUM SERPL-SCNC: 3.9 MMOL/L (ref 3.5–5.1)
RBC # BLD AUTO: 3.69 M/UL (ref 4.6–6.2)
SODIUM SERPL-SCNC: 137 MMOL/L (ref 136–145)
WBC # BLD AUTO: 6.41 K/UL (ref 4.5–11)

## 2022-07-31 PROCEDURE — 25000003 PHARM REV CODE 250

## 2022-07-31 PROCEDURE — 82962 GLUCOSE BLOOD TEST: CPT

## 2022-07-31 PROCEDURE — 36415 COLL VENOUS BLD VENIPUNCTURE: CPT

## 2022-07-31 PROCEDURE — 85025 COMPLETE CBC W/AUTO DIFF WBC: CPT

## 2022-07-31 PROCEDURE — 93005 ELECTROCARDIOGRAM TRACING: CPT

## 2022-07-31 PROCEDURE — 93010 EKG 12-LEAD: ICD-10-PCS | Mod: ,,, | Performed by: INTERNAL MEDICINE

## 2022-07-31 PROCEDURE — 99233 SBSQ HOSP IP/OBS HIGH 50: CPT | Mod: ,,, | Performed by: INTERNAL MEDICINE

## 2022-07-31 PROCEDURE — 63600175 PHARM REV CODE 636 W HCPCS

## 2022-07-31 PROCEDURE — 99233 PR SUBSEQUENT HOSPITAL CARE,LEVL III: ICD-10-PCS | Mod: ,,, | Performed by: INTERNAL MEDICINE

## 2022-07-31 PROCEDURE — C9399 UNCLASSIFIED DRUGS OR BIOLOG: HCPCS

## 2022-07-31 PROCEDURE — 80048 BASIC METABOLIC PNL TOTAL CA: CPT

## 2022-07-31 PROCEDURE — S5010 5% DEXTROSE AND 0.45% SALINE: HCPCS | Performed by: INTERNAL MEDICINE

## 2022-07-31 PROCEDURE — 93010 ELECTROCARDIOGRAM REPORT: CPT | Mod: ,,, | Performed by: INTERNAL MEDICINE

## 2022-07-31 PROCEDURE — 20000000 HC ICU ROOM

## 2022-07-31 PROCEDURE — 25000003 PHARM REV CODE 250: Performed by: INTERNAL MEDICINE

## 2022-07-31 RX ORDER — DEXTROSE MONOHYDRATE AND SODIUM CHLORIDE 5; .45 G/100ML; G/100ML
INJECTION, SOLUTION INTRAVENOUS CONTINUOUS
Status: DISCONTINUED | OUTPATIENT
Start: 2022-07-31 | End: 2022-08-01

## 2022-07-31 RX ORDER — HYDROCODONE BITARTRATE AND ACETAMINOPHEN 5; 325 MG/1; MG/1
1 TABLET ORAL DAILY PRN
Status: DISCONTINUED | OUTPATIENT
Start: 2022-07-31 | End: 2022-08-03 | Stop reason: HOSPADM

## 2022-07-31 RX ADMIN — PIPERACILLIN SODIUM AND TAZOBACTAM SODIUM 4.5 G: 4; .5 INJECTION, POWDER, LYOPHILIZED, FOR SOLUTION INTRAVENOUS at 11:07

## 2022-07-31 RX ADMIN — MUPIROCIN: 20 OINTMENT TOPICAL at 09:07

## 2022-07-31 RX ADMIN — LEVOTHYROXINE SODIUM 50 MCG: 50 TABLET ORAL at 06:07

## 2022-07-31 RX ADMIN — PIPERACILLIN SODIUM AND TAZOBACTAM SODIUM 4.5 G: 4; .5 INJECTION, POWDER, LYOPHILIZED, FOR SOLUTION INTRAVENOUS at 04:07

## 2022-07-31 RX ADMIN — PANTOPRAZOLE SODIUM 40 MG: 40 TABLET, DELAYED RELEASE ORAL at 09:07

## 2022-07-31 RX ADMIN — TAMSULOSIN HYDROCHLORIDE 0.4 MG: 0.4 CAPSULE ORAL at 09:07

## 2022-07-31 RX ADMIN — Medication 2 DROP: at 09:07

## 2022-07-31 RX ADMIN — APIXABAN 5 MG: 5 TABLET, FILM COATED ORAL at 09:07

## 2022-07-31 RX ADMIN — Medication 2 DROP: at 12:07

## 2022-07-31 RX ADMIN — POLYETHYLENE GLYCOL 3350 17 G: 17 POWDER, FOR SOLUTION ORAL at 09:07

## 2022-07-31 RX ADMIN — RISPERIDONE 1 MG: 1 TABLET ORAL at 09:07

## 2022-07-31 RX ADMIN — OXCARBAZEPINE 450 MG: 150 TABLET, FILM COATED ORAL at 09:07

## 2022-07-31 RX ADMIN — MEMANTINE 10 MG: 10 TABLET ORAL at 09:07

## 2022-07-31 RX ADMIN — VANCOMYCIN HYDROCHLORIDE 1750 MG: 5 INJECTION, POWDER, LYOPHILIZED, FOR SOLUTION INTRAVENOUS at 09:07

## 2022-07-31 RX ADMIN — LACTULOSE 20 G: 20 SOLUTION ORAL at 09:07

## 2022-07-31 RX ADMIN — PIPERACILLIN SODIUM AND TAZOBACTAM SODIUM 4.5 G: 4; .5 INJECTION, POWDER, LYOPHILIZED, FOR SOLUTION INTRAVENOUS at 07:07

## 2022-07-31 RX ADMIN — SODIUM CHLORIDE: 9 INJECTION, SOLUTION INTRAVENOUS at 04:07

## 2022-07-31 RX ADMIN — DEXTROSE AND SODIUM CHLORIDE: 5; 450 INJECTION, SOLUTION INTRAVENOUS at 07:07

## 2022-07-31 RX ADMIN — DEXTROSE MONOHYDRATE 12.5 G: 25 INJECTION, SOLUTION INTRAVENOUS at 04:07

## 2022-07-31 RX ADMIN — DEXTROSE MONOHYDRATE 25 G: 25 INJECTION, SOLUTION INTRAVENOUS at 07:07

## 2022-07-31 RX ADMIN — INSULIN DETEMIR 15 UNITS: 100 INJECTION, SOLUTION SUBCUTANEOUS at 09:07

## 2022-07-31 NOTE — HPI
: 81 y.o.m. presents to [facility] ED from [facility] due to hypothermia and bradycardia. On arrival temp was 94.6F and HR was 50. Patient is alert to place but not time and has a hard time communicating although does not appear confused. Patient transferred to ICU due to HoTN and placed on pressors. Patient arrived with indewlling urethral catheter. U/A was positive for UTI, urine and blood cultures pending. Patient was treated for UTI which grew E.Coli on 6/28/22. Patient has a sacral wound at the apex of the gluteal cleft which appears to be healing well, photos recorded. LE u/s on 1/7/22 showed DVTs bilaterally, patient currently on Eliquis. Patient meets criteria for sepsis and is receiving fluids and broad spectrum Abx. Will continue to monitor.     ED Course: CXR showed cardiomegaly and chronic reticulation of the left lung, no acute changes. EKG showed bradycardia with normal supraventricular rhythm. CBC show chronic normocytic anemia with H/H at 11.3/34.1. CMP and lactic acid were unremarkable.    Patient presents to [place] ED from [facility] with hypotension and sepsis due to UTI.  Patient was volume resuscitated in ED and blood cultures were drawn before IV abx given.  Evaluation two hours follow up for sepsis and patient remained hypotensive and bradycardic and vasdopressor was started.  Patient had no signs of volume overload such a JVD or edema or rales.  He was nor had tachypnea.  He does have advanced dementia and is schizophrenic.  He is bed bound and has a healing sacral decub and thus had an indwelling anne, hence the UTI.  Patient is a DNR but was admitted to the ICU due to need of vasopressors.

## 2022-07-31 NOTE — ASSESSMENT & PLAN NOTE
This patient does have evidence of infective focus. My overall impression is sepsis.   Organ dysfunction indicated by Hypotension, hypothermia and bradycardic  - Source- UTI  - Source control Achieved by- Vancomycin and Zosyn  - Patient on norepinephrine due to HoTN, monitoring BP  - blood Cx pending  -  mL/hr  - cardiac monitoring    - supplemental O2    7/31- Pressor off since 0600 and currently hemodynamically stable

## 2022-07-31 NOTE — ASSESSMENT & PLAN NOTE
The patient had some hypoglycemia this am  Changed fluids to d 5 1/2  Hold levemir for now  Only treat blood sugar > 200 with sliding scale

## 2022-07-31 NOTE — PROGRESS NOTES
Pharmacy consulted to assist with vancomycin dosing for possible bacteremia in this 80 y/o male patient.    94.8kg  CrCl calculated: 60 ml/min     Will begin vancomycin 1750mg every 24 hours and check a trough before the 3rd dose.    Pharmacy will continue to monitor and make adjustments as needed.    Thank you for the consult,  Ana Cortez, PharmD  [phone]

## 2022-07-31 NOTE — ASSESSMENT & PLAN NOTE
This patient does have evidence of infective focus. My overall impression is sepsis.   Organ dysfunction indicated by Hypotension, hypothermia and bradycardic  - Source- UTI  - Source control Achieved by- Vancomycin and Zosyn  - Patient on norepinephrine due to HoTN, monitoring BP  - blood Cx pending  -  mL/hr  - cardiac monitoring    - supplemental O2

## 2022-07-31 NOTE — HOSPITAL COURSE
Patient initially admitted to the hospital with the low blood pressure required some pressors was cultured and grew out E coli were Gram-negative tyrone in the urine of responded to Zosyn has had 5 days IV Zosyn is doing well without complaints back to baseline were going to send him home to nursing home today.  I would send him home on nitrofurantoin for 3 days

## 2022-07-31 NOTE — ASSESSMENT & PLAN NOTE
Patient presented with indwelling urethral catheter  - u/a positive for UTI  - urine Cx pending  - Catheter changed  - Abx as above

## 2022-07-31 NOTE — ASSESSMENT & PLAN NOTE
Patient's FSGs are controlled on current medication regimen.  Last A1c reviewed-   Lab Results   Component Value Date    HGBA1C 6.6 06/29/2022     Antihyperglycemics (From admission, onward)            Start     Stop Route Frequency Ordered    07/30/22 4821  insulin detemir U-100 injection 15 Units         -- SubQ Nightly 07/30/22 2239 07/30/22 2339  insulin aspart U-100 injection 0-5 Units         -- SubQ Before meals & nightly PRN 07/30/22 2239

## 2022-07-31 NOTE — HPI
81 y.o.m. presents to [facility] ED from [facility] due to hypothermia and bradycardia. On arrival temp was 94.6F and HR was 50. Patient is alert to place but not time and has a hard time communicating although does not appear confused. Patient transferred to ICU due to HoTN and placed on pressors. Patient arrived with indewlling urethral catheter. U/A was positive for UTI, urine and blood cultures pending. Patient was treated for UTI which grew E.Coli on 6/28/22. Patient has a sacral wound at the apex of the gluteal cleft which appears to be healing well, photos recorded. LE u/s on 1/7/22 showed DVTs bilaterally, patient currently on Eliquis. Patient meets criteria for sepsis and is receiving fluids and broad spectrum Abx. Will continue to monitor.    ED Course: CXR showed cardiomegaly and chronic reticulation of the left lung, no acute changes. EKG showed bradycardia with normal supraventricular rhythm. CBC show chronic normocytic anemia with H/H at 11.3/34.1. CMP and lactic acid were unremarkable.

## 2022-07-31 NOTE — SUBJECTIVE & OBJECTIVE
Interval History: No acute events overnight. The patient is currently resting comfortably. He is currently afebrile and vital signs are stable.    Objective:     Vital Signs (Most Recent):  Temp: 97.4 °F (36.3 °C) (07/31/22 0301)  Pulse: (!) 49 (07/31/22 0800)  Resp: 14 (07/31/22 0800)  BP: (!) 152/58 (07/31/22 0800)  SpO2: 100 % (07/31/22 0800)   Vital Signs (24h Range):  Temp:  [94.6 °F (34.8 °C)-97.4 °F (36.3 °C)] 97.4 °F (36.3 °C)  Pulse:  [41-61] 49  Resp:  [7-18] 14  SpO2:  [95 %-100 %] 100 %  BP: ()/(27-85) 152/58     Weight: 97.2 kg (214 lb 4.6 oz)  Body mass index is 29.06 kg/m².      Intake/Output Summary (Last 24 hours) at 7/31/2022 0954  Last data filed at 7/31/2022 0600  Gross per 24 hour   Intake 2958.76 ml   Output 800 ml   Net 2158.76 ml       Physical Exam  Vitals and nursing note reviewed.   Constitutional:       General: He is not in acute distress.     Appearance: Normal appearance. He is not ill-appearing or toxic-appearing.   HENT:      Head: Normocephalic and atraumatic.      Right Ear: External ear normal.      Left Ear: External ear normal.      Nose: Nose normal. No congestion or rhinorrhea.      Mouth/Throat:      Mouth: Mucous membranes are moist.      Pharynx: Oropharynx is clear.   Eyes:      Extraocular Movements: Extraocular movements intact.      Conjunctiva/sclera: Conjunctivae normal.      Pupils: Pupils are equal, round, and reactive to light.   Cardiovascular:      Rate and Rhythm: Regular rhythm. Bradycardia present.      Pulses: Normal pulses.      Heart sounds: Normal heart sounds.     No friction rub.   Pulmonary:      Effort: Pulmonary effort is normal. No respiratory distress.      Breath sounds: Normal breath sounds. No wheezing, rhonchi or rales.   Abdominal:      General: There is no distension.      Palpations: Abdomen is soft.      Tenderness: There is no abdominal tenderness. There is no guarding.   Musculoskeletal:         General: No swelling.      Cervical  back: Normal range of motion and neck supple.      Right lower leg: No edema.      Left lower leg: No edema.   Skin:     General: Skin is warm and dry.      Capillary Refill: Capillary refill takes less than 2 seconds.      Coloration: Skin is not pale.      Findings: Wound present.             Comments: Sacral wound at apex of gluteal cleft    Neurological:      General: No focal deficit present.      Mental Status: He is alert. Mental status is at baseline.      Cranial Nerves: No cranial nerve deficit.   Psychiatric:         Mood and Affect: Mood normal.         Behavior: Behavior normal.       Vents:       Lines/Drains/Airways       Drain  Duration                  Colostomy 05/24/22 0000 LLQ 68 days         Urethral Catheter 07/30/22 2040 Silicone 18 Fr. <1 day              Peripheral Intravenous Line  Duration                  Peripheral IV - Single Lumen 07/30/22 1555 20 G Left Antecubital <1 day         Peripheral IV - Single Lumen 07/30/22 1923 22 G Left Hand <1 day         Peripheral IV - Single Lumen 07/30/22 2112 20 G Anterior;Right Forearm <1 day                    Significant Labs:    CBC/Anemia Profile:  Recent Labs   Lab 07/30/22  1610 07/31/22  0403   WBC 5.38 6.41   HGB 11.3* 10.2*   HCT 34.1* 30.6*    225   MCV 83.2 82.9   RDW 15.5* 15.9*        Chemistries:  Recent Labs   Lab 07/30/22  1610 07/31/22  0403    137   K 4.1 3.9   CL 99 102   CO2 28 29   BUN 10 7   CREATININE 0.43* 0.39*   CALCIUM 9.0 8.4*   ALBUMIN 2.4*  --    PROT 6.2*  --    BILITOT 0.1  --    ALKPHOS 97  --    ALT 20  --    AST 24  --    MG 1.7  --        All pertinent labs within the past 24 hours have been reviewed.    Significant Imaging:  I have reviewed all pertinent imaging results/findings within the past 24 hours.

## 2022-07-31 NOTE — SUBJECTIVE & OBJECTIVE
Past Medical History:   Diagnosis Date    Alzheimer's disease     Anticoagulant long-term use     Bipolar disorder     BPH (benign prostatic hyperplasia)     Deep vein thrombosis (DVT) of popliteal vein of right lower extremity     Diabetes mellitus     DVT of deep femoral vein, left     Hyperlipidemia     Hypertension     Hypothyroidism     Idiopathic orofacial dystonia     Iron deficiency 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    Light Growth Escherichia coli Abnormal    Light Growth Klebsiel    Schizophrenia 1/28/2022 05/16 -continue trileptal and risperidone       Past Surgical History:   Procedure Laterality Date    COLOSTOMY N/A 5/24/2022    Procedure: CREATION, COLOSTOMY;  Surgeon: Edilma Felix MD;  Location: [facility];  Service: General;  Laterality: N/A;  diverting colostomy dr felix tuesday       Review of patient's allergies indicates:   Allergen Reactions    Metformin     Mycobacterium tuberculosis (tuberculin ppd)     Norvasc [amlodipine]        No current facility-administered medications on file prior to encounter.     Current Outpatient Medications on File Prior to Encounter   Medication Sig    apixaban (ELIQUIS) 5 mg Tab Take 5 mg by mouth 2 (two) times daily.    HYDROcodone-acetaminophen (NORCO) 5-325 mg per tablet Take 1 tablet by mouth every 6 (six) hours as needed for Pain.    insulin aspart U-100 (NOVOLOG) 100 unit/mL injection Inject 0-5 Units into the skin before meals and at bedtime as needed. **LOW CORRECTION DOSE**  Blood Glucose  mg/dL                  Pre-meal                 2200  151-200                0 unit                      0 unit  201-250                2 units                    1 unit  251-300                3 units                    1 unit  301-350                4 units                    2 units  >350                     5 units                    3 units  Administer subcutaneously if needed at times designated by monitoring schedule.   DO NOT HOLD correction dose insulin for patients who are  NPO.    insulin detemir U-100 (LEVEMIR) 100 unit/mL injection Inject 20 Units into the skin once daily.    lactulose (CHRONULAC) 10 gram/15 mL solution Take 20 g by mouth once daily.    levothyroxine (SYNTHROID) 50 MCG tablet Take 1 tablet (50 mcg total) by mouth before breakfast.    losartan (COZAAR) 50 MG tablet Take 50 mg by mouth once daily. Hold for SBP < 110 or DBP < 60    memantine (NAMENDA) 10 MG Tab Take 10 mg by mouth nightly.    metoprolol tartrate (LOPRESSOR) 25 MG tablet Take 0.5 tablets (12.5 mg total) by mouth 2 (two) times daily.    multivitamin Tab Take 1 tablet by mouth once daily.    OXcarbazepine (TRILEPTAL) 150 MG Tab Take 450 mg by mouth 2 (two) times daily.    pantoprazole (PROTONIX) 40 MG tablet Take 1 tablet by mouth once daily.    polyethylene glycol (GLYCOLAX) 17 gram PwPk Take 17 g by mouth 2 (two) times a day.    risperiDONE (RISPERDAL) 1 MG tablet Take 1 mg by mouth 2 (two) times daily.    tamsulosin (FLOMAX) 0.4 mg Cap Take 1 capsule by mouth once daily.    [DISCONTINUED] furosemide (LASIX) 40 MG tablet Take 40 mg by mouth once daily. Hold for SBP < 110 or DBP < 60    [DISCONTINUED] metoprolol succinate (TOPROL-XL) 25 MG 24 hr tablet Take 1 tablet by mouth once daily. Hold for SBP < 110 or DBP < 60 or pulse < 50    [DISCONTINUED] SITagliptin (JANUVIA) 50 MG Tab Take 1 tablet (50 mg total) by mouth once daily.     Family History       Family history is unknown by patient.          Tobacco Use    Smoking status: Never Smoker    Smokeless tobacco: Never  Used   Substance and Sexual Activity    Alcohol use: Not Currently    Drug use: Never    Sexual activity: Not Currently     Review of Systems   Unable to perform ROS: Acuity of condition   All other systems reviewed and are negative.  Objective:     Vital Signs (Most Recent):  Temp: 97.4 °F (36.3 °C) (07/30/22 2230)  Pulse: (!) 53 (07/30/22 2130)  Resp: (!) 9 (07/30/22 2130)  BP: (!) 132/59 (07/30/22 2130)  SpO2: 95 % (07/30/22 2130)   Vital Signs (24h Range):  Temp:  [94.6 °F (34.8 °C)-97.4 °F (36.3 °C)] 97.4 °F (36.3 °C)  Pulse:  [46-61] 53  Resp:  [8-18] 9  SpO2:  [95 %-100 %] 95 %  BP: ()/(27-85) 132/59     Weight: 96.2 kg (212 lb 1.3 oz)  Body mass index is 28.76 kg/m².    Physical Exam  Vitals and nursing note reviewed.   Constitutional:       General: He is not in acute distress.     Appearance: Normal appearance. He is not ill-appearing, toxic-appearing or diaphoretic.   HENT:      Head: Normocephalic and atraumatic.      Nose: Nose normal. No congestion or rhinorrhea.      Mouth/Throat:      Mouth: Mucous membranes are moist.      Pharynx: Oropharynx is clear.   Eyes:      Pupils: Pupils are equal, round, and reactive to light.   Cardiovascular:      Rate and Rhythm: Normal rate and regular rhythm.      Pulses: Normal pulses.      Heart sounds: Normal heart sounds. No murmur heard.    No friction rub.   Pulmonary:      Effort: Pulmonary effort is normal. No respiratory distress.      Breath sounds: Normal breath sounds. No wheezing, rhonchi or rales.   Abdominal:      General: There is no distension.      Tenderness: There is no abdominal tenderness. There is no guarding.   Musculoskeletal:         General: No swelling.      Right lower leg: No edema.      Left lower leg: No edema.   Skin:     General: Skin is warm and dry.      Capillary Refill: Capillary refill takes less than 2 seconds.      Findings: Wound present.             Comments: Sacral wound at apex of gluteal cleft    Neurological:       Mental Status: He is alert.   Psychiatric:         Mood and Affect: Mood normal.         Behavior: Behavior normal.         CRANIAL NERVES     CN III, IV, VI   Pupils are equal, round, and reactive to light.     Significant Labs:   Recent Lab Results  (Last 5 results in the past 24 hours)        07/30/22 2056 07/30/22  1910 07/30/22  1749   07/30/22  1619   07/30/22  1610        Albumin/Globulin Ratio         0.6       Albumin         2.4       Alkaline Phosphatase         97       ALT         20       Anion Gap         14       Appearance, UA     Ex.Turbid           AST         24       Bacteria, UA     Moderate           Baso #         0.02       Basophil %         0.4       Bilirubin (UA)     Negative           BILIRUBIN TOTAL         0.1       BUN         10       BUN/CREAT RATIO         23       Ca Oxalate Marina, UA     Occasional           Calcium         9.0       Chloride         99       CO2         28       Color, UA     Yellow           ID NOW COVID-19, ([facility])   Negative             Creatinine         0.43       Differential Type         Auto       eGFR if non          202       Eos #         0.56       Eosinophil %         10.4       Globulin, Total         3.8       Glucose         82       Glucose, UA     Normal           Hematocrit         34.1       Hemoglobin         11.3       Immature Grans (Abs)         0.02       Immature Granulocytes         0.4       Ketones, UA     Negative           Lactate, Alirio       1.3         Leukocytes, UA     Large           Lymph #         1.88       Lymph %         34.9       Magnesium         1.7       MCH         27.6       MCHC         33.1       MCV         83.2       Mono #         0.39       Mono %         7.2       MPV         8.6       Neutrophils, Abs         2.51       Neutrophils Relative         46.7       NITRITE UA     Positive           nRBC         0.0       NUCLEATED RBC ABSOLUTE         0.00       Occult Blood UA     Small            pH, UA     6.5           Platelets         273       POC Glucose 69               Potassium         4.1       PROTEIN TOTAL         6.2       Protein, UA     100            RBC         4.10       RBC, UA     2           RDW         15.5       Sodium         137       Specific Senath, UA     1.014           UROBILINOGEN UA     Normal           WBC, UA     26           WBC         5.38                              Significant Imaging: I have reviewed all pertinent imaging results/findings within the past 24 hours.

## 2022-07-31 NOTE — PROGRESS NOTES
[facility]  Pulmonology  Progress Note    Patient Name: Bhargav Gao  MRN: [id number]  Admission Date: 7/30/2022  Hospital Length of Stay: 1 days  Code Status: DNR  Attending Provider: Shaka Hayden DO  Primary Care Provider: Kerry Lin MD   Principal Problem: Severe sepsis    Subjective:     Interval History: No acute events overnight. The patient is currently resting comfortably. He is currently afebrile and vital signs are stable.    Objective:     Vital Signs (Most Recent):  Temp: 97.4 °F (36.3 °C) (07/31/22 0301)  Pulse: (!) 49 (07/31/22 0800)  Resp: 14 (07/31/22 0800)  BP: (!) 152/58 (07/31/22 0800)  SpO2: 100 % (07/31/22 0800)   Vital Signs (24h Range):  Temp:  [94.6 °F (34.8 °C)-97.4 °F (36.3 °C)] 97.4 °F (36.3 °C)  Pulse:  [41-61] 49  Resp:  [7-18] 14  SpO2:  [95 %-100 %] 100 %  BP: ()/(27-85) 152/58     Weight: 97.2 kg (214 lb 4.6 oz)  Body mass index is 29.06 kg/m².      Intake/Output Summary (Last 24 hours) at 7/31/2022 0954  Last data filed at 7/31/2022 0600  Gross per 24 hour   Intake 2958.76 ml   Output 800 ml   Net 2158.76 ml       Physical Exam  Vitals and nursing note reviewed.   Constitutional:       General: He is not in acute distress.     Appearance: Normal appearance. He is not ill-appearing or toxic-appearing.   HENT:      Head: Normocephalic and atraumatic.      Right Ear: External ear normal.      Left Ear: External ear normal.      Nose: Nose normal. No congestion or rhinorrhea.      Mouth/Throat:      Mouth: Mucous membranes are moist.      Pharynx: Oropharynx is clear.   Eyes:      Extraocular Movements: Extraocular movements intact.      Conjunctiva/sclera: Conjunctivae normal.      Pupils: Pupils are equal, round, and reactive to light.   Cardiovascular:      Rate and Rhythm: Regular rhythm. Bradycardia present.      Pulses: Normal pulses.      Heart sounds: Normal heart sounds.     No friction rub.   Pulmonary:      Effort: Pulmonary effort is  normal. No respiratory distress.      Breath sounds: Normal breath sounds. No wheezing, rhonchi or rales.   Abdominal:      General: There is no distension.      Palpations: Abdomen is soft.      Tenderness: There is no abdominal tenderness. There is no guarding.   Musculoskeletal:         General: No swelling.      Cervical back: Normal range of motion and neck supple.      Right lower leg: No edema.      Left lower leg: No edema.   Skin:     General: Skin is warm and dry.      Capillary Refill: Capillary refill takes less than 2 seconds.      Coloration: Skin is not pale.      Findings: Wound present.             Comments: Sacral wound at apex of gluteal cleft    Neurological:      General: No focal deficit present.      Mental Status: He is alert. Mental status is at baseline.      Cranial Nerves: No cranial nerve deficit.   Psychiatric:         Mood and Affect: Mood normal.         Behavior: Behavior normal.       Vents:       Lines/Drains/Airways       Drain  Duration                  Colostomy 05/24/22 0000 LLQ 68 days         Urethral Catheter 07/30/22 2040 Silicone 18 Fr. <1 day              Peripheral Intravenous Line  Duration                  Peripheral IV - Single Lumen 07/30/22 1555 20 G Left Antecubital <1 day         Peripheral IV - Single Lumen 07/30/22 1923 22 G Left Hand <1 day         Peripheral IV - Single Lumen 07/30/22 2112 20 G Anterior;Right Forearm <1 day                    Significant Labs:    CBC/Anemia Profile:  Recent Labs   Lab 07/30/22  1610 07/31/22  0403   WBC 5.38 6.41   HGB 11.3* 10.2*   HCT 34.1* 30.6*    225   MCV 83.2 82.9   RDW 15.5* 15.9*        Chemistries:  Recent Labs   Lab 07/30/22  1610 07/31/22  0403    137   K 4.1 3.9   CL 99 102   CO2 28 29   BUN 10 7   CREATININE 0.43* 0.39*   CALCIUM 9.0 8.4*   ALBUMIN 2.4*  --    PROT 6.2*  --    BILITOT 0.1  --    ALKPHOS 97  --    ALT 20  --    AST 24  --    MG 1.7  --        All pertinent labs within the past 24  hours have been reviewed.    Significant Imaging:  I have reviewed all pertinent imaging results/findings within the past 24 hours.    Assessment/Plan:     * Severe sepsis  This patient does have evidence of infective focus. My overall impression is sepsis.   Organ dysfunction indicated by Hypotension, hypothermia and bradycardic  - Source- UTI  - Source control Achieved by- Vancomycin and Zosyn  - Patient on norepinephrine due to HoTN, monitoring BP  - blood Cx pending  -  mL/hr  - cardiac monitoring    - supplemental O2    7/31- Pressor off since 0600 and currently hemodynamically stable      Bipolar disorder  Stable  On oxcarbazepine 450 mg bid  Risperidone 1mg bid    Acute deep vein thrombosis (DVT) of both lower extremities  Patient is currently on apixiban 5mg bid    Urinary tract infection associated with indwelling urethral catheter  Patient presented with indwelling urethral catheter  - u/a positive for UTI  - urine Cx pending  - Catheter changed  - Abx as above    Hypertension  Patient was hypotensive on arrival  Pressor now off since 0600    Diabetes mellitus  The patient had some hypoglycemia this am  Changed fluids to d 5 1/2  Hold levemir for now  Only treat blood sugar > 200 with sliding scale    Pressure injury of sacral region, stage 4  - Photos taken of ulcer and recorded  - wound care consulted  - abx as above    Alzheimer's disease  Stable  Patient is on aminta Hayden DO  Pulmonology  [facility] ICU

## 2022-07-31 NOTE — ASSESSMENT & PLAN NOTE
LE u/s (1/7/22): Nonocclusive DVT right popliteal vein, left common femoral vein, and left femoral vein  - Eliquis 5mg BID

## 2022-07-31 NOTE — H&P
[facility] Medicine  History & Physical    Patient Name: Bhargav Gao  MRN: [id number]  Patient Class: IP- Inpatient  Admission Date: 7/30/2022  Attending Physician: Shaka Hayden DO   Primary Care Provider: Kerry Lin MD         Patient information was obtained from patient, past medical records and ER records.     Subjective:     Principal Problem:Severe sepsis    Chief Complaint:   Chief Complaint   Patient presents with    Bradycardia    Hypothermia        HPI: 81 y.o.m. presents to [facility] ED from [facility] due to hypothermia and bradycardia. On arrival temp was 94.6F and HR was 50. Patient is alert to place but not time and has a hard time communicating although does not appear confused. Patient transferred to ICU due to HoTN and placed on pressors. Patient arrived with indewlling urethral catheter. U/A was positive for UTI, urine and blood cultures pending. Patient was treated for UTI which grew E.Coli on 6/28/22. Patient has a sacral wound at the apex of the gluteal cleft which appears to be healing well, photos recorded. LE u/s on 1/7/22 showed DVTs bilaterally, patient currently on Eliquis. Patient meets criteria for sepsis and is receiving fluids and broad spectrum Abx. Will continue to monitor.    ED Course: CXR showed cardiomegaly and chronic reticulation of the left lung, no acute changes. EKG showed bradycardia with normal supraventricular rhythm. CBC show chronic normocytic anemia with H/H at 11.3/34.1. CMP and lactic acid were unremarkable.      Past Medical History:   Diagnosis Date    Alzheimer's disease     Anticoagulant long-term use     Bipolar disorder     BPH (benign prostatic hyperplasia)     Deep vein thrombosis (DVT) of popliteal vein of right lower extremity     Diabetes mellitus     DVT of deep femoral vein, left     Hyperlipidemia     Hypertension     Hypothyroidism     Idiopathic orofacial dystonia     Iron  deficiency anemia secondary to blood loss (chronic)     Mild intellectual disabilities     Obesity     Sacral wound     05/16 records show culture of wound grew   Collected: 05/05/22 0920 Order Status: Completed Specimen: Wound from Sacral Updated: 05/11/22 1318  Culture, Wound/Abscess Light Growth Acinetobacter baumannii complex/haemolyticus Abnormal    Comment: Corrected result: Previously reported as Gram-negative Bacilli on 5/9/2022 at 1312 CDT.    Light Growth Escherichia coli Abnormal    Light Growth Klebsiel    Schizophrenia 1/28/2022 05/16 -continue trileptal and risperidone       Past Surgical History:   Procedure Laterality Date    COLOSTOMY N/A 5/24/2022    Procedure: CREATION, COLOSTOMY;  Surgeon: Edilma Felix MD;  Location: [facility];  Service: General;  Laterality: N/A;  diverting colostomy dr felix tuesday       Review of patient's allergies indicates:   Allergen Reactions    Metformin     Mycobacterium tuberculosis (tuberculin ppd)     Norvasc [amlodipine]        No current facility-administered medications on file prior to encounter.     Current Outpatient Medications on File Prior to Encounter   Medication Sig    apixaban (ELIQUIS) 5 mg Tab Take 5 mg by mouth 2 (two) times daily.    HYDROcodone-acetaminophen (NORCO) 5-325 mg per tablet Take 1 tablet by mouth every 6 (six) hours as needed for Pain.    insulin aspart U-100 (NOVOLOG) 100 unit/mL injection Inject 0-5 Units into the skin before meals and at bedtime as needed. **LOW CORRECTION DOSE**  Blood Glucose  mg/dL                  Pre-meal                2200  151-200                0 unit                      0 unit  201-250                2 units                    1 unit  251-300                3 units                    1 unit  301-350                4 units                    2 units  >350                     5 units                    3 units  Administer subcutaneously if needed at times designated by monitoring schedule.    DO NOT HOLD correction dose insulin for patients who are  NPO.    insulin detemir U-100 (LEVEMIR) 100 unit/mL injection Inject 20 Units into the skin once daily.    lactulose (CHRONULAC) 10 gram/15 mL solution Take 20 g by mouth once daily.    levothyroxine (SYNTHROID) 50 MCG tablet Take 1 tablet (50 mcg total) by mouth before breakfast.    losartan (COZAAR) 50 MG tablet Take 50 mg by mouth once daily. Hold for SBP < 110 or DBP < 60    memantine (NAMENDA) 10 MG Tab Take 10 mg by mouth nightly.    metoprolol tartrate (LOPRESSOR) 25 MG tablet Take 0.5 tablets (12.5 mg total) by mouth 2 (two) times daily.    multivitamin Tab Take 1 tablet by mouth once daily.    OXcarbazepine (TRILEPTAL) 150 MG Tab Take 450 mg by mouth 2 (two) times daily.    pantoprazole (PROTONIX) 40 MG tablet Take 1 tablet by mouth once daily.    polyethylene glycol (GLYCOLAX) 17 gram PwPk Take 17 g by mouth 2 (two) times a day.    risperiDONE (RISPERDAL) 1 MG tablet Take 1 mg by mouth 2 (two) times daily.    tamsulosin (FLOMAX) 0.4 mg Cap Take 1 capsule by mouth once daily.    [DISCONTINUED] furosemide (LASIX) 40 MG tablet Take 40 mg by mouth once daily. Hold for SBP < 110 or DBP < 60    [DISCONTINUED] metoprolol succinate (TOPROL-XL) 25 MG 24 hr tablet Take 1 tablet by mouth once daily. Hold for SBP < 110 or DBP < 60 or pulse < 50    [DISCONTINUED] SITagliptin (JANUVIA) 50 MG Tab Take 1 tablet (50 mg total) by mouth once daily.     Family History       Family history is unknown by patient.          Tobacco Use    Smoking status: Never Smoker    Smokeless tobacco: Never Used   Substance and Sexual Activity    Alcohol use: Not Currently    Drug use: Never    Sexual activity: Not Currently     Review of Systems   Unable to perform ROS: Acuity of condition   All other systems reviewed and are negative.  Objective:     Vital Signs (Most Recent):  Temp: 97.4 °F (36.3 °C) (07/30/22 2230)  Pulse: (!) 53 (07/30/22 2130)  Resp: (!) 9  (07/30/22 2130)  BP: (!) 132/59 (07/30/22 2130)  SpO2: 95 % (07/30/22 2130)   Vital Signs (24h Range):  Temp:  [94.6 °F (34.8 °C)-97.4 °F (36.3 °C)] 97.4 °F (36.3 °C)  Pulse:  [46-61] 53  Resp:  [8-18] 9  SpO2:  [95 %-100 %] 95 %  BP: ()/(27-85) 132/59     Weight: 96.2 kg (212 lb 1.3 oz)  Body mass index is 28.76 kg/m².    Physical Exam  Vitals and nursing note reviewed.   Constitutional:       General: He is not in acute distress.     Appearance: Normal appearance. He is not ill-appearing, toxic-appearing or diaphoretic.   HENT:      Head: Normocephalic and atraumatic.      Nose: Nose normal. No congestion or rhinorrhea.      Mouth/Throat:      Mouth: Mucous membranes are moist.      Pharynx: Oropharynx is clear.   Eyes:      Pupils: Pupils are equal, round, and reactive to light.   Cardiovascular:      Rate and Rhythm: Normal rate and regular rhythm.      Pulses: Normal pulses.      Heart sounds: Normal heart sounds. No murmur heard.    No friction rub.   Pulmonary:      Effort: Pulmonary effort is normal. No respiratory distress.      Breath sounds: Normal breath sounds. No wheezing, rhonchi or rales.   Abdominal:      General: There is no distension.      Tenderness: There is no abdominal tenderness. There is no guarding.   Musculoskeletal:         General: No swelling.      Right lower leg: No edema.      Left lower leg: No edema.   Skin:     General: Skin is warm and dry.      Capillary Refill: Capillary refill takes less than 2 seconds.      Findings: Wound present.             Comments: Sacral wound at apex of gluteal cleft    Neurological:      Mental Status: He is alert.   Psychiatric:         Mood and Affect: Mood normal.         Behavior: Behavior normal.         CRANIAL NERVES     CN III, IV, VI   Pupils are equal, round, and reactive to light.     Significant Labs:   Recent Lab Results  (Last 5 results in the past 24 hours)        07/30/22 2056 07/30/22  1910   07/30/22  1749   07/30/22  1619    07/30/22  1610        Albumin/Globulin Ratio         0.6       Albumin         2.4       Alkaline Phosphatase         97       ALT         20       Anion Gap         14       Appearance, UA     Ex.Turbid           AST         24       Bacteria, UA     Moderate           Baso #         0.02       Basophil %         0.4       Bilirubin (UA)     Negative           BILIRUBIN TOTAL         0.1       BUN         10       BUN/CREAT RATIO         23       Ca Oxalate Marina, UA     Occasional           Calcium         9.0       Chloride         99       CO2         28       Color, UA     Yellow           ID NOW COVID-19, (QUYNH)   Negative             Creatinine         0.43       Differential Type         Auto       eGFR if non          202       Eos #         0.56       Eosinophil %         10.4       Globulin, Total         3.8       Glucose         82       Glucose, UA     Normal           Hematocrit         34.1       Hemoglobin         11.3       Immature Grans (Abs)         0.02       Immature Granulocytes         0.4       Ketones, UA     Negative           Lactate, Alirio       1.3         Leukocytes, UA     Large           Lymph #         1.88       Lymph %         34.9       Magnesium         1.7       MCH         27.6       MCHC         33.1       MCV         83.2       Mono #         0.39       Mono %         7.2       MPV         8.6       Neutrophils, Abs         2.51       Neutrophils Relative         46.7       NITRITE UA     Positive           nRBC         0.0       NUCLEATED RBC ABSOLUTE         0.00       Occult Blood UA     Small           pH, UA     6.5           Platelets         273       POC Glucose 69               Potassium         4.1       PROTEIN TOTAL         6.2       Protein, UA     100            RBC         4.10       RBC, UA     2           RDW         15.5       Sodium         137       Specific Harper, UA     1.014           UROBILINOGEN UA     Normal           WBC, UA      26           WBC         5.38                              Significant Imaging: I have reviewed all pertinent imaging results/findings within the past 24 hours.    Assessment/Plan:     * Severe sepsis  This patient does have evidence of infective focus. My overall impression is sepsis.   Organ dysfunction indicated by Hypotension, hypothermia and bradycardic  - Source- UTI  - Source control Achieved by- Vancomycin and Zosyn  - Patient on norepinephrine due to HoTN, monitoring BP  - blood Cx pending  -  mL/hr  - cardiac monitoring    - supplemental O2      Urinary tract infection associated with indwelling urethral catheter  Patient presented with indwelling urethral catheter  - u/a positive for UTI  - urine Cx pending  - Catheter changed  - Abx as above    Pressure injury of sacral region, stage 4  - Photos taken of ulcer and recorded  - wound care consulted  - abx as above      Diabetes mellitus  Patient's FSGs are controlled on current medication regimen.  Last A1c reviewed-   Lab Results   Component Value Date    HGBA1C 6.6 06/29/2022     Antihyperglycemics (From admission, onward)            Start     Stop Route Frequency Ordered    07/30/22 2345  insulin detemir U-100 injection 15 Units         -- SubQ Nightly 07/30/22 2239    07/30/22 2339  insulin aspart U-100 injection 0-5 Units         -- SubQ Before meals & nightly PRN 07/30/22 2239          Hypertension  Holding following home meds due to HoTN  - Losartan, metoprolol tartrate,     Acute deep vein thrombosis (DVT) of both lower extremities  LE u/s (1/7/22): Nonocclusive DVT right popliteal vein, left common femoral vein, and left femoral vein  - Eliquis 5mg BID      Alzheimer's disease  - memantine 10mg QHS        Bipolar disorder  - Oxcarbazepine 450mg BID  - Risperidone 1mg BID       BPH (benign prostatic hyperplasia)  Tamsulosin 0.4mg QD        VTE Risk Mitigation (From admission, onward)         Ordered     apixaban tablet 5 mg  2 times daily          07/30/22 223                   Gabriel Ahmadi DO  Department of Hospital Medicine   [facility]

## 2022-08-01 PROBLEM — A41.9 SEVERE SEPSIS: Status: RESOLVED | Noted: 2022-07-30 | Resolved: 2022-08-01

## 2022-08-01 PROBLEM — R65.20 SEVERE SEPSIS: Status: RESOLVED | Noted: 2022-07-30 | Resolved: 2022-08-01

## 2022-08-01 LAB
GLUCOSE SERPL-MCNC: 102 MG/DL (ref 70–105)
GLUCOSE SERPL-MCNC: 102 MG/DL (ref 70–105)
GLUCOSE SERPL-MCNC: 113 MG/DL (ref 70–105)
GLUCOSE SERPL-MCNC: 92 MG/DL (ref 70–105)

## 2022-08-01 PROCEDURE — 99233 PR SUBSEQUENT HOSPITAL CARE,LEVL III: ICD-10-PCS | Mod: ,,, | Performed by: INTERNAL MEDICINE

## 2022-08-01 PROCEDURE — 20000000 HC ICU ROOM

## 2022-08-01 PROCEDURE — 94761 N-INVAS EAR/PLS OXIMETRY MLT: CPT

## 2022-08-01 PROCEDURE — 25000003 PHARM REV CODE 250: Performed by: NURSE PRACTITIONER

## 2022-08-01 PROCEDURE — 99233 SBSQ HOSP IP/OBS HIGH 50: CPT | Mod: ,,, | Performed by: INTERNAL MEDICINE

## 2022-08-01 PROCEDURE — 63600175 PHARM REV CODE 636 W HCPCS

## 2022-08-01 PROCEDURE — S5010 5% DEXTROSE AND 0.45% SALINE: HCPCS | Performed by: INTERNAL MEDICINE

## 2022-08-01 PROCEDURE — 25000003 PHARM REV CODE 250: Performed by: INTERNAL MEDICINE

## 2022-08-01 PROCEDURE — 25000003 PHARM REV CODE 250

## 2022-08-01 PROCEDURE — 82962 GLUCOSE BLOOD TEST: CPT

## 2022-08-01 PROCEDURE — 63600175 PHARM REV CODE 636 W HCPCS: Performed by: NURSE PRACTITIONER

## 2022-08-01 RX ADMIN — MEMANTINE 10 MG: 10 TABLET ORAL at 09:08

## 2022-08-01 RX ADMIN — OXCARBAZEPINE 450 MG: 150 TABLET, FILM COATED ORAL at 09:08

## 2022-08-01 RX ADMIN — APIXABAN 5 MG: 5 TABLET, FILM COATED ORAL at 08:08

## 2022-08-01 RX ADMIN — PIPERACILLIN SODIUM AND TAZOBACTAM SODIUM 4.5 G: 4; .5 INJECTION, POWDER, LYOPHILIZED, FOR SOLUTION INTRAVENOUS at 03:08

## 2022-08-01 RX ADMIN — Medication 2 DROP: at 09:08

## 2022-08-01 RX ADMIN — DEXTROSE AND SODIUM CHLORIDE: 5; 450 INJECTION, SOLUTION INTRAVENOUS at 03:08

## 2022-08-01 RX ADMIN — LACTULOSE 20 G: 20 SOLUTION ORAL at 08:08

## 2022-08-01 RX ADMIN — APIXABAN 5 MG: 5 TABLET, FILM COATED ORAL at 09:08

## 2022-08-01 RX ADMIN — PIPERACILLIN SODIUM AND TAZOBACTAM SODIUM 4.5 G: 4; .5 INJECTION, POWDER, LYOPHILIZED, FOR SOLUTION INTRAVENOUS at 06:08

## 2022-08-01 RX ADMIN — POLYETHYLENE GLYCOL 3350 17 G: 17 POWDER, FOR SOLUTION ORAL at 08:08

## 2022-08-01 RX ADMIN — OXCARBAZEPINE 450 MG: 150 TABLET, FILM COATED ORAL at 08:08

## 2022-08-01 RX ADMIN — TAMSULOSIN HYDROCHLORIDE 0.4 MG: 0.4 CAPSULE ORAL at 08:08

## 2022-08-01 RX ADMIN — LEVOTHYROXINE SODIUM 50 MCG: 50 TABLET ORAL at 06:08

## 2022-08-01 RX ADMIN — Medication 2 DROP: at 08:08

## 2022-08-01 RX ADMIN — MUPIROCIN: 20 OINTMENT TOPICAL at 08:08

## 2022-08-01 RX ADMIN — PIPERACILLIN SODIUM AND TAZOBACTAM SODIUM 4.5 G: 4; .5 INJECTION, POWDER, LYOPHILIZED, FOR SOLUTION INTRAVENOUS at 11:08

## 2022-08-01 RX ADMIN — PANTOPRAZOLE SODIUM 40 MG: 40 TABLET, DELAYED RELEASE ORAL at 08:08

## 2022-08-01 RX ADMIN — MUPIROCIN: 20 OINTMENT TOPICAL at 09:08

## 2022-08-01 RX ADMIN — RISPERIDONE 1 MG: 1 TABLET ORAL at 08:08

## 2022-08-01 RX ADMIN — RISPERIDONE 1 MG: 1 TABLET ORAL at 09:08

## 2022-08-01 NOTE — PROGRESS NOTES
[facility]  Pulmonology  Progress Note    Patient Name: Bhargav Gao  MRN: [id number]  Admission Date: 7/30/2022  Hospital Length of Stay: 2 days  Code Status: DNR  Attending Provider: Shaka Hayden DO  Primary Care Provider: Kerry Lin MD   Principal Problem: Severe sepsis    Subjective:     Interval History: patinet without complaints    Objective:     Vital Signs (Most Recent):  Temp: 97.3 °F (36.3 °C) (08/01/22 0430)  Pulse: 61 (08/01/22 0430)  Resp: 11 (08/01/22 0430)  BP: 137/60 (08/01/22 0430)  SpO2: 98 % (08/01/22 0430) Vital Signs (24h Range):  Temp:  [32 °F (0 °C)-97.9 °F (36.6 °C)] 97.3 °F (36.3 °C)  Pulse:  [38-84] 61  Resp:  [8-14] 11  SpO2:  [91 %-100 %] 98 %  BP: (101-152)/(40-84) 137/60     Weight: 96.4 kg (212 lb 8.4 oz)  Body mass index is 28.82 kg/m².      Intake/Output Summary (Last 24 hours) at 8/1/2022 0517  Last data filed at 8/1/2022 0400  Gross per 24 hour   Intake 3518.33 ml   Output 500 ml   Net 3018.33 ml       Physical Exam  Vitals reviewed.   Constitutional:       Appearance: Normal appearance.      Interventions: He is not intubated.  HENT:      Head: Normocephalic and atraumatic.      Nose: Nose normal.      Mouth/Throat:      Mouth: Mucous membranes are dry.      Pharynx: Oropharynx is clear.   Eyes:      Extraocular Movements: Extraocular movements intact.      Conjunctiva/sclera: Conjunctivae normal.      Pupils: Pupils are equal, round, and reactive to light.   Cardiovascular:      Rate and Rhythm: Normal rate.      Heart sounds: Normal heart sounds. No murmur heard.  Pulmonary:      Effort: Pulmonary effort is normal. He is not intubated.      Breath sounds: Normal breath sounds.   Abdominal:      General: Abdomen is flat. Bowel sounds are normal.      Palpations: Abdomen is soft.   Musculoskeletal:         General: Normal range of motion.      Cervical back: Normal range of motion and neck supple.      Right lower leg: No edema.      Left lower leg:  No edema.   Skin:     General: Skin is warm and dry.      Capillary Refill: Capillary refill takes less than 2 seconds.   Neurological:      General: No focal deficit present.      Mental Status: He is alert and oriented to person, place, and time.   Psychiatric:         Mood and Affect: Mood normal.         Behavior: Behavior normal.       Vents:       Lines/Drains/Airways       Drain  Duration                  Colostomy 05/24/22 0000 LLQ 69 days         Urethral Catheter 07/30/22 2040 Silicone 18 Fr. 1 day              Peripheral Intravenous Line  Duration                  Peripheral IV - Single Lumen 07/30/22 1555 20 G Left Antecubital 1 day         Peripheral IV - Single Lumen 07/30/22 2112 20 G Anterior;Right Forearm 1 day                    Significant Labs:    CBC/Anemia Profile:  Recent Labs   Lab 07/30/22  1610 07/31/22  0403   WBC 5.38 6.41   HGB 11.3* 10.2*   HCT 34.1* 30.6*    225   MCV 83.2 82.9   RDW 15.5* 15.9*        Chemistries:  Recent Labs   Lab 07/30/22  1610 07/31/22  0403    137   K 4.1 3.9   CL 99 102   CO2 28 29   BUN 10 7   CREATININE 0.43* 0.39*   CALCIUM 9.0 8.4*   ALBUMIN 2.4*  --    PROT 6.2*  --    BILITOT 0.1  --    ALKPHOS 97  --    ALT 20  --    AST 24  --    MG 1.7  --        Recent Lab Results  (Last 5 results in the past 24 hours)        07/31/22  2019   07/31/22  1611   07/31/22  1131   07/31/22  0928   07/31/22  0719        POC Glucose 155   105   110   97   59                              Significant Imaging:  I have reviewed all pertinent imaging results/findings within the past 24 hours.    Assessment/Plan:     * Severe sepsis  Clearly much better off of Levophed    7/31- Pressor off since 0600 and currently hemodynamically stable      Bipolar disorder  Stable  On oxcarbazepine 450 mg bid  Risperidone 1mg bid    Acute deep vein thrombosis (DVT) of both lower extremities  Patient is currently on apixiban 5mg bid    Urinary tract infection associated with  indwelling urethral catheter  E coli in the urine organism resistant to ampicillin Unasyn tobramycin and Bactrim    Hypertension  Patient was hypotensive on arrival  Pressor now off since 0600    Diabetes mellitus  Sugars seem better  Will decrease Levemir and stop  D5    Pressure injury of sacral region, stage 4  - Photos taken of ulcer and recorded  - wound care consulted  - abx as above    Alzheimer's disease  Stable  Patient is on namenda                 Roby Arciniega MD  Pulmonology  [facility] ICU

## 2022-08-01 NOTE — CONSULTS
[facility] ICU  Adult Nutrition  Consult Note         Reason for Assessment  Reason For Assessment: consult (wound)   Nutrition Risk Screen: other (see comments)       Assessment and Plan  Pt is an 81 yoM currently admitted in ICU with sepsis 2/2 UTI. No longer requiring pressor support. Pt with dementia at baseline.     RD consulted for assessment due to pt wounds. Pt with sacral wounds present on admission, wound care following. Recommend continuing Pureed diet (unless SLP eval and recommends something different). Add Glucerna TID with meals to increase kcal/PRO provisions daily and help pt meet needs. Add Epi BID to aid in wound healing. Monitor intakes, labs, weights, medications, MD notes, wound notes.       Malnutrition  Is Patient Malnourished: No  Skin Integrity  Bart Risk Assessment  Sensory Perception: 3-->slightly limited  Moisture: 3-->occasionally moist  Activity: 1-->bedfast  Mobility: 2-->very limited  Nutrition: 3-->adequate  Friction and Shear: 1-->problem  Bart Score: 13  Comments on skin integrity: wounds present on admission    Nutrition Diagnosis  Increased protein Needs   related to Wound healing as evidenced by sacral wounds present on admission     Nutrition Diagnosis Status: Chronic/ continues        Nutrition Risk  Level of Risk/Frequency of Follow-up: moderate - high     Recent Labs   Lab 07/31/22  0403 07/31/22  0404 07/31/22  1611   GLU 52*  --   --    POCGLU  --    < > 105    < > = values in this interval not displayed.     Comments on Glucose: GLU WNL  Nutrition Prescription / Recommendations  Recommendation/Intervention: Continue pureed diet (or least restrictive diet if SLP evaluates and recommends less restrictive diet). Add Glucerna shakes TID with meals to increase kcal/PRO daily. Add Epi BID to aid in wound healing. Consider vitamin C 500 mg BID, ZnSO4 220 mg BID, and MVI to promote wound healing. Monitor intakes and consider alternate means of nutrition  support if pt oral intakes are insufficient to meet needs.  Goals: Pureed diet, pt consume 50-75% of meals, 50-75% of supplements, take Epi well  Nutrition Goal Status: new  Current Diet Order: Pureed diet  Chewing or Swallowing Difficulty?: uncertain chewing/swallowing status   Recommended Diet: Puree or per SLP  Recommended Oral Supplement: Epi [90 kcals, 2.5g Protein, 10g Carbs(3g Sugar), 7g L-Arginine, 7g L-Glutamine, Vitamin C 300mg, 9.5mg Zinc] twice daily and Glucerna Shake [220 kcals, 10g Protein, 26g Carbs(4g Fiber, 7g Sugar), 9g Fat] three times daily  Is Nutrition Support Recommended: No  Is Education Recommended: No  Monitor and Evaluation  % current Intake: Unknown/ No P.O. intake documented  % intake to meet estimated needs: 50 - 75 %  Food and Nutrient Intake: energy intake (monitor % intake of meals and supplements)  Food and Nutrient Adminstration: diet order (follow for any diet changes)  Anthropometric Measurements: weight change, weight  Biochemical Data, Medical Tests and Procedures: electrolyte and renal panel, gastrointestinal profile, glucose/endocrine profile, inflammatory profile, lipid profile  Nutrition-Focused Physical Findings: overall appearance, extremities, muscles and bones, skin     Current Medical Diagnosis and Past Medical History  Diagnosis: infection/sepsis  Past Medical History:   Diagnosis Date    Alzheimer's disease     Anticoagulant long-term use     Bipolar disorder     BPH (benign prostatic hyperplasia)     Deep vein thrombosis (DVT) of popliteal vein of right lower extremity     Diabetes mellitus     DVT of deep femoral vein, left     Hyperlipidemia     Hypertension     Hypothyroidism     Idiopathic orofacial dystonia     Iron deficiency anemia secondary to blood loss (chronic)     Mild intellectual disabilities     Obesity     Sacral wound     05/16 records show culture of wound grew   Collected: 05/05/22 0920 Order Status: Completed Specimen: Wound  from Sacral Updated: 05/11/22 1318  Culture, Wound/Abscess Light Growth Acinetobacter baumannii complex/haemolyticus Abnormal    Comment: Corrected result: Previously reported as Gram-negative Bacilli on 5/9/2022 at 1312 CDT.    Light Growth Escherichia coli Abnormal    Light Growth Klebsiel    Schizophrenia 1/28/2022    05/16 -continue trileptal and risperidone     Nutrition/Diet History     Lab/Procedures/Meds  Recent Labs   Lab 07/30/22  1610 07/31/22  0403    137   K 4.1 3.9   BUN 10 7   CREATININE 0.43* 0.39*   CALCIUM 9.0 8.4*   ALBUMIN 2.4*  --    CL 99 102   ALT 20  --    AST 24  --      Last A1c:   Lab Results   Component Value Date    HGBA1C 6.6 06/29/2022     Lab Results   Component Value Date    RBC 3.69 (L) 07/31/2022    HGB 10.2 (L) 07/31/2022    HCT 30.6 (L) 07/31/2022    MCV 82.9 07/31/2022    MCH 27.6 07/31/2022    MCHC 33.3 07/31/2022    TIBC 174 (L) 12/09/2021     Pertinent Labs Reviewed: reviewed  Pertinent Labs Comments: Alb low, Ca++ low, Creat low, suspect all r/t inflammatory response with severe sepsis   Pertinent Medications Reviewed: reviewed  Pertinent Medications Comments: apixaban, insulin, lactulose, levothyroxine, memantine, oxcarbazepine, pantoprazole EC, zosyn, polyethylene glycol, risperidone, tamsulosin, vancomycin, D 5% and 0.45% NaCl, norepinephrine       Anthropometrics  Temp: 96.3 °F (35.7 °C)  Height: 6' (182.9 cm)  Height (inches): 72 in  Weight Method: Bed Scale  Weight: 97.2 kg (214 lb 4.6 oz)  Weight (lb): 214.29 lb  Ideal Body Weight (IBW), Male: 178 lb  % Ideal Body Weight, Male (lb): 120.39 %  BMI (Calculated): 29.1  BMI Grade: 25 - 29.9 - overweight     Estimated/Assessed Needs  Weight Used For Calorie Calculations: 97.2 kg (214 lb 4.6 oz)   Energy Need Method: Kcal/kg Energy Calorie Requirements (kcal): 9097-0394 kcal (20-25 kcal/kg)  Weight Used For Protein Calculations: 97.2 kg (214 lb 4.6 oz)  Protein Requirements:  g PRO (1.0-1.2 g PRO/kg-increased  needs given wounds)       RDA Method (mL): 1944     Nutrition by Nursing  Diet/Nutrition Received: other (see comments) (pureed)  Intake (%): 100%        Last Bowel Movement: 07/31/22              Nutrition Follow-Up  RD Follow-up?: Yes

## 2022-08-01 NOTE — ASSESSMENT & PLAN NOTE
Clearly much better off of Levophed    7/31- Pressor off since 0600 and currently hemodynamically stable

## 2022-08-01 NOTE — PLAN OF CARE
Problem: Adult Inpatient Plan of Care  Goal: Plan of Care Review  Outcome: Ongoing, Progressing     Problem: Diabetes Comorbidity  Goal: Blood Glucose Level Within Targeted Range  Outcome: Ongoing, Progressing     Problem: Bleeding (Sepsis/Septic Shock)  Goal: Absence of Bleeding  Outcome: Ongoing, Progressing     Problem: Glycemic Control Impaired (Sepsis/Septic Shock)  Goal: Blood Glucose Level Within Desired Range  Outcome: Ongoing, Progressing     Problem: Nutrition Impaired (Sepsis/Septic Shock)  Goal: Optimal Nutrition Intake  Outcome: Ongoing, Progressing     Problem: Fall Injury Risk  Goal: Absence of Fall and Fall-Related Injury  Outcome: Ongoing, Progressing     Problem: Skin Injury Risk Increased  Goal: Skin Health and Integrity  Outcome: Ongoing, Progressing     Problem: Infection  Goal: Absence of Infection Signs and Symptoms  Outcome: Ongoing, Progressing

## 2022-08-01 NOTE — PLAN OF CARE
[facility] ICU  Initial Discharge Assessment       Primary Care Provider: Kerry Lin MD    Admission Diagnosis: Sepsis [A41.9]  Wound of sacral region, subsequent encounter [S31.000D]    Admission Date: 7/30/2022  Expected Discharge Date:     Discharge Barriers Identified: None    Payor: MEDICARE / Plan: MEDICARE PART A & B / Product Type: Government /     Extended Emergency Contact Information  Primary Emergency Contact: Trish Rudolph  Mobile Phone: [phone]  Relation: Relative    Discharge Plan A: Return to nursing home  Discharge Plan B: Return to Nursing Home    No Pharmacies Listed    Initial Assessment (most recent)     Adult Discharge Assessment - 08/01/22 1559        Discharge Assessment    Assessment Type Discharge Planning Assessment     Confirmed/corrected address, phone number and insurance Yes     Source of Information family     Communicated YOSSI with patient/caregiver Date not available/Unable to determine     Lives With facility resident     Facility Arrived From: [facility]     Do you expect to return to your current living situation? Yes     Do you have help at home or someone to help you manage your care at home? Yes     Prior to hospitilization cognitive status: Unable to Assess     Current cognitive status: Unable to Assess     Equipment Currently Used at Home none     Patient currently being followed by outpatient case management? No     Do you currently have service(s) that help you manage your care at home? No     Do you have any problems affording any of your prescribed medications? No     Discharge Plan A Return to nursing home     Discharge Plan B Return to Nursing Home     DME Needed Upon Discharge  none     Discharge Plan discussed with: Friend     Discharge Barriers Identified None                    Ss spoke with trish and pt is a resident at [facility] and plans to return at d/c. Choice obtained for [facility]. Packet started. Eirca  following.

## 2022-08-01 NOTE — PLAN OF CARE
Problem: Adult Inpatient Plan of Care  Goal: Plan of Care Review  Outcome: Ongoing, Progressing  Goal: Patient-Specific Goal (Individualized)  Outcome: Ongoing, Progressing  Goal: Absence of Hospital-Acquired Illness or Injury  Outcome: Ongoing, Progressing  Goal: Optimal Comfort and Wellbeing  Outcome: Ongoing, Progressing  Goal: Readiness for Transition of Care  Outcome: Ongoing, Progressing     Problem: Diabetes Comorbidity  Goal: Blood Glucose Level Within Targeted Range  Outcome: Ongoing, Progressing     Problem: Adjustment to Illness (Sepsis/Septic Shock)  Goal: Optimal Coping  Outcome: Ongoing, Progressing     Problem: Bleeding (Sepsis/Septic Shock)  Goal: Absence of Bleeding  Outcome: Ongoing, Progressing     Problem: Glycemic Control Impaired (Sepsis/Septic Shock)  Goal: Blood Glucose Level Within Desired Range  Outcome: Ongoing, Progressing     Problem: Infection Progression (Sepsis/Septic Shock)  Goal: Absence of Infection Signs and Symptoms  Outcome: Ongoing, Progressing     Problem: Nutrition Impaired (Sepsis/Septic Shock)  Goal: Optimal Nutrition Intake  Outcome: Ongoing, Progressing     Problem: Impaired Wound Healing  Goal: Optimal Wound Healing  Outcome: Ongoing, Progressing     Problem: Fall Injury Risk  Goal: Absence of Fall and Fall-Related Injury  Outcome: Ongoing, Progressing     Problem: Skin Injury Risk Increased  Goal: Skin Health and Integrity  Outcome: Ongoing, Progressing     Problem: Infection  Goal: Absence of Infection Signs and Symptoms  Outcome: Ongoing, Progressing

## 2022-08-01 NOTE — PLAN OF CARE
Ss attempted to contact pt's relative trish with no answer re: admission assessment. Ss following.

## 2022-08-01 NOTE — SUBJECTIVE & OBJECTIVE
Interval History: patinet without complaints    Objective:     Vital Signs (Most Recent):  Temp: 97.3 °F (36.3 °C) (08/01/22 0430)  Pulse: 61 (08/01/22 0430)  Resp: 11 (08/01/22 0430)  BP: 137/60 (08/01/22 0430)  SpO2: 98 % (08/01/22 0430) Vital Signs (24h Range):  Temp:  [32 °F (0 °C)-97.9 °F (36.6 °C)] 97.3 °F (36.3 °C)  Pulse:  [38-84] 61  Resp:  [8-14] 11  SpO2:  [91 %-100 %] 98 %  BP: (101-152)/(40-84) 137/60     Weight: 96.4 kg (212 lb 8.4 oz)  Body mass index is 28.82 kg/m².      Intake/Output Summary (Last 24 hours) at 8/1/2022 0517  Last data filed at 8/1/2022 0400  Gross per 24 hour   Intake 3518.33 ml   Output 500 ml   Net 3018.33 ml       Physical Exam  Vitals reviewed.   Constitutional:       Appearance: Normal appearance.      Interventions: He is not intubated.  HENT:      Head: Normocephalic and atraumatic.      Nose: Nose normal.      Mouth/Throat:      Mouth: Mucous membranes are dry.      Pharynx: Oropharynx is clear.   Eyes:      Extraocular Movements: Extraocular movements intact.      Conjunctiva/sclera: Conjunctivae normal.      Pupils: Pupils are equal, round, and reactive to light.   Cardiovascular:      Rate and Rhythm: Normal rate.      Heart sounds: Normal heart sounds. No murmur heard.  Pulmonary:      Effort: Pulmonary effort is normal. He is not intubated.      Breath sounds: Normal breath sounds.   Abdominal:      General: Abdomen is flat. Bowel sounds are normal.      Palpations: Abdomen is soft.   Musculoskeletal:         General: Normal range of motion.      Cervical back: Normal range of motion and neck supple.      Right lower leg: No edema.      Left lower leg: No edema.   Skin:     General: Skin is warm and dry.      Capillary Refill: Capillary refill takes less than 2 seconds.   Neurological:      General: No focal deficit present.      Mental Status: He is alert and oriented to person, place, and time.   Psychiatric:         Mood and Affect: Mood normal.         Behavior:  Behavior normal.       Vents:       Lines/Drains/Airways       Drain  Duration                  Colostomy 05/24/22 0000 LLQ 69 days         Urethral Catheter 07/30/22 2040 Silicone 18 Fr. 1 day              Peripheral Intravenous Line  Duration                  Peripheral IV - Single Lumen 07/30/22 1555 20 G Left Antecubital 1 day         Peripheral IV - Single Lumen 07/30/22 2112 20 G Anterior;Right Forearm 1 day                    Significant Labs:    CBC/Anemia Profile:  Recent Labs   Lab 07/30/22  1610 07/31/22  0403   WBC 5.38 6.41   HGB 11.3* 10.2*   HCT 34.1* 30.6*    225   MCV 83.2 82.9   RDW 15.5* 15.9*        Chemistries:  Recent Labs   Lab 07/30/22  1610 07/31/22  0403    137   K 4.1 3.9   CL 99 102   CO2 28 29   BUN 10 7   CREATININE 0.43* 0.39*   CALCIUM 9.0 8.4*   ALBUMIN 2.4*  --    PROT 6.2*  --    BILITOT 0.1  --    ALKPHOS 97  --    ALT 20  --    AST 24  --    MG 1.7  --        Recent Lab Results  (Last 5 results in the past 24 hours)        07/31/22  2019   07/31/22  1611   07/31/22  1131   07/31/22  0928   07/31/22  0719        POC Glucose 155   105   110   97   59                              Significant Imaging:  I have reviewed all pertinent imaging results/findings within the past 24 hours.

## 2022-08-02 LAB
ANION GAP SERPL CALCULATED.3IONS-SCNC: 11 MMOL/L (ref 7–16)
BASOPHILS # BLD AUTO: 0.04 K/UL (ref 0–0.2)
BASOPHILS NFR BLD AUTO: 0.7 % (ref 0–1)
BUN SERPL-MCNC: 5 MG/DL (ref 7–18)
BUN/CREAT SERPL: 7 (ref 6–20)
CALCIUM SERPL-MCNC: 8.5 MG/DL (ref 8.5–10.1)
CHLORIDE SERPL-SCNC: 104 MMOL/L (ref 98–107)
CO2 SERPL-SCNC: 29 MMOL/L (ref 21–32)
CREAT SERPL-MCNC: 0.72 MG/DL (ref 0.7–1.3)
DIFFERENTIAL METHOD BLD: ABNORMAL
EOSINOPHIL # BLD AUTO: 0.47 K/UL (ref 0–0.5)
EOSINOPHIL NFR BLD AUTO: 8.5 % (ref 1–4)
ERYTHROCYTE [DISTWIDTH] IN BLOOD BY AUTOMATED COUNT: 15.9 % (ref 11.5–14.5)
GLUCOSE SERPL-MCNC: 75 MG/DL (ref 70–105)
GLUCOSE SERPL-MCNC: 77 MG/DL (ref 74–106)
GLUCOSE SERPL-MCNC: 78 MG/DL (ref 70–105)
GLUCOSE SERPL-MCNC: 81 MG/DL (ref 70–105)
GLUCOSE SERPL-MCNC: 84 MG/DL (ref 70–105)
HCT VFR BLD AUTO: 30.9 % (ref 40–54)
HGB BLD-MCNC: 10.1 G/DL (ref 13.5–18)
IMM GRANULOCYTES # BLD AUTO: 0.01 K/UL (ref 0–0.04)
IMM GRANULOCYTES NFR BLD: 0.2 % (ref 0–0.4)
LYMPHOCYTES # BLD AUTO: 2.16 K/UL (ref 1–4.8)
LYMPHOCYTES NFR BLD AUTO: 39.1 % (ref 27–41)
MCH RBC QN AUTO: 27.6 PG (ref 27–31)
MCHC RBC AUTO-ENTMCNC: 32.7 G/DL (ref 32–36)
MCV RBC AUTO: 84.4 FL (ref 80–96)
MONOCYTES # BLD AUTO: 0.49 K/UL (ref 0–0.8)
MONOCYTES NFR BLD AUTO: 8.9 % (ref 2–6)
MPC BLD CALC-MCNC: 8.8 FL (ref 9.4–12.4)
NEUTROPHILS # BLD AUTO: 2.36 K/UL (ref 1.8–7.7)
NEUTROPHILS NFR BLD AUTO: 42.6 % (ref 53–65)
NRBC # BLD AUTO: 0 X10E3/UL
NRBC, AUTO (.00): 0 %
PLATELET # BLD AUTO: 253 K/UL (ref 150–400)
POTASSIUM SERPL-SCNC: 4.1 MMOL/L (ref 3.5–5.1)
RBC # BLD AUTO: 3.66 M/UL (ref 4.6–6.2)
SODIUM SERPL-SCNC: 140 MMOL/L (ref 136–145)
WBC # BLD AUTO: 5.53 K/UL (ref 4.5–11)

## 2022-08-02 PROCEDURE — 25000003 PHARM REV CODE 250: Performed by: NURSE PRACTITIONER

## 2022-08-02 PROCEDURE — 20000000 HC ICU ROOM

## 2022-08-02 PROCEDURE — 82962 GLUCOSE BLOOD TEST: CPT

## 2022-08-02 PROCEDURE — 63600175 PHARM REV CODE 636 W HCPCS: Performed by: NURSE PRACTITIONER

## 2022-08-02 PROCEDURE — 80048 BASIC METABOLIC PNL TOTAL CA: CPT | Performed by: INTERNAL MEDICINE

## 2022-08-02 PROCEDURE — 85025 COMPLETE CBC W/AUTO DIFF WBC: CPT | Performed by: INTERNAL MEDICINE

## 2022-08-02 PROCEDURE — 99233 SBSQ HOSP IP/OBS HIGH 50: CPT | Mod: ,,, | Performed by: INTERNAL MEDICINE

## 2022-08-02 PROCEDURE — 99233 PR SUBSEQUENT HOSPITAL CARE,LEVL III: ICD-10-PCS | Mod: ,,, | Performed by: INTERNAL MEDICINE

## 2022-08-02 PROCEDURE — 25000003 PHARM REV CODE 250

## 2022-08-02 PROCEDURE — 36415 COLL VENOUS BLD VENIPUNCTURE: CPT | Performed by: INTERNAL MEDICINE

## 2022-08-02 PROCEDURE — 92610 EVALUATE SWALLOWING FUNCTION: CPT

## 2022-08-02 RX ORDER — LOSARTAN POTASSIUM 50 MG/1
50 TABLET ORAL DAILY
Status: DISCONTINUED | OUTPATIENT
Start: 2022-08-02 | End: 2022-08-03 | Stop reason: HOSPADM

## 2022-08-02 RX ADMIN — Medication 2 DROP: at 09:08

## 2022-08-02 RX ADMIN — PIPERACILLIN SODIUM AND TAZOBACTAM SODIUM 4.5 G: 4; .5 INJECTION, POWDER, LYOPHILIZED, FOR SOLUTION INTRAVENOUS at 06:08

## 2022-08-02 RX ADMIN — MUPIROCIN: 20 OINTMENT TOPICAL at 09:08

## 2022-08-02 RX ADMIN — PIPERACILLIN SODIUM AND TAZOBACTAM SODIUM 4.5 G: 4; .5 INJECTION, POWDER, LYOPHILIZED, FOR SOLUTION INTRAVENOUS at 11:08

## 2022-08-02 RX ADMIN — APIXABAN 5 MG: 5 TABLET, FILM COATED ORAL at 09:08

## 2022-08-02 RX ADMIN — OXCARBAZEPINE 450 MG: 150 TABLET, FILM COATED ORAL at 09:08

## 2022-08-02 RX ADMIN — RISPERIDONE 1 MG: 1 TABLET ORAL at 09:08

## 2022-08-02 RX ADMIN — PIPERACILLIN SODIUM AND TAZOBACTAM SODIUM 4.5 G: 4; .5 INJECTION, POWDER, LYOPHILIZED, FOR SOLUTION INTRAVENOUS at 03:08

## 2022-08-02 RX ADMIN — MEMANTINE 10 MG: 10 TABLET ORAL at 09:08

## 2022-08-02 RX ADMIN — LEVOTHYROXINE SODIUM 50 MCG: 50 TABLET ORAL at 06:08

## 2022-08-02 NOTE — SUBJECTIVE & OBJECTIVE
Interval History:  Patient without complaints    Objective:     Vital Signs (Most Recent):  Temp: 98.8 °F (37.1 °C) (08/02/22 0330)  Pulse: 73 (08/02/22 0400)  Resp: 11 (08/02/22 0400)  BP: (!) 143/69 (08/02/22 0400)  SpO2: 97 % (08/02/22 0400)   Vital Signs (24h Range):  Temp:  [97.3 °F (36.3 °C)-98.8 °F (37.1 °C)] 98.8 °F (37.1 °C)  Pulse:  [54-96] 73  Resp:  [9-15] 11  SpO2:  [94 %-100 %] 97 %  BP: (116-195)/(45-98) 143/69     Weight: 103.9 kg (229 lb 0.9 oz)  Body mass index is 31.07 kg/m².      Intake/Output Summary (Last 24 hours) at 8/2/2022 0537  Last data filed at 8/2/2022 0330  Gross per 24 hour   Intake 820 ml   Output 2650 ml   Net -1830 ml       Physical Exam  Vitals reviewed.   Constitutional:       Appearance: Normal appearance.      Interventions: He is not intubated.  HENT:      Head: Normocephalic and atraumatic.      Nose: Nose normal.      Mouth/Throat:      Mouth: Mucous membranes are dry.      Pharynx: Oropharynx is clear.   Eyes:      Extraocular Movements: Extraocular movements intact.      Conjunctiva/sclera: Conjunctivae normal.      Pupils: Pupils are equal, round, and reactive to light.   Cardiovascular:      Rate and Rhythm: Normal rate.      Heart sounds: Normal heart sounds. No murmur heard.  Pulmonary:      Effort: Pulmonary effort is normal. He is not intubated.      Breath sounds: Normal breath sounds.   Abdominal:      General: Abdomen is flat. Bowel sounds are normal.      Palpations: Abdomen is soft.   Musculoskeletal:         General: Normal range of motion.      Cervical back: Normal range of motion and neck supple.      Right lower leg: No edema.      Left lower leg: No edema.   Skin:     General: Skin is warm and dry.      Capillary Refill: Capillary refill takes less than 2 seconds.   Neurological:      General: No focal deficit present.      Mental Status: He is alert and oriented to person, place, and time.   Psychiatric:         Mood and Affect: Mood normal.          Behavior: Behavior normal.       Vents:       Lines/Drains/Airways       Drain  Duration                  Colostomy 05/24/22 0000 LLQ 70 days         Urethral Catheter 07/30/22 2040 Silicone 18 Fr. 2 days              Peripheral Intravenous Line  Duration                  Peripheral IV - Single Lumen 07/30/22 1555 20 G Left Antecubital 2 days         Peripheral IV - Single Lumen 07/30/22 2112 20 G Anterior;Right Forearm 2 days                    Significant Labs:    CBC/Anemia Profile:  No results for input(s): WBC, HGB, HCT, PLT, MCV, RDW, IRON, FERRITIN, RETIC, FOLATE, [id number], OCCULTBLOOD in the last 48 hours.     Chemistries:  No results for input(s): NA, K, CL, CO2, BUN, CREATININE, CALCIUM, ALBUMIN, PROT, BILITOT, ALKPHOS, ALT, AST, GLUCOSE, MG, PHOS in the last 48 hours.    Recent Lab Results         08/01/22  2120   08/01/22  1621   08/01/22  1204        POC Glucose 92   102   102               Significant Imaging:  I have reviewed all pertinent imaging results/findings within the past 24 hours.

## 2022-08-02 NOTE — PROGRESS NOTES
[facility] ICU  Pulmonology  Progress Note    Patient Name: Bhargav Gao  MRN: [id number]  Admission Date: 7/30/2022  Hospital Length of Stay: 3 days  Code Status: DNR  Attending Provider: Shaka Hayden DO  Primary Care Provider: Kerry Lin MD   Principal Problem: Severe sepsis    Subjective:     Interval History:  Patient without complaints    Objective:     Vital Signs (Most Recent):  Temp: 98.8 °F (37.1 °C) (08/02/22 0330)  Pulse: 73 (08/02/22 0400)  Resp: 11 (08/02/22 0400)  BP: (!) 143/69 (08/02/22 0400)  SpO2: 97 % (08/02/22 0400)   Vital Signs (24h Range):  Temp:  [97.3 °F (36.3 °C)-98.8 °F (37.1 °C)] 98.8 °F (37.1 °C)  Pulse:  [54-96] 73  Resp:  [9-15] 11  SpO2:  [94 %-100 %] 97 %  BP: (116-195)/(45-98) 143/69     Weight: 103.9 kg (229 lb 0.9 oz)  Body mass index is 31.07 kg/m².      Intake/Output Summary (Last 24 hours) at 8/2/2022 0537  Last data filed at 8/2/2022 0330  Gross per 24 hour   Intake 820 ml   Output 2650 ml   Net -1830 ml       Physical Exam  Vitals reviewed.   Constitutional:       Appearance: Normal appearance.      Interventions: He is not intubated.  HENT:      Head: Normocephalic and atraumatic.      Nose: Nose normal.      Mouth/Throat:      Mouth: Mucous membranes are dry.      Pharynx: Oropharynx is clear.   Eyes:      Extraocular Movements: Extraocular movements intact.      Conjunctiva/sclera: Conjunctivae normal.      Pupils: Pupils are equal, round, and reactive to light.   Cardiovascular:      Rate and Rhythm: Normal rate.      Heart sounds: Normal heart sounds. No murmur heard.  Pulmonary:      Effort: Pulmonary effort is normal. He is not intubated.      Breath sounds: Normal breath sounds.   Abdominal:      General: Abdomen is flat. Bowel sounds are normal.      Palpations: Abdomen is soft.   Musculoskeletal:         General: Normal range of motion.      Cervical back: Normal range of motion and neck supple.      Right lower leg: No edema.       Left lower leg: No edema.   Skin:     General: Skin is warm and dry.      Capillary Refill: Capillary refill takes less than 2 seconds.   Neurological:      General: No focal deficit present.      Mental Status: He is alert and oriented to person, place, and time.   Psychiatric:         Mood and Affect: Mood normal.         Behavior: Behavior normal.       Vents:       Lines/Drains/Airways       Drain  Duration                  Colostomy 05/24/22 0000 LLQ 70 days         Urethral Catheter 07/30/22 2040 Silicone 18 Fr. 2 days              Peripheral Intravenous Line  Duration                  Peripheral IV - Single Lumen 07/30/22 1555 20 G Left Antecubital 2 days         Peripheral IV - Single Lumen 07/30/22 2112 20 G Anterior;Right Forearm 2 days                    Significant Labs:    CBC/Anemia Profile:  No results for input(s): WBC, HGB, HCT, PLT, MCV, RDW, IRON, FERRITIN, RETIC, FOLATE, [id number], OCCULTBLOOD in the last 48 hours.     Chemistries:  No results for input(s): NA, K, CL, CO2, BUN, CREATININE, CALCIUM, ALBUMIN, PROT, BILITOT, ALKPHOS, ALT, AST, GLUCOSE, MG, PHOS in the last 48 hours.    Recent Lab Results         08/01/22  2120   08/01/22  1621   08/01/22  1204        POC Glucose 92   102   102               Significant Imaging:  I have reviewed all pertinent imaging results/findings within the past 24 hours.    Assessment/Plan:     Bipolar disorder  Stable  On oxcarbazepine 450 mg bid  Risperidone 1mg bid    Acute deep vein thrombosis (DVT) of both lower extremities  Patient is currently on apixiban 5mg bid    Urinary tract infection associated with indwelling urethral catheter  E coli in the urine organism resistant to ampicillin Unasyn tobramycin and Bactrim  Patient is improving transfer the floor if no room available probably discharge home tomorrow from the ICU    Hypertension  Blood pressure has gone up review home medicines for blood pressure    Diabetes mellitus  Sugars are better on  decreased insulin    Pressure injury of sacral region, stage 4  - Photos taken of ulcer and recorded  - wound care consulted  - abx as above    Alzheimer's disease  Stable  Patient is on namenda                 Roby Arciniega MD  Pulmonology  [facility] ICU

## 2022-08-02 NOTE — ASSESSMENT & PLAN NOTE
E coli in the urine organism resistant to ampicillin Unasyn tobramycin and Bactrim  Patient is improving transfer the floor if no room available probably discharge home tomorrow from the ICU

## 2022-08-02 NOTE — PLAN OF CARE
Problem: Adult Inpatient Plan of Care  Goal: Plan of Care Review  Outcome: Ongoing, Progressing  Goal: Patient-Specific Goal (Individualized)  Outcome: Ongoing, Progressing  Goal: Absence of Hospital-Acquired Illness or Injury  Outcome: Ongoing, Progressing  Goal: Optimal Comfort and Wellbeing  Outcome: Ongoing, Progressing  Goal: Readiness for Transition of Care  Outcome: Ongoing, Progressing     Problem: Diabetes Comorbidity  Goal: Blood Glucose Level Within Targeted Range  Outcome: Ongoing, Progressing     Problem: Adjustment to Illness (Sepsis/Septic Shock)  Goal: Optimal Coping  Outcome: Ongoing, Progressing     Problem: Bleeding (Sepsis/Septic Shock)  Goal: Absence of Bleeding  Outcome: Ongoing, Progressing     Problem: Glycemic Control Impaired (Sepsis/Septic Shock)  Goal: Blood Glucose Level Within Desired Range  Outcome: Ongoing, Progressing     Problem: Infection Progression (Sepsis/Septic Shock)  Goal: Absence of Infection Signs and Symptoms  Outcome: Ongoing, Progressing     Problem: Nutrition Impaired (Sepsis/Septic Shock)  Goal: Optimal Nutrition Intake  Outcome: Ongoing, Progressing     Problem: Impaired Wound Healing  Goal: Optimal Wound Healing  Outcome: Ongoing, Progressing     Problem: Skin Injury Risk Increased  Goal: Skin Health and Integrity  Outcome: Ongoing, Progressing

## 2022-08-02 NOTE — PLAN OF CARE
Problem: Adult Inpatient Plan of Care  Goal: Plan of Care Review  Outcome: Ongoing, Progressing  Goal: Patient-Specific Goal (Individualized)  Outcome: Ongoing, Progressing     Problem: Diabetes Comorbidity  Goal: Blood Glucose Level Within Targeted Range  Outcome: Ongoing, Progressing     Problem: Adjustment to Illness (Sepsis/Septic Shock)  Goal: Optimal Coping  Outcome: Ongoing, Progressing     Problem: Glycemic Control Impaired (Sepsis/Septic Shock)  Goal: Blood Glucose Level Within Desired Range  Outcome: Ongoing, Progressing

## 2022-08-02 NOTE — ASSESSMENT & PLAN NOTE
Stable  On oxcarbazepine 450 mg bid  Risperidone 1mg bid   68 yo F w pmhx of T2DM, HTN presenting with 1 month of LLE swelling and discoloration admitted for  presumed cellulitis       seen by id and abs stopped  monitor off abx per ID as per id      ·  Problem: HTN (hypertension).   : BP well controlled  c/w meds      ·  Problem: DM (diabetes mellitus).    : Blood glucose well controlled  C/w sliding scale insulin.       ·  Problem: Microcytic anemia.    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   : BP well controlled  c/w meds      ·  Problem: DM (diabetes mellitus).    : Blood glucose well controlled  C/w sliding scale insulin.       ·  Problem: Microcytic anemia.    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 OR plan for BKA, if BKA refused by pt will change back to [facility] for discharge planning  Rheum and Heme appreciated, vasculitis/APLS w/u neg  stopped a/c as per/vasc/rheum  Derm appreciated, no role of repeat skin bx at this time  MRA LLE  done results noted  possibly trial of HBO per Podiatry    2nd opinion Rheum and Heme noted,and following  cont pletal  Steroids taper per Rheum    Adrenal incidenteloma - MRI reviewed, appears to be adrenal adenoma, outpt Surg f/u      likely plan for amputation of toes or foot vs BKA LLE after complete demarcation  vascular to follow up for proposed sx    ipmn - GI appreciated, f/u CT outpt in 1 year    records sent to [facility], as daughter wishes pt to transfer there, but has been rejected    pt now agreeable for surgery, OR planned on Friday  prior to limb ischemia pt had good functional capacity >4 mets, no chest pain or SOB, EKG reviewed, no ST or T wave changes, RRR  pt is elevated risk for high risk procedure and is currently medically optimized for OR

## 2022-08-02 NOTE — PT/OT/SLP EVAL
Speech Language Pathology Evaluation  Bedside Swallow    Patient Name:  Bhargav Gao   MRN:  [id number]  Admitting Diagnosis: Severe sepsis    Recommendations:                 General Recommendations:  Follow-up not indicated  Diet recommendations:  Puree, Thin (Thin liquids via cup only; no straw)   Aspiration Precautions: 1 bite/sip at a time, Alternating bites/sips, Assistance with meals, HOB to 90 degrees, Monitor for s/s of aspiration and No straws   General Precautions: Standard, aspiration, pureed diet (Pt should not use a straw for drinking liquids.)  Communication strategies:  provide increased time to answer    History:     Past Medical History:   Diagnosis Date    Alzheimer's disease     Anticoagulant long-term use     Bipolar disorder     BPH (benign prostatic hyperplasia)     Deep vein thrombosis (DVT) of popliteal vein of right lower extremity     Diabetes mellitus     DVT of deep femoral vein, left     Hyperlipidemia     Hypertension     Hypothyroidism     Idiopathic orofacial dystonia     Iron deficiency anemia secondary to blood loss (chronic)     Mild intellectual disabilities     Obesity     Sacral wound     05/16 records show culture of wound grew   Collected: 05/05/22 0920 Order Status: Completed Specimen: Wound from Sacral Updated: 05/11/22 1318  Culture, Wound/Abscess Light Growth Acinetobacter baumannii complex/haemolyticus Abnormal    Comment: Corrected result: Previously reported as Gram-negative Bacilli on 5/9/2022 at 1312 CDT.    Light Growth Escherichia coli Abnormal    Light Growth Klebsiel    Schizophrenia 1/28/2022 05/16 -continue trileptal and risperidone       Past Surgical History:   Procedure Laterality Date    COLOSTOMY N/A 5/24/2022    Procedure: CREATION, COLOSTOMY;  Surgeon: Edilma Felix MD;  Location: [facility];  Service: General;  Laterality: N/A;  diverting colostomy dr felix tuesday       Social History: Patient lives at [facility]  facility.    Prior Intubation HX:  n/a    Modified Barium Swallow: n/a    Chest X-Rays: See chart    Prior diet: pureed diet with thin liquids.    Occupation/hobbies/homemaking: none reported.    Subjective     Pt lying in bed awake; agreeable to BSE.  Patient goals: None stated     Pain/Comfort:  · Pain Rating 1: 0/10    Respiratory Status: Room air    Objective:     Oral Musculature Evaluation  · Oral Musculature: WFL  · Dentition: edentulous  · Secretion Management: adequate  · Mucosal Quality: good  · Mandibular Strength and Mobility: WFL  · Oral Labial Strength and Mobility: WFL  · Lingual Strength and Mobility: WFL  · Voice Prior to PO Intake: Clear vocal quality prior to BSE; however, pt did cough prior to BSE.    Bedside Swallow Eval:   Consistencies Assessed:  · Thin liquids Pt tolerated thin liquids via cup and spoon without overt s/s of aspiration. Pt exhibited cough x 2 with thin liquid via straw. Pt also utilized multiple swallows spontaneously.   · Puree Pt tolerated pureed consistency without overt s/s of aspiration.      Oral Phase:   · WFL    Pharyngeal Phase:   · coughing/choking  · multiple spontaneous swallows    Compensatory Strategies  · None    Treatment: ST not indicated. Pt is not a candidate for skilled ST due to history of dementia.      Assessment:     Bhargav Gao is a 81 y.o. male with an SLP diagnosis of Dysphagia.  He presents with coughing with thin liquids administered via straw. He tolerated pureed consistency and thin liquids via spoon and cup edge without overt s/s of aspiration. Rec pureed diet with thin liquids as tolerated.  Pt should not use a straw for liquids; he should drink from cup edge.     Goals:   Multidisciplinary Problems     SLP Goals     Not on file                Plan:     · Patient to be seen:      · Plan of Care expires:     · Plan of Care reviewed with:      · SLP Follow-Up:  No       Discharge recommendations:  nursing facility, skilled       Time Tracking:      SLP Treatment Date:      Speech Start Time:  1156  Speech Stop Time:  1214     Speech Total Time (min):  18 min    Billable Minutes: Eval Swallow and Oral Function 18 minutes    08/02/2022

## 2022-08-02 NOTE — PROGRESS NOTES
08/02/22 1117   Wound Care Follow Up   Wound Care Follow-up? Yes   Wound Care- Next Visit Date 08/08/22   Follow Up Plan reassess wound

## 2022-08-03 VITALS
DIASTOLIC BLOOD PRESSURE: 51 MMHG | HEART RATE: 65 BPM | SYSTOLIC BLOOD PRESSURE: 120 MMHG | TEMPERATURE: 98 F | RESPIRATION RATE: 13 BRPM | HEIGHT: 72 IN | OXYGEN SATURATION: 99 % | BODY MASS INDEX: 30.46 KG/M2 | WEIGHT: 224.88 LBS

## 2022-08-03 LAB
GLUCOSE SERPL-MCNC: 80 MG/DL (ref 70–105)
UA COMPLETE W REFLEX CULTURE PNL UR: ABNORMAL
UA COMPLETE W REFLEX CULTURE PNL UR: ABNORMAL

## 2022-08-03 PROCEDURE — 82962 GLUCOSE BLOOD TEST: CPT

## 2022-08-03 PROCEDURE — 25000003 PHARM REV CODE 250: Performed by: INTERNAL MEDICINE

## 2022-08-03 PROCEDURE — 25000003 PHARM REV CODE 250

## 2022-08-03 PROCEDURE — 94761 N-INVAS EAR/PLS OXIMETRY MLT: CPT

## 2022-08-03 PROCEDURE — 25000003 PHARM REV CODE 250: Performed by: NURSE PRACTITIONER

## 2022-08-03 PROCEDURE — 99239 HOSP IP/OBS DSCHRG MGMT >30: CPT | Mod: ,,, | Performed by: INTERNAL MEDICINE

## 2022-08-03 PROCEDURE — 99239 PR HOSPITAL DISCHARGE DAY,>30 MIN: ICD-10-PCS | Mod: ,,, | Performed by: INTERNAL MEDICINE

## 2022-08-03 PROCEDURE — 63600175 PHARM REV CODE 636 W HCPCS: Performed by: NURSE PRACTITIONER

## 2022-08-03 RX ORDER — NITROFURANTOIN 25; 75 MG/1; MG/1
100 CAPSULE ORAL 2 TIMES DAILY
Qty: 6 CAPSULE | Refills: 0 | Status: ON HOLD | OUTPATIENT
Start: 2022-08-03 | End: 2022-10-20 | Stop reason: CLARIF

## 2022-08-03 RX ORDER — NITROFURANTOIN 25; 75 MG/1; MG/1
100 CAPSULE ORAL EVERY 12 HOURS
Status: DISCONTINUED | OUTPATIENT
Start: 2022-08-03 | End: 2022-08-03 | Stop reason: HOSPADM

## 2022-08-03 RX ADMIN — LEVOTHYROXINE SODIUM 50 MCG: 50 TABLET ORAL at 06:08

## 2022-08-03 RX ADMIN — Medication 2 DROP: at 08:08

## 2022-08-03 RX ADMIN — PANTOPRAZOLE SODIUM 40 MG: 40 TABLET, DELAYED RELEASE ORAL at 08:08

## 2022-08-03 RX ADMIN — TAMSULOSIN HYDROCHLORIDE 0.4 MG: 0.4 CAPSULE ORAL at 08:08

## 2022-08-03 RX ADMIN — MUPIROCIN: 20 OINTMENT TOPICAL at 08:08

## 2022-08-03 RX ADMIN — NITROFURANTOIN (MONOHYDRATE/MACROCRYSTALS) 100 MG: 75; 25 CAPSULE ORAL at 08:08

## 2022-08-03 RX ADMIN — POLYETHYLENE GLYCOL 3350 17 G: 17 POWDER, FOR SOLUTION ORAL at 08:08

## 2022-08-03 RX ADMIN — LACTULOSE 20 G: 20 SOLUTION ORAL at 08:08

## 2022-08-03 RX ADMIN — PIPERACILLIN SODIUM AND TAZOBACTAM SODIUM 4.5 G: 4; .5 INJECTION, POWDER, LYOPHILIZED, FOR SOLUTION INTRAVENOUS at 08:08

## 2022-08-03 RX ADMIN — RISPERIDONE 1 MG: 1 TABLET ORAL at 08:08

## 2022-08-03 RX ADMIN — APIXABAN 5 MG: 5 TABLET, FILM COATED ORAL at 08:08

## 2022-08-03 RX ADMIN — LOSARTAN POTASSIUM 50 MG: 50 TABLET, FILM COATED ORAL at 08:08

## 2022-08-03 RX ADMIN — OXCARBAZEPINE 450 MG: 150 TABLET, FILM COATED ORAL at 08:08

## 2022-08-03 NOTE — PLAN OF CARE
Problem: Adult Inpatient Plan of Care  Goal: Plan of Care Review  Outcome: Ongoing, Progressing     Problem: Diabetes Comorbidity  Goal: Blood Glucose Level Within Targeted Range  Outcome: Ongoing, Progressing     Problem: Adjustment to Illness (Sepsis/Septic Shock)  Goal: Optimal Coping  Outcome: Ongoing, Progressing     Problem: Bleeding (Sepsis/Septic Shock)  Goal: Absence of Bleeding  Outcome: Ongoing, Progressing     Problem: Glycemic Control Impaired (Sepsis/Septic Shock)  Goal: Blood Glucose Level Within Desired Range  Outcome: Ongoing, Progressing     Problem: Nutrition Impaired (Sepsis/Septic Shock)  Goal: Optimal Nutrition Intake  Outcome: Ongoing, Progressing     Problem: Impaired Wound Healing  Goal: Optimal Wound Healing  Outcome: Ongoing, Progressing     Problem: Fall Injury Risk  Goal: Absence of Fall and Fall-Related Injury  Outcome: Ongoing, Progressing     Problem: Skin Injury Risk Increased  Goal: Skin Health and Integrity  Outcome: Ongoing, Progressing     Problem: Infection  Goal: Absence of Infection Signs and Symptoms  Outcome: Ongoing, Progressing

## 2022-08-03 NOTE — PLAN OF CARE
[facility] ICU  Discharge Final Note    Primary Care Provider: Kerry Lin MD    Expected Discharge Date: 8/3/2022    Final Discharge Note (most recent)     Final Note - 08/03/22 0858        Final Note    Assessment Type Final Discharge Note     Anticipated Discharge Disposition Discharged to Nursing Facility Certified under Medicaid but not Medicare        Post-Acute Status    Patient choice form signed by patient/caregiver List with quality metrics by geographic area provided;List from CMS Compare;List from System Post-Acute Care     Discharge Delays None known at this time             ss spoke with stewart good and pt may return today. Packet taken to rn. D/c info faxed to nh.    Important Message from Medicare  Important Message from Medicare regarding Discharge Appeal Rights: Given to patient/caregiver, Explained to patient/caregiver, Signed/date by patient/caregiver     Date IMM was signed: 08/01/22  Time IMM was signed: [id number]

## 2022-08-03 NOTE — DISCHARGE SUMMARY
[facility]  Pulmonology  Discharge Summary      Patient Name: Bhargav Gao  MRN: [id number]  Admission Date: 7/30/2022  Hospital Length of Stay: 4 days  Discharge Date and Time:  08/03/2022 5:44 AM  Attending Physician: Shaka Hayden DO   Discharging Provider: Roby Arciniega MD  Primary Care Provider: Kerry Lin MD    HPI:  : 81 y.o.m. presents to [facility] ED from [facility] due to hypothermia and bradycardia. On arrival temp was 94.6F and HR was 50. Patient is alert to place but not time and has a hard time communicating although does not appear confused. Patient transferred to ICU due to HoTN and placed on pressors. Patient arrived with indewlling urethral catheter. U/A was positive for UTI, urine and blood cultures pending. Patient was treated for UTI which grew E.Coli on 6/28/22. Patient has a sacral wound at the apex of the gluteal cleft which appears to be healing well, photos recorded. LE u/s on 1/7/22 showed DVTs bilaterally, patient currently on Eliquis. Patient meets criteria for sepsis and is receiving fluids and broad spectrum Abx. Will continue to monitor.     ED Course: CXR showed cardiomegaly and chronic reticulation of the left lung, no acute changes. EKG showed bradycardia with normal supraventricular rhythm. CBC show chronic normocytic anemia with H/H at 11.3/34.1. CMP and lactic acid were unremarkable.    Patient presents to [place] ED from [facility] with hypotension and sepsis due to UTI.  Patient was volume resuscitated in ED and blood cultures were drawn before IV abx given.  Evaluation two hours follow up for sepsis and patient remained hypotensive and bradycardic and vasdopressor was started.  Patient had no signs of volume overload such a JVD or edema or rales.  He was nor had tachypnea.  He does have advanced dementia and is schizophrenic.  He is bed bound and has a healing sacral decub and thus had an indwelling anne,  hence the UTI.  Patient is a DNR but was admitted to the ICU due to need of vasopressors.         * No surgery found *    Indwelling Lines/Drains at Time of Discharge:   Lines/Drains/Airways     Drain  Duration                Colostomy 05/24/22 0000 LLQ 71 days         Urethral Catheter 07/30/22 2040 Silicone 18 Fr. 3 days                Hospital Course:  Patient initially admitted to the hospital with the low blood pressure required some pressors was cultured and grew out E coli were Gram-negative tyrone in the urine of responded to Zosyn has had 5 days IV Zosyn is doing well without complaints back to baseline were going to send him home to nursing home today.  I would send him home on nitrofurantoin for 3 days      Goals of Care Treatment Preferences:  Code Status: DNR      Consults (From admission, onward)        Status Ordering Provider     Inpatient consult to Registered Dietitian/Nutritionist  Once        Provider:  (Not yet assigned)    Completed GERSON ALEMAN     Pharmacy to dose Vancomycin consult  Once        Provider:  (Not yet assigned)    Acknowledged SERENA BILLINGSLEY          Significant Labs:  A1C: No results for input(s): HGBA1C in the last 48 hours.    Significant Imaging:  I have reviewed all pertinent imaging results/findings within the past 24 hours.    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  Medications:  Reconciled Home Medications:      Medication List      CHANGE how you take these medications    insulin detemir U-100 100 unit/mL injection  Commonly known as: Levemir  Inject 10 Units into the skin once daily.  What changed: how much to take        CONTINUE taking these medications    apixaban 5 mg Tab  Commonly known as: ELIQUIS  Take 5 mg by mouth 2 (two) times daily.     HYDROcodone-acetaminophen 5-325 mg per tablet  Commonly known as: NORCO  Take 1 tablet by mouth every 6 (six) hours as needed for Pain.     insulin aspart U-100 100 unit/mL injection  Commonly known as: NovoLOG  Inject 0-5 Units into the skin before meals and at bedtime as needed. **LOW CORRECTION DOSE**  Blood Glucose  mg/dL                  Pre-meal                2200  151-200                0 unit                      0 unit  201-250                2 units                    1 unit  251-300                3 units                    1 unit  301-350                4 units                    2 units  >350                     5 units                    3 units  Administer subcutaneously if needed at times designated by monitoring schedule.   DO NOT HOLD correction dose insulin for patients who are  NPO.     lactulose 10 gram/15 mL solution  Commonly known as: CHRONULAC  Take 20 g by mouth once daily.     levothyroxine 50 MCG tablet  Commonly known as: SYNTHROID  Take 1 tablet (50 mcg total) by mouth before breakfast.     losartan 50 MG tablet  Commonly known as: COZAAR  Take 50 mg by mouth once daily. Hold for SBP < 110 or DBP < 60     memantine 10 MG Tab  Commonly known as: NAMENDA  Take 10 mg by mouth nightly.     metoprolol tartrate 25 MG tablet  Commonly known as: LOPRESSOR  Take 0.5 tablets (12.5 mg  total) by mouth 2 (two) times daily.     multivitamin Tab  Take 1 tablet by mouth once daily.     OXcarbazepine 150 MG Tab  Commonly known as: TRILEPTAL  Take 450 mg by mouth 2 (two) times daily.     pantoprazole 40 MG tablet  Commonly known as: PROTONIX  Take 1 tablet by mouth once daily.     polyethylene glycol 17 gram Pwpk  Commonly known as: GLYCOLAX  Take 17 g by mouth 2 (two) times a day.     risperiDONE 1 MG tablet  Commonly known as: RISPERDAL  Take 1 mg by mouth 2 (two) times daily.     tamsulosin 0.4 mg Cap  Commonly known as: FLOMAX  Take 1 capsule by mouth once daily.        ASK your doctor about these medications    furosemide 40 MG tablet  Commonly known as: LASIX  Take 40 mg by mouth once daily. Hold for SBP < 110 or DBP < 60     metoprolol succinate 25 MG 24 hr tablet  Commonly known as: TOPROL-XL  Take 1 tablet by mouth once daily. Hold for SBP < 110 or DBP < 60 or pulse < 50     SITagliptin 50 MG Tab  Commonly known as: JANUVIA  Take 1 tablet (50 mg total) by mouth once daily.        Spent 35 minutes on discharge    Roby Arciniega MD  Pulmonology  [facility] ICU

## 2022-08-05 LAB
BACTERIA BLD CULT: NORMAL
BACTERIA BLD CULT: NORMAL

## 2022-10-17 ENCOUNTER — HOSPITAL ENCOUNTER (INPATIENT)
Facility: HOSPITAL | Age: 82
LOS: 2 days | Discharge: NURSING, REHAB OR LONG-TERM CARE | DRG: 871 | End: 2022-10-19
Attending: EMERGENCY MEDICINE | Admitting: INTERNAL MEDICINE
Payer: MEDICARE

## 2022-10-17 DIAGNOSIS — A41.9 SEPTIC SHOCK: ICD-10-CM

## 2022-10-17 DIAGNOSIS — N39.0 URINARY TRACT INFECTION WITH HEMATURIA, SITE UNSPECIFIED: ICD-10-CM

## 2022-10-17 DIAGNOSIS — R65.21 SEPTIC SHOCK: ICD-10-CM

## 2022-10-17 DIAGNOSIS — R41.82 ALTERED MENTAL STATUS: Primary | ICD-10-CM

## 2022-10-17 DIAGNOSIS — R31.9 URINARY TRACT INFECTION WITH HEMATURIA, SITE UNSPECIFIED: ICD-10-CM

## 2022-10-17 PROBLEM — R65.20 SEVERE SEPSIS: Status: ACTIVE | Noted: 2022-10-17

## 2022-10-17 PROBLEM — J44.9 COPD (CHRONIC OBSTRUCTIVE PULMONARY DISEASE): Status: ACTIVE | Noted: 2022-10-17

## 2022-10-17 PROBLEM — D50.9 IDA (IRON DEFICIENCY ANEMIA): Status: ACTIVE | Noted: 2022-10-17

## 2022-10-17 PROBLEM — N17.0 ACUTE RENAL FAILURE WITH TUBULAR NECROSIS: Status: ACTIVE | Noted: 2022-10-17

## 2022-10-17 PROBLEM — R65.20 SEVERE SEPSIS: Status: RESOLVED | Noted: 2022-10-17 | Resolved: 2022-10-17

## 2022-10-17 PROBLEM — G93.41 ACUTE METABOLIC ENCEPHALOPATHY: Status: ACTIVE | Noted: 2022-10-17

## 2022-10-17 LAB
ALBUMIN SERPL BCP-MCNC: 1.9 G/DL (ref 3.5–5)
ALBUMIN/GLOB SERPL: 0.5 {RATIO}
ALP SERPL-CCNC: 92 U/L (ref 45–115)
ALT SERPL W P-5'-P-CCNC: 13 U/L (ref 16–61)
ANION GAP SERPL CALCULATED.3IONS-SCNC: 12 MMOL/L (ref 7–16)
ANISOCYTOSIS BLD QL SMEAR: ABNORMAL
APTT PPP: 38.7 SECONDS (ref 25.2–37.3)
AST SERPL W P-5'-P-CCNC: 23 U/L (ref 15–37)
BACTERIA #/AREA URNS HPF: ABNORMAL /HPF
BASOPHILS # BLD AUTO: 0.06 K/UL (ref 0–0.2)
BASOPHILS NFR BLD AUTO: 0.3 % (ref 0–1)
BILIRUB SERPL-MCNC: 0.3 MG/DL (ref ?–1.2)
BILIRUB UR QL STRIP: NEGATIVE
BUN SERPL-MCNC: 48 MG/DL (ref 7–18)
BUN/CREAT SERPL: 16 (ref 6–20)
CALCIUM SERPL-MCNC: 9.4 MG/DL (ref 8.5–10.1)
CHLORIDE SERPL-SCNC: 101 MMOL/L (ref 98–107)
CLARITY UR: ABNORMAL
CO2 SERPL-SCNC: 26 MMOL/L (ref 21–32)
COLOR UR: ABNORMAL
CREAT SERPL-MCNC: 2.94 MG/DL (ref 0.7–1.3)
DIFFERENTIAL METHOD BLD: ABNORMAL
EGFR (NO RACE VARIABLE) (RUSH/TITUS): 21 ML/MIN/1.73M²
EOSINOPHIL # BLD AUTO: 0 K/UL (ref 0–0.5)
EOSINOPHIL NFR BLD AUTO: 0 % (ref 1–4)
ERYTHROCYTE [DISTWIDTH] IN BLOOD BY AUTOMATED COUNT: 16.1 % (ref 11.5–14.5)
GLOBULIN SER-MCNC: 4.2 G/DL (ref 2–4)
GLUCOSE SERPL-MCNC: 204 MG/DL (ref 70–105)
GLUCOSE SERPL-MCNC: 212 MG/DL (ref 74–106)
GLUCOSE UR STRIP-MCNC: NORMAL MG/DL
HCT VFR BLD AUTO: 31.8 % (ref 40–54)
HGB BLD-MCNC: 10.4 G/DL (ref 13.5–18)
HYALINE CASTS #/AREA URNS LPF: ABNORMAL /LPF
IMM GRANULOCYTES # BLD AUTO: 0.78 K/UL (ref 0–0.04)
IMM GRANULOCYTES NFR BLD: 3.5 % (ref 0–0.4)
KETONES UR STRIP-SCNC: NEGATIVE MG/DL
LACTATE SERPL-SCNC: 3.9 MMOL/L (ref 0.4–2)
LACTATE SERPL-SCNC: 5.3 MMOL/L (ref 0.4–2)
LEUKOCYTE ESTERASE UR QL STRIP: ABNORMAL
LYMPHOCYTES # BLD AUTO: 1.08 K/UL (ref 1–4.8)
LYMPHOCYTES NFR BLD AUTO: 4.8 % (ref 27–41)
LYMPHOCYTES NFR BLD MANUAL: 7 % (ref 27–41)
MAGNESIUM SERPL-MCNC: 1.9 MG/DL (ref 1.7–2.3)
MCH RBC QN AUTO: 27.4 PG (ref 27–31)
MCHC RBC AUTO-ENTMCNC: 32.7 G/DL (ref 32–36)
MCV RBC AUTO: 83.9 FL (ref 80–96)
METAMYELOCYTES NFR BLD MANUAL: 2 %
MONOCYTES # BLD AUTO: 1.16 K/UL (ref 0–0.8)
MONOCYTES NFR BLD AUTO: 5.2 % (ref 2–6)
MONOCYTES NFR BLD MANUAL: 3 % (ref 2–6)
MPC BLD CALC-MCNC: 8.7 FL (ref 9.4–12.4)
MUCOUS THREADS #/AREA URNS HPF: ABNORMAL /HPF
NEUTROPHILS # BLD AUTO: 19.23 K/UL (ref 1.8–7.7)
NEUTROPHILS NFR BLD AUTO: 86.2 % (ref 53–65)
NEUTS BAND NFR BLD MANUAL: 27 % (ref 1–5)
NEUTS SEG NFR BLD MANUAL: 61 % (ref 50–62)
NITRITE UR QL STRIP: NEGATIVE
NRBC # BLD AUTO: 0 X10E3/UL
NRBC, AUTO (.00): 0 %
NT-PROBNP SERPL-MCNC: 1505 PG/ML (ref 1–450)
PH UR STRIP: 6.5 PH UNITS
PLATELET # BLD AUTO: 272 K/UL (ref 150–400)
PLATELET MORPHOLOGY: ABNORMAL
POLYCHROMASIA BLD QL SMEAR: ABNORMAL
POTASSIUM SERPL-SCNC: 4.4 MMOL/L (ref 3.5–5.1)
PROT SERPL-MCNC: 6.1 G/DL (ref 6.4–8.2)
PROT UR QL STRIP: 300
RBC # BLD AUTO: 3.79 M/UL (ref 4.6–6.2)
RBC # UR STRIP: ABNORMAL /UL
RBC #/AREA URNS HPF: ABNORMAL /HPF
SARS-COV-2 RDRP RESP QL NAA+PROBE: NEGATIVE
SODIUM SERPL-SCNC: 135 MMOL/L (ref 136–145)
SP GR UR STRIP: 1.03
SQUAMOUS #/AREA URNS LPF: ABNORMAL /LPF
TROPONIN I SERPL HS-MCNC: 29.8 PG/ML
UROBILINOGEN UR STRIP-ACNC: NORMAL MG/DL
WBC # BLD AUTO: 22.31 K/UL (ref 4.5–11)
WBC #/AREA URNS HPF: ABNORMAL /HPF

## 2022-10-17 PROCEDURE — 87635 SARS-COV-2 COVID-19 AMP PRB: CPT | Performed by: INTERNAL MEDICINE

## 2022-10-17 PROCEDURE — 96365 THER/PROPH/DIAG IV INF INIT: CPT

## 2022-10-17 PROCEDURE — 63600175 PHARM REV CODE 636 W HCPCS: Performed by: INTERNAL MEDICINE

## 2022-10-17 PROCEDURE — 84484 ASSAY OF TROPONIN QUANT: CPT | Performed by: EMERGENCY MEDICINE

## 2022-10-17 PROCEDURE — 25000003 PHARM REV CODE 250: Performed by: INTERNAL MEDICINE

## 2022-10-17 PROCEDURE — 25000242 PHARM REV CODE 250 ALT 637 W/ HCPCS: Performed by: INTERNAL MEDICINE

## 2022-10-17 PROCEDURE — 85730 THROMBOPLASTIN TIME PARTIAL: CPT | Performed by: EMERGENCY MEDICINE

## 2022-10-17 PROCEDURE — 25000003 PHARM REV CODE 250

## 2022-10-17 PROCEDURE — 83605 ASSAY OF LACTIC ACID: CPT | Performed by: EMERGENCY MEDICINE

## 2022-10-17 PROCEDURE — 87040 BLOOD CULTURE FOR BACTERIA: CPT | Performed by: INTERNAL MEDICINE

## 2022-10-17 PROCEDURE — 93005 ELECTROCARDIOGRAM TRACING: CPT

## 2022-10-17 PROCEDURE — 93010 EKG 12-LEAD: ICD-10-PCS | Mod: ,,, | Performed by: INTERNAL MEDICINE

## 2022-10-17 PROCEDURE — 81001 URINALYSIS AUTO W/SCOPE: CPT | Performed by: EMERGENCY MEDICINE

## 2022-10-17 PROCEDURE — 96361 HYDRATE IV INFUSION ADD-ON: CPT

## 2022-10-17 PROCEDURE — 99283 EMERGENCY DEPT VISIT LOW MDM: CPT | Mod: CS,,, | Performed by: EMERGENCY MEDICINE

## 2022-10-17 PROCEDURE — 63600175 PHARM REV CODE 636 W HCPCS: Performed by: EMERGENCY MEDICINE

## 2022-10-17 PROCEDURE — 99223 1ST HOSP IP/OBS HIGH 75: CPT | Mod: AI,,, | Performed by: INTERNAL MEDICINE

## 2022-10-17 PROCEDURE — 87186 SC STD MICRODIL/AGAR DIL: CPT | Performed by: EMERGENCY MEDICINE

## 2022-10-17 PROCEDURE — 83735 ASSAY OF MAGNESIUM: CPT | Performed by: EMERGENCY MEDICINE

## 2022-10-17 PROCEDURE — 87077 CULTURE AEROBIC IDENTIFY: CPT | Performed by: EMERGENCY MEDICINE

## 2022-10-17 PROCEDURE — 85025 COMPLETE CBC W/AUTO DIFF WBC: CPT | Performed by: EMERGENCY MEDICINE

## 2022-10-17 PROCEDURE — 99283 PR EMERGENCY DEPT VISIT,LEVEL III: ICD-10-PCS | Mod: CS,,, | Performed by: EMERGENCY MEDICINE

## 2022-10-17 PROCEDURE — 25000003 PHARM REV CODE 250: Performed by: EMERGENCY MEDICINE

## 2022-10-17 PROCEDURE — 87149 DNA/RNA DIRECT PROBE: CPT | Performed by: INTERNAL MEDICINE

## 2022-10-17 PROCEDURE — 83880 ASSAY OF NATRIURETIC PEPTIDE: CPT | Performed by: EMERGENCY MEDICINE

## 2022-10-17 PROCEDURE — 82962 GLUCOSE BLOOD TEST: CPT

## 2022-10-17 PROCEDURE — 99285 EMERGENCY DEPT VISIT HI MDM: CPT | Mod: 25

## 2022-10-17 PROCEDURE — 20000000 HC ICU ROOM

## 2022-10-17 PROCEDURE — 36415 COLL VENOUS BLD VENIPUNCTURE: CPT | Performed by: EMERGENCY MEDICINE

## 2022-10-17 PROCEDURE — 99223 PR INITIAL HOSPITAL CARE,LEVL III: ICD-10-PCS | Mod: AI,,, | Performed by: INTERNAL MEDICINE

## 2022-10-17 PROCEDURE — 80053 COMPREHEN METABOLIC PANEL: CPT | Performed by: EMERGENCY MEDICINE

## 2022-10-17 PROCEDURE — 93010 ELECTROCARDIOGRAM REPORT: CPT | Mod: ,,, | Performed by: INTERNAL MEDICINE

## 2022-10-17 RX ORDER — SODIUM CHLORIDE 9 MG/ML
1000 INJECTION, SOLUTION INTRAVENOUS
Status: COMPLETED | OUTPATIENT
Start: 2022-10-17 | End: 2022-10-17

## 2022-10-17 RX ORDER — RISPERIDONE 1 MG/1
1 TABLET ORAL 2 TIMES DAILY
Status: DISCONTINUED | OUTPATIENT
Start: 2022-10-17 | End: 2022-10-17

## 2022-10-17 RX ORDER — PANTOPRAZOLE SODIUM 40 MG/10ML
40 INJECTION, POWDER, LYOPHILIZED, FOR SOLUTION INTRAVENOUS DAILY
Status: DISCONTINUED | OUTPATIENT
Start: 2022-10-18 | End: 2022-10-19 | Stop reason: HOSPADM

## 2022-10-17 RX ORDER — PANTOPRAZOLE SODIUM 40 MG/1
40 TABLET, DELAYED RELEASE ORAL DAILY
Status: DISCONTINUED | OUTPATIENT
Start: 2022-10-18 | End: 2022-10-17

## 2022-10-17 RX ORDER — GLUCAGON 1 MG
1 KIT INJECTION
Status: DISCONTINUED | OUTPATIENT
Start: 2022-10-17 | End: 2022-10-19 | Stop reason: HOSPADM

## 2022-10-17 RX ORDER — ONDANSETRON 2 MG/ML
4 INJECTION INTRAMUSCULAR; INTRAVENOUS EVERY 8 HOURS PRN
Status: DISCONTINUED | OUTPATIENT
Start: 2022-10-17 | End: 2022-10-19 | Stop reason: HOSPADM

## 2022-10-17 RX ORDER — SODIUM CHLORIDE 9 MG/ML
INJECTION, SOLUTION INTRAVENOUS
Status: COMPLETED
Start: 2022-10-17 | End: 2022-10-17

## 2022-10-17 RX ORDER — HEPARIN SODIUM 5000 [USP'U]/ML
5000 INJECTION, SOLUTION INTRAVENOUS; SUBCUTANEOUS EVERY 8 HOURS
Status: DISCONTINUED | OUTPATIENT
Start: 2022-10-17 | End: 2022-10-18

## 2022-10-17 RX ORDER — PROCHLORPERAZINE EDISYLATE 5 MG/ML
5 INJECTION INTRAMUSCULAR; INTRAVENOUS EVERY 6 HOURS PRN
Status: DISCONTINUED | OUTPATIENT
Start: 2022-10-17 | End: 2022-10-19 | Stop reason: HOSPADM

## 2022-10-17 RX ORDER — INSULIN ASPART 100 [IU]/ML
0-5 INJECTION, SOLUTION INTRAVENOUS; SUBCUTANEOUS EVERY 4 HOURS PRN
Status: DISCONTINUED | OUTPATIENT
Start: 2022-10-17 | End: 2022-10-19 | Stop reason: HOSPADM

## 2022-10-17 RX ORDER — LEVOTHYROXINE SODIUM 50 UG/1
50 TABLET ORAL
Status: DISCONTINUED | OUTPATIENT
Start: 2022-10-18 | End: 2022-10-17

## 2022-10-17 RX ORDER — TAMSULOSIN HYDROCHLORIDE 0.4 MG/1
1 CAPSULE ORAL DAILY
Status: DISCONTINUED | OUTPATIENT
Start: 2022-10-18 | End: 2022-10-17

## 2022-10-17 RX ORDER — MEMANTINE HYDROCHLORIDE 10 MG/1
10 TABLET ORAL NIGHTLY
Status: DISCONTINUED | OUTPATIENT
Start: 2022-10-17 | End: 2022-10-17

## 2022-10-17 RX ORDER — IPRATROPIUM BROMIDE AND ALBUTEROL SULFATE 2.5; .5 MG/3ML; MG/3ML
3 SOLUTION RESPIRATORY (INHALATION) EVERY 6 HOURS
Status: DISCONTINUED | OUTPATIENT
Start: 2022-10-17 | End: 2022-10-19 | Stop reason: HOSPADM

## 2022-10-17 RX ORDER — SODIUM CHLORIDE 9 MG/ML
INJECTION, SOLUTION INTRAVENOUS CONTINUOUS
Status: DISCONTINUED | OUTPATIENT
Start: 2022-10-17 | End: 2022-10-19

## 2022-10-17 RX ORDER — MUPIROCIN 20 MG/G
OINTMENT TOPICAL 2 TIMES DAILY
Status: DISCONTINUED | OUTPATIENT
Start: 2022-10-17 | End: 2022-10-19 | Stop reason: HOSPADM

## 2022-10-17 RX ORDER — LEVOTHYROXINE SODIUM ANHYDROUS 100 UG/5ML
25 INJECTION, POWDER, LYOPHILIZED, FOR SOLUTION INTRAVENOUS DAILY
Status: DISCONTINUED | OUTPATIENT
Start: 2022-10-18 | End: 2022-10-19 | Stop reason: HOSPADM

## 2022-10-17 RX ORDER — SODIUM CHLORIDE 0.9 % (FLUSH) 0.9 %
10 SYRINGE (ML) INJECTION
Status: DISCONTINUED | OUTPATIENT
Start: 2022-10-17 | End: 2022-10-19 | Stop reason: HOSPADM

## 2022-10-17 RX ADMIN — CEFTRIAXONE SODIUM 2 G: 2 INJECTION, POWDER, FOR SOLUTION INTRAMUSCULAR; INTRAVENOUS at 05:10

## 2022-10-17 RX ADMIN — SODIUM CHLORIDE 1000 ML: 9 INJECTION, SOLUTION INTRAVENOUS at 05:10

## 2022-10-17 RX ADMIN — MUPIROCIN: 20 OINTMENT TOPICAL at 08:10

## 2022-10-17 RX ADMIN — SODIUM CHLORIDE 1000 ML: 9 INJECTION, SOLUTION INTRAVENOUS at 04:10

## 2022-10-17 RX ADMIN — IPRATROPIUM BROMIDE AND ALBUTEROL SULFATE 3 ML: .5; 3 SOLUTION RESPIRATORY (INHALATION) at 07:10

## 2022-10-17 RX ADMIN — MEROPENEM 1 G: 1 INJECTION, POWDER, FOR SOLUTION INTRAVENOUS at 07:10

## 2022-10-17 RX ADMIN — INSULIN ASPART 1 UNITS: 100 INJECTION, SOLUTION INTRAVENOUS; SUBCUTANEOUS at 09:10

## 2022-10-17 RX ADMIN — NOREPINEPHRINE BITARTRATE 0.05 MCG/KG/MIN: 1 INJECTION, SOLUTION, CONCENTRATE INTRAVENOUS at 07:10

## 2022-10-17 RX ADMIN — HEPARIN SODIUM 5000 UNITS: 5000 INJECTION INTRAVENOUS; SUBCUTANEOUS at 09:10

## 2022-10-17 RX ADMIN — SODIUM CHLORIDE: 9 INJECTION, SOLUTION INTRAVENOUS at 08:10

## 2022-10-17 RX ADMIN — SODIUM CHLORIDE 500 ML: 9 INJECTION, SOLUTION INTRAVENOUS at 06:10

## 2022-10-17 NOTE — ED TRIAGE NOTES
Presents to ED via EMS from [facility] for staff c/o AMS that has been ongoing for a few weeks. Reports BP was low, EMS reports 60's SBP upon arrival. Staff also report gross hematuria. Patient unable to participate with triage.

## 2022-10-17 NOTE — ED PROVIDER NOTES
Encounter Date: 10/17/2022    SCRIBE #1 NOTE: I, Paris Bernstein, am scribing for, and in the presence of,  Emmanuel Hamm MD. I have scribed the entire note.     History     Chief Complaint   Patient presents with    Altered Mental Status     82 y.o. male was brought to the emergency department from the nursing home. Nursing home stated the patient is experiencing hematuria, AMS, and low ox sats. Patient is unable to provide any hx.       The history is provided by the nursing home. No  was used.   Review of patient's allergies indicates:   Allergen Reactions    Metformin     Mycobacterium tuberculosis (tuberculin ppd)     Norvasc [amlodipine]      Past Medical History:   Diagnosis Date    Alzheimer's disease     Anticoagulant long-term use     Bipolar disorder     BPH (benign prostatic hyperplasia)     Deep vein thrombosis (DVT) of popliteal vein of right lower extremity     Diabetes mellitus     DVT of deep femoral vein, left     Hyperlipidemia     Hypertension     Hypothyroidism     Idiopathic orofacial dystonia     Iron deficiency 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    Light Growth Escherichia coli Abnormal    Light Growth Klebsiel    Schizophrenia 1/28/2022 05/16 -continue trileptal and risperidone     Past Surgical History:   Procedure Laterality Date    COLOSTOMY N/A 5/24/2022    Procedure: CREATION, COLOSTOMY;  Surgeon: Edilma Felix MD;  Location: [facility] [place];  Service: General;  Laterality: N/A;  diverting colostomy dr felix tuesday     Family History   Family history unknown:  Yes     Social History     Tobacco Use    Smoking status: Never    Smokeless tobacco: Never   Substance Use Topics    Alcohol use: Not Currently    Drug use: Never     Review of Systems   Reason unable to perform ROS: patient is unable to give any information.     Physical Exam     Initial Vitals [10/17/22 1642]   BP Pulse Resp Temp SpO2   (!) 70/42 89 14 97.7 °F (36.5 °C) 100 %      MAP       --         Physical Exam    Nursing note and vitals reviewed.  Constitutional: He appears well-developed and well-nourished.   Neck:   Normal range of motion.  Cardiovascular:  Normal rate, regular rhythm and normal heart sounds.           Pulmonary/Chest: Breath sounds normal.   Abdominal: Abdomen is soft.   Colostomy bag in left lower quadrant   Musculoskeletal:         General: Normal range of motion.      Cervical back: Normal range of motion.      Comments: Cannot lift lower extremities     Neurological: He is alert. Tremors: drowsy and weary.   Slow answering questions    Skin: Skin is warm.       ED Course   Procedures  Labs Reviewed   NT-PRO NATRIURETIC PEPTIDE - Abnormal; Notable for the following components:       Result Value    ProBNP 1,505 (*)     All other components within normal limits   COMPREHENSIVE METABOLIC PANEL - Abnormal; Notable for the following components:    Sodium 135 (*)     Glucose 212 (*)     BUN 48 (*)     Creatinine 2.94 (*)     Total Protein 6.1 (*)     Albumin 1.9 (*)     Globulin 4.2 (*)     ALT 13 (*)     eGFR 21 (*)     All other components within normal limits   APTT - Abnormal; Notable for the following components:    PTT 38.7 (*)     All other components within normal limits   LACTIC ACID, PLASMA - Abnormal; Notable for the following components:    Lactic Acid 5.3 (*)     All other components within normal limits   URINALYSIS, REFLEX TO URINE CULTURE - Abnormal; Notable for the following components:    Color, UA Red (*)     Leukocytes, UA Large (*)     Protein,  (*)     Blood, UA  Large (*)     All other components within normal limits   URINALYSIS, MICROSCOPIC - Abnormal; Notable for the following components:    WBC, UA Too Numerous To Count (*)     RBC, UA Too Numerous To Count (*)     Bacteria, UA Many (*)     Mucus, UA Few (*)     Hyaline Casts, UA 0-2 (*)     All other components within normal limits   MAGNESIUM - Normal   TROPONIN I - Normal   CULTURE, URINE   CBC W/ AUTO DIFFERENTIAL    Narrative:     The following orders were created for panel order CBC auto differential.  Procedure                               Abnormality         Status                     ---------                               -----------         ------                     CBC with Differential[[id number]]                            In process                 Manual Differential[[id number]]                              In process                   Please view results for these tests on the individual orders.   CBC WITH DIFFERENTIAL   MANUAL DIFFERENTIAL   LACTIC ACID, PLASMA        ECG Results              EKG 12-lead (In process)  Result time 10/17/22 16:53:02      In process by Interface, Lab In [facility] (10/17/22 16:53:02)                   Narrative:    Test Reason : R41.82,    Vent. Rate : 088 BPM     Atrial Rate : 000 BPM     P-R Int : 174 ms          QRS Dur : 108 ms      QT Int : 346 ms       P-R-T Axes : 020 -10 065 degrees     QTc Int : 395 ms    Sinus rhythm  with aberrantly conducted supraventricular complexes  Consider left atrial abnormality  Borderline ECG      Referred By: [email or web address]   SELF           Confirmed By:                                   Imaging Results              CT Head Without Contrast (In process)                      X-Ray Chest AP Portable (Final result)  Result time 10/17/22 17:20:58      Final result by Donaldo Lane II, MD (10/17/22 17:20:58)                   Impression:      Chronic lung changes.  No definite acute process or significant change.      Electronically signed  by: Donaldo Lane  Date:    10/17/2022  Time:    17:20               Narrative:    EXAMINATION:  XR CHEST AP PORTABLE    CLINICAL HISTORY:  Altered mental status, unspecified    COMPARISON:  30 July 2022    FINDINGS:  The heart and mediastinum are normal in size and configuration.  The pulmonary vascularity is normal in caliber.  There are increased bronchial markings similar to previous studies.  No lung infiltrates, effusions, pneumothorax or other abnormality is demonstrated.                                       Medications   sodium chloride 0.9% bolus 1,000 mL (1,000 mLs Intravenous New Bag 10/17/22 1713)   cefTRIAXone (ROCEPHIN) 2 g in dextrose 5 % in water (D5W) 5 % 50 mL IVPB (MB+) (2 g Intravenous New Bag 10/17/22 1739)   0.9%  NaCl infusion (1,000 mLs Intravenous New Bag 10/17/22 1640)   sodium chloride 0.9% bolus 1,000 mL (0 mLs Intravenous Stopped 10/17/22 1740)                Attending Attestation:           Physician Attestation for Scribe:  Physician Attestation Statement for Scribe #1: I, Emmanuel Hamm MD, reviewed documentation, as scribed by Paris Bernstein in my presence, and it is both accurate and complete.                        Clinical Impression:   Final diagnoses:  [R41.82] Altered mental status (Primary)  [N39.0, R31.9] Urinary tract infection with hematuria, site unspecified        ED Disposition Condition    Admit Stable                Emmanuel Hamm MD  10/17/22 1552

## 2022-10-18 PROBLEM — L89.159 SACRAL DECUBITUS ULCER: Status: ACTIVE | Noted: 2022-10-18

## 2022-10-18 LAB
ACANTHOCYTES BLD QL SMEAR: ABNORMAL
ALBUMIN SERPL BCP-MCNC: 1.7 G/DL (ref 3.5–5)
ALBUMIN/GLOB SERPL: 0.5 {RATIO}
ALP SERPL-CCNC: 78 U/L (ref 45–115)
ALT SERPL W P-5'-P-CCNC: 14 U/L (ref 16–61)
ANION GAP SERPL CALCULATED.3IONS-SCNC: 16 MMOL/L (ref 7–16)
ANISOCYTOSIS BLD QL SMEAR: ABNORMAL
AST SERPL W P-5'-P-CCNC: 22 U/L (ref 15–37)
BASOPHILS # BLD AUTO: 0.05 K/UL (ref 0–0.2)
BASOPHILS NFR BLD AUTO: 0.3 % (ref 0–1)
BILIRUB SERPL-MCNC: 0.2 MG/DL (ref ?–1.2)
BUN SERPL-MCNC: 42 MG/DL (ref 7–18)
BUN/CREAT SERPL: 23 (ref 6–20)
CALCIUM SERPL-MCNC: 8 MG/DL (ref 8.5–10.1)
CHLORIDE SERPL-SCNC: 108 MMOL/L (ref 98–107)
CO2 SERPL-SCNC: 26 MMOL/L (ref 21–32)
CREAT SERPL-MCNC: 1.82 MG/DL (ref 0.7–1.3)
CRENATED CELLS: ABNORMAL
DIFFERENTIAL METHOD BLD: ABNORMAL
EGFR (NO RACE VARIABLE) (RUSH/TITUS): 37 ML/MIN/1.73M²
EOSINOPHIL # BLD AUTO: 0 K/UL (ref 0–0.5)
EOSINOPHIL NFR BLD AUTO: 0 % (ref 1–4)
ERYTHROCYTE [DISTWIDTH] IN BLOOD BY AUTOMATED COUNT: 16.4 % (ref 11.5–14.5)
GLOBULIN SER-MCNC: 3.1 G/DL (ref 2–4)
GLUCOSE SERPL-MCNC: 121 MG/DL (ref 70–105)
GLUCOSE SERPL-MCNC: 127 MG/DL (ref 74–106)
GLUCOSE SERPL-MCNC: 128 MG/DL (ref 70–105)
GLUCOSE SERPL-MCNC: 185 MG/DL (ref 70–105)
GLUCOSE SERPL-MCNC: 71 MG/DL (ref 70–105)
GLUCOSE SERPL-MCNC: 82 MG/DL (ref 70–105)
HCT VFR BLD AUTO: 29.5 % (ref 40–54)
HGB BLD-MCNC: 9.4 G/DL (ref 13.5–18)
IMM GRANULOCYTES # BLD AUTO: 0.22 K/UL (ref 0–0.04)
IMM GRANULOCYTES NFR BLD: 1.4 % (ref 0–0.4)
LYMPHOCYTES # BLD AUTO: 1.24 K/UL (ref 1–4.8)
LYMPHOCYTES NFR BLD AUTO: 7.7 % (ref 27–41)
LYMPHOCYTES NFR BLD MANUAL: 8 % (ref 27–41)
MCH RBC QN AUTO: 27.3 PG (ref 27–31)
MCHC RBC AUTO-ENTMCNC: 31.9 G/DL (ref 32–36)
MCV RBC AUTO: 85.8 FL (ref 80–96)
MONOCYTES # BLD AUTO: 0.68 K/UL (ref 0–0.8)
MONOCYTES NFR BLD AUTO: 4.2 % (ref 2–6)
MONOCYTES NFR BLD MANUAL: 2 % (ref 2–6)
MPC BLD CALC-MCNC: 9 FL (ref 9.4–12.4)
NEUTROPHILS # BLD AUTO: 13.98 K/UL (ref 1.8–7.7)
NEUTROPHILS NFR BLD AUTO: 86.4 % (ref 53–65)
NEUTS BAND NFR BLD MANUAL: 15 % (ref 1–5)
NEUTS SEG NFR BLD MANUAL: 75 % (ref 50–62)
NRBC # BLD AUTO: 0 X10E3/UL
NRBC, AUTO (.00): 0 %
PLATELET # BLD AUTO: 227 K/UL (ref 150–400)
PLATELET MORPHOLOGY: NORMAL
POTASSIUM SERPL-SCNC: 3.8 MMOL/L (ref 3.5–5.1)
PROT SERPL-MCNC: 4.8 G/DL (ref 6.4–8.2)
RBC # BLD AUTO: 3.44 M/UL (ref 4.6–6.2)
SODIUM SERPL-SCNC: 146 MMOL/L (ref 136–145)
WBC # BLD AUTO: 16.17 K/UL (ref 4.5–11)

## 2022-10-18 PROCEDURE — C9113 INJ PANTOPRAZOLE SODIUM, VIA: HCPCS | Performed by: INTERNAL MEDICINE

## 2022-10-18 PROCEDURE — 99223 1ST HOSP IP/OBS HIGH 75: CPT | Mod: ,,, | Performed by: NURSE PRACTITIONER

## 2022-10-18 PROCEDURE — 94640 AIRWAY INHALATION TREATMENT: CPT

## 2022-10-18 PROCEDURE — 99223 PR INITIAL HOSPITAL CARE,LEVL III: ICD-10-PCS | Mod: ,,, | Performed by: NURSE PRACTITIONER

## 2022-10-18 PROCEDURE — 80053 COMPREHEN METABOLIC PANEL: CPT | Performed by: INTERNAL MEDICINE

## 2022-10-18 PROCEDURE — 20000000 HC ICU ROOM

## 2022-10-18 PROCEDURE — 25000003 PHARM REV CODE 250: Performed by: NURSE PRACTITIONER

## 2022-10-18 PROCEDURE — 63600175 PHARM REV CODE 636 W HCPCS: Performed by: INTERNAL MEDICINE

## 2022-10-18 PROCEDURE — 25000003 PHARM REV CODE 250: Performed by: INTERNAL MEDICINE

## 2022-10-18 PROCEDURE — 99233 PR SUBSEQUENT HOSPITAL CARE,LEVL III: ICD-10-PCS | Mod: ,,, | Performed by: NURSE PRACTITIONER

## 2022-10-18 PROCEDURE — 94761 N-INVAS EAR/PLS OXIMETRY MLT: CPT

## 2022-10-18 PROCEDURE — 25000242 PHARM REV CODE 250 ALT 637 W/ HCPCS: Performed by: INTERNAL MEDICINE

## 2022-10-18 PROCEDURE — 92610 EVALUATE SWALLOWING FUNCTION: CPT

## 2022-10-18 PROCEDURE — 63600175 PHARM REV CODE 636 W HCPCS: Performed by: NURSE PRACTITIONER

## 2022-10-18 PROCEDURE — 82962 GLUCOSE BLOOD TEST: CPT

## 2022-10-18 PROCEDURE — 36415 COLL VENOUS BLD VENIPUNCTURE: CPT | Performed by: INTERNAL MEDICINE

## 2022-10-18 PROCEDURE — 85025 COMPLETE CBC W/AUTO DIFF WBC: CPT | Performed by: INTERNAL MEDICINE

## 2022-10-18 PROCEDURE — 99233 SBSQ HOSP IP/OBS HIGH 50: CPT | Mod: ,,, | Performed by: NURSE PRACTITIONER

## 2022-10-18 RX ORDER — ENOXAPARIN SODIUM 100 MG/ML
90 INJECTION SUBCUTANEOUS
Status: DISCONTINUED | OUTPATIENT
Start: 2022-10-18 | End: 2022-10-19

## 2022-10-18 RX ADMIN — HEPARIN SODIUM 5000 UNITS: 5000 INJECTION INTRAVENOUS; SUBCUTANEOUS at 05:10

## 2022-10-18 RX ADMIN — SODIUM CHLORIDE: 9 INJECTION, SOLUTION INTRAVENOUS at 05:10

## 2022-10-18 RX ADMIN — MEROPENEM 1 G: 1 INJECTION, POWDER, FOR SOLUTION INTRAVENOUS at 04:10

## 2022-10-18 RX ADMIN — IPRATROPIUM BROMIDE AND ALBUTEROL SULFATE 3 ML: .5; 3 SOLUTION RESPIRATORY (INHALATION) at 12:10

## 2022-10-18 RX ADMIN — NOREPINEPHRINE BITARTRATE 0.03 MCG/KG/MIN: 1 INJECTION, SOLUTION, CONCENTRATE INTRAVENOUS at 11:10

## 2022-10-18 RX ADMIN — ENOXAPARIN SODIUM 90 MG: 100 INJECTION SUBCUTANEOUS at 12:10

## 2022-10-18 RX ADMIN — IPRATROPIUM BROMIDE AND ALBUTEROL SULFATE 3 ML: .5; 3 SOLUTION RESPIRATORY (INHALATION) at 07:10

## 2022-10-18 RX ADMIN — MEROPENEM 1 G: 1 INJECTION, POWDER, FOR SOLUTION INTRAVENOUS at 08:10

## 2022-10-18 RX ADMIN — NOREPINEPHRINE BITARTRATE 0.01 MCG/KG/MIN: 1 INJECTION, SOLUTION, CONCENTRATE INTRAVENOUS at 11:10

## 2022-10-18 RX ADMIN — LEVOTHYROXINE SODIUM ANHYDROUS 25 MCG: 100 INJECTION, POWDER, LYOPHILIZED, FOR SOLUTION INTRAVENOUS at 10:10

## 2022-10-18 RX ADMIN — SODIUM CHLORIDE: 9 INJECTION, SOLUTION INTRAVENOUS at 11:10

## 2022-10-18 RX ADMIN — MUPIROCIN: 20 OINTMENT TOPICAL at 09:10

## 2022-10-18 RX ADMIN — MUPIROCIN: 20 OINTMENT TOPICAL at 10:10

## 2022-10-18 RX ADMIN — SODIUM CHLORIDE: 9 INJECTION, SOLUTION INTRAVENOUS at 03:10

## 2022-10-18 RX ADMIN — MEROPENEM 1 G: 1 INJECTION, POWDER, FOR SOLUTION INTRAVENOUS at 12:10

## 2022-10-18 RX ADMIN — PANTOPRAZOLE SODIUM 40 MG: 40 INJECTION, POWDER, FOR SOLUTION INTRAVENOUS at 10:10

## 2022-10-18 NOTE — SUBJECTIVE & OBJECTIVE
Interval History: awake, alert. On low dose levophed. Confused but able to follow simple commands.     Objective:     Vital Signs (Most Recent):  Temp: 97.9 °F (36.6 °C) (10/18/22 0630)  Pulse: 73 (10/18/22 0746)  Resp: 14 (10/18/22 0746)  BP: (!) 110/50 (10/18/22 0630)  SpO2: 100 % (10/18/22 0746)   Vital Signs (24h Range):  Temp:  [95.4 °F (35.2 °C)-97.9 °F (36.6 °C)] 97.9 °F (36.6 °C)  Pulse:  [58-97] 73  Resp:  [10-26] 14  SpO2:  [98 %-100 %] 100 %  BP: ()/(38-68) 110/50     Weight: 101.1 kg (222 lb 14.2 oz)  Body mass index is 30.23 kg/m².      Intake/Output Summary (Last 24 hours) at 10/18/2022 1154  Last data filed at 10/18/2022 0853  Gross per 24 hour   Intake 4192.46 ml   Output 825 ml   Net 3367.46 ml       Physical Exam  Vitals reviewed.   HENT:      Right Ear: External ear normal.      Left Ear: External ear normal.      Mouth/Throat:      Mouth: Mucous membranes are moist.   Eyes:      Extraocular Movements: Extraocular movements intact.      Conjunctiva/sclera: Conjunctivae normal.      Pupils: Pupils are equal, round, and reactive to light.   Cardiovascular:      Rate and Rhythm: Normal rate and regular rhythm.   Pulmonary:      Effort: Pulmonary effort is normal.      Breath sounds: Normal breath sounds.   Abdominal:      General: Bowel sounds are normal.      Palpations: Abdomen is soft.   Musculoskeletal:         General: Normal range of motion.      Cervical back: Normal range of motion and neck supple.      Right lower leg: Edema present.      Left lower leg: Edema present.   Skin:     General: Skin is warm and dry.      Capillary Refill: Capillary refill takes less than 2 seconds.   Neurological:      Mental Status: He is alert. He is disoriented.   Psychiatric:         Mood and Affect: Mood normal.       Vents:       Lines/Drains/Airways       Drain  Duration                  Urethral Catheter -- days         Urethral Catheter 07/30/22 2040 Silicone 18 Fr. 79 days         Colostomy  10/17/22 2015 LLQ <1 day              Peripheral Intravenous Line  Duration                  Peripheral IV - Single Lumen 10/17/22 1603 20 G Left Antecubital <1 day         Peripheral IV - Single Lumen 10/17/22 1714 20 G Left;Posterior Hand <1 day                    Significant Labs:    CBC/Anemia Profile:  Recent Labs   Lab 10/17/22  1315 10/17/22  1712 10/18/22  0508   WBC 13.83* 22.31* 16.17*   HGB 11.2* 10.4* 9.4*   HCT 34.7* 31.8* 29.5*    272 227   MCV 86.3 83.9 85.8   RDW 16.3* 16.1* 16.4*        Chemistries:  Recent Labs   Lab 10/17/22  1315 10/17/22  1716 10/18/22  0508    135* 146*   K 5.4* 4.4 3.8    101 108*   CO2 28 26 26   BUN 45* 48* 42*   CREATININE 2.78* 2.94* 1.82*   CALCIUM 9.1 9.4 8.0*   ALBUMIN 2.0* 1.9* 1.7*   PROT 5.7* 6.1* 4.8*   BILITOT 0.4 0.3 0.2   ALKPHOS 95 92 78   ALT 15* 13* 14*   AST 19 23 22   MG  --  1.9  --        All pertinent labs within the past 24 hours have been reviewed.    Significant Imaging:  I have reviewed all pertinent imaging results/findings within the past 24 hours.

## 2022-10-18 NOTE — ASSESSMENT & PLAN NOTE
Secondary to UTI  Treated with IVFs, BC NGTD, lactic acid trended now.  On low dose levophed   Urine culture with GNB

## 2022-10-18 NOTE — ASSESSMENT & PLAN NOTE
Improved - Cr down from 2.94 to 1.83- baseline 0.44  continue IVfs   UOP 625cc since admission  - will check renal US to r/o obstruction

## 2022-10-18 NOTE — ASSESSMENT & PLAN NOTE
While patient does not have a diagnosis of COPD and med list from nursing home did not include any neb treatments.  Chest x-ray demonstrated chronic interstitial changes and patient was wheezing on physical examination.  Will start patient on DuoNeb q.6 hours.  Currently, patient is not on any acute respiratory distress with room air SpO2 of 100%

## 2022-10-18 NOTE — PROGRESS NOTES
[facility]  Pulmonology  Progress Note    Patient Name: Bhargav Gao  MRN: [id number]  Admission Date: 10/17/2022  Hospital Length of Stay: 1 days  Code Status: DNR  Attending Provider: Roby Arciniega MD  Primary Care Provider: Kerry Lin MD   Principal Problem: Septic shock    Subjective:     Interval History: awake, alert. On low dose levophed. Confused but able to follow simple commands.     Objective:     Vital Signs (Most Recent):  Temp: 97.9 °F (36.6 °C) (10/18/22 0630)  Pulse: 73 (10/18/22 0746)  Resp: 14 (10/18/22 0746)  BP: (!) 110/50 (10/18/22 0630)  SpO2: 100 % (10/18/22 0746)   Vital Signs (24h Range):  Temp:  [95.4 °F (35.2 °C)-97.9 °F (36.6 °C)] 97.9 °F (36.6 °C)  Pulse:  [58-97] 73  Resp:  [10-26] 14  SpO2:  [98 %-100 %] 100 %  BP: ()/(38-68) 110/50     Weight: 101.1 kg (222 lb 14.2 oz)  Body mass index is 30.23 kg/m².      Intake/Output Summary (Last 24 hours) at 10/18/2022 1154  Last data filed at 10/18/2022 0853  Gross per 24 hour   Intake 4192.46 ml   Output 825 ml   Net 3367.46 ml       Physical Exam  Vitals reviewed.   HENT:      Right Ear: External ear normal.      Left Ear: External ear normal.      Mouth/Throat:      Mouth: Mucous membranes are moist.   Eyes:      Extraocular Movements: Extraocular movements intact.      Conjunctiva/sclera: Conjunctivae normal.      Pupils: Pupils are equal, round, and reactive to light.   Cardiovascular:      Rate and Rhythm: Normal rate and regular rhythm.   Pulmonary:      Effort: Pulmonary effort is normal.      Breath sounds: Normal breath sounds.   Abdominal:      General: Bowel sounds are normal.      Palpations: Abdomen is soft.   Musculoskeletal:         General: Normal range of motion.      Cervical back: Normal range of motion and neck supple.      Right lower leg: Edema present.      Left lower leg: Edema present.   Skin:     General: Skin is warm and dry.      Capillary Refill: Capillary refill takes less  than 2 seconds.   Neurological:      Mental Status: He is alert. He is disoriented.   Psychiatric:         Mood and Affect: Mood normal.       Vents:       Lines/Drains/Airways       Drain  Duration                  Urethral Catheter -- days         Urethral Catheter 07/30/22 2040 Silicone 18 Fr. 79 days         Colostomy 10/17/22 2015 LLQ <1 day              Peripheral Intravenous Line  Duration                  Peripheral IV - Single Lumen 10/17/22 1603 20 G Left Antecubital <1 day         Peripheral IV - Single Lumen 10/17/22 1714 20 G Left;Posterior Hand <1 day                    Significant Labs:    CBC/Anemia Profile:  Recent Labs   Lab 10/17/22  1315 10/17/22  1712 10/18/22  0508   WBC 13.83* 22.31* 16.17*   HGB 11.2* 10.4* 9.4*   HCT 34.7* 31.8* 29.5*    272 227   MCV 86.3 83.9 85.8   RDW 16.3* 16.1* 16.4*        Chemistries:  Recent Labs   Lab 10/17/22  1315 10/17/22  1716 10/18/22  0508    135* 146*   K 5.4* 4.4 3.8    101 108*   CO2 28 26 26   BUN 45* 48* 42*   CREATININE 2.78* 2.94* 1.82*   CALCIUM 9.1 9.4 8.0*   ALBUMIN 2.0* 1.9* 1.7*   PROT 5.7* 6.1* 4.8*   BILITOT 0.4 0.3 0.2   ALKPHOS 95 92 78   ALT 15* 13* 14*   AST 19 23 22   MG  --  1.9  --        All pertinent labs within the past 24 hours have been reviewed.    Significant Imaging:  I have reviewed all pertinent imaging results/findings within the past 24 hours.    Assessment/Plan:     * Septic shock  Secondary to UTI  Treated with IVFs, BC NGTD, lactic acid trended now.  On low dose levophed   Urine culture with GNB         Sacral decubitus ulcer  Wound care to evaluate     COPD (chronic obstructive pulmonary disease)  Stable     Acute renal failure with tubular necrosis  Improved - Cr down from 2.94 to 1.83- baseline 0.44  continue IVfs   UOP 625cc since admission  - will check renal US to r/o obstruction     Acute metabolic encephalopathy  Likely secondary to UTI. Looks like he is improving. Will follow simple commands      Bipolar disorder  Stable     Acute deep vein thrombosis (DVT) of both lower extremities  Hx of DVT -- US today with right popliteal nonocclusive thrombosis - start wt based renally dosed lovenox - change to oral anticoagulation when he passed swallow eval      Urinary tract infection with hematuria  Pt has a chronic anne at nursing home.   Will make sure it has been changed this admission   - urine culture with >100,000 GNB  -  Day #2 Merrem     Hypertension  Stable     Diabetes mellitus  Accuchecks with SSI     Alzheimer's disease  Stable, continue home medications                  Milka Rubio AG-ACNP  Pulmonology  [facility]

## 2022-10-18 NOTE — PLAN OF CARE
[facility]  Initial Discharge Assessment       Primary Care Provider: Kerry Lin MD    Admission Diagnosis: Altered mental status [R41.82]  Septic shock [A41.9, R65.21]  Urinary tract infection with hematuria, site unspecified [N39.0, R31.9]    Admission Date: 10/17/2022  Expected Discharge Date:     Discharge Barriers Identified: None    Payor: MEDICARE / Plan: MEDICARE PART A & B / Product Type: Government /     Extended Emergency Contact Information  Primary Emergency Contact: Trish Rudolph  Mobile Phone: [phone]  Relation: Relative    Discharge Plan A: Return to nursing home  Discharge Plan B: Return to Nursing Home    No Pharmacies Listed    Initial Assessment (most recent)       Adult Discharge Assessment - 10/18/22 1158          Discharge Assessment    Assessment Type Discharge Planning Assessment     Source of Information family     If unable to respond/provide information was family/caregiver contacted? Yes     Contact Name/Number trish dickinson [phone]     Lives With facility resident     Facility Arrived From: [facility]     Do you expect to return to your current living situation? Yes     Current cognitive status: --   AMS    Equipment Currently Used at Home other (see comments)   per [facility]    Patient currently being followed by outpatient case management? No     Do you currently have service(s) that help you manage your care at home? No     Are you on dialysis? No     Do you take coumadin? No     Discharge Plan A Return to nursing home     Discharge Plan B Return to Nursing Home     DME Needed Upon Discharge  none     Discharge Plan discussed with: --   niece    Discharge Barriers Identified None     SDOH --   SEE plan of care note                  Pt unable to answer questions, spoke with alok on phone, pt resides at [facility] and choice to return at [place], packet started, confirmed with jaden nurse at [facility] that pt can return, SDOH N/A since  pt resides at [facility], following

## 2022-10-18 NOTE — ASSESSMENT & PLAN NOTE
Will need to hold off on Trileptal and Risperdal as patient is at high risk for aspiration due to decreased level of consciousness.

## 2022-10-18 NOTE — H&P
[facility]  [facility] Medicine  History & Physical    Patient Name: Bhargav Gao  MRN: [id number]  Patient Class: IP- Inpatient  Admission Date: 10/17/2022  Attending Physician: NAVDEEP Arciniega MD  Primary Care Provider: Kerry Lin MD         Patient information was obtained from nursing home and ER records.     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  Patient was sent to ED via EMS secondary to decreased level of consciousness with hematuria.  No associated symptoms such as fever chills cough wheezing chest pain palpitation lower extremity edema nausea vomiting abdominal pain were reported.  No other history could be gathered at this time this patient is somnolent and confused and was only able to state his name only at this time.  On initial presentation, patient was hypotensive with systolic blood pressure in the 60s and tachycardic.  Ensuing workup was notable for leukocytosis with left shift, elevated lactic acid level, hyperglycemia, acute renal failure, and UA highly suggestive of a presence of urinary tract infection.  Patient did not respond favorably to IV fluid bolus of 30 cc/kg and thus will be admitted to ICU for vasopressor support.  Patient's code status is DNR according to nursing home records.      Past Medical History:   Diagnosis Date    Alzheimer's disease     Anticoagulant long-term use     Bipolar disorder     BPH (benign prostatic hyperplasia)     Deep vein thrombosis (DVT) of popliteal vein of right lower  extremity     Diabetes mellitus     DVT of deep femoral vein, left     Hyperlipidemia     Hypertension     Hypothyroidism     Idiopathic orofacial dystonia     Iron deficiency anemia secondary to blood loss (chronic)     Mild intellectual disabilities     Obesity     Sacral wound     05/16 records show culture of wound grew   Collected: 05/05/22 0920 Order Status: Completed Specimen: Wound from Sacral Updated: 05/11/22 1318  Culture, Wound/Abscess Light Growth Acinetobacter baumannii complex/haemolyticus Abnormal    Comment: Corrected result: Previously reported as Gram-negative Bacilli on 5/9/2022 at 1312 CDT.    Light Growth Escherichia coli Abnormal    Light Growth Klebsiel    Schizophrenia 1/28/2022 05/16 -continue trileptal and risperidone       Past Surgical History:   Procedure Laterality Date    COLOSTOMY N/A 5/24/2022    Procedure: CREATION, COLOSTOMY;  Surgeon: Edilma Felix MD;  Location: [facility];  Service: General;  Laterality: N/A;  diverting colostomy dr felix tuesday       Review of patient's allergies indicates:   Allergen Reactions    Metformin     Mycobacterium tuberculosis (tuberculin ppd)     Norvasc [amlodipine]        No current facility-administered medications on file prior to encounter.     Current Outpatient Medications on File Prior to Encounter   Medication Sig    apixaban (ELIQUIS) 5 mg Tab Take 5 mg by mouth 2 (two) times daily.    HYDROcodone-acetaminophen (NORCO) 5-325 mg per tablet Take 1 tablet by mouth every 6 (six) hours as needed for Pain.    insulin aspart U-100 (NOVOLOG) 100 unit/mL injection Inject 0-5 Units into the skin before meals and at bedtime as needed. **LOW CORRECTION DOSE**  Blood Glucose  mg/dL                  Pre-meal                2200  151-200                0 unit                      0 unit  201-250                2 units                    1 unit  251-300                3 units                    1 unit  301-350                4  units                    2 units  >350                     5 units                    3 units  Administer subcutaneously if needed at times designated by monitoring schedule.   DO NOT HOLD correction dose insulin for patients who are  NPO.    insulin detemir U-100 (LEVEMIR) 100 unit/mL injection Inject 10 Units into the skin once daily.    lactulose (CHRONULAC) 10 gram/15 mL solution Take 20 g by mouth once daily.    levothyroxine (SYNTHROID) 50 MCG tablet Take 1 tablet (50 mcg total) by mouth before breakfast.    losartan (COZAAR) 50 MG tablet Take 50 mg by mouth once daily. Hold for SBP < 110 or DBP < 60    memantine (NAMENDA) 10 MG Tab Take 10 mg by mouth nightly.    metoprolol tartrate (LOPRESSOR) 25 MG tablet Take 0.5 tablets (12.5 mg total) by mouth 2 (two) times daily.    multivitamin Tab Take 1 tablet by mouth once daily.    nitrofurantoin, macrocrystal-monohydrate, (MACROBID) 100 MG capsule Take 1 capsule (100 mg total) by mouth 2 (two) times daily.    OXcarbazepine (TRILEPTAL) 150 MG Tab Take 450 mg by mouth 2 (two) times daily.    pantoprazole (PROTONIX) 40 MG tablet Take 1 tablet by mouth once daily.    polyethylene glycol (GLYCOLAX) 17 gram PwPk Take 17 g by mouth 2 (two) times a day.    risperiDONE (RISPERDAL) 1 MG tablet Take 1 mg by mouth 2 (two) times daily.    tamsulosin (FLOMAX) 0.4 mg Cap Take 1 capsule by mouth once daily.    [DISCONTINUED] furosemide (LASIX) 40 MG tablet Take 40 mg by mouth once daily. Hold for SBP < 110 or DBP < 60    [DISCONTINUED] metoprolol succinate (TOPROL-XL) 25 MG 24 hr tablet Take 1 tablet by mouth once daily. Hold for SBP < 110 or DBP < 60 or pulse < 50    [DISCONTINUED] SITagliptin (JANUVIA) 50 MG Tab Take 1 tablet (50 mg total) by mouth once daily.     Family History       Family history is unknown by patient.          Tobacco Use    Smoking status: Never    Smokeless tobacco: Never   Substance and Sexual Activity    Alcohol use: Not Currently     Drug use: Never    Sexual activity: Not Currently     Review of system could not be performed due to patient's decreased level of consciousness and confusional state    Objective:     Vital Signs (Most Recent):  Temp: 97.7 °F (36.5 °C) (10/17/22 1642)  Pulse: 84 (10/17/22 1827)  Resp: 17 (10/17/22 1827)  BP: (!) 80/40 (10/17/22 1827)  SpO2: 100 % (10/17/22 1827)   Vital Signs (24h Range):  Temp:  [97.7 °F (36.5 °C)] 97.7 °F (36.5 °C)  Pulse:  [83-97] 84  Resp:  [11-17] 17  SpO2:  [100 %] 100 %  BP: (66-95)/(38-54) 80/40     Weight: 92.1 kg (203 lb)  Body mass index is 27.53 kg/m².    Physical Exam  Vitals reviewed.   Constitutional:       General: He is not in acute distress.  HENT:      Head: Normocephalic and atraumatic.      Right Ear: External ear normal.      Left Ear: External ear normal.   Eyes:      Extraocular Movements: Extraocular movements intact.      Pupils: Pupils are equal, round, and reactive to light.   Cardiovascular:      Rate and Rhythm: Normal rate and regular rhythm.      Pulses: Normal pulses.      Heart sounds: Normal heart sounds. No murmur heard.  Pulmonary:      Effort: Pulmonary effort is normal. No respiratory distress.      Breath sounds: Wheezing present.   Chest:      Chest wall: No tenderness.   Abdominal:      General: Abdomen is flat.      Palpations: Abdomen is soft. There is no mass.      Tenderness: There is no abdominal tenderness. There is no right CVA tenderness or left CVA tenderness.   Musculoskeletal:         General: No swelling or tenderness. Normal range of motion.   Skin:     Capillary Refill: Capillary refill takes less than 2 seconds.      Comments: Stage IV sacral wound was noted. Wound base was clean with no purulent drainage or fibrinous/necrotic tissue   Neurological:      Comments: Patient was somnolent but arousable and was only able to state his name only     Neurological examination was limited secondary to patient's inability to follow verbal commands      Significant Labs: All pertinent labs within the past 24 hours have been reviewed.    Significant Imaging: I have reviewed all pertinent imaging results/findings within the past 24 hours.    Assessment/Plan:     * Septic shock    Patient presents to the emergency room with systolic blood pressure in the 60s and tachycardiac with a heart rate >90.  Patient was subsequently given 30 cc/kg of IV fluid bolus without improvement and thus will be admitted to ICU for vasopressor support.  Blood cultures x 2 was ordered and patient was given empiric antibiotic.  Source or infection appeared to be UTI associated with chronic indwelling Khan      Urinary tract infection with hematuria    Patient has a history of recurring urinary tract infection Mr. Olson with chronic indwelling Khan.  Patient has grown multiple MDR organisms in the past including Acinetobacter, Pseudomonas, and Enterococcus faecalis.  Will empirically start patient on meropenem      Acute renal failure with tubular necrosis    Patient with acute kidney injury likely due to acute tubular necrosis secondary to septic shock.  Renal function/electrolytes with Estimated Creatinine Clearance: 21.3 mL/min (A) (based on SCr of 2.94 mg/dL (H)). according to latest data. Monitor urine output and serial BMP and adjust therapy as needed. Avoid nephrotoxins and renally dose meds for GFR listed above. Will try to maintain MAP > 65 with vasopressors      Acute metabolic encephalopathy    Secondary to sepsis.  Baseline mental status is unknown at this time but patient is said to be bed-bound and has significant level of confusion due to presence of Alzheimer's dementia coupled with bipolar disorder and schizophrenia      Bipolar disorder    Will need to hold off on Trileptal and Risperdal as patient is at high risk for aspiration due to decreased level of consciousness.      Diabetes mellitus    Last known hemoglobin A1c was 6.6 3 months ago, but patient presented  with blood glucose of 212.  Will start patient on low intensity sliding scale insulin q.4 hours to maintain CABGs in the range of 130s to 180s    COPD (chronic obstructive pulmonary disease)    While patient does not have a diagnosis of COPD and med list from nursing home did not include any neb treatments.  Chest x-ray demonstrated chronic interstitial changes and patient was wheezing on physical examination.  Will start patient on DuoNeb q.6 hours.  Currently, patient is not on any acute respiratory distress with room air SpO2 of 100%    Acute deep vein thrombosis (DVT) of both lower extremities    Patient was diagnosed with bilateral lower extremity DVT in January of 2022 and is currently maintained on Eliquis.  However, secondary to increased risk of aspiration, Eliquis will be held for now, and patient will be started on heparin 5000 units subcu q.8 hours.  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  Wound base was clean with no purulent drainage or for fibrinous/devitalized tissue.  Will consult wound care      ARMAAN (iron deficiency anemia)    Patient's hemoglobin level is remaining stable from his baseline at 11 g      Hypertension    Will need to hold off on antihypertensive as patient is currently in septic shock      BPH (benign prostatic hyperplasia)    Patient has chronic indwelling Khan secondary to BPH with HORNE.  Again, will need to hold off on Flomax as patient is currently in a state of high risk for aspiration        VTE Risk Mitigation (From admission, onward)         Ordered     heparin (porcine) injection 5,000 Units  Every 8 hours         10/17/22 1901                   Raymond Manuel MD  Department of Hospital Medicine   [facility]

## 2022-10-18 NOTE — SUBJECTIVE & OBJECTIVE
Past Medical History:   Diagnosis Date    Alzheimer's disease     Anticoagulant long-term use     Bipolar disorder     BPH (benign prostatic hyperplasia)     Deep vein thrombosis (DVT) of popliteal vein of right lower extremity     Diabetes mellitus     DVT of deep femoral vein, left     Hyperlipidemia     Hypertension     Hypothyroidism     Idiopathic orofacial dystonia     Iron deficiency 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    Light Growth Escherichia coli Abnormal    Light Growth Klebsiel    Schizophrenia 1/28/2022 05/16 -continue trileptal and risperidone       Past Surgical History:   Procedure Laterality Date    COLOSTOMY N/A 5/24/2022    Procedure: CREATION, COLOSTOMY;  Surgeon: Edilma Felix MD;  Location: [facility];  Service: General;  Laterality: N/A;  diverting colostomy dr felix tuesday       Review of patient's allergies indicates:   Allergen Reactions    Metformin     Mycobacterium tuberculosis (tuberculin ppd)     Norvasc [amlodipine]        No current facility-administered medications on file prior to encounter.     Current Outpatient Medications on File Prior to Encounter   Medication Sig    apixaban (ELIQUIS) 5 mg Tab Take 5 mg by mouth 2 (two) times daily.    HYDROcodone-acetaminophen (NORCO) 5-325 mg per tablet Take 1 tablet by mouth every 6 (six) hours as needed for Pain.    insulin aspart U-100 (NOVOLOG) 100 unit/mL injection Inject 0-5 Units into the skin before meals and at bedtime as needed. **LOW CORRECTION DOSE**  Blood Glucose  mg/dL                  Pre-meal                 2200  151-200                0 unit                      0 unit  201-250                2 units                    1 unit  251-300                3 units                    1 unit  301-350                4 units                    2 units  >350                     5 units                    3 units  Administer subcutaneously if needed at times designated by monitoring schedule.   DO NOT HOLD correction dose insulin for patients who are  NPO.    insulin detemir U-100 (LEVEMIR) 100 unit/mL injection Inject 10 Units into the skin once daily.    lactulose (CHRONULAC) 10 gram/15 mL solution Take 20 g by mouth once daily.    levothyroxine (SYNTHROID) 50 MCG tablet Take 1 tablet (50 mcg total) by mouth before breakfast.    losartan (COZAAR) 50 MG tablet Take 50 mg by mouth once daily. Hold for SBP < 110 or DBP < 60    memantine (NAMENDA) 10 MG Tab Take 10 mg by mouth nightly.    metoprolol tartrate (LOPRESSOR) 25 MG tablet Take 0.5 tablets (12.5 mg total) by mouth 2 (two) times daily.    multivitamin Tab Take 1 tablet by mouth once daily.    nitrofurantoin, macrocrystal-monohydrate, (MACROBID) 100 MG capsule Take 1 capsule (100 mg total) by mouth 2 (two) times daily.    OXcarbazepine (TRILEPTAL) 150 MG Tab Take 450 mg by mouth 2 (two) times daily.    pantoprazole (PROTONIX) 40 MG tablet Take 1 tablet by mouth once daily.    polyethylene glycol (GLYCOLAX) 17 gram PwPk Take 17 g by mouth 2 (two) times a day.    risperiDONE (RISPERDAL) 1 MG tablet Take 1 mg by mouth 2 (two) times daily.    tamsulosin (FLOMAX) 0.4 mg Cap Take 1 capsule by mouth once daily.    [DISCONTINUED] furosemide (LASIX) 40 MG tablet Take 40 mg by mouth once daily. Hold for SBP < 110 or DBP < 60    [DISCONTINUED] metoprolol succinate (TOPROL-XL) 25 MG 24 hr tablet Take 1 tablet by mouth once daily. Hold for SBP < 110 or DBP < 60 or pulse < 50    [DISCONTINUED] SITagliptin (JANUVIA) 50 MG Tab Take 1 tablet (50 mg total) by mouth once daily.     Family  History       Family history is unknown by patient.          Tobacco Use    Smoking status: Never    Smokeless tobacco: Never   Substance and Sexual Activity    Alcohol use: Not Currently    Drug use: Never    Sexual activity: Not Currently     Review of system could not be performed due to patient's decreased level of consciousness and confusional state    Objective:     Vital Signs (Most Recent):  Temp: 97.7 °F (36.5 °C) (10/17/22 1642)  Pulse: 84 (10/17/22 1827)  Resp: 17 (10/17/22 1827)  BP: (!) 80/40 (10/17/22 1827)  SpO2: 100 % (10/17/22 1827)   Vital Signs (24h Range):  Temp:  [97.7 °F (36.5 °C)] 97.7 °F (36.5 °C)  Pulse:  [83-97] 84  Resp:  [11-17] 17  SpO2:  [100 %] 100 %  BP: (66-95)/(38-54) 80/40     Weight: 92.1 kg (203 lb)  Body mass index is 27.53 kg/m².    Physical Exam  Vitals reviewed.   Constitutional:       General: He is not in acute distress.  HENT:      Head: Normocephalic and atraumatic.      Right Ear: External ear normal.      Left Ear: External ear normal.   Eyes:      Extraocular Movements: Extraocular movements intact.      Pupils: Pupils are equal, round, and reactive to light.   Cardiovascular:      Rate and Rhythm: Normal rate and regular rhythm.      Pulses: Normal pulses.      Heart sounds: Normal heart sounds. No murmur heard.  Pulmonary:      Effort: Pulmonary effort is normal. No respiratory distress.      Breath sounds: Wheezing present.   Chest:      Chest wall: No tenderness.   Abdominal:      General: Abdomen is flat.      Palpations: Abdomen is soft. There is no mass.      Tenderness: There is no abdominal tenderness. There is no right CVA tenderness or left CVA tenderness.   Musculoskeletal:         General: No swelling or tenderness. Normal range of motion.   Skin:     Capillary Refill: Capillary refill takes less than 2 seconds.      Comments: Stage IV sacral wound was noted. Wound base was clean with no purulent drainage or fibrinous/necrotic tissue   Neurological:       Comments: Patient was somnolent but arousable and was only able to state his name only     Neurological examination was limited secondary to patient's inability to follow verbal commands     Significant Labs: All pertinent labs within the past 24 hours have been reviewed.    Significant Imaging: I have reviewed all pertinent imaging results/findings within the past 24 hours.

## 2022-10-18 NOTE — HPI
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  Patient was sent to ED via EMS secondary to decreased level of consciousness with hematuria.  No associated symptoms such as fever chills cough wheezing chest pain palpitation lower extremity edema nausea vomiting abdominal pain were reported.  No other history could be gathered at this time this patient is somnolent and confused and was only able to state his name only at this time.  On initial presentation, patient was hypotensive with systolic blood pressure in the 60s and tachycardic.  Ensuing workup was notable for leukocytosis with left shift, elevated lactic acid level, hyperglycemia, acute renal failure, and UA highly suggestive of a presence of urinary tract infection.  Patient did not respond favorably to IV fluid bolus of 30 cc/kg and thus will be admitted to ICU for vasopressor support.  Patient's code status is DNR according to nursing home records.

## 2022-10-18 NOTE — ASSESSMENT & PLAN NOTE
Patient was diagnosed with bilateral lower extremity DVT in January of 2022 and is currently maintained on Eliquis.  However, secondary to increased risk of aspiration, Eliquis will be held for now, and patient will be started on heparin 5000 units subcu q.8 hours.  Will proceed with venous ultrasound of bilateral lower extremities to see if patient still has DVT requiring chronic anticoagulation

## 2022-10-18 NOTE — ASSESSMENT & PLAN NOTE
Pt has a chronic anne at nursing home.   Will make sure it has been changed this admission   - urine culture with >100,000 GNB  -  Day #2 Merrem

## 2022-10-18 NOTE — SUBJECTIVE & OBJECTIVE
No current facility-administered medications on file prior to encounter.     Current Outpatient Medications on File Prior to Encounter   Medication Sig    apixaban (ELIQUIS) 5 mg Tab Take 5 mg by mouth 2 (two) times daily.    HYDROcodone-acetaminophen (NORCO) 5-325 mg per tablet Take 1 tablet by mouth every 6 (six) hours as needed for Pain.    insulin aspart U-100 (NOVOLOG) 100 unit/mL injection Inject 0-5 Units into the skin before meals and at bedtime as needed. **LOW CORRECTION DOSE**  Blood Glucose  mg/dL                  Pre-meal                2200  151-200                0 unit                      0 unit  201-250                2 units                    1 unit  251-300                3 units                    1 unit  301-350                4 units                    2 units  >350                     5 units                    3 units  Administer subcutaneously if needed at times designated by monitoring schedule.   DO NOT HOLD correction dose insulin for patients who are  NPO.    insulin detemir U-100 (LEVEMIR) 100 unit/mL injection Inject 10 Units into the skin once daily.    lactulose (CHRONULAC) 10 gram/15 mL solution Take 20 g by mouth once daily.    levothyroxine (SYNTHROID) 50 MCG tablet Take 1 tablet (50 mcg total) by mouth before breakfast.    losartan (COZAAR) 50 MG tablet Take 50 mg by mouth once daily. Hold for SBP < 110 or DBP < 60    memantine (NAMENDA) 10 MG Tab Take 10 mg by mouth nightly.    metoprolol tartrate (LOPRESSOR) 25 MG tablet Take 0.5 tablets (12.5 mg total) by mouth 2 (two) times daily.    multivitamin Tab Take 1 tablet by mouth once daily.    nitrofurantoin, macrocrystal-monohydrate, (MACROBID) 100 MG capsule Take 1 capsule (100 mg total) by mouth 2 (two) times daily.    OXcarbazepine (TRILEPTAL) 150 MG Tab Take 450 mg by mouth 2 (two) times daily.    pantoprazole (PROTONIX) 40 MG tablet Take 1 tablet by mouth once daily.    polyethylene glycol (GLYCOLAX) 17 gram PwPk Take 17 g  by mouth 2 (two) times a day.    risperiDONE (RISPERDAL) 1 MG tablet Take 1 mg by mouth 2 (two) times daily.    tamsulosin (FLOMAX) 0.4 mg Cap Take 1 capsule by mouth once daily.    [DISCONTINUED] furosemide (LASIX) 40 MG tablet Take 40 mg by mouth once daily. Hold for SBP < 110 or DBP < 60    [DISCONTINUED] metoprolol succinate (TOPROL-XL) 25 MG 24 hr tablet Take 1 tablet by mouth once daily. Hold for SBP < 110 or DBP < 60 or pulse < 50    [DISCONTINUED] SITagliptin (JANUVIA) 50 MG Tab Take 1 tablet (50 mg total) by mouth once daily.       Review of patient's allergies indicates:   Allergen Reactions    Metformin     Mycobacterium tuberculosis (tuberculin ppd)     Norvasc [amlodipine]        Past Medical History:   Diagnosis Date    Alzheimer's disease     Anticoagulant long-term use     Bipolar disorder     BPH (benign prostatic hyperplasia)     Deep vein thrombosis (DVT) of popliteal vein of right lower extremity     Diabetes mellitus     DVT of deep femoral vein, left     Hyperlipidemia     Hypertension     Hypothyroidism     Idiopathic orofacial dystonia     Iron deficiency 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    Light Growth Escherichia coli Abnormal    Light Growth Klebsiel    Schizophrenia 1/28/2022 05/16 -continue trileptal and risperidone     Past Surgical History:   Procedure Laterality Date    COLOSTOMY N/A 5/24/2022    Procedure: CREATION, COLOSTOMY;  Surgeon: Edilma Felix MD;  Location: [facility];  Service: General;  Laterality: N/A;  diverting colostomy dr felix tuesday     Family History        Family history is unknown by patient.          Tobacco Use    Smoking status: Never    Smokeless tobacco: Never   Substance and Sexual Activity    Alcohol use: Not Currently    Drug use: Never    Sexual activity: Not Currently     Review of Systems   Unable to perform ROS: Dementia   Objective:     Vital Signs (Most Recent):  Temp: 97.3 °F (36.3 °C) (10/18/22 1345)  Pulse: 71 (10/18/22 1345)  Resp: 13 (10/18/22 1345)  BP: (!) 91/48 (10/18/22 1345)  SpO2: 100 % (10/18/22 1345)   Vital Signs (24h Range):  Temp:  [95.4 °F (35.2 °C)-98.2 °F (36.8 °C)] 97.3 °F (36.3 °C)  Pulse:  [57-97] 71  Resp:  [10-26] 13  SpO2:  [96 %-100 %] 100 %  BP: ()/(38-68) 91/48     Weight: 101.1 kg (222 lb 14.2 oz)  Body mass index is 30.23 kg/m².    Physical Exam  Vitals and nursing note reviewed. Exam conducted with a chaperone present.   Constitutional:       Appearance: He is ill-appearing.   HENT:      Head: Normocephalic.   Pulmonary:      Effort: Pulmonary effort is normal.   Abdominal:      Palpations: Abdomen is soft.   Genitourinary:     Comments: Sacral wound  Skin:     General: Skin is warm and dry.   Neurological:      Mental Status: He is alert.       Significant Labs:  I have reviewed all pertinent lab results within the past 24 hours.  CBC:   Recent Labs   Lab 10/18/22  0508   WBC 16.17*   RBC 3.44*   HGB 9.4*   HCT 29.5*      MCV 85.8   MCH 27.3   MCHC 31.9*     BMP:   Recent Labs   Lab 10/17/22  1716 10/18/22  0508   * 127*   * 146*   K 4.4 3.8    108*   CO2 26 26   BUN 48* 42*   CREATININE 2.94* 1.82*   CALCIUM 9.4 8.0*   MG 1.9  --      CMP:   Recent Labs   Lab 10/18/22  0508   *   CALCIUM 8.0*   ALBUMIN 1.7*   PROT 4.8*   *   K 3.8   CO2 26   *   BUN 42*   CREATININE 1.82*   ALKPHOS 78   ALT 14*   AST 22   BILITOT 0.2     LFTs:   Recent Labs   Lab 10/18/22  0508   ALT 14*   AST 22   ALKPHOS 78   BILITOT 0.2   PROT 4.8*   ALBUMIN 1.7*     Coagulation:   Recent Labs   Lab  10/17/22  1716   APTT 38.7*     Cardiac markers: No results for input(s): CKMB, CPKMB, TROPONINT, TROPONINI, MYOGLOBIN in the last 168 hours.  ABGs: No results for input(s): PH, PCO2, PO2, HCO3, POCSATURATED, BE in the last 168 hours.    Significant Diagnostics:  I have reviewed all pertinent imaging results/findings within the past 24 hours.

## 2022-10-18 NOTE — ASSESSMENT & PLAN NOTE
Patient has a history of recurring urinary tract infection Mr. Olson with chronic indwelling Khan.  Patient has grown multiple MDR organisms in the past including Acinetobacter, Pseudomonas, and Enterococcus faecalis.  Will empirically start patient on meropenem

## 2022-10-18 NOTE — ASSESSMENT & PLAN NOTE
Patient has chronic indwelling Khan secondary to BPH with HORNE.  Again, will need to hold off on Flomax as patient is currently in a state of high risk for aspiration

## 2022-10-18 NOTE — PLAN OF CARE
Problem: Adult Inpatient Plan of Care  Goal: Plan of Care Review  Flowsheets (Taken 10/17/2022 2211)  Plan of Care Reviewed With: patient  Goal: Optimal Comfort and Wellbeing  Outcome: Ongoing, Progressing  Goal: Readiness for Transition of Care  Outcome: Ongoing, Progressing     Problem: Diabetes Comorbidity  Goal: Blood Glucose Level Within Targeted Range  Outcome: Ongoing, Progressing     Problem: Adjustment to Illness (Sepsis/Septic Shock)  Goal: Optimal Coping  Outcome: Ongoing, Progressing     Problem: Bleeding (Sepsis/Septic Shock)  Goal: Absence of Bleeding  Outcome: Ongoing, Progressing     Problem: Glycemic Control Impaired (Sepsis/Septic Shock)  Goal: Blood Glucose Level Within Desired Range  Outcome: Ongoing, Progressing     Problem: Infection Progression (Sepsis/Septic Shock)  Goal: Absence of Infection Signs and Symptoms  Outcome: Ongoing, Progressing     Problem: Nutrition Impaired (Sepsis/Septic Shock)  Goal: Optimal Nutrition Intake  Outcome: Ongoing, Progressing     Problem: Skin Injury Risk Increased  Goal: Skin Health and Integrity  Outcome: Ongoing, Progressing

## 2022-10-18 NOTE — PROGRESS NOTES
Repeat volume status and tissue perfusion assessment    Vital signs:  Blood pressure 98/56 with MAP of 69, heart rate 67, respiratory rate 10 with SpO2 of Will 100% on room air and temp of 95.4° F    Physical examination    Cardiac:  Regular rate without murmur S1-S2 no S3-S4  Lungs:  Diffuse if story wheezing noted unchanged  Peripheral pulses +1 DP/PT  Capillary refill:  Less than 2 seconds  Skin:  Normal skin turgor noted

## 2022-10-18 NOTE — CONSULTS
[facility] ICU  General Surgery  Consult Note    Patient Name: Bhargav Gao  MRN: [id number]  Code Status: DNR  Admission Date: 10/17/2022  Hospital Length of Stay: 1 days  Attending Physician: Roby Arciniega MD  Primary Care Provider: Kerry Lin MD    Patient information was obtained from ER records.     Inpatient consult to General Surgery  Consult performed by: MAX Feliciano  Consult ordered by: TERRENCE Plasencia  Reason for consult: sacral wound  Assessment/Recommendations: Wound care no surgical debridement needed        Subjective:     Principal Problem: Septic shock    History of Present Illness: 81 y/o male admitted to ICU with urosepsis  Bedbound, nonverbal  General surgery consulted for sacral wound      No current facility-administered medications on file prior to encounter.     Current Outpatient Medications on File Prior to Encounter   Medication Sig    apixaban (ELIQUIS) 5 mg Tab Take 5 mg by mouth 2 (two) times daily.    HYDROcodone-acetaminophen (NORCO) 5-325 mg per tablet Take 1 tablet by mouth every 6 (six) hours as needed for Pain.    insulin aspart U-100 (NOVOLOG) 100 unit/mL injection Inject 0-5 Units into the skin before meals and at bedtime as needed. **LOW CORRECTION DOSE**  Blood Glucose  mg/dL                  Pre-meal                2200  151-200                0 unit                      0 unit  201-250                2 units                    1 unit  251-300                3 units                    1 unit  301-350                4 units                    2 units  >350                     5 units                    3 units  Administer subcutaneously if needed at times designated by monitoring schedule.   DO NOT HOLD correction dose insulin for patients who are  NPO.    insulin detemir U-100 (LEVEMIR) 100 unit/mL injection Inject 10 Units into the skin once daily.    lactulose (CHRONULAC) 10 gram/15 mL solution Take 20 g by mouth once  daily.    levothyroxine (SYNTHROID) 50 MCG tablet Take 1 tablet (50 mcg total) by mouth before breakfast.    losartan (COZAAR) 50 MG tablet Take 50 mg by mouth once daily. Hold for SBP < 110 or DBP < 60    memantine (NAMENDA) 10 MG Tab Take 10 mg by mouth nightly.    metoprolol tartrate (LOPRESSOR) 25 MG tablet Take 0.5 tablets (12.5 mg total) by mouth 2 (two) times daily.    multivitamin Tab Take 1 tablet by mouth once daily.    nitrofurantoin, macrocrystal-monohydrate, (MACROBID) 100 MG capsule Take 1 capsule (100 mg total) by mouth 2 (two) times daily.    OXcarbazepine (TRILEPTAL) 150 MG Tab Take 450 mg by mouth 2 (two) times daily.    pantoprazole (PROTONIX) 40 MG tablet Take 1 tablet by mouth once daily.    polyethylene glycol (GLYCOLAX) 17 gram PwPk Take 17 g by mouth 2 (two) times a day.    risperiDONE (RISPERDAL) 1 MG tablet Take 1 mg by mouth 2 (two) times daily.    tamsulosin (FLOMAX) 0.4 mg Cap Take 1 capsule by mouth once daily.    [DISCONTINUED] furosemide (LASIX) 40 MG tablet Take 40 mg by mouth once daily. Hold for SBP < 110 or DBP < 60    [DISCONTINUED] metoprolol succinate (TOPROL-XL) 25 MG 24 hr tablet Take 1 tablet by mouth once daily. Hold for SBP < 110 or DBP < 60 or pulse < 50    [DISCONTINUED] SITagliptin (JANUVIA) 50 MG Tab Take 1 tablet (50 mg total) by mouth once daily.       Review of patient's allergies indicates:   Allergen Reactions    Metformin     Mycobacterium tuberculosis (tuberculin ppd)     Norvasc [amlodipine]        Past Medical History:   Diagnosis Date    Alzheimer's disease     Anticoagulant long-term use     Bipolar disorder     BPH (benign prostatic hyperplasia)     Deep vein thrombosis (DVT) of popliteal vein of right lower extremity     Diabetes mellitus     DVT of deep femoral vein, left     Hyperlipidemia     Hypertension     Hypothyroidism     Idiopathic orofacial dystonia     Iron deficiency anemia secondary to blood loss (chronic)      Mild intellectual disabilities     Obesity     Sacral wound     05/16 records show culture of wound grew   Collected: 05/05/22 0920 Order Status: Completed Specimen: Wound from Sacral Updated: 05/11/22 1318  Culture, Wound/Abscess Light Growth Acinetobacter baumannii complex/haemolyticus Abnormal    Comment: Corrected result: Previously reported as Gram-negative Bacilli on 5/9/2022 at 1312 CDT.    Light Growth Escherichia coli Abnormal    Light Growth Klebsiel    Schizophrenia 1/28/2022 05/16 -continue trileptal and risperidone     Past Surgical History:   Procedure Laterality Date    COLOSTOMY N/A 5/24/2022    Procedure: CREATION, COLOSTOMY;  Surgeon: Edilma Felix MD;  Location: [facility];  Service: General;  Laterality: N/A;  diverting colostomy dr felix tuesday     Family History       Family history is unknown by patient.          Tobacco Use    Smoking status: Never    Smokeless tobacco: Never   Substance and Sexual Activity    Alcohol use: Not Currently    Drug use: Never    Sexual activity: Not Currently     Review of Systems   Unable to perform ROS: Dementia   Objective:     Vital Signs (Most Recent):  Temp: 97.3 °F (36.3 °C) (10/18/22 1345)  Pulse: 71 (10/18/22 1345)  Resp: 13 (10/18/22 1345)  BP: (!) 91/48 (10/18/22 1345)  SpO2: 100 % (10/18/22 1345)   Vital Signs (24h Range):  Temp:  [95.4 °F (35.2 °C)-98.2 °F (36.8 °C)] 97.3 °F (36.3 °C)  Pulse:  [57-97] 71  Resp:  [10-26] 13  SpO2:  [96 %-100 %] 100 %  BP: ()/(38-68) 91/48     Weight: 101.1 kg (222 lb 14.2 oz)  Body mass index is 30.23 kg/m².    Physical Exam  Vitals and nursing note reviewed. Exam conducted with a chaperone present.   Constitutional:       Appearance: He is ill-appearing.   HENT:      Head: Normocephalic.   Pulmonary:      Effort: Pulmonary effort is normal.   Abdominal:      Palpations: Abdomen is soft.   Genitourinary:     Comments: Sacral wound  Skin:     General: Skin is warm and dry.   Neurological:       Mental Status: He is alert.       Significant Labs:  I have reviewed all pertinent lab results within the past 24 hours.  CBC:   Recent Labs   Lab 10/18/22  0508   WBC 16.17*   RBC 3.44*   HGB 9.4*   HCT 29.5*      MCV 85.8   MCH 27.3   MCHC 31.9*     BMP:   Recent Labs   Lab 10/17/22  1716 10/18/22  0508   * 127*   * 146*   K 4.4 3.8    108*   CO2 26 26   BUN 48* 42*   CREATININE 2.94* 1.82*   CALCIUM 9.4 8.0*   MG 1.9  --      CMP:   Recent Labs   Lab 10/18/22  0508   *   CALCIUM 8.0*   ALBUMIN 1.7*   PROT 4.8*   *   K 3.8   CO2 26   *   BUN 42*   CREATININE 1.82*   ALKPHOS 78   ALT 14*   AST 22   BILITOT 0.2     LFTs:   Recent Labs   Lab 10/18/22  0508   ALT 14*   AST 22   ALKPHOS 78   BILITOT 0.2   PROT 4.8*   ALBUMIN 1.7*     Coagulation:   Recent Labs   Lab 10/17/22  1716   APTT 38.7*     Cardiac markers: No results for input(s): CKMB, CPKMB, TROPONINT, TROPONINI, MYOGLOBIN in the last 168 hours.  ABGs: No results for input(s): PH, PCO2, PO2, HCO3, POCSATURATED, BE in the last 168 hours.    Significant Diagnostics:  I have reviewed all pertinent imaging results/findings within the past 24 hours.      Assessment/Plan:     Sacral decubitus ulcer  10/18 large wound clean, no pus or necrotic tissue to be debrided, not likely source of sepsis  Continue wound care  Offloading, optimize nutrition  No surgical debridement indicated  Call surgery prn if wound worsens      VTE Risk Mitigation (From admission, onward)         Ordered     enoxaparin injection 90 mg  Every 24 hours (non-standard times)         10/18/22 1006                Thank you for your consult. I will sign off. Please contact us if you have any additional questions.    Mary Baumann, [facility]  General Surgery  [facility]

## 2022-10-18 NOTE — HOSPITAL COURSE
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  Patient was sent to ED via EMS secondary to decreased level of consciousness with hematuria.  No associated symptoms such as fever chills cough wheezing chest pain palpitation lower extremity edema nausea vomiting abdominal pain were reported.  No other history could be gathered at this time this patient is somnolent and confused and was only able to state his name only at this time.  On initial presentation, patient was hypotensive with systolic blood pressure in the 60s and tachycardic.  Ensuing workup was notable for leukocytosis with left shift, elevated lactic acid level, hyperglycemia, acute renal failure, and UA highly suggestive of a presence of urinary tract infection.  Patient did not respond favorably to IV fluid bolus of 30 cc/kg and thus will be admitted to ICU for vasopressor support.  Patient's code status is DNR according to nursing home records    Hospital course--  for patient admitted taking his of care on IV Levophed for sepsis secondary urinary tract infection.  IV Levophed was weaned off and less than 24 hours.  His AKA resolved with IV fluids and his creatinine is now back to baseline.  Patient has a chronic indwelling Khan at the nursing home.  This has been changed to this admission.  He has had multiple UTIs in the past.  He was started on Merrem and vancomycin concurrently on day 3 of antibiotics.  Urine culture is still pending but shows gram-negative bacilli.  He is now had 2/2 bottles from his blood cultures with gram-positive cocci.  Surgery was consulted to evaluate his sacral wound.  No surgical I and D was needed.  Wound  care to continue to follow.  Ultrasound this admission showed a nonocclusive thrombus in his right popliteal vein.  He does have history of DVTs.  He is currently on weight based Lovenox since he failed his swallow evaluation today.  Plan is to repeat tomorrow and if he fails again he will need NG tube.  He has met maximal benefit this hospitalization with discharge [facility] for continued IV antibiotics.  Blood cultures x2 were repeated today.would not recommend placing a PICC until his blood cultures clear.

## 2022-10-18 NOTE — ASSESSMENT & PLAN NOTE
Hx of DVT -- US today with right popliteal nonocclusive thrombosis - start wt based renally dosed lovenox - change to oral anticoagulation when he passed swallow eval

## 2022-10-18 NOTE — PROGRESS NOTES
[facility]  Wound Care    Patient Name:  Bhargav Gao   MRN:  [id number]  Date: 10/18/2022  Diagnosis: Septic shock    History:     Past Medical History:   Diagnosis Date    Alzheimer's disease     Anticoagulant long-term use     Bipolar disorder     BPH (benign prostatic hyperplasia)     Deep vein thrombosis (DVT) of popliteal vein of right lower extremity     Diabetes mellitus     DVT of deep femoral vein, left     Hyperlipidemia     Hypertension     Hypothyroidism     Idiopathic orofacial dystonia     Iron deficiency 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    Light Growth Escherichia coli Abnormal    Light Growth Klebsiel    Schizophrenia 1/28/2022 05/16 -continue trileptal and risperidone       Social History     Socioeconomic History    Marital status: Single   Occupational History    Occupation: disabled   Tobacco Use    Smoking status: Never    Smokeless tobacco: Never   Substance and Sexual Activity    Alcohol use: Not Currently    Drug use: Never    Sexual activity: Not Currently       Precautions:     Allergies as of 10/17/2022 - Reviewed 10/17/2022   Allergen Reaction Noted    Metformin  09/03/2021    Mycobacterium tuberculosis (tuberculin ppd)  09/03/2021    Norvasc [amlodipine]  09/03/2021       WOC Assessment Details/Treatment        10/18/22 1457   WOCN Assessment   WOCN Total Time (mins) 125   Visit Date 10/18/22   Consult Type Wound Care Tech;New   WOCN Speciality Wound   Wound pressure;deep tissue injury   Number of Wounds 2   Intervention chart  review;assessed;changed;applied;coordination of care;consult other service;team conference;orders   Teaching on-going   Skin Interventions   Pressure Reduction Devices heel offloading device utilized   Pressure Reduction Techniques frequent weight shift encouraged;heels elevated off bed;positioned off wounds   Pressure Injury Prevention    Check Moisture Management Pad Done   Heel protection technique Foam dressing        Altered Skin Integrity 10/17/22 2015 Right Sacral spine Ulceration Full thickness tissue loss with exposed bone, tendon, or muscle. Often includes undermining and tunneling. May extend into muscle and/or supporting structures.   Date First Assessed/Time First Assessed: 10/17/22 2015   Altered Skin Integrity Present on Admission: yes  Side: (c) Right  Location: Sacral spine  Primary Wound Type: Ulceration  Description of Altered Skin Integrity: Full thickness tissue loss with ...   Wound Image    Description of Altered Skin Integrity Full thickness tissue loss with exposed bone, tendon, or muscle. Often includes undermining and tunneling. May extend into muscle and/or supporting structures.   Dressing Appearance Saturated;Intact   Drainage Amount Large   Drainage Characteristics/Odor Serosanguineous;Purulent   Appearance Pink;Gray;Moist;Not granulating;Muscle   Tissue loss description Full thickness   Red (%), Wound Tissue Color 50 %   Periwound Area Denuded;Macerated   Wound Edges Defined;Rolled/closed   Wound Length (cm) 6 cm   Wound Width (cm) 3 cm   Wound Depth (cm) 1.7 cm   Wound Volume (cm^3) 30.6 cm^3   Wound Surface Area (cm^2) 18 cm^2   Care Cleansed with:;Wound cleanser;Applied:;Skin Barrier   Dressing Removed;Changed;Hydrofiber;Silver;Absorptive Pad   Periwound Care Skin barrier film applied;Absorptive dressing applied;Cleansed with pH balanced cleanser;Dry periwound area maintained       WOC Team consulted for Sacral wounds.     Narrative: Pt awake, does not communicate verbally, does  make eye contact.    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    Orders placed.    Thank you for the consult.     We will continue to follow. See additional note under Notes TAB for tentative f/u plan/dates.    10/18/2022

## 2022-10-18 NOTE — ASSESSMENT & PLAN NOTE
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  Wound base was clean with no purulent drainage or for fibrinous/devitalized tissue.  Will consult wound care

## 2022-10-18 NOTE — ASSESSMENT & PLAN NOTE
Patient with acute kidney injury likely due to acute tubular necrosis secondary to septic shock.  Renal function/electrolytes with Estimated Creatinine Clearance: 21.3 mL/min (A) (based on SCr of 2.94 mg/dL (H)). according to latest data. Monitor urine output and serial BMP and adjust therapy as needed. Avoid nephrotoxins and renally dose meds for GFR listed above. Will try to maintain MAP > 65 with vasopressors

## 2022-10-18 NOTE — PT/OT/SLP EVAL
Speech Language Pathology Evaluation  Bedside Swallow    Patient Name:  Bhargav Gao   MRN:  [id number]  Admitting Diagnosis: Septic shock    Recommendations:                 General Recommendations:  Follow-up not indicated  Diet recommendations:   ,   NPO  Aspiration Precautions: Alternate means of nutrition/hydration   General Precautions: Standard,    Communication strategies:   unable to answer y/n questions consistently    History:     Past Medical History:   Diagnosis Date    Alzheimer's disease     Anticoagulant long-term use     Bipolar disorder     BPH (benign prostatic hyperplasia)     Deep vein thrombosis (DVT) of popliteal vein of right lower extremity     Diabetes mellitus     DVT of deep femoral vein, left     Hyperlipidemia     Hypertension     Hypothyroidism     Idiopathic orofacial dystonia     Iron deficiency 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    Light Growth Escherichia coli Abnormal    Light Growth Klebsiel    Schizophrenia 1/28/2022 05/16 -continue trileptal and risperidone       Past Surgical History:   Procedure Laterality Date    COLOSTOMY N/A 5/24/2022    Procedure: CREATION, COLOSTOMY;  Surgeon: Edilma Felix MD;  Location: [facility];  Service: General;  Laterality: N/A;  diverting colostomy dr felix tuesday       Social History: Patient lives at NH.    Prior Intubation HX:  N/a    Modified Barium Swallow: N/a    Chest X-Rays: N/a    Prior diet: Unknown.    Occupation/hobbies/homemaking: None stated.    Subjective     Patient lying in bed upon therapist arrival. Patient able to state  name, however he cannot verbalize other than name.  Patient goals: None stated     Pain/Comfort:   Patient unable to state pain level, however he moaned throughout evaluation.    Respiratory Status: Room air    Objective:     Oral Musculature Evaluation   Patient is edentulous. Patient required visual cues/imitation to complete OM tasks.    Bedside Swallow Eval:   Consistencies Assessed:  Thin liquids Patient had trials of thin liquid via spoon and straw. Patient did not have strong cough/throat clear and unable to imitate cough. When patient told therapist his name, wet vocal quality noted.  Puree Patient had trials of puree via spoon. Following swallow, patient began coughing.        Oral Phase:   WFL    Pharyngeal Phase:   coughing/choking  throat clearing  wet vocal quality after swallow    Compensatory Strategies  Unable due to following directions    Treatment: No follow-up/treatment indicated due to difficulty with participation/following directions..    Assessment:     Bhargav Gao is a 82 y.o. male with an SLP diagnosis of Dysphagia.  He presents with overt s/s aspiration. Patient would benefit from alternate means of nutrition/hydration and remaining NPO.    Goals:   Multidisciplinary Problems       SLP Goals       Not on file                    Plan:     Patient to be seen:      Plan of Care expires:     Plan of Care reviewed with:  family   SLP Follow-Up:     No follow-up indicated     Discharge recommendations:    NPO; alternate route of nutrition/hydration  Barriers to Discharge:  Level of Skilled Assistance Needed      Time Tracking:     SLP Treatment Date:      Speech Start Time:   1120  Speech Stop Time:      1140  Speech Total Time (min):   20 min    Billable Minutes: Eval Swallow and Oral Function 20    10/18/2022

## 2022-10-18 NOTE — ASSESSMENT & PLAN NOTE
Patient presents to the emergency room with systolic blood pressure in the 60s and tachycardiac with a heart rate >90.  Patient was subsequently given 30 cc/kg of IV fluid bolus without improvement and thus will be admitted to ICU for vasopressor support.  Blood cultures x 2 was ordered and patient was given empiric antibiotic.  Source or infection appeared to be UTI associated with chronic indwelling Khan

## 2022-10-18 NOTE — HPI
83 y/o male admitted to ICU with urosepsis  Bedbound, nonverbal  General surgery consulted for sacral wound

## 2022-10-18 NOTE — PLAN OF CARE
Problem: Adult Inpatient Plan of Care  Goal: Plan of Care Review  Outcome: Ongoing, Progressing  Goal: Patient-Specific Goal (Individualized)  Outcome: Ongoing, Progressing  Goal: Absence of Hospital-Acquired Illness or Injury  Outcome: Ongoing, Progressing  Goal: Optimal Comfort and Wellbeing  Outcome: Ongoing, Progressing  Goal: Readiness for Transition of Care  Outcome: Ongoing, Progressing     Problem: Diabetes Comorbidity  Goal: Blood Glucose Level Within Targeted Range  Outcome: Ongoing, Progressing     Problem: Adjustment to Illness (Sepsis/Septic Shock)  Goal: Optimal Coping  Outcome: Ongoing, Progressing     Problem: Bleeding (Sepsis/Septic Shock)  Goal: Absence of Bleeding  Outcome: Ongoing, Progressing     Problem: Glycemic Control Impaired (Sepsis/Septic Shock)  Goal: Blood Glucose Level Within Desired Range  Outcome: Ongoing, Progressing     Problem: Infection Progression (Sepsis/Septic Shock)  Goal: Absence of Infection Signs and Symptoms  Outcome: Ongoing, Progressing     Problem: Nutrition Impaired (Sepsis/Septic Shock)  Goal: Optimal Nutrition Intake  Outcome: Ongoing, Progressing     Problem: Impaired Wound Healing  Goal: Optimal Wound Healing  Outcome: Ongoing, Progressing     Problem: Skin Injury Risk Increased  Goal: Skin Health and Integrity  Outcome: Ongoing, Progressing     Problem: Infection  Goal: Absence of Infection Signs and Symptoms  Outcome: Ongoing, Progressing     Problem: Airway Clearance Ineffective  Goal: Effective Airway Clearance  Outcome: Ongoing, Progressing     Problem: Adjustment to Illness COPD (Chronic Obstructive Pulmonary Disease)  Goal: Optimal Chronic Illness Coping  Outcome: Ongoing, Progressing     Problem: Functional Ability Impaired COPD (Chronic Obstructive Pulmonary Disease)  Goal: Optimal Level of Functional [place]  Outcome: Ongoing, Progressing     Problem: Infection COPD (Chronic Obstructive Pulmonary Disease)  Goal: Absence of Infection Signs and  Symptoms  Outcome: Ongoing, Progressing     Problem: Oral Intake Inadequate COPD (Chronic Obstructive Pulmonary Disease)  Goal: Improved Nutrition Intake  Outcome: Ongoing, Progressing     Problem: Respiratory Compromise COPD (Chronic Obstructive Pulmonary Disease)  Goal: Effective Oxygenation and Ventilation  Outcome: Ongoing, Progressing

## 2022-10-18 NOTE — ASSESSMENT & PLAN NOTE
10/18 large wound clean, no pus or necrotic tissue to be debrided, not likely source of sepsis  Continue wound care  Offloading, optimize nutrition  No surgical debridement indicated  Call surgery prn if wound worsens

## 2022-10-18 NOTE — PROGRESS NOTES
[facility] ICU  Adult Nutrition  First Assessment Note         Reason for Assessment  Reason For Assessment: identified at risk by screening criteria (pressure injury of sacral region stage 4)   Nutrition Risk Screen: large or nonhealing wound, burn or pressure injury (pressure injury of sacral region stage 4)    Recommend monitor nutrition need for tube feeding. Current NPO status inadequate to meet nutritional needs. Consider adding 500mg Vit C BID + 220mg ZnSO4 BID + MVI daily to aid in wound healing.    RD assessment d/t stage 4 pressure injury of sacral region. 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 Patient was sent to ED via EMS secondary to decreased level of consciousness with hematuria. Patient with septic shock, UTI with hematruia hx with chronic indwelling anne, pt with colostomy, acute renal failure with tubular necrosis, acute metabolic encephalopathy, DM, COPD.    Patient is on NPO diet order. If unable to advance diet and if tube feeding recommendations are desired, recommend Suplena 1.8. Suplena 1.8 at goal rate of 60ml/hr would provide 2592 kcal (meets 100% of energy needs), 64.8g pro (meets 100% of protein needs), 139.68g fat, 279.36g CHO, 964.8ml free water. With 30ml/hr free water flush providing 720ml, total water from formula and free water flush would be 1684.8ml. Would also recommend Epi BID to aid in wound healing, which would provide 5g pro. Tube feeding at goal rate and Epi BID would provide 69.8g pro total (meets 100% of protein needs). Will monitor renal labs/JORDYN status and adjust tube feeding recommendations as appropriate.     Patient's  current weight is 101.1kg, 30.23 BMI, in obese BMI category - needs adjusted. Patient with lower extremity edema per MD note. Last BM documented 10/17. Will monitor weight trend, labs, meds, wound/skin, diet order changes, updates in patient condition. RD following.    Malnutrition  Is Patient Malnourished: No  Skin (Micronutrient): wounds unhealed  Per MD note stage IV sacral wound was noted. Wound base was clean with no purulent drainage or fibrinous/necrotic tissue.     Skin Integrity  Bart Risk Assessment  Sensory Perception: 2-->very limited  Moisture: 3-->occasionally moist  Activity: 1-->bedfast  Mobility: 2-->very limited  Nutrition: 3-->adequate  Friction and Shear: 2-->potential problem  Bart Score: 13    Nutrition Diagnosis  Increased protein Needs   related to Wound healing as evidenced by pressure injury of sacral region stage 4 present on admission    Nutrition Diagnosis Status: New      Nutrition Risk  Level of Risk/Frequency of Follow-up: high    Recent Labs   Lab 10/18/22  0428 10/18/22  0508   GLU  --  127*   POCGLU 128*  --    Comments on Glucose: Pt with PMHx of Diabetes Mellitus    Nutrition Prescription / Recommendations  Recommendation/Intervention: Recommend monitor nutrition need for tube feeding. Current NPO status inadequate to meet nutritional needs. Consider adding 500mg Vit C BID + 220mg ZnSO4 BID + MVI daily to aid in wound healing.  Goals: Initiate tube feeding if patient remains NPO and if medically appropriate. Monitor weight trend, labs, meds, wound/skin, diet order changes, updates in patient condition.  Nutrition Goal Status: new  Current Diet Order: NPO  Chewing or Swallowing Difficulty?: Swallowing difficulty   Recommended Diet: Enteral Nutrition and NPO EN recommended if patient remains NPO  Recommended Oral Supplement: No Oral Supplements  Is Nutrition Support Recommended: Yes     Needs Calculated  Energy Need Method: Kcal/kg (30-35 kcal/kg ideal body weight) Energy  Calorie Requirements (kcal): 2422..91 kcal  Weight Used For Protein Calculations: 80.74 kg (178 lb) (0.8-1.0g/kg ideal body weight acute renal failure, septic shock, wound)  Protein Requirements: 64.73- 80.91 g pro    Fluid Requirements (mL): fluids per MD  Enteral Nutrition   Enteral Nutrition Formula Provides:  2592 kcals Propofol Rate: No  64.8 g Protein  279.36 g Carbohydrates  139.68 g Fat Propofol Rate: No  964.8 ml Fluid without Flush    720 ml Fluid by flush   1684.8 ml Total Fluid  Enteral Nutrition Recommended Order:  Tube feeding via NG/ Dobhoff  Tube feeding formula: Suplena 1.8 Continuous 60 ml/h  Free Water Flush: 30 ml hourly  Modular Supplements:No Modular Supplements needed and Epi 2 times a day  Enteral Nutrition meets needs?: yes  Enteral Nutrition Status: Recommended but Not Ordered  Is Education Recommended: No    Monitor and Evaluation  % current Intake: NPO  % intake to meet estimated needs: Enteral Nutrition   Food and Nutrient Intake:  (current NPO)  Food and Nutrient Adminstration: other (specify) (current NPO)  Knowledge/Beliefs/Attitudes: food and nutrition knowledge/skill, beliefs and attitudes  Physical Activity and Function: nutrition-related ADLs and IADLs, factors affecting access to physical activity  Anthropometric Measurements: weight, weight change, body mass index  Biochemical Data, Medical Tests and Procedures: electrolyte and renal panel, gastrointestinal profile, glucose/endocrine profile, inflammatory profile, lipid profile  Nutrition-Focused Physical Findings: overall appearance, extremities, muscles and bones, head and eyes, skin     Current Medical Diagnosis and Past Medical History  Diagnosis: other (see comments) (septic shock)  Past Medical History:   Diagnosis Date    Alzheimer's disease     Anticoagulant long-term use     Bipolar disorder     BPH (benign prostatic hyperplasia)     Deep vein thrombosis (DVT) of popliteal vein of right lower extremity      Diabetes mellitus     DVT of deep femoral vein, left     Hyperlipidemia     Hypertension     Hypothyroidism     Idiopathic orofacial dystonia     Iron deficiency 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    Light Growth Escherichia coli Abnormal    Light Growth Klebsiel    Schizophrenia 1/28/2022 05/16 -continue trileptal and risperidone     Nutrition/Diet History     Lab/Procedures/Meds  Recent Labs   Lab 10/18/22  0508   *   K 3.8   BUN 42*   CREATININE 1.82*   CALCIUM 8.0*   ALBUMIN 1.7*   *   ALT 14*   AST 22     Last A1c:   Lab Results   Component Value Date    HGBA1C 6.6 06/29/2022     Lab Results   Component Value Date    RBC 3.44 (L) 10/18/2022    HGB 9.4 (L) 10/18/2022    HCT 29.5 (L) 10/18/2022    MCV 85.8 10/18/2022    MCH 27.3 10/18/2022    MCHC 31.9 (L) 10/18/2022    TIBC 174 (L) 12/09/2021     Na 146 (H), Cl 108 (H), BUN 42 (H), Creatinine 1.82 (H), BUN/CREAT RATIO 23 (H), eGFR 37 (L),  (H), Ca 8.0 (L), total protein 4.8 (L), albumin 1.7 (L), ALT 14 (L), CRP 7.29 (H) - patient with septic shock, receiving IVF 100ml/hr, PMH of DM, acute renal failure with tubular necrosis,   Pertinent Labs Reviewed: reviewed  Pertinent Medications Reviewed: reviewed  Albuterol-ipratropium, enoxaparin, levothyroxine, merrem, mupriocin, pantoprazole, IVF 100ml/hr, norepinephrine    Anthropometrics  Temp: 97.9 °F (36.6 °C)  Height: 6' (182.9 cm)  Height (inches): 72 in  Weight Method: Bed Scale  Weight: 101.1 kg (222 lb 14.2 oz)  Weight (lb): 222.89 lb  Ideal Body Weight (IBW), Male: 178 lb  % Ideal Body  Weight, Male (lb): 125.22 %  BMI (Calculated): 30.2  BMI Grade: 30 - 34.9- obesity - grade I     Estimated/Assessed Needs    Temp: 97.9 °F (36.6 °C)Oral  Weight Used For Calorie Calculations: 80.74 kg (178 lb)  Energy Need Method: Kcal/kg (30-35 kcal/kg ideal body weight) Energy Calorie Requirements (kcal): 2422..91 kcal  Weight Used For Protein Calculations: 80.74 kg (178 lb) (0.8-1.0g/kg ideal body weight acute renal failure, septic shock, wound)  Protein Requirements: 64.73- 80.91 g pro    Fluid Requirements (mL): fluids per MD  RDA Method (mL): 2527.5     Nutrition by Nursing  Diet/Nutrition Received: NPO  Last Bowel Movement: 10/17/22    Nutrition Follow-Up  RD Follow-up?: Yes

## 2022-10-18 NOTE — ASSESSMENT & PLAN NOTE
Secondary to sepsis.  Baseline mental status is unknown at this time but patient is said to be bed-bound and has significant level of confusion due to presence of Alzheimer's dementia coupled with bipolar disorder and schizophrenia

## 2022-10-18 NOTE — PLAN OF CARE
Problem: Respiratory Compromise COPD (Chronic Obstructive Pulmonary Disease)  Goal: Effective Oxygenation and Ventilation  Outcome: Ongoing, Progressing

## 2022-10-18 NOTE — ASSESSMENT & PLAN NOTE
Last known hemoglobin A1c was 6.6 3 months ago, but patient presented with blood glucose of 212.  Will start patient on low intensity sliding scale insulin q.4 hours to maintain CABGs in the range of 130s to 180s

## 2022-10-19 ENCOUNTER — HOSPITAL ENCOUNTER (INPATIENT)
Facility: HOSPITAL | Age: 82
LOS: 15 days | DRG: 853 | End: 2022-11-03
Attending: FAMILY MEDICINE | Admitting: FAMILY MEDICINE
Payer: MEDICARE

## 2022-10-19 VITALS
WEIGHT: 226.88 LBS | HEART RATE: 79 BPM | BODY MASS INDEX: 30.73 KG/M2 | HEIGHT: 72 IN | DIASTOLIC BLOOD PRESSURE: 69 MMHG | OXYGEN SATURATION: 100 % | SYSTOLIC BLOOD PRESSURE: 153 MMHG | TEMPERATURE: 98 F | RESPIRATION RATE: 16 BRPM

## 2022-10-19 DIAGNOSIS — L89.154 PRESSURE ULCER OF SACRAL REGION, STAGE 4: ICD-10-CM

## 2022-10-19 DIAGNOSIS — R65.21 SEVERE SEPSIS WITH SEPTIC SHOCK: ICD-10-CM

## 2022-10-19 DIAGNOSIS — T17.308A CHOKING: ICD-10-CM

## 2022-10-19 DIAGNOSIS — L89.159 PRESSURE INJURY OF SKIN OF SACRAL REGION, UNSPECIFIED INJURY STAGE: Primary | ICD-10-CM

## 2022-10-19 DIAGNOSIS — R13.10 DYSPHAGIA: Primary | ICD-10-CM

## 2022-10-19 DIAGNOSIS — R78.81 BACTEREMIA: ICD-10-CM

## 2022-10-19 DIAGNOSIS — A41.9 SEVERE SEPSIS WITH SEPTIC SHOCK: ICD-10-CM

## 2022-10-19 DIAGNOSIS — R05.9 COUGHING: ICD-10-CM

## 2022-10-19 DIAGNOSIS — R68.89 SUSPECTED DEEP TISSUE INJURY: ICD-10-CM

## 2022-10-19 DIAGNOSIS — N30.01 ACUTE CYSTITIS WITH HEMATURIA: ICD-10-CM

## 2022-10-19 DIAGNOSIS — E11.9 TYPE 2 DIABETES MELLITUS WITHOUT COMPLICATION, WITHOUT LONG-TERM CURRENT USE OF INSULIN: ICD-10-CM

## 2022-10-19 PROBLEM — G93.41 ACUTE METABOLIC ENCEPHALOPATHY: Status: RESOLVED | Noted: 2022-10-17 | Resolved: 2022-10-19

## 2022-10-19 PROBLEM — N17.0 ACUTE RENAL FAILURE WITH TUBULAR NECROSIS: Status: RESOLVED | Noted: 2022-10-17 | Resolved: 2022-10-19

## 2022-10-19 LAB
ALBUMIN SERPL BCP-MCNC: 1.9 G/DL (ref 3.5–5)
ALBUMIN/GLOB SERPL: 0.6 {RATIO}
ALP SERPL-CCNC: 78 U/L (ref 45–115)
ALT SERPL W P-5'-P-CCNC: 18 U/L (ref 16–61)
ANION GAP SERPL CALCULATED.3IONS-SCNC: 9 MMOL/L (ref 7–16)
ANISOCYTOSIS BLD QL SMEAR: ABNORMAL
AST SERPL W P-5'-P-CCNC: 25 U/L (ref 15–37)
BASOPHILS # BLD AUTO: 0.02 K/UL (ref 0–0.2)
BASOPHILS NFR BLD AUTO: 0.2 % (ref 0–1)
BASOPHILS NFR BLD MANUAL: 1 % (ref 0–1)
BILIRUB SERPL-MCNC: 0.2 MG/DL (ref ?–1.2)
BUN SERPL-MCNC: 26 MG/DL (ref 7–18)
BUN SERPL-MCNC: 32 MG/DL (ref 7–18)
BUN/CREAT SERPL: 36 (ref 6–20)
BUN/CREAT SERPL: 37 (ref 6–20)
CALCIUM SERPL-MCNC: 8.5 MG/DL (ref 8.5–10.1)
CHLORIDE SERPL-SCNC: 110 MMOL/L (ref 98–107)
CO2 SERPL-SCNC: 29 MMOL/L (ref 21–32)
CREAT SERPL-MCNC: 0.72 MG/DL (ref 0.7–1.3)
CREAT SERPL-MCNC: 0.86 MG/DL (ref 0.7–1.3)
CRENATED CELLS: ABNORMAL
DIFFERENTIAL METHOD BLD: ABNORMAL
EGFR (NO RACE VARIABLE) (RUSH/TITUS): 86 ML/MIN/1.73M²
EGFR (NO RACE VARIABLE) (RUSH/TITUS): 91 ML/MIN/1.73M²
EOSINOPHIL # BLD AUTO: 0.15 K/UL (ref 0–0.5)
EOSINOPHIL NFR BLD AUTO: 1.4 % (ref 1–4)
EOSINOPHIL NFR BLD MANUAL: 1 % (ref 1–4)
ERYTHROCYTE [DISTWIDTH] IN BLOOD BY AUTOMATED COUNT: 16.4 % (ref 11.5–14.5)
GLOBULIN SER-MCNC: 3.4 G/DL (ref 2–4)
GLUCOSE SERPL-MCNC: 107 MG/DL (ref 70–105)
GLUCOSE SERPL-MCNC: 66 MG/DL (ref 70–105)
GLUCOSE SERPL-MCNC: 67 MG/DL (ref 74–106)
GLUCOSE SERPL-MCNC: 76 MG/DL (ref 70–105)
GLUCOSE SERPL-MCNC: 78 MG/DL (ref 70–105)
HCT VFR BLD AUTO: 30.6 % (ref 40–54)
HGB BLD-MCNC: 9.7 G/DL (ref 13.5–18)
HYPOCHROMIA BLD QL SMEAR: ABNORMAL
IMM GRANULOCYTES # BLD AUTO: 0.04 K/UL (ref 0–0.04)
IMM GRANULOCYTES NFR BLD: 0.4 % (ref 0–0.4)
LYMPHOCYTES # BLD AUTO: 1 K/UL (ref 1–4.8)
LYMPHOCYTES NFR BLD AUTO: 9.5 % (ref 27–41)
LYMPHOCYTES NFR BLD MANUAL: 6 % (ref 27–41)
MCH RBC QN AUTO: 26.9 PG (ref 27–31)
MCHC RBC AUTO-ENTMCNC: 31.7 G/DL (ref 32–36)
MCV RBC AUTO: 84.8 FL (ref 80–96)
MONOCYTES # BLD AUTO: 0.46 K/UL (ref 0–0.8)
MONOCYTES NFR BLD AUTO: 4.4 % (ref 2–6)
MONOCYTES NFR BLD MANUAL: 1 % (ref 2–6)
MPC BLD CALC-MCNC: 9 FL (ref 9.4–12.4)
NEUTROPHILS # BLD AUTO: 8.85 K/UL (ref 1.8–7.7)
NEUTROPHILS NFR BLD AUTO: 84.1 % (ref 53–65)
NEUTS BAND NFR BLD MANUAL: 18 % (ref 1–5)
NEUTS SEG NFR BLD MANUAL: 73 % (ref 50–62)
NRBC # BLD AUTO: 0 X10E3/UL
NRBC, AUTO (.00): 0 %
OVALOCYTES BLD QL SMEAR: ABNORMAL
PLATELET # BLD AUTO: 234 K/UL (ref 150–400)
PLATELET MORPHOLOGY: NORMAL
POTASSIUM SERPL-SCNC: 3.8 MMOL/L (ref 3.5–5.1)
PROT SERPL-MCNC: 5.3 G/DL (ref 6.4–8.2)
RBC # BLD AUTO: 3.61 M/UL (ref 4.6–6.2)
SODIUM SERPL-SCNC: 144 MMOL/L (ref 136–145)
VANCOMYCIN TROUGH SERPL-MCNC: 9.4 ΜG/ML (ref 10–20)
VERIGENE RESULT: ABNORMAL
WBC # BLD AUTO: 10.52 K/UL (ref 4.5–11)

## 2022-10-19 PROCEDURE — 82962 GLUCOSE BLOOD TEST: CPT

## 2022-10-19 PROCEDURE — 25000003 PHARM REV CODE 250: Performed by: HOSPITALIST

## 2022-10-19 PROCEDURE — 25000003 PHARM REV CODE 250: Performed by: NURSE PRACTITIONER

## 2022-10-19 PROCEDURE — 63600175 PHARM REV CODE 636 W HCPCS: Performed by: INTERNAL MEDICINE

## 2022-10-19 PROCEDURE — 84520 ASSAY OF UREA NITROGEN: CPT | Performed by: FAMILY MEDICINE

## 2022-10-19 PROCEDURE — 82565 ASSAY OF CREATININE: CPT | Performed by: FAMILY MEDICINE

## 2022-10-19 PROCEDURE — 27000221 HC OXYGEN, UP TO 24 HOURS

## 2022-10-19 PROCEDURE — 63600175 PHARM REV CODE 636 W HCPCS: Performed by: NURSE PRACTITIONER

## 2022-10-19 PROCEDURE — 25000242 PHARM REV CODE 250 ALT 637 W/ HCPCS: Performed by: INTERNAL MEDICINE

## 2022-10-19 PROCEDURE — 80053 COMPREHEN METABOLIC PANEL: CPT | Performed by: INTERNAL MEDICINE

## 2022-10-19 PROCEDURE — 99233 PR SUBSEQUENT HOSPITAL CARE,LEVL III: ICD-10-PCS | Mod: ,,, | Performed by: INTERNAL MEDICINE

## 2022-10-19 PROCEDURE — 80202 ASSAY OF VANCOMYCIN: CPT | Performed by: INTERNAL MEDICINE

## 2022-10-19 PROCEDURE — 99223 PR INITIAL HOSPITAL CARE,LEVL III: ICD-10-PCS | Mod: AI,GC,, | Performed by: FAMILY MEDICINE

## 2022-10-19 PROCEDURE — 25000003 PHARM REV CODE 250: Performed by: INTERNAL MEDICINE

## 2022-10-19 PROCEDURE — 94761 N-INVAS EAR/PLS OXIMETRY MLT: CPT

## 2022-10-19 PROCEDURE — 36415 COLL VENOUS BLD VENIPUNCTURE: CPT | Performed by: INTERNAL MEDICINE

## 2022-10-19 PROCEDURE — 63600175 PHARM REV CODE 636 W HCPCS: Performed by: HOSPITALIST

## 2022-10-19 PROCEDURE — 85025 COMPLETE CBC W/AUTO DIFF WBC: CPT | Performed by: INTERNAL MEDICINE

## 2022-10-19 PROCEDURE — 99223 1ST HOSP IP/OBS HIGH 75: CPT | Mod: AI,GC,, | Performed by: FAMILY MEDICINE

## 2022-10-19 PROCEDURE — C9113 INJ PANTOPRAZOLE SODIUM, VIA: HCPCS | Performed by: INTERNAL MEDICINE

## 2022-10-19 PROCEDURE — S5010 5% DEXTROSE AND 0.45% SALINE: HCPCS | Performed by: NURSE PRACTITIONER

## 2022-10-19 PROCEDURE — 94640 AIRWAY INHALATION TREATMENT: CPT

## 2022-10-19 PROCEDURE — 11000001 HC ACUTE MED/SURG PRIVATE ROOM

## 2022-10-19 PROCEDURE — 87040 BLOOD CULTURE FOR BACTERIA: CPT | Performed by: NURSE PRACTITIONER

## 2022-10-19 PROCEDURE — 99233 SBSQ HOSP IP/OBS HIGH 50: CPT | Mod: ,,, | Performed by: INTERNAL MEDICINE

## 2022-10-19 PROCEDURE — 25000242 PHARM REV CODE 250 ALT 637 W/ HCPCS: Performed by: NURSE PRACTITIONER

## 2022-10-19 RX ORDER — SODIUM CHLORIDE 0.9 % (FLUSH) 0.9 %
10 SYRINGE (ML) INJECTION
Status: CANCELLED | OUTPATIENT
Start: 2022-10-19

## 2022-10-19 RX ORDER — ENOXAPARIN SODIUM 100 MG/ML
100 INJECTION SUBCUTANEOUS
Status: DISCONTINUED | OUTPATIENT
Start: 2022-10-19 | End: 2022-10-22

## 2022-10-19 RX ORDER — ONDANSETRON 2 MG/ML
4 INJECTION INTRAMUSCULAR; INTRAVENOUS EVERY 8 HOURS PRN
Status: CANCELLED | OUTPATIENT
Start: 2022-10-19

## 2022-10-19 RX ORDER — MUPIROCIN 20 MG/G
OINTMENT TOPICAL 2 TIMES DAILY
Status: CANCELLED | OUTPATIENT
Start: 2022-10-19 | End: 2022-10-22

## 2022-10-19 RX ORDER — PROCHLORPERAZINE EDISYLATE 5 MG/ML
5 INJECTION INTRAMUSCULAR; INTRAVENOUS EVERY 6 HOURS PRN
Status: DISCONTINUED | OUTPATIENT
Start: 2022-10-19 | End: 2022-11-03 | Stop reason: HOSPADM

## 2022-10-19 RX ORDER — ENOXAPARIN SODIUM 100 MG/ML
100 INJECTION SUBCUTANEOUS
Status: CANCELLED | OUTPATIENT
Start: 2022-10-19

## 2022-10-19 RX ORDER — INSULIN ASPART 100 [IU]/ML
0-5 INJECTION, SOLUTION INTRAVENOUS; SUBCUTANEOUS EVERY 4 HOURS PRN
Status: CANCELLED | OUTPATIENT
Start: 2022-10-19

## 2022-10-19 RX ORDER — ONDANSETRON 2 MG/ML
4 INJECTION INTRAMUSCULAR; INTRAVENOUS EVERY 8 HOURS PRN
Status: DISCONTINUED | OUTPATIENT
Start: 2022-10-19 | End: 2022-10-19

## 2022-10-19 RX ORDER — ONDANSETRON 2 MG/ML
4 INJECTION INTRAMUSCULAR; INTRAVENOUS EVERY 8 HOURS PRN
Status: DISCONTINUED | OUTPATIENT
Start: 2022-10-19 | End: 2022-11-03 | Stop reason: HOSPADM

## 2022-10-19 RX ORDER — IPRATROPIUM BROMIDE AND ALBUTEROL SULFATE 2.5; .5 MG/3ML; MG/3ML
3 SOLUTION RESPIRATORY (INHALATION) EVERY 6 HOURS
Status: DISCONTINUED | OUTPATIENT
Start: 2022-10-19 | End: 2022-11-03 | Stop reason: HOSPADM

## 2022-10-19 RX ORDER — LEVOTHYROXINE SODIUM ANHYDROUS 100 UG/5ML
25 INJECTION, POWDER, LYOPHILIZED, FOR SOLUTION INTRAVENOUS DAILY
Status: DISCONTINUED | OUTPATIENT
Start: 2022-10-20 | End: 2022-10-24

## 2022-10-19 RX ORDER — GLUCAGON 1 MG
1 KIT INJECTION
Status: CANCELLED | OUTPATIENT
Start: 2022-10-19

## 2022-10-19 RX ORDER — IPRATROPIUM BROMIDE AND ALBUTEROL SULFATE 2.5; .5 MG/3ML; MG/3ML
3 SOLUTION RESPIRATORY (INHALATION) EVERY 6 HOURS
Status: CANCELLED | OUTPATIENT
Start: 2022-10-19

## 2022-10-19 RX ORDER — PANTOPRAZOLE SODIUM 40 MG/10ML
40 INJECTION, POWDER, LYOPHILIZED, FOR SOLUTION INTRAVENOUS DAILY
Status: CANCELLED | OUTPATIENT
Start: 2022-10-20

## 2022-10-19 RX ORDER — SODIUM CHLORIDE 0.9 % (FLUSH) 0.9 %
10 SYRINGE (ML) INJECTION
Status: DISCONTINUED | OUTPATIENT
Start: 2022-10-19 | End: 2022-10-19 | Stop reason: HOSPADM

## 2022-10-19 RX ORDER — INSULIN ASPART 100 [IU]/ML
0-5 INJECTION, SOLUTION INTRAVENOUS; SUBCUTANEOUS EVERY 4 HOURS PRN
Status: DISCONTINUED | OUTPATIENT
Start: 2022-10-19 | End: 2022-11-03 | Stop reason: HOSPADM

## 2022-10-19 RX ORDER — PROCHLORPERAZINE EDISYLATE 5 MG/ML
5 INJECTION INTRAMUSCULAR; INTRAVENOUS EVERY 6 HOURS PRN
Status: CANCELLED | OUTPATIENT
Start: 2022-10-19

## 2022-10-19 RX ORDER — DEXTROSE MONOHYDRATE AND SODIUM CHLORIDE 5; .45 G/100ML; G/100ML
INJECTION, SOLUTION INTRAVENOUS CONTINUOUS
Status: DISCONTINUED | OUTPATIENT
Start: 2022-10-19 | End: 2022-10-19 | Stop reason: HOSPADM

## 2022-10-19 RX ORDER — DEXTROSE MONOHYDRATE AND SODIUM CHLORIDE 5; .45 G/100ML; G/100ML
INJECTION, SOLUTION INTRAVENOUS CONTINUOUS
Status: DISCONTINUED | OUTPATIENT
Start: 2022-10-19 | End: 2022-10-22

## 2022-10-19 RX ORDER — PANTOPRAZOLE SODIUM 40 MG/10ML
40 INJECTION, POWDER, LYOPHILIZED, FOR SOLUTION INTRAVENOUS DAILY
Status: DISCONTINUED | OUTPATIENT
Start: 2022-10-20 | End: 2022-10-24

## 2022-10-19 RX ORDER — ENOXAPARIN SODIUM 100 MG/ML
100 INJECTION SUBCUTANEOUS
Status: DISCONTINUED | OUTPATIENT
Start: 2022-10-19 | End: 2022-10-19

## 2022-10-19 RX ORDER — SODIUM CHLORIDE 0.9 % (FLUSH) 0.9 %
10 SYRINGE (ML) INJECTION
Status: DISCONTINUED | OUTPATIENT
Start: 2022-10-19 | End: 2022-11-03 | Stop reason: HOSPADM

## 2022-10-19 RX ORDER — ENOXAPARIN SODIUM 100 MG/ML
100 INJECTION SUBCUTANEOUS
Status: DISCONTINUED | OUTPATIENT
Start: 2022-10-19 | End: 2022-10-19 | Stop reason: HOSPADM

## 2022-10-19 RX ORDER — DEXTROSE MONOHYDRATE AND SODIUM CHLORIDE 5; .45 G/100ML; G/100ML
INJECTION, SOLUTION INTRAVENOUS CONTINUOUS
Status: CANCELLED | OUTPATIENT
Start: 2022-10-19

## 2022-10-19 RX ORDER — LEVOTHYROXINE SODIUM ANHYDROUS 100 UG/5ML
25 INJECTION, POWDER, LYOPHILIZED, FOR SOLUTION INTRAVENOUS DAILY
Status: CANCELLED | OUTPATIENT
Start: 2022-10-20

## 2022-10-19 RX ORDER — GLUCAGON 1 MG
1 KIT INJECTION
Status: DISCONTINUED | OUTPATIENT
Start: 2022-10-19 | End: 2022-11-03 | Stop reason: HOSPADM

## 2022-10-19 RX ORDER — MUPIROCIN 20 MG/G
OINTMENT TOPICAL 2 TIMES DAILY
Status: COMPLETED | OUTPATIENT
Start: 2022-10-19 | End: 2022-10-22

## 2022-10-19 RX ADMIN — ENOXAPARIN SODIUM 100 MG: 100 INJECTION SUBCUTANEOUS at 11:10

## 2022-10-19 RX ADMIN — DEXTROSE AND SODIUM CHLORIDE: 5; 450 INJECTION, SOLUTION INTRAVENOUS at 06:10

## 2022-10-19 RX ADMIN — SODIUM CHLORIDE: 9 INJECTION, SOLUTION INTRAVENOUS at 12:10

## 2022-10-19 RX ADMIN — VANCOMYCIN HYDROCHLORIDE 1500 MG: 1 INJECTION, POWDER, LYOPHILIZED, FOR SOLUTION INTRAVENOUS at 08:10

## 2022-10-19 RX ADMIN — DEXTROSE AND SODIUM CHLORIDE: 5; 450 INJECTION, SOLUTION INTRAVENOUS at 03:10

## 2022-10-19 RX ADMIN — VANCOMYCIN HYDROCHLORIDE 1000 MG: 1 INJECTION, POWDER, LYOPHILIZED, FOR SOLUTION INTRAVENOUS at 01:10

## 2022-10-19 RX ADMIN — IPRATROPIUM BROMIDE AND ALBUTEROL SULFATE 3 ML: .5; 3 SOLUTION RESPIRATORY (INHALATION) at 01:10

## 2022-10-19 RX ADMIN — MEROPENEM 1 G: 1 INJECTION, POWDER, FOR SOLUTION INTRAVENOUS at 11:10

## 2022-10-19 RX ADMIN — MEROPENEM 1 G: 1 INJECTION, POWDER, FOR SOLUTION INTRAVENOUS at 08:10

## 2022-10-19 RX ADMIN — IPRATROPIUM BROMIDE AND ALBUTEROL SULFATE 3 ML: 2.5; .5 SOLUTION RESPIRATORY (INHALATION) at 07:10

## 2022-10-19 RX ADMIN — PANTOPRAZOLE SODIUM 40 MG: 40 INJECTION, POWDER, FOR SOLUTION INTRAVENOUS at 09:10

## 2022-10-19 RX ADMIN — MUPIROCIN: 20 OINTMENT TOPICAL at 09:10

## 2022-10-19 RX ADMIN — IPRATROPIUM BROMIDE AND ALBUTEROL SULFATE 3 ML: .5; 3 SOLUTION RESPIRATORY (INHALATION) at 07:10

## 2022-10-19 RX ADMIN — VANCOMYCIN HYDROCHLORIDE 1500 MG: 1 INJECTION, POWDER, LYOPHILIZED, FOR SOLUTION INTRAVENOUS at 10:10

## 2022-10-19 RX ADMIN — ENOXAPARIN SODIUM 100 MG: 100 INJECTION SUBCUTANEOUS at 09:10

## 2022-10-19 RX ADMIN — MEROPENEM 1 G: 1 INJECTION, POWDER, FOR SOLUTION INTRAVENOUS at 04:10

## 2022-10-19 RX ADMIN — DEXTROSE MONOHYDRATE 12.5 G: 25 INJECTION, SOLUTION INTRAVENOUS at 05:10

## 2022-10-19 RX ADMIN — LEVOTHYROXINE SODIUM ANHYDROUS 25 MCG: 100 INJECTION, POWDER, LYOPHILIZED, FOR SOLUTION INTRAVENOUS at 09:10

## 2022-10-19 NOTE — H&P
[facility] Hospital Medicine  History & Physical    Patient Name: Bhargav Gao  MRN: [id number]  Patient Class: IP- Inpatient  Admission Date: 10/19/2022  Attending Physician: Hipolito Nance Jr., MD   Primary Care Provider: Kerry Lin MD         Patient information was obtained from past medical records and ER records.     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 He presents to [facility] with a UTI that was found on hospital admission on 10/17/2022. Patient was sent to the [facility] ED via EMS secondary to decreased level of consciousness with hematuria.  No associated symptoms such as fever chills cough wheezing chest pain palpitation lower extremity edema nausea vomiting abdominal pain were reported.  No other history could be gathered at this time this patient is somnolent and confused and was only able to state his name only at this time.  On initial presentation, patient was hypotensive with systolic blood pressure in the 60s and tachycardic.  Ensuing workup was notable for leukocytosis with left shift, elevated lactic acid level, hyperglycemia, acute renal failure, and UA highly suggestive of a presence of urinary tract infection.  Patient did not respond favorably to IV fluid bolus of 30 cc/kg and was admitted to ICU for vasopressor support.  Patient's code status is DNR according to nursing home records. After IVF, antibiotics and pressor treatment she was downgraded from the ICU on 10/19/2022 and now  admitted the [facility].     The patient was admitted to the [facility] to Family Medicine Service under the direct supervision Dr. Lee Ann SIMMONS for continued care and medical management.       No new subjective & objective note has been filed under this hospital service since the last note was generated.  Assessment/Plan:     * Bacteremia  Secondary to UTI  Treated with IVFs, BC x2 Gram positive - coagulase negative Staphylococcus, other than Staph epi and staph lugdunensis  Patient off of Levophed cultures grew out Gram-positive cocci Staph epidermidis and grew out Gram-negative tyrone of the urine continue current antibiotics vanc was added 10/18    Acute deep vein thrombosis (DVT) of both lower extremities  Hx of DVT -- US today with right popliteal nonocclusive thrombosis - start wt based renally dosed lovenox - change to oral anticoagulation when he passed swallow eval      Urinary tract infection with hematuria  Pt has a chronic anne at nursing home.   Will make sure it has been changed this admission   - urine culture with >100,000 GNB  -  Day #2 Merrem   Cultures still pending    Diabetes mellitus  POCT glucose checks  Sliding scale insulin     VTE Risk Mitigation (From admission, onward)           Ordered     enoxaparin injection 100 mg  Every 12 hours (non-standard times)         10/19/22 3763                       Hipolito Nance Jr, MD  Department of Hospital Medicine   [facility]

## 2022-10-19 NOTE — DISCHARGE SUMMARY
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  Patient was sent to ED via EMS secondary to decreased level of consciousness with hematuria.  No associated symptoms such as fever chills cough wheezing chest pain palpitation lower extremity edema nausea vomiting abdominal pain were reported.  No other history could be gathered at this time this patient is somnolent and confused and was only able to state his name only at this time.  On initial presentation, patient was hypotensive with systolic blood pressure in the 60s and tachycardic.  Ensuing workup was notable for leukocytosis with left shift, elevated lactic acid level, hyperglycemia, acute renal failure, and UA highly suggestive of a presence of urinary tract infection.  Patient did not respond favorably to IV fluid bolus of 30 cc/kg and thus will be admitted to ICU for vasopressor support.  Patient's code status is DNR according to nursing home records.      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  Patient was sent to ED via EMS secondary to decreased level of consciousness with hematuria.  No associated symptoms such as fever chills cough wheezing chest pain palpitation lower extremity edema nausea vomiting abdominal pain were reported.  No other history could be gathered at this time this patient is somnolent and confused and was only able to state his name only at this time.  On initial presentation, patient was hypotensive with systolic blood pressure in the 60s and tachycardic.  Ensuing workup was notable for leukocytosis with left shift, elevated lactic acid level, hyperglycemia, acute renal failure, and UA highly suggestive of a presence of urinary tract infection.  Patient did not respond favorably to IV fluid bolus of 30 cc/kg and thus will be admitted to ICU for vasopressor support.  Patient's code status is DNR according to nursing home records    Hospital course--  for patient admitted taking his of care on IV Levophed for sepsis secondary urinary tract infection.  IV Levophed was weaned off and less than 24 hours.  His AKA resolved with IV fluids and his creatinine is now back to baseline.  Patient has a chronic indwelling Khan at the nursing home.  This has been changed to this admission.  He has had multiple UTIs in the past.  He was started on Merrem and vancomycin concurrently on day 3 of antibiotics.  Urine culture is still pending but shows gram-negative bacilli.  He is now had 2/2 bottles from his blood cultures with gram-positive cocci.  Surgery was consulted to evaluate his sacral wound.  No surgical I and D was needed.  Wound care to continue to follow.  Ultrasound this admission showed a nonocclusive thrombus in his right  popliteal vein.  He does have history of DVTs.  He is currently on weight based Lovenox since he failed his swallow evaluation today.  Plan is to repeat tomorrow and if he fails again he will need NG tube.  He has met maximal benefit this hospitalization with discharge [facility] for continued IV antibiotics.  Blood cultures x2 were repeated today.would not recommend placing a PICC until his blood cultures clear.            Goals of Care Treatment Preferences:  Code Status: DNR      Consults (From admission, onward)        Status Ordering Provider     Pharmacy to dose Vancomycin consult  Once        Provider:  (Not yet assigned)   See [facility] for full Linked Orders Report.    Acknowledged WOJCIECH QUINONES     Inpatient consult to General Surgery  Once        Provider:  Yannick Sanders MD    Completed GASTON MORATAYA          Significant Labs:  All pertinent labs within the past 24 hours have been reviewed.    Significant Imaging:  I have reviewed all pertinent imaging results/findings within the past 24 hours.    Pending Diagnostic Studies:     Procedure Component Value Units Date/Time    EXTRA TUBES [[id number]]     Order Status: Sent Lab Status: No result     Specimen: Blood, Venous     Narrative:      The following orders were created for panel order EXTRA TUBES.  Procedure                               Abnormality         Status                     ---------                               -----------         ------                     Light Green Top Hold[[id number]]                                                          Please view results for these tests on the individual orders.    Light Green Top Hold [[id number]]     Order Status: Sent Lab Status: No result     Specimen: Blood, Venous         Final Active Diagnoses:    Diagnosis Date Noted POA    PRINCIPAL PROBLEM:  Septic shock [A41.9, R65.21] 10/17/2022 Yes    Sacral decubitus ulcer [L89.159] 10/18/2022 Unknown    Acute metabolic  encephalopathy [G93.41] 10/17/2022 Yes    Acute renal failure with tubular necrosis [N17.0] 10/17/2022 Yes    COPD (chronic obstructive pulmonary disease) [J44.9] 10/17/2022 Yes    ARMAAN (iron deficiency anemia) [D50.9] 10/17/2022 Yes    Urinary tract infection with hematuria [N39.0, R31.9] 07/30/2022 Yes    Acute deep vein thrombosis (DVT) of both lower extremities [I82.403] 07/30/2022 Yes    Bipolar disorder [F31.9] 07/30/2022 Yes    BPH (benign prostatic hyperplasia) [N40.0] 07/30/2022 Yes    Hypertension [I10]  Yes    Diabetes mellitus [E11.9]  Yes    Alzheimer's disease [G30.9, F02.80]  Yes      Problems Resolved During this Admission:    Diagnosis Date Noted Date Resolved POA    Altered mental status [R41.82] 10/17/2022 10/17/2022 Unknown    Severe sepsis [A41.9, R65.20] 10/17/2022 10/17/2022 Yes    Pressure injury of sacral region, 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    Service: Nursing Home  Contact information:  [address] [place] [id number] [phone]                           Patient Instructions:   No discharge procedures on file.  Medications:  Transfer Medications (for Discharge Readmit only):   Current Facility-Administered Medications   Medication Dose Route Frequency Provider Last Rate Last Admin    albuterol-ipratropium 2.5 mg-0.5 mg/3 mL nebulizer solution 3 mL  3 mL Nebulization Q6H Min KELSIE Manuel MD   3 mL at 10/19/22 0738    dextrose 5 % and 0.45 % NaCl infusion   Intravenous Continuous SALINA Plasencia-ACNP        dextrose 50% injection 12.5 g  12.5 g Intravenous PRN Min KELSIE Manuel MD        dextrose 50% injection 25 g  25 g Intravenous PRN Min KELSIE Manuel MD        enoxaparin injection 100 mg  100 mg Subcutaneous Q12H JOANNE PlasenciaP   100 mg at 10/19/22 1115    glucagon (human  recombinant) injection 1 mg  1 mg Intramuscular PRN Min KELSIE Manuel MD        insulin aspart U-100 injection 0-5 Units  0-5 Units Subcutaneous Q4H PRN Min KELSIE Manuel MD   1 Units at 10/17/22 2144    levothyroxine injection 25 mcg  25 mcg Intravenous Daily Min KELSIE Manuel MD   25 mcg at 10/19/22 0900    meropenem (MERREM) 1 g in sodium chloride 0.9 % 100 mL IVPB (MB+)  1 g Intravenous Q8H Min KELSIE Manuel MD   Stopped at 10/19/22 1215    mupirocin 2 % ointment   Nasal BID Roby Arciniega MD   Given at 10/19/22 0903    ondansetron injection 4 mg  4 mg Intravenous Q8H PRN Min KELSIE Manuel MD        pantoprazole injection 40 mg  40 mg Intravenous Daily Min KELSIE Manuel MD   40 mg at 10/19/22 0900    prochlorperazine injection Soln 5 mg  5 mg Intravenous Q6H PRN Min KELSIE Manuel MD        sodium chloride 0.9% flush 10 mL  10 mL Intravenous PRN Min KELSIE Manuel MD        vancomycin (VANCOCIN) 1,500 mg in dextrose 5 % 250 mL IVPB  1,500 mg Intravenous Q12H Roby Arciniega MD   Stopped at 10/19/22 1028    vancomycin - pharmacy to dose   Intravenous pharmacy to manage frequency MD Milka Zuleta, AG-ACNP  Pulmonology  [facility] ICU

## 2022-10-19 NOTE — ASSESSMENT & PLAN NOTE
Secondary to UTI  Treated with IVFs, BC x2 Gram positive - coagulase negative Staphylococcus, other than Staph epi and staph lugdunensis  Patient off of Levophed cultures grew out Gram-positive cocci Staph epidermidis and grew out Gram-negative tyrone of the urine continue current antibiotics vanc was added 10/18

## 2022-10-19 NOTE — ASSESSMENT & PLAN NOTE
Pt has a chronic anne at nursing home.   Will make sure it has been changed this admission   - urine culture with >100,000 GNB  -  Day #2 Merrem   Cultures still pending

## 2022-10-19 NOTE — NURSING
Pt arrived to the floor. Bedside report given by LYNNE Nava RN to this nurse. Resp even et unlabored. No s/s of distress noted at this time. See flowsheet for assessment. SR's up x 2. Call light in reach, bed low and locked. Instructed to call PRN.

## 2022-10-19 NOTE — PROGRESS NOTES
[facility]  Pulmonology  Progress Note    Patient Name: Bhargav Gao  MRN: [id number]  Admission Date: 10/17/2022  Hospital Length of Stay: 2 days  Code Status: DNR  Attending Provider: Roby Arciniega MD  Primary Care Provider: Kerry Lin MD   Principal Problem: Septic shock    Subjective:     Interval History:  Patient without complaints    Objective:     Vital Signs (Most Recent):  Temp: 97.9 °F (36.6 °C) (10/19/22 0545)  Pulse: 78 (10/19/22 0545)  Resp: 16 (10/19/22 0545)  BP: 119/66 (10/19/22 0545)  SpO2: 100 % (10/19/22 0545) Vital Signs (24h Range):  Temp:  [96.3 °F (35.7 °C)-98.2 °F (36.8 °C)] 97.9 °F (36.6 °C)  Pulse:  [58-92] 78  Resp:  [9-22] 16  SpO2:  [94 %-100 %] 100 %  BP: ()/(33-88) 119/66     Weight: 102.9 kg (226 lb 13.7 oz)  Body mass index is 30.77 kg/m².      Intake/Output Summary (Last 24 hours) at 10/19/2022 0655  Last data filed at 10/19/2022 0600  Gross per 24 hour   Intake 2649.24 ml   Output 1075 ml   Net 1574.24 ml       Physical Exam  Vitals reviewed.   Constitutional:       Appearance: Normal appearance.      Interventions: He is not intubated.  HENT:      Head: Normocephalic and atraumatic.      Nose: Nose normal.      Mouth/Throat:      Mouth: Mucous membranes are dry.      Pharynx: Oropharynx is clear.   Eyes:      Extraocular Movements: Extraocular movements intact.      Conjunctiva/sclera: Conjunctivae normal.      Pupils: Pupils are equal, round, and reactive to light.   Cardiovascular:      Rate and Rhythm: Normal rate.      Heart sounds: Normal heart sounds. No murmur heard.  Pulmonary:      Effort: Pulmonary effort is normal. He is not intubated.      Breath sounds: Normal breath sounds.   Abdominal:      General: Abdomen is flat. Bowel sounds are normal.      Palpations: Abdomen is soft.   Musculoskeletal:         General: Normal range of motion.      Cervical back: Normal range of motion and neck supple.      Right lower leg: No edema.       Left lower leg: No edema.   Skin:     General: Skin is warm and dry.      Capillary Refill: Capillary refill takes less than 2 seconds.   Neurological:      General: No focal deficit present.      Mental Status: He is alert and oriented to person, place, and time.   Psychiatric:         Mood and Affect: Mood normal.         Behavior: Behavior normal.       Vents:       Lines/Drains/Airways       Drain  Duration                  Urethral Catheter -- days         Urethral Catheter 07/30/22 2040 Silicone 18 Fr. 80 days         Colostomy 10/17/22 2015 LLQ 1 day              Peripheral Intravenous Line  Duration                  Peripheral IV - Single Lumen 10/17/22 1603 20 G Left Antecubital 1 day         Peripheral IV - Single Lumen 10/17/22 1714 20 G Left;Posterior Hand 1 day                    Significant Labs:    CBC/Anemia Profile:  Recent Labs   Lab 10/17/22  1315 10/17/22  1712 10/18/22  0508   WBC 13.83* 22.31* 16.17*   HGB 11.2* 10.4* 9.4*   HCT 34.7* 31.8* 29.5*    272 227   MCV 86.3 83.9 85.8   RDW 16.3* 16.1* 16.4*        Chemistries:  Recent Labs   Lab 10/17/22  1315 10/17/22  1716 10/18/22  0508    135* 146*   K 5.4* 4.4 3.8    101 108*   CO2 28 26 26   BUN 45* 48* 42*   CREATININE 2.78* 2.94* 1.82*   CALCIUM 9.1 9.4 8.0*   ALBUMIN 2.0* 1.9* 1.7*   PROT 5.7* 6.1* 4.8*   BILITOT 0.4 0.3 0.2   ALKPHOS 95 92 78   ALT 15* 13* 14*   AST 19 23 22   MG  --  1.9  --        Recent Lab Results         10/19/22  0430   10/18/22  2338   10/18/22  1640   10/18/22  1200        POC Glucose 76   71   82   121               Significant Imaging:  I have reviewed all pertinent imaging results/findings within the past 24 hours.    Assessment/Plan:     * Septic shock   Secondary to UTI  Treated with IVFs, BC NGTD, lactic acid trended now.  Patient off of Levophed cultures grew out Gram-positive cocci Staph epidermidis and grew out Gram-negative tyrone of the urine continue current antibiotics vanc was added  last night         Sacral decubitus ulcer  Wound care to evaluate     COPD (chronic obstructive pulmonary disease)  Stable     Acute renal failure with tubular necrosis  Creatinine  was down yesterday patient seems to be doing better.  Renal ultrasound unremarkable creatinine pending    Acute metabolic encephalopathy  Seems a little better    Bipolar disorder  Stable     Acute deep vein thrombosis (DVT) of both lower extremities  Hx of DVT -- US today with right popliteal nonocclusive thrombosis - start wt based renally dosed lovenox - change to oral anticoagulation when he passed swallow eval      Urinary tract infection with hematuria  Pt has a chronic anne at nursing home.   Will make sure it has been changed this admission   - urine culture with >100,000 GNB  -  Day #2 Merrem   Cultures still pending    Hypertension  Stable     Diabetes mellitus  Accuchecks with SSI     Alzheimer's disease  Stable, continue home medications                  Roby Arciniega MD  Pulmonology  [facility]

## 2022-10-19 NOTE — PROGRESS NOTES
Consulted to assist with Vancomycin dosing.  Based on pt wt of 103kg and CrCl 58ml/min, we will begin Vancomycin 1500mg IV q12hrs. (Pt did receive Vanc 1gm at 0200)     We will monitor daily and adjust as needed with a target trough of 15-20.

## 2022-10-19 NOTE — SUBJECTIVE & OBJECTIVE
Past Medical History:   Diagnosis Date    Alzheimer's disease     Anticoagulant long-term use     Bipolar disorder     BPH (benign prostatic hyperplasia)     Deep vein thrombosis (DVT) of popliteal vein of right lower extremity     Diabetes mellitus     DVT of deep femoral vein, left     Hyperlipidemia     Hypertension     Hypothyroidism     Idiopathic orofacial dystonia     Iron deficiency 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    Light Growth Escherichia coli Abnormal    Light Growth Klebsiel    Schizophrenia 1/28/2022 05/16 -continue trileptal and risperidone       Past Surgical History:   Procedure Laterality Date    COLOSTOMY N/A 5/24/2022    Procedure: CREATION, COLOSTOMY;  Surgeon: Edilma Felix MD;  Location: [facility];  Service: General;  Laterality: N/A;  diverting colostomy dr felix tuesday       Review of patient's allergies indicates:   Allergen Reactions    Metformin     Mycobacterium tuberculosis (tuberculin ppd)     Norvasc [amlodipine]        Current Facility-Administered Medications on File Prior to Encounter   Medication    sodium chloride 0.9% flush 10 mL    [COMPLETED] vancomycin (VANCOCIN) 1,000 mg in dextrose 5 % 250 mL IVPB    [DISCONTINUED] 0.9%  NaCl infusion    [DISCONTINUED] albuterol-ipratropium 2.5 mg-0.5 mg/3 mL nebulizer solution 3 mL    [DISCONTINUED] dextrose 5 % and 0.45 % NaCl infusion    [DISCONTINUED] dextrose 50% injection 12.5 g    [DISCONTINUED] dextrose 50% injection 25 g    [DISCONTINUED] enoxaparin injection 100 mg    [DISCONTINUED] enoxaparin injection 100 mg     [DISCONTINUED] enoxaparin injection 90 mg    [DISCONTINUED] glucagon (human recombinant) injection 1 mg    [DISCONTINUED] insulin aspart U-100 injection 0-5 Units    [DISCONTINUED] levothyroxine injection 25 mcg    [DISCONTINUED] meropenem (MERREM) 1 g in sodium chloride 0.9 % 100 mL IVPB (MB+)    [DISCONTINUED] meropenem (MERREM) 500 mg in sodium chloride 0.9 % 100 mL IVPB (MB+)    [DISCONTINUED] mupirocin 2 % ointment    [DISCONTINUED] NORepinephrine 4 mg in dextrose 5 % 250 mL infusion    [DISCONTINUED] ondansetron injection 4 mg    [DISCONTINUED] ondansetron injection 4 mg    [DISCONTINUED] pantoprazole injection 40 mg    [DISCONTINUED] prochlorperazine injection Soln 5 mg    [DISCONTINUED] sodium chloride 0.9% flush 10 mL    [DISCONTINUED] vancomycin (VANCOCIN) 1,500 mg in dextrose 5 % 250 mL IVPB    [DISCONTINUED] vancomycin - pharmacy to dose     Current Outpatient Medications on File Prior to Encounter   Medication Sig    apixaban (ELIQUIS) 5 mg Tab Take 5 mg by mouth 2 (two) times daily.    HYDROcodone-acetaminophen (NORCO) 5-325 mg per tablet Take 1 tablet by mouth every 6 (six) hours as needed for Pain.    insulin aspart U-100 (NOVOLOG) 100 unit/mL injection Inject 0-5 Units into the skin before meals and at bedtime as needed. **LOW CORRECTION DOSE**  Blood Glucose  mg/dL                  Pre-meal                2200  151-200                0 unit                      0 unit  201-250                2 units                    1 unit  251-300                3 units                    1 unit  301-350                4 units                    2 units  >350                     5 units                    3 units  Administer subcutaneously if needed at times designated by monitoring schedule.   DO NOT HOLD correction dose insulin for patients who are  NPO.    insulin detemir U-100 (LEVEMIR) 100 unit/mL injection Inject 10 Units into the skin once daily.    lactulose (CHRONULAC) 10 gram/15 mL solution Take 20 g  by mouth once daily.    levothyroxine (SYNTHROID) 50 MCG tablet Take 1 tablet (50 mcg total) by mouth before breakfast.    losartan (COZAAR) 50 MG tablet Take 50 mg by mouth once daily. Hold for SBP < 110 or DBP < 60    memantine (NAMENDA) 10 MG Tab Take 10 mg by mouth nightly.    metoprolol tartrate (LOPRESSOR) 25 MG tablet Take 0.5 tablets (12.5 mg total) by mouth 2 (two) times daily.    multivitamin Tab Take 1 tablet by mouth once daily.    nitrofurantoin, macrocrystal-monohydrate, (MACROBID) 100 MG capsule Take 1 capsule (100 mg total) by mouth 2 (two) times daily.    OXcarbazepine (TRILEPTAL) 150 MG Tab Take 450 mg by mouth 2 (two) times daily.    pantoprazole (PROTONIX) 40 MG tablet Take 1 tablet by mouth once daily.    polyethylene glycol (GLYCOLAX) 17 gram PwPk Take 17 g by mouth 2 (two) times a day.    risperiDONE (RISPERDAL) 1 MG tablet Take 1 mg by mouth 2 (two) times daily.    tamsulosin (FLOMAX) 0.4 mg Cap Take 1 capsule by mouth once daily.    [DISCONTINUED] furosemide (LASIX) 40 MG tablet Take 40 mg by mouth once daily. Hold for SBP < 110 or DBP < 60    [DISCONTINUED] metoprolol succinate (TOPROL-XL) 25 MG 24 hr tablet Take 1 tablet by mouth once daily. Hold for SBP < 110 or DBP < 60 or pulse < 50    [DISCONTINUED] SITagliptin (JANUVIA) 50 MG Tab Take 1 tablet (50 mg total) by mouth once daily.     Family History       Family history is unknown by patient.          Tobacco Use    Smoking status: Never    Smokeless tobacco: Never   Substance and Sexual Activity    Alcohol use: Not Currently    Drug use: Never    Sexual activity: Not Currently     Review of Systems   Unable to perform ROS: Dementia   Objective:     Vital Signs (Most Recent):  Temp: 98 °F (36.7 °C) (10/19/22 1615)  Pulse: 82 (10/19/22 1615)  Resp: 20 (10/19/22 1615)  BP: (!) 166/77 (10/19/22 1615)  SpO2: 100 % (10/19/22 1615)   Vital Signs (24h Range):  Temp:  [75.2 °F (24 °C)-99.1 °F (37.3 °C)] 98 °F (36.7 °C)  Pulse:  []  82  Resp:  [9-22] 20  SpO2:  [94 %-100 %] 100 %  BP: (105-166)/(34-93) 166/77        There is no height or weight on file to calculate BMI.    Physical Exam  Vitals reviewed.   Constitutional:       General: He is not in acute distress.     Appearance: Normal appearance. He is well-developed. He is obese.      Interventions: He is not intubated.  HENT:      Head: Normocephalic and atraumatic.      Nose: Nose normal.      Mouth/Throat:      Mouth: Mucous membranes are dry.      Pharynx: Oropharynx is clear.   Eyes:      Extraocular Movements: Extraocular movements intact.      Conjunctiva/sclera: Conjunctivae normal.      Pupils: Pupils are equal, round, and reactive to light.   Cardiovascular:      Rate and Rhythm: Normal rate.      Heart sounds: Normal heart sounds. No murmur heard.  Pulmonary:      Effort: Pulmonary effort is normal. He is not intubated.      Breath sounds: Normal breath sounds.   Abdominal:      General: Abdomen is flat. Bowel sounds are normal.      Palpations: Abdomen is soft.   Musculoskeletal:         General: Normal range of motion.      Cervical back: Normal range of motion and neck supple.      Right lower leg: No edema.      Left lower leg: No edema.   Skin:     General: Skin is warm and dry.      Capillary Refill: Capillary refill takes less than 2 seconds.   Neurological:      General: No focal deficit present.      Mental Status: He is oriented to person, place, and time. He is lethargic.   Psychiatric:         Mood and Affect: Mood normal.         Behavior: Behavior is uncooperative.         CRANIAL NERVES     CN III, IV, VI   Pupils are equal, round, and reactive to light.     Significant Labs: All pertinent labs within the past 24 hours have been reviewed.    Significant Imaging: I have reviewed all pertinent imaging results/findings within the past 24 hours.

## 2022-10-19 NOTE — HPI
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 He presents to [facility] with a UTI that was found on hospital admission on 10/17/2022. Patient was sent to the [facility] ED via EMS secondary to decreased level of consciousness with hematuria.  No associated symptoms such as fever chills cough wheezing chest pain palpitation lower extremity edema nausea vomiting abdominal pain were reported.  No other history could be gathered at this time this patient is somnolent and confused and was only able to state his name only at this time.  On initial presentation, patient was hypotensive with systolic blood pressure in the 60s and tachycardic.  Ensuing workup was notable for leukocytosis with left shift, elevated lactic acid level, hyperglycemia, acute renal failure, and UA highly suggestive of a presence of urinary tract infection.  Patient did not respond favorably to IV fluid bolus of 30 cc/kg and was admitted to ICU for vasopressor support.  Patient's code status is DNR according to nursing home records. After IVF, antibiotics and pressor treatment she was downgraded from the ICU on 10/19/2022 and now admitted the [facility].     The patient was admitted to the [facility] to Family Medicine Service under the direct supervision Dr. Lee Ann SIMMONS for continued care and medical management.

## 2022-10-19 NOTE — ASSESSMENT & PLAN NOTE
Creatinine  was down yesterday patient seems to be doing better.  Renal ultrasound unremarkable creatinine pending

## 2022-10-19 NOTE — SUBJECTIVE & OBJECTIVE
Interval History:  Patient without complaints    Objective:     Vital Signs (Most Recent):  Temp: 97.9 °F (36.6 °C) (10/19/22 0545)  Pulse: 78 (10/19/22 0545)  Resp: 16 (10/19/22 0545)  BP: 119/66 (10/19/22 0545)  SpO2: 100 % (10/19/22 0545) Vital Signs (24h Range):  Temp:  [96.3 °F (35.7 °C)-98.2 °F (36.8 °C)] 97.9 °F (36.6 °C)  Pulse:  [58-92] 78  Resp:  [9-22] 16  SpO2:  [94 %-100 %] 100 %  BP: ()/(33-88) 119/66     Weight: 102.9 kg (226 lb 13.7 oz)  Body mass index is 30.77 kg/m².      Intake/Output Summary (Last 24 hours) at 10/19/2022 0655  Last data filed at 10/19/2022 0600  Gross per 24 hour   Intake 2649.24 ml   Output 1075 ml   Net 1574.24 ml       Physical Exam  Vitals reviewed.   Constitutional:       Appearance: Normal appearance.      Interventions: He is not intubated.  HENT:      Head: Normocephalic and atraumatic.      Nose: Nose normal.      Mouth/Throat:      Mouth: Mucous membranes are dry.      Pharynx: Oropharynx is clear.   Eyes:      Extraocular Movements: Extraocular movements intact.      Conjunctiva/sclera: Conjunctivae normal.      Pupils: Pupils are equal, round, and reactive to light.   Cardiovascular:      Rate and Rhythm: Normal rate.      Heart sounds: Normal heart sounds. No murmur heard.  Pulmonary:      Effort: Pulmonary effort is normal. He is not intubated.      Breath sounds: Normal breath sounds.   Abdominal:      General: Abdomen is flat. Bowel sounds are normal.      Palpations: Abdomen is soft.   Musculoskeletal:         General: Normal range of motion.      Cervical back: Normal range of motion and neck supple.      Right lower leg: No edema.      Left lower leg: No edema.   Skin:     General: Skin is warm and dry.      Capillary Refill: Capillary refill takes less than 2 seconds.   Neurological:      General: No focal deficit present.      Mental Status: He is alert and oriented to person, place, and time.   Psychiatric:         Mood and Affect: Mood normal.          Behavior: Behavior normal.       Vents:       Lines/Drains/Airways       Drain  Duration                  Urethral Catheter -- days         Urethral Catheter 07/30/22 2040 Silicone 18 Fr. 80 days         Colostomy 10/17/22 2015 LLQ 1 day              Peripheral Intravenous Line  Duration                  Peripheral IV - Single Lumen 10/17/22 1603 20 G Left Antecubital 1 day         Peripheral IV - Single Lumen 10/17/22 1714 20 G Left;Posterior Hand 1 day                    Significant Labs:    CBC/Anemia Profile:  Recent Labs   Lab 10/17/22  1315 10/17/22  1712 10/18/22  0508   WBC 13.83* 22.31* 16.17*   HGB 11.2* 10.4* 9.4*   HCT 34.7* 31.8* 29.5*    272 227   MCV 86.3 83.9 85.8   RDW 16.3* 16.1* 16.4*        Chemistries:  Recent Labs   Lab 10/17/22  1315 10/17/22  1716 10/18/22  0508    135* 146*   K 5.4* 4.4 3.8    101 108*   CO2 28 26 26   BUN 45* 48* 42*   CREATININE 2.78* 2.94* 1.82*   CALCIUM 9.1 9.4 8.0*   ALBUMIN 2.0* 1.9* 1.7*   PROT 5.7* 6.1* 4.8*   BILITOT 0.4 0.3 0.2   ALKPHOS 95 92 78   ALT 15* 13* 14*   AST 19 23 22   MG  --  1.9  --        Recent Lab Results         10/19/22  0430   10/18/22  2338   10/18/22  1640   10/18/22  1200        POC Glucose 76   71   82   121               Significant Imaging:  I have reviewed all pertinent imaging results/findings within the past 24 hours.

## 2022-10-19 NOTE — PLAN OF CARE
[facility] ICU  Discharge Final Note    Primary Care Provider: Kerry Lin MD    Expected Discharge Date:     Final Discharge Note (most recent)       Final Note - 10/19/22 1307          Final Note    Assessment Type Final Discharge Note     Anticipated Discharge Disposition Long Term Acute Care        Post-Acute Status    Post-Acute Authorization Placement     Post-Acute Placement Status Set-up Complete/Auth obtained     Patient choice form signed by patient/caregiver List with quality metrics by geographic area provided;List from CMS Compare     Discharge Delays None known at this time                     Important Message from Medicare  Important Message from Medicare regarding Discharge Appeal Rights: Given to patient/caregiver, Explained to patient/caregiver     Date IMM was signed: 10/18/22  Time IMM was signed: 1156    After-discharge care                Destination       [facility]   Service: Nursing Home    [address] [place], [place] [address]   Phone: [phone]                   Spoke with george pt accepted to regular bed in specialty for today, notified maria g borja and pts alok chambers

## 2022-10-19 NOTE — PROGRESS NOTES
10/19/22 0707   Wound Care Follow Up   Wound Care Follow-up? Yes   Wound Care- Next Visit Date 10/25/22

## 2022-10-20 ENCOUNTER — HOSPITAL ENCOUNTER (OUTPATIENT)
Dept: RADIOLOGY | Facility: HOSPITAL | Age: 82
Discharge: HOME | End: 2022-10-20
Payer: MEDICARE

## 2022-10-20 PROBLEM — R68.89 SUSPECTED DEEP TISSUE INJURY: Status: ACTIVE | Noted: 2022-10-20

## 2022-10-20 LAB
ACANTHOCYTES BLD QL SMEAR: ABNORMAL
ANION GAP SERPL CALCULATED.3IONS-SCNC: 11 MMOL/L (ref 7–16)
ANISOCYTOSIS BLD QL SMEAR: ABNORMAL
BASOPHILS # BLD AUTO: 0.02 K/UL (ref 0–0.2)
BASOPHILS NFR BLD AUTO: 0.2 % (ref 0–1)
BUN SERPL-MCNC: 21 MG/DL (ref 7–18)
BUN/CREAT SERPL: 28 (ref 6–20)
CALCIUM SERPL-MCNC: 8.6 MG/DL (ref 8.5–10.1)
CHLORIDE SERPL-SCNC: 111 MMOL/L (ref 98–107)
CO2 SERPL-SCNC: 26 MMOL/L (ref 21–32)
CREAT SERPL-MCNC: 0.76 MG/DL (ref 0.7–1.3)
DIFFERENTIAL METHOD BLD: ABNORMAL
EGFR (NO RACE VARIABLE) (RUSH/TITUS): 90 ML/MIN/1.73M²
EOSINOPHIL # BLD AUTO: 0.21 K/UL (ref 0–0.5)
EOSINOPHIL NFR BLD AUTO: 2.6 % (ref 1–4)
EOSINOPHIL NFR BLD MANUAL: 6 % (ref 1–4)
ERYTHROCYTE [DISTWIDTH] IN BLOOD BY AUTOMATED COUNT: 16.3 % (ref 11.5–14.5)
GLUCOSE SERPL-MCNC: 107 MG/DL (ref 70–105)
GLUCOSE SERPL-MCNC: 110 MG/DL (ref 70–105)
GLUCOSE SERPL-MCNC: 84 MG/DL (ref 74–106)
GLUCOSE SERPL-MCNC: 94 MG/DL (ref 70–105)
GLUCOSE SERPL-MCNC: 97 MG/DL (ref 70–105)
GLUCOSE SERPL-MCNC: 99 MG/DL (ref 70–105)
HCT VFR BLD AUTO: 29.8 % (ref 40–54)
HGB BLD-MCNC: 9.9 G/DL (ref 13.5–18)
HYPOCHROMIA BLD QL SMEAR: ABNORMAL
IMM GRANULOCYTES # BLD AUTO: 0.03 K/UL (ref 0–0.04)
IMM GRANULOCYTES NFR BLD: 0.4 % (ref 0–0.4)
LYMPHOCYTES # BLD AUTO: 1.17 K/UL (ref 1–4.8)
LYMPHOCYTES NFR BLD AUTO: 14.4 % (ref 27–41)
LYMPHOCYTES NFR BLD MANUAL: 10 % (ref 27–41)
MAGNESIUM SERPL-MCNC: 2.2 MG/DL (ref 1.7–2.3)
MCH RBC QN AUTO: 27.6 PG (ref 27–31)
MCHC RBC AUTO-ENTMCNC: 33.2 G/DL (ref 32–36)
MCV RBC AUTO: 83 FL (ref 80–96)
MONOCYTES # BLD AUTO: 0.61 K/UL (ref 0–0.8)
MONOCYTES NFR BLD AUTO: 7.5 % (ref 2–6)
MONOCYTES NFR BLD MANUAL: 3 % (ref 2–6)
MPC BLD CALC-MCNC: 9 FL (ref 9.4–12.4)
NEUTROPHILS # BLD AUTO: 6.11 K/UL (ref 1.8–7.7)
NEUTROPHILS NFR BLD AUTO: 74.9 % (ref 53–65)
NEUTS BAND NFR BLD MANUAL: 8 % (ref 1–5)
NEUTS SEG NFR BLD MANUAL: 73 % (ref 50–62)
NRBC # BLD AUTO: 0 X10E3/UL
NRBC, AUTO (.00): 0 %
PLATELET # BLD AUTO: 219 K/UL (ref 150–400)
PLATELET MORPHOLOGY: ABNORMAL
POTASSIUM SERPL-SCNC: 4.1 MMOL/L (ref 3.5–5.1)
RBC # BLD AUTO: 3.59 M/UL (ref 4.6–6.2)
SODIUM SERPL-SCNC: 144 MMOL/L (ref 136–145)
TARGETS BLD QL SMEAR: ABNORMAL
UA COMPLETE W REFLEX CULTURE PNL UR: ABNORMAL
VANCOMYCIN TROUGH SERPL-MCNC: 31.4 ΜG/ML (ref 10–20)
WBC # BLD AUTO: 8.15 K/UL (ref 4.5–11)

## 2022-10-20 PROCEDURE — 83735 ASSAY OF MAGNESIUM: CPT | Performed by: NURSE PRACTITIONER

## 2022-10-20 PROCEDURE — 63600175 PHARM REV CODE 636 W HCPCS: Performed by: NURSE PRACTITIONER

## 2022-10-20 PROCEDURE — 99232 PR SUBSEQUENT HOSPITAL CARE,LEVL II: ICD-10-PCS | Mod: ,,, | Performed by: NURSE PRACTITIONER

## 2022-10-20 PROCEDURE — 80202 ASSAY OF VANCOMYCIN: CPT | Performed by: NURSE PRACTITIONER

## 2022-10-20 PROCEDURE — 11000001 HC ACUTE MED/SURG PRIVATE ROOM

## 2022-10-20 PROCEDURE — 25000003 PHARM REV CODE 250

## 2022-10-20 PROCEDURE — C1751 CATH, INF, PER/CENT/MIDLINE: HCPCS

## 2022-10-20 PROCEDURE — 36573 INSJ PICC RS&I 5 YR+: CPT

## 2022-10-20 PROCEDURE — 36415 COLL VENOUS BLD VENIPUNCTURE: CPT

## 2022-10-20 PROCEDURE — 85025 COMPLETE CBC W/AUTO DIFF WBC: CPT

## 2022-10-20 PROCEDURE — S5010 5% DEXTROSE AND 0.45% SALINE: HCPCS | Performed by: NURSE PRACTITIONER

## 2022-10-20 PROCEDURE — 87075 CULTR BACTERIA EXCEPT BLOOD: CPT | Performed by: NURSE PRACTITIONER

## 2022-10-20 PROCEDURE — 92610 EVALUATE SWALLOWING FUNCTION: CPT

## 2022-10-20 PROCEDURE — 36415 COLL VENOUS BLD VENIPUNCTURE: CPT | Performed by: NURSE PRACTITIONER

## 2022-10-20 PROCEDURE — 99232 SBSQ HOSP IP/OBS MODERATE 35: CPT | Mod: GC,,, | Performed by: FAMILY MEDICINE

## 2022-10-20 PROCEDURE — C9113 INJ PANTOPRAZOLE SODIUM, VIA: HCPCS | Performed by: NURSE PRACTITIONER

## 2022-10-20 PROCEDURE — 94761 N-INVAS EAR/PLS OXIMETRY MLT: CPT

## 2022-10-20 PROCEDURE — 99232 SBSQ HOSP IP/OBS MODERATE 35: CPT | Mod: ,,, | Performed by: NURSE PRACTITIONER

## 2022-10-20 PROCEDURE — 94640 AIRWAY INHALATION TREATMENT: CPT

## 2022-10-20 PROCEDURE — 99232 PR SUBSEQUENT HOSPITAL CARE,LEVL II: ICD-10-PCS | Mod: GC,,, | Performed by: FAMILY MEDICINE

## 2022-10-20 PROCEDURE — 25000003 PHARM REV CODE 250: Performed by: NURSE PRACTITIONER

## 2022-10-20 PROCEDURE — 25000242 PHARM REV CODE 250 ALT 637 W/ HCPCS: Performed by: NURSE PRACTITIONER

## 2022-10-20 PROCEDURE — 80048 BASIC METABOLIC PNL TOTAL CA: CPT

## 2022-10-20 PROCEDURE — 82962 GLUCOSE BLOOD TEST: CPT

## 2022-10-20 PROCEDURE — 87070 CULTURE OTHR SPECIMN AEROBIC: CPT | Performed by: NURSE PRACTITIONER

## 2022-10-20 PROCEDURE — 27000525 HC TRAY ALL

## 2022-10-20 PROCEDURE — 27000525 IR PICC LINE PLACEMENT W/O PORT, W/IMG > 5 Y/O

## 2022-10-20 PROCEDURE — 99900035 HC TECH TIME PER 15 MIN (STAT)

## 2022-10-20 RX ORDER — CARBOXYMETHYLCELLULOSE SODIUM 5 MG/ML
2 SOLUTION/ DROPS OPHTHALMIC 2 TIMES DAILY
COMMUNITY

## 2022-10-20 RX ORDER — INSULIN LISPRO 100 [IU]/ML
4 INJECTION, SOLUTION INTRAVENOUS; SUBCUTANEOUS
Status: ON HOLD | COMMUNITY
End: 2022-11-02 | Stop reason: HOSPADM

## 2022-10-20 RX ORDER — HYDRALAZINE HYDROCHLORIDE 20 MG/ML
10 INJECTION INTRAMUSCULAR; INTRAVENOUS EVERY 6 HOURS PRN
Status: DISCONTINUED | OUTPATIENT
Start: 2022-10-20 | End: 2022-11-03 | Stop reason: HOSPADM

## 2022-10-20 RX ORDER — FUROSEMIDE 40 MG/1
40 TABLET ORAL 2 TIMES DAILY
Status: ON HOLD | COMMUNITY
End: 2022-11-02 | Stop reason: HOSPADM

## 2022-10-20 RX ORDER — ACETIC ACID 0.25 G/100ML
IRRIGANT IRRIGATION
Status: DISCONTINUED | OUTPATIENT
Start: 2022-10-20 | End: 2022-11-03 | Stop reason: HOSPADM

## 2022-10-20 RX ORDER — LOSARTAN POTASSIUM 50 MG/1
50 TABLET ORAL DAILY
Status: DISCONTINUED | OUTPATIENT
Start: 2022-10-20 | End: 2022-10-26

## 2022-10-20 RX ADMIN — LEVOTHYROXINE SODIUM ANHYDROUS 25 MCG: 100 INJECTION, POWDER, LYOPHILIZED, FOR SOLUTION INTRAVENOUS at 08:10

## 2022-10-20 RX ADMIN — IPRATROPIUM BROMIDE AND ALBUTEROL SULFATE 3 ML: 2.5; .5 SOLUTION RESPIRATORY (INHALATION) at 12:10

## 2022-10-20 RX ADMIN — MEROPENEM 1 G: 1 INJECTION, POWDER, FOR SOLUTION INTRAVENOUS at 12:10

## 2022-10-20 RX ADMIN — MUPIROCIN: 20 OINTMENT TOPICAL at 09:10

## 2022-10-20 RX ADMIN — VANCOMYCIN HYDROCHLORIDE 1500 MG: 1 INJECTION, POWDER, LYOPHILIZED, FOR SOLUTION INTRAVENOUS at 08:10

## 2022-10-20 RX ADMIN — MUPIROCIN: 20 OINTMENT TOPICAL at 08:10

## 2022-10-20 RX ADMIN — MEROPENEM 1 G: 1 INJECTION, POWDER, FOR SOLUTION INTRAVENOUS at 05:10

## 2022-10-20 RX ADMIN — ENOXAPARIN SODIUM 100 MG: 100 INJECTION SUBCUTANEOUS at 09:10

## 2022-10-20 RX ADMIN — MEROPENEM 1 G: 1 INJECTION, POWDER, FOR SOLUTION INTRAVENOUS at 08:10

## 2022-10-20 RX ADMIN — IPRATROPIUM BROMIDE AND ALBUTEROL SULFATE 3 ML: 2.5; .5 SOLUTION RESPIRATORY (INHALATION) at 07:10

## 2022-10-20 RX ADMIN — DEXTROSE AND SODIUM CHLORIDE: 5; 450 INJECTION, SOLUTION INTRAVENOUS at 02:10

## 2022-10-20 RX ADMIN — LOSARTAN POTASSIUM 50 MG: 50 TABLET, FILM COATED ORAL at 02:10

## 2022-10-20 RX ADMIN — ENOXAPARIN SODIUM 100 MG: 100 INJECTION SUBCUTANEOUS at 10:10

## 2022-10-20 RX ADMIN — PANTOPRAZOLE SODIUM 40 MG: 40 INJECTION, POWDER, LYOPHILIZED, FOR SOLUTION INTRAVENOUS at 08:10

## 2022-10-20 NOTE — PROGRESS NOTES
[facility]  Adult Nutrition  First Assessment Note         Reason for Assessment  Reason For Assessment: identified at risk by screening criteria   Nutrition Risk Screen: large or nonhealing wound, burn or pressure injury    Current NPO status inadequate to meet nutritional needs. Consider adding 500mg Vit C BID + 220mg ZnSO4 BID + MVI daily to aid in wound healing.    Per ST ruffin on 10/20/22-Bhargav Gao is a 82 y.o. male with an SLP diagnosis of Dysphagia.  He presents with no overt s/s of aspiration with thin liquid or pudding consistencies.  Pt independently utilized double swallow with pudding consistency. Rec Pureed diet with thin liquids as tolerated. 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 Patient was sent to ED via EMS secondary to decreased level of consciousness with hematuria. Patient with septic shock, UTI with hematruia hx with chronic indwelling anne, pt with colostomy, acute renal failure with tubular necrosis, acute metabolic encephalopathy, DM, COPD.    Patient is on NPO diet order. If unable to advance diet or patient does not consume adequate nutrition and tube feeding recommendations are desired, recommend Suplena 1.8. Suplena 1.8 at goal rate of 60ml/hr would provide 2592 kcal (meets 100% of energy needs), 64.8g pro (meets 100% of protein needs), 139.68g fat, 279.36g CHO, 964.8ml free water. With 30ml/hr free water flush providing 720ml, total water from formula and free water flush would be 1684.8ml. Would also  recommend Epi BID to aid in wound healing, which would provide 5g pro. Tube feeding at goal rate and Epi BID would provide 69.8g pro total (meets 100% of protein needs). Will monitor renal labs/JORDYN status and adjust tube feeding recommendations as appropriate.     Patient's current weight is 101.1kg, 30.23 BMI, in obese BMI category - needs adjusted. Patient with lower extremity edema per MD note. Last BM documented 10/20. Will monitor weight trend, labs, meds, wound/skin, diet order changes, updates in patient condition. RD following.    Malnutrition  Is Patient Malnourished: No  Skin (Micronutrient): wounds unhealed  Per MD note stage IV sacral wound was noted. Wound base was clean with no purulent drainage or fibrinous/necrotic tissue.     Skin Integrity  Bart Risk Assessment  Sensory Perception: 3-->slightly limited  Moisture: 3-->occasionally moist  Activity: 1-->bedfast  Mobility: 1-->completely immobile  Nutrition: 1-->very poor  Friction and Shear: 1-->problem  Bart Score: 10    Nutrition Diagnosis  Increased protein Needs   related to Wound healing as evidenced by pressure injury of sacral region stage 4 present on admission    Nutrition Diagnosis Status: New      Nutrition Risk  Level of Risk/Frequency of Follow-up: high    Recent Labs   Lab 10/20/22  0513 10/20/22  0658   GLU  --  84   POCGLU 99  --      Comments on Glucose: Pt with PMHx of Diabetes Mellitus    Nutrition Prescription / Recommendations  Recommendation/Intervention: Recommend monitor nutrition need for tube feeding. Current NPO status inadequate to meet nutritional needs. Consider adding 500mg Vit C BID + 220mg ZnSO4 BID + MVI daily to aid in wound healing.  Goals: Initiate tube feeding if patient remains NPO and if medically appropriate. Monitor weight trend, labs, meds, wound/skin, diet order changes, updates in patient condition.  Nutrition Goal Status: new  Current Diet Order: puree per ST  Chewing or Swallowing Difficulty?:  Swallowing difficulty   Recommended Diet: Enteral Nutrition and NPO EN recommended if patient remains NPO  Recommended Oral Supplement: No Oral Supplements  Is Nutrition Support Recommended: Yes     Needs Calculated  Energy Need Method: Kcal/kg (30-35 kcal/kg ideal body weight) Energy Calorie Requirements (kcal): 2422..91 kcal  Weight Used For Protein Calculations: 80.74 kg (178 lb) (0.8-1.0g/kg ideal body weight acute renal failure, septic shock, wound)  Protein Requirements: 64.73- 80.91 g pro    Fluid Requirements (mL): fluids per MD  Enteral Nutrition   Enteral Nutrition Formula Provides:  2592 kcals Propofol Rate: No  64.8 g Protein  279.36 g Carbohydrates  139.68 g Fat Propofol Rate: No  964.8 ml Fluid without Flush    720 ml Fluid by flush   1684.8 ml Total Fluid  Enteral Nutrition Recommended Order:  Tube feeding via NG/ Dobhoff  Tube feeding formula: Suplena 1.8 Continuous 60 ml/h  Free Water Flush: 30 ml hourly  Modular Supplements:No Modular Supplements needed and Epi 2 times a day  Enteral Nutrition meets needs?: yes  Enteral Nutrition Status: Recommended but Not Ordered  Is Education Recommended: No    Monitor and Evaluation  % current Intake: NPO  % intake to meet estimated needs: Enteral Nutrition         Current Medical Diagnosis and Past Medical History     Past Medical History:   Diagnosis Date    Alzheimer's disease     Anticoagulant long-term use     Bipolar disorder     BPH (benign prostatic hyperplasia)     Deep vein thrombosis (DVT) of popliteal vein of right lower extremity     Diabetes mellitus     DVT of deep femoral vein, left     Hyperlipidemia     Hypertension     Hypothyroidism     Idiopathic orofacial dystonia     Iron deficiency anemia secondary to blood loss (chronic)     Mild intellectual disabilities     Obesity     Sacral wound     05/16 records show culture of wound grew   Collected: 05/05/22 9195 Order Status: Completed Specimen: Wound from Sacral Updated: 05/11/22 8223   Culture, Wound/Abscess Light Growth Acinetobacter baumannii complex/haemolyticus Abnormal    Comment: Corrected result: Previously reported as Gram-negative Bacilli on 5/9/2022 at 1312 CDT.    Light Growth Escherichia coli Abnormal    Light Growth Klebsiel    Schizophrenia 1/28/2022 05/16 -continue trileptal and risperidone     Nutrition/Diet History  Spiritual, Cultural Beliefs, Congregation Practices, Values that Affect Care: no  Lab/Procedures/Meds  Recent Labs   Lab 10/19/22  0621 10/19/22  1803 10/20/22  0658     --  144   K 3.8  --  4.1   BUN 32*   < > 21*   CREATININE 0.86   < > 0.76   CALCIUM 8.5  --  8.6   ALBUMIN 1.9*  --   --    *  --  111*   ALT 18  --   --    AST 25  --   --     < > = values in this interval not displayed.     BUN elevated-improving JORDYN  Last A1c:   Lab Results   Component Value Date    HGBA1C 6.6 06/29/2022     Lab Results   Component Value Date    RBC 3.59 (L) 10/20/2022    HGB 9.9 (L) 10/20/2022    HCT 29.8 (L) 10/20/2022    MCV 83.0 10/20/2022    MCH 27.6 10/20/2022    MCHC 33.2 10/20/2022    TIBC 174 (L) 12/09/2021      Albuterol-ipratropium, enoxaparin, levothyroxine, merrem, mupriocin, pantoprazole, IVF 100ml/hr, norepinephrine    Anthropometrics  Temp: 97.9 °F (36.6 °C)  Weight Method: Bed Scale  Weight: 97.6 kg (215 lb 2.7 oz)  Weight (lb): 215.17 lb  BMI (Calculated): 29.2     Estimated/Assessed Needs    Temp: 97.9 °F (36.6 °C)Oral  Weight Used For Calorie Calculations: 80.74 kg (178 lb)  Energy Need Method: Kcal/kg (30-35 kcal/kg ideal body weight) Energy Calorie Requirements (kcal): 2422..91 kcal  Weight Used For Protein Calculations: 80.74 kg (178 lb) (0.8-1.0g/kg ideal body weight acute renal failure, septic shock, wound)  Protein Requirements: 64.73- 80.91 g pro    Fluid Requirements (mL): fluids per MD  RDA Method (mL): 2527.5     Nutrition by Nursing  Diet/Nutrition Received: NPO  Last Bowel Movement: 10/20/22    Nutrition Follow-Up  RD Follow-up?:  Yes

## 2022-10-20 NOTE — ASSESSMENT & PLAN NOTE
Hx of DVT -- US 10/17 with right popliteal nonocclusive thrombosis - start wt based renally dosed lovenox

## 2022-10-20 NOTE — CONSULTS
[facility]  Wound Care  Progress Note    Patient Name: Bhargav Gao  MRN: [id number]  Admission Date: 10/19/2022  Attending Physician: Hipolito Nance Jr., MD    Past Medical History:   Diagnosis Date    Alzheimer's disease     Anticoagulant long-term use     Bipolar disorder     BPH (benign prostatic hyperplasia)     Deep vein thrombosis (DVT) of popliteal vein of right lower extremity     Diabetes mellitus     DVT of deep femoral vein, left     Hyperlipidemia     Hypertension     Hypothyroidism     Idiopathic orofacial dystonia     Iron deficiency 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    Light Growth Escherichia coli Abnormal    Light Growth Klebsiel    Schizophrenia 1/28/2022 05/16 -continue trileptal and risperidone        Subjective:     HPI:  Bhargav Gao is a 81 yo male with wounds to sacral and right buttock admitted with bacteremia. Bone palpated today on exam. Wound to sacral covered in slough and necrotic tissue. Wound to right buttock necrotic. Cultures obtained today.  He also has SDTI to toes on left foot. He is at risk for additional skin breakdown  multiple co-morbidities, poor vascular supply, diabetes, edema, heavy drainage, excessive wound moisture and macerated tissue, decreased granulation tissue, necrosis, large surface area, advanced age, fragile skin, and infection. 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 Wound healing is complicated by dementia, limited mobility, diabetes, anemia, and chronic sacral wound.               Review of Systems   Unable to perform ROS: Dementia   Objective:     Vital Signs (Most Recent):  Temp: 97.9 °F (36.6 °C) (10/20/22 0800)  Pulse: 69 (10/20/22 0800)  Resp: 18 (10/20/22 0800)  BP: (!) 161/90 (10/20/22 0800)  SpO2: 100 % (10/20/22 0800)   Vital Signs (24h Range):  Temp:  [75.2 °F (24 °C)-99.1 °F (37.3 °C)] 97.9 °F (36.6 °C)  Pulse:  [66-96] 69  Resp:  [11-20] 18  SpO2:  [97 %-100 %] 100 %  BP: (128-170)/(45-93) 161/90     Weight: 97.6 kg (215 lb 2.7 oz)  Body mass index is 29.18 kg/m².  No data recorded    Physical Exam  Vitals reviewed.   Constitutional:       Appearance: He is ill-appearing.   HENT:      Head: Normocephalic.   Cardiovascular:      Rate and Rhythm: Normal rate and regular rhythm.      Pulses: Normal pulses.      Heart sounds: Normal heart sounds.   Pulmonary:      Effort: Pulmonary effort is normal.      Breath sounds: Normal breath sounds.   Skin:     Findings: Erythema present.      Comments: Wound, LDA for photos/measurements   Neurological:      Mental Status: He is alert.      Cranial Nerves: Cranial nerve deficit present.      Sensory: Sensory deficit present.      Motor: Weakness present.      Gait: Gait abnormal.             Altered Skin Integrity 10/20/22 1000 Sacral spine Ulceration Full thickness tissue loss. Base is covered by slough and/or eschar in the wound bed (Active)   10/20/22 1000   Altered Skin Integrity Present on Admission: yes   Side:    Orientation:    Location: Sacral spine   Wound Number:    Is this injury device related?:    Primary Wound Type: Ulceration   Description of Altered Skin Integrity: Full thickness tissue loss. Base is covered by slough and/or eschar in the wound bed   Ankle-Brachial Index:    Pulses:    Removal Indication and Assessment:     Wound Outcome:    (Retired) Wound Length (cm):    (Retired) Wound Width (cm):    (Retired) Depth (cm):    Wound Description (Comments):    Removal Indications:    Wound Image    10/20/22 1608   Description of Altered Skin Integrity Full thickness tissue loss with exposed bone, tendon, or muscle. Often includes undermining and tunneling. May extend into muscle and/or supporting structures. 10/20/22 1608   Dressing Appearance Moist drainage 10/20/22 1608   Drainage Amount Moderate 10/20/22 1608   Drainage Characteristics/Odor Serosanguineous 10/20/22 1608   Appearance Dressing in place, unable to visualize 10/21/22 2000   Tissue loss description Full thickness 10/20/22 1608   Yellow (%), Wound Tissue Color 90 % 10/20/22 1608   Wound Length (cm) 7 cm 10/20/22 1608   Wound Width (cm) 3 cm 10/20/22 1608   Wound Depth (cm) 2 cm 10/20/22 1608   Wound Volume (cm^3) 42 cm^3 10/20/22 1608   Wound Surface Area (cm^2) 21 cm^2 10/20/22 1608   Care Cleansed with:;Soap and water 10/20/22 1608   Dressing Applied;Other (comment) 10/20/22 1608   Number of days: 4            Altered Skin Integrity 10/20/22 1000 Right Buttocks Skin Tear Partial thickness tissue loss. Shallow open ulcer with a red or pink wound bed, without slough. Intact or Open/Ruptured Serum-filled blister. (Active)   10/20/22 1000   Altered Skin Integrity Present on Admission: yes   Side: Right   Orientation:    Location: Buttocks   Wound Number:    Is this injury device related?:    Primary Wound Type: Skin tear   Description of Altered Skin Integrity: Partial thickness tissue loss. Shallow open ulcer with a red or pink wound bed, without slough. Intact or Open/Ruptured Serum-filled blister.   Ankle-Brachial Index:    Pulses:    Removal Indication and Assessment:    Wound Outcome:    (Retired) Wound Length (cm):    (Retired) Wound Width (cm):    (Retired) Depth (cm):    Wound Description (Comments):    Removal Indications:    Wound Image   10/20/22 1613    Description of Altered Skin Integrity Partial thickness tissue loss. Shallow open ulcer with a red or pink wound bed, without slough. Intact or Open/Ruptured Serum-filled blister. 10/20/22 1613   Drainage Amount Scant 10/20/22 1613   Drainage Characteristics/Odor Serosanguineous 10/20/22 1613   Appearance Dressing in place, unable to visualize 10/21/22 2000   Tissue loss description Partial thickness 10/20/22 1613   Care Cleansed with:;Soap and water 10/20/22 1613   Dressing Applied;Gauze, wet to dry 10/20/22 1613   Number of days: 4            Altered Skin Integrity 10/20/22 1000 Left Toe, first Purple or maroon localized area of discolored intact skin or non-intact skin or a blood-filled blister. (Active)   10/20/22 1000   Altered Skin Integrity Present on Admission: yes   Side: Left   Orientation:    Location: Toe, first   Wound Number:    Is this injury device related?:    Primary Wound Type:    Description of Altered Skin Integrity: Purple or maroon localized area of discolored intact skin or non-intact skin or a blood-filled blister.   Ankle-Brachial Index:    Pulses:    Removal Indication and Assessment:    Wound Outcome:    (Retired) Wound Length (cm):    (Retired) Wound Width (cm):    (Retired) Depth (cm):    Wound Description (Comments):    Removal Indications:    Wound Image    10/20/22 1616   Description of Altered Skin Integrity Purple or maroon localized area of discolored intact skin or non-intact skin or a blood-filled blister. 10/20/22 1616   Dressing Appearance Open to air 10/21/22 2000   Drainage Amount None 10/20/22 1616   Appearance Maroon;Red 10/20/22 1616   Care Cleansed with:;Soap and water 10/20/22 1616   Number of days: 4            Altered Skin Integrity 10/20/22 1000 Left Toe, fifth Purple or maroon localized area of discolored intact skin or non-intact skin or a blood-filled blister. (Active)   10/20/22 1000   Altered Skin Integrity Present on Admission: yes   Side: Left   Orientation:     Location: Toe, fifth   Wound Number:    Is this injury device related?:    Primary Wound Type:    Description of Altered Skin Integrity: Purple or maroon localized area of discolored intact skin or non-intact skin or a blood-filled blister.   Ankle-Brachial Index:    Pulses:    Removal Indication and Assessment:    Wound Outcome:    (Retired) Wound Length (cm):    (Retired) Wound Width (cm):    (Retired) Depth (cm):    Wound Description (Comments):    Removal Indications:    Wound Image   10/20/22 1618   Description of Altered Skin Integrity Purple or maroon localized area of discolored intact skin or non-intact skin or a blood-filled blister. 10/20/22 1618   Dressing Appearance Open to air 10/21/22 2000   Drainage Amount None 10/20/22 1618   Appearance Maroon;Red 10/20/22 1618   Care Cleansed with:;Soap and water 10/20/22 1618   Number of days: 4         Assessment and Plan      Suspected deep tissue injury  Protect areas with foam dressings  Wound care to change M, W, F  Nursing to assess each shift to ensure no additional breakdown to toes or heels  Waffle boots in bed  Keep pressure off toes  Nursing to perform Bart scale every shift  Turn every 2 hours    Pressure ulcer of sacral region, stage 4  Clean with acetic acid  Pack with acetic acid moistened drawtex  Cover and secure with 4x4s, abd pad, and paper tape  Low air loss mattress  Turn every 2 hours  Change daily and PRN for soilage  TAP system            Signature:  LIEN Elise  Wound Care    Date of encounter: 10/20/2022

## 2022-10-20 NOTE — NURSING
Wound care note:  81yo male admitted to [facility] with diagnosis of bacteremia. Assessed by A LIEN Solis. Unstageable sacral,  tan tissue noted, acetic acid ordered daily; culture obtained. SDTI noted to left 5th toe and leg 1st toe.

## 2022-10-20 NOTE — ASSESSMENT & PLAN NOTE
Chief Complaint   Patient presents with   • Sinus Problem         HPI:   Pt seen 10/22/2019 with >1  Month of cough/sinus symptoms. He had been using sudafed regularly. On exam he had irregular heart beat, ECG done with sinus jaycee, sinus arrhythmia. He was not symptomatic with this. He was treated with azithro, flonase, advised to stop sudafed and follow symptoms. No h/o heart issues.       Today feels sinuses still congested. Cough intermittent, can have tickle, feels some \"wheezy\" when he talks longer periods.  Stopped flonase 1 week ago after 2 weeks of use as no noted change. Did not note change with addition of azithro either.   Still with some PND feeling, clear secretions.  Upper chest feels \"tight\".   No similar persistent symptoms in past.   Not feeling SOB.  No fevers or GI symptoms, no difficulty swallowing.   Not using neti pot.   Intake has been normal.  No fevers or rash.      Patient Active Problem List   Diagnosis   • Sciatica   • Numbness of right foot   • History of hematuria   • Recurrent oral herpes simplex     Current Outpatient Medications   Medication   • azithromycin (ZITHROMAX) 250 MG tablet   • fluticasone (FLONASE) 50 MCG/ACT nasal spray   • Valacyclovir HCl 1000 MG Tab   • betamethasone dipropionate (DIPROSONE) 0.05 % cream   • Omega-3 Fatty Acids (FISH OIL) 1000 MG capsule   • vitamin - therapeutic multivitamins w/minerals (CENTRUM SILVER,THERA-M) Tab     No current facility-administered medications for this visit.      ALLERGIES:   Allergen Reactions   • Penicillins      rash       Past Medical History:   Diagnosis Date   • Basal cell carcinoma    • Keratosis, actinic    • Warts      Past Surgical History:   Procedure Laterality Date   • Colonoscopy      WNL through [facility]   • Finger tendon repair Right 1985   • Laminectomy     • Skin biopsy       Family History   Problem Relation Age of Onset   • Dementia/Alzheimers Mother             • Cancer, Skin Mother       POCT glucose checks  Sliding scale insulin       BCC   • Cancer, Lung Mother         smoker; lobectomy 2012   • Heart disease Father    • Thyroid Sister         removed 2013   • Myocardial Infarction Maternal Grandfather    • Cancer, Colon Maternal Grandfather    • Stroke Paternal Grandmother    • Patient is unaware of any medical problems Son         twin   • Patient is unaware of any medical problems Son         twin   • Patient is unaware of any medical problems Daughter    • Cancer, Prostate Neg Hx    • Hyperlipidemia Neg Hx    • Hypertension Neg Hx    • Diabetes Neg Hx      Social History     Tobacco Use   • Smoking status: Never Smoker   • Smokeless tobacco: Never Used   Substance Use Topics   • Alcohol use: Yes     Alcohol/week: 3.0 standard drinks     Types: 3 Standard drinks or equivalent per week     Frequency: 2-4 times a month     Drinks per session: 1 or 2     Binge frequency: Less than monthly     Comment: Friday or Saturday   • Drug use: No         Recent PHQ 2/9 Score    PHQ 2:  Date Adult PHQ 2 Score   11/12/2019 0       PHQ 9:         DEPRESSION ASSESSMENT/PLAN:  Depression screening is negative no further plan needed.        BP Readings from Last 5 Encounters:   11/12/19 136/82   10/22/19 130/88   09/10/19 138/90   01/08/19 120/88   11/16/18 124/84         Visit Vitals  /82 (BP Location: LUE - Left upper extremity, Patient Position: Sitting, Cuff Size: Regular)   Temp 96.4 °F (35.8 °C) (Temporal)   Resp 16   Ht 6' 3\" (1.905 m)   Wt 91.5 kg   BMI 25.22 kg/m²      General: A&O x3, looks generally well, nontoxic. Voice quality normal.   Eyes: Pupils equal, sclera and conjunctiva clear, lids normal. No discharge.  Nose: Nares patent, congestion minor, discharge not seen. Septum midline.  Face: no tenderness to palpation over sinuses  Mouth: Mucus membranes moist, no oral lesions. Oropharynx without erythema, no exudate present. Tonsils with no hypertrophy.  Post-nasal drainage not seen.  Left ear: Canal without erythema or swelling. No  tragal tenderness. Tympanic membrane with no erythema, no fluid, no loss of bony landmarks.  Right ear: Canal without erythema or swelling. Tympanic membrane with no erythema, no fluid, no loss of bony landmarks.  Neck: Supple, nontender; no supraclavicular adenopathy; cervical adenopathy not present. Thyroid without mass or enlargement. Carotid pulses equal.   Heart: Regular rate, rare to hear irreg rhythm - much better than last visit; no murmur, rub, gallop.  Lungs: Easy respiratory effort, clear to auscultation without wheeze, rale, rhonchi.  Skin: Warm and dry without rash or significant abnormality.    LAB/IMAGING RESULTS:   Pending per orders      Assessment/Plan:  1. Cough    2. PND (post-nasal drip)    3. Sinus symptom    4. Chest tightness       Will get CXR and CBC today, fu as indicated. Otherwise discussed adding zyrtec, albuterol and flovent with phone f/u 2 weeks.  If still symptomatic would have him see ENT.   See patient instructions.  Patient/caregiver/family agreeable to plan of care, questions answered.

## 2022-10-20 NOTE — ASSESSMENT & PLAN NOTE
Pt has a chronic anne at nursing home.   Will make sure it has been changed this admission   - urine culture with >100,000 GNB  -  Day #3 Merrem   Cultures sensitive to merrem

## 2022-10-20 NOTE — SUBJECTIVE & OBJECTIVE
Interval History: Patient was seen in bed resting. Patient is nonverbal. He was awake and able to make eye contact. Patient downgraded from ICU yesterday. Continuation of Meropenem 1g Q12hrs and vancomycin pharm to dose for UTI. Patient receiving Lovenox for DVTs. Wound care is on for sacral wound.    Review of Systems   Unable to perform ROS: Dementia   Objective:     Vital Signs (Most Recent):  Temp: 97.9 °F (36.6 °C) (10/20/22 0800)  Pulse: 69 (10/20/22 0800)  Resp: 18 (10/20/22 0800)  BP: (!) 161/90 (10/20/22 0800)  SpO2: 100 % (10/20/22 0800)   Vital Signs (24h Range):  Temp:  [75.2 °F (24 °C)-99.1 °F (37.3 °C)] 97.9 °F (36.6 °C)  Pulse:  [66-96] 69  Resp:  [11-20] 18  SpO2:  [97 %-100 %] 100 %  BP: (128-170)/(45-93) 161/90     Weight: 97.6 kg (215 lb 2.7 oz)  Body mass index is 29.18 kg/m².  No intake or output data in the 24 hours ending 10/20/22 1109   Physical Exam  Vitals reviewed.   Constitutional:       General: He is not in acute distress.     Appearance: Normal appearance. He is well-developed. He is obese.      Interventions: He is not intubated.  HENT:      Head: Normocephalic and atraumatic.      Nose: Nose normal.      Mouth/Throat:      Mouth: Mucous membranes are dry.      Pharynx: Oropharynx is clear.   Eyes:      Extraocular Movements: Extraocular movements intact.      Conjunctiva/sclera: Conjunctivae normal.      Pupils: Pupils are equal, round, and reactive to light.   Cardiovascular:      Rate and Rhythm: Normal rate.      Heart sounds: Normal heart sounds. No murmur heard.  Pulmonary:      Effort: Pulmonary effort is normal. He is not intubated.      Breath sounds: Normal breath sounds.   Abdominal:      General: Abdomen is flat. Bowel sounds are normal.      Palpations: Abdomen is soft.   Musculoskeletal:         General: Normal range of motion.      Cervical back: Normal range of motion and neck supple.      Right lower leg: No edema.      Left lower leg: No edema.   Skin:     General:  Skin is warm and dry.      Capillary Refill: Capillary refill takes less than 2 seconds.   Neurological:      General: No focal deficit present.      Mental Status: He is oriented to person, place, and time. He is lethargic.   Psychiatric:         Mood and Affect: Mood normal.         Behavior: Behavior is uncooperative.       Significant Labs: All pertinent labs within the past 24 hours have been reviewed.    Significant Imaging: I have reviewed all pertinent imaging results/findings within the past 24 hours.

## 2022-10-20 NOTE — PLAN OF CARE
[facility]  Initial Discharge Assessment       Primary Care Provider: Kerry Lin MD    Admission Diagnosis: Severe sepsis with septic shock [A41.9, R65.21]    Admission Date: 10/19/2022  Expected Discharge Date: 10/19/2022    Discharge Barriers Identified: None    Payor: MEDICARE / Plan: MEDICARE PART A & B / Product Type: Government /     Extended Emergency Contact Information  Primary Emergency Contact: Jennifer Rudolph  Mobile Phone: [phone]  Relation: Relative    Discharge Plan A: Return to nursing home  Discharge Plan B: Return to Nursing Home    No Pharmacies Listed    Initial Assessment (most recent)       Adult Discharge Assessment - 10/20/22 1116          Discharge Assessment    Assessment Type Discharge Planning Assessment     Source of Information family     Contact Name/Number Jennifer dickinson [phone]     Communicated YOSSI with patient/caregiver Date not available/Unable to determine     Lives With facility resident     Do you expect to return to your current living situation? Yes     Do you have help at home or someone to help you manage your care at home? Yes     Who are your caregiver(s) and their phone number(s)? Per NH     Walking or Climbing Stairs Difficulty ambulation difficulty, dependent     Dressing/Bathing Difficulty dressing difficulty, requires equipment     Home Accessibility wheelchair accessible     Home Layout Able to live on 1st floor     Equipment Currently Used at Home other (see comments)   per NH    Patient currently being followed by outpatient case management? No     Do you currently have service(s) that help you manage your care at home? No     Do you take prescription medications? Yes     Do you have prescription coverage? Yes     Do you have any problems affording any of your prescribed medications? No     Is the patient taking medications as prescribed? yes     Who is going to help you get home at discharge? ambulance     How do  you get to doctors appointments? agency     Are you on dialysis? No     Do you take coumadin? No     Discharge Plan A Return to nursing home     Discharge Plan B Return to Nursing Home     DME Needed Upon Discharge  none     Discharge Plan discussed with: --   niece    Discharge Barriers Identified None        Physical Activity    On average, how many days per week do you engage in moderate to strenuous exercise (like a brisk walk)? 0 days     On average, how many minutes do you engage in exercise at this level? 0 min        Financial Resource Strain    How hard is it for you to pay for the very basics like food, housing, medical care, and heating? Not hard at all        Housing Stability    In the last 12 months, was there a time when you were not able to pay the mortgage or rent on time? No     In the last 12 months, how many places have you lived? 1     In the last 12 months, was there a time when you did not have a steady place to sleep or slept in a shelter (including now)? No        Transportation Needs    In the past 12 months, has lack of transportation kept you from medical appointments or from getting medications? No     In the past 12 months, has lack of transportation kept you from meetings, work, or from getting things needed for daily living? No        Food Insecurity    Within the past 12 months, you worried that your food would run out before you got the money to buy more. Never true     Within the past 12 months, the food you bought just didn't last and you didn't have money to get more. Never true        Stress    Do you feel stress - tense, restless, nervous, or anxious, or unable to sleep at night because your mind is troubled all the time - these days? Not at all        Social Connections    In a typical week, how many times do you talk on the phone with family, friends, or neighbors? --   pt unable to talk    How often do you get together with friends or relatives? Never     How often do you  attend Buddhism or Congregation services? Never     How often do you attend meetings of the clubs or organizations you belong to? Never        Alcohol Use    Q1: How often do you have a drink containing alcohol? Never     Q2: How many drinks containing alcohol do you have on a typical day when you are drinking? Patient does not drink     Q3: How often do you have six or more drinks on one occasion? Never                      SS spoke with pt's niece. Pt is a resident at [facility]. Pt will return when medically ready for discharge. Password set and is Travis Serrato. Informed of IDT meeting and would like to attend. SS will follow for discharge needs.

## 2022-10-20 NOTE — NURSING
Verbal/telephone consent for PICC line obtained from Jennifer Rudolph. Witnessed by this  nurse and ZENOBIA Calderon RN

## 2022-10-20 NOTE — ASSESSMENT & PLAN NOTE
Secondary to UTI  Treated with IVFs, BC x2 Gram positive - coagulase negative Staphylococcus, other than Staph epi and staph lugdunensis  Patient off of Levophed cultures grew out Gram-positive cocci Staph epidermidis and grew out Gram-negative tyrone of the urine continue current antibiotics vanc was added 10/18  PICC line ordered

## 2022-10-20 NOTE — PT/OT/SLP EVAL
Speech Language Pathology Evaluation  Bedside Swallow    Patient Name:  Bhargav Gao   MRN:  [id number]  Admitting Diagnosis: Bacteremia    Recommendations:                 General Recommendations:  Follow-up not indicated  Diet recommendations:  Puree, Thin   Aspiration Precautions: 1 bite/sip at a time, Alternating bites/sips, Assistance with meals, Double swallow with each bite/sip, Feed only when awake/alert, HOB to 90 degrees, Meds crushed in puree, Monitor for s/s of aspiration, Remain upright 30 minutes post meal, Small bites/sips, and Standard aspiration precautions   General Precautions: Standard, aspiration, fall  Communication strategies:  Pt unable to answer yes/no questions consistently.    History:     Past Medical History:   Diagnosis Date    Alzheimer's disease     Anticoagulant long-term use     Bipolar disorder     BPH (benign prostatic hyperplasia)     Deep vein thrombosis (DVT) of popliteal vein of right lower extremity     Diabetes mellitus     DVT of deep femoral vein, left     Hyperlipidemia     Hypertension     Hypothyroidism     Idiopathic orofacial dystonia     Iron deficiency 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    Light Growth Escherichia coli Abnormal    Light Growth Klebsiel    Schizophrenia 1/28/2022 05/16 -continue trileptal and risperidone       Past Surgical History:   Procedure Laterality Date    COLOSTOMY N/A 5/24/2022    Procedure: CREATION, COLOSTOMY;  Surgeon: Edilma Felix MD;  Location: [facility], [place];  Service: General;  Laterality: N/A;  diverting colostomy dr felix tuesday        Social History: Patient is a nursing home resident. .    Prior Intubation HX:  n/a    Modified Barium Swallow: n/a    Chest X-Rays: see chart    Prior diet: unknown.    Occupation/hobbies/homemaking: none reported.    Subjective     Pt lying in bed; awake and alert. Pt nods head when name called.  Patient goals: None stated     Pain/Comfort:  Pain Rating 1: 0/10 (Patient nonverbal; no signs of pain observed)    Respiratory Status: Room air    Objective:     Oral Musculature Evaluation  Oral Musculature: unable to assess due to poor participation/comprehension  Dentition: edentulous  Secretion Management: adequate  Mucosal Quality: dry  Oral Musculature Comments: Pt did not follow commands for complete oral motor evaluation.  Pt did not follow commands despite verbal and visual cues.   Bedside Swallow Eval:   Consistencies Assessed:  Thin liquids Pt tolerated thin liquid consistency without overt s/s of aspiration.  Puree Pt tolerated pudding consistency without overt s/s of aspiration.       Oral Phase:   WFL    Pharyngeal Phase:   no overt clinical signs/symptoms of aspiration  multiple spontaneous swallows    Compensatory Strategies  None    Treatment: ST not indicated.     Assessment:     Bhargav Gao is a 82 y.o. male with an SLP diagnosis of Dysphagia.  He presents with no overt s/s of aspiration with thin liquid or pudding consistencies.  Pt independently utilized double swallow with pudding consistency. Rec Pureed diet with thin liquids as tolerated. St not indicated due to patient's inability to follow directions and participate.    Goals:   Multidisciplinary Problems       SLP Goals       Not on file                    Plan:     Patient to be seen:      Plan of Care expires:     Plan of Care reviewed with:      SLP Follow-Up:  No       Discharge recommendations:  nursing facility, basic   Barriers to Discharge:  None    Time Tracking:     SLP Treatment Date:      Speech Start Time:  0933  Speech Stop  Time:  0947     Speech Total Time (min):  14 min    Billable Minutes: Eval Swallow and Oral Function 14 minutes    10/20/2022

## 2022-10-20 NOTE — PROGRESS NOTES
PICC line insertion   Performed by A Hakeem A  Consent obtained for PICC line insertion.  A formal timeout was called all staff present agree patient procedure.  The left upper extremity was prepped with ChloraPrep and sterile field was established.  1% lidocaine was used as local anesthetic.  Under ultrasound guidance the brachial vein was localized and accessed with a micropuncture needle.  An 018 guidewire was passed through the vein to the level of the superior vena cava.  A 45 cm PICC line was then placed over the wire with its tip terminating at the atrial caval junction.  The wire was removed both ports were flushed with heparinized saline.  The PICC line was then cleaned and secured.  The patient tolerated the procedure well there were no immediate postprocedure complications.  Total fluoroscopy time was 48 seconds.

## 2022-10-20 NOTE — PROGRESS NOTES
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 He presents to [facility] with a UTI that was found on hospital admission on 10/17/2022. Patient was sent to the [facility] ED via EMS secondary to decreased level of consciousness with hematuria.  No associated symptoms such as fever chills cough wheezing chest pain palpitation lower extremity edema nausea vomiting abdominal pain were reported.  No other history could be gathered at this time this patient is somnolent and confused and was only able to state his name only at this time.  On initial presentation, patient was hypotensive with systolic blood pressure in the 60s and tachycardic.  Ensuing workup was notable for leukocytosis with left shift, elevated lactic acid level, hyperglycemia, acute renal failure, and UA highly suggestive of a presence of urinary tract infection.  Patient did not respond favorably to IV fluid bolus of 30 cc/kg and was admitted to ICU for vasopressor support.  Patient's code status is DNR according to nursing home records. After IVF, antibiotics and pressor treatment she was downgraded from the ICU on 10/19/2022 and now admitted the [facility].     The patient was admitted to the [facility] to Family Medicine Service under the direct  supervision Dr. Lee Ann SIMMONS for continued care and medical management.       Overview/Hospital Course:  No notes on file    Interval History: Patient was seen in bed resting. Patient is nonverbal. He was awake and able to make eye contact. Patient downgraded from ICU yesterday. Continuation of Meropenem 1g Q12hrs and vancomycin pharm to dose for UTI. Patient receiving Lovenox for DVTs. Wound care is on for sacral wound.    Review of Systems   Unable to perform ROS: Dementia   Objective:     Vital Signs (Most Recent):  Temp: 97.9 °F (36.6 °C) (10/20/22 0800)  Pulse: 69 (10/20/22 0800)  Resp: 18 (10/20/22 0800)  BP: (!) 161/90 (10/20/22 0800)  SpO2: 100 % (10/20/22 0800)   Vital Signs (24h Range):  Temp:  [75.2 °F (24 °C)-99.1 °F (37.3 °C)] 97.9 °F (36.6 °C)  Pulse:  [66-96] 69  Resp:  [11-20] 18  SpO2:  [97 %-100 %] 100 %  BP: (128-170)/(45-93) 161/90     Weight: 97.6 kg (215 lb 2.7 oz)  Body mass index is 29.18 kg/m².  No intake or output data in the 24 hours ending 10/20/22 1109   Physical Exam  Vitals reviewed.   Constitutional:       General: He is not in acute distress.     Appearance: Normal appearance. He is well-developed. He is obese.      Interventions: He is not intubated.  HENT:      Head: Normocephalic and atraumatic.      Nose: Nose normal.      Mouth/Throat:      Mouth: Mucous membranes are dry.      Pharynx: Oropharynx is clear.   Eyes:      Extraocular Movements: Extraocular movements intact.      Conjunctiva/sclera: Conjunctivae normal.      Pupils: Pupils are equal, round, and reactive to light.   Cardiovascular:      Rate and Rhythm: Normal rate.      Heart sounds: Normal heart sounds. No murmur heard.  Pulmonary:      Effort: Pulmonary effort is normal. He is not intubated.      Breath sounds: Normal breath sounds.   Abdominal:      General: Abdomen is flat. Bowel sounds are normal.      Palpations: Abdomen is soft.   Musculoskeletal:         General: Normal range of motion.      Cervical back:  Normal range of motion and neck supple.      Right lower leg: No edema.      Left lower leg: No edema.   Skin:     General: Skin is warm and dry.      Capillary Refill: Capillary refill takes less than 2 seconds.   Neurological:      General: No focal deficit present.      Mental Status: He is oriented to person, place, and time. He is lethargic.   Psychiatric:         Mood and Affect: Mood normal.         Behavior: Behavior is uncooperative.       Significant Labs: All pertinent labs within the past 24 hours have been reviewed.    Significant Imaging: I have reviewed all pertinent imaging results/findings within the past 24 hours.      Assessment/Plan:      * Bacteremia  Secondary to UTI  Treated with IVFs, BC x2 Gram positive - coagulase negative Staphylococcus, other than Staph epi and staph lugdunensis  Patient off of Levophed cultures grew out Gram-positive cocci Staph epidermidis and grew out Gram-negative tyrone of the urine continue current antibiotics vanc was added 10/18  PICC line ordered     Urinary tract infection with hematuria  Pt has a chronic anne at nursing home.   Will make sure it has been changed this admission   - urine culture with >100,000 GNB  -  Day #3 Merrem   Cultures sensitive to merrem     Acute deep vein thrombosis (DVT) of both lower extremities  Hx of DVT -- US 10/17 with right popliteal nonocclusive thrombosis - start wt based renally dosed lovenox     Pressure ulcer of sacral region, stage 4  Wound care consulted, was with patient today      Suspected deep tissue injury  Wound care consulted       Diabetes mellitus  POCT glucose checks  Sliding scale insulin       VTE Risk Mitigation (From admission, onward)         Ordered     enoxaparin injection 100 mg  Every 12 hours (non-standard times)         10/19/22 1717                Discharge Planning   YOSSI: 10/19/2022     Code Status: DNR   Is the patient medically ready for discharge?:     Reason for patient still in hospital (select  all that apply): Treatment and Consult recommendations  Discharge Plan A: Return to nursing home                  Nba Pryor MD  Department of Hospital Medicine   [facility]

## 2022-10-21 LAB
ANION GAP SERPL CALCULATED.3IONS-SCNC: 9 MMOL/L (ref 7–16)
BASOPHILS # BLD AUTO: 0.02 K/UL (ref 0–0.2)
BASOPHILS NFR BLD AUTO: 0.3 % (ref 0–1)
BUN SERPL-MCNC: 14 MG/DL (ref 7–18)
BUN/CREAT SERPL: 25 (ref 6–20)
CALCIUM SERPL-MCNC: 8.2 MG/DL (ref 8.5–10.1)
CHLORIDE SERPL-SCNC: 112 MMOL/L (ref 98–107)
CO2 SERPL-SCNC: 26 MMOL/L (ref 21–32)
CREAT SERPL-MCNC: 0.57 MG/DL (ref 0.7–1.3)
DIFFERENTIAL METHOD BLD: ABNORMAL
EGFR (NO RACE VARIABLE) (RUSH/TITUS): 98 ML/MIN/1.73M²
EOSINOPHIL # BLD AUTO: 0.19 K/UL (ref 0–0.5)
EOSINOPHIL NFR BLD AUTO: 3.1 % (ref 1–4)
ERYTHROCYTE [DISTWIDTH] IN BLOOD BY AUTOMATED COUNT: 16.6 % (ref 11.5–14.5)
GLUCOSE SERPL-MCNC: 116 MG/DL (ref 70–105)
GLUCOSE SERPL-MCNC: 120 MG/DL (ref 70–105)
GLUCOSE SERPL-MCNC: 125 MG/DL (ref 70–105)
GLUCOSE SERPL-MCNC: 98 MG/DL (ref 74–106)
HCT VFR BLD AUTO: 29.3 % (ref 40–54)
HGB BLD-MCNC: 9.8 G/DL (ref 13.5–18)
IMM GRANULOCYTES # BLD AUTO: 0.03 K/UL (ref 0–0.04)
IMM GRANULOCYTES NFR BLD: 0.5 % (ref 0–0.4)
LYMPHOCYTES # BLD AUTO: 1.28 K/UL (ref 1–4.8)
LYMPHOCYTES NFR BLD AUTO: 20.8 % (ref 27–41)
MAGNESIUM SERPL-MCNC: 1.8 MG/DL (ref 1.7–2.3)
MCH RBC QN AUTO: 27.4 PG (ref 27–31)
MCHC RBC AUTO-ENTMCNC: 33.4 G/DL (ref 32–36)
MCV RBC AUTO: 81.8 FL (ref 80–96)
MONOCYTES # BLD AUTO: 0.66 K/UL (ref 0–0.8)
MONOCYTES NFR BLD AUTO: 10.7 % (ref 2–6)
MPC BLD CALC-MCNC: 9.6 FL (ref 9.4–12.4)
NEUTROPHILS # BLD AUTO: 3.98 K/UL (ref 1.8–7.7)
NEUTROPHILS NFR BLD AUTO: 64.6 % (ref 53–65)
NRBC # BLD AUTO: 0 X10E3/UL
NRBC, AUTO (.00): 0 %
PLATELET # BLD AUTO: 233 K/UL (ref 150–400)
POTASSIUM SERPL-SCNC: 3.4 MMOL/L (ref 3.5–5.1)
RBC # BLD AUTO: 3.58 M/UL (ref 4.6–6.2)
SODIUM SERPL-SCNC: 144 MMOL/L (ref 136–145)
VANCOMYCIN TROUGH SERPL-MCNC: 22 ΜG/ML (ref 10–20)
WBC # BLD AUTO: 6.16 K/UL (ref 4.5–11)

## 2022-10-21 PROCEDURE — 80048 BASIC METABOLIC PNL TOTAL CA: CPT

## 2022-10-21 PROCEDURE — 82962 GLUCOSE BLOOD TEST: CPT

## 2022-10-21 PROCEDURE — 80202 ASSAY OF VANCOMYCIN: CPT | Performed by: FAMILY MEDICINE

## 2022-10-21 PROCEDURE — 85025 COMPLETE CBC W/AUTO DIFF WBC: CPT

## 2022-10-21 PROCEDURE — 36415 COLL VENOUS BLD VENIPUNCTURE: CPT

## 2022-10-21 PROCEDURE — 25000003 PHARM REV CODE 250: Performed by: NURSE PRACTITIONER

## 2022-10-21 PROCEDURE — S5010 5% DEXTROSE AND 0.45% SALINE: HCPCS | Performed by: NURSE PRACTITIONER

## 2022-10-21 PROCEDURE — C9113 INJ PANTOPRAZOLE SODIUM, VIA: HCPCS | Performed by: NURSE PRACTITIONER

## 2022-10-21 PROCEDURE — 25000003 PHARM REV CODE 250

## 2022-10-21 PROCEDURE — 94640 AIRWAY INHALATION TREATMENT: CPT

## 2022-10-21 PROCEDURE — 17250 PR CHEM CAUTERY GRANULATN TISSUE: ICD-10-PCS | Mod: ,,, | Performed by: NURSE PRACTITIONER

## 2022-10-21 PROCEDURE — 25000003 PHARM REV CODE 250: Performed by: FAMILY MEDICINE

## 2022-10-21 PROCEDURE — 99232 SBSQ HOSP IP/OBS MODERATE 35: CPT | Mod: GC,,, | Performed by: FAMILY MEDICINE

## 2022-10-21 PROCEDURE — 25000242 PHARM REV CODE 250 ALT 637 W/ HCPCS: Performed by: NURSE PRACTITIONER

## 2022-10-21 PROCEDURE — 99900035 HC TECH TIME PER 15 MIN (STAT)

## 2022-10-21 PROCEDURE — 63600175 PHARM REV CODE 636 W HCPCS: Performed by: NURSE PRACTITIONER

## 2022-10-21 PROCEDURE — 94761 N-INVAS EAR/PLS OXIMETRY MLT: CPT

## 2022-10-21 PROCEDURE — 96372 THER/PROPH/DIAG INJ SC/IM: CPT

## 2022-10-21 PROCEDURE — 11000001 HC ACUTE MED/SURG PRIVATE ROOM

## 2022-10-21 PROCEDURE — 99232 PR SUBSEQUENT HOSPITAL CARE,LEVL II: ICD-10-PCS | Mod: GC,,, | Performed by: FAMILY MEDICINE

## 2022-10-21 PROCEDURE — 83735 ASSAY OF MAGNESIUM: CPT

## 2022-10-21 PROCEDURE — 27000221 HC OXYGEN, UP TO 24 HOURS

## 2022-10-21 PROCEDURE — 17250 CHEM CAUT OF GRANLTJ TISSUE: CPT | Mod: ,,, | Performed by: NURSE PRACTITIONER

## 2022-10-21 RX ORDER — POTASSIUM CHLORIDE 20 MEQ/1
40 TABLET, EXTENDED RELEASE ORAL ONCE
Status: DISCONTINUED | OUTPATIENT
Start: 2022-10-21 | End: 2022-10-21

## 2022-10-21 RX ADMIN — DEXTROSE AND SODIUM CHLORIDE: 5; 450 INJECTION, SOLUTION INTRAVENOUS at 05:10

## 2022-10-21 RX ADMIN — MEROPENEM 1 G: 1 INJECTION, POWDER, FOR SOLUTION INTRAVENOUS at 11:10

## 2022-10-21 RX ADMIN — ENOXAPARIN SODIUM 100 MG: 100 INJECTION SUBCUTANEOUS at 10:10

## 2022-10-21 RX ADMIN — VANCOMYCIN HYDROCHLORIDE 1500 MG: 1 INJECTION, POWDER, LYOPHILIZED, FOR SOLUTION INTRAVENOUS at 09:10

## 2022-10-21 RX ADMIN — PANTOPRAZOLE SODIUM 40 MG: 40 INJECTION, POWDER, LYOPHILIZED, FOR SOLUTION INTRAVENOUS at 09:10

## 2022-10-21 RX ADMIN — POTASSIUM BICARBONATE 40 MEQ: 391 TABLET, EFFERVESCENT ORAL at 08:10

## 2022-10-21 RX ADMIN — ACETIC ACID: 250 IRRIGANT IRRIGATION at 11:10

## 2022-10-21 RX ADMIN — IPRATROPIUM BROMIDE AND ALBUTEROL SULFATE 3 ML: 2.5; .5 SOLUTION RESPIRATORY (INHALATION) at 07:10

## 2022-10-21 RX ADMIN — MEROPENEM 1 G: 1 INJECTION, POWDER, FOR SOLUTION INTRAVENOUS at 04:10

## 2022-10-21 RX ADMIN — LEVOTHYROXINE SODIUM ANHYDROUS 25 MCG: 100 INJECTION, POWDER, LYOPHILIZED, FOR SOLUTION INTRAVENOUS at 09:10

## 2022-10-21 RX ADMIN — MUPIROCIN: 20 OINTMENT TOPICAL at 08:10

## 2022-10-21 RX ADMIN — DEXTROSE AND SODIUM CHLORIDE: 5; 450 INJECTION, SOLUTION INTRAVENOUS at 04:10

## 2022-10-21 RX ADMIN — MEROPENEM 1 G: 1 INJECTION, POWDER, FOR SOLUTION INTRAVENOUS at 08:10

## 2022-10-21 RX ADMIN — IPRATROPIUM BROMIDE AND ALBUTEROL SULFATE 3 ML: 2.5; .5 SOLUTION RESPIRATORY (INHALATION) at 01:10

## 2022-10-21 RX ADMIN — LOSARTAN POTASSIUM 50 MG: 50 TABLET, FILM COATED ORAL at 08:10

## 2022-10-21 RX ADMIN — IPRATROPIUM BROMIDE AND ALBUTEROL SULFATE 3 ML: 2.5; .5 SOLUTION RESPIRATORY (INHALATION) at 12:10

## 2022-10-21 NOTE — PROGRESS NOTES
Pharmacokinetic Assessment Follow Up: IV Vancomycin    Vancomycin serum concentration assessment(s):    The trough level was drawn correctly and can be used to guide therapy at this time. The measurement is above the desired definitive target range of 15 to 20 mcg/mL.    Vancomycin Regimen Plan:    Change regimen to Vancomycin 1500 mg IV every 18 hours with next serum trough concentration measured at 0900 prior to 4th dose on 10/24    Drug levels (last 3 results):  Recent Labs   Lab Result Units 10/19/22  0858 10/20/22  2104 10/21/22  0829   Vancomycin, Trough µg/mL 9.4* 31.4* 22.0*       Pharmacy will continue to follow and monitor vancomycin.    Please contact pharmacy at extension [phone] for questions regarding this assessment.    Thank you for the consult,   Daquan Caraballo       Patient brief summary:  Bhargav Gao is a 82 y.o. male initiated on antimicrobial therapy with IV Vancomycin for treatment of bacteremia    The patient's current regimen is 1500 mg IV Q18H    Drug Allergies:   Review of patient's allergies indicates:   Allergen Reactions    Metformin     Mycobacterium tuberculosis (tuberculin ppd)     Norvasc [amlodipine]        Actual Body Weight:   102.7 kg    Renal Function:   Estimated Creatinine Clearance: 123.8 mL/min (A) (based on SCr of 0.57 mg/dL (L)).,     Dialysis Method (if applicable):  N/A    CBC (last 72 hours):  Recent Labs   Lab Result Units 10/19/22  0737 10/20/22  0658 10/21/22  0509   WBC K/uL 10.52 8.15 6.16   Hemoglobin g/dL 9.7* 9.9* 9.8*   Hematocrit % 30.6* 29.8* 29.3*   Platelet Count K/uL 234 219 233   Lymphocytes % % 9.5* 14.4* 20.8*   Lymphocytes, Man % % 6* 10*  --    Monocytes % % 4.4 7.5* 10.7*   Monocytes, Man % % 1* 3  --    Eosinophils % % 1.4 2.6 3.1   Eosinophils, Man % % 1 6*  --    Basophils % % 0.2 0.2 0.3   Basophils, Man % % 1  --   --        Metabolic Panel (last 72 hours):  Recent Labs   Lab Result Units 10/19/22  0621 10/19/22  1803 10/20/22  0531  10/20/22  0658 10/21/22  0509 10/21/22  0829   Sodium mmol/L 144  --   --  144 144  --    Potassium mmol/L 3.8  --   --  4.1 3.4*  --    Chloride mmol/L 110*  --   --  111* 112*  --    CO2 mmol/L 29  --   --  26 26  --    Glucose mg/dL 67*  --   --  84 98  --    BUN mg/dL 32* 26*  --  21* 14  --    Creatinine mg/dL 0.86 0.72  --  0.76 0.57*  --    Albumin g/dL 1.9*  --   --   --   --   --    Bilirubin, Total mg/dL 0.2  --   --   --   --   --    Alk Phos U/L 78  --   --   --   --   --    AST U/L 25  --   --   --   --   --    ALT U/L 18  --   --   --   --   --    Magnesium mg/dL  --   --  2.2  --   --  1.8       Vancomycin Administrations:  vancomycin given in the last 96 hours                     vancomycin (VANCOCIN) 1,500 mg in dextrose 5 % 250 mL IVPB (mg) 1,500 mg New Bag 10/21/22 0917     1,500 mg New Bag 10/20/22 2052     1,500 mg New Bag  0858     1,500 mg New Bag 10/19/22 2200    vancomycin (VANCOCIN) 1,500 mg in dextrose 5 % 250 mL IVPB (mg) 1,500 mg New Bag 10/19/22 0858    vancomycin (VANCOCIN) 1,000 mg in dextrose 5 % 250 mL IVPB (mg) 1,000 mg New Bag 10/19/22 0146                    Microbiologic Results:  Microbiology Results (last 7 days)       Procedure Component Value Units Date/Time    Blood culture (site 1) [[id number]] Collected: 10/19/22 1740    Order Status: Completed Specimen: Blood Updated: 10/21/22 0754     Culture, Blood No Growth At 24 Hours    Blood culture (site 2) [[id number]] Collected: 10/19/22 1746    Order Status: Completed Specimen: Blood Updated: 10/21/22 0754     Culture, Blood No Growth At 24 Hours    Culture, Anaerobe [[id number]] Collected: 10/20/22 1101    Order Status: Sent Specimen: Wound from Sacral Updated: 10/20/22 1111    Culture, Wound [[id number]] Collected: 10/20/22 1101    Order Status: Sent Specimen: Wound from Sacral Updated: 10/20/22 1111

## 2022-10-21 NOTE — PROGRESS NOTES
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 He presents to [facility] with a UTI that was found on hospital admission on 10/17/2022. Patient was sent to the [facility] ED via EMS secondary to decreased level of consciousness with hematuria.  No associated symptoms such as fever chills cough wheezing chest pain palpitation lower extremity edema nausea vomiting abdominal pain were reported.  No other history could be gathered at this time this patient is somnolent and confused and was only able to state his name only at this time.  On initial presentation, patient was hypotensive with systolic blood pressure in the 60s and tachycardic.  Ensuing workup was notable for leukocytosis with left shift, elevated lactic acid level, hyperglycemia, acute renal failure, and UA highly suggestive of a presence of urinary tract infection.  Patient did not respond favorably to IV fluid bolus of 30 cc/kg and was admitted to ICU for vasopressor support.  Patient's code status is DNR according to nursing home records. After IVF, antibiotics and pressor treatment she was downgraded from the ICU on 10/19/2022 and now admitted the [facility].     The patient was admitted to the [facility] to Family Medicine Service under the direct  supervision Dr. Lee Ann SIMMONS for continued care and medical management.       Overview/Hospital Course:  No notes on file    Interval History: Patient seen this morning in bed. Patient may need debridement of sacral wound. Patient had PICC line placed yesterday. Will continue to monitor.       Review of Systems   Unable to perform ROS: Dementia   Objective:     Vital Signs (Most Recent):  Temp: 98 °F (36.7 °C) (10/21/22 0448)  Pulse: 69 (10/21/22 0448)  Resp: 16 (10/21/22 0448)  BP: (!) 165/90 (10/21/22 0448)  SpO2: 98 % (10/21/22 0448)   Vital Signs (24h Range):  Temp:  [96.4 °F (35.8 °C)-98.6 °F (37 °C)] 98 °F (36.7 °C)  Pulse:  [60-71] 69  Resp:  [14-20] 16  SpO2:  [94 %-100 %] 98 %  BP: (147-178)/(87-92) 165/90     Weight: 102.7 kg (226 lb 6.6 oz)  Body mass index is 30.71 kg/m².    Intake/Output Summary (Last 24 hours) at 10/21/2022 0630  Last data filed at 10/21/2022 0449  Gross per 24 hour   Intake --   Output 600 ml   Net -600 ml        Physical Exam  Vitals reviewed.   Constitutional:       General: He is not in acute distress.     Appearance: Normal appearance. He is well-developed. He is obese.      Interventions: He is not intubated.  HENT:      Head: Normocephalic and atraumatic.      Nose: Nose normal.      Mouth/Throat:      Mouth: Mucous membranes are dry.      Pharynx: Oropharynx is clear.   Eyes:      Extraocular Movements: Extraocular movements intact.      Conjunctiva/sclera: Conjunctivae normal.      Pupils: Pupils are equal, round, and reactive to light.   Cardiovascular:      Rate and Rhythm: Normal rate and regular rhythm.      Heart sounds: Normal heart sounds. No murmur heard.    No friction rub.   Pulmonary:      Effort: Pulmonary effort is normal. He is not intubated.      Breath sounds: Normal breath sounds.   Abdominal:      General: Abdomen is flat. Bowel sounds are normal.      Palpations: Abdomen is soft.   Musculoskeletal:         General: Normal range of motion.      Cervical back:  Normal range of motion and neck supple.      Right lower leg: No edema.      Left lower leg: No edema.   Skin:     General: Skin is warm and dry.      Capillary Refill: Capillary refill takes less than 2 seconds.   Neurological:      Mental Status: He is lethargic.   Psychiatric:         Mood and Affect: Mood normal.         Behavior: Behavior is uncooperative.       Significant Labs: All pertinent labs within the past 24 hours have been reviewed.    Significant Imaging: I have reviewed all pertinent imaging results/findings within the past 24 hours.      Assessment/Plan:      * Bacteremia  Secondary to UTI  Treated with IVFs, BC x2 Gram positive - coagulase negative Staphylococcus, other than Staph epi and staph lugdunensis  Patient off of Levophed cultures grew out Gram-positive cocci Staph epidermidis and grew out Gram-negative tyrone of the urine continue current antibiotics vanc was added 10/18  PICC line placed     Urinary tract infection with hematuria  Pt has a chronic anne at nursing home.   Will make sure it has been changed this admission   - urine culture with >100,000 GNB  -  Day #4 Merrem   Cultures sensitive to merrem     Pressure ulcer of sacral region, stage 4  Wound care consulted      Diabetes mellitus  POCT glucose checks  Sliding scale insulin     Acute deep vein thrombosis (DVT) of both lower extremities  Hx of DVT -- US 10/17 with right popliteal nonocclusive thrombosis - start wt based renally dosed lovenox     Suspected deep tissue injury  Wound care consulted         VTE Risk Mitigation (From admission, onward)         Ordered     enoxaparin injection 100 mg  Every 12 hours (non-standard times)         10/19/22 1717                Discharge Planning   YOSSI: 10/19/2022     Code Status: DNR   Is the patient medically ready for discharge?:     Reason for patient still in hospital (select all that apply): Treatment  Discharge Plan A: Return to nursing home                  Gabriel Ahmadi    Department of Hospital Medicine   [facility]

## 2022-10-21 NOTE — SUBJECTIVE & OBJECTIVE
Interval History: Patient seen this morning in bed. Patient may need debridement of sacral wound. Patient had PICC line placed yesterday. Will continue to monitor.       Review of Systems   Unable to perform ROS: Dementia   Objective:     Vital Signs (Most Recent):  Temp: 98 °F (36.7 °C) (10/21/22 0448)  Pulse: 69 (10/21/22 0448)  Resp: 16 (10/21/22 0448)  BP: (!) 165/90 (10/21/22 0448)  SpO2: 98 % (10/21/22 0448)   Vital Signs (24h Range):  Temp:  [96.4 °F (35.8 °C)-98.6 °F (37 °C)] 98 °F (36.7 °C)  Pulse:  [60-71] 69  Resp:  [14-20] 16  SpO2:  [94 %-100 %] 98 %  BP: (147-178)/(87-92) 165/90     Weight: 102.7 kg (226 lb 6.6 oz)  Body mass index is 30.71 kg/m².    Intake/Output Summary (Last 24 hours) at 10/21/2022 0630  Last data filed at 10/21/2022 0449  Gross per 24 hour   Intake --   Output 600 ml   Net -600 ml        Physical Exam  Vitals reviewed.   Constitutional:       General: He is not in acute distress.     Appearance: Normal appearance. He is well-developed. He is obese.      Interventions: He is not intubated.  HENT:      Head: Normocephalic and atraumatic.      Nose: Nose normal.      Mouth/Throat:      Mouth: Mucous membranes are dry.      Pharynx: Oropharynx is clear.   Eyes:      Extraocular Movements: Extraocular movements intact.      Conjunctiva/sclera: Conjunctivae normal.      Pupils: Pupils are equal, round, and reactive to light.   Cardiovascular:      Rate and Rhythm: Normal rate and regular rhythm.      Heart sounds: Normal heart sounds. No murmur heard.    No friction rub.   Pulmonary:      Effort: Pulmonary effort is normal. He is not intubated.      Breath sounds: Normal breath sounds.   Abdominal:      General: Abdomen is flat. Bowel sounds are normal.      Palpations: Abdomen is soft.   Musculoskeletal:         General: Normal range of motion.      Cervical back: Normal range of motion and neck supple.      Right lower leg: No edema.      Left lower leg: No edema.   Skin:     General:  Skin is warm and dry.      Capillary Refill: Capillary refill takes less than 2 seconds.   Neurological:      Mental Status: He is lethargic.   Psychiatric:         Mood and Affect: Mood normal.         Behavior: Behavior is uncooperative.       Significant Labs: All pertinent labs within the past 24 hours have been reviewed.    Significant Imaging: I have reviewed all pertinent imaging results/findings within the past 24 hours.

## 2022-10-21 NOTE — ASSESSMENT & PLAN NOTE
Secondary to UTI  Treated with IVFs, BC x2 Gram positive - coagulase negative Staphylococcus, other than Staph epi and staph lugdunensis  Patient off of Levophed cultures grew out Gram-positive cocci Staph epidermidis and grew out Gram-negative tyrone of the urine continue current antibiotics vanc was added 10/18  PICC line placed

## 2022-10-21 NOTE — ASSESSMENT & PLAN NOTE
Pt has a chronic anne at nursing home.   Will make sure it has been changed this admission   - urine culture with >100,000 GNB  -  Day #4 Merrem   Cultures sensitive to merrem

## 2022-10-21 NOTE — NURSING
VANCOMYCIN, TROUGH   Lab Collected: 10/20/22 2104  Vancomycin, Trough 31.4   Vanc scheduled dose was infusing at the time of lab draw, was unaware that lab draw until notified of results.   Message sent to Pharmacy regarding results, Dr. Linda Davis made aware of results as well.

## 2022-10-21 NOTE — PROGRESS NOTES
[facility]  Wound Care  Progress Note    Patient Name: Bhargav Gao  MRN: [id number]  Admission Date: 10/19/2022  Attending Physician: Hipolito Nance Jr., MD    Past Medical History:   Diagnosis Date    Alzheimer's disease     Anticoagulant long-term use     Bipolar disorder     BPH (benign prostatic hyperplasia)     Deep vein thrombosis (DVT) of popliteal vein of right lower extremity     Diabetes mellitus     DVT of deep femoral vein, left     Hyperlipidemia     Hypertension     Hypothyroidism     Idiopathic orofacial dystonia     Iron deficiency 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    Light Growth Escherichia coli Abnormal    Light Growth Klebsiel    Schizophrenia 1/28/2022 05/16 -continue trileptal and risperidone        Subjective:     HPI:  Bhargav Gao is a 83 yo male with wounds to sacral and right buttock admitted with bacteremia. I was called to bedside today due to bleeding from wound bed that was unable to be controlled with pressure. Wound cauterized, applied calcium alginate to prevent further bleeding.        Review of Systems   Unable to perform ROS: Dementia   Objective:     Vital Signs (Most Recent):  Temp: 98.1 °F (36.7 °C) (10/21/22 1200)  Pulse: 72 (10/21/22 1217)  Resp: 18 (10/21/22 1217)  BP: (!) 161/89 (10/21/22 1200)  SpO2: 100 % (10/21/22 1516)   Vital Signs (24h Range):  Temp:  [97.4 °F (36.3 °C)-98.6 °F (37 °C)] 98.1 °F (36.7 °C)  Pulse:  [55-74] 72  Resp:  [14-20] 18  SpO2:  [96 %-100 %] 100 %  BP: (147-170)/(88-92) 161/89     Weight: 102.7 kg (226 lb 6.6 oz)  Body mass  index is 30.71 kg/m².  No data recorded    Physical Exam  Vitals reviewed.   Constitutional:       Appearance: He is ill-appearing.   HENT:      Head: Normocephalic.   Cardiovascular:      Rate and Rhythm: Normal rate and regular rhythm.      Pulses: Normal pulses.      Heart sounds: Normal heart sounds.   Pulmonary:      Effort: Pulmonary effort is normal.      Breath sounds: Normal breath sounds.   Skin:     Findings: Erythema present.      Comments: Wound, LDA for photos/measurements  Moderate amount of fresh blood noted from sacral wound   Neurological:      Mental Status: He is alert.      Cranial Nerves: Cranial nerve deficit present.      Sensory: Sensory deficit present.      Motor: Weakness present.      Gait: Gait abnormal.             Altered Skin Integrity 10/20/22 1000 Sacral spine Ulceration Full thickness tissue loss. Base is covered by slough and/or eschar in the wound bed (Active)   10/20/22 1000   Altered Skin Integrity Present on Admission: yes   Side:    Orientation:    Location: Sacral spine   Wound Number:    Is this injury device related?:    Primary Wound Type: Ulceration   Description of Altered Skin Integrity: Full thickness tissue loss. Base is covered by slough and/or eschar in the wound bed   Ankle-Brachial Index:    Pulses:    Removal Indication and Assessment:    Wound Outcome:    (Retired) Wound Length (cm):    (Retired) Wound Width (cm):    (Retired) Depth (cm):    Wound Description (Comments):    Removal Indications:    Wound Image    10/20/22 1608   Description of Altered Skin Integrity Full thickness tissue loss with exposed bone, tendon, or muscle. Often includes undermining and tunneling. May extend into muscle and/or supporting structures. 10/20/22 1608   Dressing Appearance Moist drainage 10/20/22 1608   Drainage Amount Moderate 10/20/22 1608   Drainage Characteristics/Odor Serosanguineous 10/20/22 1608   Appearance Dressing in place, unable to visualize 10/21/22 2000   Tissue  loss description Full thickness 10/20/22 1608   Yellow (%), Wound Tissue Color 90 % 10/20/22 1608   Wound Length (cm) 7 cm 10/20/22 1608   Wound Width (cm) 3 cm 10/20/22 1608   Wound Depth (cm) 2 cm 10/20/22 1608   Wound Volume (cm^3) 42 cm^3 10/20/22 1608   Wound Surface Area (cm^2) 21 cm^2 10/20/22 1608   Care Cleansed with:;Soap and water 10/20/22 1608   Dressing Applied;Other (comment) 10/20/22 1608   Number of days: 4            Altered Skin Integrity 10/20/22 1000 Right Buttocks Skin Tear Partial thickness tissue loss. Shallow open ulcer with a red or pink wound bed, without slough. Intact or Open/Ruptured Serum-filled blister. (Active)   10/20/22 1000   Altered Skin Integrity Present on Admission: yes   Side: Right   Orientation:    Location: Buttocks   Wound Number:    Is this injury device related?:    Primary Wound Type: Skin tear   Description of Altered Skin Integrity: Partial thickness tissue loss. Shallow open ulcer with a red or pink wound bed, without slough. Intact or Open/Ruptured Serum-filled blister.   Ankle-Brachial Index:    Pulses:    Removal Indication and Assessment:    Wound Outcome:    (Retired) Wound Length (cm):    (Retired) Wound Width (cm):    (Retired) Depth (cm):    Wound Description (Comments):    Removal Indications:    Wound Image   10/20/22 1613   Description of Altered Skin Integrity Partial thickness tissue loss. Shallow open ulcer with a red or pink wound bed, without slough. Intact or Open/Ruptured Serum-filled blister. 10/20/22 1613   Drainage Amount Scant 10/20/22 1613   Drainage Characteristics/Odor Serosanguineous 10/20/22 1613   Appearance Dressing in place, unable to visualize 10/21/22 2000   Tissue loss description Partial thickness 10/20/22 1613   Care Cleansed with:;Soap and water 10/20/22 1613   Dressing Applied;Gauze, wet to dry 10/20/22 1613   Number of days: 4            Altered Skin Integrity 10/20/22 1000 Left Toe, first Purple or maroon localized area of  discolored intact skin or non-intact skin or a blood-filled blister. (Active)   10/20/22 1000   Altered Skin Integrity Present on Admission: yes   Side: Left   Orientation:    Location: Toe, first   Wound Number:    Is this injury device related?:    Primary Wound Type:    Description of Altered Skin Integrity: Purple or maroon localized area of discolored intact skin or non-intact skin or a blood-filled blister.   Ankle-Brachial Index:    Pulses:    Removal Indication and Assessment:    Wound Outcome:    (Retired) Wound Length (cm):    (Retired) Wound Width (cm):    (Retired) Depth (cm):    Wound Description (Comments):    Removal Indications:    Wound Image    10/20/22 1616   Description of Altered Skin Integrity Purple or maroon localized area of discolored intact skin or non-intact skin or a blood-filled blister. 10/20/22 1616   Dressing Appearance Open to air 10/21/22 2000   Drainage Amount None 10/20/22 1616   Appearance Maroon;Red 10/20/22 1616   Care Cleansed with:;Soap and water 10/20/22 1616   Number of days: 4            Altered Skin Integrity 10/20/22 1000 Left Toe, fifth Purple or maroon localized area of discolored intact skin or non-intact skin or a blood-filled blister. (Active)   10/20/22 1000   Altered Skin Integrity Present on Admission: yes   Side: Left   Orientation:    Location: Toe, fifth   Wound Number:    Is this injury device related?:    Primary Wound Type:    Description of Altered Skin Integrity: Purple or maroon localized area of discolored intact skin or non-intact skin or a blood-filled blister.   Ankle-Brachial Index:    Pulses:    Removal Indication and Assessment:    Wound Outcome:    (Retired) Wound Length (cm):    (Retired) Wound Width (cm):    (Retired) Depth (cm):    Wound Description (Comments):    Removal Indications:    Wound Image   10/20/22 1618   Description of Altered Skin Integrity Purple or maroon localized area of discolored intact skin or non-intact skin or a  blood-filled blister. 10/20/22 1618   Dressing Appearance Open to air 10/21/22 2000   Drainage Amount None 10/20/22 1618   Appearance Maroon;Red 10/20/22 1618   Care Cleansed with:;Soap and water 10/20/22 1618   Number of days: 4         Assessment and Plan    Pressure ulcer of sacral region, stage 4  Wound cauterized with silver nitrate  Pack with calcium alginate, duoderm applied to buttock.   Nursing to assess every 2 hours for bleeding.   Wound care to follow up on Monday      Signature:  LIEN Elise  Wound Care    Date of encounter: 10/21/2022

## 2022-10-21 NOTE — PLAN OF CARE
Problem: Adult Inpatient Plan of Care  Goal: Plan of Care Review  Outcome: Ongoing, Progressing  Goal: Patient-Specific Goal (Individualized)  Outcome: Ongoing, Progressing  Goal: Absence of Hospital-Acquired Illness or Injury  Outcome: Ongoing, Progressing  Goal: Optimal Comfort and Wellbeing  Outcome: Ongoing, Progressing  Goal: Readiness for Transition of Care  Outcome: Ongoing, Progressing     Problem: Diabetes Comorbidity  Goal: Blood Glucose Level Within Targeted Range  Outcome: Ongoing, Progressing     Problem: Impaired Wound Healing  Goal: Optimal Wound Healing  Outcome: Ongoing, Progressing     Problem: Skin Injury Risk Increased  Goal: Skin Health and Integrity  Outcome: Ongoing, Progressing     Problem: Infection  Goal: Absence of Infection Signs and Symptoms  Outcome: Ongoing, Progressing     Problem: Gas Exchange Impaired  Goal: Optimal Gas Exchange  Outcome: Ongoing, Progressing

## 2022-10-21 NOTE — NURSING
New 02 stat sensor applied to right ear lobe for cont 02 monitoring, pt tolerated well. 02 stat noted to be 99% at this time.

## 2022-10-21 NOTE — NURSING
Wound care note: outer edges of bleeding noted to sacral wound. A LIEN Solis assessed and silver nitrated. Ca+ Alginate and pressure dressing applied. Patient is to not have dressing change over the weekend. If breakthrough bleeding, call surgery. A LIEN Solis will reassess on Monday, October 24, 2022.

## 2022-10-22 LAB
ANION GAP SERPL CALCULATED.3IONS-SCNC: 10 MMOL/L (ref 7–16)
BACTERIA BLD CULT: ABNORMAL
BASOPHILS # BLD AUTO: 0.01 K/UL (ref 0–0.2)
BASOPHILS NFR BLD AUTO: 0.1 % (ref 0–1)
BUN SERPL-MCNC: 9 MG/DL (ref 7–18)
BUN/CREAT SERPL: 11 (ref 6–20)
CALCIUM SERPL-MCNC: 7.5 MG/DL (ref 8.5–10.1)
CHLORIDE SERPL-SCNC: 106 MMOL/L (ref 98–107)
CO2 SERPL-SCNC: 22 MMOL/L (ref 21–32)
CREAT SERPL-MCNC: 0.84 MG/DL (ref 0.7–1.3)
DIFFERENTIAL METHOD BLD: ABNORMAL
EGFR (NO RACE VARIABLE) (RUSH/TITUS): 87 ML/MIN/1.73M²
EOSINOPHIL # BLD AUTO: 0.24 K/UL (ref 0–0.5)
EOSINOPHIL NFR BLD AUTO: 3.5 % (ref 1–4)
ERYTHROCYTE [DISTWIDTH] IN BLOOD BY AUTOMATED COUNT: 17.1 % (ref 11.5–14.5)
GLUCOSE SERPL-MCNC: 102 MG/DL (ref 70–105)
GLUCOSE SERPL-MCNC: 136 MG/DL (ref 70–105)
GLUCOSE SERPL-MCNC: 137 MG/DL (ref 70–105)
GLUCOSE SERPL-MCNC: 558 MG/DL (ref 74–106)
GRAM STN SPEC: ABNORMAL
HCT VFR BLD AUTO: 25.1 % (ref 40–54)
HGB BLD-MCNC: 8.2 G/DL (ref 13.5–18)
IMM GRANULOCYTES # BLD AUTO: 0.07 K/UL (ref 0–0.04)
IMM GRANULOCYTES NFR BLD: 1 % (ref 0–0.4)
LYMPHOCYTES # BLD AUTO: 1.73 K/UL (ref 1–4.8)
LYMPHOCYTES NFR BLD AUTO: 25.1 % (ref 27–41)
MCH RBC QN AUTO: 27.3 PG (ref 27–31)
MCHC RBC AUTO-ENTMCNC: 32.7 G/DL (ref 32–36)
MCV RBC AUTO: 83.7 FL (ref 80–96)
MONOCYTES # BLD AUTO: 0.68 K/UL (ref 0–0.8)
MONOCYTES NFR BLD AUTO: 9.9 % (ref 2–6)
MPC BLD CALC-MCNC: 10.4 FL (ref 9.4–12.4)
NEUTROPHILS # BLD AUTO: 4.16 K/UL (ref 1.8–7.7)
NEUTROPHILS NFR BLD AUTO: 60.4 % (ref 53–65)
NRBC # BLD AUTO: 0 X10E3/UL
NRBC, AUTO (.00): 0 %
PLATELET # BLD AUTO: 174 K/UL (ref 150–400)
POTASSIUM SERPL-SCNC: 3.5 MMOL/L (ref 3.5–5.1)
RBC # BLD AUTO: 3 M/UL (ref 4.6–6.2)
SODIUM SERPL-SCNC: 134 MMOL/L (ref 136–145)
WBC # BLD AUTO: 6.89 K/UL (ref 4.5–11)

## 2022-10-22 PROCEDURE — S5010 5% DEXTROSE AND 0.45% SALINE: HCPCS | Performed by: NURSE PRACTITIONER

## 2022-10-22 PROCEDURE — 94640 AIRWAY INHALATION TREATMENT: CPT

## 2022-10-22 PROCEDURE — 85025 COMPLETE CBC W/AUTO DIFF WBC: CPT

## 2022-10-22 PROCEDURE — 25000003 PHARM REV CODE 250: Performed by: FAMILY MEDICINE

## 2022-10-22 PROCEDURE — 11000001 HC ACUTE MED/SURG PRIVATE ROOM

## 2022-10-22 PROCEDURE — 99232 SBSQ HOSP IP/OBS MODERATE 35: CPT | Mod: GC,,, | Performed by: FAMILY MEDICINE

## 2022-10-22 PROCEDURE — 25000003 PHARM REV CODE 250: Performed by: NURSE PRACTITIONER

## 2022-10-22 PROCEDURE — 25000242 PHARM REV CODE 250 ALT 637 W/ HCPCS: Performed by: NURSE PRACTITIONER

## 2022-10-22 PROCEDURE — 94761 N-INVAS EAR/PLS OXIMETRY MLT: CPT

## 2022-10-22 PROCEDURE — 80048 BASIC METABOLIC PNL TOTAL CA: CPT

## 2022-10-22 PROCEDURE — 99232 PR SUBSEQUENT HOSPITAL CARE,LEVL II: ICD-10-PCS | Mod: GC,,, | Performed by: FAMILY MEDICINE

## 2022-10-22 PROCEDURE — C9113 INJ PANTOPRAZOLE SODIUM, VIA: HCPCS | Performed by: NURSE PRACTITIONER

## 2022-10-22 PROCEDURE — 63600175 PHARM REV CODE 636 W HCPCS: Performed by: FAMILY MEDICINE

## 2022-10-22 PROCEDURE — 25000003 PHARM REV CODE 250

## 2022-10-22 PROCEDURE — 82962 GLUCOSE BLOOD TEST: CPT

## 2022-10-22 PROCEDURE — 36415 COLL VENOUS BLD VENIPUNCTURE: CPT

## 2022-10-22 PROCEDURE — 63600175 PHARM REV CODE 636 W HCPCS: Performed by: NURSE PRACTITIONER

## 2022-10-22 RX ADMIN — IPRATROPIUM BROMIDE AND ALBUTEROL SULFATE 3 ML: 2.5; .5 SOLUTION RESPIRATORY (INHALATION) at 08:10

## 2022-10-22 RX ADMIN — PANTOPRAZOLE SODIUM 40 MG: 40 INJECTION, POWDER, LYOPHILIZED, FOR SOLUTION INTRAVENOUS at 09:10

## 2022-10-22 RX ADMIN — MEROPENEM 1 G: 1 INJECTION, POWDER, FOR SOLUTION INTRAVENOUS at 04:10

## 2022-10-22 RX ADMIN — LEVOTHYROXINE SODIUM ANHYDROUS 25 MCG: 100 INJECTION, POWDER, LYOPHILIZED, FOR SOLUTION INTRAVENOUS at 09:10

## 2022-10-22 RX ADMIN — DEXTROSE AND SODIUM CHLORIDE: 5; 450 INJECTION, SOLUTION INTRAVENOUS at 04:10

## 2022-10-22 RX ADMIN — VANCOMYCIN HYDROCHLORIDE 1500 MG: 500 INJECTION, POWDER, LYOPHILIZED, FOR SOLUTION INTRAVENOUS at 04:10

## 2022-10-22 RX ADMIN — LOSARTAN POTASSIUM 50 MG: 50 TABLET, FILM COATED ORAL at 09:10

## 2022-10-22 RX ADMIN — VANCOMYCIN HYDROCHLORIDE 1500 MG: 500 INJECTION, POWDER, LYOPHILIZED, FOR SOLUTION INTRAVENOUS at 09:10

## 2022-10-22 RX ADMIN — MUPIROCIN: 20 OINTMENT TOPICAL at 09:10

## 2022-10-22 RX ADMIN — IPRATROPIUM BROMIDE AND ALBUTEROL SULFATE 3 ML: 2.5; .5 SOLUTION RESPIRATORY (INHALATION) at 12:10

## 2022-10-22 RX ADMIN — MEROPENEM 1 G: 1 INJECTION, POWDER, FOR SOLUTION INTRAVENOUS at 12:10

## 2022-10-22 RX ADMIN — IPRATROPIUM BROMIDE AND ALBUTEROL SULFATE 3 ML: 2.5; .5 SOLUTION RESPIRATORY (INHALATION) at 07:10

## 2022-10-22 NOTE — NURSING
Patient noted to have bleeding in sacral wound, also from anne catheter and picc line this shift. Dr Nance and residents notified. Lovenox injection was d/c'd. Will continue to monitor for excess bleeding.

## 2022-10-22 NOTE — PLAN OF CARE
Problem: Adult Inpatient Plan of Care  Goal: Plan of Care Review  Outcome: Ongoing, Progressing  Goal: Patient-Specific Goal (Individualized)  Outcome: Ongoing, Progressing  Goal: Absence of Hospital-Acquired Illness or Injury  Outcome: Ongoing, Progressing  Goal: Optimal Comfort and Wellbeing  Outcome: Ongoing, Progressing  Goal: Readiness for Transition of Care  Outcome: Ongoing, Progressing     Problem: Diabetes Comorbidity  Goal: Blood Glucose Level Within Targeted Range  Outcome: Ongoing, Progressing     Problem: Impaired Wound Healing  Goal: Optimal Wound Healing  Outcome: Ongoing, Progressing     Problem: Skin Injury Risk Increased  Goal: Skin Health and Integrity  Outcome: Ongoing, Progressing     Problem: Infection  Goal: Absence of Infection Signs and Symptoms  Outcome: Ongoing, Progressing     Problem: Gas Exchange Impaired  Goal: Optimal Gas Exchange  Outcome: Ongoing, Progressing     Problem: Fall Injury Risk  Goal: Absence of Fall and Fall-Related Injury  Outcome: Ongoing, Progressing

## 2022-10-22 NOTE — PLAN OF CARE
Problem: Gas Exchange Impaired  Goal: Optimal Gas Exchange  Outcome: Ongoing, Progressing      77 y/o female with hx of asthma, HTN, hemochromatosis, NSTEMI and chronic pseudomonas infection admitted for SOB, likely 2/2 to asthma/COPD exacerbation. Pt currently managed on levofloxacin, prednisone and duonebs.

## 2022-10-22 NOTE — NURSING
Decision was made not to change picc dsg due to bleeding finally seems to have stopped and felt it better to leave it in place for as long as possible to prevent disturbing it and causing the site to resume bleeding. MD discontinued the lovenox today so problem should improve

## 2022-10-22 NOTE — PROGRESS NOTES
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 He presents to [facility] with a UTI that was found on hospital admission on 10/17/2022. Patient was sent to the [facility] ED via EMS secondary to decreased level of consciousness with hematuria.  No associated symptoms such as fever chills cough wheezing chest pain palpitation lower extremity edema nausea vomiting abdominal pain were reported.  No other history could be gathered at this time this patient is somnolent and confused and was only able to state his name only at this time.  On initial presentation, patient was hypotensive with systolic blood pressure in the 60s and tachycardic.  Ensuing workup was notable for leukocytosis with left shift, elevated lactic acid level, hyperglycemia, acute renal failure, and UA highly suggestive of a presence of urinary tract infection.  Patient did not respond favorably to IV fluid bolus of 30 cc/kg and was admitted to ICU for vasopressor support.  Patient's code status is DNR according to nursing home records. After IVF, antibiotics and pressor treatment she was downgraded from the ICU on 10/19/2022 and now admitted the [facility].     The patient was admitted to the [facility] to Family Medicine Service under the direct  supervision Dr. Lee Ann SIMMONS for continued care and medical management.       Overview/Hospital Course:  No notes on file    Interval History: Patient seen this morning in bed. Nurses noted bleeding from sacral wound and PICC line site. Lovenox will be held for 2 days, restarted on Monday 10/24/2022. Wound care debrided sacral wound yesterday. Will continue to monitor.     Review of Systems   Unable to perform ROS: Dementia   Objective:     Vital Signs (Most Recent):  Temp: 97.8 °F (36.6 °C) (10/22/22 0800)  Pulse: 73 (10/22/22 0800)  Resp: 18 (10/22/22 0800)  BP: (!) 165/81 (10/22/22 0800)  SpO2: 100 % (10/22/22 0900)   Vital Signs (24h Range):  Temp:  [97.7 °F (36.5 °C)-98.9 °F (37.2 °C)] 97.8 °F (36.6 °C)  Pulse:  [] 73  Resp:  [16-22] 18  SpO2:  [98 %-100 %] 100 %  BP: (148-180)/(79-91) 165/81     Weight: 102.7 kg (226 lb 6.6 oz)  Body mass index is 30.71 kg/m².    Intake/Output Summary (Last 24 hours) at 10/22/2022 1045  Last data filed at 10/22/2022 0651  Gross per 24 hour   Intake 690 ml   Output 1600 ml   Net -910 ml      Physical Exam  Vitals reviewed.   Constitutional:       General: He is awake. He is not in acute distress.     Appearance: Normal appearance. He is well-developed. He is obese.      Interventions: He is not intubated.  HENT:      Head: Normocephalic and atraumatic.      Nose: Nose normal.      Mouth/Throat:      Mouth: Mucous membranes are dry.      Pharynx: Oropharynx is clear.   Eyes:      Extraocular Movements: Extraocular movements intact.      Conjunctiva/sclera: Conjunctivae normal.      Pupils: Pupils are equal, round, and reactive to light.   Cardiovascular:      Rate and Rhythm: Normal rate and regular rhythm.      Heart sounds: Normal heart sounds. No murmur heard.    No friction rub.   Pulmonary:      Effort: Pulmonary effort is normal. He is not intubated.      Breath sounds: Normal breath sounds.   Abdominal:      General: Abdomen is flat. Bowel sounds are normal.       Palpations: Abdomen is soft.   Musculoskeletal:         General: Normal range of motion.      Cervical back: Normal range of motion and neck supple.      Right lower leg: No edema.      Left lower leg: No edema.   Skin:     General: Skin is warm and dry.      Capillary Refill: Capillary refill takes less than 2 seconds.   Neurological:      Mental Status: He is lethargic.   Psychiatric:         Mood and Affect: Mood normal.         Behavior: Behavior is uncooperative.       Significant Labs: All pertinent labs within the past 24 hours have been reviewed.    Significant Imaging: I have reviewed all pertinent imaging results/findings within the past 24 hours.      Assessment/Plan:      * Bacteremia  Secondary to UTI  Treated with IVFs, BC x2 Gram positive - coagulase negative Staphylococcus, other than Staph epi and staph lugdunensis  Patient off of Levophed cultures grew out Gram-positive cocci Staph epidermidis and grew out Gram-negative tyrone of the urine continue current antibiotics vanc was added 10/18  PICC line placed     Urinary tract infection with hematuria  Pt has a chronic anne at nursing home.   Will make sure it has been changed this admission   - urine culture with >100,000 GNB  -  Day #5 Merrem   Cultures sensitive to merrem     Acute deep vein thrombosis (DVT) of both lower extremities  Hx of DVT -- US 10/17 with right popliteal nonocclusive thrombosis - start wt based renally dosed lovenox   Lovenox held today, restart on Monday 10/24/2022  Patient was bleeding from PICC line and sacral wound  Will continue to monitor    Pressure ulcer of sacral region, stage 4  Wound care consulted      Suspected deep tissue injury  Wound care consulted       Diabetes mellitus  POCT glucose checks  Sliding scale insulin       VTE Risk Mitigation (From admission, onward)    None          Discharge Planning   YOSSI: 10/19/2022     Code Status: DNR   Is the patient medically ready for discharge?:     Reason for patient  still in hospital (select all that apply): Treatment and Consult recommendations  Discharge Plan A: Return to nursing home                  Nba Pryor MD  Department of Hospital Medicine   [facility]

## 2022-10-22 NOTE — ASSESSMENT & PLAN NOTE
Hx of DVT -- US 10/17 with right popliteal nonocclusive thrombosis - start wt based renally dosed lovenox   Lovenox held today, restart on Monday 10/24/2022  Patient was bleeding from PICC line and sacral wound  Will continue to monitor

## 2022-10-22 NOTE — SUBJECTIVE & OBJECTIVE
Interval History: Patient seen this morning in bed. Nurses noted bleeding from sacral wound and PICC line site. Lovenox will be held for 2 days, restarted on Monday 10/24/2022. Wound care debrided sacral wound yesterday. Will continue to monitor.     Review of Systems   Unable to perform ROS: Dementia   Objective:     Vital Signs (Most Recent):  Temp: 97.8 °F (36.6 °C) (10/22/22 0800)  Pulse: 73 (10/22/22 0800)  Resp: 18 (10/22/22 0800)  BP: (!) 165/81 (10/22/22 0800)  SpO2: 100 % (10/22/22 0900)   Vital Signs (24h Range):  Temp:  [97.7 °F (36.5 °C)-98.9 °F (37.2 °C)] 97.8 °F (36.6 °C)  Pulse:  [] 73  Resp:  [16-22] 18  SpO2:  [98 %-100 %] 100 %  BP: (148-180)/(79-91) 165/81     Weight: 102.7 kg (226 lb 6.6 oz)  Body mass index is 30.71 kg/m².    Intake/Output Summary (Last 24 hours) at 10/22/2022 1045  Last data filed at 10/22/2022 0651  Gross per 24 hour   Intake 690 ml   Output 1600 ml   Net -910 ml      Physical Exam  Vitals reviewed.   Constitutional:       General: He is awake. He is not in acute distress.     Appearance: Normal appearance. He is well-developed. He is obese.      Interventions: He is not intubated.  HENT:      Head: Normocephalic and atraumatic.      Nose: Nose normal.      Mouth/Throat:      Mouth: Mucous membranes are dry.      Pharynx: Oropharynx is clear.   Eyes:      Extraocular Movements: Extraocular movements intact.      Conjunctiva/sclera: Conjunctivae normal.      Pupils: Pupils are equal, round, and reactive to light.   Cardiovascular:      Rate and Rhythm: Normal rate and regular rhythm.      Heart sounds: Normal heart sounds. No murmur heard.    No friction rub.   Pulmonary:      Effort: Pulmonary effort is normal. He is not intubated.      Breath sounds: Normal breath sounds.   Abdominal:      General: Abdomen is flat. Bowel sounds are normal.      Palpations: Abdomen is soft.   Musculoskeletal:         General: Normal range of motion.      Cervical back: Normal range of  motion and neck supple.      Right lower leg: No edema.      Left lower leg: No edema.   Skin:     General: Skin is warm and dry.      Capillary Refill: Capillary refill takes less than 2 seconds.   Neurological:      Mental Status: He is lethargic.   Psychiatric:         Mood and Affect: Mood normal.         Behavior: Behavior is uncooperative.       Significant Labs: All pertinent labs within the past 24 hours have been reviewed.    Significant Imaging: I have reviewed all pertinent imaging results/findings within the past 24 hours.

## 2022-10-22 NOTE — ASSESSMENT & PLAN NOTE
Pt has a chronic anne at nursing home.   Will make sure it has been changed this admission   - urine culture with >100,000 GNB  -  Day #5 Merrem   Cultures sensitive to merrem

## 2022-10-22 NOTE — NURSING
Lab call around 0515 stated that glucose was 558. Sam JAMES stated that the blood was drawn from the picc line and the D51/2NS was stopped. Recheck accucheck at 0600 left hand 134 and right hand 125. Secure chat to inform the on call and RICCI Whitaker DO said she will let the day team know that the pt's accuchecks were running in the 130's. No new orders received. Hand off report to JACOB Richard day team.

## 2022-10-23 ENCOUNTER — APPOINTMENT (OUTPATIENT)
Dept: RADIOLOGY | Facility: HOSPITAL | Age: 82
End: 2022-10-23
Payer: MEDICARE

## 2022-10-23 LAB
ANION GAP SERPL CALCULATED.3IONS-SCNC: 9 MMOL/L (ref 7–16)
ANISOCYTOSIS BLD QL SMEAR: ABNORMAL
BACTERIA BLD CULT: ABNORMAL
BACTERIA SPEC ANAEROBE CULT: NORMAL
BASOPHILS # BLD AUTO: 0.02 K/UL (ref 0–0.2)
BASOPHILS NFR BLD AUTO: 0.3 % (ref 0–1)
BUN SERPL-MCNC: 7 MG/DL (ref 7–18)
BUN/CREAT SERPL: 13 (ref 6–20)
CALCIUM SERPL-MCNC: 8 MG/DL (ref 8.5–10.1)
CHLORIDE SERPL-SCNC: 111 MMOL/L (ref 98–107)
CO2 SERPL-SCNC: 26 MMOL/L (ref 21–32)
CREAT SERPL-MCNC: 0.52 MG/DL (ref 0.7–1.3)
DIFFERENTIAL METHOD BLD: ABNORMAL
EGFR (NO RACE VARIABLE) (RUSH/TITUS): 101 ML/MIN/1.73M²
EOSINOPHIL # BLD AUTO: 0.43 K/UL (ref 0–0.5)
EOSINOPHIL NFR BLD AUTO: 6.1 % (ref 1–4)
ERYTHROCYTE [DISTWIDTH] IN BLOOD BY AUTOMATED COUNT: 16.8 % (ref 11.5–14.5)
GLUCOSE SERPL-MCNC: 110 MG/DL (ref 70–105)
GLUCOSE SERPL-MCNC: 112 MG/DL (ref 70–105)
GLUCOSE SERPL-MCNC: 85 MG/DL (ref 70–105)
GLUCOSE SERPL-MCNC: 92 MG/DL (ref 70–105)
GLUCOSE SERPL-MCNC: 93 MG/DL (ref 74–106)
GRAM STN SPEC: ABNORMAL
GRAM STN SPEC: ABNORMAL
HCT VFR BLD AUTO: 27.9 % (ref 40–54)
HGB BLD-MCNC: 9 G/DL (ref 13.5–18)
HYPOCHROMIA BLD QL SMEAR: ABNORMAL
IMM GRANULOCYTES # BLD AUTO: 0.05 K/UL (ref 0–0.04)
IMM GRANULOCYTES NFR BLD: 0.7 % (ref 0–0.4)
LYMPHOCYTES # BLD AUTO: 1.98 K/UL (ref 1–4.8)
LYMPHOCYTES NFR BLD AUTO: 28.1 % (ref 27–41)
MCH RBC QN AUTO: 26.9 PG (ref 27–31)
MCHC RBC AUTO-ENTMCNC: 32.3 G/DL (ref 32–36)
MCV RBC AUTO: 83.5 FL (ref 80–96)
MICROORGANISM SPEC CULT: ABNORMAL
MICROORGANISM SPEC CULT: ABNORMAL
MONOCYTES # BLD AUTO: 0.71 K/UL (ref 0–0.8)
MONOCYTES NFR BLD AUTO: 10.1 % (ref 2–6)
MPC BLD CALC-MCNC: 10.5 FL (ref 9.4–12.4)
NEUTROPHILS # BLD AUTO: 3.86 K/UL (ref 1.8–7.7)
NEUTROPHILS NFR BLD AUTO: 54.7 % (ref 53–65)
NRBC # BLD AUTO: 0 X10E3/UL
NRBC, AUTO (.00): 0 %
OVALOCYTES BLD QL SMEAR: ABNORMAL
PLATELET # BLD AUTO: 210 K/UL (ref 150–400)
PLATELET MORPHOLOGY: ABNORMAL
POLYCHROMASIA BLD QL SMEAR: ABNORMAL
POTASSIUM SERPL-SCNC: 3.8 MMOL/L (ref 3.5–5.1)
RBC # BLD AUTO: 3.34 M/UL (ref 4.6–6.2)
SODIUM SERPL-SCNC: 142 MMOL/L (ref 136–145)
WBC # BLD AUTO: 7.05 K/UL (ref 4.5–11)

## 2022-10-23 PROCEDURE — 93970 EXTREMITY STUDY: CPT | Mod: TC

## 2022-10-23 PROCEDURE — 94761 N-INVAS EAR/PLS OXIMETRY MLT: CPT

## 2022-10-23 PROCEDURE — 63600175 PHARM REV CODE 636 W HCPCS: Performed by: NURSE PRACTITIONER

## 2022-10-23 PROCEDURE — 25000242 PHARM REV CODE 250 ALT 637 W/ HCPCS: Performed by: NURSE PRACTITIONER

## 2022-10-23 PROCEDURE — 85025 COMPLETE CBC W/AUTO DIFF WBC: CPT

## 2022-10-23 PROCEDURE — 63600175 PHARM REV CODE 636 W HCPCS

## 2022-10-23 PROCEDURE — 93970 EXTREMITY STUDY: CPT

## 2022-10-23 PROCEDURE — 94640 AIRWAY INHALATION TREATMENT: CPT

## 2022-10-23 PROCEDURE — 25000003 PHARM REV CODE 250

## 2022-10-23 PROCEDURE — 80048 BASIC METABOLIC PNL TOTAL CA: CPT

## 2022-10-23 PROCEDURE — 36415 COLL VENOUS BLD VENIPUNCTURE: CPT

## 2022-10-23 PROCEDURE — 25000003 PHARM REV CODE 250: Performed by: NURSE PRACTITIONER

## 2022-10-23 PROCEDURE — 27000221 HC OXYGEN, UP TO 24 HOURS

## 2022-10-23 PROCEDURE — 99233 SBSQ HOSP IP/OBS HIGH 50: CPT | Mod: ,,, | Performed by: HOSPITALIST

## 2022-10-23 PROCEDURE — 11000001 HC ACUTE MED/SURG PRIVATE ROOM

## 2022-10-23 PROCEDURE — 99233 PR SUBSEQUENT HOSPITAL CARE,LEVL III: ICD-10-PCS | Mod: ,,, | Performed by: HOSPITALIST

## 2022-10-23 PROCEDURE — 82962 GLUCOSE BLOOD TEST: CPT

## 2022-10-23 PROCEDURE — C9113 INJ PANTOPRAZOLE SODIUM, VIA: HCPCS | Performed by: NURSE PRACTITIONER

## 2022-10-23 RX ORDER — ENOXAPARIN SODIUM 100 MG/ML
40 INJECTION SUBCUTANEOUS EVERY 24 HOURS
Status: DISCONTINUED | OUTPATIENT
Start: 2022-10-24 | End: 2022-10-24

## 2022-10-23 RX ORDER — RISPERIDONE 1 MG/1
1 TABLET ORAL 2 TIMES DAILY
Status: DISCONTINUED | OUTPATIENT
Start: 2022-10-23 | End: 2022-11-03 | Stop reason: HOSPADM

## 2022-10-23 RX ADMIN — PIPERACILLIN AND TAZOBACTAM 4.5 G: 4; .5 INJECTION, POWDER, LYOPHILIZED, FOR SOLUTION INTRAVENOUS at 09:10

## 2022-10-23 RX ADMIN — IPRATROPIUM BROMIDE AND ALBUTEROL SULFATE 3 ML: 2.5; .5 SOLUTION RESPIRATORY (INHALATION) at 12:10

## 2022-10-23 RX ADMIN — IPRATROPIUM BROMIDE AND ALBUTEROL SULFATE 3 ML: 2.5; .5 SOLUTION RESPIRATORY (INHALATION) at 07:10

## 2022-10-23 RX ADMIN — RISPERIDONE 1 MG: 1 TABLET ORAL at 02:10

## 2022-10-23 RX ADMIN — IPRATROPIUM BROMIDE AND ALBUTEROL SULFATE 3 ML: 2.5; .5 SOLUTION RESPIRATORY (INHALATION) at 08:10

## 2022-10-23 RX ADMIN — LOSARTAN POTASSIUM 50 MG: 50 TABLET, FILM COATED ORAL at 09:10

## 2022-10-23 RX ADMIN — RISPERIDONE 1 MG: 1 TABLET ORAL at 08:10

## 2022-10-23 RX ADMIN — PIPERACILLIN AND TAZOBACTAM 4.5 G: 4; .5 INJECTION, POWDER, LYOPHILIZED, FOR SOLUTION INTRAVENOUS at 12:10

## 2022-10-23 RX ADMIN — PANTOPRAZOLE SODIUM 40 MG: 40 INJECTION, POWDER, LYOPHILIZED, FOR SOLUTION INTRAVENOUS at 09:10

## 2022-10-23 RX ADMIN — LEVOTHYROXINE SODIUM ANHYDROUS 25 MCG: 100 INJECTION, POWDER, LYOPHILIZED, FOR SOLUTION INTRAVENOUS at 09:10

## 2022-10-23 RX ADMIN — IPRATROPIUM BROMIDE AND ALBUTEROL SULFATE 3 ML: 2.5; .5 SOLUTION RESPIRATORY (INHALATION) at 01:10

## 2022-10-23 NOTE — ASSESSMENT & PLAN NOTE
Secondary to UTI  Treated with IVFs, BC x2 Gram positive - coagulase negative Staphylococcus, other than Staph epi and staph lugdunensis  Patient off of Levophed cultures grew out Gram-positive cocci Staph epidermidis and grew out Gram-negative tyrone of the urine continue current antibiotics vanc was added 10/18  PICC line placed   Blood Cx x2 showed no growth on 10/23, therefore Vancomycin d/c.  Wound Cx on 10/23 grew pseudomonas sensitive to Zosyn, Zosyn added

## 2022-10-23 NOTE — ASSESSMENT & PLAN NOTE
Pt has a chronic anne at nursing home.   Will make sure it has been changed this admission   - urine culture with >100,000 GNB  - Cultures sensitive to merrem   -  Patient completed 4 days of Merrem and vancomycin on 10/22

## 2022-10-23 NOTE — PLAN OF CARE
Problem: Adult Inpatient Plan of Care  Goal: Plan of Care Review  Outcome: Ongoing, Progressing  Flowsheets (Taken 10/22/2022 2034)  Plan of Care Reviewed With: patient  Goal: Optimal Comfort and Wellbeing  Outcome: Ongoing, Progressing  Intervention: Monitor Pain and Promote Comfort  Flowsheets (Taken 10/22/2022 2034)  Pain Management Interventions:   pillow support provided   position adjusted   relaxation techniques promoted   quiet environment facilitated   pain management plan reviewed with patient/caregiver  Intervention: Provide Person-Centered Care  Flowsheets (Taken 10/22/2022 2034)  Trust Relationship/Rapport:   care explained   questions encouraged   choices provided   reassurance provided   emotional support provided   thoughts/feelings acknowledged   questions answered   empathic listening provided     Problem: Diabetes Comorbidity  Goal: Blood Glucose Level Within Targeted Range  Outcome: Ongoing, Progressing  Intervention: Monitor and Manage Glycemia  Flowsheets (Taken 10/22/2022 2034)  Glycemic Management:   blood glucose monitored   supplemental insulin given     Problem: Impaired Wound Healing  Goal: Optimal Wound Healing  Outcome: Ongoing, Progressing  Intervention: Promote Wound Healing  Flowsheets (Taken 10/22/2022 2034)  Oral Nutrition Promotion:   physical activity promoted   rest periods promoted   safe use of adaptive equipment encouraged   social interaction promoted  Sleep/Rest Enhancement:   awakenings minimized   regular sleep/rest pattern promoted   relaxation techniques promoted  Pain Management Interventions:   pillow support provided   position adjusted   relaxation techniques promoted   quiet environment facilitated   pain management plan reviewed with patient/caregiver     Problem: Skin Injury Risk Increased  Goal: Skin Health and Integrity  Outcome: Ongoing, Progressing  Intervention: Optimize Skin Protection  Flowsheets (Taken 10/22/2022 2034)  Pressure Reduction Techniques:    frequent weight shift encouraged   heels elevated off bed   positioned off wounds   pressure points protected   weight shift assistance provided   rest period provided between sit times  Pressure Reduction Devices: positioning supports utilized  Skin Protection:   adhesive use limited   incontinence pads utilized   transparent dressing maintained   tubing/devices free from skin contact  Head of Bed (HOB) Positioning: HOB elevated  Intervention: Promote and Optimize Oral Intake  Flowsheets (Taken 10/22/2022 2034)  Oral Nutrition Promotion:   physical activity promoted   rest periods promoted   safe use of adaptive equipment encouraged   social interaction promoted     Problem: Fall Injury Risk  Goal: Absence of Fall and Fall-Related Injury  Outcome: Ongoing, Progressing  Intervention: Identify and Manage Contributors  Flowsheets (Taken 10/22/2022 2034)  Self-Care Promotion:   independence encouraged   BADL personal objects within reach   BADL personal routines maintained   meal set-up provided   safe use of adaptive equipment encouraged  Medication Review/Management: medications reviewed

## 2022-10-23 NOTE — PROGRESS NOTES
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 He presents to [facility] with a UTI that was found on hospital admission on 10/17/2022. Patient was sent to the [facility] ED via EMS secondary to decreased level of consciousness with hematuria.  No associated symptoms such as fever chills cough wheezing chest pain palpitation lower extremity edema nausea vomiting abdominal pain were reported.  No other history could be gathered at this time this patient is somnolent and confused and was only able to state his name only at this time.  On initial presentation, patient was hypotensive with systolic blood pressure in the 60s and tachycardic.  Ensuing workup was notable for leukocytosis with left shift, elevated lactic acid level, hyperglycemia, acute renal failure, and UA highly suggestive of a presence of urinary tract infection.  Patient did not respond favorably to IV fluid bolus of 30 cc/kg and was admitted to ICU for vasopressor support.  Patient's code status is DNR according to nursing home records. After IVF, antibiotics and pressor treatment she was downgraded from the ICU on 10/19/2022 and now admitted the [facility].     The patient was admitted to the [facility] to Family Medicine Service under the direct  supervision Dr. Lee Ann SIMMONS for continued care and medical management.       Overview/Hospital Course:  No notes on file    Interval History: Patient seen this morning in bed nonverbal. Nursing staff reported patient yelling. Patient's home psych med risperidone was continued.     Review of Systems   Unable to perform ROS: Patient nonverbal   Objective:     Vital Signs (Most Recent):  Temp: 97.8 °F (36.6 °C) (10/23/22 0800)  Pulse: 65 (10/23/22 0800)  Resp: 18 (10/23/22 0800)  BP: (!) 171/83 (10/23/22 0800)  SpO2: 98 % (10/23/22 0800)   Vital Signs (24h Range):  Temp:  [97.8 °F (36.6 °C)-98.9 °F (37.2 °C)] 97.8 °F (36.6 °C)  Pulse:  [65-94] 65  Resp:  [16-20] 18  SpO2:  [98 %-100 %] 98 %  BP: (147-171)/(81-98) 171/83     Weight: 102.7 kg (226 lb 6.6 oz)  Body mass index is 30.71 kg/m².    Intake/Output Summary (Last 24 hours) at 10/23/2022 1320  Last data filed at 10/23/2022 0334  Gross per 24 hour   Intake 250 ml   Output 700 ml   Net -450 ml      Physical Exam  Vitals and nursing note reviewed.   Constitutional:       General: He is not in acute distress.     Appearance: Normal appearance. He is not ill-appearing, toxic-appearing or diaphoretic.   HENT:      Head: Normocephalic and atraumatic.      Nose: Nose normal. No congestion or rhinorrhea.      Mouth/Throat:      Mouth: Mucous membranes are moist.      Pharynx: Oropharynx is clear. No oropharyngeal exudate or posterior oropharyngeal erythema.   Eyes:      Conjunctiva/sclera: Conjunctivae normal.      Pupils: Pupils are equal, round, and reactive to light.   Cardiovascular:      Rate and Rhythm: Normal rate and regular rhythm.      Pulses: Normal pulses.      Heart sounds: Normal heart sounds. No murmur heard.    No friction rub.   Pulmonary:      Effort: Pulmonary effort is normal. No respiratory distress.      Breath sounds: Normal breath sounds. No wheezing, rhonchi or rales.   Abdominal:      General: There is no distension.      Tenderness: There is no  abdominal tenderness. There is no guarding.   Musculoskeletal:      Right lower leg: Edema present.      Left lower leg: No edema.   Skin:     General: Skin is warm and dry.      Capillary Refill: Capillary refill takes less than 2 seconds.   Neurological:      General: No focal deficit present.      Mental Status: He is alert.   Psychiatric:         Behavior: Behavior is uncooperative.       Significant Labs: All pertinent labs within the past 24 hours have been reviewed.    Significant Imaging: I have reviewed all pertinent imaging results/findings within the past 24 hours.      Assessment/Plan:      * Bacteremia  Secondary to UTI  Treated with IVFs, BC x2 Gram positive - coagulase negative Staphylococcus, other than Staph epi and staph lugdunensis  Patient off of Levophed cultures grew out Gram-positive cocci Staph epidermidis and grew out Gram-negative tyrone of the urine continue current antibiotics vanc was added 10/18  PICC line placed   Blood Cx x2 showed no growth on 10/23, therefore Vancomycin d/c.  Wound Cx on 10/23 grew pseudomonas sensitive to Zosyn, Zosyn added    Urinary tract infection with hematuria  Pt has a chronic anne at nursing home.   Will make sure it has been changed this admission   - urine culture with >100,000 GNB  - Cultures sensitive to merrem   -  Patient completed 4 days of Merrem and vancomycin on 10/22       Pressure ulcer of sacral region, stage 4  - Wound care consulted  - wound Cx grew pseudomonas sensitive to Zosyn  - Zosyn 4.5g IV Q8H      Diabetes mellitus  POCT glucose checks  Sliding scale insulin     Acute deep vein thrombosis (DVT) of both lower extremities  Hx of DVT -- US 10/17 with right popliteal nonocclusive thrombosis - start wt based renally dosed lovenox   Lovenox held today, restart on Monday 10/24/2022  Patient was bleeding from PICC line and sacral wound  Will continue to monitor    Suspected deep tissue injury  Wound care consulted       Bipolar  disorder  Continuing home risperidone         VTE Risk Mitigation (From admission, onward)         Ordered     enoxaparin injection 40 mg  Daily         10/23/22 1335                Discharge Planning   YOSSI: 10/19/2022     Code Status: DNR   Is the patient medically ready for discharge?:     Reason for patient still in hospital (select all that apply): Treatment  Discharge Plan A: Return to nursing home                  Gabriel Ahmadi DO  Department of Hospital Medicine   [facility]

## 2022-10-23 NOTE — SUBJECTIVE & OBJECTIVE
Interval History: Patient seen this morning in bed nonverbal. Nursing staff reported patient yelling. Patient's home psych med risperidone was continued.     Review of Systems   Unable to perform ROS: Patient nonverbal   Objective:     Vital Signs (Most Recent):  Temp: 97.8 °F (36.6 °C) (10/23/22 0800)  Pulse: 65 (10/23/22 0800)  Resp: 18 (10/23/22 0800)  BP: (!) 171/83 (10/23/22 0800)  SpO2: 98 % (10/23/22 0800)   Vital Signs (24h Range):  Temp:  [97.8 °F (36.6 °C)-98.9 °F (37.2 °C)] 97.8 °F (36.6 °C)  Pulse:  [65-94] 65  Resp:  [16-20] 18  SpO2:  [98 %-100 %] 98 %  BP: (147-171)/(81-98) 171/83     Weight: 102.7 kg (226 lb 6.6 oz)  Body mass index is 30.71 kg/m².    Intake/Output Summary (Last 24 hours) at 10/23/2022 1320  Last data filed at 10/23/2022 0334  Gross per 24 hour   Intake 250 ml   Output 700 ml   Net -450 ml      Physical Exam  Vitals and nursing note reviewed.   Constitutional:       General: He is not in acute distress.     Appearance: Normal appearance. He is not ill-appearing, toxic-appearing or diaphoretic.   HENT:      Head: Normocephalic and atraumatic.      Nose: Nose normal. No congestion or rhinorrhea.      Mouth/Throat:      Mouth: Mucous membranes are moist.      Pharynx: Oropharynx is clear. No oropharyngeal exudate or posterior oropharyngeal erythema.   Eyes:      Conjunctiva/sclera: Conjunctivae normal.      Pupils: Pupils are equal, round, and reactive to light.   Cardiovascular:      Rate and Rhythm: Normal rate and regular rhythm.      Pulses: Normal pulses.      Heart sounds: Normal heart sounds. No murmur heard.    No friction rub.   Pulmonary:      Effort: Pulmonary effort is normal. No respiratory distress.      Breath sounds: Normal breath sounds. No wheezing, rhonchi or rales.   Abdominal:      General: There is no distension.      Tenderness: There is no abdominal tenderness. There is no guarding.   Musculoskeletal:      Right lower leg: Edema present.      Left lower leg: No  edema.   Skin:     General: Skin is warm and dry.      Capillary Refill: Capillary refill takes less than 2 seconds.   Neurological:      General: No focal deficit present.      Mental Status: He is alert.   Psychiatric:         Behavior: Behavior is uncooperative.       Significant Labs: All pertinent labs within the past 24 hours have been reviewed.    Significant Imaging: I have reviewed all pertinent imaging results/findings within the past 24 hours.

## 2022-10-24 ENCOUNTER — APPOINTMENT (OUTPATIENT)
Dept: RADIOLOGY | Facility: HOSPITAL | Age: 82
End: 2022-10-24
Payer: MEDICARE

## 2022-10-24 LAB
ANION GAP SERPL CALCULATED.3IONS-SCNC: 7 MMOL/L (ref 7–16)
BASOPHILS # BLD AUTO: 0.01 K/UL (ref 0–0.2)
BASOPHILS NFR BLD AUTO: 0.2 % (ref 0–1)
BUN SERPL-MCNC: 7 MG/DL (ref 7–18)
BUN/CREAT SERPL: 13 (ref 6–20)
CALCIUM SERPL-MCNC: 8.2 MG/DL (ref 8.5–10.1)
CHLORIDE SERPL-SCNC: 109 MMOL/L (ref 98–107)
CO2 SERPL-SCNC: 29 MMOL/L (ref 21–32)
CREAT SERPL-MCNC: 0.53 MG/DL (ref 0.7–1.3)
DIFFERENTIAL METHOD BLD: ABNORMAL
EGFR (NO RACE VARIABLE) (RUSH/TITUS): 100 ML/MIN/1.73M²
EOSINOPHIL # BLD AUTO: 0.46 K/UL (ref 0–0.5)
EOSINOPHIL NFR BLD AUTO: 7.4 % (ref 1–4)
ERYTHROCYTE [DISTWIDTH] IN BLOOD BY AUTOMATED COUNT: 16.6 % (ref 11.5–14.5)
GLUCOSE SERPL-MCNC: 106 MG/DL (ref 70–105)
GLUCOSE SERPL-MCNC: 117 MG/DL (ref 70–105)
GLUCOSE SERPL-MCNC: 125 MG/DL (ref 70–105)
GLUCOSE SERPL-MCNC: 128 MG/DL (ref 70–105)
GLUCOSE SERPL-MCNC: 84 MG/DL (ref 74–106)
GLUCOSE SERPL-MCNC: 94 MG/DL (ref 70–105)
GLUCOSE SERPL-MCNC: 97 MG/DL (ref 70–105)
HCT VFR BLD AUTO: 28.5 % (ref 40–54)
HGB BLD-MCNC: 9 G/DL (ref 13.5–18)
IMM GRANULOCYTES # BLD AUTO: 0.04 K/UL (ref 0–0.04)
IMM GRANULOCYTES NFR BLD: 0.6 % (ref 0–0.4)
LYMPHOCYTES # BLD AUTO: 1.76 K/UL (ref 1–4.8)
LYMPHOCYTES NFR BLD AUTO: 28.3 % (ref 27–41)
MCH RBC QN AUTO: 27.1 PG (ref 27–31)
MCHC RBC AUTO-ENTMCNC: 31.6 G/DL (ref 32–36)
MCV RBC AUTO: 85.8 FL (ref 80–96)
MONOCYTES # BLD AUTO: 0.51 K/UL (ref 0–0.8)
MONOCYTES NFR BLD AUTO: 8.2 % (ref 2–6)
MPC BLD CALC-MCNC: 9.9 FL (ref 9.4–12.4)
NEUTROPHILS # BLD AUTO: 3.43 K/UL (ref 1.8–7.7)
NEUTROPHILS NFR BLD AUTO: 55.3 % (ref 53–65)
NRBC # BLD AUTO: 0 X10E3/UL
NRBC, AUTO (.00): 0 %
PLATELET # BLD AUTO: 206 K/UL (ref 150–400)
POTASSIUM SERPL-SCNC: 4 MMOL/L (ref 3.5–5.1)
RBC # BLD AUTO: 3.32 M/UL (ref 4.6–6.2)
SODIUM SERPL-SCNC: 141 MMOL/L (ref 136–145)
WBC # BLD AUTO: 6.21 K/UL (ref 4.5–11)

## 2022-10-24 PROCEDURE — 72220 X-RAY EXAM SACRUM TAILBONE: CPT

## 2022-10-24 PROCEDURE — 96372 THER/PROPH/DIAG INJ SC/IM: CPT

## 2022-10-24 PROCEDURE — 82962 GLUCOSE BLOOD TEST: CPT

## 2022-10-24 PROCEDURE — 63600175 PHARM REV CODE 636 W HCPCS

## 2022-10-24 PROCEDURE — 80048 BASIC METABOLIC PNL TOTAL CA: CPT | Performed by: HOSPITALIST

## 2022-10-24 PROCEDURE — 94761 N-INVAS EAR/PLS OXIMETRY MLT: CPT

## 2022-10-24 PROCEDURE — 99232 SBSQ HOSP IP/OBS MODERATE 35: CPT | Mod: GC,,, | Performed by: FAMILY MEDICINE

## 2022-10-24 PROCEDURE — 94640 AIRWAY INHALATION TREATMENT: CPT

## 2022-10-24 PROCEDURE — 11042 DEBRIDEMENT: ICD-10-PCS | Mod: ,,, | Performed by: NURSE PRACTITIONER

## 2022-10-24 PROCEDURE — 25000003 PHARM REV CODE 250: Performed by: NURSE PRACTITIONER

## 2022-10-24 PROCEDURE — C9113 INJ PANTOPRAZOLE SODIUM, VIA: HCPCS | Performed by: NURSE PRACTITIONER

## 2022-10-24 PROCEDURE — 25000242 PHARM REV CODE 250 ALT 637 W/ HCPCS: Performed by: NURSE PRACTITIONER

## 2022-10-24 PROCEDURE — 11042 DBRDMT SUBQ TIS 1ST 20SQCM/<: CPT | Mod: ,,, | Performed by: NURSE PRACTITIONER

## 2022-10-24 PROCEDURE — 25000003 PHARM REV CODE 250

## 2022-10-24 PROCEDURE — 11045 DBRDMT SUBQ TISS EACH ADDL: CPT | Mod: ,,, | Performed by: NURSE PRACTITIONER

## 2022-10-24 PROCEDURE — 85025 COMPLETE CBC W/AUTO DIFF WBC: CPT | Performed by: HOSPITALIST

## 2022-10-24 PROCEDURE — 27000221 HC OXYGEN, UP TO 24 HOURS

## 2022-10-24 PROCEDURE — 63600175 PHARM REV CODE 636 W HCPCS: Performed by: NURSE PRACTITIONER

## 2022-10-24 PROCEDURE — 99232 PR SUBSEQUENT HOSPITAL CARE,LEVL II: ICD-10-PCS | Mod: GC,,, | Performed by: FAMILY MEDICINE

## 2022-10-24 PROCEDURE — 99223 1ST HOSP IP/OBS HIGH 75: CPT | Mod: ,,, | Performed by: INTERNAL MEDICINE

## 2022-10-24 PROCEDURE — 99223 PR INITIAL HOSPITAL CARE,LEVL III: ICD-10-PCS | Mod: ,,, | Performed by: INTERNAL MEDICINE

## 2022-10-24 PROCEDURE — 72220 XR SACRUM AND COCCYX: ICD-10-PCS | Mod: 26,,, | Performed by: STUDENT IN AN ORGANIZED HEALTH CARE EDUCATION/TRAINING PROGRAM

## 2022-10-24 PROCEDURE — 11045 DEBRIDEMENT: ICD-10-PCS | Mod: ,,, | Performed by: NURSE PRACTITIONER

## 2022-10-24 PROCEDURE — 72220 X-RAY EXAM SACRUM TAILBONE: CPT | Mod: 26,,, | Performed by: STUDENT IN AN ORGANIZED HEALTH CARE EDUCATION/TRAINING PROGRAM

## 2022-10-24 PROCEDURE — 11000001 HC ACUTE MED/SURG PRIVATE ROOM

## 2022-10-24 PROCEDURE — 72220 X-RAY EXAM SACRUM TAILBONE: CPT | Mod: TC

## 2022-10-24 PROCEDURE — 36415 COLL VENOUS BLD VENIPUNCTURE: CPT | Performed by: HOSPITALIST

## 2022-10-24 RX ORDER — ENOXAPARIN SODIUM 100 MG/ML
100 INJECTION SUBCUTANEOUS
Status: DISCONTINUED | OUTPATIENT
Start: 2022-10-24 | End: 2022-10-26

## 2022-10-24 RX ORDER — LEVOTHYROXINE SODIUM 50 UG/1
50 TABLET ORAL
Status: DISCONTINUED | OUTPATIENT
Start: 2022-10-25 | End: 2022-11-03 | Stop reason: HOSPADM

## 2022-10-24 RX ORDER — PANTOPRAZOLE SODIUM 40 MG/1
40 TABLET, DELAYED RELEASE ORAL DAILY
Status: DISCONTINUED | OUTPATIENT
Start: 2022-10-25 | End: 2022-10-25

## 2022-10-24 RX ORDER — ENOXAPARIN SODIUM 100 MG/ML
100 INJECTION SUBCUTANEOUS EVERY 24 HOURS
Status: DISCONTINUED | OUTPATIENT
Start: 2022-10-24 | End: 2022-10-24

## 2022-10-24 RX ADMIN — IPRATROPIUM BROMIDE AND ALBUTEROL SULFATE 3 ML: 2.5; .5 SOLUTION RESPIRATORY (INHALATION) at 07:10

## 2022-10-24 RX ADMIN — PIPERACILLIN AND TAZOBACTAM 4.5 G: 4; .5 INJECTION, POWDER, LYOPHILIZED, FOR SOLUTION INTRAVENOUS at 08:10

## 2022-10-24 RX ADMIN — RISPERIDONE 1 MG: 1 TABLET ORAL at 09:10

## 2022-10-24 RX ADMIN — IPRATROPIUM BROMIDE AND ALBUTEROL SULFATE 3 ML: 2.5; .5 SOLUTION RESPIRATORY (INHALATION) at 12:10

## 2022-10-24 RX ADMIN — LEVOTHYROXINE SODIUM ANHYDROUS 25 MCG: 100 INJECTION, POWDER, LYOPHILIZED, FOR SOLUTION INTRAVENOUS at 11:10

## 2022-10-24 RX ADMIN — PIPERACILLIN AND TAZOBACTAM 4.5 G: 4; .5 INJECTION, POWDER, LYOPHILIZED, FOR SOLUTION INTRAVENOUS at 03:10

## 2022-10-24 RX ADMIN — PANTOPRAZOLE SODIUM 40 MG: 40 INJECTION, POWDER, LYOPHILIZED, FOR SOLUTION INTRAVENOUS at 11:10

## 2022-10-24 RX ADMIN — LOSARTAN POTASSIUM 50 MG: 50 TABLET, FILM COATED ORAL at 09:10

## 2022-10-24 RX ADMIN — PIPERACILLIN AND TAZOBACTAM 4.5 G: 4; .5 INJECTION, POWDER, LYOPHILIZED, FOR SOLUTION INTRAVENOUS at 12:10

## 2022-10-24 RX ADMIN — ENOXAPARIN SODIUM 100 MG: 100 INJECTION SUBCUTANEOUS at 04:10

## 2022-10-24 NOTE — SUBJECTIVE & OBJECTIVE
Interval History: Patient was seen resting in bed this morning. No overnight events reported. Patient continuing to receiving Zosyn. Today restarting Lovenox therapy.     Review of Systems   Unable to perform ROS: Dementia   Objective:     Vital Signs (Most Recent):  Temp: 97.1 °F (36.2 °C) (10/24/22 0800)  Pulse: 64 (10/24/22 0800)  Resp: 19 (10/24/22 0800)  BP: (!) 184/96 (10/24/22 0800)  SpO2: 100 % (10/24/22 0916)   Vital Signs (24h Range):  Temp:  [97.1 °F (36.2 °C)-98.9 °F (37.2 °C)] 97.1 °F (36.2 °C)  Pulse:  [64-82] 64  Resp:  [16-20] 19  SpO2:  [100 %] 100 %  BP: (159-194)/(74-96) 184/96     Weight: 102.5 kg (225 lb 15.5 oz)  Body mass index is 30.65 kg/m².    Intake/Output Summary (Last 24 hours) at 10/24/2022 1223  Last data filed at 10/24/2022 0930  Gross per 24 hour   Intake 620 ml   Output 1475 ml   Net -855 ml      Physical Exam  Vitals reviewed.   Constitutional:       General: He is awake. He is not in acute distress.     Appearance: Normal appearance. He is well-developed. He is obese.      Interventions: He is not intubated.  HENT:      Head: Normocephalic and atraumatic.      Nose: Nose normal.      Mouth/Throat:      Mouth: Mucous membranes are dry.      Pharynx: Oropharynx is clear.   Eyes:      Extraocular Movements: Extraocular movements intact.      Conjunctiva/sclera: Conjunctivae normal.      Pupils: Pupils are equal, round, and reactive to light.   Cardiovascular:      Rate and Rhythm: Normal rate and regular rhythm.      Heart sounds: Normal heart sounds. No murmur heard.    No friction rub.   Pulmonary:      Effort: Pulmonary effort is normal. He is not intubated.      Breath sounds: Normal breath sounds.   Abdominal:      General: Abdomen is flat. Bowel sounds are normal.      Palpations: Abdomen is soft.   Musculoskeletal:         General: Normal range of motion.      Cervical back: Normal range of motion and neck supple.      Right lower leg: No edema.      Left lower leg: No edema.    Skin:     General: Skin is warm and dry.      Capillary Refill: Capillary refill takes less than 2 seconds.   Neurological:      Mental Status: He is lethargic.   Psychiatric:         Mood and Affect: Mood normal.         Behavior: Behavior is uncooperative.       Significant Labs: All pertinent labs within the past 24 hours have been reviewed.    Significant Imaging: I have reviewed all pertinent imaging results/findings within the past 24 hours.

## 2022-10-24 NOTE — PROGRESS NOTES
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 He presents to [facility] with a UTI that was found on hospital admission on 10/17/2022. Patient was sent to the [facility] ED via EMS secondary to decreased level of consciousness with hematuria.  No associated symptoms such as fever chills cough wheezing chest pain palpitation lower extremity edema nausea vomiting abdominal pain were reported.  No other history could be gathered at this time this patient is somnolent and confused and was only able to state his name only at this time.  On initial presentation, patient was hypotensive with systolic blood pressure in the 60s and tachycardic.  Ensuing workup was notable for leukocytosis with left shift, elevated lactic acid level, hyperglycemia, acute renal failure, and UA highly suggestive of a presence of urinary tract infection.  Patient did not respond favorably to IV fluid bolus of 30 cc/kg and was admitted to ICU for vasopressor support.  Patient's code status is DNR according to nursing home records. After IVF, antibiotics and pressor treatment she was downgraded from the ICU on 10/19/2022 and now admitted the [facility].     The patient was admitted to the [facility] to Family Medicine Service under the direct  supervision Dr. Lee Ann SIMMONS for continued care and medical management.       Overview/Hospital Course:  No notes on file    Interval History: Patient was seen resting in bed this morning. No overnight events reported. Patient continuing to receiving Zosyn. Today restarting Lovenox therapy.     Review of Systems   Unable to perform ROS: Dementia   Objective:     Vital Signs (Most Recent):  Temp: 97.1 °F (36.2 °C) (10/24/22 0800)  Pulse: 64 (10/24/22 0800)  Resp: 19 (10/24/22 0800)  BP: (!) 184/96 (10/24/22 0800)  SpO2: 100 % (10/24/22 0916)   Vital Signs (24h Range):  Temp:  [97.1 °F (36.2 °C)-98.9 °F (37.2 °C)] 97.1 °F (36.2 °C)  Pulse:  [64-82] 64  Resp:  [16-20] 19  SpO2:  [100 %] 100 %  BP: (159-194)/(74-96) 184/96     Weight: 102.5 kg (225 lb 15.5 oz)  Body mass index is 30.65 kg/m².    Intake/Output Summary (Last 24 hours) at 10/24/2022 1223  Last data filed at 10/24/2022 0930  Gross per 24 hour   Intake 620 ml   Output 1475 ml   Net -855 ml      Physical Exam  Vitals reviewed.   Constitutional:       General: He is awake. He is not in acute distress.     Appearance: Normal appearance. He is well-developed. He is obese.      Interventions: He is not intubated.  HENT:      Head: Normocephalic and atraumatic.      Nose: Nose normal.      Mouth/Throat:      Mouth: Mucous membranes are dry.      Pharynx: Oropharynx is clear.   Eyes:      Extraocular Movements: Extraocular movements intact.      Conjunctiva/sclera: Conjunctivae normal.      Pupils: Pupils are equal, round, and reactive to light.   Cardiovascular:      Rate and Rhythm: Normal rate and regular rhythm.      Heart sounds: Normal heart sounds. No murmur heard.    No friction rub.   Pulmonary:      Effort: Pulmonary effort is normal. He is not intubated.      Breath sounds: Normal breath sounds.   Abdominal:      General: Abdomen is flat. Bowel sounds are normal.      Palpations: Abdomen is soft.   Musculoskeletal:         General: Normal range of motion.       Cervical back: Normal range of motion and neck supple.      Right lower leg: No edema.      Left lower leg: No edema.   Skin:     General: Skin is warm and dry.      Capillary Refill: Capillary refill takes less than 2 seconds.   Neurological:      Mental Status: He is lethargic.   Psychiatric:         Mood and Affect: Mood normal.         Behavior: Behavior is uncooperative.       Significant Labs: All pertinent labs within the past 24 hours have been reviewed.    Significant Imaging: I have reviewed all pertinent imaging results/findings within the past 24 hours.      Assessment/Plan:      * Bacteremia  Secondary to UTI  Treated with IVFs, BC x2 Gram positive - coagulase negative Staphylococcus, other than Staph epi and staph lugdunensis  Patient off of Levophed cultures grew out Gram-positive cocci Staph epidermidis and grew out Gram-negative tyrone of the urine continue current antibiotics vanc was added 10/18  PICC line placed   Blood Cx x2 showed no growth on 10/23, therefore Vancomycin d/c.  Wound Cx on 10/23 grew pseudomonas sensitive to Zosyn, Zosyn added    Acute deep vein thrombosis (DVT) of both lower extremities  Hx of DVT -- US 10/17 with right popliteal nonocclusive thrombosis - start wt based renally dosed lovenox   Lovenox held today, restart on Monday 10/24/2022  Patient was bleeding from PICC line and sacral wound  Will continue to monitor    Pressure ulcer of sacral region, stage 4  - Wound care consulted  - wound Cx grew pseudomonas sensitive to Zosyn  - Zosyn 4.5g IV Q8H      Urinary tract infection with hematuria  Pt has a chronic anne at nursing home.   Will make sure it has been changed this admission   - urine culture with >100,000 GNB  - Cultures sensitive to merrem   -  Patient completed 4 days of Merrem and vancomycin on 10/22       Suspected deep tissue injury  Wound care consulted       Bipolar disorder  Continuing home risperidone       Diabetes mellitus  POCT glucose  checks  Sliding scale insulin       VTE Risk Mitigation (From admission, onward)         Ordered     enoxaparin injection 100 mg  Daily         10/24/22 1222                Discharge Planning   YOSSI: 10/19/2022     Code Status: DNR   Is the patient medically ready for discharge?:     Reason for patient still in hospital (select all that apply): Laboratory test, Treatment and Consult recommendations  Discharge Plan A: Return to nursing home                  Nba Pryor MD  Department of Hospital Medicine   [facility]

## 2022-10-24 NOTE — PROCEDURES
"Bhargav Gao is a 82 y.o. male patient.    Temp: 97.8 °F (36.6 °C) (10/24/22 1200)  Pulse: 73 (10/24/22 1242)  Resp: 20 (10/24/22 1242)  BP: (!) 150/90 (10/24/22 1200)  SpO2: 100 % (10/24/22 1242)  Weight: 102.5 kg (225 lb 15.5 oz) (10/24/22 0400)  Height: 6' (182.9 cm) (10/19/22 1716)       Debridement    Date/Time: 10/21/2022 3:36 PM  Performed by: LIEN Elise  Authorized by: LIEN Elise     Time out: Immediately prior to procedure a "time out" was called to verify the correct patient, procedure, equipment, support staff and site/side marked as required.    Consent Done?:  Yes (Written)    Wound Details:    Location:  Sacrum    Type of Debridement:  Non-excisional       Length (cm):  7       Area (sq cm):  21       Width (cm):  3       Percent Debrided (%):  100       Depth (cm):  2       Total Area Debrided (sq cm):  21    Depth of debridement:  Subcutaneous tissue    Tissue debrided:  Other    Devitalized tissue debrided:  Clots    Instruments:  Other    Bleeding:  Moderate  Hemostasis Achieved: Yes    Method Used:  Silver Nitrate  Patient tolerance:  Patient tolerated the procedure well with no immediate complications     Chemical cauterization using silver nitrate    10/24/2022    "

## 2022-10-24 NOTE — PROCEDURES
"Bhargav Gao is a 82 y.o. male patient.    Temp: 97.8 °F (36.6 °C) (10/24/22 1200)  Pulse: 73 (10/24/22 1242)  Resp: 20 (10/24/22 1242)  BP: (!) 150/90 (10/24/22 1200)  SpO2: 100 % (10/24/22 1242)  Weight: 102.5 kg (225 lb 15.5 oz) (10/24/22 0400)  Height: 6' (182.9 cm) (10/19/22 1716)       Debridement    Date/Time: 10/24/2022 3:19 PM  Performed by: LIEN Elise  Authorized by: LIEN Elise     Time out: Immediately prior to procedure a "time out" was called to verify the correct patient, procedure, equipment, support staff and site/side marked as required.    Consent Done?:  Yes (Written)    Wound Details:    Location:  Sacrum    Type of Debridement:  Excisional       Length (cm):  7       Area (sq cm):  21       Width (cm):  3       Percent Debrided (%):  100       Depth (cm):  2       Total Area Debrided (sq cm):  21    Depth of debridement:  Subcutaneous tissue    Tissue debrided:  Adipose, Dermis, Epidermis and Subcutaneous    Devitalized tissue debrided:  Biofilm, Exudate, Clots and Slough    Instruments:  Curette and Scissors    Bleeding:  Moderate  Hemostasis Achieved: Yes    Method Used:  Pressure  Patient tolerance:  Patient tolerated the procedure well with no immediate complications     Assistant PAPI Hartley LPN and BOUBACAR Pineda LPN    10/24/2022    "

## 2022-10-24 NOTE — PROGRESS NOTES
Pharmacist Intervention IV to PO Note    Bhargav Gao is a 82 y.o. male being treated with IV medication pantoprazole    Patient Data:    Vital Signs (Most Recent):  Temp: 97.1 °F (36.2 °C) (10/24/22 0800)  Pulse: 64 (10/24/22 0800)  Resp: 19 (10/24/22 0800)  BP: (!) 184/96 (10/24/22 0800)  SpO2: 100 % (10/24/22 0916)   Vital Signs (72h Range):  Temp:  [97.1 °F (36.2 °C)-98.9 °F (37.2 °C)]   Pulse:  []   Resp:  [16-22]   BP: (147-194)/(72-98)   SpO2:  [98 %-100 %]      CBC:  Recent Labs   Lab 10/22/22  0302 10/23/22  0448 10/24/22  0330   WBC 6.89 7.05 6.21   RBC 3.00* 3.34* 3.32*   HGB 8.2* 9.0* 9.0*   HCT 25.1* 27.9* 28.5*    210 206   MCV 83.7 83.5 85.8   MCH 27.3 26.9* 27.1   MCHC 32.7 32.3 31.6*     CMP:     Recent Labs   Lab 10/17/22  1716 10/18/22  0508 10/19/22  0621 10/19/22  1803 10/22/22  0302 10/23/22  0448 10/24/22  0330   * 127* 67*   < > 558* 93 84   CALCIUM 9.4 8.0* 8.5   < > 7.5* 8.0* 8.2*   ALBUMIN 1.9* 1.7* 1.9*  --   --   --   --    PROT 6.1* 4.8* 5.3*  --   --   --   --    * 146* 144   < > 134* 142 141   K 4.4 3.8 3.8   < > 3.5 3.8 4.0   CO2 26 26 29   < > 22 26 29    108* 110*   < > 106 111* 109*   BUN 48* 42* 32*   < > 9 7 7   CREATININE 2.94* 1.82* 0.86   < > 0.84 0.52* 0.53*   ALKPHOS 92 78 78  --   --   --   --    ALT 13* 14* 18  --   --   --   --    AST 23 22 25  --   --   --   --    BILITOT 0.3 0.2 0.2  --   --   --   --     < > = values in this interval not displayed.       Dietary Orders:  Diet Orders            Dietary nutrition supplements Rush; Epi - Any flavor starting at 10/24 1800    Diet consistent carbohydrate 1800 (60g Carbs/ meal); Thin: Carb Control starting at 10/21 0839            Based on the following criteria, this patient qualifies for intravenous to oral conversion:  [x] The patients gastrointestinal tract is functioning (tolerating medications via oral or enteral route for 24 hours and tolerating food or enteral feeds for 24  hours).    IV medication pantoprazole will be changed to oral medication pantoprazole    Pharmacist's Name: Henna Leigh  Pharmacist's Extension: [phone]

## 2022-10-24 NOTE — ASSESSMENT & PLAN NOTE
Protect areas with foam dressings  Wound care to change M, W, F  Nursing to assess each shift to ensure no additional breakdown to toes or heels  Waffle boots in bed  Keep pressure off toes  Nursing to perform Bart scale every shift  Turn every 2 hours

## 2022-10-24 NOTE — PROGRESS NOTES
;[facility]  Wound Care  Progress Note    Patient Name: Bhargav Gao  MRN: [id number]  Admission Date: 10/19/2022  Attending Physician: Hipolito Nance Jr., MD    Past Medical History:   Diagnosis Date    Alzheimer's disease     Anticoagulant long-term use     Bipolar disorder     BPH (benign prostatic hyperplasia)     Deep vein thrombosis (DVT) of popliteal vein of right lower extremity     Diabetes mellitus     DVT of deep femoral vein, left     Hyperlipidemia     Hypertension     Hypothyroidism     Idiopathic orofacial dystonia     Iron deficiency 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    Light Growth Escherichia coli Abnormal    Light Growth Klebsiel    Schizophrenia 1/28/2022 05/16 -continue trileptal and risperidone        Subjective:     HPI:  Bhargav Gao is a 81 yo male with wounds to sacral and right buttock admitted with bacteremia. Bone palpated during exam, no visible bone exposed. Wound to sacral covered in slough and necrotic tissue. Wound to right buttock necrotic. Thick, slimy discharge over sacral and right buttock. Cultures positive for Pseudomonas aeruginosa and yeast. Wounds are worse today. Bedside debridement today. X-ray pending. He also has SDTI to toes on left foot. He is at risk for additional skin breakdown  multiple co-morbidities, poor vascular supply, diabetes, edema, heavy drainage, excessive wound moisture and macerated tissue, decreased granulation tissue, necrosis, large surface area, advanced age, fragile skin, and infection. 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 Wound healing is complicated by dementia, limited mobility, diabetes, anemia, and chronic sacral wound.               Review of Systems   Unable to perform ROS: Dementia   Objective:     Vital Signs (Most Recent):  Temp: 97.1 °F (36.2 °C) (10/24/22 0800)  Pulse: 64 (10/24/22 0800)  Resp: 19 (10/24/22 0800)  BP: (!) 184/96 (10/24/22 0800)  SpO2: 100 % (10/24/22 0916)   Vital Signs (24h Range):  Temp:  [97.1 °F (36.2 °C)-98.9 °F (37.2 °C)] 97.1 °F (36.2 °C)  Pulse:  [64-82] 64  Resp:  [16-20] 19  SpO2:  [100 %] 100 %  BP: (159-194)/(74-96) 184/96     Weight: 102.5 kg (225 lb 15.5 oz)  Body mass index is 30.65 kg/m².  No data recorded    Physical Exam  Vitals reviewed.   Constitutional:       Appearance: He is ill-appearing.   HENT:      Head: Normocephalic.   Cardiovascular:      Rate and Rhythm: Normal rate and regular rhythm.      Pulses: Normal pulses.      Heart sounds: Normal heart sounds.   Pulmonary:      Effort: Pulmonary effort is normal.      Breath sounds: Normal breath sounds.   Skin:     Findings: Erythema present.      Comments: Wound, LDA for photos/measurements   Neurological:      Mental Status: He is alert.      Cranial Nerves: Cranial nerve deficit present.      Sensory: Sensory deficit present.      Motor: Weakness present.      Gait: Gait abnormal.             Altered Skin Integrity 10/20/22 1000 Sacral spine Ulceration Full thickness tissue loss. Base is covered by slough and/or eschar in the wound bed (Active)   10/20/22 1000   Altered Skin Integrity Present on Admission: yes   Side:    Orientation:    Location: Sacral spine   Wound Number:    Is this injury device related?:    Primary Wound Type: Ulceration   Description of Altered Skin  Integrity: Full thickness tissue loss. Base is covered by slough and/or eschar in the wound bed   Ankle-Brachial Index:    Pulses:    Removal Indication and Assessment:    Wound Outcome:    (Retired) Wound Length (cm):    (Retired) Wound Width (cm):    (Retired) Depth (cm):    Wound Description (Comments):    Removal Indications:    Wound Image    10/20/22 1608   Description of Altered Skin Integrity Full thickness tissue loss with exposed bone, tendon, or muscle. Often includes undermining and tunneling. May extend into muscle and/or supporting structures. 10/20/22 1608   Dressing Appearance Moist drainage 10/20/22 1608   Drainage Amount Moderate 10/20/22 1608   Drainage Characteristics/Odor Serosanguineous 10/20/22 1608   Appearance Dressing in place, unable to visualize 10/21/22 2000   Tissue loss description Full thickness 10/20/22 1608   Yellow (%), Wound Tissue Color 90 % 10/20/22 1608   Wound Length (cm) 7 cm 10/20/22 1608   Wound Width (cm) 3 cm 10/20/22 1608   Wound Depth (cm) 2 cm 10/20/22 1608   Wound Volume (cm^3) 42 cm^3 10/20/22 1608   Wound Surface Area (cm^2) 21 cm^2 10/20/22 1608   Care Cleansed with:;Soap and water 10/20/22 1608   Dressing Applied;Other (comment) 10/20/22 1608   Number of days: 4            Altered Skin Integrity 10/20/22 1000 Right Buttocks Skin Tear Partial thickness tissue loss. Shallow open ulcer with a red or pink wound bed, without slough. Intact or Open/Ruptured Serum-filled blister. (Active)   10/20/22 1000   Altered Skin Integrity Present on Admission: yes   Side: Right   Orientation:    Location: Buttocks   Wound Number:    Is this injury device related?:    Primary Wound Type: Skin tear   Description of Altered Skin Integrity: Partial thickness tissue loss. Shallow open ulcer with a red or pink wound bed, without slough. Intact or Open/Ruptured Serum-filled blister.   Ankle-Brachial Index:    Pulses:    Removal Indication and Assessment:    Wound Outcome:    (Retired)  Wound Length (cm):    (Retired) Wound Width (cm):    (Retired) Depth (cm):    Wound Description (Comments):    Removal Indications:    Wound Image   10/20/22 1613   Description of Altered Skin Integrity Partial thickness tissue loss. Shallow open ulcer with a red or pink wound bed, without slough. Intact or Open/Ruptured Serum-filled blister. 10/20/22 1613   Drainage Amount Scant 10/20/22 1613   Drainage Characteristics/Odor Serosanguineous 10/20/22 1613   Appearance Dressing in place, unable to visualize 10/21/22 2000   Tissue loss description Partial thickness 10/20/22 1613   Care Cleansed with:;Soap and water 10/20/22 1613   Dressing Applied;Gauze, wet to dry 10/20/22 1613   Number of days: 4            Altered Skin Integrity 10/20/22 1000 Left Toe, first Purple or maroon localized area of discolored intact skin or non-intact skin or a blood-filled blister. (Active)   10/20/22 1000   Altered Skin Integrity Present on Admission: yes   Side: Left   Orientation:    Location: Toe, first   Wound Number:    Is this injury device related?:    Primary Wound Type:    Description of Altered Skin Integrity: Purple or maroon localized area of discolored intact skin or non-intact skin or a blood-filled blister.   Ankle-Brachial Index:    Pulses:    Removal Indication and Assessment:    Wound Outcome:    (Retired) Wound Length (cm):    (Retired) Wound Width (cm):    (Retired) Depth (cm):    Wound Description (Comments):    Removal Indications:    Wound Image    10/20/22 1616   Description of Altered Skin Integrity Purple or maroon localized area of discolored intact skin or non-intact skin or a blood-filled blister. 10/20/22 1616   Dressing Appearance Open to air 10/21/22 2000   Drainage Amount None 10/20/22 1616   Appearance Maroon;Red 10/20/22 1616   Care Cleansed with:;Soap and water 10/20/22 1616   Number of days: 4            Altered Skin Integrity 10/20/22 1000 Left Toe, fifth Purple or maroon localized area of  discolored intact skin or non-intact skin or a blood-filled blister. (Active)   10/20/22 1000   Altered Skin Integrity Present on Admission: yes   Side: Left   Orientation:    Location: Toe, fifth   Wound Number:    Is this injury device related?:    Primary Wound Type:    Description of Altered Skin Integrity: Purple or maroon localized area of discolored intact skin or non-intact skin or a blood-filled blister.   Ankle-Brachial Index:    Pulses:    Removal Indication and Assessment:    Wound Outcome:    (Retired) Wound Length (cm):    (Retired) Wound Width (cm):    (Retired) Depth (cm):    Wound Description (Comments):    Removal Indications:    Wound Image   10/20/22 1618   Description of Altered Skin Integrity Purple or maroon localized area of discolored intact skin or non-intact skin or a blood-filled blister. 10/20/22 1618   Dressing Appearance Open to air 10/21/22 2000   Drainage Amount None 10/20/22 1618   Appearance Maroon;Red 10/20/22 1618   Care Cleansed with:;Soap and water 10/20/22 1618   Number of days: 4         Assessment and Plan    Suspected deep tissue injury  Protect areas with foam dressings  Wound care to change M, W, F  Nursing to assess each shift to ensure no additional breakdown to toes or heels  Waffle boots in bed  Keep pressure off toes  Nursing to perform Bart scale every shift  Turn every 2 hours    Pressure ulcer of sacral region, stage 4  Bedside debridement today, abscess noted to right side of sacral extending to right buttock  Clean with acetic acid  Pack with acetic acid moistened drawtex  Cover and secure with 4x4s, abd pad, and paper tape  X-ray sacral today, notify attending of suspicion of abscess. Pending results consider reconsulting General Surgery.    Low air loss mattress  Turn every 2 hours  Change daily and PRN for soilage  TAP system            Signature:  LIEN Elise  Wound Care    Date of encounter: 10/24/2022

## 2022-10-24 NOTE — PROGRESS NOTES
Pharmacist Intervention IV to PO Note    Bhargav Gao is a 82 y.o. male being treated with IV medication levothyroxine    Patient Data:    Vital Signs (Most Recent):  Temp: 97.1 °F (36.2 °C) (10/24/22 0800)  Pulse: 64 (10/24/22 0800)  Resp: 19 (10/24/22 0800)  BP: (!) 184/96 (10/24/22 0800)  SpO2: 100 % (10/24/22 0916)   Vital Signs (72h Range):  Temp:  [97.1 °F (36.2 °C)-98.9 °F (37.2 °C)]   Pulse:  []   Resp:  [16-22]   BP: (147-194)/(72-98)   SpO2:  [98 %-100 %]      CBC:  Recent Labs   Lab 10/22/22  0302 10/23/22  0448 10/24/22  0330   WBC 6.89 7.05 6.21   RBC 3.00* 3.34* 3.32*   HGB 8.2* 9.0* 9.0*   HCT 25.1* 27.9* 28.5*    210 206   MCV 83.7 83.5 85.8   MCH 27.3 26.9* 27.1   MCHC 32.7 32.3 31.6*     CMP:     Recent Labs   Lab 10/17/22  1716 10/18/22  0508 10/19/22  0621 10/19/22  1803 10/22/22  0302 10/23/22  0448 10/24/22  0330   * 127* 67*   < > 558* 93 84   CALCIUM 9.4 8.0* 8.5   < > 7.5* 8.0* 8.2*   ALBUMIN 1.9* 1.7* 1.9*  --   --   --   --    PROT 6.1* 4.8* 5.3*  --   --   --   --    * 146* 144   < > 134* 142 141   K 4.4 3.8 3.8   < > 3.5 3.8 4.0   CO2 26 26 29   < > 22 26 29    108* 110*   < > 106 111* 109*   BUN 48* 42* 32*   < > 9 7 7   CREATININE 2.94* 1.82* 0.86   < > 0.84 0.52* 0.53*   ALKPHOS 92 78 78  --   --   --   --    ALT 13* 14* 18  --   --   --   --    AST 23 22 25  --   --   --   --    BILITOT 0.3 0.2 0.2  --   --   --   --     < > = values in this interval not displayed.       Dietary Orders:  Diet Orders            Dietary nutrition supplements Rush; Epi - Any flavor starting at 10/24 1800    Diet consistent carbohydrate 1800 (60g Carbs/ meal); Thin: Carb Control starting at 10/21 0839            Based on the following criteria, this patient qualifies for intravenous to oral conversion:  [x] The patients gastrointestinal tract is functioning (tolerating medications via oral or enteral route for 24 hours and tolerating food or enteral feeds for 24  hours).    IV medication levothyroxine will be changed to oral medication levothyroxine    Pharmacist's Name: Henna Leigh  Pharmacist's Extension: [phone]

## 2022-10-24 NOTE — ASSESSMENT & PLAN NOTE
Bedside debridement today, abscess noted to right side of sacral extending to right buttock  Clean with acetic acid  Pack with acetic acid moistened drawtex  Cover and secure with 4x4s, abd pad, and paper tape  X-ray sacral today, notify attending of suspicion of abscess. Pending results consider reconsulting General Surgery.    Low air loss mattress  Turn every 2 hours  Change daily and PRN for soilage  TAP system

## 2022-10-24 NOTE — CONSULTS
INFECTIOUS DISEASE CONSULT NOTE   Admit Date: 10/19/2022  CONSULT FOR :  Positive blood cultures  Chief Complaint:  Bacteremia    HPI:      History obtained from the patient's records as he has dementia.  82-year-old nursing home resident who has chronic indwelling Khan catheter and was sent to [facility] for altered mental state.  Diagnosed with septic shock due to UTI.  Was in ICU for 2 days on vasopressors and empiric antibiotics.  After his 2 day stay he was transferred to Specialty on 10/19 for continued care.  Blood cultures from admission on 10/17 came up positive for coag-negative staph in 2 sets of blood cultures.  I am asked to advise on treatment necessity.  Patient has been afebrile in [facility].  Was on vancomycin up until yesterday.  He remains on Zosyn.  No major problems mentioned by his nurse, he eats very well once he is fed.    ROS:      Unable to obtain due to dementia    PMHx:      Past Medical History:   Diagnosis Date    Alzheimer's disease     Anticoagulant long-term use     Bipolar disorder     BPH (benign prostatic hyperplasia)     Deep vein thrombosis (DVT) of popliteal vein of right lower extremity     Diabetes mellitus     DVT of deep femoral vein, left     Hyperlipidemia     Hypertension     Hypothyroidism     Idiopathic orofacial dystonia     Iron deficiency 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    Light Growth Escherichia coli Abnormal    Light Growth Klebsiel    Schizophrenia 1/28/2022 05/16 -continue trileptal and risperidone        PMSx:      Past  Surgical History:   Procedure Laterality Date    COLOSTOMY N/A 5/24/2022    Procedure: CREATION, COLOSTOMY;  Surgeon: Edilma Felix MD;  Location: [facility];  Service: General;  Laterality: N/A;  diverting colostomy dr felix tuesday        Social Hx:      Social History     Socioeconomic History    Marital status: Single   Occupational History    Occupation: disabled   Tobacco Use    Smoking status: Never    Smokeless tobacco: Never   Substance and Sexual Activity    Alcohol use: Not Currently    Drug use: Never    Sexual activity: Not Currently     Social Determinants of Health     Financial Resource Strain: Low Risk     Difficulty of Paying Living Expenses: Not hard at all   Food Insecurity: No Food Insecurity    Worried About Running Out of Food in the Last Year: Never true    Ran Out of Food in the Last Year: Never true   Transportation Needs: No Transportation Needs    Lack of Transportation (Medical): No    Lack of Transportation (Non-Medical): No   Physical Activity: Inactive    Days of Exercise per Week: 0 days    Minutes of Exercise per Session: 0 min   Stress: No Stress Concern Present    Feeling of Stress : Not at all   Social Connections: Unknown    Frequency of Social Gatherings with Friends and Family: Never    Attends Worship Services: Never    Attends Club or Organization Meetings: Never   Housing Stability: Low Risk     Unable to Pay for Housing in the Last Year: No    Number of Places Lived in the Last Year: 1    Unstable Housing in the Last Year: No        Family Hx:      Family History   Family history unknown: Yes    `  Review of patient's allergies indicates:   Allergen Reactions    Metformin     Mycobacterium tuberculosis (tuberculin ppd)     Norvasc [amlodipine]        Medications:      Current Facility-Administered Medications   Medication    acetic acid 0.25 % irrigation    albuterol-ipratropium 2.5 mg-0.5 mg/3 mL nebulizer solution 3 mL    dextrose 50% injection 12.5 g    dextrose  50% injection 25 g    enoxaparin injection 100 mg    glucagon (human recombinant) injection 1 mg    hydrALAZINE injection 10 mg    insulin aspart U-100 injection 0-5 Units    [START ON 10/25/2022] levothyroxine tablet 50 mcg    losartan tablet 50 mg    ondansetron injection 4 mg    [START ON 10/25/2022] pantoprazole EC tablet 40 mg    piperacillin-tazobactam (ZOSYN) 4.5 g in dextrose 5 % in water (D5W) 5 % 100 mL IVPB (MB+)    prochlorperazine injection Soln 5 mg    risperiDONE tablet 1 mg    sodium chloride 0.9% flush 10 mL       VITALS:      Vital Signs Range (Last 24H):  Temp:  [97.1 °F (36.2 °C)-98.9 °F (37.2 °C)]   Pulse:  [64-82]   Resp:  [18-20]   BP: (150-194)/(74-96)   SpO2:  [100 %]     I & O (Last 24H):    Intake/Output Summary (Last 24 hours) at 10/24/2022 1547  Last data filed at 10/24/2022 0930  Gross per 24 hour   Intake 620 ml   Output 1475 ml   Net -855 ml       Physical Exam   Constitutional: Pt is easily arousable, responds to simple questions but his speech is not clear.  He is obese.    Head: Normocephalic and atraumatic.   Eyes, nose and throat:  Pale moist mucosa, anicteric and acyanotic, DIANE, no oral exudates  Neck: Neck supple, no cervical lymphadenopathy, no thyroid gland enlargement   Cardiovascular: Normal rate and regular rhythm.  S1 and S2 appreciated by ascultation, no murmurs  Pulmonary/Chest: Effort normal, no crepitations or wheezes auscultated   Abdomen:  Obese, not distended, normal bowel sounds. Soft. Non-tender.  Neurological: Pt is alert a but not very verbal.  Right-sided weakness with some contracture of right upper extremity.  Extremities:  Generalized dependent edema present including all extremities, worse being right upper extremity. No clubbing or cyanosis  Skin: Warm and dry. No rashes.  Photographs of decubitus wounds noted, he has a sacral wound with slough, multiple small wounds to toes of both feet    Laboratory Data:  Reviewed and noted in plan where applicable-  Please see chart for full laboratory data.    No results for input(s): CPK, CPKMB, TROPONINI, MB in the last 24 hours. No results for input(s): POCTGLUCOSE in the last 24 hours.     No results found for: INR, PROTIME    Lab Results   Component Value Date    WBC 6.21 10/24/2022    HGB 9.0 (L) 10/24/2022    HCT 28.5 (L) 10/24/2022    MCV 85.8 10/24/2022     10/24/2022       BMP  Recent Labs   Lab 10/24/22  0330   GLU 84      K 4.0   *   CO2 29   BUN 7   CREATININE 0.53*   CALCIUM 8.2*     Microbiology Results (last 7 days)       Procedure Component Value Units Date/Time    Blood culture (site 1) [[id number]] Collected: 10/19/22 1740    Order Status: Completed Specimen: Blood Updated: 10/24/22 0710     Culture, Blood No Growth At 72 Hours    Blood culture (site 2) [[id number]] Collected: 10/19/22 1746    Order Status: Completed Specimen: Blood Updated: 10/24/22 0710     Culture, Blood No Growth At 72 Hours    Culture, Wound [[id number]]  (Abnormal)  (Susceptibility) Collected: 10/20/22 1101    Order Status: Completed Specimen: Wound from Sacral Updated: 10/23/22 1013     Culture, Wound/Abscess Light Growth Pseudomonas aeruginosa      Moderate Growth Yeast     Comment: For further identification, fungus culture recommended. Isolate will be held for 7 days. Notify Micro department at [phone] if fungus culture ordered.       Culture, Anaerobe [[id number]] Collected: 10/20/22 1101    Order Status: Completed Specimen: Wound from Sacral Updated: 10/23/22 0921     Culture, Anaerobe No Anaerobes Isolated          Two different coag-negative staph species in 2 different sets of blood cultures on 10/17.  Repeat blood cultures on 10/19 negative to date.    Radiology:  Reviewed and noted in plan where applicable- Please see chart for full radiology data.      ASSESSMENT/PLAN:     ACTIVE PROBLEMS:    Patient Active Problem List   Diagnosis    Alzheimer's disease    Pressure ulcer of sacral region, stage 4     Diabetes mellitus    Hypertension    Urinary tract infection with hematuria    Acute deep vein thrombosis (DVT) of both lower extremities    BPH (benign prostatic hyperplasia)    Bipolar disorder    COPD (chronic obstructive pulmonary disease)    ARMAAN (iron deficiency anemia)    Sacral decubitus ulcer    Bacteremia    Suspected deep tissue injury       ASSESSMENT:  Coag-negative Staph in 2 of 2 sets of blood cultures, 1 set had staph hominis in both aerobic and anaerobic bottles, another set had a different coag-negative staph species in the aerobic bottle only.  Most likely these cultures represent contamination.  UTI due to Morganella  Infected sacral decubitus wound with Pseudomonas isolated (on yeast), status post bedside debridement today  Recent septic shock, due to the above    PLAN:  No need to treat the positive blood cultures therefore agree with stopping vancomycin  Can complete 10-14 days of Zosyn for the UTI and infected sacral ulcer    Thank you very much for the consult.  Call again p.r.n..

## 2022-10-24 NOTE — PROGRESS NOTES
[facility]  Adult Nutrition  RD follow up note         Reason for Assessment  Reason For Assessment: RD follow-up   Nutrition Risk Screen: large or nonhealing wound, burn or pressure injury    Current diet is puree per ST recommendations. His intake is ~50%.  Consider adding 500mg Vit C BID + 220mg ZnSO4 BID + MVI daily to aid in wound healing.    Per ST eval on 10/20/22-Bhargav Gao is a 82 y.o. male with an SLP diagnosis of Dysphagia.  He presents with no overt s/s of aspiration with thin liquid or pudding consistencies.  Pt independently utilized double swallow with pudding consistency. Rec Pureed diet with thin liquids as tolerated. 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 Patient was sent to ED via EMS secondary to decreased level of consciousness with hematuria. Patient with septic shock, UTI with hematruia hx with chronic indwelling anne, pt with colostomy, acute renal failure with tubular necrosis, acute metabolic encephalopathy, DM, COPD.     Would also recommend Epi BID to aid in wound healing, which would provide 5g pro.     Patient's current weight is 225#, 30.65 BMI, in obese BMI category - needs adjusted. Patient with right lower extremity edema per MD note. Last BM documented 10/23. Will monitor weight trend, labs, meds, wound/skin, diet order changes, updates in patient condition. RD following.    Malnutrition  Is Patient Malnourished: No  Skin (Micronutrient): wounds unhealed  Per MD note stage IV sacral wound was  noted. Wound base was clean with no purulent drainage or fibrinous/necrotic tissue.     Skin Integrity  Bart Risk Assessment  Sensory Perception: 3-->slightly limited  Moisture: 3-->occasionally moist  Activity: 1-->bedfast  Mobility: 1-->completely immobile  Nutrition: 2-->probably inadequate  Friction and Shear: 2-->potential problem  Bart Score: 12    Nutrition Diagnosis  Increased protein Needs   related to Wound healing as evidenced by pressure injury of sacral region stage 4 present on admission    Nutrition Diagnosis Status: New      Nutrition Risk  Level of Risk/Frequency of Follow-up: high    Recent Labs   Lab 10/24/22  0330 10/24/22  0559   GLU 84  --    POCGLU  --  94     Comments on Glucose: Pt with PMHx of Diabetes Mellitus    Nutrition Prescription / Recommendations  Recommendation/Intervention: Recommend continue with puree diet. Recommend Epi BID for wound helaing. Consider adding 500mg Vit C BID + 220mg ZnSO4 BID + MVI daily to aid in wound healing.  Goals: Tolerance of diet with % of meals eaten. Monitor weight trend, labs, meds, wound/skin, diet order changes, updates in patient condition.  Nutrition Goal Status: progressing  Current Diet Order: puree  Chewing or Swallowing Difficulty?: Swallowing difficulty   Recommended Diet: puree per ST  Recommended Oral Supplement: Epi  Is Nutrition Support Recommended: no    Needs Calculated  Energy Need Method: Kcal/kg (30-35 kcal/kg ideal body weight) Energy Calorie Requirements (kcal): 2422..91 kcal  Weight Used For Protein Calculations: 80.74 kg (178 lb) (0.8-1.0g/kg ideal body weight acute renal failure, septic shock, wound)  Protein Requirements: 64.73- 80.91 g pro    Fluid Requirements (mL): fluids per MD  Enteral Nutrition Status: no  Is Education Recommended: No    Monitor and Evaluation    Current intake is 50% of melas  Food and Nutrient Intake: food and beverage intake  Food and Nutrient Adminstration: diet  order  Anthropometric Measurements: weight, weight change, body mass index  Biochemical Data, Medical Tests and Procedures: electrolyte and renal panel, glucose/endocrine profile, lipid profile, gastrointestinal profile, inflammatory profile  Nutrition-Focused Physical Findings: overall appearance, skin, extremities, muscles and bones, head and eyes     Current Medical Diagnosis and Past Medical History     Past Medical History:   Diagnosis Date    Alzheimer's disease     Anticoagulant long-term use     Bipolar disorder     BPH (benign prostatic hyperplasia)     Deep vein thrombosis (DVT) of popliteal vein of right lower extremity     Diabetes mellitus     DVT of deep femoral vein, left     Hyperlipidemia     Hypertension     Hypothyroidism     Idiopathic orofacial dystonia     Iron deficiency 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    Light Growth Escherichia coli Abnormal    Light Growth Klebsiel    Schizophrenia 1/28/2022 05/16 -continue trileptal and risperidone     Nutrition/Diet History  Patient Reported Diet/Restrictions/Preferences: pureed  Spiritual, Cultural Beliefs, Holiness Practices, Values that Affect Care: no  Food Allergies: NKFA  Lab/Procedures/Meds  Recent Labs   Lab 10/24/22  0330      K 4.0   BUN 7   CREATININE 0.53*   CALCIUM 8.2*   *     BUN elevated-improving JORDYN  Last A1c:   Lab Results   Component Value Date    HGBA1C 6.6 06/29/2022     Lab Results   Component Value Date    RBC 3.32 (L) 10/24/2022    HGB 9.0 (L) 10/24/2022    HCT 28.5 (L) 10/24/2022    MCV 85.8 10/24/2022    MCH  27.1 10/24/2022    MCHC 31.6 (L) 10/24/2022    TIBC 174 (L) 12/09/2021   Albuterol-ipratropium, enoxaparin, levothyroxine, merrem, mupriocin, pantoprazole, IVF 100ml/hr, norepinephrine    Anthropometrics  Temp: 97.1 °F (36.2 °C)  Height: 6' (182.9 cm)  Height (inches): 72 in  Weight Method: Bed Scale  Weight: 102.5 kg (225 lb 15.5 oz)  Weight (lb): 225.97 lb  Ideal Body Weight (IBW), Male: 178 lb  % Ideal Body Weight, Male (lb): 120.88 %  BMI (Calculated): 30.6  BMI Grade: 30 - 34.9- obesity - grade I     Estimated/Assessed Needs    Temp: 97.9 °F (36.6 °C)Oral  Weight Used For Calorie Calculations: 80.74 kg (178 lb)  Energy Need Method: Kcal/kg (30-35 kcal/kg ideal body weight) Energy Calorie Requirements (kcal): 2422..91 kcal  Weight Used For Protein Calculations: 80.74 kg (178 lb) (0.8-1.0g/kg ideal body weight acute renal failure, septic shock, wound)  Protein Requirements: 64.73- 80.91 g pro    Fluid Requirements (mL): fluids per MD  RDA Method (mL): 2527.5     Nutrition by Nursing  Diet/Nutrition Received: NPO  Last Bowel Movement: 10/23/22    Nutrition Follow-Up  RD Follow-up?: Yes

## 2022-10-25 LAB
BACTERIA BLD CULT: NORMAL
GLUCOSE SERPL-MCNC: 103 MG/DL (ref 70–105)
GLUCOSE SERPL-MCNC: 104 MG/DL (ref 70–105)
GLUCOSE SERPL-MCNC: 110 MG/DL (ref 70–105)
GLUCOSE SERPL-MCNC: 99 MG/DL (ref 70–105)

## 2022-10-25 PROCEDURE — 99232 SBSQ HOSP IP/OBS MODERATE 35: CPT | Mod: GC,,, | Performed by: FAMILY MEDICINE

## 2022-10-25 PROCEDURE — 11000001 HC ACUTE MED/SURG PRIVATE ROOM

## 2022-10-25 PROCEDURE — 63600175 PHARM REV CODE 636 W HCPCS

## 2022-10-25 PROCEDURE — 25000242 PHARM REV CODE 250 ALT 637 W/ HCPCS: Performed by: NURSE PRACTITIONER

## 2022-10-25 PROCEDURE — 25000003 PHARM REV CODE 250

## 2022-10-25 PROCEDURE — 25000003 PHARM REV CODE 250: Performed by: FAMILY MEDICINE

## 2022-10-25 PROCEDURE — 99232 PR SUBSEQUENT HOSPITAL CARE,LEVL II: ICD-10-PCS | Mod: GC,,, | Performed by: FAMILY MEDICINE

## 2022-10-25 PROCEDURE — 82962 GLUCOSE BLOOD TEST: CPT

## 2022-10-25 PROCEDURE — 94640 AIRWAY INHALATION TREATMENT: CPT

## 2022-10-25 RX ORDER — METOPROLOL TARTRATE 25 MG/1
12.5 TABLET ORAL 2 TIMES DAILY
Status: DISCONTINUED | OUTPATIENT
Start: 2022-10-25 | End: 2022-11-01

## 2022-10-25 RX ORDER — PANTOPRAZOLE SODIUM 40 MG/1
40 FOR SUSPENSION ORAL DAILY
Status: DISCONTINUED | OUTPATIENT
Start: 2022-10-25 | End: 2022-11-03 | Stop reason: HOSPADM

## 2022-10-25 RX ORDER — INSULIN LISPRO 100 [IU]/ML
1-5 INJECTION, SOLUTION INTRAVENOUS; SUBCUTANEOUS 2 TIMES DAILY PRN
Status: ON HOLD | COMMUNITY
End: 2022-11-02 | Stop reason: HOSPADM

## 2022-10-25 RX ADMIN — PIPERACILLIN AND TAZOBACTAM 4.5 G: 4; .5 INJECTION, POWDER, LYOPHILIZED, FOR SOLUTION INTRAVENOUS at 03:10

## 2022-10-25 RX ADMIN — IPRATROPIUM BROMIDE AND ALBUTEROL SULFATE 3 ML: 2.5; .5 SOLUTION RESPIRATORY (INHALATION) at 12:10

## 2022-10-25 RX ADMIN — LEVOTHYROXINE SODIUM 50 MCG: 0.05 TABLET ORAL at 05:10

## 2022-10-25 RX ADMIN — ENOXAPARIN SODIUM 100 MG: 100 INJECTION SUBCUTANEOUS at 04:10

## 2022-10-25 RX ADMIN — METOPROLOL TARTRATE 12.5 MG: 25 TABLET, FILM COATED ORAL at 09:10

## 2022-10-25 RX ADMIN — RISPERIDONE 1 MG: 1 TABLET ORAL at 09:10

## 2022-10-25 RX ADMIN — ACETIC ACID: 250 IRRIGANT IRRIGATION at 12:10

## 2022-10-25 RX ADMIN — METOPROLOL TARTRATE 12.5 MG: 25 TABLET, FILM COATED ORAL at 08:10

## 2022-10-25 RX ADMIN — RISPERIDONE 1 MG: 1 TABLET ORAL at 08:10

## 2022-10-25 RX ADMIN — IPRATROPIUM BROMIDE AND ALBUTEROL SULFATE 3 ML: 2.5; .5 SOLUTION RESPIRATORY (INHALATION) at 07:10

## 2022-10-25 RX ADMIN — PIPERACILLIN AND TAZOBACTAM 4.5 G: 4; .5 INJECTION, POWDER, LYOPHILIZED, FOR SOLUTION INTRAVENOUS at 08:10

## 2022-10-25 RX ADMIN — PIPERACILLIN AND TAZOBACTAM 4.5 G: 4; .5 INJECTION, POWDER, LYOPHILIZED, FOR SOLUTION INTRAVENOUS at 12:10

## 2022-10-25 RX ADMIN — LOSARTAN POTASSIUM 50 MG: 50 TABLET, FILM COATED ORAL at 09:10

## 2022-10-25 RX ADMIN — PANTOPRAZOLE SODIUM 40 MG: 40 GRANULE, DELAYED RELEASE ORAL at 09:10

## 2022-10-25 NOTE — PLAN OF CARE
Problem: Adult Inpatient Plan of Care  Goal: Plan of Care Review  10/25/2022 1351 by Kerry Garcia RN  Outcome: Ongoing, Progressing  10/25/2022 0738 by Kerry Garcia RN  Outcome: Ongoing, Progressing     Problem: Impaired Wound Healing  Goal: Optimal Wound Healing  Outcome: Ongoing, Progressing     Problem: Gas Exchange Impaired  Goal: Optimal Gas Exchange  Outcome: Ongoing, Progressing     Problem: Fall Injury Risk  Goal: Absence of Fall and Fall-Related Injury  10/25/2022 1351 by Kerry Garcia RN  Outcome: Ongoing, Progressing  10/25/2022 0738 by Kerry Garcia RN  Outcome: Ongoing, Progressing

## 2022-10-25 NOTE — SUBJECTIVE & OBJECTIVE
Interval History:  Patient was seen resting in bed this morning. No overnight events reported. Patient continuing to receiving Zosyn. Yesterday restarted Lovenox therapy. Wound care noted possible sacral wound abscess, they have coordinated care and consulted General surgery.    Review of Systems   Unable to perform ROS: Dementia   Objective:     Vital Signs (Most Recent):  Temp: 98.1 °F (36.7 °C) (10/25/22 0800)  Pulse: 86 (10/25/22 0800)  Resp: 18 (10/25/22 0800)  BP: (!) 177/71 (10/25/22 0800)  SpO2: 100 % (10/25/22 0800)   Vital Signs (24h Range):  Temp:  [97.6 °F (36.4 °C)-99.3 °F (37.4 °C)] 98.1 °F (36.7 °C)  Pulse:  [67-91] 86  Resp:  [16-20] 18  SpO2:  [96 %-100 %] 100 %  BP: (150-181)/(71-98) 177/71     Weight: 102.5 kg (225 lb 15.5 oz)  Body mass index is 30.65 kg/m².    Intake/Output Summary (Last 24 hours) at 10/25/2022 0852  Last data filed at 10/25/2022 0728  Gross per 24 hour   Intake 660 ml   Output 301 ml   Net 359 ml      Physical Exam  Vitals reviewed.   Constitutional:       General: He is awake. He is not in acute distress.     Appearance: Normal appearance. He is well-developed. He is obese.      Interventions: He is not intubated.  HENT:      Head: Normocephalic and atraumatic.      Nose: Nose normal.      Mouth/Throat:      Pharynx: Oropharynx is clear.   Eyes:      Extraocular Movements: Extraocular movements intact.      Conjunctiva/sclera: Conjunctivae normal.      Pupils: Pupils are equal, round, and reactive to light.   Cardiovascular:      Rate and Rhythm: Normal rate and regular rhythm.      Heart sounds: Normal heart sounds. No murmur heard.    No friction rub.   Pulmonary:      Effort: Pulmonary effort is normal. He is not intubated.      Breath sounds: Normal breath sounds.   Abdominal:      General: Abdomen is flat. Bowel sounds are normal.      Palpations: Abdomen is soft.   Musculoskeletal:         General: Normal range of motion.      Cervical back: Normal range of motion and  neck supple.      Right lower leg: No edema.      Left lower leg: No edema.   Skin:     General: Skin is warm and dry.      Capillary Refill: Capillary refill takes less than 2 seconds.   Neurological:      Mental Status: He is alert.   Psychiatric:         Mood and Affect: Mood normal.         Behavior: Behavior is uncooperative.       Significant Labs: All pertinent labs within the past 24 hours have been reviewed.    Significant Imaging: I have reviewed all pertinent imaging results/findings within the past 24 hours.

## 2022-10-25 NOTE — ASSESSMENT & PLAN NOTE
- Wound care consulted  - wound Cx grew pseudomonas sensitive to Zosyn  - Zosyn 4.5g IV Q8H  - Surgery consulted via wound care, guidance appreciated

## 2022-10-25 NOTE — PROGRESS NOTES
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 He presents to [facility] with a UTI that was found on hospital admission on 10/17/2022. Patient was sent to the [facility] ED via EMS secondary to decreased level of consciousness with hematuria.  No associated symptoms such as fever chills cough wheezing chest pain palpitation lower extremity edema nausea vomiting abdominal pain were reported.  No other history could be gathered at this time this patient is somnolent and confused and was only able to state his name only at this time.  On initial presentation, patient was hypotensive with systolic blood pressure in the 60s and tachycardic.  Ensuing workup was notable for leukocytosis with left shift, elevated lactic acid level, hyperglycemia, acute renal failure, and UA highly suggestive of a presence of urinary tract infection.  Patient did not respond favorably to IV fluid bolus of 30 cc/kg and was admitted to ICU for vasopressor support.  Patient's code status is DNR according to nursing home records. After IVF, antibiotics and pressor treatment she was downgraded from the ICU on 10/19/2022 and now admitted the [facility].     The patient was admitted to the [facility] to Family Medicine Service under the direct  supervision Dr. Lee Ann SIMMONS for continued care and medical management.       Overview/Hospital Course:  No notes on file    Interval History:  Patient was seen resting in bed this morning. No overnight events reported. Patient continuing to receiving Zosyn. Yesterday restarted Lovenox therapy. Wound care noted possible sacral wound abscess, they have coordinated care and consulted General surgery.    Review of Systems   Unable to perform ROS: Dementia   Objective:     Vital Signs (Most Recent):  Temp: 98.1 °F (36.7 °C) (10/25/22 0800)  Pulse: 86 (10/25/22 0800)  Resp: 18 (10/25/22 0800)  BP: (!) 177/71 (10/25/22 0800)  SpO2: 100 % (10/25/22 0800)   Vital Signs (24h Range):  Temp:  [97.6 °F (36.4 °C)-99.3 °F (37.4 °C)] 98.1 °F (36.7 °C)  Pulse:  [67-91] 86  Resp:  [16-20] 18  SpO2:  [96 %-100 %] 100 %  BP: (150-181)/(71-98) 177/71     Weight: 102.5 kg (225 lb 15.5 oz)  Body mass index is 30.65 kg/m².    Intake/Output Summary (Last 24 hours) at 10/25/2022 0852  Last data filed at 10/25/2022 0728  Gross per 24 hour   Intake 660 ml   Output 301 ml   Net 359 ml      Physical Exam  Vitals reviewed.   Constitutional:       General: He is awake. He is not in acute distress.     Appearance: Normal appearance. He is well-developed. He is obese.      Interventions: He is not intubated.  HENT:      Head: Normocephalic and atraumatic.      Nose: Nose normal.      Mouth/Throat:      Pharynx: Oropharynx is clear.   Eyes:      Extraocular Movements: Extraocular movements intact.      Conjunctiva/sclera: Conjunctivae normal.      Pupils: Pupils are equal, round, and reactive to light.   Cardiovascular:      Rate and Rhythm: Normal rate and regular rhythm.      Heart sounds: Normal heart sounds. No murmur heard.    No friction rub.   Pulmonary:      Effort: Pulmonary effort is normal. He is not intubated.      Breath sounds: Normal breath sounds.   Abdominal:      General: Abdomen is flat. Bowel sounds are normal.      Palpations:  Abdomen is soft.   Musculoskeletal:         General: Normal range of motion.      Cervical back: Normal range of motion and neck supple.      Right lower leg: No edema.      Left lower leg: No edema.   Skin:     General: Skin is warm and dry.      Capillary Refill: Capillary refill takes less than 2 seconds.   Neurological:      Mental Status: He is alert.   Psychiatric:         Mood and Affect: Mood normal.         Behavior: Behavior is uncooperative.       Significant Labs: All pertinent labs within the past 24 hours have been reviewed.    Significant Imaging: I have reviewed all pertinent imaging results/findings within the past 24 hours.      Assessment/Plan:      * Bacteremia  Secondary to UTI  Treated with IVFs, BC x2 Gram positive - coagulase negative Staphylococcus, other than Staph epi and staph lugdunensis  Patient off of Levophed cultures grew out Gram-positive cocci Staph epidermidis and grew out Gram-negative tyrone of the urine continue current antibiotics vanc was added 10/18  PICC line placed   Blood Cx x2 showed no growth on 10/23, therefore Vancomycin d/c.  Wound Cx on 10/23 grew pseudomonas sensitive to Zosyn, Zosyn added    Acute deep vein thrombosis (DVT) of both lower extremities  Hx of DVT -- US 10/17 with right popliteal nonocclusive thrombosis - start wt based renally dosed lovenox   Patient was bleeding from PICC line and sacral wound  Lovenox held 10/22 & 10/23, restarted on Monday 10/24/2022  Will continue to monitor    Pressure ulcer of sacral region, stage 4  - Wound care consulted  - wound Cx grew pseudomonas sensitive to Zosyn  - Zosyn 4.5g IV Q8H  - Surgery consulted via wound care, guidance appreciated       Diabetes mellitus  POCT glucose checks  Sliding scale insulin     Urinary tract infection with hematuria  Pt has a chronic anne at nursing home.   Will make sure it has been changed this admission   - urine culture with >100,000 GNB  - Cultures sensitive to merrem   -  Patient  completed 4 days of Merrem and vancomycin on 10/22       Suspected deep tissue injury  Wound care consulted       Bipolar disorder  Continuing home risperidone         VTE Risk Mitigation (From admission, onward)         Ordered     enoxaparin injection 100 mg  Every 12 hours (non-standard times)         10/24/22 7859                Discharge Planning   YOSSI: 10/19/2022     Code Status: DNR   Is the patient medically ready for discharge?:     Reason for patient still in hospital (select all that apply): Laboratory test, Treatment and Consult recommendations  Discharge Plan A: Return to nursing home                  Nba Pryor MD  Department of Hospital Medicine   [facility]

## 2022-10-25 NOTE — ASSESSMENT & PLAN NOTE
Hx of DVT -- US 10/17 with right popliteal nonocclusive thrombosis - start wt based renally dosed lovenox   Patient was bleeding from PICC line and sacral wound  Lovenox held 10/22 & 10/23, restarted on Monday 10/24/2022  Will continue to monitor

## 2022-10-26 LAB
ANION GAP SERPL CALCULATED.3IONS-SCNC: 13 MMOL/L (ref 7–16)
BASOPHILS # BLD AUTO: 0.02 K/UL (ref 0–0.2)
BASOPHILS NFR BLD AUTO: 0.3 % (ref 0–1)
BUN SERPL-MCNC: 6 MG/DL (ref 7–18)
BUN/CREAT SERPL: 9 (ref 6–20)
CALCIUM SERPL-MCNC: 8.6 MG/DL (ref 8.5–10.1)
CHLORIDE SERPL-SCNC: 109 MMOL/L (ref 98–107)
CO2 SERPL-SCNC: 25 MMOL/L (ref 21–32)
CREAT SERPL-MCNC: 0.67 MG/DL (ref 0.7–1.3)
DIFFERENTIAL METHOD BLD: ABNORMAL
EGFR (NO RACE VARIABLE) (RUSH/TITUS): 93 ML/MIN/1.73M²
EOSINOPHIL # BLD AUTO: 0.35 K/UL (ref 0–0.5)
EOSINOPHIL NFR BLD AUTO: 6.1 % (ref 1–4)
ERYTHROCYTE [DISTWIDTH] IN BLOOD BY AUTOMATED COUNT: 16.9 % (ref 11.5–14.5)
GLUCOSE SERPL-MCNC: 101 MG/DL (ref 70–105)
GLUCOSE SERPL-MCNC: 103 MG/DL (ref 70–105)
GLUCOSE SERPL-MCNC: 109 MG/DL (ref 70–105)
GLUCOSE SERPL-MCNC: 88 MG/DL (ref 74–106)
GLUCOSE SERPL-MCNC: 97 MG/DL (ref 70–105)
GLUCOSE SERPL-MCNC: 97 MG/DL (ref 70–105)
HCT VFR BLD AUTO: 26.9 % (ref 40–54)
HGB BLD-MCNC: 8.7 G/DL (ref 13.5–18)
IMM GRANULOCYTES # BLD AUTO: 0.03 K/UL (ref 0–0.04)
IMM GRANULOCYTES NFR BLD: 0.5 % (ref 0–0.4)
LYMPHOCYTES # BLD AUTO: 1.85 K/UL (ref 1–4.8)
LYMPHOCYTES NFR BLD AUTO: 32.2 % (ref 27–41)
MCH RBC QN AUTO: 27.3 PG (ref 27–31)
MCHC RBC AUTO-ENTMCNC: 32.3 G/DL (ref 32–36)
MCV RBC AUTO: 84.3 FL (ref 80–96)
MONOCYTES # BLD AUTO: 0.48 K/UL (ref 0–0.8)
MONOCYTES NFR BLD AUTO: 8.3 % (ref 2–6)
MPC BLD CALC-MCNC: 9.1 FL (ref 9.4–12.4)
NEUTROPHILS # BLD AUTO: 3.02 K/UL (ref 1.8–7.7)
NEUTROPHILS NFR BLD AUTO: 52.6 % (ref 53–65)
NRBC # BLD AUTO: 0 X10E3/UL
NRBC, AUTO (.00): 0 %
PLATELET # BLD AUTO: 331 K/UL (ref 150–400)
POTASSIUM SERPL-SCNC: 4 MMOL/L (ref 3.5–5.1)
RBC # BLD AUTO: 3.19 M/UL (ref 4.6–6.2)
SODIUM SERPL-SCNC: 143 MMOL/L (ref 136–145)
WBC # BLD AUTO: 5.75 K/UL (ref 4.5–11)

## 2022-10-26 PROCEDURE — 25000003 PHARM REV CODE 250

## 2022-10-26 PROCEDURE — 99232 SBSQ HOSP IP/OBS MODERATE 35: CPT | Mod: GC,,, | Performed by: FAMILY MEDICINE

## 2022-10-26 PROCEDURE — 36415 COLL VENOUS BLD VENIPUNCTURE: CPT | Performed by: HOSPITALIST

## 2022-10-26 PROCEDURE — 63600175 PHARM REV CODE 636 W HCPCS

## 2022-10-26 PROCEDURE — 80048 BASIC METABOLIC PNL TOTAL CA: CPT | Performed by: HOSPITALIST

## 2022-10-26 PROCEDURE — 94640 AIRWAY INHALATION TREATMENT: CPT

## 2022-10-26 PROCEDURE — 11000001 HC ACUTE MED/SURG PRIVATE ROOM

## 2022-10-26 PROCEDURE — 85025 COMPLETE CBC W/AUTO DIFF WBC: CPT | Performed by: HOSPITALIST

## 2022-10-26 PROCEDURE — 25000003 PHARM REV CODE 250: Performed by: FAMILY MEDICINE

## 2022-10-26 PROCEDURE — 94761 N-INVAS EAR/PLS OXIMETRY MLT: CPT

## 2022-10-26 PROCEDURE — 25000242 PHARM REV CODE 250 ALT 637 W/ HCPCS: Performed by: NURSE PRACTITIONER

## 2022-10-26 PROCEDURE — 99232 PR SUBSEQUENT HOSPITAL CARE,LEVL II: ICD-10-PCS | Mod: GC,,, | Performed by: FAMILY MEDICINE

## 2022-10-26 PROCEDURE — 82962 GLUCOSE BLOOD TEST: CPT

## 2022-10-26 RX ORDER — LOSARTAN POTASSIUM 100 MG/1
100 TABLET ORAL DAILY
Status: DISCONTINUED | OUTPATIENT
Start: 2022-10-27 | End: 2022-11-03 | Stop reason: HOSPADM

## 2022-10-26 RX ADMIN — LEVOTHYROXINE SODIUM 50 MCG: 0.05 TABLET ORAL at 05:10

## 2022-10-26 RX ADMIN — PANTOPRAZOLE SODIUM 40 MG: 40 GRANULE, DELAYED RELEASE ORAL at 08:10

## 2022-10-26 RX ADMIN — METOPROLOL TARTRATE 12.5 MG: 25 TABLET, FILM COATED ORAL at 08:10

## 2022-10-26 RX ADMIN — IPRATROPIUM BROMIDE AND ALBUTEROL SULFATE 3 ML: 2.5; .5 SOLUTION RESPIRATORY (INHALATION) at 12:10

## 2022-10-26 RX ADMIN — ACETIC ACID: 250 IRRIGANT IRRIGATION at 05:10

## 2022-10-26 RX ADMIN — RISPERIDONE 1 MG: 1 TABLET ORAL at 08:10

## 2022-10-26 RX ADMIN — METOPROLOL TARTRATE 12.5 MG: 25 TABLET, FILM COATED ORAL at 09:10

## 2022-10-26 RX ADMIN — PIPERACILLIN AND TAZOBACTAM 4.5 G: 4; .5 INJECTION, POWDER, LYOPHILIZED, FOR SOLUTION INTRAVENOUS at 05:10

## 2022-10-26 RX ADMIN — LOSARTAN POTASSIUM 50 MG: 50 TABLET, FILM COATED ORAL at 08:10

## 2022-10-26 RX ADMIN — RISPERIDONE 1 MG: 1 TABLET ORAL at 09:10

## 2022-10-26 RX ADMIN — IPRATROPIUM BROMIDE AND ALBUTEROL SULFATE 3 ML: 2.5; .5 SOLUTION RESPIRATORY (INHALATION) at 07:10

## 2022-10-26 RX ADMIN — PIPERACILLIN AND TAZOBACTAM 4.5 G: 4; .5 INJECTION, POWDER, LYOPHILIZED, FOR SOLUTION INTRAVENOUS at 09:10

## 2022-10-26 RX ADMIN — IPRATROPIUM BROMIDE AND ALBUTEROL SULFATE 3 ML: 2.5; .5 SOLUTION RESPIRATORY (INHALATION) at 01:10

## 2022-10-26 RX ADMIN — PIPERACILLIN AND TAZOBACTAM 4.5 G: 4; .5 INJECTION, POWDER, LYOPHILIZED, FOR SOLUTION INTRAVENOUS at 12:10

## 2022-10-26 RX ADMIN — ENOXAPARIN SODIUM 100 MG: 100 INJECTION SUBCUTANEOUS at 05:10

## 2022-10-26 NOTE — PLAN OF CARE
Problem: Impaired Wound Healing  Goal: Optimal Wound Healing  Outcome: Ongoing, Progressing     Problem: Skin Injury Risk Increased  Goal: Skin Health and Integrity  Outcome: Ongoing, Progressing     Problem: Infection  Goal: Absence of Infection Signs and Symptoms  Outcome: Ongoing, Progressing     Problem: Fall Injury Risk  Goal: Absence of Fall and Fall-Related Injury  Outcome: Ongoing, Progressing

## 2022-10-26 NOTE — PROGRESS NOTES
Wound care note;  Patient skin evaluated today  Patient in bed, alert.   Right buttock wound less denuded skin, slow improvement , Red slow granular tissue. Moist edges.  Sacral wound with pink/tan discolored thick tissue noted.   Cont Acetic acid with drawtex .  LLQ colostomy with pale , pink, moist stoma noted . Applied 2 piece colostomy appliance,   Cont turn protocol  Pillows to offload heels   On low air loss mattress   Wound care team to follow

## 2022-10-26 NOTE — ASSESSMENT & PLAN NOTE
Hx of DVT -- US 10/17 with right popliteal nonocclusive thrombosis - start wt based renally dosed lovenox   Patient was bleeding from PICC line and sacral wound  Lovenox held 10/22 & 10/23, restarted on Monday 10/24/2022  Bleeding noticed again today (10/26), Lovenox held 10/26  Will continue to monitor

## 2022-10-26 NOTE — SUBJECTIVE & OBJECTIVE
Interval History: Patient was seen resting in bed this morning. No overnight events reported. Wound care noted possible sacral wound abscess, they have coordinated care and consulted General surgery. Nursing note placed to do eye care. Losartan dose increase to 100mg Qd due to trend on uncontrolled BP, starting on 10/27.    Review of Systems   Unable to perform ROS: Dementia   Objective:     Vital Signs (Most Recent):  Temp: 99 °F (37.2 °C) (10/26/22 0400)  Pulse: 66 (10/26/22 0839)  Resp: 20 (10/26/22 0739)  BP: (!) 193/88 (10/26/22 0839)  SpO2: 100 % (10/26/22 0739)   Vital Signs (24h Range):  Temp:  [98.1 °F (36.7 °C)-99 °F (37.2 °C)] 99 °F (37.2 °C)  Pulse:  [61-77] 66  Resp:  [16-20] 20  SpO2:  [99 %-100 %] 100 %  BP: (142-197)/(78-91) 193/88     Weight: 102.5 kg (225 lb 15.5 oz)  Body mass index is 30.65 kg/m².    Intake/Output Summary (Last 24 hours) at 10/26/2022 0854  Last data filed at 10/26/2022 0800  Gross per 24 hour   Intake 580 ml   Output 900 ml   Net -320 ml      Physical Exam  Vitals reviewed.   Constitutional:       General: He is awake. He is not in acute distress.     Appearance: Normal appearance. He is well-developed. He is obese.      Interventions: He is not intubated.  HENT:      Head: Normocephalic and atraumatic.      Nose: Nose normal.      Mouth/Throat:      Pharynx: Oropharynx is clear.   Eyes:      General:         Right eye: Discharge present.         Left eye: Discharge present.     Extraocular Movements: Extraocular movements intact.      Conjunctiva/sclera: Conjunctivae normal.      Pupils: Pupils are equal, round, and reactive to light.   Cardiovascular:      Rate and Rhythm: Normal rate and regular rhythm.      Heart sounds: Normal heart sounds. No murmur heard.    No friction rub.   Pulmonary:      Effort: Pulmonary effort is normal. He is not intubated.      Breath sounds: Normal breath sounds.   Abdominal:      General: Abdomen is flat. Bowel sounds are normal.       Palpations: Abdomen is soft.   Musculoskeletal:         General: Normal range of motion.      Cervical back: Normal range of motion and neck supple.      Right lower leg: No edema.      Left lower leg: No edema.   Skin:     General: Skin is warm and dry.      Capillary Refill: Capillary refill takes less than 2 seconds.   Neurological:      Mental Status: He is alert.   Psychiatric:         Mood and Affect: Mood normal.         Behavior: Behavior is uncooperative.       Significant Labs: All pertinent labs within the past 24 hours have been reviewed.    Significant Imaging: I have reviewed all pertinent imaging results/findings within the past 24 hours.

## 2022-10-26 NOTE — CONSULTS
Presents to ED via EMS from [facility] for staff c/o AMS that has been ongoing for a few weeks. Reports BP was low, EMS reports 60's SBP upon arrival. Staff also report gross hematuria. Patient unable to participate with triage.    82 y.o. male was brought to the emergency department from the nursing home. Nursing home stated the patient is experiencing hematuria, AMS, and low ox sats. Patient is unable to provide any hx.  The history is provided by the nursing home and ER records. No  was used.   --------------------------------------------------------------------------------  The above notes are from the ER visit on October 17, 2022.    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 He presents to [facility] with a UTI that was found on hospital admission on 10/17/2022. Patient was sent to the [facility] ED via EMS secondary to decreased level of consciousness with hematuria.  No associated symptoms such as fever chills cough wheezing chest pain palpitation lower extremity edema nausea vomiting abdominal pain were reported.  No other history could be gathered at this time this patient is somnolent and confused and was only able to state his name only at this time.  On initial presentation, patient was hypotensive with systolic blood pressure in the 60s and tachycardic.  Ensuing workup was notable for leukocytosis with left shift, elevated lactic acid level, hyperglycemia, acute renal failure, and UA highly suggestive of a presence of urinary tract infection.  Patient did not respond  favorably to IV fluid bolus of 30 cc/kg and was admitted to ICU for vasopressor support.  Patient's code status is DNR according to nursing home records. After IVF, antibiotics and pressor treatment she was downgraded from the ICU on 10/19/2022 and now admitted the [facility].      The patient was admitted to the [facility] to Family Medicine Service under the direct supervision Dr. Lee Ann SIMMONS for continued care and medical management.       Interval History 10/26: Patient was seen resting in bed this morning. No overnight events reported. Wound care noted possible sacral wound abscess, they have coordinated care and consulted General surgery. Nursing note placed to do eye care. Losartan dose increase to 100mg Qd due to trend on uncontrolled BP, starting on 10/27.    Bacteremia  Secondary to UTI  Treated with IVFs, BC x2 Gram positive - coagulase negative Staphylococcus, other than Staph epi and staph lugdunensis  Patient off of Levophed cultures grew out Gram-positive cocci Staph epidermidis and grew out Gram-negative tyrone of the urine continue current antibiotics vanc was added 10/18  PICC line placed   Blood Cx x2 showed no growth on 10/23, therefore Vancomycin d/c.  Wound Cx on 10/23 grew pseudomonas sensitive to Zosyn, Zosyn added  Zosyn to be on 10-14 days per Infectious Disease consult, Zosyn to end 11/2 (10 days) or 11/6 (14 days)     Acute deep vein thrombosis (DVT) of both lower extremities  Hx of DVT -- US 10/17 with right popliteal nonocclusive thrombosis - start wt based renally dosed lovenox   Patient was bleeding from PICC line and sacral wound  Lovenox held 10/22 & 10/23, restarted on Monday 10/24/2022  Bleeding noticed again today (10/26), Lovenox held 10/26  Will continue to monitor      Urinary tract infection with hematuria  Pt has a chronic anne at nursing home.   Will make sure it has been changed this admission   - urine culture with >100,000 GNB  - Cultures  sensitive to merrem   -  Patient completed 4 days of Merrem and vancomycin on 10/22   --------------------------------------------------------------------------------  The above note is from the Progress Note of Resident, Dr. Nba Pryor from this morning, October 26, 2022.    We were asked to see Mr. Gao due to hematuria and blood around penis.  He is a patient at the [facility] under the care of Dr. Kerry Shi.  Mr. Gao has a 22 Fr Khan catheter indwelling draining light blood tinged urine with no clots seen.  Patient is unable to give answers to our questions.  His Flomax (Tamsulosin) has been stopped at this time due to high risk of aspiration with PO meds, etc.  His Lovenox was stopped this morning due to hematuria.    Presume the patient had the catheter indwelling prior to admission since he has a large Khan, so I suspect that he had previous hematuria also.  We do not know anything about the patient as far as his pre-existing problems, but if he has been wearing an indwelling Khan he will always have a urinary tract infection.  Since he has developed the current hematuria after admission we will proceed with cystoscopy to make sure there is not any other underlying problem other than infection.  We will plan cystoscopy with flexible scope tomorrow.

## 2022-10-26 NOTE — ASSESSMENT & PLAN NOTE
Secondary to UTI  Treated with IVFs, BC x2 Gram positive - coagulase negative Staphylococcus, other than Staph epi and staph lugdunensis  Patient off of Levophed cultures grew out Gram-positive cocci Staph epidermidis and grew out Gram-negative tyrone of the urine continue current antibiotics vanc was added 10/18  PICC line placed   Blood Cx x2 showed no growth on 10/23, therefore Vancomycin d/c.  Wound Cx on 10/23 grew pseudomonas sensitive to Zosyn, Zosyn added  Zosyn to be on 10-14 days per Infectious Disease consult, Zosyn to end 11/2 (10 days) or 11/6 (14 days)

## 2022-10-26 NOTE — PROGRESS NOTES
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 He presents to [facility] with a UTI that was found on hospital admission on 10/17/2022. Patient was sent to the [facility] ED via EMS secondary to decreased level of consciousness with hematuria.  No associated symptoms such as fever chills cough wheezing chest pain palpitation lower extremity edema nausea vomiting abdominal pain were reported.  No other history could be gathered at this time this patient is somnolent and confused and was only able to state his name only at this time.  On initial presentation, patient was hypotensive with systolic blood pressure in the 60s and tachycardic.  Ensuing workup was notable for leukocytosis with left shift, elevated lactic acid level, hyperglycemia, acute renal failure, and UA highly suggestive of a presence of urinary tract infection.  Patient did not respond favorably to IV fluid bolus of 30 cc/kg and was admitted to ICU for vasopressor support.  Patient's code status is DNR according to nursing home records. After IVF, antibiotics and pressor treatment she was downgraded from the ICU on 10/19/2022 and now admitted the [facility].     The patient was admitted to the [facility] to Family Medicine Service under the direct  supervision Dr. Lee Ann SIMMONS for continued care and medical management.       Overview/Hospital Course:  No notes on file    Interval History: Patient was seen resting in bed this morning. No overnight events reported. Wound care noted possible sacral wound abscess, they have coordinated care and consulted General surgery. Nursing note placed to do eye care. Losartan dose increase to 100mg Qd due to trend on uncontrolled BP, starting on 10/27.    Review of Systems   Unable to perform ROS: Dementia   Objective:     Vital Signs (Most Recent):  Temp: 99 °F (37.2 °C) (10/26/22 0400)  Pulse: 66 (10/26/22 0839)  Resp: 20 (10/26/22 0739)  BP: (!) 193/88 (10/26/22 0839)  SpO2: 100 % (10/26/22 0739)   Vital Signs (24h Range):  Temp:  [98.1 °F (36.7 °C)-99 °F (37.2 °C)] 99 °F (37.2 °C)  Pulse:  [61-77] 66  Resp:  [16-20] 20  SpO2:  [99 %-100 %] 100 %  BP: (142-197)/(78-91) 193/88     Weight: 102.5 kg (225 lb 15.5 oz)  Body mass index is 30.65 kg/m².    Intake/Output Summary (Last 24 hours) at 10/26/2022 0854  Last data filed at 10/26/2022 0800  Gross per 24 hour   Intake 580 ml   Output 900 ml   Net -320 ml      Physical Exam  Vitals reviewed.   Constitutional:       General: He is awake. He is not in acute distress.     Appearance: Normal appearance. He is well-developed. He is obese.      Interventions: He is not intubated.  HENT:      Head: Normocephalic and atraumatic.      Nose: Nose normal.      Mouth/Throat:      Pharynx: Oropharynx is clear.   Eyes:      General:         Right eye: Discharge present.         Left eye: Discharge present.     Extraocular Movements: Extraocular movements intact.      Conjunctiva/sclera: Conjunctivae normal.      Pupils: Pupils are equal, round, and reactive to light.   Cardiovascular:      Rate and Rhythm: Normal rate and regular rhythm.      Heart sounds: Normal heart sounds. No murmur heard.    No friction rub.   Pulmonary:      Effort: Pulmonary effort is normal. He is not intubated.       Breath sounds: Normal breath sounds.   Abdominal:      General: Abdomen is flat. Bowel sounds are normal.      Palpations: Abdomen is soft.   Musculoskeletal:         General: Normal range of motion.      Cervical back: Normal range of motion and neck supple.      Right lower leg: No edema.      Left lower leg: No edema.   Skin:     General: Skin is warm and dry.      Capillary Refill: Capillary refill takes less than 2 seconds.   Neurological:      Mental Status: He is alert.   Psychiatric:         Mood and Affect: Mood normal.         Behavior: Behavior is uncooperative.       Significant Labs: All pertinent labs within the past 24 hours have been reviewed.    Significant Imaging: I have reviewed all pertinent imaging results/findings within the past 24 hours.      Assessment/Plan:      * Bacteremia  Secondary to UTI  Treated with IVFs, BC x2 Gram positive - coagulase negative Staphylococcus, other than Staph epi and staph lugdunensis  Patient off of Levophed cultures grew out Gram-positive cocci Staph epidermidis and grew out Gram-negative tyrone of the urine continue current antibiotics vanc was added 10/18  PICC line placed   Blood Cx x2 showed no growth on 10/23, therefore Vancomycin d/c.  Wound Cx on 10/23 grew pseudomonas sensitive to Zosyn, Zosyn added  Zosyn to be on 10-14 days per Infectious Disease consult, Zosyn to end 11/2 (10 days) or 11/6 (14 days)    Acute deep vein thrombosis (DVT) of both lower extremities  Hx of DVT -- US 10/17 with right popliteal nonocclusive thrombosis - start wt based renally dosed lovenox   Patient was bleeding from PICC line and sacral wound  Lovenox held 10/22 & 10/23, restarted on Monday 10/24/2022  Bleeding noticed again today (10/26), Lovenox held 10/26  Will continue to monitor    Pressure ulcer of sacral region, stage 4  - Wound care consulted  - wound Cx grew pseudomonas sensitive to Zosyn  - Zosyn 4.5g IV Q8H  - Surgery consulted via wound care, guidance  appreciated       Diabetes mellitus  POCT glucose checks  Sliding scale insulin     Urinary tract infection with hematuria  Pt has a chronic anne at nursing home.   Will make sure it has been changed this admission   - urine culture with >100,000 GNB  - Cultures sensitive to merrem   -  Patient completed 4 days of Merrem and vancomycin on 10/22       Suspected deep tissue injury  Wound care consulted       Bipolar disorder  Continuing home risperidone       Hypertension  Losartan 100mg Qd  Lopressor 12.5mg BID        VTE Risk Mitigation (From admission, onward)    None          Discharge Planning   YSOSI: 10/19/2022     Code Status: DNR   Is the patient medically ready for discharge?:     Reason for patient still in hospital (select all that apply): Treatment and Consult recommendations  Discharge Plan A: Return to nursing home                  Nba Pryor MD  Department of Hospital Medicine   [facility]

## 2022-10-27 LAB
GLUCOSE SERPL-MCNC: 105 MG/DL (ref 70–105)
GLUCOSE SERPL-MCNC: 105 MG/DL (ref 70–105)
GLUCOSE SERPL-MCNC: 128 MG/DL (ref 70–105)
GLUCOSE SERPL-MCNC: 131 MG/DL (ref 70–105)

## 2022-10-27 PROCEDURE — 94761 N-INVAS EAR/PLS OXIMETRY MLT: CPT

## 2022-10-27 PROCEDURE — 25000003 PHARM REV CODE 250: Performed by: FAMILY MEDICINE

## 2022-10-27 PROCEDURE — 25000003 PHARM REV CODE 250

## 2022-10-27 PROCEDURE — 94640 AIRWAY INHALATION TREATMENT: CPT

## 2022-10-27 PROCEDURE — 82962 GLUCOSE BLOOD TEST: CPT

## 2022-10-27 PROCEDURE — 25000242 PHARM REV CODE 250 ALT 637 W/ HCPCS: Performed by: NURSE PRACTITIONER

## 2022-10-27 PROCEDURE — 99232 SBSQ HOSP IP/OBS MODERATE 35: CPT | Mod: ,,, | Performed by: FAMILY MEDICINE

## 2022-10-27 PROCEDURE — 63600175 PHARM REV CODE 636 W HCPCS

## 2022-10-27 PROCEDURE — 11000001 HC ACUTE MED/SURG PRIVATE ROOM

## 2022-10-27 PROCEDURE — 99232 PR SUBSEQUENT HOSPITAL CARE,LEVL II: ICD-10-PCS | Mod: ,,, | Performed by: FAMILY MEDICINE

## 2022-10-27 RX ADMIN — METOPROLOL TARTRATE 12.5 MG: 25 TABLET, FILM COATED ORAL at 09:10

## 2022-10-27 RX ADMIN — LOSARTAN POTASSIUM 100 MG: 100 TABLET, FILM COATED ORAL at 09:10

## 2022-10-27 RX ADMIN — IPRATROPIUM BROMIDE AND ALBUTEROL SULFATE 3 ML: 2.5; .5 SOLUTION RESPIRATORY (INHALATION) at 07:10

## 2022-10-27 RX ADMIN — IPRATROPIUM BROMIDE AND ALBUTEROL SULFATE 3 ML: 2.5; .5 SOLUTION RESPIRATORY (INHALATION) at 01:10

## 2022-10-27 RX ADMIN — RISPERIDONE 1 MG: 1 TABLET ORAL at 09:10

## 2022-10-27 RX ADMIN — PIPERACILLIN AND TAZOBACTAM 4.5 G: 4; .5 INJECTION, POWDER, LYOPHILIZED, FOR SOLUTION INTRAVENOUS at 08:10

## 2022-10-27 RX ADMIN — LEVOTHYROXINE SODIUM 50 MCG: 0.05 TABLET ORAL at 06:10

## 2022-10-27 RX ADMIN — PIPERACILLIN AND TAZOBACTAM 4.5 G: 4; .5 INJECTION, POWDER, LYOPHILIZED, FOR SOLUTION INTRAVENOUS at 04:10

## 2022-10-27 RX ADMIN — PIPERACILLIN AND TAZOBACTAM 4.5 G: 4; .5 INJECTION, POWDER, LYOPHILIZED, FOR SOLUTION INTRAVENOUS at 11:10

## 2022-10-27 RX ADMIN — PANTOPRAZOLE SODIUM 40 MG: 40 GRANULE, DELAYED RELEASE ORAL at 09:10

## 2022-10-27 NOTE — PLAN OF CARE
Problem: Adult Inpatient Plan of Care  Goal: Plan of Care Review  Outcome: Ongoing, Progressing  Goal: Patient-Specific Goal (Individualized)  Outcome: Ongoing, Progressing  Goal: Absence of Hospital-Acquired Illness or Injury  Outcome: Ongoing, Progressing  Goal: Optimal Comfort and Wellbeing  Outcome: Ongoing, Progressing  Goal: Readiness for Transition of Care  Outcome: Ongoing, Progressing     Problem: Diabetes Comorbidity  Goal: Blood Glucose Level Within Targeted Range  Outcome: Ongoing, Progressing     Problem: Impaired Wound Healing  Goal: Optimal Wound Healing  Outcome: Ongoing, Progressing     Problem: Skin Injury Risk Increased  Goal: Skin Health and Integrity  Outcome: Ongoing, Progressing     Problem: Infection  Goal: Absence of Infection Signs and Symptoms  Outcome: Ongoing, Progressing     Problem: Gas Exchange Impaired  Goal: Optimal Gas Exchange  Outcome: Ongoing, Progressing     Problem: Fall Injury Risk  Goal: Absence of Fall and Fall-Related Injury  Outcome: Ongoing, Progressing      None

## 2022-10-27 NOTE — NURSING
Attempted to call [facility]NA[facility] and Jennifer Rudolph for consent for cystoscopy. No answer for either party.    1235 Spoke with Jennifer Rudolph and she want to speak with Dr. Del Cid.    Phone consent given per Jennifer Rudolph to TAYLA Gabriel.     1300 cystoscopy done at bedside per Dr. Del Cid.

## 2022-10-27 NOTE — OP NOTE
Cystoscopy note    Preop diagnosis:  Gross hematuria undetermined etiology    Postoperative diagnosis:  Gross hematuria probably secondary to bleeding from urethral stricture and from irritated bladder from chronic indwelling Khan    Description:  The patient was prepared for flexible cystoscopy in the supine position in his bed.  The Khan catheter was irrigated and removed.  Genitalia was prepped with Betadine.  The urethra was anesthetized with lidocaine jelly.  The 16 [place] Olympus flexible cystoscope was passed transurethrally into the bladder.  Anterior urethra was clear down to the bulbous urethra where there was a mild stricture noted that scope went past but there was a little irritation of the area and a little bit of blood in the area suggesting that this area possibly had bled.  There was also some irritation of the bulbous urethra proximal to the strictured area.  Posterior urethra was entered and there was trilobar enlargement of prostate from a small gland that was only about 3 cm in length.  The bladder was entered.  I did not see any active bleeding in the bladder.  The ureteral orifices could not really be identified but there were various areas in the bladder that had inflammatory change that I feel are secondary to catheter irritation.  There was 1 area on the left side of the bladder that was particularly inflamed and I suspect that is where he has bled from recently.  The area was only minimally raised and I do not think it is suspicious of a tumor.  I did not see anything that looked like a papillary growth or other suspicious lesions.  I feel that the bleeding is coming from the irritated bladder mucosa which is chronically inflamed from an indwelling Khan and there may also be some bleeding from the mild stricture of the bulbous urethra.  The scope was removed and no additional lesions were seen.  A 20 [place]an silicone Khan was left indwelling with 10 cc water left in the balloon.   Drainage bag attached.  The patient tolerated the procedure well and there was no blood loss from the procedure itself.      I subsequently discussed the findings with LIEN Vargas, who is the patient's niece and has his power of .

## 2022-10-28 LAB
ANION GAP SERPL CALCULATED.3IONS-SCNC: 10 MMOL/L (ref 7–16)
BASOPHILS # BLD AUTO: 0.01 K/UL (ref 0–0.2)
BASOPHILS NFR BLD AUTO: 0.1 % (ref 0–1)
BUN SERPL-MCNC: 6 MG/DL (ref 7–18)
BUN/CREAT SERPL: 8 (ref 6–20)
CALCIUM SERPL-MCNC: 8.5 MG/DL (ref 8.5–10.1)
CHLORIDE SERPL-SCNC: 109 MMOL/L (ref 98–107)
CO2 SERPL-SCNC: 26 MMOL/L (ref 21–32)
CREAT SERPL-MCNC: 0.72 MG/DL (ref 0.7–1.3)
DIFFERENTIAL METHOD BLD: ABNORMAL
EGFR (NO RACE VARIABLE) (RUSH/TITUS): 91 ML/MIN/1.73M²
EOSINOPHIL # BLD AUTO: 0.27 K/UL (ref 0–0.5)
EOSINOPHIL NFR BLD AUTO: 3.7 % (ref 1–4)
ERYTHROCYTE [DISTWIDTH] IN BLOOD BY AUTOMATED COUNT: 17.3 % (ref 11.5–14.5)
GLUCOSE SERPL-MCNC: 107 MG/DL (ref 70–105)
GLUCOSE SERPL-MCNC: 90 MG/DL (ref 70–105)
GLUCOSE SERPL-MCNC: 93 MG/DL (ref 70–105)
GLUCOSE SERPL-MCNC: 97 MG/DL (ref 74–106)
HCT VFR BLD AUTO: 25.6 % (ref 40–54)
HGB BLD-MCNC: 8.3 G/DL (ref 13.5–18)
IMM GRANULOCYTES # BLD AUTO: 0.02 K/UL (ref 0–0.04)
IMM GRANULOCYTES NFR BLD: 0.3 % (ref 0–0.4)
LYMPHOCYTES # BLD AUTO: 1.55 K/UL (ref 1–4.8)
LYMPHOCYTES NFR BLD AUTO: 21.3 % (ref 27–41)
MCH RBC QN AUTO: 27.5 PG (ref 27–31)
MCHC RBC AUTO-ENTMCNC: 32.4 G/DL (ref 32–36)
MCV RBC AUTO: 84.8 FL (ref 80–96)
MONOCYTES # BLD AUTO: 0.63 K/UL (ref 0–0.8)
MONOCYTES NFR BLD AUTO: 8.7 % (ref 2–6)
MPC BLD CALC-MCNC: 9.1 FL (ref 9.4–12.4)
NEUTROPHILS # BLD AUTO: 4.78 K/UL (ref 1.8–7.7)
NEUTROPHILS NFR BLD AUTO: 65.9 % (ref 53–65)
NRBC # BLD AUTO: 0 X10E3/UL
NRBC, AUTO (.00): 0 %
PLATELET # BLD AUTO: 384 K/UL (ref 150–400)
POTASSIUM SERPL-SCNC: 3.7 MMOL/L (ref 3.5–5.1)
RBC # BLD AUTO: 3.02 M/UL (ref 4.6–6.2)
SODIUM SERPL-SCNC: 141 MMOL/L (ref 136–145)
WBC # BLD AUTO: 7.26 K/UL (ref 4.5–11)

## 2022-10-28 PROCEDURE — 99232 PR SUBSEQUENT HOSPITAL CARE,LEVL II: ICD-10-PCS | Mod: ,,, | Performed by: FAMILY MEDICINE

## 2022-10-28 PROCEDURE — 63600175 PHARM REV CODE 636 W HCPCS

## 2022-10-28 PROCEDURE — 85025 COMPLETE CBC W/AUTO DIFF WBC: CPT | Performed by: HOSPITALIST

## 2022-10-28 PROCEDURE — 25000242 PHARM REV CODE 250 ALT 637 W/ HCPCS: Performed by: NURSE PRACTITIONER

## 2022-10-28 PROCEDURE — 25000003 PHARM REV CODE 250

## 2022-10-28 PROCEDURE — 99232 SBSQ HOSP IP/OBS MODERATE 35: CPT | Mod: ,,, | Performed by: FAMILY MEDICINE

## 2022-10-28 PROCEDURE — 94761 N-INVAS EAR/PLS OXIMETRY MLT: CPT

## 2022-10-28 PROCEDURE — 25000003 PHARM REV CODE 250: Performed by: FAMILY MEDICINE

## 2022-10-28 PROCEDURE — 36415 COLL VENOUS BLD VENIPUNCTURE: CPT | Performed by: HOSPITALIST

## 2022-10-28 PROCEDURE — 82962 GLUCOSE BLOOD TEST: CPT

## 2022-10-28 PROCEDURE — 80048 BASIC METABOLIC PNL TOTAL CA: CPT | Performed by: HOSPITALIST

## 2022-10-28 PROCEDURE — 94640 AIRWAY INHALATION TREATMENT: CPT

## 2022-10-28 PROCEDURE — 11000001 HC ACUTE MED/SURG PRIVATE ROOM

## 2022-10-28 RX ADMIN — METOPROLOL TARTRATE 12.5 MG: 25 TABLET, FILM COATED ORAL at 09:10

## 2022-10-28 RX ADMIN — LOSARTAN POTASSIUM 100 MG: 100 TABLET, FILM COATED ORAL at 08:10

## 2022-10-28 RX ADMIN — IPRATROPIUM BROMIDE AND ALBUTEROL SULFATE 3 ML: 2.5; .5 SOLUTION RESPIRATORY (INHALATION) at 12:10

## 2022-10-28 RX ADMIN — PIPERACILLIN AND TAZOBACTAM 4.5 G: 4; .5 INJECTION, POWDER, LYOPHILIZED, FOR SOLUTION INTRAVENOUS at 12:10

## 2022-10-28 RX ADMIN — METOPROLOL TARTRATE 12.5 MG: 25 TABLET, FILM COATED ORAL at 08:10

## 2022-10-28 RX ADMIN — RISPERIDONE 1 MG: 1 TABLET ORAL at 09:10

## 2022-10-28 RX ADMIN — PIPERACILLIN AND TAZOBACTAM 4.5 G: 4; .5 INJECTION, POWDER, LYOPHILIZED, FOR SOLUTION INTRAVENOUS at 09:10

## 2022-10-28 RX ADMIN — RISPERIDONE 1 MG: 1 TABLET ORAL at 08:10

## 2022-10-28 RX ADMIN — PANTOPRAZOLE SODIUM 40 MG: 40 GRANULE, DELAYED RELEASE ORAL at 08:10

## 2022-10-28 RX ADMIN — IPRATROPIUM BROMIDE AND ALBUTEROL SULFATE 3 ML: 2.5; .5 SOLUTION RESPIRATORY (INHALATION) at 07:10

## 2022-10-28 RX ADMIN — PIPERACILLIN AND TAZOBACTAM 4.5 G: 4; .5 INJECTION, POWDER, LYOPHILIZED, FOR SOLUTION INTRAVENOUS at 04:10

## 2022-10-28 RX ADMIN — LEVOTHYROXINE SODIUM 50 MCG: 0.05 TABLET ORAL at 05:10

## 2022-10-28 NOTE — PROGRESS NOTES
Mr. Gao has completely clear urine today.  New catheter working well and the drainage bag looks good.  Patient does respond to stimuli but cannot speak or at least did not speak to me.  Please re-consult for any further  problems.

## 2022-10-28 NOTE — SUBJECTIVE & OBJECTIVE
Interval History: Patient eating well with assistance.      Review of Systems   Respiratory:  Negative for shortness of breath.    Cardiovascular:  Negative for chest pain.   Gastrointestinal:  Negative for abdominal pain.   Objective:     Vital Signs (Most Recent):  Temp: 98 °F (36.7 °C) (10/27/22 1600)  Pulse: 73 (10/27/22 1921)  Resp: 18 (10/27/22 1921)  BP: (!) 168/91 (10/27/22 1600)  SpO2: 99 % (10/27/22 1921)   Vital Signs (24h Range):  Temp:  [97.7 °F (36.5 °C)-99.5 °F (37.5 °C)] 98 °F (36.7 °C)  Pulse:  [61-95] 73  Resp:  [16-20] 18  SpO2:  [96 %-100 %] 99 %  BP: (143-168)/(65-91) 168/91     Weight: 102.5 kg (225 lb 15.5 oz)  Body mass index is 30.65 kg/m².    Intake/Output Summary (Last 24 hours) at 10/27/2022 1956  Last data filed at 10/27/2022 1800  Gross per 24 hour   Intake 477 ml   Output 700 ml   Net -223 ml      Physical Exam  Vitals reviewed.   Constitutional:       General: He is awake. He is not in acute distress.     Appearance: Normal appearance. He is well-developed. He is obese.      Interventions: He is not intubated.  HENT:      Head: Normocephalic and atraumatic.      Nose: Nose normal.      Mouth/Throat:      Pharynx: Oropharynx is clear.   Eyes:      Extraocular Movements: Extraocular movements intact.      Conjunctiva/sclera: Conjunctivae normal.      Pupils: Pupils are equal, round, and reactive to light.   Cardiovascular:      Rate and Rhythm: Normal rate and regular rhythm.      Heart sounds: Normal heart sounds. No murmur heard.    No friction rub.   Pulmonary:      Effort: Pulmonary effort is normal. He is not intubated.      Breath sounds: Normal breath sounds.   Abdominal:      General: Abdomen is flat. Bowel sounds are normal.      Palpations: Abdomen is soft.   Musculoskeletal:         General: Normal range of motion.      Cervical back: Normal range of motion and neck supple.      Right lower leg: No edema.      Left lower leg: No edema.   Skin:     General: Skin is warm and  dry.      Capillary Refill: Capillary refill takes less than 2 seconds.   Neurological:      Mental Status: He is alert. Mental status is at baseline.   Psychiatric:         Behavior: Behavior is uncooperative.       Significant Labs: All pertinent labs within the past 24 hours have been reviewed.    Significant Imaging: I have reviewed all pertinent imaging results/findings within the past 24 hours.

## 2022-10-28 NOTE — PROGRESS NOTES
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 He presents to [facility] with a UTI that was found on hospital admission on 10/17/2022. Patient was sent to the [facility] ED via EMS secondary to decreased level of consciousness with hematuria.  No associated symptoms such as fever chills cough wheezing chest pain palpitation lower extremity edema nausea vomiting abdominal pain were reported.  No other history could be gathered at this time this patient is somnolent and confused and was only able to state his name only at this time.  On initial presentation, patient was hypotensive with systolic blood pressure in the 60s and tachycardic.  Ensuing workup was notable for leukocytosis with left shift, elevated lactic acid level, hyperglycemia, acute renal failure, and UA highly suggestive of a presence of urinary tract infection.  Patient did not respond favorably to IV fluid bolus of 30 cc/kg and was admitted to ICU for vasopressor support.  Patient's code status is DNR according to nursing home records. After IVF, antibiotics and pressor treatment she was downgraded from the ICU on 10/19/2022 and now admitted the [facility].     The patient was admitted to the [facility] to Family Medicine Service under the direct  supervision Dr. Lee Ann SIMMONS for continued care and medical management.       Overview/Hospital Course:  No notes on file    Interval History: Patient eating well with assistance.      Review of Systems   Respiratory:  Negative for shortness of breath.    Cardiovascular:  Negative for chest pain.   Gastrointestinal:  Negative for abdominal pain.   Objective:     Vital Signs (Most Recent):  Temp: 98 °F (36.7 °C) (10/27/22 1600)  Pulse: 73 (10/27/22 1921)  Resp: 18 (10/27/22 1921)  BP: (!) 168/91 (10/27/22 1600)  SpO2: 99 % (10/27/22 1921)   Vital Signs (24h Range):  Temp:  [97.7 °F (36.5 °C)-99.5 °F (37.5 °C)] 98 °F (36.7 °C)  Pulse:  [61-95] 73  Resp:  [16-20] 18  SpO2:  [96 %-100 %] 99 %  BP: (143-168)/(65-91) 168/91     Weight: 102.5 kg (225 lb 15.5 oz)  Body mass index is 30.65 kg/m².    Intake/Output Summary (Last 24 hours) at 10/27/2022 1956  Last data filed at 10/27/2022 1800  Gross per 24 hour   Intake 477 ml   Output 700 ml   Net -223 ml      Physical Exam  Vitals reviewed.   Constitutional:       General: He is awake. He is not in acute distress.     Appearance: Normal appearance. He is well-developed. He is obese.      Interventions: He is not intubated.  HENT:      Head: Normocephalic and atraumatic.      Nose: Nose normal.      Mouth/Throat:      Pharynx: Oropharynx is clear.   Eyes:      Extraocular Movements: Extraocular movements intact.      Conjunctiva/sclera: Conjunctivae normal.      Pupils: Pupils are equal, round, and reactive to light.   Cardiovascular:      Rate and Rhythm: Normal rate and regular rhythm.      Heart sounds: Normal heart sounds. No murmur heard.    No friction rub.   Pulmonary:      Effort: Pulmonary effort is normal. He is not intubated.      Breath sounds: Normal breath sounds.   Abdominal:      General: Abdomen is flat. Bowel sounds are normal.      Palpations: Abdomen is soft.   Musculoskeletal:         General: Normal range of motion.      Cervical back: Normal range of  motion and neck supple.      Right lower leg: No edema.      Left lower leg: No edema.   Skin:     General: Skin is warm and dry.      Capillary Refill: Capillary refill takes less than 2 seconds.   Neurological:      Mental Status: He is alert. Mental status is at baseline.   Psychiatric:         Behavior: Behavior is uncooperative.       Significant Labs: All pertinent labs within the past 24 hours have been reviewed.    Significant Imaging: I have reviewed all pertinent imaging results/findings within the past 24 hours.      Assessment/Plan:      * Bacteremia  Secondary to UTI  Treated with IVFs, BC x2 Gram positive - coagulase negative Staphylococcus, other than Staph epi and staph lugdunensis  Patient off of Levophed cultures grew out Gram-positive cocci Staph epidermidis and grew out Gram-negative tyrone of the urine continue current antibiotics vanc was added 10/18  PICC line placed   Blood Cx x2 showed no growth on 10/23, therefore Vancomycin d/c.  Wound Cx on 10/23 grew pseudomonas sensitive to Zosyn, Zosyn added  Zosyn to be on 10-14 days per Infectious Disease consult, Zosyn to end 11/2 (10 days) or 11/6 (14 days)    Suspected deep tissue injury  Wound care consulted       Bipolar disorder  Continuing home risperidone       Acute deep vein thrombosis (DVT) of both lower extremities  Hx of DVT -- US 10/17 with right popliteal nonocclusive thrombosis - start wt based renally dosed lovenox   Patient was bleeding from PICC line and sacral wound  Lovenox held 10/22 & 10/23, restarted on Monday 10/24/2022  Bleeding noticed again today (10/26), Lovenox held 10/26  Will continue to monitor    Urinary tract infection with hematuria  Pt has a chronic anne at nursing home.   Will make sure it has been changed this admission   - urine culture with >100,000 GNB  - Cultures sensitive to merrem   -  Patient completed 4 days of Merrem and vancomycin on 10/22       Hypertension  Losartan 100mg Qd  Lopressor 12.5mg  BID      Diabetes mellitus  POCT glucose checks  Sliding scale insulin   Acceptable control    Pressure ulcer of sacral region, stage 4  - Wound care consulted  - wound Cx grew pseudomonas sensitive to Zosyn  - Zosyn 4.5g IV Q8H  - Surgery consulted via wound care, guidance appreciated         VTE Risk Mitigation (From admission, onward)    None          Discharge Planning   YOSSI: 10/19/2022     Code Status: DNR   Is the patient medically ready for discharge?:     Reason for patient still in hospital (select all that apply): Treatment  Discharge Plan A: Return to nursing home                  Hipolito Nance Jr, MD  Department of Hospital Medicine   [facility]

## 2022-10-28 NOTE — PROGRESS NOTES
[facility]  Adult Nutrition  Progress Note         Reason for Assessment  Reason For Assessment: RD follow-up  Nutrition Risk Screen: other (see comments) (puree diet) large or nonhealing wound, burn or pressure injury    RD follow up.    Recommend continue with puree diet and Epi BID. Consider adding 500mg Vit C BID + 220mg ZnSO4 BID + MVI daily to aid in wound healing.    Current diet is puree per ST recommendations. Patient's intake is ~75%, with more recent 100% intake documented. PO intake has improved since 10/24. Epi BID order started on 10/24 - unsure of % intake at this time. Consider adding 500mg Vit C BID + 220mg ZnSO4 BID + MVI daily to aid in wound healing.    Per MD note, surgery consulted via wound care for stage 4 ulcer of sacral region and DM in acceptable control. Per op note, patient had cystoscopy yesterday.    Patient with no new weight since last RD assessment on 10/24. Weight on 10/24 was 225#, 30.65 BMI, in obese BMI category - needs adjusted. Patient with +3 edema (moderate) at both right arm and hand and left arm and hand.  Last BM documented 10/27 per flowsheets. Will monitor weight trend, labs, meds, wound/skin, diet order changes, updates in patient condition. RD following.    Malnutrition  Is Patient Malnourished: No  Skin (Micronutrient): wounds unhealed  Per MD note stage IV sacral wound was noted and surgery consulted via wound care.    Nutrition Diagnosis  Increased protein Needs   related to Wound healing as evidenced by pressure injury of sacral region stage 4 present on admission    Nutrition Diagnosis Status: Chronic/ continues    Nutrition Risk  Level of Risk/Frequency of Follow-up: high   Chewing or Swallowing Difficulty?: Swallowing difficulty    Estimated/Assessed Needs    Temp: 98.8 °F (37.1 °C)Oral  Weight Used For Calorie Calculations: 86.2 kg (190 lb) (adjusted body weight)   Energy Need Method: Kcal/kg (25-30kcal/kg adjusted body weight)  Energy Calorie Requirements (kcal): 2154..5kcal  Weight Used For Protein Calculations: 86.2 kg (190 lb) (adjusted body weight)  Protein Requirements: 86..64g pro (1.0-1.2g pro/kg adjusted body weight)  Estimated Fluid Requirement Method: RDA Method    RDA Method (mL): 2154.59     Nutrition Prescription / Recommendations  Recommendation/Intervention: Recommend continue with puree diet and Epi BID. Consider adding 500mg Vit C BID + 220mg ZnSO4 BID + MVI daily to aid in wound healing.  Goals: Patient will consume % of meals  Nutrition Goal Status: progressing towards goal  Current Diet Order: Puree consistent carbohydrate 1800 with thin liquids  Oral Nutrition Supplement: Epi BID, provides 180kcal and 5g pro  Recommended Diet: Consistent Carbohydrate 1800 (60g Carbs) and Puree   Recommended Oral Supplement: Epi [90 kcals, 2.5g Protein, 10g Carbs(3g Sugar), 7g L-Arginine, 7g L-Glutamine, Vitamin C 300mg, 9.5mg Zinc] twice daily  Is Nutrition Support Recommended: No  Is Education Recommended: No    Monitor and Evaluation  % current Intake: Other: % intake documented, more recently %    % intake to meet estimated needs: 75 - 100 %  Food and Nutrient Intake: energy intake, food and beverage intake  Food and Nutrient Adminstration: diet order  Knowledge/Beliefs/Attitudes: food and nutrition knowledge/skill, beliefs and attitudes  Physical Activity and Function: nutrition-related ADLs and IADLs, factors affecting access to physical activity  Anthropometric Measurements: weight, weight change, body mass index  Biochemical Data, Medical Tests and Procedures: electrolyte and renal panel, gastrointestinal profile, glucose/endocrine profile, inflammatory profile, lipid profile  Nutrition-Focused Physical Findings: overall appearance, extremities, muscles and bones, head and eyes, skin     Current Medical Diagnosis and Past Medical History  Diagnosis: other (see comments) (bacteremia)  Past  Medical History:   Diagnosis Date    Alzheimer's disease     Anticoagulant long-term use     Bipolar disorder     BPH (benign prostatic hyperplasia)     Deep vein thrombosis (DVT) of popliteal vein of right lower extremity     Diabetes mellitus     DVT of deep femoral vein, left     Hyperlipidemia     Hypertension     Hypothyroidism     Idiopathic orofacial dystonia     Iron deficiency 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    Light Growth Escherichia coli Abnormal    Light Growth Klebsiel    Schizophrenia 1/28/2022 05/16 -continue trileptal and risperidone     Nutrition/Diet History  Patient Reported Diet/Restrictions/Preferences: pureed  Spiritual, Cultural Beliefs, Orthodoxy Practices, Values that Affect Care: no  Food Allergies: NKFA    Lab/Procedures/Meds  Recent Labs   Lab 10/28/22  0254      K 3.7   BUN 6*   CREATININE 0.72   GLU 97   CALCIUM 8.5   *     Last A1c:   Lab Results   Component Value Date    HGBA1C 6.6 06/29/2022     Lab Results   Component Value Date    RBC 3.02 (L) 10/28/2022    HGB 8.3 (L) 10/28/2022    HCT 25.6 (L) 10/28/2022    MCV 84.8 10/28/2022    MCH 27.5 10/28/2022    MCHC 32.4 10/28/2022    TIBC 174 (L) 12/09/2021     Pertinent Labs Reviewed: reviewed  Pertinent Labs Comments: Cl 109 (H), BUN 6 (L), total protein 5.3 (L), albumin 1.9 (L), prealbumin 15 (L), CRP 7.29 (H) - altered labs likely related to patient with bacteremia, suspected deep tissue injury, acute DVT of both lower extremities, UTI with hematuria, pressure ulcer of sacral region stage 4    Pertinent Medications  Reviewed: reviewed  Albuterol-ipratropium, levothyroxine, losartan, metoprolol, pantoprazole, zosyn, risperidone    Anthropometrics  Temp: 98.8 °F (37.1 °C)  Height: 6' (182.9 cm)  Height (inches): 72 in  Weight Method: Bed Scale  Weight: 102.5 kg (225 lb 15.5 oz)  Weight (lb): 225.97 lb  Ideal Body Weight (IBW), Male: 178 lb  % Ideal Body Weight, Male (lb): 126.95 %  BMI (Calculated): 30.6  BMI Grade: 30 - 34.9- obesity - grade I     Nutrition by Nursing  Diet/Nutrition Received: mechanical/dental soft  Intake (%): 75%  Diet/Feeding Assistance: assisted with feeding  Diet/Feeding Tolerance: good  Last Bowel Movement: 10/27/22    Nutrition Follow-Up  RD Follow-up?: Yes

## 2022-10-29 PROBLEM — R78.81 BACTEREMIA: Status: RESOLVED | Noted: 2022-10-19 | Resolved: 2022-10-29

## 2022-10-29 LAB
GLUCOSE SERPL-MCNC: 109 MG/DL (ref 70–105)
GLUCOSE SERPL-MCNC: 120 MG/DL (ref 70–105)
GLUCOSE SERPL-MCNC: 120 MG/DL (ref 70–105)
GLUCOSE SERPL-MCNC: 84 MG/DL (ref 70–105)
GLUCOSE SERPL-MCNC: 96 MG/DL (ref 70–105)

## 2022-10-29 PROCEDURE — 94640 AIRWAY INHALATION TREATMENT: CPT

## 2022-10-29 PROCEDURE — 25000003 PHARM REV CODE 250

## 2022-10-29 PROCEDURE — 63600175 PHARM REV CODE 636 W HCPCS

## 2022-10-29 PROCEDURE — 63600175 PHARM REV CODE 636 W HCPCS: Performed by: FAMILY MEDICINE

## 2022-10-29 PROCEDURE — 25000003 PHARM REV CODE 250: Performed by: FAMILY MEDICINE

## 2022-10-29 PROCEDURE — 25000242 PHARM REV CODE 250 ALT 637 W/ HCPCS: Performed by: NURSE PRACTITIONER

## 2022-10-29 PROCEDURE — 11000001 HC ACUTE MED/SURG PRIVATE ROOM

## 2022-10-29 PROCEDURE — 99232 PR SUBSEQUENT HOSPITAL CARE,LEVL II: ICD-10-PCS | Mod: ,,, | Performed by: FAMILY MEDICINE

## 2022-10-29 PROCEDURE — 99232 SBSQ HOSP IP/OBS MODERATE 35: CPT | Mod: ,,, | Performed by: FAMILY MEDICINE

## 2022-10-29 PROCEDURE — 82962 GLUCOSE BLOOD TEST: CPT

## 2022-10-29 RX ORDER — ENOXAPARIN SODIUM 100 MG/ML
40 INJECTION SUBCUTANEOUS EVERY 24 HOURS
Status: DISCONTINUED | OUTPATIENT
Start: 2022-10-29 | End: 2022-10-31

## 2022-10-29 RX ADMIN — METOPROLOL TARTRATE 12.5 MG: 25 TABLET, FILM COATED ORAL at 09:10

## 2022-10-29 RX ADMIN — RISPERIDONE 1 MG: 1 TABLET ORAL at 09:10

## 2022-10-29 RX ADMIN — RISPERIDONE 1 MG: 1 TABLET ORAL at 08:10

## 2022-10-29 RX ADMIN — IPRATROPIUM BROMIDE AND ALBUTEROL SULFATE 3 ML: 2.5; .5 SOLUTION RESPIRATORY (INHALATION) at 01:10

## 2022-10-29 RX ADMIN — PIPERACILLIN AND TAZOBACTAM 4.5 G: 4; .5 INJECTION, POWDER, LYOPHILIZED, FOR SOLUTION INTRAVENOUS at 12:10

## 2022-10-29 RX ADMIN — LOSARTAN POTASSIUM 100 MG: 100 TABLET, FILM COATED ORAL at 09:10

## 2022-10-29 RX ADMIN — LEVOTHYROXINE SODIUM 50 MCG: 0.05 TABLET ORAL at 06:10

## 2022-10-29 RX ADMIN — ENOXAPARIN SODIUM 40 MG: 100 INJECTION SUBCUTANEOUS at 06:10

## 2022-10-29 RX ADMIN — IPRATROPIUM BROMIDE AND ALBUTEROL SULFATE 3 ML: 2.5; .5 SOLUTION RESPIRATORY (INHALATION) at 07:10

## 2022-10-29 RX ADMIN — PIPERACILLIN AND TAZOBACTAM 4.5 G: 4; .5 INJECTION, POWDER, LYOPHILIZED, FOR SOLUTION INTRAVENOUS at 08:10

## 2022-10-29 RX ADMIN — METOPROLOL TARTRATE 12.5 MG: 25 TABLET, FILM COATED ORAL at 08:10

## 2022-10-29 RX ADMIN — PIPERACILLIN AND TAZOBACTAM 4.5 G: 4; .5 INJECTION, POWDER, LYOPHILIZED, FOR SOLUTION INTRAVENOUS at 04:10

## 2022-10-29 RX ADMIN — PANTOPRAZOLE SODIUM 40 MG: 40 GRANULE, DELAYED RELEASE ORAL at 09:10

## 2022-10-29 NOTE — ASSESSMENT & PLAN NOTE
Hx of DVT -- US 10/17 with right popliteal nonocclusive thrombosis - start wt based renally dosed lovenox   Patient was bleeding from PICC line and sacral wound  Lovenox held 10/22 & 10/23, restarted on Monday 10/24/2022  Bleeding noticed again today (10/26), Lovenox held 10/26  Will continue to monitor    10/29 - Will resume lovenox at 40 (DVT ppx dose). It is not clear that DVTs are acute. Bleeding complications precludes full anticoagulation.

## 2022-10-29 NOTE — SUBJECTIVE & OBJECTIVE
"Interval History: No reported problems. Helga "Help" when trying to examine .     Review of Systems  Will not answer.  Objective:     Vital Signs (Most Recent):  Temp: 98.6 °F (37 °C) (10/29/22 0800)  Pulse: 62 (10/29/22 0800)  Resp: 18 (10/29/22 0800)  BP: (!) 175/84 (10/29/22 0800)  SpO2: 100 % (10/29/22 0800)   Vital Signs (24h Range):  Temp:  [98.1 °F (36.7 °C)-98.6 °F (37 °C)] 98.6 °F (37 °C)  Pulse:  [61-77] 62  Resp:  [17-20] 18  SpO2:  [100 %] 100 %  BP: (162-183)/(73-93) 175/84     Weight: 102.5 kg (225 lb 15.5 oz)  Body mass index is 30.65 kg/m².    Intake/Output Summary (Last 24 hours) at 10/29/2022 0833  Last data filed at 10/28/2022 1800  Gross per 24 hour   Intake 0 ml   Output --   Net 0 ml      Physical Exam  Vitals reviewed.   Constitutional:       General: He is awake. He is not in acute distress.     Appearance: Normal appearance. He is well-developed. He is obese.      Interventions: He is not intubated.  HENT:      Head: Normocephalic and atraumatic.      Nose: Nose normal.      Mouth/Throat:      Pharynx: Oropharynx is clear.   Eyes:      Extraocular Movements: Extraocular movements intact.      Conjunctiva/sclera: Conjunctivae normal.      Pupils: Pupils are equal, round, and reactive to light.   Cardiovascular:      Rate and Rhythm: Normal rate and regular rhythm.      Heart sounds: Normal heart sounds. No murmur heard.    No friction rub.   Pulmonary:      Effort: Pulmonary effort is normal. He is not intubated.      Breath sounds: Normal breath sounds.   Abdominal:      General: Abdomen is flat. Bowel sounds are normal.      Palpations: Abdomen is soft.   Musculoskeletal:         General: Normal range of motion.      Cervical back: Normal range of motion and neck supple.      Right lower leg: No edema.      Left lower leg: No edema.   Skin:     General: Skin is warm and dry.      Capillary Refill: Capillary refill takes less than 2 seconds.   Neurological:      Mental Status: He is alert. " Mental status is at baseline.   Psychiatric:         Behavior: Behavior is uncooperative.       Significant Labs: All pertinent labs within the past 24 hours have been reviewed.  BMP:   Recent Labs   Lab 10/28/22  0254   GLU 97      K 3.7   *   CO2 26   BUN 6*   CREATININE 0.72   CALCIUM 8.5     CBC:   Recent Labs   Lab 10/28/22  0254   WBC 7.26   HGB 8.3*   HCT 25.6*          Significant Imaging: I have reviewed all pertinent imaging results/findings within the past 24 hours.

## 2022-10-29 NOTE — PROGRESS NOTES
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 He presents to [facility] with a UTI that was found on hospital admission on 10/17/2022. Patient was sent to the [facility] ED via EMS secondary to decreased level of consciousness with hematuria.  No associated symptoms such as fever chills cough wheezing chest pain palpitation lower extremity edema nausea vomiting abdominal pain were reported.  No other history could be gathered at this time this patient is somnolent and confused and was only able to state his name only at this time.  On initial presentation, patient was hypotensive with systolic blood pressure in the 60s and tachycardic.  Ensuing workup was notable for leukocytosis with left shift, elevated lactic acid level, hyperglycemia, acute renal failure, and UA highly suggestive of a presence of urinary tract infection.  Patient did not respond favorably to IV fluid bolus of 30 cc/kg and was admitted to ICU for vasopressor support.  Patient's code status is DNR according to nursing home records. After IVF, antibiotics and pressor treatment she was downgraded from the ICU on 10/19/2022 and now admitted the [facility].     The patient was admitted to the [facility] to Family Medicine Service under the direct  "supervision Dr. Lee Ann SIMMONS for continued care and medical management.       Overview/Hospital Course:  No notes on file    Interval History: No reported problems. Kieraners "Help" when trying to examine .     Review of Systems  Will not answer.  Objective:     Vital Signs (Most Recent):  Temp: 98.6 °F (37 °C) (10/29/22 0800)  Pulse: 62 (10/29/22 0800)  Resp: 18 (10/29/22 0800)  BP: (!) 175/84 (10/29/22 0800)  SpO2: 100 % (10/29/22 0800)   Vital Signs (24h Range):  Temp:  [98.1 °F (36.7 °C)-98.6 °F (37 °C)] 98.6 °F (37 °C)  Pulse:  [61-77] 62  Resp:  [17-20] 18  SpO2:  [100 %] 100 %  BP: (162-183)/(73-93) 175/84     Weight: 102.5 kg (225 lb 15.5 oz)  Body mass index is 30.65 kg/m².    Intake/Output Summary (Last 24 hours) at 10/29/2022 0833  Last data filed at 10/28/2022 1800  Gross per 24 hour   Intake 0 ml   Output --   Net 0 ml      Physical Exam  Vitals reviewed.   Constitutional:       General: He is awake. He is not in acute distress.     Appearance: Normal appearance. He is well-developed. He is obese.      Interventions: He is not intubated.  HENT:      Head: Normocephalic and atraumatic.      Nose: Nose normal.      Mouth/Throat:      Pharynx: Oropharynx is clear.   Eyes:      Extraocular Movements: Extraocular movements intact.      Conjunctiva/sclera: Conjunctivae normal.      Pupils: Pupils are equal, round, and reactive to light.   Cardiovascular:      Rate and Rhythm: Normal rate and regular rhythm.      Heart sounds: Normal heart sounds. No murmur heard.    No friction rub.   Pulmonary:      Effort: Pulmonary effort is normal. He is not intubated.      Breath sounds: Normal breath sounds.   Abdominal:      General: Abdomen is flat. Bowel sounds are normal.      Palpations: Abdomen is soft.   Musculoskeletal:         General: Normal range of motion.      Cervical back: Normal range of motion and neck supple.      Right lower leg: No edema.      Left lower leg: No edema.   Skin:     General: Skin is warm " and dry.      Capillary Refill: Capillary refill takes less than 2 seconds.   Neurological:      Mental Status: He is alert. Mental status is at baseline.   Psychiatric:         Behavior: Behavior is uncooperative.       Significant Labs: All pertinent labs within the past 24 hours have been reviewed.  BMP:   Recent Labs   Lab 10/28/22  0254   GLU 97      K 3.7   *   CO2 26   BUN 6*   CREATININE 0.72   CALCIUM 8.5     CBC:   Recent Labs   Lab 10/28/22  0254   WBC 7.26   HGB 8.3*   HCT 25.6*          Significant Imaging: I have reviewed all pertinent imaging results/findings within the past 24 hours.      Assessment/Plan:      * Bacteremia  Secondary to UTI  Treated with IVFs, BC x2 Gram positive - coagulase negative Staphylococcus, other than Staph epi and staph lugdunensis  Patient off of Levophed cultures grew out Gram-positive cocci Staph epidermidis and grew out Gram-negative tyrone of the urine continue current antibiotics vanc was added 10/18  PICC line placed   Blood Cx x2 showed no growth on 10/23, therefore Vancomycin d/c.  Wound Cx on 10/23 grew pseudomonas sensitive to Zosyn, Zosyn added  Zosyn to be on 10-14 days per Infectious Disease consult, Zosyn to end 11/2 (10 days) or 11/6 (14 days)    Suspected deep tissue injury  Wound care consulted       Bipolar disorder  Continuing home risperidone       Acute deep vein thrombosis (DVT) of both lower extremities  Hx of DVT -- US 10/17 with right popliteal nonocclusive thrombosis - start wt based renally dosed lovenox   Patient was bleeding from PICC line and sacral wound  Lovenox held 10/22 & 10/23, restarted on Monday 10/24/2022  Bleeding noticed again today (10/26), Lovenox held 10/26  Will continue to monitor    Urinary tract infection with hematuria  Pt has a chronic anne at nursing home.   Will make sure it has been changed this admission   - urine culture with >100,000 GNB  - Cultures sensitive to merrem   -  Patient completed 4 days of  Merrem and vancomycin on 10/22       Hypertension  Losartan 100mg Qd  Lopressor 12.5mg BID      Diabetes mellitus  POCT glucose checks  Sliding scale insulin   Acceptable control    Pressure ulcer of sacral region, stage 4  - Wound care consulted  - wound Cx grew pseudomonas sensitive to Zosyn  - Zosyn 4.5g IV Q8H  - Surgery consulted via wound care, guidance appreciated         VTE Risk Mitigation (From admission, onward)    None          Discharge Planning   YOSSI: 10/19/2022     Code Status: DNR   Is the patient medically ready for discharge?:     Reason for patient still in hospital (select all that apply): Treatment  Discharge Plan A: Return to nursing home                  Hipolito Nance Jr, MD  Department of Hospital Medicine   [facility]

## 2022-10-29 NOTE — ASSESSMENT & PLAN NOTE
Pt has a chronic anne at nursing home.   Will make sure it has been changed this admission   - urine culture with >100,000 GNB  - Cultures sensitive to merrem   -  Patient completed 4 days of Merrem and vancomycin on 10/22     10/29 - Adequately treated.  Remains on abx for wound.  Sepsis syndrome resoved.

## 2022-10-29 NOTE — SUBJECTIVE & OBJECTIVE
Interval History: Wont talk to me but smiles at me today. Eating well.     Review of Systems   Unable to perform ROS: Patient nonverbal   Objective:     Vital Signs (Most Recent):  Temp: 98.6 °F (37 °C) (10/29/22 1200)  Pulse: 65 (10/29/22 1316)  Resp: 18 (10/29/22 1316)  BP: (!) 140/69 (10/29/22 1200)  SpO2: 96 % (10/29/22 1316)   Vital Signs (24h Range):  Temp:  [98.1 °F (36.7 °C)-98.6 °F (37 °C)] 98.6 °F (37 °C)  Pulse:  [55-77] 65  Resp:  [17-20] 18  SpO2:  [96 %-100 %] 96 %  BP: (140-182)/(69-86) 140/69     Weight: 102.5 kg (225 lb 15.5 oz)  Body mass index is 30.65 kg/m².    Intake/Output Summary (Last 24 hours) at 10/29/2022 1437  Last data filed at 10/29/2022 1416  Gross per 24 hour   Intake 320 ml   Output --   Net 320 ml      Physical Exam  Vitals reviewed.   Constitutional:       General: He is awake. He is not in acute distress.     Appearance: Normal appearance. He is well-developed. He is obese.      Interventions: He is not intubated.  HENT:      Head: Normocephalic and atraumatic.      Nose: Nose normal.      Mouth/Throat:      Pharynx: Oropharynx is clear.   Eyes:      Extraocular Movements: Extraocular movements intact.      Conjunctiva/sclera: Conjunctivae normal.      Pupils: Pupils are equal, round, and reactive to light.   Cardiovascular:      Rate and Rhythm: Normal rate and regular rhythm.      Heart sounds: Normal heart sounds. No murmur heard.    No friction rub.   Pulmonary:      Effort: Pulmonary effort is normal. He is not intubated.      Breath sounds: Normal breath sounds.   Abdominal:      General: Abdomen is flat. Bowel sounds are normal.      Palpations: Abdomen is soft.   Musculoskeletal:         General: Normal range of motion.      Cervical back: Normal range of motion and neck supple.      Right lower leg: No edema.      Left lower leg: No edema.   Skin:     General: Skin is warm and dry.      Capillary Refill: Capillary refill takes less than 2 seconds.   Neurological:       Mental Status: He is alert. Mental status is at baseline.   Psychiatric:         Behavior: Behavior is uncooperative.       Significant Labs: All pertinent labs within the past 24 hours have been reviewed.    Significant Imaging: I have reviewed all pertinent imaging results/findings within the past 24 hours.

## 2022-10-29 NOTE — PROGRESS NOTES
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 He presents to [facility] with a UTI that was found on hospital admission on 10/17/2022. Patient was sent to the [facility] ED via EMS secondary to decreased level of consciousness with hematuria.  No associated symptoms such as fever chills cough wheezing chest pain palpitation lower extremity edema nausea vomiting abdominal pain were reported.  No other history could be gathered at this time this patient is somnolent and confused and was only able to state his name only at this time.  On initial presentation, patient was hypotensive with systolic blood pressure in the 60s and tachycardic.  Ensuing workup was notable for leukocytosis with left shift, elevated lactic acid level, hyperglycemia, acute renal failure, and UA highly suggestive of a presence of urinary tract infection.  Patient did not respond favorably to IV fluid bolus of 30 cc/kg and was admitted to ICU for vasopressor support.  Patient's code status is DNR according to nursing home records. After IVF, antibiotics and pressor treatment she was downgraded from the ICU on 10/19/2022 and now admitted the [facility].     The patient was admitted to the [facility] to Family Medicine Service under the direct  supervision Dr. Lee Ann SIMMONS for continued care and medical management.       Overview/Hospital Course:  No notes on file    Interval History: Wont talk to me but smiles at me today. Eating well.     Review of Systems   Unable to perform ROS: Patient nonverbal   Objective:     Vital Signs (Most Recent):  Temp: 98.6 °F (37 °C) (10/29/22 1200)  Pulse: 65 (10/29/22 1316)  Resp: 18 (10/29/22 1316)  BP: (!) 140/69 (10/29/22 1200)  SpO2: 96 % (10/29/22 1316)   Vital Signs (24h Range):  Temp:  [98.1 °F (36.7 °C)-98.6 °F (37 °C)] 98.6 °F (37 °C)  Pulse:  [55-77] 65  Resp:  [17-20] 18  SpO2:  [96 %-100 %] 96 %  BP: (140-182)/(69-86) 140/69     Weight: 102.5 kg (225 lb 15.5 oz)  Body mass index is 30.65 kg/m².    Intake/Output Summary (Last 24 hours) at 10/29/2022 1437  Last data filed at 10/29/2022 1416  Gross per 24 hour   Intake 320 ml   Output --   Net 320 ml      Physical Exam  Vitals reviewed.   Constitutional:       General: He is awake. He is not in acute distress.     Appearance: Normal appearance. He is well-developed. He is obese.      Interventions: He is not intubated.  HENT:      Head: Normocephalic and atraumatic.      Nose: Nose normal.      Mouth/Throat:      Pharynx: Oropharynx is clear.   Eyes:      Extraocular Movements: Extraocular movements intact.      Conjunctiva/sclera: Conjunctivae normal.      Pupils: Pupils are equal, round, and reactive to light.   Cardiovascular:      Rate and Rhythm: Normal rate and regular rhythm.      Heart sounds: Normal heart sounds. No murmur heard.    No friction rub.   Pulmonary:      Effort: Pulmonary effort is normal. He is not intubated.      Breath sounds: Normal breath sounds.   Abdominal:      General: Abdomen is flat. Bowel sounds are normal.      Palpations: Abdomen is soft.   Musculoskeletal:         General: Normal range of motion.      Cervical back: Normal range of motion and neck supple.      Right lower leg: No edema.      Left lower leg: No edema.   Skin:      General: Skin is warm and dry.      Capillary Refill: Capillary refill takes less than 2 seconds.   Neurological:      Mental Status: He is alert. Mental status is at baseline.   Psychiatric:         Behavior: Behavior is uncooperative.       Significant Labs: All pertinent labs within the past 24 hours have been reviewed.    Significant Imaging: I have reviewed all pertinent imaging results/findings within the past 24 hours.      Assessment/Plan:      Urinary tract infection with hematuria  Pt has a chronic anne at nursing home.   Will make sure it has been changed this admission   - urine culture with >100,000 GNB  - Cultures sensitive to merrem   -  Patient completed 4 days of Merrem and vancomycin on 10/22     10/29 - Adequately treated.  Remains on abx for wound.  Sepsis syndrome resoved.       Pressure ulcer of sacral region, stage 4  - Wound care consulted  - wound Cx grew pseudomonas sensitive to Zosyn  - Zosyn 4.5g IV Q8H  - Surgery consulted via wound care, guidance appreciated       Diabetes mellitus  POCT glucose checks  Sliding scale insulin   Acceptable control    Suspected deep tissue injury  Wound care consulted       Bipolar disorder  Continuing home risperidone .  Patient also with schizophrenia.        Acute deep vein thrombosis (DVT) of both lower extremities  Hx of DVT -- US 10/17 with right popliteal nonocclusive thrombosis - start wt based renally dosed lovenox   Patient was bleeding from PICC line and sacral wound  Lovenox held 10/22 & 10/23, restarted on Monday 10/24/2022  Bleeding noticed again today (10/26), Lovenox held 10/26  Will continue to monitor    10/29 - Will resume lovenox at 40 (DVT ppx dose). It is not clear that DVTs are acute. Bleeding complications precludes full anticoagulation.     Hypertension  Losartan 100mg Qd  Lopressor 12.5mg BID        VTE Risk Mitigation (From admission, onward)    None          Discharge Planning   YOSSI: 10/19/2022     Code Status: DNR   Is the  patient medically ready for discharge?:     Reason for patient still in hospital (select all that apply): Treatment  Discharge Plan A: Return to nursing home                  Hipolito Nance Jr, MD  Department of Hospital Medicine   [facility]

## 2022-10-29 NOTE — PLAN OF CARE
Problem: Infection  Goal: Absence of Infection Signs and Symptoms  Outcome: Ongoing, Progressing     Problem: Fall Injury Risk  Goal: Absence of Fall and Fall-Related Injury  Outcome: Ongoing, Progressing     Problem: Adult Inpatient Plan of Care  Goal: Patient-Specific Goal (Individualized)  Outcome: Ongoing, Progressing  Goal: Optimal Comfort and Wellbeing  Outcome: Ongoing, Progressing

## 2022-10-30 LAB
GLUCOSE SERPL-MCNC: 100 MG/DL (ref 70–105)
GLUCOSE SERPL-MCNC: 104 MG/DL (ref 70–105)
GLUCOSE SERPL-MCNC: 109 MG/DL (ref 70–105)

## 2022-10-30 PROCEDURE — 94640 AIRWAY INHALATION TREATMENT: CPT

## 2022-10-30 PROCEDURE — 99232 PR SUBSEQUENT HOSPITAL CARE,LEVL II: ICD-10-PCS | Mod: ,,, | Performed by: FAMILY MEDICINE

## 2022-10-30 PROCEDURE — 99232 SBSQ HOSP IP/OBS MODERATE 35: CPT | Mod: ,,, | Performed by: FAMILY MEDICINE

## 2022-10-30 PROCEDURE — 25000242 PHARM REV CODE 250 ALT 637 W/ HCPCS: Performed by: NURSE PRACTITIONER

## 2022-10-30 PROCEDURE — 63600175 PHARM REV CODE 636 W HCPCS: Performed by: FAMILY MEDICINE

## 2022-10-30 PROCEDURE — 25000003 PHARM REV CODE 250: Performed by: FAMILY MEDICINE

## 2022-10-30 PROCEDURE — 82962 GLUCOSE BLOOD TEST: CPT

## 2022-10-30 PROCEDURE — 11000001 HC ACUTE MED/SURG PRIVATE ROOM

## 2022-10-30 PROCEDURE — 63600175 PHARM REV CODE 636 W HCPCS

## 2022-10-30 PROCEDURE — 25000003 PHARM REV CODE 250

## 2022-10-30 RX ADMIN — LOSARTAN POTASSIUM 100 MG: 100 TABLET, FILM COATED ORAL at 08:10

## 2022-10-30 RX ADMIN — PANTOPRAZOLE SODIUM 40 MG: 40 GRANULE, DELAYED RELEASE ORAL at 08:10

## 2022-10-30 RX ADMIN — METOPROLOL TARTRATE 12.5 MG: 25 TABLET, FILM COATED ORAL at 08:10

## 2022-10-30 RX ADMIN — IPRATROPIUM BROMIDE AND ALBUTEROL SULFATE 3 ML: 2.5; .5 SOLUTION RESPIRATORY (INHALATION) at 07:10

## 2022-10-30 RX ADMIN — IPRATROPIUM BROMIDE AND ALBUTEROL SULFATE 3 ML: 2.5; .5 SOLUTION RESPIRATORY (INHALATION) at 01:10

## 2022-10-30 RX ADMIN — PIPERACILLIN AND TAZOBACTAM 4.5 G: 4; .5 INJECTION, POWDER, LYOPHILIZED, FOR SOLUTION INTRAVENOUS at 08:10

## 2022-10-30 RX ADMIN — IPRATROPIUM BROMIDE AND ALBUTEROL SULFATE 3 ML: 2.5; .5 SOLUTION RESPIRATORY (INHALATION) at 12:10

## 2022-10-30 RX ADMIN — PIPERACILLIN AND TAZOBACTAM 4.5 G: 4; .5 INJECTION, POWDER, LYOPHILIZED, FOR SOLUTION INTRAVENOUS at 11:10

## 2022-10-30 RX ADMIN — RISPERIDONE 1 MG: 1 TABLET ORAL at 08:10

## 2022-10-30 RX ADMIN — PIPERACILLIN AND TAZOBACTAM 4.5 G: 4; .5 INJECTION, POWDER, LYOPHILIZED, FOR SOLUTION INTRAVENOUS at 04:10

## 2022-10-30 RX ADMIN — ENOXAPARIN SODIUM 40 MG: 100 INJECTION SUBCUTANEOUS at 04:10

## 2022-10-30 RX ADMIN — LEVOTHYROXINE SODIUM 50 MCG: 0.05 TABLET ORAL at 05:10

## 2022-10-30 NOTE — PROGRESS NOTES
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 He presents to [facility] with a UTI that was found on hospital admission on 10/17/2022. Patient was sent to the [facility] ED via EMS secondary to decreased level of consciousness with hematuria.  No associated symptoms such as fever chills cough wheezing chest pain palpitation lower extremity edema nausea vomiting abdominal pain were reported.  No other history could be gathered at this time this patient is somnolent and confused and was only able to state his name only at this time.  On initial presentation, patient was hypotensive with systolic blood pressure in the 60s and tachycardic.  Ensuing workup was notable for leukocytosis with left shift, elevated lactic acid level, hyperglycemia, acute renal failure, and UA highly suggestive of a presence of urinary tract infection.  Patient did not respond favorably to IV fluid bolus of 30 cc/kg and was admitted to ICU for vasopressor support.  Patient's code status is DNR according to nursing home records. After IVF, antibiotics and pressor treatment she was downgraded from the ICU on 10/19/2022 and now admitted the [facility].     The patient was admitted to the [facility] to Family Medicine Service under the direct  supervision Dr. Lee Ann SIMMONS for continued care and medical management.       Overview/Hospital Course:  No notes on file    Interval History: Not verbal with me today.  Sitting up and eating well (fed by CNA).      Review of Systems   Unable to perform ROS: Patient nonverbal   Objective:     Vital Signs (Most Recent):  Temp: 97.7 °F (36.5 °C) (10/30/22 1200)  Pulse: 61 (10/30/22 1301)  Resp: 18 (10/30/22 1301)  BP: (!) 182/91 (10/30/22 1200)  SpO2: 100 % (10/30/22 1301)   Vital Signs (24h Range):  Temp:  [97.7 °F (36.5 °C)-99.1 °F (37.3 °C)] 97.7 °F (36.5 °C)  Pulse:  [61-94] 61  Resp:  [18-20] 18  SpO2:  [90 %-100 %] 100 %  BP: (141-182)/(63-91) 182/91     Weight: 99.1 kg (218 lb 7.6 oz)  Body mass index is 29.63 kg/m².    Intake/Output Summary (Last 24 hours) at 10/30/2022 1431  Last data filed at 10/30/2022 0946  Gross per 24 hour   Intake 840 ml   Output 850 ml   Net -10 ml      Physical Exam  Vitals reviewed.   Constitutional:       General: He is not in acute distress.     Appearance: He is not toxic-appearing.   HENT:      Head: Normocephalic and atraumatic.   Cardiovascular:      Rate and Rhythm: Normal rate and regular rhythm.      Heart sounds: Normal heart sounds.   Pulmonary:      Effort: Pulmonary effort is normal. No respiratory distress.      Breath sounds: Normal breath sounds.   Abdominal:      General: Abdomen is flat. Bowel sounds are normal. There is no distension.      Palpations: Abdomen is soft.      Tenderness: There is no abdominal tenderness.   Skin:     General: Skin is warm and dry.      Coloration: Skin is not jaundiced.   Neurological:      Mental Status: He is alert.       Significant Labs: All pertinent labs within the past 24 hours have been reviewed.  BMP: No results for input(s): GLU, NA, K, CL, CO2, BUN, CREATININE, CALCIUM, MG in the last 48 hours.  CBC: No results for input(s): WBC, HGB, HCT, PLT in the last 48 hours.    Significant Imaging: I have reviewed all pertinent imaging  results/findings within the past 24 hours.      Assessment/Plan:      Pressure ulcer of sacral region, stage 4  - Wound care consulted  - wound Cx grew pseudomonas sensitive to Zosyn  - Zosyn 4.5g IV Q8H  - Surgery consulted via wound care, guidance appreciated       Urinary tract infection with hematuria  Pt has a chronic anne at nursing home.   Will make sure it has been changed this admission   - urine culture with >100,000 GNB  - Cultures sensitive to merrem   -  Patient completed 4 days of Merrem and vancomycin on 10/22     10/29 - Adequately treated.  Remains on abx for wound.  Sepsis syndrome resoved.       Acute deep vein thrombosis (DVT) of both lower extremities  Hx of DVT -- US 10/17 with right popliteal nonocclusive thrombosis - start wt based renally dosed lovenox   Patient was bleeding from PICC line and sacral wound  Lovenox held 10/22 & 10/23, restarted on Monday 10/24/2022  Bleeding noticed again today (10/26), Lovenox held 10/26  Will continue to monitor    10/29 - Will resume lovenox at 40 (DVT ppx dose). It is not clear that DVTs are acute. Bleeding complications (gross hematuria and wound bleeding) preclude full anticoagulation.     Diabetes mellitus  POCT glucose checks  Sliding scale insulin   Acceptable control    Suspected deep tissue injury  Wound care consulted       Bipolar disorder  Continuing home risperidone .  Patient also with schizophrenia.        Hypertension  Losartan 100mg Qd  Lopressor 12.5mg BID        VTE Risk Mitigation (From admission, onward)         Ordered     enoxaparin injection 40 mg  Daily         10/29/22 0825                Discharge Planning   YOSSI: 10/19/2022     Code Status: DNR   Is the patient medically ready for discharge?:     Reason for patient still in hospital (select all that apply): Treatment  Discharge Plan A: Return to nursing home                  Hipolito Nance Jr, MD  Department of Hospital Medicine   [facility]

## 2022-10-30 NOTE — SUBJECTIVE & OBJECTIVE
Interval History: Not verbal with me today.  Sitting up and eating well (fed by CNA).      Review of Systems   Unable to perform ROS: Patient nonverbal   Objective:     Vital Signs (Most Recent):  Temp: 97.7 °F (36.5 °C) (10/30/22 1200)  Pulse: 61 (10/30/22 1301)  Resp: 18 (10/30/22 1301)  BP: (!) 182/91 (10/30/22 1200)  SpO2: 100 % (10/30/22 1301)   Vital Signs (24h Range):  Temp:  [97.7 °F (36.5 °C)-99.1 °F (37.3 °C)] 97.7 °F (36.5 °C)  Pulse:  [61-94] 61  Resp:  [18-20] 18  SpO2:  [90 %-100 %] 100 %  BP: (141-182)/(63-91) 182/91     Weight: 99.1 kg (218 lb 7.6 oz)  Body mass index is 29.63 kg/m².    Intake/Output Summary (Last 24 hours) at 10/30/2022 1431  Last data filed at 10/30/2022 0946  Gross per 24 hour   Intake 840 ml   Output 850 ml   Net -10 ml      Physical Exam  Vitals reviewed.   Constitutional:       General: He is not in acute distress.     Appearance: He is not toxic-appearing.   HENT:      Head: Normocephalic and atraumatic.   Cardiovascular:      Rate and Rhythm: Normal rate and regular rhythm.      Heart sounds: Normal heart sounds.   Pulmonary:      Effort: Pulmonary effort is normal. No respiratory distress.      Breath sounds: Normal breath sounds.   Abdominal:      General: Abdomen is flat. Bowel sounds are normal. There is no distension.      Palpations: Abdomen is soft.      Tenderness: There is no abdominal tenderness.   Skin:     General: Skin is warm and dry.      Coloration: Skin is not jaundiced.   Neurological:      Mental Status: He is alert.       Significant Labs: All pertinent labs within the past 24 hours have been reviewed.  BMP: No results for input(s): GLU, NA, K, CL, CO2, BUN, CREATININE, CALCIUM, MG in the last 48 hours.  CBC: No results for input(s): WBC, HGB, HCT, PLT in the last 48 hours.    Significant Imaging: I have reviewed all pertinent imaging results/findings within the past 24 hours.

## 2022-10-30 NOTE — ASSESSMENT & PLAN NOTE
Hx of DVT -- US 10/17 with right popliteal nonocclusive thrombosis - start wt based renally dosed lovenox   Patient was bleeding from PICC line and sacral wound  Lovenox held 10/22 & 10/23, restarted on Monday 10/24/2022  Bleeding noticed again today (10/26), Lovenox held 10/26  Will continue to monitor    10/29 - Will resume lovenox at 40 (DVT ppx dose). It is not clear that DVTs are acute. Bleeding complications (gross hematuria and wound bleeding) preclude full anticoagulation.

## 2022-10-31 LAB
ANION GAP SERPL CALCULATED.3IONS-SCNC: 10 MMOL/L (ref 7–16)
BASOPHILS # BLD AUTO: 0.02 K/UL (ref 0–0.2)
BASOPHILS NFR BLD AUTO: 0.3 % (ref 0–1)
BUN SERPL-MCNC: 6 MG/DL (ref 7–18)
BUN/CREAT SERPL: 8 (ref 6–20)
CALCIUM SERPL-MCNC: 8.4 MG/DL (ref 8.5–10.1)
CHLORIDE SERPL-SCNC: 111 MMOL/L (ref 98–107)
CO2 SERPL-SCNC: 25 MMOL/L (ref 21–32)
CREAT SERPL-MCNC: 0.72 MG/DL (ref 0.7–1.3)
DIFFERENTIAL METHOD BLD: ABNORMAL
EGFR (NO RACE VARIABLE) (RUSH/TITUS): 91 ML/MIN/1.73M²
EOSINOPHIL # BLD AUTO: 0.37 K/UL (ref 0–0.5)
EOSINOPHIL NFR BLD AUTO: 5.7 % (ref 1–4)
ERYTHROCYTE [DISTWIDTH] IN BLOOD BY AUTOMATED COUNT: 17.7 % (ref 11.5–14.5)
GLUCOSE SERPL-MCNC: 107 MG/DL (ref 70–105)
GLUCOSE SERPL-MCNC: 111 MG/DL (ref 70–105)
GLUCOSE SERPL-MCNC: 116 MG/DL (ref 74–106)
GLUCOSE SERPL-MCNC: 123 MG/DL (ref 70–105)
GLUCOSE SERPL-MCNC: 127 MG/DL (ref 70–105)
GLUCOSE SERPL-MCNC: 129 MG/DL (ref 70–105)
HCT VFR BLD AUTO: 27.3 % (ref 40–54)
HGB BLD-MCNC: 8.6 G/DL (ref 13.5–18)
IMM GRANULOCYTES # BLD AUTO: 0.02 K/UL (ref 0–0.04)
IMM GRANULOCYTES NFR BLD: 0.3 % (ref 0–0.4)
LYMPHOCYTES # BLD AUTO: 1.16 K/UL (ref 1–4.8)
LYMPHOCYTES NFR BLD AUTO: 17.8 % (ref 27–41)
MCH RBC QN AUTO: 27.2 PG (ref 27–31)
MCHC RBC AUTO-ENTMCNC: 31.5 G/DL (ref 32–36)
MCV RBC AUTO: 86.4 FL (ref 80–96)
MONOCYTES # BLD AUTO: 0.57 K/UL (ref 0–0.8)
MONOCYTES NFR BLD AUTO: 8.7 % (ref 2–6)
MPC BLD CALC-MCNC: 8.8 FL (ref 9.4–12.4)
NEUTROPHILS # BLD AUTO: 4.39 K/UL (ref 1.8–7.7)
NEUTROPHILS NFR BLD AUTO: 67.2 % (ref 53–65)
NRBC # BLD AUTO: 0 X10E3/UL
NRBC, AUTO (.00): 0 %
PLATELET # BLD AUTO: 522 K/UL (ref 150–400)
POTASSIUM SERPL-SCNC: 3.4 MMOL/L (ref 3.5–5.1)
RBC # BLD AUTO: 3.16 M/UL (ref 4.6–6.2)
SODIUM SERPL-SCNC: 143 MMOL/L (ref 136–145)
WBC # BLD AUTO: 6.53 K/UL (ref 4.5–11)

## 2022-10-31 PROCEDURE — 25000003 PHARM REV CODE 250

## 2022-10-31 PROCEDURE — 99232 SBSQ HOSP IP/OBS MODERATE 35: CPT | Mod: ,,, | Performed by: NURSE PRACTITIONER

## 2022-10-31 PROCEDURE — 80048 BASIC METABOLIC PNL TOTAL CA: CPT | Performed by: HOSPITALIST

## 2022-10-31 PROCEDURE — 82962 GLUCOSE BLOOD TEST: CPT

## 2022-10-31 PROCEDURE — 63600175 PHARM REV CODE 636 W HCPCS

## 2022-10-31 PROCEDURE — 85025 COMPLETE CBC W/AUTO DIFF WBC: CPT | Performed by: HOSPITALIST

## 2022-10-31 PROCEDURE — 36415 COLL VENOUS BLD VENIPUNCTURE: CPT | Performed by: HOSPITALIST

## 2022-10-31 PROCEDURE — 99900035 HC TECH TIME PER 15 MIN (STAT)

## 2022-10-31 PROCEDURE — 94761 N-INVAS EAR/PLS OXIMETRY MLT: CPT

## 2022-10-31 PROCEDURE — 94640 AIRWAY INHALATION TREATMENT: CPT

## 2022-10-31 PROCEDURE — 99232 SBSQ HOSP IP/OBS MODERATE 35: CPT | Mod: ,,, | Performed by: FAMILY MEDICINE

## 2022-10-31 PROCEDURE — 25000003 PHARM REV CODE 250: Performed by: FAMILY MEDICINE

## 2022-10-31 PROCEDURE — 99232 PR SUBSEQUENT HOSPITAL CARE,LEVL II: ICD-10-PCS | Mod: ,,, | Performed by: FAMILY MEDICINE

## 2022-10-31 PROCEDURE — 25000242 PHARM REV CODE 250 ALT 637 W/ HCPCS: Performed by: NURSE PRACTITIONER

## 2022-10-31 PROCEDURE — 99232 PR SUBSEQUENT HOSPITAL CARE,LEVL II: ICD-10-PCS | Mod: ,,, | Performed by: NURSE PRACTITIONER

## 2022-10-31 PROCEDURE — 63600175 PHARM REV CODE 636 W HCPCS: Performed by: FAMILY MEDICINE

## 2022-10-31 PROCEDURE — 11000001 HC ACUTE MED/SURG PRIVATE ROOM

## 2022-10-31 RX ORDER — POTASSIUM CHLORIDE 750 MG/1
10 TABLET, EXTENDED RELEASE ORAL 2 TIMES DAILY
Status: DISCONTINUED | OUTPATIENT
Start: 2022-10-31 | End: 2022-11-01

## 2022-10-31 RX ADMIN — APIXABAN 2.5 MG: 2.5 TABLET, FILM COATED ORAL at 08:10

## 2022-10-31 RX ADMIN — LOSARTAN POTASSIUM 100 MG: 100 TABLET, FILM COATED ORAL at 09:10

## 2022-10-31 RX ADMIN — PIPERACILLIN AND TAZOBACTAM 4.5 G: 4; .5 INJECTION, POWDER, LYOPHILIZED, FOR SOLUTION INTRAVENOUS at 03:10

## 2022-10-31 RX ADMIN — METOPROLOL TARTRATE 12.5 MG: 25 TABLET, FILM COATED ORAL at 09:10

## 2022-10-31 RX ADMIN — PIPERACILLIN AND TAZOBACTAM 4.5 G: 4; .5 INJECTION, POWDER, LYOPHILIZED, FOR SOLUTION INTRAVENOUS at 08:10

## 2022-10-31 RX ADMIN — IPRATROPIUM BROMIDE AND ALBUTEROL SULFATE 3 ML: 2.5; .5 SOLUTION RESPIRATORY (INHALATION) at 07:10

## 2022-10-31 RX ADMIN — IPRATROPIUM BROMIDE AND ALBUTEROL SULFATE 3 ML: 2.5; .5 SOLUTION RESPIRATORY (INHALATION) at 01:10

## 2022-10-31 RX ADMIN — LEVOTHYROXINE SODIUM 50 MCG: 0.05 TABLET ORAL at 05:10

## 2022-10-31 RX ADMIN — METOPROLOL TARTRATE 12.5 MG: 25 TABLET, FILM COATED ORAL at 08:10

## 2022-10-31 RX ADMIN — PANTOPRAZOLE SODIUM 40 MG: 40 GRANULE, DELAYED RELEASE ORAL at 09:10

## 2022-10-31 RX ADMIN — RISPERIDONE 1 MG: 1 TABLET ORAL at 08:10

## 2022-10-31 RX ADMIN — RISPERIDONE 1 MG: 1 TABLET ORAL at 09:10

## 2022-10-31 RX ADMIN — PIPERACILLIN AND TAZOBACTAM 4.5 G: 4; .5 INJECTION, POWDER, LYOPHILIZED, FOR SOLUTION INTRAVENOUS at 12:10

## 2022-10-31 RX ADMIN — POTASSIUM CHLORIDE 10 MEQ: 750 TABLET, FILM COATED, EXTENDED RELEASE ORAL at 08:10

## 2022-10-31 RX ADMIN — ENOXAPARIN SODIUM 40 MG: 100 INJECTION SUBCUTANEOUS at 04:10

## 2022-10-31 RX ADMIN — IPRATROPIUM BROMIDE AND ALBUTEROL SULFATE 3 ML: 2.5; .5 SOLUTION RESPIRATORY (INHALATION) at 12:10

## 2022-10-31 RX ADMIN — IPRATROPIUM BROMIDE AND ALBUTEROL SULFATE 3 ML: 2.5; .5 SOLUTION RESPIRATORY (INHALATION) at 08:10

## 2022-10-31 NOTE — PROGRESS NOTES
[facility]  Wound Care  Progress Note    Patient Name: Bhargav Gao  MRN: [id number]  Admission Date: 10/19/2022  Attending Physician: Hipolito Nance Jr., MD    Past Medical History:   Diagnosis Date    Alzheimer's disease     Anticoagulant long-term use     Bipolar disorder     BPH (benign prostatic hyperplasia)     Deep vein thrombosis (DVT) of popliteal vein of right lower extremity     Diabetes mellitus     DVT of deep femoral vein, left     Hyperlipidemia     Hypertension     Hypothyroidism     Idiopathic orofacial dystonia     Iron deficiency 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    Light Growth Escherichia coli Abnormal    Light Growth Klebsiel    Schizophrenia 1/28/2022 05/16 -continue trileptal and risperidone        Subjective:     HPI:  Bhargav Gao is a 81 yo male with wounds to sacral and right buttock admitted with bacteremia. Bone palpated during exam, no visible bone exposed. Wound to sacral with slough and copious amount of green drainage. Wound to right buttock has improved. Cultures positive for Pseudomonas aeruginosa and yeast. He also has SDTI to toes on left foot. He is at risk for additional skin breakdown  multiple co-morbidities, poor vascular supply, diabetes, edema, heavy drainage, excessive wound moisture and macerated tissue, decreased granulation tissue, necrosis, large surface area, advanced age, fragile skin, and infection. 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 Wound healing is complicated by dementia, limited mobility, diabetes, anemia, and chronic sacral wound.               Review of Systems   Unable to perform ROS: Dementia   Objective:     Vital Signs (Most Recent):  Temp: 97.7 °F (36.5 °C) (10/31/22 1200)  Pulse: 63 (10/31/22 1328)  Resp: 16 (10/31/22 1328)  BP: (!) 161/95 (10/31/22 1200)  SpO2: 100 % (10/31/22 1328)   Vital Signs (24h Range):  Temp:  [97.7 °F (36.5 °C)-97.8 °F (36.6 °C)] 97.7 °F (36.5 °C)  Pulse:  [63-87] 63  Resp:  [12-20] 16  SpO2:  [99 %-100 %] 100 %  BP: (152-172)/(78-95) 161/95     Weight: 99.1 kg (218 lb 7.6 oz)  Body mass index is 29.63 kg/m².  No data recorded    Physical Exam  Vitals reviewed.   Constitutional:       Appearance: He is ill-appearing.   HENT:      Head: Normocephalic.   Cardiovascular:      Rate and Rhythm: Normal rate and regular rhythm.      Pulses: Normal pulses.      Heart sounds: Normal heart sounds.   Pulmonary:      Effort: Pulmonary effort is normal.      Breath sounds: Normal breath sounds.   Skin:     Findings: Erythema present.      Comments: Wound, LDA for photos/measurements   Neurological:      Mental Status: He is alert.      Cranial Nerves: Cranial nerve deficit present.      Sensory: Sensory deficit present.      Motor: Weakness present.      Gait: Gait abnormal.             Altered Skin Integrity 10/20/22 1000 Sacral spine Ulceration Full thickness tissue loss. Base is covered by slough and/or eschar in the wound bed (Active)   10/20/22 1000   Altered Skin Integrity Present on Admission: yes   Side:    Orientation:    Location: Sacral spine   Wound Number:    Is this injury device related?:    Primary Wound Type: Ulceration   Description of Altered Skin Integrity: Full thickness tissue loss. Base is covered by slough and/or eschar in the wound bed   Ankle-Brachial  Index:    Pulses:    Removal Indication and Assessment:    Wound Outcome:    (Retired) Wound Length (cm):    (Retired) Wound Width (cm):    (Retired) Depth (cm):    Wound Description (Comments):    Removal Indications:    Wound Image     10/24/22 1200   Description of Altered Skin Integrity Full thickness tissue loss. Subcutaneous fat may be visible but bone, tendon or muscle are not exposed 10/24/22 1200   Dressing Appearance Dry;Intact 10/30/22 0800   Drainage Amount Moderate 10/26/22 1200   Drainage Characteristics/Odor Serosanguineous;No odor 10/26/22 1200   Appearance Red;Moist;Tan 10/26/22 1200   Tissue loss description Full thickness 10/26/22 1200   Black (%), Wound Tissue Color 0 % 10/24/22 1200   Yellow (%), Wound Tissue Color 90 % 10/20/22 1608   Periwound Area Moist;Excoriated;Pink 10/26/22 1200   Wound Length (cm) 7 cm 10/24/22 1200   Wound Width (cm) 4 cm 10/24/22 1200   Wound Depth (cm) 2 cm 10/24/22 1200   Wound Volume (cm^3) 56 cm^3 10/24/22 1200   Wound Surface Area (cm^2) 28 cm^2 10/24/22 1200   Care Cleansed with:;Sterile normal saline 10/30/22 1500   Dressing Applied;Gauze, wet to dry 10/30/22 1500   Packing packed with;other (see comment) 10/30/22 1500   Dressing Change Due 10/30/22 10/30/22 1500   Number of days: 11            Altered Skin Integrity 10/20/22 1000 Right Buttocks Skin Tear Partial thickness tissue loss. Shallow open ulcer with a red or pink wound bed, without slough. Intact or Open/Ruptured Serum-filled blister. (Active)   10/20/22 1000   Altered Skin Integrity Present on Admission: yes   Side: Right   Orientation:    Location: Buttocks   Wound Number:    Is this injury device related?:    Primary Wound Type: Skin tear   Description of Altered Skin Integrity: Partial thickness tissue loss. Shallow open ulcer with a red or pink wound bed, without slough. Intact or Open/Ruptured Serum-filled blister.   Ankle-Brachial Index:    Pulses:    Removal Indication and Assessment:    Wound  Outcome:    (Retired) Wound Length (cm):    (Retired) Wound Width (cm):    (Retired) Depth (cm):    Wound Description (Comments):    Removal Indications:    Wound Image    10/24/22 1200   Description of Altered Skin Integrity Partial thickness tissue loss. Shallow open ulcer with a red or pink wound bed, without slough. Intact or Open/Ruptured Serum-filled blister. 10/24/22 1200   Dressing Appearance Dry;Intact;Clean 10/27/22 2000   Drainage Amount Moderate 10/26/22 1200   Drainage Characteristics/Odor Serosanguineous;No odor 10/26/22 1200   Appearance Red;Moist 10/26/22 1200   Tissue loss description Partial thickness 10/26/22 1200   Periwound Area Moist;Excoriated 10/26/22 1200   Wound Edges Defined;Open 10/26/22 1200   Wound Length (cm) 9 cm 10/24/22 1200   Wound Width (cm) 13 cm 10/24/22 1200   Wound Depth (cm) 0 cm 10/24/22 1200   Wound Volume (cm^3) 0 cm^3 10/24/22 1200   Wound Surface Area (cm^2) 117 cm^2 10/24/22 1200   Care Cleansed with:;Sterile normal saline 10/29/22 1901   Dressing Applied;Gauze, wet to dry 10/29/22 1901   Periwound Care Moisture barrier applied;Skin barrier film applied 10/26/22 1200   Dressing Change Due 10/27/22 10/26/22 1200   Number of days: 11            Altered Skin Integrity 10/20/22 1000 Left Toe, first Purple or maroon localized area of discolored intact skin or non-intact skin or a blood-filled blister. (Active)   10/20/22 1000   Altered Skin Integrity Present on Admission: yes   Side: Left   Orientation:    Location: Toe, first   Wound Number:    Is this injury device related?:    Primary Wound Type:    Description of Altered Skin Integrity: Purple or maroon localized area of discolored intact skin or non-intact skin or a blood-filled blister.   Ankle-Brachial Index:    Pulses:    Removal Indication and Assessment:    Wound Outcome:    (Retired) Wound Length (cm):    (Retired) Wound Width (cm):    (Retired) Depth (cm):    Wound Description (Comments):    Removal Indications:     Wound Image    10/24/22 1200   Description of Altered Skin Integrity Purple or maroon localized area of discolored intact skin or non-intact skin or a blood-filled blister. 10/24/22 1200   Dressing Appearance Open to air;Dry 10/27/22 2000   Drainage Amount None 10/24/22 1200   Appearance Maroon;Purple;Dry 10/24/22 1200   Care Cleansed with:;Soap and water 10/24/22 1200   Number of days: 11            Altered Skin Integrity 10/20/22 1000 Left Toe, fifth Purple or maroon localized area of discolored intact skin or non-intact skin or a blood-filled blister. (Active)   10/20/22 1000   Altered Skin Integrity Present on Admission: yes   Side: Left   Orientation:    Location: Toe, fifth   Wound Number:    Is this injury device related?:    Primary Wound Type:    Description of Altered Skin Integrity: Purple or maroon localized area of discolored intact skin or non-intact skin or a blood-filled blister.   Ankle-Brachial Index:    Pulses:    Removal Indication and Assessment:    Wound Outcome:    (Retired) Wound Length (cm):    (Retired) Wound Width (cm):    (Retired) Depth (cm):    Wound Description (Comments):    Removal Indications:    Wound Image    10/24/22 1200   Description of Altered Skin Integrity Purple or maroon localized area of discolored intact skin or non-intact skin or a blood-filled blister. 10/24/22 1200   Dressing Appearance Open to air;Dry 10/27/22 2000   Drainage Amount None 10/24/22 1200   Appearance Purple;Maroon;Moist 10/24/22 1200   Periwound Area Moist 10/24/22 1200   Care Cleansed with:;Soap and water 10/24/22 1200   Number of days: 11         Assessment and Plan      Suspected deep tissue injury  Protect areas with foam dressings  Wound care to change M, W, F  Nursing to assess each shift to ensure no additional breakdown to toes or heels  Waffle boots in bed  Keep pressure off toes  Nursing to perform Bart scale every shift  Turn every 2 hours     Pressure ulcer of sacral region, stage  4  Bedside debridement today, abscess noted to right side of sacral extending to right buttock  Clean with acetic acid  Pack with acetic acid moistened drawtex  Cover and secure with 4x4s, abd pad, and paper tape  Low air loss mattress  Turn every 2 hours  Change daily and PRN for soilage  TAP system          Signature:  LIEN Elise  Wound Care    Date of encounter: 10/31/2022

## 2022-10-31 NOTE — PROGRESS NOTES
[facility] Medicine  Progress Note    Patient Name: Bhargav Gao  MRN: [id number]  Patient Class: IP- Inpatient   Admission Date: 10/19/2022  Length of Stay: 12 days  Attending Physician: Hipolito Nance Jr., MD  Primary Care Provider: Kerry Lin MD        Subjective:     Principal Problem:Bacteremia        HPI:  Patient is on 82-year-old male with multiple medical issues including type 2 diabetes on insulin, essential hypertension, hypothyroidism, iron deficiency anemia, 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 He presents to [facility] with a UTI that was found on hospital admission on 10/17/2022. Patient was sent to the [facility] ED via EMS secondary to decreased level of consciousness with hematuria.  No associated symptoms such as fever chills cough wheezing chest pain palpitation lower extremity edema nausea vomiting abdominal pain were reported.  No other history could be gathered at this time this patient is somnolent and confused and was only able to state his name only at this time.  On initial presentation, patient was hypotensive with systolic blood pressure in the 60s and tachycardic.  Ensuing workup was notable for leukocytosis with left shift, elevated lactic acid level, hyperglycemia, acute renal failure, and UA highly suggestive of a presence of urinary tract infection.  Patient did not respond favorably to IV fluid bolus of 30 cc/kg and was admitted to ICU for vasopressor support.  Patient's code status is DNR according to nursing home records. After IVF, antibiotics and pressor treatment she was downgraded from the ICU on 10/19/2022 and now admitted the [facility].     The patient was admitted to the [facility] to Family Medicine Service under the direct  supervision Dr. LeeA nn SIMMONS for continued care and medical management.       Overview/Hospital Course:  No notes on file    Interval History: Nonverbal with me today but smiles. No reported problems by nursing.     Review of Systems   Unable to perform ROS: Patient nonverbal   Objective:     Vital Signs (Most Recent):  Temp: 97.7 °F (36.5 °C) (10/31/22 1200)  Pulse: 63 (10/31/22 1328)  Resp: 16 (10/31/22 1328)  BP: (!) 161/95 (10/31/22 1200)  SpO2: 100 % (10/31/22 1516)   Vital Signs (24h Range):  Temp:  [97.7 °F (36.5 °C)-97.8 °F (36.6 °C)] 97.7 °F (36.5 °C)  Pulse:  [63-87] 63  Resp:  [12-20] 16  SpO2:  [99 %-100 %] 100 %  BP: (161-172)/(78-95) 161/95     Weight: 99.1 kg (218 lb 7.6 oz)  Body mass index is 29.63 kg/m².    Intake/Output Summary (Last 24 hours) at 10/31/2022 1628  Last data filed at 10/30/2022 1759  Gross per 24 hour   Intake 240 ml   Output --   Net 240 ml      Physical Exam  Vitals reviewed.   Constitutional:       General: He is not in acute distress.     Appearance: He is not toxic-appearing.   HENT:      Head: Normocephalic and atraumatic.   Cardiovascular:      Rate and Rhythm: Normal rate and regular rhythm.      Heart sounds: Normal heart sounds.   Pulmonary:      Effort: Pulmonary effort is normal. No respiratory distress.      Breath sounds: Normal breath sounds.   Abdominal:      General: Abdomen is flat. Bowel sounds are normal. There is no distension.      Palpations: Abdomen is soft.      Tenderness: There is no abdominal tenderness.   Skin:     General: Skin is warm and dry.      Coloration: Skin is not jaundiced.   Neurological:      Mental Status: He is alert.       Significant Labs: All pertinent labs within the past 24 hours have been reviewed.  BMP:   Recent Labs   Lab 10/31/22  0317   *      K 3.4*   *   CO2 25   BUN 6*   CREATININE 0.72   CALCIUM 8.4*       Significant Imaging: I have reviewed all pertinent imaging results/findings within the past 24  hours.      Assessment/Plan:      Pressure ulcer of sacral region, stage 4  - Wound care consulted  - wound Cx grew pseudomonas sensitive to Zosyn  - Zosyn 4.5g IV Q8H  - Surgery consulted via wound care, guidance appreciated     10/31 - Still a little greenish drainage but improved.  Will give IV abx another 24-48 hours, continue wound care.  Begin discharge planning.     Urinary tract infection with hematuria  Pt has a chronic anne at nursing home.   Will make sure it has been changed this admission   - urine culture with >100,000 GNB  - Cultures sensitive to merrem   -  Patient completed 4 days of Merrem and vancomycin on 10/22     10/29 - Adequately treated.  Remains on abx for wound.  Sepsis syndrome resoved.       Acute deep vein thrombosis (DVT) of both lower extremities  Hx of DVT -- US 10/17 with right popliteal nonocclusive thrombosis - start wt based renally dosed lovenox   Patient was bleeding from PICC line and sacral wound  Lovenox held 10/22 & 10/23, restarted on Monday 10/24/2022  Bleeding noticed again today (10/26), Lovenox held 10/26  Will continue to monitor    10/29 - Will resume lovenox at 40 (DVT ppx dose). It is not clear that DVTs are acute. Bleeding complications (gross hematuria and wound bleeding) preclude full anticoagulation.     10/31 - No bleeding in a couple of days on DVT dose lovenox. Will d/c lovenox and resume eliquis at 2.5 BID    Diabetes mellitus  POCT glucose checks  Sliding scale insulin   Acceptable control    Suspected deep tissue injury  Wound care consulted       Bipolar disorder  Continuing home risperidone .  Patient also with schizophrenia.        Hypertension  Losartan 100mg Qd  Lopressor 12.5mg BID        VTE Risk Mitigation (From admission, onward)         Ordered     enoxaparin injection 40 mg  Daily         10/29/22 1445                Discharge Planning   YOSSI: 10/19/2022     Code Status: DNR   Is the patient medically ready for discharge?:     Reason for  patient still in hospital (select all that apply): Treatment  Discharge Plan A: Return to nursing home                  Hipolito Nance Jr, MD  Department of Hospital Medicine   [facility]

## 2022-10-31 NOTE — SUBJECTIVE & OBJECTIVE
Interval History: Nonverbal with me today but smiles. No reported problems by nursing.     Review of Systems   Unable to perform ROS: Patient nonverbal   Objective:     Vital Signs (Most Recent):  Temp: 97.7 °F (36.5 °C) (10/31/22 1200)  Pulse: 63 (10/31/22 1328)  Resp: 16 (10/31/22 1328)  BP: (!) 161/95 (10/31/22 1200)  SpO2: 100 % (10/31/22 1516)   Vital Signs (24h Range):  Temp:  [97.7 °F (36.5 °C)-97.8 °F (36.6 °C)] 97.7 °F (36.5 °C)  Pulse:  [63-87] 63  Resp:  [12-20] 16  SpO2:  [99 %-100 %] 100 %  BP: (161-172)/(78-95) 161/95     Weight: 99.1 kg (218 lb 7.6 oz)  Body mass index is 29.63 kg/m².    Intake/Output Summary (Last 24 hours) at 10/31/2022 1628  Last data filed at 10/30/2022 1759  Gross per 24 hour   Intake 240 ml   Output --   Net 240 ml      Physical Exam  Vitals reviewed.   Constitutional:       General: He is not in acute distress.     Appearance: He is not toxic-appearing.   HENT:      Head: Normocephalic and atraumatic.   Cardiovascular:      Rate and Rhythm: Normal rate and regular rhythm.      Heart sounds: Normal heart sounds.   Pulmonary:      Effort: Pulmonary effort is normal. No respiratory distress.      Breath sounds: Normal breath sounds.   Abdominal:      General: Abdomen is flat. Bowel sounds are normal. There is no distension.      Palpations: Abdomen is soft.      Tenderness: There is no abdominal tenderness.   Skin:     General: Skin is warm and dry.      Coloration: Skin is not jaundiced.   Neurological:      Mental Status: He is alert.       Significant Labs: All pertinent labs within the past 24 hours have been reviewed.  BMP:   Recent Labs   Lab 10/31/22  0317   *      K 3.4*   *   CO2 25   BUN 6*   CREATININE 0.72   CALCIUM 8.4*       Significant Imaging: I have reviewed all pertinent imaging results/findings within the past 24 hours.

## 2022-10-31 NOTE — ASSESSMENT & PLAN NOTE
Hx of DVT -- US 10/17 with right popliteal nonocclusive thrombosis - start wt based renally dosed lovenox   Patient was bleeding from PICC line and sacral wound  Lovenox held 10/22 & 10/23, restarted on Monday 10/24/2022  Bleeding noticed again today (10/26), Lovenox held 10/26  Will continue to monitor    10/29 - Will resume lovenox at 40 (DVT ppx dose). It is not clear that DVTs are acute. Bleeding complications (gross hematuria and wound bleeding) preclude full anticoagulation.     10/31 - No bleeding in a couple of days on DVT dose lovenox. Will d/c lovenox and resume eliquis at 2.5 BID

## 2022-10-31 NOTE — PLAN OF CARE
Problem: Adult Inpatient Plan of Care  Goal: Plan of Care Review  10/30/2022 2058 by Arcelia Gil RN  Outcome: Ongoing, Progressing  10/30/2022 1946 by Arcelia Gil RN  Outcome: Ongoing, Progressing  Goal: Patient-Specific Goal (Individualized)  10/30/2022 2058 by Arcelia Gil RN  Outcome: Ongoing, Progressing  10/30/2022 1946 by Arcelia Gil RN  Outcome: Ongoing, Progressing  Goal: Absence of Hospital-Acquired Illness or Injury  10/30/2022 2058 by Arcelia Gil RN  Outcome: Ongoing, Progressing  10/30/2022 1946 by Arcelia Gil RN  Outcome: Ongoing, Progressing  Goal: Optimal Comfort and Wellbeing  10/30/2022 2058 by Arcelia Gil RN  Outcome: Ongoing, Progressing  10/30/2022 1946 by Arcelia Gil RN  Outcome: Ongoing, Progressing  Goal: Readiness for Transition of Care  10/30/2022 2058 by Arcelia Gil RN  Outcome: Ongoing, Progressing  10/30/2022 1946 by Arcelia Gil RN  Outcome: Ongoing, Progressing     Problem: Diabetes Comorbidity  Goal: Blood Glucose Level Within Targeted Range  10/30/2022 2058 by Arcelia Gil RN  Outcome: Ongoing, Progressing  10/30/2022 1946 by Arcelia Gil RN  Outcome: Ongoing, Progressing     Problem: Impaired Wound Healing  Goal: Optimal Wound Healing  10/30/2022 2058 by Arcelia Gil RN  Outcome: Ongoing, Progressing  10/30/2022 1946 by Arcelia Gil RN  Outcome: Ongoing, Progressing     Problem: Skin Injury Risk Increased  Goal: Skin Health and Integrity  10/30/2022 2058 by Arcelia Gil RN  Outcome: Ongoing, Progressing  10/30/2022 1946 by Arcelia Gil RN  Outcome: Ongoing, Progressing     Problem: Infection  Goal: Absence of Infection Signs and Symptoms  10/30/2022 2058 by Arcelia Gil RN  Outcome: Ongoing, Progressing  10/30/2022 1946 by Arcelia Gil RN  Outcome: Ongoing, Progressing     Problem: Gas Exchange Impaired  Goal: Optimal Gas Exchange  10/30/2022 2058 by Arcelia Gil, JACOB  Outcome:  Ongoing, Progressing  10/30/2022 1946 by Arcelia Gil, JACOB  Outcome: Ongoing, Progressing     Problem: Fall Injury Risk  Goal: Absence of Fall and Fall-Related Injury  10/30/2022 2058 by Arcelia Gil RN  Outcome: Ongoing, Progressing  10/30/2022 1946 by Arcelia Gil, RN  Outcome: Ongoing, Progressing

## 2022-10-31 NOTE — ASSESSMENT & PLAN NOTE
- Wound care consulted  - wound Cx grew pseudomonas sensitive to Zosyn  - Zosyn 4.5g IV Q8H  - Surgery consulted via wound care, guidance appreciated     10/31 - Still a little greenish drainage but improved.  Will give IV abx another 24-48 hours, continue wound care.  Begin discharge planning.

## 2022-11-01 LAB
GLUCOSE SERPL-MCNC: 104 MG/DL (ref 70–105)
GLUCOSE SERPL-MCNC: 111 MG/DL (ref 70–105)
GLUCOSE SERPL-MCNC: 150 MG/DL (ref 70–105)
GLUCOSE SERPL-MCNC: 87 MG/DL (ref 70–105)
GLUCOSE SERPL-MCNC: 89 MG/DL (ref 70–105)

## 2022-11-01 PROCEDURE — 25000242 PHARM REV CODE 250 ALT 637 W/ HCPCS: Performed by: NURSE PRACTITIONER

## 2022-11-01 PROCEDURE — 25000003 PHARM REV CODE 250

## 2022-11-01 PROCEDURE — 99233 SBSQ HOSP IP/OBS HIGH 50: CPT | Mod: ,,, | Performed by: HOSPITALIST

## 2022-11-01 PROCEDURE — 99900035 HC TECH TIME PER 15 MIN (STAT)

## 2022-11-01 PROCEDURE — 11000001 HC ACUTE MED/SURG PRIVATE ROOM

## 2022-11-01 PROCEDURE — 99233 PR SUBSEQUENT HOSPITAL CARE,LEVL III: ICD-10-PCS | Mod: ,,, | Performed by: HOSPITALIST

## 2022-11-01 PROCEDURE — 25000003 PHARM REV CODE 250: Performed by: FAMILY MEDICINE

## 2022-11-01 PROCEDURE — 82962 GLUCOSE BLOOD TEST: CPT

## 2022-11-01 PROCEDURE — 94761 N-INVAS EAR/PLS OXIMETRY MLT: CPT

## 2022-11-01 PROCEDURE — 63600175 PHARM REV CODE 636 W HCPCS: Performed by: HOSPITALIST

## 2022-11-01 PROCEDURE — 94640 AIRWAY INHALATION TREATMENT: CPT

## 2022-11-01 PROCEDURE — 25000003 PHARM REV CODE 250: Performed by: HOSPITALIST

## 2022-11-01 PROCEDURE — 63600175 PHARM REV CODE 636 W HCPCS

## 2022-11-01 RX ORDER — METOPROLOL TARTRATE 25 MG/1
25 TABLET, FILM COATED ORAL 2 TIMES DAILY
Status: DISCONTINUED | OUTPATIENT
Start: 2022-11-01 | End: 2022-11-03 | Stop reason: HOSPADM

## 2022-11-01 RX ORDER — HYDRALAZINE HYDROCHLORIDE 25 MG/1
25 TABLET, FILM COATED ORAL EVERY 8 HOURS
Status: DISCONTINUED | OUTPATIENT
Start: 2022-11-01 | End: 2022-11-02

## 2022-11-01 RX ADMIN — IPRATROPIUM BROMIDE AND ALBUTEROL SULFATE 3 ML: 2.5; .5 SOLUTION RESPIRATORY (INHALATION) at 07:11

## 2022-11-01 RX ADMIN — IPRATROPIUM BROMIDE AND ALBUTEROL SULFATE 3 ML: 2.5; .5 SOLUTION RESPIRATORY (INHALATION) at 01:11

## 2022-11-01 RX ADMIN — RISPERIDONE 1 MG: 1 TABLET ORAL at 09:11

## 2022-11-01 RX ADMIN — LEVOTHYROXINE SODIUM 50 MCG: 0.05 TABLET ORAL at 05:11

## 2022-11-01 RX ADMIN — PIPERACILLIN AND TAZOBACTAM 4.5 G: 4; .5 INJECTION, POWDER, LYOPHILIZED, FOR SOLUTION INTRAVENOUS at 04:11

## 2022-11-01 RX ADMIN — METOPROLOL TARTRATE 12.5 MG: 25 TABLET, FILM COATED ORAL at 09:11

## 2022-11-01 RX ADMIN — POTASSIUM BICARBONATE 10 MEQ: 391 TABLET, EFFERVESCENT ORAL at 09:11

## 2022-11-01 RX ADMIN — PIPERACILLIN AND TAZOBACTAM 4.5 G: 4; .5 INJECTION, POWDER, LYOPHILIZED, FOR SOLUTION INTRAVENOUS; PARENTERAL at 09:11

## 2022-11-01 RX ADMIN — METOPROLOL TARTRATE 25 MG: 25 TABLET, FILM COATED ORAL at 09:11

## 2022-11-01 RX ADMIN — HYDRALAZINE HYDROCHLORIDE 25 MG: 25 TABLET ORAL at 09:11

## 2022-11-01 RX ADMIN — LOSARTAN POTASSIUM 100 MG: 100 TABLET, FILM COATED ORAL at 09:11

## 2022-11-01 RX ADMIN — APIXABAN 2.5 MG: 2.5 TABLET, FILM COATED ORAL at 09:11

## 2022-11-01 RX ADMIN — PIPERACILLIN AND TAZOBACTAM 4.5 G: 4; .5 INJECTION, POWDER, LYOPHILIZED, FOR SOLUTION INTRAVENOUS at 12:11

## 2022-11-01 RX ADMIN — PANTOPRAZOLE SODIUM 40 MG: 40 GRANULE, DELAYED RELEASE ORAL at 09:11

## 2022-11-01 NOTE — PROGRESS NOTES
[facility]  Adult Nutrition  Progress Note         Reason for Assessment  Reason For Assessment: RD follow-up  Nutrition Risk Screen: other (see comments) (puree diet) large or nonhealing wound, burn or pressure injury    RD follow up.    Recommend continue with puree diet and Epi BID. Consider adding 500mg Vit C BID + 220mg ZnSO4 BID + MVI daily to aid in wound healing.    Current diet is puree per ST recommendations. Patient's intake is ~%.  PO intake has improved since 10/24. Epi BID order started on 10/24. Consider adding 500mg Vit C BID + 220mg ZnSO4 BID + MVI daily to aid in wound healing.    Per MD note, surgery consulted via wound care for stage 4 ulcer of sacral region and DM in acceptable control. Per op note, patient had cystoscopy.    Weight on 10/24 was 225#, CBW is 218#. Patient with weight fluctuations due to edema.  BMI in obese BMI category - needs adjusted.   Last BM documented 10/31 per flowsheets. Will monitor weight trend, labs, meds, wound/skin, diet order changes, updates in patient condition. RD following.    Malnutrition  Is Patient Malnourished: No  Skin (Micronutrient): wounds unhealed  Per MD note stage IV sacral wound was noted and surgery consulted via wound care.    Nutrition Diagnosis  Increased protein Needs   related to Wound healing as evidenced by pressure injury of sacral region stage 4 present on admission    Nutrition Diagnosis Status: Chronic/ continues    Nutrition Risk  Level of Risk/Frequency of Follow-up: high   Chewing or Swallowing Difficulty?: Swallowing difficulty    Estimated/Assessed Needs    Temp: 98.9 °F (37.2 °C)Oral  Weight Used For Calorie Calculations: 86.2 kg (190 lb) (adjusted body weight)   Energy Need Method: Kcal/kg (25-30kcal/kg adjusted body weight) Energy Calorie Requirements (kcal): 2154..5kcal  Weight Used For Protein Calculations: 86.2 kg (190 lb) (adjusted body weight)  Protein Requirements: 86..64g  pro (1.0-1.2g pro/kg adjusted body weight)  Estimated Fluid Requirement Method: RDA Method    RDA Method (mL): 2154.59     Nutrition Prescription / Recommendations  Recommendation/Intervention: Recommend continue with puree diet and Epi BID. Consider adding 500mg Vit C BID + 220mg ZnSO4 BID + MVI daily to aid in wound healing.  Goals: Patient will consume % of meals  Nutrition Goal Status: progressing towards goal  Current Diet Order: Puree consistent carbohydrate 1800 with thin liquids  Oral Nutrition Supplement: Epi BID, provides 180kcal and 5g pro  Recommended Diet: Consistent Carbohydrate 1800 (60g Carbs) and Puree   Recommended Oral Supplement: Epi [90 kcals, 2.5g Protein, 10g Carbs(3g Sugar), 7g L-Arginine, 7g L-Glutamine, Vitamin C 300mg, 9.5mg Zinc] twice daily  Is Nutrition Support Recommended: No  Is Education Recommended: No    Monitor and Evaluation  % current Intake: Other: % intake documented, more recently %    % intake to meet estimated needs: 75 - 100 %  Food and Nutrient Intake: energy intake, food and beverage intake  Food and Nutrient Adminstration: diet order  Knowledge/Beliefs/Attitudes: food and nutrition knowledge/skill, beliefs and attitudes  Physical Activity and Function: nutrition-related ADLs and IADLs, factors affecting access to physical activity  Anthropometric Measurements: weight, weight change, body mass index  Biochemical Data, Medical Tests and Procedures: electrolyte and renal panel, gastrointestinal profile, glucose/endocrine profile, inflammatory profile, lipid profile  Nutrition-Focused Physical Findings: overall appearance, extremities, muscles and bones, head and eyes, skin     Current Medical Diagnosis and Past Medical History  Diagnosis: other (see comments) (bacteremia)  Past Medical History:   Diagnosis Date    Alzheimer's disease     Anticoagulant long-term use     Bipolar disorder     BPH (benign prostatic hyperplasia)     Deep vein thrombosis  (DVT) of popliteal vein of right lower extremity     Diabetes mellitus     DVT of deep femoral vein, left     Hyperlipidemia     Hypertension     Hypothyroidism     Idiopathic orofacial dystonia     Iron deficiency 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    Light Growth Escherichia coli Abnormal    Light Growth Klebsiel    Schizophrenia 1/28/2022 05/16 -continue trileptal and risperidone     Nutrition/Diet History  Patient Reported Diet/Restrictions/Preferences: pureed  Spiritual, Cultural Beliefs, Yazidism Practices, Values that Affect Care: no  Food Allergies: NKFA    Lab/Procedures/Meds  Recent Labs   Lab 10/31/22  0317      K 3.4*   BUN 6*   CREATININE 0.72   *   CALCIUM 8.4*   *       Last A1c:   Lab Results   Component Value Date    HGBA1C 6.6 06/29/2022     Lab Results   Component Value Date    RBC 3.16 (L) 10/31/2022    HGB 8.6 (L) 10/31/2022    HCT 27.3 (L) 10/31/2022    MCV 86.4 10/31/2022    MCH 27.2 10/31/2022    MCHC 31.5 (L) 10/31/2022    TIBC 174 (L) 12/09/2021     Pertinent Labs Reviewed: reviewed  Pertinent Labs Comments: Cl 109 (H), BUN 6 (L), total protein 5.3 (L), albumin 1.9 (L), prealbumin 15 (L), CRP 7.29 (H) - altered labs likely related to patient with bacteremia, suspected deep tissue injury, acute DVT of both lower extremities, UTI with hematuria, pressure ulcer of sacral region stage 4    Pertinent Medications Reviewed: reviewed  Albuterol-ipratropium, levothyroxine, losartan, metoprolol, pantoprazole, zosyn, risperidone    Anthropometrics  Temp: 98.9 °F (37.2  °C)  Height: 6' (182.9 cm)  Height (inches): 72 in  Weight Method: Bed Scale  Weight: 99.1 kg (218 lb 7.6 oz)  Weight (lb): 218.48 lb  Ideal Body Weight (IBW), Male: 178 lb  % Ideal Body Weight, Male (lb): 126.95 %  BMI (Calculated): 29.6  BMI Grade: 30 - 34.9- obesity - grade I     Nutrition by Nursing  Diet/Nutrition Received: mechanical/dental soft  Intake (%): 75%  Diet/Feeding Assistance: assisted with feeding  Diet/Feeding Tolerance: good  Last Bowel Movement: 10/31/22    Nutrition Follow-Up  RD Follow-up?: Yes

## 2022-11-01 NOTE — SUBJECTIVE & OBJECTIVE
Interval History:     Review of Systems   Constitutional:  Negative for appetite change and fever.   HENT:  Negative for congestion and trouble swallowing.    Respiratory:  Negative for shortness of breath and wheezing.    Gastrointestinal:  Negative for abdominal pain, constipation and vomiting.   Musculoskeletal:  Negative for neck stiffness.   Skin:  Negative for pallor and rash.   Neurological:  Positive for speech difficulty. Negative for dizziness and headaches.   Psychiatric/Behavioral:  Positive for confusion. Negative for suicidal ideas.    Objective:     Vital Signs (Most Recent):  Temp: 97.3 °F (36.3 °C) (11/01/22 1200)  Pulse: 83 (11/01/22 1332)  Resp: 14 (11/01/22 1332)  BP: (!) 183/95 (11/01/22 1200)  SpO2: 98 % (11/01/22 1332)   Vital Signs (24h Range):  Temp:  [96.4 °F (35.8 °C)-98.9 °F (37.2 °C)] 97.3 °F (36.3 °C)  Pulse:  [60-83] 83  Resp:  [14-20] 14  SpO2:  [97 %-100 %] 98 %  BP: (159-183)/(83-96) 183/95     Weight: 99.1 kg (218 lb 7.6 oz)  Body mass index is 29.63 kg/m².    Intake/Output Summary (Last 24 hours) at 11/1/2022 1354  Last data filed at 11/1/2022 0807  Gross per 24 hour   Intake 100 ml   Output 1000 ml   Net -900 ml      Physical Exam  Vitals reviewed.   Constitutional:       General: He is not in acute distress.     Appearance: He is obese.      Comments: Does not follow commands   Eyes:      Pupils: Pupils are equal, round, and reactive to light.   Cardiovascular:      Rate and Rhythm: Normal rate and regular rhythm.      Pulses: Normal pulses.   Pulmonary:      Effort: Pulmonary effort is normal. No respiratory distress.      Breath sounds: Normal breath sounds. No wheezing.   Abdominal:      General: Bowel sounds are normal. There is no distension.      Tenderness: There is no abdominal tenderness.   Skin:     General: Skin is warm.   Neurological:      General: No focal deficit present.      Mental Status: He is alert and easily aroused. Mental status is at baseline.       Comments: Does not follow commands which is apparently his baseline  Nonvocal   Psychiatric:         Mood and Affect: Mood normal.         Behavior: Behavior normal.       Significant Labs: All pertinent labs within the past 24 hours have been reviewed.  BMP:   Recent Labs   Lab 10/31/22  0317   *      K 3.4*   *   CO2 25   BUN 6*   CREATININE 0.72   CALCIUM 8.4*     CBC:   Recent Labs   Lab 10/31/22  0317   WBC 6.53   HGB 8.6*   HCT 27.3*   *     CMP:   Recent Labs   Lab 10/31/22  0317      K 3.4*   *   CO2 25   *   BUN 6*   CREATININE 0.72   CALCIUM 8.4*   ANIONGAP 10       Significant Imaging: I have reviewed all pertinent imaging results/findings within the past 24 hours.      Intake/Output - Last 3 Shifts         10/30 0700  10/31 0659 10/31 0700  11/01 0659 11/01 0700 11/02 0659    P.O. 540      IV Piggyback 100  100    Total Intake(mL/kg) 640 (6.5)  100 (1)    Urine (mL/kg/hr) 0 (0) 1000 (0.4)     Other 0      Total Output 0 1000     Net +640 -1000 +100           Urine Occurrence 1 x            Microbiology Results (last 7 days)       ** No results found for the last 168 hours. **

## 2022-11-01 NOTE — PLAN OF CARE
Problem: Adult Inpatient Plan of Care  Goal: Absence of Hospital-Acquired Illness or Injury  Intervention: Identify and Manage Fall Risk  Flowsheets (Taken 11/1/2022 1445)  Safety Promotion/Fall Prevention:   assistive device/personal item within reach   medications reviewed   nonskid shoes/socks when out of bed     Problem: Diabetes Comorbidity  Goal: Blood Glucose Level Within Targeted Range  Intervention: Monitor and Manage Glycemia  Flowsheets (Taken 11/1/2022 1445)  Glycemic Management: blood glucose monitored     Problem: Skin Injury Risk Increased  Goal: Skin Health and Integrity  Intervention: Optimize Skin Protection  Flowsheets (Taken 11/1/2022 1445)  Pressure Reduction Techniques: pressure points protected  Pressure Reduction Devices:   specialty bed utilized   positioning supports utilized  Skin Protection: incontinence pads utilized  Head of Bed (HOB) Positioning: HOB at 30 degrees     Problem: Fall Injury Risk  Goal: Absence of Fall and Fall-Related Injury  Intervention: Identify and Manage Contributors  Flowsheets (Taken 11/1/2022 1445)  Self-Care Promotion: BADL personal routines maintained  Medication Review/Management: medications reviewed

## 2022-11-01 NOTE — PROGRESS NOTES
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 He presents to [facility] with a UTI that was found on hospital admission on 10/17/2022. Patient was sent to the [facility] ED via EMS secondary to decreased level of consciousness with hematuria.  No associated symptoms such as fever chills cough wheezing chest pain palpitation lower extremity edema nausea vomiting abdominal pain were reported.  No other history could be gathered at this time this patient is somnolent and confused and was only able to state his name only at this time.  On initial presentation, patient was hypotensive with systolic blood pressure in the 60s and tachycardic.  Ensuing workup was notable for leukocytosis with left shift, elevated lactic acid level, hyperglycemia, acute renal failure, and UA highly suggestive of a presence of urinary tract infection.  Patient did not respond favorably to IV fluid bolus of 30 cc/kg and was admitted to ICU for vasopressor support.  Patient's code status is DNR according to nursing [place] records. After IVF, antibiotics and pressor treatment she was downgraded from the ICU on 10/19/2022 and now admitted the [facility].     The patient was admitted to the [facility] to Family Medicine Service under the direct supervision  Dr. Lee Ann SIMMONS for continued care and medical management.       Overview/Hospital Course:  Mr. Goyal is an 82 year old admitted from [facility] 10/17 with altered mental status and hematuria and felt to have symptomatic urinary tract infection.  Initially hypotensive and admitted to ICU for vasopressor support.  Condition improved and transferred out of ICU 10/19 to [facility] and later admitted into LTAC into [facility].  Continued with antibiotic support.  At nursing home patient is listed as DNR.  Has chronic indwelling Khan catheter.  Blood cultures 2 assist 2 sets were positive with coag-negative staff but were different organisms and felt most likely contaminant.  Was treated for symptomatic UTI from Morganella and infected sacral decubitus wound with organisms including Pseudomonas.  Patient was to complete up to 14 days of Zosyn.     During stay did undergo cystoscopy by Dr. Del Cid 10/27:  Preop diagnosis:  Gross hematuria undetermined etiology  Postoperative diagnosis:  Gross hematuria probably secondary to bleeding from urethral stricture and from irritated bladder from chronic indwelling Khan.        Patient Active Problem List   Diagnosis    Alzheimer's disease    Pressure ulcer of sacral region, stage 4    Diabetes mellitus    Hypertension    Urinary tract infection with hematuria    Acute deep vein thrombosis (DVT) of both lower extremities    BPH (benign prostatic hyperplasia)    Bipolar disorder    COPD (chronic obstructive pulmonary disease)    ARMAAN (iron deficiency anemia)    Sacral decubitus ulcer    Bacteremia    Suspected deep tissue injury      Finishing antibiotics and looking into discharge needs.            Interval History:     Review of Systems   Constitutional:  Negative for appetite change and fever.   HENT:  Negative for congestion and trouble swallowing.    Respiratory:  Negative for shortness of breath and wheezing.    Gastrointestinal:  Negative for abdominal pain,  constipation and vomiting.   Musculoskeletal:  Negative for neck stiffness.   Skin:  Negative for pallor and rash.   Neurological:  Positive for speech difficulty. Negative for dizziness and headaches.   Psychiatric/Behavioral:  Positive for confusion. Negative for suicidal ideas.    Objective:     Vital Signs (Most Recent):  Temp: 97.3 °F (36.3 °C) (11/01/22 1200)  Pulse: 83 (11/01/22 1332)  Resp: 14 (11/01/22 1332)  BP: (!) 183/95 (11/01/22 1200)  SpO2: 98 % (11/01/22 1332)   Vital Signs (24h Range):  Temp:  [96.4 °F (35.8 °C)-98.9 °F (37.2 °C)] 97.3 °F (36.3 °C)  Pulse:  [60-83] 83  Resp:  [14-20] 14  SpO2:  [97 %-100 %] 98 %  BP: (159-183)/(83-96) 183/95     Weight: 99.1 kg (218 lb 7.6 oz)  Body mass index is 29.63 kg/m².    Intake/Output Summary (Last 24 hours) at 11/1/2022 1354  Last data filed at 11/1/2022 0807  Gross per 24 hour   Intake 100 ml   Output 1000 ml   Net -900 ml      Physical Exam  Vitals reviewed.   Constitutional:       General: He is not in acute distress.     Appearance: He is obese.      Comments: Does not follow commands   Eyes:      Pupils: Pupils are equal, round, and reactive to light.   Cardiovascular:      Rate and Rhythm: Normal rate and regular rhythm.      Pulses: Normal pulses.   Pulmonary:      Effort: Pulmonary effort is normal. No respiratory distress.      Breath sounds: Normal breath sounds. No wheezing.   Abdominal:      General: Bowel sounds are normal. There is no distension.      Tenderness: There is no abdominal tenderness.   Skin:     General: Skin is warm.   Neurological:      General: No focal deficit present.      Mental Status: He is alert and easily aroused. Mental status is at baseline.      Comments: Does not follow commands which is apparently his baseline  Nonvocal   Psychiatric:         Mood and Affect: Mood normal.         Behavior: Behavior normal.       Significant Labs: All pertinent labs within the past 24 hours have been reviewed.  BMP:   Recent Labs    Lab 10/31/22  0317   *      K 3.4*   *   CO2 25   BUN 6*   CREATININE 0.72   CALCIUM 8.4*     CBC:   Recent Labs   Lab 10/31/22  0317   WBC 6.53   HGB 8.6*   HCT 27.3*   *     CMP:   Recent Labs   Lab 10/31/22  0317      K 3.4*   *   CO2 25   *   BUN 6*   CREATININE 0.72   CALCIUM 8.4*   ANIONGAP 10       Significant Imaging: I have reviewed all pertinent imaging results/findings within the past 24 hours.      Intake/Output - Last 3 Shifts         10/30 0700  10/31 0659 10/31 0700 11/01 0659 11/01 0700 11/02 0659    P.O. 540      IV Piggyback 100  100    Total Intake(mL/kg) 640 (6.5)  100 (1)    Urine (mL/kg/hr) 0 (0) 1000 (0.4)     Other 0      Total Output 0 1000     Net +640 -1000 +100           Urine Occurrence 1 x            Microbiology Results (last 7 days)       ** No results found for the last 168 hours. **                Assessment/Plan:      Suspected deep tissue injury  Wound care consulted       Bipolar disorder  Continuing home risperidone .  Patient also with schizophrenia.        Acute deep vein thrombosis (DVT) of both lower extremities  Hx of DVT -- US 10/17 with right popliteal nonocclusive thrombosis - start wt based renally dosed lovenox   Patient was bleeding from PICC line and sacral wound  Lovenox held 10/22 & 10/23, restarted on Monday 10/24/2022  Bleeding noticed again today (10/26), Lovenox held 10/26  Will continue to monitor    10/29 - Will resume lovenox at 40 (DVT ppx dose). It is not clear that DVTs are acute. Bleeding complications (gross hematuria and wound bleeding) preclude full anticoagulation.     10/31 - No bleeding in a couple of days on DVT dose lovenox. Will d/c lovenox and resume eliquis at 2.5 BID    Urinary tract infection with hematuria  Pt has a chronic anne at nursing home.   Will make sure it has been changed this admission   - urine culture with >100,000 GNB  - Cultures sensitive to merrem   -  Patient completed 4  days of Merrem and vancomycin on 10/22     10/29 - Adequately treated.  Remains on abx for wound.  Sepsis syndrome resoved.       Hypertension  Losartan 100mg Qd  Lopressor 12.5mg BID      Diabetes mellitus  POCT glucose checks  Sliding scale insulin   Acceptable control    Pressure ulcer of sacral region, stage 4  - Wound care consulted  - wound Cx grew pseudomonas sensitive to Zosyn  - Zosyn 4.5g IV Q8H  - Surgery consulted via wound care, guidance appreciated     10/31 - Still a little greenish drainage but improved.  Will give IV abx another 24-48 hours, continue wound care.  Begin discharge planning.       VTE Risk Mitigation (From admission, onward)         Ordered     apixaban tablet 2.5 mg  2 times daily         10/31/22 1642                Discharge Planning   YOSSI: 10/19/2022     Code Status: DNR   Is the patient medically ready for discharge?:     Reason for patient still in hospital (select all that apply): Laboratory test, Treatment, Imaging and Pending disposition  Discharge Plan A: Return to nursing home                  Tristan Boles MD  Department of Hospital Medicine   [facility]

## 2022-11-01 NOTE — HOSPITAL COURSE
Mr. Goyal is an 82 year old admitted from [facility] 10/17 with altered mental status and hematuria and felt to have symptomatic urinary tract infection.  Initially hypotensive and admitted to ICU for vasopressor support.  Condition improved and transferred out of ICU 10/19 to [facility] and later admitted into LTAC into [facility].  Continued with antibiotic support.  At nursing home patient is listed as DNR.  Has chronic indwelling Khan catheter.  Blood cultures 2 assist 2 sets were positive with coag-negative staff but were different organisms and felt most likely contaminant.  Was treated for symptomatic UTI from Morganella and infected sacral decubitus wound with organisms including Pseudomonas.  Patient was to complete up to 14 days of Zosyn.     During stay did undergo cystoscopy by Dr. Del Cid 10/27:  Preop diagnosis:  Gross hematuria undetermined etiology  Postoperative diagnosis:  Gross hematuria probably secondary to bleeding from urethral stricture and from irritated bladder from chronic indwelling Khan.        Patient Active Problem List   Diagnosis    Alzheimer's disease    Pressure ulcer of sacral region, stage 4    Diabetes mellitus    Hypertension    Urinary tract infection with hematuria    Acute deep vein thrombosis (DVT) of both lower extremities    BPH (benign prostatic hyperplasia)    Bipolar disorder    COPD (chronic obstructive pulmonary disease)    ARMAAN (iron deficiency anemia)    Sacral decubitus ulcer    Bacteremia    Suspected deep tissue injury      Finishing antibiotics and looking into discharge needs.    Patient continues to do well, tolerating diet, felt had reached maximal hospital benefit, will be discharged back to nursing home, continue with Khan catheter and can be removed if and when sacral wound is resolved.  Patient to follow-up wound care center in 2 weeks.  Continue outpatient wound care as per instructions from wound care nurse.  Discharged.    11/02 addendum:  Nursing  home would not take back today  11/03 no new issues during the night.  Alert and mental status as before with patient following with eyes and following only very simple commands.  Basically nonverbal.

## 2022-11-02 LAB
ANION GAP SERPL CALCULATED.3IONS-SCNC: 5 MMOL/L (ref 7–16)
BASOPHILS # BLD AUTO: 0.02 K/UL (ref 0–0.2)
BASOPHILS NFR BLD AUTO: 0.3 % (ref 0–1)
BUN SERPL-MCNC: 7 MG/DL (ref 7–18)
BUN/CREAT SERPL: 10 (ref 6–20)
CALCIUM SERPL-MCNC: 8.7 MG/DL (ref 8.5–10.1)
CHLORIDE SERPL-SCNC: 108 MMOL/L (ref 98–107)
CO2 SERPL-SCNC: 29 MMOL/L (ref 21–32)
CREAT SERPL-MCNC: 0.7 MG/DL (ref 0.7–1.3)
CRP SERPL-MCNC: 6.5 MG/DL (ref 0–0.8)
DIFFERENTIAL METHOD BLD: ABNORMAL
EGFR (NO RACE VARIABLE) (RUSH/TITUS): 92 ML/MIN/1.73M²
EOSINOPHIL # BLD AUTO: 0.45 K/UL (ref 0–0.5)
EOSINOPHIL NFR BLD AUTO: 7.4 % (ref 1–4)
ERYTHROCYTE [DISTWIDTH] IN BLOOD BY AUTOMATED COUNT: 17.9 % (ref 11.5–14.5)
GLUCOSE SERPL-MCNC: 82 MG/DL (ref 70–105)
GLUCOSE SERPL-MCNC: 87 MG/DL (ref 70–105)
GLUCOSE SERPL-MCNC: 90 MG/DL (ref 70–105)
GLUCOSE SERPL-MCNC: 91 MG/DL (ref 74–106)
HCT VFR BLD AUTO: 28.4 % (ref 40–54)
HGB BLD-MCNC: 8.8 G/DL (ref 13.5–18)
IMM GRANULOCYTES # BLD AUTO: 0.01 K/UL (ref 0–0.04)
IMM GRANULOCYTES NFR BLD: 0.2 % (ref 0–0.4)
LYMPHOCYTES # BLD AUTO: 1.39 K/UL (ref 1–4.8)
LYMPHOCYTES NFR BLD AUTO: 22.9 % (ref 27–41)
MCH RBC QN AUTO: 26.7 PG (ref 27–31)
MCHC RBC AUTO-ENTMCNC: 31 G/DL (ref 32–36)
MCV RBC AUTO: 86.3 FL (ref 80–96)
MONOCYTES # BLD AUTO: 0.55 K/UL (ref 0–0.8)
MONOCYTES NFR BLD AUTO: 9 % (ref 2–6)
MPC BLD CALC-MCNC: 8.7 FL (ref 9.4–12.4)
NEUTROPHILS # BLD AUTO: 3.66 K/UL (ref 1.8–7.7)
NEUTROPHILS NFR BLD AUTO: 60.2 % (ref 53–65)
NRBC # BLD AUTO: 0 X10E3/UL
NRBC, AUTO (.00): 0 %
PLATELET # BLD AUTO: 534 K/UL (ref 150–400)
POTASSIUM SERPL-SCNC: 3.6 MMOL/L (ref 3.5–5.1)
RBC # BLD AUTO: 3.29 M/UL (ref 4.6–6.2)
SODIUM SERPL-SCNC: 138 MMOL/L (ref 136–145)
T4 SERPL-MCNC: 7.6 ΜG/DL (ref 4.5–12.1)
TSH SERPL DL<=0.005 MIU/L-ACNC: 2.73 UIU/ML (ref 0.36–3.74)
WBC # BLD AUTO: 6.08 K/UL (ref 4.5–11)

## 2022-11-02 PROCEDURE — 99232 PR SUBSEQUENT HOSPITAL CARE,LEVL II: ICD-10-PCS | Mod: ,,, | Performed by: HOSPITALIST

## 2022-11-02 PROCEDURE — 84443 ASSAY THYROID STIM HORMONE: CPT | Performed by: HOSPITALIST

## 2022-11-02 PROCEDURE — 25000003 PHARM REV CODE 250

## 2022-11-02 PROCEDURE — 11000001 HC ACUTE MED/SURG PRIVATE ROOM

## 2022-11-02 PROCEDURE — 94761 N-INVAS EAR/PLS OXIMETRY MLT: CPT

## 2022-11-02 PROCEDURE — 84436 ASSAY OF TOTAL THYROXINE: CPT | Performed by: HOSPITALIST

## 2022-11-02 PROCEDURE — 99232 SBSQ HOSP IP/OBS MODERATE 35: CPT | Mod: ,,, | Performed by: HOSPITALIST

## 2022-11-02 PROCEDURE — 86140 C-REACTIVE PROTEIN: CPT | Performed by: HOSPITALIST

## 2022-11-02 PROCEDURE — 82962 GLUCOSE BLOOD TEST: CPT

## 2022-11-02 PROCEDURE — 25000242 PHARM REV CODE 250 ALT 637 W/ HCPCS: Performed by: NURSE PRACTITIONER

## 2022-11-02 PROCEDURE — 94640 AIRWAY INHALATION TREATMENT: CPT

## 2022-11-02 PROCEDURE — 25000003 PHARM REV CODE 250: Performed by: FAMILY MEDICINE

## 2022-11-02 PROCEDURE — 80048 BASIC METABOLIC PNL TOTAL CA: CPT | Performed by: HOSPITALIST

## 2022-11-02 PROCEDURE — 36415 COLL VENOUS BLD VENIPUNCTURE: CPT | Performed by: HOSPITALIST

## 2022-11-02 PROCEDURE — 25000003 PHARM REV CODE 250: Performed by: HOSPITALIST

## 2022-11-02 PROCEDURE — 85025 COMPLETE CBC W/AUTO DIFF WBC: CPT | Performed by: HOSPITALIST

## 2022-11-02 RX ORDER — METOPROLOL TARTRATE 25 MG/1
25 TABLET, FILM COATED ORAL 2 TIMES DAILY
Qty: 60 TABLET | Refills: 11 | Status: ON HOLD
Start: 2022-11-02 | End: 2022-11-11 | Stop reason: DRUGHIGH

## 2022-11-02 RX ORDER — HYDRALAZINE HYDROCHLORIDE 25 MG/1
25 TABLET, FILM COATED ORAL EVERY 8 HOURS
Qty: 90 TABLET | Refills: 11 | Status: ON HOLD
Start: 2022-11-02 | End: 2022-11-11 | Stop reason: ALTCHOICE

## 2022-11-02 RX ORDER — FUROSEMIDE 40 MG/1
40 TABLET ORAL 2 TIMES DAILY
Status: DISCONTINUED | OUTPATIENT
Start: 2022-11-02 | End: 2022-11-02

## 2022-11-02 RX ORDER — FUROSEMIDE 20 MG/1
20 TABLET ORAL DAILY
Qty: 30 TABLET | Refills: 11 | Status: ON HOLD | OUTPATIENT
Start: 2022-11-03 | End: 2022-11-11 | Stop reason: DRUGHIGH

## 2022-11-02 RX ORDER — FUROSEMIDE 20 MG/1
20 TABLET ORAL DAILY
Status: DISCONTINUED | OUTPATIENT
Start: 2022-11-02 | End: 2022-11-03 | Stop reason: HOSPADM

## 2022-11-02 RX ORDER — HYDRALAZINE HYDROCHLORIDE 25 MG/1
25 TABLET, FILM COATED ORAL EVERY 8 HOURS
Status: DISCONTINUED | OUTPATIENT
Start: 2022-11-02 | End: 2022-11-03 | Stop reason: HOSPADM

## 2022-11-02 RX ORDER — LOSARTAN POTASSIUM 100 MG/1
100 TABLET ORAL DAILY
Qty: 90 TABLET | Refills: 3 | Status: ON HOLD | OUTPATIENT
Start: 2022-11-03 | End: 2022-11-11 | Stop reason: DRUGHIGH

## 2022-11-02 RX ADMIN — FUROSEMIDE 20 MG: 20 TABLET ORAL at 09:11

## 2022-11-02 RX ADMIN — METOPROLOL TARTRATE 25 MG: 25 TABLET, FILM COATED ORAL at 08:11

## 2022-11-02 RX ADMIN — HYDRALAZINE HYDROCHLORIDE 25 MG: 25 TABLET ORAL at 01:11

## 2022-11-02 RX ADMIN — RISPERIDONE 1 MG: 1 TABLET ORAL at 08:11

## 2022-11-02 RX ADMIN — IPRATROPIUM BROMIDE AND ALBUTEROL SULFATE 3 ML: 2.5; .5 SOLUTION RESPIRATORY (INHALATION) at 12:11

## 2022-11-02 RX ADMIN — HYPROMELLOSE 2910 1 DROP: 5 SOLUTION/ DROPS OPHTHALMIC at 02:11

## 2022-11-02 RX ADMIN — LOSARTAN POTASSIUM 100 MG: 100 TABLET, FILM COATED ORAL at 09:11

## 2022-11-02 RX ADMIN — HYDRALAZINE HYDROCHLORIDE 25 MG: 25 TABLET ORAL at 09:11

## 2022-11-02 RX ADMIN — APIXABAN 2.5 MG: 2.5 TABLET, FILM COATED ORAL at 08:11

## 2022-11-02 RX ADMIN — SITAGLIPTIN 25 MG: 25 TABLET, FILM COATED ORAL at 09:11

## 2022-11-02 RX ADMIN — RISPERIDONE 1 MG: 1 TABLET ORAL at 09:11

## 2022-11-02 RX ADMIN — IPRATROPIUM BROMIDE AND ALBUTEROL SULFATE 3 ML: 2.5; .5 SOLUTION RESPIRATORY (INHALATION) at 07:11

## 2022-11-02 RX ADMIN — METOPROLOL TARTRATE 25 MG: 25 TABLET, FILM COATED ORAL at 09:11

## 2022-11-02 RX ADMIN — HYDRALAZINE HYDROCHLORIDE 25 MG: 25 TABLET ORAL at 05:11

## 2022-11-02 RX ADMIN — APIXABAN 2.5 MG: 2.5 TABLET, FILM COATED ORAL at 09:11

## 2022-11-02 RX ADMIN — PANTOPRAZOLE SODIUM 40 MG: 40 GRANULE, DELAYED RELEASE ORAL at 09:11

## 2022-11-02 RX ADMIN — IPRATROPIUM BROMIDE AND ALBUTEROL SULFATE 3 ML: 2.5; .5 SOLUTION RESPIRATORY (INHALATION) at 08:11

## 2022-11-02 RX ADMIN — LEVOTHYROXINE SODIUM 50 MCG: 0.05 TABLET ORAL at 05:11

## 2022-11-02 RX ADMIN — HYPROMELLOSE 2910 1 DROP: 5 SOLUTION/ DROPS OPHTHALMIC at 10:11

## 2022-11-02 RX ADMIN — HYPROMELLOSE 2910 1 DROP: 5 SOLUTION/ DROPS OPHTHALMIC at 08:11

## 2022-11-02 NOTE — SUBJECTIVE & OBJECTIVE
Interval History:     Review of Systems   Constitutional:  Negative for appetite change and fever.   HENT:  Negative for congestion and trouble swallowing.    Respiratory:  Negative for shortness of breath and wheezing.    Gastrointestinal:  Negative for abdominal pain, constipation and vomiting.   Musculoskeletal:  Negative for neck stiffness.   Skin:  Negative for pallor and rash.   Neurological:  Positive for speech difficulty. Negative for dizziness and headaches.   Psychiatric/Behavioral:  Positive for confusion. Negative for suicidal ideas.    Objective:     Vital Signs (Most Recent):  Temp: 97.8 °F (36.6 °C) (11/02/22 1200)  Pulse: 72 (11/02/22 1218)  Resp: 17 (11/02/22 1218)  BP: 134/85 (11/02/22 1352)  SpO2: 100 % (11/02/22 1218)   Vital Signs (24h Range):  Temp:  [97.6 °F (36.4 °C)-98.9 °F (37.2 °C)] 97.8 °F (36.6 °C)  Pulse:  [65-80] 72  Resp:  [17-20] 17  SpO2:  [100 %] 100 %  BP: (128-184)/(77-96) 134/85     Weight: 99.1 kg (218 lb 7.6 oz)  Body mass index is 29.63 kg/m².    Intake/Output Summary (Last 24 hours) at 11/2/2022 1717  Last data filed at 11/2/2022 0800  Gross per 24 hour   Intake 240 ml   Output 300 ml   Net -60 ml        Physical Exam  Vitals reviewed.   Constitutional:       General: He is not in acute distress.     Appearance: He is obese.      Comments: Does not follow commands   Eyes:      Pupils: Pupils are equal, round, and reactive to light.   Cardiovascular:      Rate and Rhythm: Normal rate and regular rhythm.      Pulses: Normal pulses.   Pulmonary:      Effort: Pulmonary effort is normal. No respiratory distress.      Breath sounds: Normal breath sounds. No wheezing.   Abdominal:      General: Bowel sounds are normal. There is no distension.      Tenderness: There is no abdominal tenderness.   Skin:     General: Skin is warm.   Neurological:      General: No focal deficit present.      Mental Status: He is alert and easily aroused. Mental status is at baseline.      Comments:  Does not follow commands which is apparently his baseline  Nonvocal   Psychiatric:         Mood and Affect: Mood normal.         Behavior: Behavior normal.       Significant Labs: All pertinent labs within the past 24 hours have been reviewed.  BMP:   Recent Labs   Lab 11/02/22 0307   GLU 91      K 3.6   *   CO2 29   BUN 7   CREATININE 0.70   CALCIUM 8.7       CBC:   Recent Labs   Lab 11/02/22 0307   WBC 6.08   HGB 8.8*   HCT 28.4*   *       CMP:   Recent Labs   Lab 11/02/22 0307      K 3.6   *   CO2 29   GLU 91   BUN 7   CREATININE 0.70   CALCIUM 8.7   ANIONGAP 5*         Significant Imaging: I have reviewed all pertinent imaging results/findings within the past 24 hours.      Intake/Output - Last 3 Shifts         10/31 0700  11/01 0659 11/01 0700  11/02 0659 11/02 0700  11/03 0659    P.O.   240    IV Piggyback  200     Total Intake(mL/kg)  200 (2) 240 (2.4)    Urine (mL/kg/hr) 1000 (0.4) 300 (0.1)     Other       Total Output 1000 300     Net -1000 -100 +240                 Microbiology Results (last 7 days)       ** No results found for the last 168 hours. **

## 2022-11-02 NOTE — PLAN OF CARE
[facility]  Discharge Final Note    Primary Care Provider: Kerry Lin MD    Expected Discharge Date: 10/19/2022    Final Discharge Note (most recent)       Final Note - 11/02/22 1004          Final Note    Assessment Type Final Discharge Note     Anticipated Discharge Disposition Skilled Nursing Facility        Post-Acute Status    Post-Acute Authorization Placement     Post-Acute Placement Status Set-up Complete/Auth obtained     Patient choice form signed by patient/caregiver List with quality metrics by geographic area provided;List from CMS Compare;List from System Post-Acute Care     Discharge Delays None known at this time                     Important Message from Medicare  Important Message from Medicare regarding Discharge Appeal Rights: Given to patient/caregiver     Date IMM was signed: 11/02/22  Time IMM was signed: 1000    pt to dc this day. Family is aware. 0 further dc needs.

## 2022-11-02 NOTE — PLAN OF CARE
Rcd consult for dc planning. Pt is a resident as [facility] [place] and will return when medically stable. Updates faxed now. Will follow dc needs as arise.   0945  Spoke with angel at facility pt can return this day. Packet taken to floor md estevez aware via secure chat. 0 further dc needs.   1000  [facility] aware pt will dc this day to [facility] home. IM updated.

## 2022-11-02 NOTE — DISCHARGE SUMMARY
[facility]  Discharge Summary      Patient Name: Bhargav Gao  MRN: [id number]  [facility]: [id number]  Patient Class: IP- Inpatient  Admission Date: 10/19/2022  Hospital Length of Stay: 14 days  Discharge Date and Time:  11/02/2022 2:13 PM  Attending Physician: Tristan Boles MD   Discharging Provider: Tristan Boles MD  Primary Care Provider: Kerry Lin MD    Primary Care Team: Networked reference to record PCT     HPI:   Patient is on 82-year-old male with multiple medical issues including type 2 diabetes on insulin, essential hypertension, hypothyroidism, iron deficiency anemia, 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 He presents to [facility] with a UTI that was found on hospital admission on 10/17/2022. Patient was sent to the [facility] ED via EMS secondary to decreased level of consciousness with hematuria.  No associated symptoms such as fever chills cough wheezing chest pain palpitation lower extremity edema nausea vomiting abdominal pain were reported.  No other history could be gathered at this time this patient is somnolent and confused and was only able to state his name only at this time.  On initial presentation, patient was hypotensive with systolic blood pressure in the 60s and tachycardic.  Ensuing workup was notable for leukocytosis with left shift, elevated lactic acid level, hyperglycemia, acute renal failure, and UA highly suggestive of a presence of urinary tract infection.  Patient did not respond favorably to IV fluid bolus of 30 cc/kg and was admitted to ICU for vasopressor support.  Patient's code status is DNR according to nursing home records. After IVF, antibiotics and pressor treatment she was downgraded from the ICU on 10/19/2022 and now admitted the [facility].      The patient was admitted to the [facility] to Family Medicine Service under the direct supervision Dr. Lee Ann SIMMONS for continued care and medical management.       * No surgery found *      Hospital Course:   Mr. Goyal is an 82 year old admitted from [facility] 10/17 with altered mental status and hematuria and felt to have symptomatic urinary tract infection.  Initially hypotensive and admitted to ICU for vasopressor support.  Condition improved and transferred out of ICU 10/19 to [facility] and later admitted into LTAC into [facility].  Continued with antibiotic support.  At nursing home patient is listed as DNR.  Has chronic indwelling Khan catheter.  Blood cultures 2 assist 2 sets were positive with coag-negative staff but were different organisms and felt most likely contaminant.  Was treated for symptomatic UTI from Morganella and infected sacral decubitus wound with organisms including Pseudomonas.  Patient was to complete up to 14 days of Zosyn.     During stay did undergo cystoscopy by Dr. Del Cid 10/27:  Preop diagnosis:  Gross hematuria undetermined etiology  Postoperative diagnosis:  Gross hematuria probably secondary to bleeding from urethral stricture and from irritated bladder from chronic indwelling Khan.        Patient Active Problem List   Diagnosis    Alzheimer's disease    Pressure ulcer of sacral region, stage 4    Diabetes mellitus    Hypertension    Urinary tract infection with hematuria    Acute deep vein thrombosis (DVT) of both lower extremities    BPH (benign prostatic hyperplasia)    Bipolar disorder    COPD (chronic obstructive pulmonary disease)    ARMAAN (iron deficiency anemia)    Sacral decubitus ulcer    Bacteremia    Suspected deep tissue injury      Finishing antibiotics and looking into discharge needs.    Patient continues to do well, tolerating diet, felt had reached maximal hospital benefit, will be discharged back to nursing home, continue  with Anne catheter and can be removed if and when sacral wound is resolved.  Patient to follow-up wound care center in 2 weeks.  Continue outpatient wound care as per instructions from wound care nurse.  Discharged.           Goals of Care Treatment Preferences:  Code Status: DNR      Consults:   Consults (From admission, onward)        Status Ordering Provider     Inpatient consult to Social Work  Once        Provider:  (Not yet assigned)    Completed VINOD MAS     Inpatient consult to Urology  Once        Provider:  Leon Del Cid Jr., MD    Acknowledged ISABELLA CANADA     Inpatient consult to General Surgery  Once        Provider:  Yannick Sanders MD    Acknowledged YANETH ALONZO     Inpatient consult to Infectious Diseases  Once        Provider:  Duyen Bermudez MD    Completed KODY CORONADO JR          Suspected deep tissue injury  Wound care consulted       Acute deep vein thrombosis (DVT) of both lower extremities  Hx of DVT -- US 10/17 with right popliteal nonocclusive thrombosis - start wt based renally dosed lovenox   Patient was bleeding from PICC line and sacral wound  Lovenox held 10/22 & 10/23, restarted on Monday 10/24/2022  Bleeding noticed again today (10/26), Lovenox held 10/26  Will continue to monitor    10/29 - Will resume lovenox at 40 (DVT ppx dose). It is not clear that DVTs are acute. Bleeding complications (gross hematuria and wound bleeding) preclude full anticoagulation.     10/31 - No bleeding in a couple of days on DVT dose lovenox. Will d/c lovenox and resume eliquis at 2.5 BID  11/02 - with patient doing well and consider past history of bleeding will leave on current dose Eliquis when discharged      Urinary tract infection with hematuria  Pt has a chronic anne at nursing home.   Will make sure it has been changed this admission   - urine culture with >100,000 GNB  - Cultures sensitive to merrem   -  Patient completed 4 days of Merrem and vancomycin on 10/22      10/29 - Adequately treated.  Remains on abx for wound.  Sepsis syndrome resoved.       Diabetes mellitus  POCT glucose checks  Sliding scale insulin   Acceptable control    11/02 discharged on Januvia.  Can watch blood sugar on sliding scale    Final Active Diagnoses:    Diagnosis Date Noted POA    Suspected deep tissue injury [R68.89] 10/20/2022 Yes    Urinary tract infection with hematuria [N39.0, R31.9] 07/30/2022 Yes    Acute deep vein thrombosis (DVT) of both lower extremities [I82.403] 07/30/2022 Yes    Bipolar disorder [F31.9] 07/30/2022 Yes    Diabetes mellitus [E11.9]  Yes    Pressure ulcer of sacral region, stage 4 [L89.154]  Yes      Problems Resolved During this Admission:    Diagnosis Date Noted Date Resolved POA    PRINCIPAL PROBLEM:  Bacteremia [R78.81] 10/19/2022 10/29/2022 Yes       Discharged Condition: fair    Disposition: Skilled Nursing Facility    Follow Up:   Follow-up Information     [facility] up in 2 week(s).    Specialty: Wound Care  Why: Appt:  11/16/2022 @ 0900  Contact information:  [address] [place] [id number]  [phone]                     Patient Instructions:      Diet diabetic     Wound care discharge order   Order Comments: Clean wound with acetic acid  Apply sensicare around wound  Apply acetic acid moistened drawtex to wound bed then apply second layer of drawtex to wick drainage  Cover and secure with abd pad and paper tape  Change daily  Turn every two hours  Low air loss mattress  Keep pressure off wound     Order Specific Question Answer Comments   Wound Location,  Apply to: Clean with acetic acid    Wound Care Option: Other:  Specify      Change dressing (specify)   Order Comments: Dressing change:  Follow-up outpatient wound care instructions by wound care nurse     Activity as tolerated       Significant Diagnostic Studies: Labs:   BMP:   Recent Labs   Lab 11/02/22  0307   GLU 91      K 3.6   *    CO2 29   BUN 7   CREATININE 0.70   CALCIUM 8.7   , CMP   Recent Labs   Lab 11/02/22  0307      K 3.6   *   CO2 29   GLU 91   BUN 7   CREATININE 0.70   CALCIUM 8.7   ANIONGAP 5*    and CBC   Recent Labs   Lab 11/02/22  0307   WBC 6.08   HGB 8.8*   HCT 28.4*   *         Microbiology Results (last 7 days)     ** No results found for the last 168 hours. **        Intake/Output - Last 3 Shifts       10/31 0700  11/01 0659 11/01 0700  11/02 0659 11/02 0700  11/03 0659    P.O.       IV Piggyback  200     Total Intake(mL/kg)  200 (2)     Urine (mL/kg/hr) 1000 (0.4) 300 (0.1)     Other       Total Output 1000 300     Net -1000 -100                    Pending Diagnostic Studies:     None         Medications:  Reconciled Home Medications:      Medication List      START taking these medications    hydrALAZINE 25 MG tablet  Commonly known as: APRESOLINE  1 tablet (25 mg total) by Per G Tube route every 8 (eight) hours.        CHANGE how you take these medications    apixaban 2.5 mg Tab  Commonly known as: ELIQUIS  Take 1 tablet (2.5 mg total) by mouth 2 (two) times daily.  What changed:   · medication strength  · how much to take     furosemide 20 MG tablet  Commonly known as: LASIX  Take 1 tablet (20 mg total) by mouth once daily.  Start taking on: November 3, 2022  What changed:   · medication strength  · how much to take  · when to take this  · additional instructions     losartan 100 MG tablet  Commonly known as: COZAAR  Take 1 tablet (100 mg total) by mouth once daily.  Start taking on: November 3, 2022  What changed:   · medication strength  · how much to take  · additional instructions     metoprolol tartrate 25 MG tablet  Commonly known as: LOPRESSOR  Take 1 tablet (25 mg total) by mouth 2 (two) times daily.  What changed: how much to take     SITagliptin 25 MG Tab  Commonly known as: JANUVIA  Take 1 tablet (25 mg total) by mouth once daily.  Start taking on: November 3, 2022  What changed:    · medication strength  · how much to take        CONTINUE taking these medications    CALTRATE 600 + D ORAL  Take 1 tablet by mouth 2 (two) times a day.     carboxymethylcellulose sodium 0.5 % Drop  Place 2 drops into both eyes 2 (two) times daily.     levothyroxine 50 MCG tablet  Commonly known as: SYNTHROID  Take 1 tablet (50 mcg total) by mouth before breakfast.     memantine 10 MG Tab  Commonly known as: NAMENDA  Take 10 mg by mouth nightly.     OXcarbazepine 300 MG Tab  Commonly known as: TRILEPTAL  Take 300 mg by mouth once daily.     pantoprazole 40 MG tablet  Commonly known as: PROTONIX  Take 1 tablet by mouth every evening.     polyethylene glycol 17 gram Pwpk  Commonly known as: GLYCOLAX  Take 17 g by mouth 2 (two) times a day.     risperiDONE 1 MG tablet  Commonly known as: RISPERDAL  Take 1 mg by mouth 2 (two) times daily.     tamsulosin 0.4 mg Cap  Commonly known as: FLOMAX  Take 1 capsule by mouth once daily.        STOP taking these medications    HYDROcodone-acetaminophen 5-325 mg per tablet  Commonly known as: NORCO     insulin detemir U-100 100 unit/mL injection  Commonly known as: Levemir     insulin lispro 100 unit/mL injection     lactulose 10 gram/15 mL solution  Commonly known as: CHRONULAC          Continue with wound care and ostomy care at nursing home her wound care instructions.  Discontinue PICC line.  Continue with Khan catheter to be discharged to nursing home with it being in place.  Nursing home to continue with Khan care        Indwelling Lines/Drains at time of discharge:   Lines/Drains/Airways     Peripherally Inserted Central Catheter Line  Duration           PICC Double Lumen 10/20/22 1338 left basilic 13 days          Drain  Duration                Urethral Catheter 10/17/22 16 days         Colostomy 10/17/22 2015 LLQ 15 days                Time spent on the discharge of patient: more 30   minutes         Tristan Boles MD  Department of Hospital Medicine  [facility]

## 2022-11-02 NOTE — DISCHARGE INSTRUCTIONS
To be discharged to nursing home with anne and can be removed by Wound Care Center if / when  sacral wounds improved

## 2022-11-02 NOTE — PROGRESS NOTES
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 He presents to [facility] with a UTI that was found on hospital admission on 10/17/2022. Patient was sent to the [facility] ED via EMS secondary to decreased level of consciousness with hematuria.  No associated symptoms such as fever chills cough wheezing chest pain palpitation lower extremity edema nausea vomiting abdominal pain were reported.  No other history could be gathered at this time this patient is somnolent and confused and was only able to state his name only at this time.  On initial presentation, patient was hypotensive with systolic blood pressure in the 60s and tachycardic.  Ensuing workup was notable for leukocytosis with left shift, elevated lactic acid level, hyperglycemia, acute renal failure, and UA highly suggestive of a presence of urinary tract infection.  Patient did not respond favorably to IV fluid bolus of 30 cc/kg and was admitted to ICU for vasopressor support.  Patient's code status is DNR according to nursing [place] records. After IVF, antibiotics and pressor treatment she was downgraded from the ICU on 10/19/2022 and now admitted the [facility].     The patient was admitted to the [facility] to Family Medicine Service under the direct supervision  Dr. Lee Ann SIMMONS for continued care and medical management.       Overview/Hospital Course:  Mr. Goyal is an 82 year old admitted from [facility] 10/17 with altered mental status and hematuria and felt to have symptomatic urinary tract infection.  Initially hypotensive and admitted to ICU for vasopressor support.  Condition improved and transferred out of ICU 10/19 to [facility] and later admitted into LTAC into [facility].  Continued with antibiotic support.  At nursing home patient is listed as DNR.  Has chronic indwelling Khan catheter.  Blood cultures 2 assist 2 sets were positive with coag-negative staff but were different organisms and felt most likely contaminant.  Was treated for symptomatic UTI from Morganella and infected sacral decubitus wound with organisms including Pseudomonas.  Patient was to complete up to 14 days of Zosyn.     During stay did undergo cystoscopy by Dr. Del Cid 10/27:  Preop diagnosis:  Gross hematuria undetermined etiology  Postoperative diagnosis:  Gross hematuria probably secondary to bleeding from urethral stricture and from irritated bladder from chronic indwelling Khan.        Patient Active Problem List   Diagnosis    Alzheimer's disease    Pressure ulcer of sacral region, stage 4    Diabetes mellitus    Hypertension    Urinary tract infection with hematuria    Acute deep vein thrombosis (DVT) of both lower extremities    BPH (benign prostatic hyperplasia)    Bipolar disorder    COPD (chronic obstructive pulmonary disease)    ARMAAN (iron deficiency anemia)    Sacral decubitus ulcer    Bacteremia    Suspected deep tissue injury      Finishing antibiotics and looking into discharge needs.    Patient continues to do well, tolerating diet, felt had reached maximal hospital benefit, will be discharged back to nursing home, continue with Khan catheter and can be removed if and when sacral wound is resolved.  Patient to follow-up wound care center in 2 weeks.  Continue  outpatient wound care as per instructions from wound care nurse.  Discharged.    11/02 addendum:  Nursing home would not take back today        Interval History:     Review of Systems   Constitutional:  Negative for appetite change and fever.   HENT:  Negative for congestion and trouble swallowing.    Respiratory:  Negative for shortness of breath and wheezing.    Gastrointestinal:  Negative for abdominal pain, constipation and vomiting.   Musculoskeletal:  Negative for neck stiffness.   Skin:  Negative for pallor and rash.   Neurological:  Positive for speech difficulty. Negative for dizziness and headaches.   Psychiatric/Behavioral:  Positive for confusion. Negative for suicidal ideas.    Objective:     Vital Signs (Most Recent):  Temp: 97.8 °F (36.6 °C) (11/02/22 1200)  Pulse: 72 (11/02/22 1218)  Resp: 17 (11/02/22 1218)  BP: 134/85 (11/02/22 1352)  SpO2: 100 % (11/02/22 1218)   Vital Signs (24h Range):  Temp:  [97.6 °F (36.4 °C)-98.9 °F (37.2 °C)] 97.8 °F (36.6 °C)  Pulse:  [65-80] 72  Resp:  [17-20] 17  SpO2:  [100 %] 100 %  BP: (128-184)/(77-96) 134/85     Weight: 99.1 kg (218 lb 7.6 oz)  Body mass index is 29.63 kg/m².    Intake/Output Summary (Last 24 hours) at 11/2/2022 1717  Last data filed at 11/2/2022 0800  Gross per 24 hour   Intake 240 ml   Output 300 ml   Net -60 ml        Physical Exam  Vitals reviewed.   Constitutional:       General: He is not in acute distress.     Appearance: He is obese.      Comments: Does not follow commands   Eyes:      Pupils: Pupils are equal, round, and reactive to light.   Cardiovascular:      Rate and Rhythm: Normal rate and regular rhythm.      Pulses: Normal pulses.   Pulmonary:      Effort: Pulmonary effort is normal. No respiratory distress.      Breath sounds: Normal breath sounds. No wheezing.   Abdominal:      General: Bowel sounds are normal. There is no distension.      Tenderness: There is no abdominal tenderness.   Skin:     General: Skin is warm.    Neurological:      General: No focal deficit present.      Mental Status: He is alert and easily aroused. Mental status is at baseline.      Comments: Does not follow commands which is apparently his baseline  Nonvocal   Psychiatric:         Mood and Affect: Mood normal.         Behavior: Behavior normal.       Significant Labs: All pertinent labs within the past 24 hours have been reviewed.  BMP:   Recent Labs   Lab 11/02/22  0307   GLU 91      K 3.6   *   CO2 29   BUN 7   CREATININE 0.70   CALCIUM 8.7       CBC:   Recent Labs   Lab 11/02/22  0307   WBC 6.08   HGB 8.8*   HCT 28.4*   *       CMP:   Recent Labs   Lab 11/02/22  0307      K 3.6   *   CO2 29   GLU 91   BUN 7   CREATININE 0.70   CALCIUM 8.7   ANIONGAP 5*         Significant Imaging: I have reviewed all pertinent imaging results/findings within the past 24 hours.      Intake/Output - Last 3 Shifts         10/31 0700  11/01 0659 11/01 0700  11/02 0659 11/02 0700  11/03 0659    P.O.   240    IV Piggyback  200     Total Intake(mL/kg)  200 (2) 240 (2.4)    Urine (mL/kg/hr) 1000 (0.4) 300 (0.1)     Other       Total Output 1000 300     Net -1000 -100 +240                 Microbiology Results (last 7 days)       ** No results found for the last 168 hours. **                Assessment/Plan:      Suspected deep tissue injury  Wound care consulted       Bipolar disorder  Continuing home risperidone .  Patient also with schizophrenia.        Acute deep vein thrombosis (DVT) of both lower extremities  Hx of DVT -- US 10/17 with right popliteal nonocclusive thrombosis - start wt based renally dosed lovenox   Patient was bleeding from PICC line and sacral wound  Lovenox held 10/22 & 10/23, restarted on Monday 10/24/2022  Bleeding noticed again today (10/26), Lovenox held 10/26  Will continue to monitor    10/29 - Will resume lovenox at 40 (DVT ppx dose). It is not clear that DVTs are acute. Bleeding complications (gross hematuria and  wound bleeding) preclude full anticoagulation.     10/31 - No bleeding in a couple of days on DVT dose lovenox. Will d/c lovenox and resume eliquis at 2.5 BID  11/02 - with patient doing well and consider past history of bleeding will leave on current dose Eliquis when discharged      Urinary tract infection with hematuria  Pt has a chronic anne at nursing home.   Will make sure it has been changed this admission   - urine culture with >100,000 GNB  - Cultures sensitive to merrem   -  Patient completed 4 days of Merrem and vancomycin on 10/22     10/29 - Adequately treated.  Remains on abx for wound.  Sepsis syndrome resoved.       Hypertension  Losartan 100mg Qd  Lopressor 12.5mg BID      Diabetes mellitus  POCT glucose checks  Sliding scale insulin   Acceptable control    11/02 discharged on Januvia.  Can watch blood sugar on sliding scale    Pressure ulcer of sacral region, stage 4  - Wound care consulted  - wound Cx grew pseudomonas sensitive to Zosyn  - Zosyn 4.5g IV Q8H  - Surgery consulted via wound care, guidance appreciated     10/31 - Still a little greenish drainage but improved.  Will give IV abx another 24-48 hours, continue wound care.  Begin discharge planning.       VTE Risk Mitigation (From admission, onward)         Ordered     apixaban tablet 2.5 mg  2 times daily         10/31/22 1642                Discharge Planning   YOSSI: 11/2/2022     Code Status: DNR   Is the patient medically ready for discharge?:     Reason for patient still in hospital (select all that apply): Laboratory test, Treatment, Imaging, PT / OT recommendations and Pending disposition  Discharge Plan A: Return to nursing home   Discharge Delays: None known at this time              Tristan Boles MD  Department of Hospital Medicine   [facility]

## 2022-11-02 NOTE — ASSESSMENT & PLAN NOTE
Hx of DVT -- US 10/17 with right popliteal nonocclusive thrombosis - start wt based renally dosed lovenox   Patient was bleeding from PICC line and sacral wound  Lovenox held 10/22 & 10/23, restarted on Monday 10/24/2022  Bleeding noticed again today (10/26), Lovenox held 10/26  Will continue to monitor    10/29 - Will resume lovenox at 40 (DVT ppx dose). It is not clear that DVTs are acute. Bleeding complications (gross hematuria and wound bleeding) preclude full anticoagulation.     10/31 - No bleeding in a couple of days on DVT dose lovenox. Will d/c lovenox and resume eliquis at 2.5 BID  11/02 - with patient doing well and consider past history of bleeding will leave on current dose Eliquis when discharged

## 2022-11-03 VITALS
HEART RATE: 64 BPM | HEIGHT: 72 IN | OXYGEN SATURATION: 100 % | SYSTOLIC BLOOD PRESSURE: 130 MMHG | DIASTOLIC BLOOD PRESSURE: 72 MMHG | TEMPERATURE: 97 F | BODY MASS INDEX: 29.59 KG/M2 | WEIGHT: 218.5 LBS | RESPIRATION RATE: 19 BRPM

## 2022-11-03 LAB
GLUCOSE SERPL-MCNC: 110 MG/DL (ref 70–105)
GLUCOSE SERPL-MCNC: 87 MG/DL (ref 70–105)
GLUCOSE SERPL-MCNC: 94 MG/DL (ref 70–105)

## 2022-11-03 PROCEDURE — 25000003 PHARM REV CODE 250: Performed by: FAMILY MEDICINE

## 2022-11-03 PROCEDURE — 94761 N-INVAS EAR/PLS OXIMETRY MLT: CPT

## 2022-11-03 PROCEDURE — 25000003 PHARM REV CODE 250

## 2022-11-03 PROCEDURE — 25000242 PHARM REV CODE 250 ALT 637 W/ HCPCS: Performed by: NURSE PRACTITIONER

## 2022-11-03 PROCEDURE — 99239 PR HOSPITAL DISCHARGE DAY,>30 MIN: ICD-10-PCS | Mod: ,,, | Performed by: HOSPITALIST

## 2022-11-03 PROCEDURE — 94640 AIRWAY INHALATION TREATMENT: CPT

## 2022-11-03 PROCEDURE — 82962 GLUCOSE BLOOD TEST: CPT

## 2022-11-03 PROCEDURE — 25000003 PHARM REV CODE 250: Performed by: HOSPITALIST

## 2022-11-03 PROCEDURE — 99239 HOSP IP/OBS DSCHRG MGMT >30: CPT | Mod: ,,, | Performed by: HOSPITALIST

## 2022-11-03 RX ADMIN — ACETIC ACID: 250 IRRIGANT IRRIGATION at 04:11

## 2022-11-03 RX ADMIN — APIXABAN 2.5 MG: 2.5 TABLET, FILM COATED ORAL at 08:11

## 2022-11-03 RX ADMIN — IPRATROPIUM BROMIDE AND ALBUTEROL SULFATE 3 ML: 2.5; .5 SOLUTION RESPIRATORY (INHALATION) at 01:11

## 2022-11-03 RX ADMIN — LEVOTHYROXINE SODIUM 50 MCG: 0.05 TABLET ORAL at 05:11

## 2022-11-03 RX ADMIN — METOPROLOL TARTRATE 25 MG: 25 TABLET, FILM COATED ORAL at 08:11

## 2022-11-03 RX ADMIN — IPRATROPIUM BROMIDE AND ALBUTEROL SULFATE 3 ML: 2.5; .5 SOLUTION RESPIRATORY (INHALATION) at 07:11

## 2022-11-03 RX ADMIN — SITAGLIPTIN 25 MG: 25 TABLET, FILM COATED ORAL at 08:11

## 2022-11-03 RX ADMIN — RISPERIDONE 1 MG: 1 TABLET ORAL at 08:11

## 2022-11-03 RX ADMIN — PANTOPRAZOLE SODIUM 40 MG: 40 GRANULE, DELAYED RELEASE ORAL at 08:11

## 2022-11-03 RX ADMIN — HYDRALAZINE HYDROCHLORIDE 25 MG: 25 TABLET ORAL at 05:11

## 2022-11-03 RX ADMIN — LOSARTAN POTASSIUM 100 MG: 100 TABLET, FILM COATED ORAL at 08:11

## 2022-11-03 RX ADMIN — HYPROMELLOSE 2910 1 DROP: 5 SOLUTION/ DROPS OPHTHALMIC at 09:11

## 2022-11-03 RX ADMIN — FUROSEMIDE 20 MG: 20 TABLET ORAL at 08:11

## 2022-11-03 NOTE — PLAN OF CARE
Nursing aware that pt is to dc back to nursing home [facility] today asap, metro froms given, packet with nursing

## 2022-11-03 NOTE — DISCHARGE SUMMARY
[facility] Medicine  Discharge Summary      Patient Name: Bhargav Gao  MRN: [id number]  [facility]: [id number]  Patient Class: IP- Inpatient  Admission Date: 10/19/2022  Hospital Length of Stay: 15 days  Discharge Date and Time:  11/03/2022 8:15 AM  Attending Physician: Tristan Boles MD   Discharging Provider: Tristan Boles MD  Primary Care Provider: Kerry Lin MD    Primary Care Team: Networked reference to record PCT     HPI:   Patient is on 82-year-old male with multiple medical issues including type 2 diabetes on insulin, essential hypertension, hypothyroidism, iron deficiency anemia, 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 He presents to [facility] with a UTI that was found on hospital admission on 10/17/2022. Patient was sent to the [facility] ED via EMS secondary to decreased level of consciousness with hematuria.  No associated symptoms such as fever chills cough wheezing chest pain palpitation lower extremity edema nausea vomiting abdominal pain were reported.  No other history could be gathered at this time this patient is somnolent and confused and was only able to state his name only at this time.  On initial presentation, patient was hypotensive with systolic blood pressure in the 60s and tachycardic.  Ensuing workup was notable for leukocytosis with left shift, elevated lactic acid level, hyperglycemia, acute renal failure, and UA highly suggestive of a presence of urinary tract infection.  Patient did not respond favorably to IV fluid bolus of 30 cc/kg and was admitted to ICU for vasopressor support.  Patient's code status is DNR according to nursing home records. After IVF, antibiotics and pressor treatment she was downgraded from the ICU on 10/19/2022 and now admitted the [facility].      The patient was admitted to the [facility] to Family Medicine Service under the direct supervision Dr. Lee Ann SIMMONS for continued care and medical management.       * No surgery found *      Hospital Course:   Mr. Goyal is an 82 year old admitted from [facility] 10/17 with altered mental status and hematuria and felt to have symptomatic urinary tract infection.  Initially hypotensive and admitted to ICU for vasopressor support.  Condition improved and transferred out of ICU 10/19 to [facility] and later admitted into LTAC into [facility].  Continued with antibiotic support.  At nursing home patient is listed as DNR.  Has chronic indwelling Khan catheter.  Blood cultures 2 assist 2 sets were positive with coag-negative staff but were different organisms and felt most likely contaminant.  Was treated for symptomatic UTI from Morganella and infected sacral decubitus wound with organisms including Pseudomonas.  Patient was to complete up to 14 days of Zosyn.     During stay did undergo cystoscopy by Dr. Del Cid 10/27:  Preop diagnosis:  Gross hematuria undetermined etiology  Postoperative diagnosis:  Gross hematuria probably secondary to bleeding from urethral stricture and from irritated bladder from chronic indwelling Khna.        Patient Active Problem List   Diagnosis    Alzheimer's disease    Pressure ulcer of sacral region, stage 4    Diabetes mellitus    Hypertension    Urinary tract infection with hematuria    Acute deep vein thrombosis (DVT) of both lower extremities    BPH (benign prostatic hyperplasia)    Bipolar disorder    COPD (chronic obstructive pulmonary disease)    ARMAAN (iron deficiency anemia)    Sacral decubitus ulcer    Bacteremia    Suspected deep tissue injury      Finishing antibiotics and looking into discharge needs.    Patient continues to do well, tolerating diet, felt had reached maximal hospital benefit, will be discharged back to nursing home, continue  with Khan catheter and can be removed if and when sacral wound is resolved.  Patient to follow-up wound care center in 2 weeks.  Continue outpatient wound care as per instructions from wound care nurse.  Discharged.    11/02 addendum:  Nursing home would not take back today  11/03 no new issues during the night.  Alert and mental status as before with patient following with eyes and following only very simple commands.  Basically nonverbal.           Goals of Care Treatment Preferences:  Code Status: DNR      Consults:   Consults (From admission, onward)        Status Ordering Provider     Inpatient consult to Social Work  Once        Provider:  (Not yet assigned)    Completed VINOD MAS     Inpatient consult to Urology  Once        Provider:  Leon Del Cid Jr., MD    Acknowledged ISABELLA CANADA     Inpatient consult to General Surgery  Once        Provider:  Yannick Sanders MD    Acknowledged YANETH ALONZO     Inpatient consult to Infectious Diseases  Once        Provider:  Duyen Bermudez MD    Completed KODY CORONADO JR          No new Assessment & Plan notes have been filed under this hospital service since the last note was generated.  Service: Hospital Medicine    Final Active Diagnoses:    Diagnosis Date Noted POA    PRINCIPAL PROBLEM:  Pressure ulcer of sacral region, stage 4 [L89.154]  Yes    Suspected deep tissue injury [R68.89] 10/20/2022 Yes    Urinary tract infection with hematuria [N39.0, R31.9] 07/30/2022 Yes    Acute deep vein thrombosis (DVT) of both lower extremities [I82.403] 07/30/2022 Yes    Bipolar disorder [F31.9] 07/30/2022 Yes    Diabetes mellitus [E11.9]  Yes      Problems Resolved During this Admission:    Diagnosis Date Noted Date Resolved POA    Bacteremia [R78.81] 10/19/2022 10/29/2022 Yes       Discharged Condition: fair    Disposition: Home or Self Care    Follow Up:   Follow-up Information     [facility]     Specialty: Wound Care  Why: Appt:  11/16/2022 @ 0900  Contact information:  [address]  [facility] [address] [phone]                     Patient Instructions:      Diet diabetic     Diet diabetic     Wound care discharge order   Order Comments: Clean wound with acetic acid  Apply sensicare around wound  Apply acetic acid moistened drawtex to wound bed then apply second layer of drawtex to wick drainage  Cover and secure with abd pad and paper tape  Change daily  Turn every two hours  Low air loss mattress  Keep pressure off wound     Order Specific Question Answer Comments   Wound Location,  Apply to: Clean with acetic acid    Wound Care Option: Other:  Specify      Change dressing (specify)   Order Comments: Dressing change:  Follow-up outpatient wound care instructions by wound care nurse     Activity as tolerated       Significant Diagnostic Studies: Labs:   BMP:   Recent Labs   Lab 11/02/22 0307   GLU 91      K 3.6   *   CO2 29   BUN 7   CREATININE 0.70   CALCIUM 8.7   , CMP   Recent Labs   Lab 11/02/22 0307      K 3.6   *   CO2 29   GLU 91   BUN 7   CREATININE 0.70   CALCIUM 8.7   ANIONGAP 5*    and CBC   Recent Labs   Lab 11/02/22 0307   WBC 6.08   HGB 8.8*   HCT 28.4*   *         Intake/Output - Last 3 Shifts       11/01 0700  11/02 0659 11/02 0700 11/03 0659 11/03 0700  11/04 0659    P.O.  2340     IV Piggyback 200      Total Intake(mL/kg) 200 (2) 2340 (23.6)     Urine (mL/kg/hr) 300 (0.1) 500 (0.2)     Total Output 300 500     Net -100 +1840                  Pending Diagnostic Studies:     None         Medications:  Reconciled Home Medications:      Medication List      START taking these medications    hydrALAZINE 25 MG tablet  Commonly known as: APRESOLINE  1 tablet (25 mg total) by Per G Tube route every 8 (eight) hours.        CHANGE how you take these medications    apixaban 2.5 mg Tab  Commonly known as: ELIQUIS  Take 1 tablet (2.5 mg total) by  mouth 2 (two) times daily.  What changed:   · medication strength  · how much to take     furosemide 20 MG tablet  Commonly known as: LASIX  Take 1 tablet (20 mg total) by mouth once daily.  What changed:   · medication strength  · how much to take  · when to take this  · additional instructions     losartan 100 MG tablet  Commonly known as: COZAAR  Take 1 tablet (100 mg total) by mouth once daily.  What changed:   · medication strength  · how much to take  · additional instructions     metoprolol tartrate 25 MG tablet  Commonly known as: LOPRESSOR  Take 1 tablet (25 mg total) by mouth 2 (two) times daily.  What changed: how much to take     SITagliptin 25 MG Tab  Commonly known as: JANUVIA  Take 1 tablet (25 mg total) by mouth once daily.  What changed:   · medication strength  · how much to take        CONTINUE taking these medications    CALTRATE 600 + D ORAL  Take 1 tablet by mouth 2 (two) times a day.     carboxymethylcellulose sodium 0.5 % Drop  Place 2 drops into both eyes 2 (two) times daily.     levothyroxine 50 MCG tablet  Commonly known as: SYNTHROID  Take 1 tablet (50 mcg total) by mouth before breakfast.     memantine 10 MG Tab  Commonly known as: NAMENDA  Take 10 mg by mouth nightly.     OXcarbazepine 300 MG Tab  Commonly known as: TRILEPTAL  Take 300 mg by mouth once daily.     pantoprazole 40 MG tablet  Commonly known as: PROTONIX  Take 1 tablet by mouth every evening.     polyethylene glycol 17 gram Pwpk  Commonly known as: GLYCOLAX  Take 17 g by mouth 2 (two) times a day.     risperiDONE 1 MG tablet  Commonly known as: RISPERDAL  Take 1 mg by mouth 2 (two) times daily.     tamsulosin 0.4 mg Cap  Commonly known as: FLOMAX  Take 1 capsule by mouth once daily.        STOP taking these medications    HYDROcodone-acetaminophen 5-325 mg per tablet  Commonly known as: NORCO     insulin detemir U-100 100 unit/mL injection  Commonly known as: Levemir     insulin lispro 100 unit/mL injection     lactulose 10  gram/15 mL solution  Commonly known as: CHRONULAC            Indwelling Lines/Drains at time of discharge:   Lines/Drains/Airways     Drain  Duration                Urethral Catheter 10/17/22 17 days         Colostomy 10/17/22 2015 LLQ 16 days                Time spent on the discharge of patient: more 30   minutes         Tristan Boles MD  Department of Hospital Medicine  [facility]

## 2022-11-03 NOTE — PLAN OF CARE
[facility]  Discharge Final Note    Primary Care Provider: Kerry Lin MD    Expected Discharge Date: 11/3/2022    Final Discharge Note (most recent)       Final Note - 11/03/22 1547          Final Note    Assessment Type Final Discharge Note     Anticipated Discharge Disposition [facility] Nursing Home        Post-Acute Status    Post-Acute Authorization Placement     Post-Acute Placement Status Set-up Complete/Auth obtained     Patient choice form signed by patient/caregiver List with quality metrics by geographic area provided;List from CMS Compare     Discharge Delays None known at this time                     Important Message from Medicare  Important Message from Medicare regarding Discharge Appeal Rights: Given to patient/caregiver     Date IMM was signed: 11/02/22  Time IMM was signed: 1000    Contact Info       [facility]   Specialty: Wound Care    [address] [address]   Phone: [phone]       Next Steps: Follow up in 2 week(s)    Instructions: Appt:  11/16/2022 @ 0900        Pt dc'd back to [facility] today

## 2022-11-10 ENCOUNTER — HOSPITAL ENCOUNTER (EMERGENCY)
Facility: HOSPITAL | Age: 82
Discharge: HOME | End: 2022-11-10
Attending: EMERGENCY MEDICINE
Payer: MEDICARE

## 2022-11-10 ENCOUNTER — HOSPITAL ENCOUNTER (INPATIENT)
Facility: HOSPITAL | Age: 82
LOS: 4 days | DRG: 592 | End: 2022-11-14
Attending: INTERNAL MEDICINE | Admitting: INTERNAL MEDICINE
Payer: MEDICARE

## 2022-11-10 VITALS
WEIGHT: 201 LBS | HEART RATE: 68 BPM | BODY MASS INDEX: 27.22 KG/M2 | SYSTOLIC BLOOD PRESSURE: 117 MMHG | HEIGHT: 72 IN | RESPIRATION RATE: 10 BRPM | OXYGEN SATURATION: 99 % | DIASTOLIC BLOOD PRESSURE: 64 MMHG | TEMPERATURE: 99 F

## 2022-11-10 DIAGNOSIS — R07.9 CHEST PAIN: ICD-10-CM

## 2022-11-10 DIAGNOSIS — L89.154 PRESSURE ULCER OF SACRAL REGION, STAGE 4: Primary | ICD-10-CM

## 2022-11-10 DIAGNOSIS — R53.1 GENERALIZED WEAKNESS: ICD-10-CM

## 2022-11-10 DIAGNOSIS — R53.1 GENERAL WEAKNESS: ICD-10-CM

## 2022-11-10 LAB
ALBUMIN SERPL BCP-MCNC: 1.8 G/DL (ref 3.5–5)
ALBUMIN/GLOB SERPL: 0.4 {RATIO}
ALP SERPL-CCNC: 89 U/L (ref 45–115)
ALT SERPL W P-5'-P-CCNC: 14 U/L (ref 16–61)
ANION GAP SERPL CALCULATED.3IONS-SCNC: 9 MMOL/L (ref 7–16)
APTT PPP: 37.1 SECONDS (ref 25.2–37.3)
AST SERPL W P-5'-P-CCNC: 26 U/L (ref 15–37)
BACTERIA #/AREA URNS HPF: ABNORMAL /HPF
BASOPHILS # BLD AUTO: 0.05 K/UL (ref 0–0.2)
BASOPHILS NFR BLD AUTO: 0.6 % (ref 0–1)
BILIRUB SERPL-MCNC: 0.3 MG/DL (ref ?–1.2)
BILIRUB UR QL STRIP: NEGATIVE
BUN SERPL-MCNC: 23 MG/DL (ref 7–18)
BUN/CREAT SERPL: 34 (ref 6–20)
CALCIUM SERPL-MCNC: 8.7 MG/DL (ref 8.5–10.1)
CHLORIDE SERPL-SCNC: 102 MMOL/L (ref 98–107)
CLARITY UR: CLEAR
CO2 SERPL-SCNC: 28 MMOL/L (ref 21–32)
COLOR UR: ABNORMAL
CREAT SERPL-MCNC: 0.68 MG/DL (ref 0.7–1.3)
DIFFERENTIAL METHOD BLD: ABNORMAL
EGFR (NO RACE VARIABLE) (RUSH/TITUS): 93 ML/MIN/1.73M²
EOSINOPHIL # BLD AUTO: 0.59 K/UL (ref 0–0.5)
EOSINOPHIL NFR BLD AUTO: 6.7 % (ref 1–4)
ERYTHROCYTE [DISTWIDTH] IN BLOOD BY AUTOMATED COUNT: 17 % (ref 11.5–14.5)
GLOBULIN SER-MCNC: 4.1 G/DL (ref 2–4)
GLUCOSE SERPL-MCNC: 97 MG/DL (ref 74–106)
GLUCOSE UR STRIP-MCNC: NORMAL MG/DL
HCT VFR BLD AUTO: 31.9 % (ref 40–54)
HGB BLD-MCNC: 9.9 G/DL (ref 13.5–18)
IMM GRANULOCYTES # BLD AUTO: 0.06 K/UL (ref 0–0.04)
IMM GRANULOCYTES NFR BLD: 0.7 % (ref 0–0.4)
INR BLD: 1.39
KETONES UR STRIP-SCNC: NEGATIVE MG/DL
LACTATE SERPL-SCNC: 1.5 MMOL/L (ref 0.4–2)
LEUKOCYTE ESTERASE UR QL STRIP: ABNORMAL
LYMPHOCYTES # BLD AUTO: 2.47 K/UL (ref 1–4.8)
LYMPHOCYTES NFR BLD AUTO: 28 % (ref 27–41)
MAGNESIUM SERPL-MCNC: 2 MG/DL (ref 1.7–2.3)
MCH RBC QN AUTO: 27.1 PG (ref 27–31)
MCHC RBC AUTO-ENTMCNC: 31 G/DL (ref 32–36)
MCV RBC AUTO: 87.4 FL (ref 80–96)
MONOCYTES # BLD AUTO: 0.92 K/UL (ref 0–0.8)
MONOCYTES NFR BLD AUTO: 10.4 % (ref 2–6)
MPC BLD CALC-MCNC: 8.7 FL (ref 9.4–12.4)
NEUTROPHILS # BLD AUTO: 4.74 K/UL (ref 1.8–7.7)
NEUTROPHILS NFR BLD AUTO: 53.6 % (ref 53–65)
NITRITE UR QL STRIP: NEGATIVE
NRBC # BLD AUTO: 0 X10E3/UL
NRBC, AUTO (.00): 0 %
PH UR STRIP: 6 PH UNITS
PLATELET # BLD AUTO: 338 K/UL (ref 150–400)
POTASSIUM SERPL-SCNC: 4.2 MMOL/L (ref 3.5–5.1)
PROT SERPL-MCNC: 5.9 G/DL (ref 6.4–8.2)
PROT UR QL STRIP: NEGATIVE
PROTHROMBIN TIME: 16.5 SECONDS (ref 11.7–14.7)
RBC # BLD AUTO: 3.65 M/UL (ref 4.6–6.2)
RBC # UR STRIP: ABNORMAL /UL
RBC #/AREA URNS HPF: ABNORMAL /HPF
SODIUM SERPL-SCNC: 135 MMOL/L (ref 136–145)
SP GR UR STRIP: 1.01
SQUAMOUS #/AREA URNS LPF: ABNORMAL /LPF
UROBILINOGEN UR STRIP-ACNC: NORMAL MG/DL
WBC # BLD AUTO: 8.83 K/UL (ref 4.5–11)
WBC #/AREA URNS HPF: ABNORMAL /HPF
YEAST #/AREA URNS HPF: ABNORMAL /HPF

## 2022-11-10 PROCEDURE — 93005 ELECTROCARDIOGRAM TRACING: CPT

## 2022-11-10 PROCEDURE — 93010 EKG 12-LEAD: ICD-10-PCS | Mod: ,,, | Performed by: HOSPITALIST

## 2022-11-10 PROCEDURE — 99283 PR EMERGENCY DEPT VISIT,LEVEL III: ICD-10-PCS | Mod: ,,, | Performed by: EMERGENCY MEDICINE

## 2022-11-10 PROCEDURE — 85610 PROTHROMBIN TIME: CPT | Performed by: EMERGENCY MEDICINE

## 2022-11-10 PROCEDURE — 99283 EMERGENCY DEPT VISIT LOW MDM: CPT | Mod: ,,, | Performed by: EMERGENCY MEDICINE

## 2022-11-10 PROCEDURE — 99223 1ST HOSP IP/OBS HIGH 75: CPT | Mod: AI,,, | Performed by: INTERNAL MEDICINE

## 2022-11-10 PROCEDURE — 99223 PR INITIAL HOSPITAL CARE,LEVL III: ICD-10-PCS | Mod: AI,,, | Performed by: INTERNAL MEDICINE

## 2022-11-10 PROCEDURE — 83605 ASSAY OF LACTIC ACID: CPT | Performed by: EMERGENCY MEDICINE

## 2022-11-10 PROCEDURE — 81003 URINALYSIS AUTO W/O SCOPE: CPT | Performed by: EMERGENCY MEDICINE

## 2022-11-10 PROCEDURE — 25000003 PHARM REV CODE 250: Performed by: INTERNAL MEDICINE

## 2022-11-10 PROCEDURE — 36415 COLL VENOUS BLD VENIPUNCTURE: CPT | Performed by: EMERGENCY MEDICINE

## 2022-11-10 PROCEDURE — 93010 ELECTROCARDIOGRAM REPORT: CPT | Mod: ,,, | Performed by: HOSPITALIST

## 2022-11-10 PROCEDURE — 81001 URINALYSIS AUTO W/SCOPE: CPT | Performed by: EMERGENCY MEDICINE

## 2022-11-10 PROCEDURE — 87040 BLOOD CULTURE FOR BACTERIA: CPT | Mod: 59 | Performed by: EMERGENCY MEDICINE

## 2022-11-10 PROCEDURE — 11000008

## 2022-11-10 PROCEDURE — 85025 COMPLETE CBC W/AUTO DIFF WBC: CPT | Performed by: EMERGENCY MEDICINE

## 2022-11-10 PROCEDURE — 99285 EMERGENCY DEPT VISIT HI MDM: CPT | Mod: 25

## 2022-11-10 PROCEDURE — 85730 THROMBOPLASTIN TIME PARTIAL: CPT | Performed by: EMERGENCY MEDICINE

## 2022-11-10 PROCEDURE — 80053 COMPREHEN METABOLIC PANEL: CPT | Performed by: EMERGENCY MEDICINE

## 2022-11-10 PROCEDURE — 83735 ASSAY OF MAGNESIUM: CPT | Performed by: EMERGENCY MEDICINE

## 2022-11-10 RX ORDER — LEVOTHYROXINE SODIUM 50 UG/1
50 TABLET ORAL
Status: DISCONTINUED | OUTPATIENT
Start: 2022-11-11 | End: 2022-11-10 | Stop reason: HOSPADM

## 2022-11-10 RX ORDER — POLYETHYLENE GLYCOL 3350 17 G/17G
17 POWDER, FOR SOLUTION ORAL 2 TIMES DAILY
Status: DISCONTINUED | OUTPATIENT
Start: 2022-11-10 | End: 2022-11-10 | Stop reason: HOSPADM

## 2022-11-10 RX ORDER — MEMANTINE HYDROCHLORIDE 10 MG/1
10 TABLET ORAL NIGHTLY
Status: DISCONTINUED | OUTPATIENT
Start: 2022-11-10 | End: 2022-11-10 | Stop reason: HOSPADM

## 2022-11-10 RX ORDER — PANTOPRAZOLE SODIUM 40 MG/1
40 TABLET, DELAYED RELEASE ORAL NIGHTLY
Status: DISCONTINUED | OUTPATIENT
Start: 2022-11-10 | End: 2022-11-10 | Stop reason: HOSPADM

## 2022-11-10 RX ORDER — TAMSULOSIN HYDROCHLORIDE 0.4 MG/1
1 CAPSULE ORAL DAILY
Status: DISCONTINUED | OUTPATIENT
Start: 2022-11-11 | End: 2022-11-14 | Stop reason: HOSPADM

## 2022-11-10 RX ORDER — OXCARBAZEPINE 150 MG/1
300 TABLET, FILM COATED ORAL DAILY
Status: DISCONTINUED | OUTPATIENT
Start: 2022-11-11 | End: 2022-11-14 | Stop reason: HOSPADM

## 2022-11-10 RX ORDER — RISPERIDONE 1 MG/1
1 TABLET ORAL 2 TIMES DAILY
Status: DISCONTINUED | OUTPATIENT
Start: 2022-11-10 | End: 2022-11-10 | Stop reason: HOSPADM

## 2022-11-10 RX ORDER — PANTOPRAZOLE SODIUM 40 MG/1
40 TABLET, DELAYED RELEASE ORAL NIGHTLY
Status: DISCONTINUED | OUTPATIENT
Start: 2022-11-11 | End: 2022-11-14 | Stop reason: HOSPADM

## 2022-11-10 RX ORDER — ONDANSETRON 2 MG/ML
4 INJECTION INTRAMUSCULAR; INTRAVENOUS EVERY 8 HOURS PRN
Status: DISCONTINUED | OUTPATIENT
Start: 2022-11-10 | End: 2022-11-14 | Stop reason: HOSPADM

## 2022-11-10 RX ORDER — RISPERIDONE 1 MG/1
1 TABLET ORAL 2 TIMES DAILY
Status: DISCONTINUED | OUTPATIENT
Start: 2022-11-11 | End: 2022-11-14 | Stop reason: HOSPADM

## 2022-11-10 RX ORDER — IBUPROFEN 200 MG
24 TABLET ORAL
Status: DISCONTINUED | OUTPATIENT
Start: 2022-11-10 | End: 2022-11-14 | Stop reason: HOSPADM

## 2022-11-10 RX ORDER — TAMSULOSIN HYDROCHLORIDE 0.4 MG/1
1 CAPSULE ORAL DAILY
Status: DISCONTINUED | OUTPATIENT
Start: 2022-11-11 | End: 2022-11-10 | Stop reason: HOSPADM

## 2022-11-10 RX ORDER — LEVOTHYROXINE SODIUM 50 UG/1
50 TABLET ORAL
Status: DISCONTINUED | OUTPATIENT
Start: 2022-11-11 | End: 2022-11-14 | Stop reason: HOSPADM

## 2022-11-10 RX ORDER — GLUCAGON 1 MG
1 KIT INJECTION
Status: DISCONTINUED | OUTPATIENT
Start: 2022-11-10 | End: 2022-11-14 | Stop reason: HOSPADM

## 2022-11-10 RX ORDER — MUPIROCIN 20 MG/G
OINTMENT TOPICAL 2 TIMES DAILY
Status: DISCONTINUED | OUTPATIENT
Start: 2022-11-11 | End: 2022-11-14 | Stop reason: HOSPADM

## 2022-11-10 RX ORDER — POLYETHYLENE GLYCOL 3350 17 G/17G
17 POWDER, FOR SOLUTION ORAL 2 TIMES DAILY
Status: DISCONTINUED | OUTPATIENT
Start: 2022-11-10 | End: 2022-11-14 | Stop reason: HOSPADM

## 2022-11-10 RX ORDER — INSULIN ASPART 100 [IU]/ML
0-5 INJECTION, SOLUTION INTRAVENOUS; SUBCUTANEOUS
Status: DISCONTINUED | OUTPATIENT
Start: 2022-11-10 | End: 2022-11-14 | Stop reason: HOSPADM

## 2022-11-10 RX ORDER — SODIUM CHLORIDE 0.9 % (FLUSH) 0.9 %
10 SYRINGE (ML) INJECTION EVERY 12 HOURS PRN
Status: DISCONTINUED | OUTPATIENT
Start: 2022-11-10 | End: 2022-11-14 | Stop reason: HOSPADM

## 2022-11-10 RX ORDER — IBUPROFEN 200 MG
16 TABLET ORAL
Status: DISCONTINUED | OUTPATIENT
Start: 2022-11-10 | End: 2022-11-14 | Stop reason: HOSPADM

## 2022-11-10 RX ORDER — MEMANTINE HYDROCHLORIDE 5 MG/1
10 TABLET ORAL NIGHTLY
Status: DISCONTINUED | OUTPATIENT
Start: 2022-11-10 | End: 2022-11-14 | Stop reason: HOSPADM

## 2022-11-10 RX ORDER — NALOXONE HCL 0.4 MG/ML
0.02 VIAL (ML) INJECTION
Status: DISCONTINUED | OUTPATIENT
Start: 2022-11-10 | End: 2022-11-14 | Stop reason: HOSPADM

## 2022-11-10 RX ORDER — OXCARBAZEPINE 300 MG/1
300 TABLET, FILM COATED ORAL DAILY
Status: DISCONTINUED | OUTPATIENT
Start: 2022-11-11 | End: 2022-11-10 | Stop reason: HOSPADM

## 2022-11-10 RX ADMIN — RISPERIDONE 1 MG: 1 TABLET ORAL at 11:11

## 2022-11-10 RX ADMIN — MEMANTINE 10 MG: 10 TABLET ORAL at 11:11

## 2022-11-10 RX ADMIN — APIXABAN 2.5 MG: 2.5 TABLET, FILM COATED ORAL at 11:11

## 2022-11-10 RX ADMIN — PANTOPRAZOLE SODIUM 40 MG: 40 TABLET, DELAYED RELEASE ORAL at 11:11

## 2022-11-10 RX ADMIN — POLYETHYLENE GLYCOL 3350 17 G: 17 POWDER, FOR SOLUTION ORAL at 11:11

## 2022-11-10 NOTE — ED PROVIDER NOTES
Encounter Date: 11/10/2022    SCRIBE #1 NOTE: I, Pallavi Gonzáles, am scribing for, and in the presence of,  Allen Xie MD. I have scribed the entire note.     History     Chief Complaint   Patient presents with    Wound Check     Sacral wound     Weakness     Patient is a 82 y.o. male who presents to the emergency department via EMS with multiple complaints. EMS reports that the patient's blood pressure was 102/53. Report states that he had a decreased level of consciousness and appeared generally weak. Patient also has a sacral wound. Patient is a poor historian due to Alzheimer's disease.     The history is provided by the EMS personnel. No  was used.   Review of patient's allergies indicates:   Allergen Reactions    Metformin     Mycobacterium tuberculosis (tuberculin ppd)     Norvasc [amlodipine]      Past Medical History:   Diagnosis Date    Alzheimer's disease     Anticoagulant long-term use     Bipolar disorder     BPH (benign prostatic hyperplasia)     Deep vein thrombosis (DVT) of popliteal vein of right lower extremity     Diabetes mellitus     DVT of deep femoral vein, left     Hyperlipidemia     Hypertension     Hypothyroidism     Idiopathic orofacial dystonia     Iron deficiency 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    Light Growth Escherichia coli Abnormal    Light Growth Klebsiel    Schizophrenia 1/28/2022 05/16 -continue trileptal and risperidone     Past Surgical History:   Procedure Laterality Date    COLOSTOMY N/A 5/24/2022    Procedure: CREATION, COLOSTOMY;  Surgeon:  Edilma Felix MD;  Location: [facility];  Service: General;  Laterality: N/A;  diverting colostomy dr felix tuesday     Family History   Family history unknown: Yes     Social History     Tobacco Use    Smoking status: Never    Smokeless tobacco: Never   Substance Use Topics    Alcohol use: Not Currently    Drug use: Never     Review of Systems   HENT: Negative.     Eyes: Negative.    Respiratory: Negative.     Endocrine: Negative.    Genitourinary: Negative.    Skin:  Positive for wound (sacral wound).   Allergic/Immunologic: Negative.    Neurological:  Positive for weakness.     Physical Exam     Initial Vitals [11/10/22 1630]   BP Pulse Resp Temp SpO2   (!) 109/59 86 16 99.1 °F (37.3 °C) 98 %      MAP       --         Physical Exam    Nursing note and vitals reviewed.  Constitutional: He appears well-developed and well-nourished. He is not diaphoretic. No distress.   HENT:   Head: Normocephalic and atraumatic.   Right Ear: External ear normal.   Left Ear: External ear normal.   Nose: Nose normal.   Eyes: Conjunctivae and EOM are normal.   Neck: Neck supple.   Cardiovascular:  Normal rate.           Pulmonary/Chest: Breath sounds normal. No stridor. No respiratory distress. He has no wheezes.   Musculoskeletal:      Cervical back: Neck supple. Normal.      Thoracic back: Normal.      Lumbar back: Normal.     Neurological: He is alert. No cranial nerve deficit.   Patient is awake and alert but nonverbal   Skin: Skin is warm and dry. No pallor.   Stage 4 ulcer on sacrum (See media).  Superficial blister left last toe.   Psychiatric: He has a normal mood and affect. Thought content normal.             ED Course   Procedures  Labs Reviewed   COMPREHENSIVE METABOLIC PANEL - Abnormal; Notable for the following components:       Result Value    Sodium 135 (*)     BUN 23 (*)     Creatinine 0.68 (*)     BUN/Creatinine Ratio 34 (*)     Total Protein 5.9 (*)     Albumin 1.8 (*)     Globulin 4.1 (*)     ALT 14 (*)     All  other components within normal limits   URINALYSIS - Abnormal; Notable for the following components:    Leukocytes, UA Moderate (*)     Blood, UA Large (*)     All other components within normal limits   PROTIME-INR - Abnormal; Notable for the following components:    PT 16.5 (*)     All other components within normal limits   CBC WITH DIFFERENTIAL - Abnormal; Notable for the following components:    RBC 3.65 (*)     Hemoglobin 9.9 (*)     Hematocrit 31.9 (*)     MCHC 31.0 (*)     RDW 17.0 (*)     MPV 8.7 (*)     Monocytes % 10.4 (*)     Eosinophils % 6.7 (*)     Immature Granulocytes % 0.7 (*)     Monocytes, Absolute 0.92 (*)     Eosinophils, Absolute 0.59 (*)     Immature Granulocytes, Absolute 0.06 (*)     All other components within normal limits   URINALYSIS, MICROSCOPIC - Abnormal; Notable for the following components:    RBC, UA 15-25 (*)     Bacteria, UA Few (*)     Squamous Epithelial Cells, UA Occasional (*)     All other components within normal limits   LACTIC ACID, PLASMA - Normal   MAGNESIUM - Normal   APTT - Normal   CULTURE, BLOOD   CULTURE, BLOOD   CBC W/ AUTO DIFFERENTIAL    Narrative:     The following orders were created for panel order CBC auto differential.  Procedure                               Abnormality         Status                     ---------                               -----------         ------                     CBC with Differential[[id number]]        Abnormal            Final result                 Please view results for these tests on the individual orders.   POCT GLUCOSE MONITORING CONTINUOUS        ECG Results              EKG 12-lead (In process)  Result time 11/11/22 05:51:17      In process by Interface, Lab In [facility] (11/11/22 05:51:17)                   Narrative:    Test Reason : R53.1,    Vent. Rate : 070 BPM     Atrial Rate : 000 BPM     P-R Int : 162 ms          QRS Dur : 102 ms      QT Int : 396 ms       P-R-T Axes : 000 -30 004 degrees     QTc Int : 414  ms    Sinus rhythm  with PAC(s)  Left axis deviation  Left ventricular hypertrophy by voltage only  Inferior T wave abnormality  is nonspecific  Low QRS voltages in precordial leads  Borderline ECG      Referred By: [email or web address]   SELF           Confirmed By:                                   Imaging Results              X-Ray Chest 1 View (Final result)  Result time 11/10/22 17:51:19      Final result by Arnav Calhoun MD (11/10/22 17:51:19)                   Impression:      As above.      Electronically signed by: Arnav Calhoun  Date:    11/10/2022  Time:    17:51               Narrative:    EXAMINATION:  XR CHEST 1 VIEW    CLINICAL HISTORY:  Weakness    TECHNIQUE:  Single frontal view of the chest was performed.    COMPARISON:  10/17/2022    FINDINGS:  The cardiomegaly is unchanged.  There are interstitial markings that are similar to prior and may be chronic as these have been present on the recent studies.  No pneumothorax.  No pleural effusion.  No focal infiltrate.                                       Medications - No data to display             Attending Attestation:           Physician Attestation for Scribe:  Physician Attestation Statement for Scribe #1: I, Allen Xie MD, reviewed documentation, as scribed by Pallavi Gonzáles in my presence, and it is both accurate and complete.           ED Course as of 11/12/22 0247   Thu Nov 10, 2022   1826 Care transferred to Dr. King [PK]      ED Course User Index  [PK] Allen Xie MD                 Clinical Impression:   Final diagnoses:  [R53.1] General weakness  [R53.1] Generalized weakness      ED Disposition Condition    Discharge           ED Prescriptions    None       Follow-up Information    None          Rishi King MD  11/12/22 0247       Rishi King MD  11/12/22 0247

## 2022-11-11 ENCOUNTER — HOSPITAL ENCOUNTER (OUTPATIENT)
Dept: RADIOLOGY | Facility: HOSPITAL | Age: 82
Discharge: HOME | End: 2022-11-11
Attending: FAMILY MEDICINE
Payer: MEDICARE

## 2022-11-11 LAB
ALBUMIN SERPL BCP-MCNC: 1.7 G/DL (ref 3.5–5)
ALBUMIN/GLOB SERPL: 0.4 {RATIO}
ALP SERPL-CCNC: 79 U/L (ref 45–115)
ALT SERPL W P-5'-P-CCNC: 12 U/L (ref 16–61)
ANION GAP SERPL CALCULATED.3IONS-SCNC: 9 MMOL/L (ref 7–16)
AST SERPL W P-5'-P-CCNC: 19 U/L (ref 15–37)
BASOPHILS # BLD AUTO: 0.05 K/UL (ref 0–0.2)
BASOPHILS NFR BLD AUTO: 0.6 % (ref 0–1)
BILIRUB SERPL-MCNC: 0.3 MG/DL (ref ?–1.2)
BUN SERPL-MCNC: 21 MG/DL (ref 7–18)
BUN/CREAT SERPL: 34 (ref 6–20)
CALCIUM SERPL-MCNC: 8.5 MG/DL (ref 8.5–10.1)
CHLORIDE SERPL-SCNC: 102 MMOL/L (ref 98–107)
CO2 SERPL-SCNC: 30 MMOL/L (ref 21–32)
CREAT SERPL-MCNC: 0.62 MG/DL (ref 0.7–1.3)
CRP SERPL-MCNC: 7.97 MG/DL (ref 0–0.8)
DIFFERENTIAL METHOD BLD: ABNORMAL
EGFR (NO RACE VARIABLE) (RUSH/TITUS): 95 ML/MIN/1.73M²
EOSINOPHIL # BLD AUTO: 0.63 K/UL (ref 0–0.5)
EOSINOPHIL NFR BLD AUTO: 7.8 % (ref 1–4)
ERYTHROCYTE [DISTWIDTH] IN BLOOD BY AUTOMATED COUNT: 16.4 % (ref 11.5–14.5)
ERYTHROCYTE [SEDIMENTATION RATE] IN BLOOD BY WESTERGREN METHOD: 65 MM/HR (ref 0–20)
GLOBULIN SER-MCNC: 4.1 G/DL (ref 2–4)
GLUCOSE SERPL-MCNC: 100 MG/DL (ref 70–105)
GLUCOSE SERPL-MCNC: 117 MG/DL (ref 70–105)
GLUCOSE SERPL-MCNC: 73 MG/DL (ref 74–106)
GLUCOSE SERPL-MCNC: 78 MG/DL (ref 70–105)
GLUCOSE SERPL-MCNC: 87 MG/DL (ref 70–105)
GLUCOSE SERPL-MCNC: 91 MG/DL (ref 70–105)
HCT VFR BLD AUTO: 29.4 % (ref 40–54)
HGB BLD-MCNC: 9.7 G/DL (ref 13.5–18)
IMM GRANULOCYTES # BLD AUTO: 0.08 K/UL (ref 0–0.04)
IMM GRANULOCYTES NFR BLD: 1 % (ref 0–0.4)
LYMPHOCYTES # BLD AUTO: 2.32 K/UL (ref 1–4.8)
LYMPHOCYTES NFR BLD AUTO: 28.6 % (ref 27–41)
MCH RBC QN AUTO: 27.2 PG (ref 27–31)
MCHC RBC AUTO-ENTMCNC: 33 G/DL (ref 32–36)
MCV RBC AUTO: 82.4 FL (ref 80–96)
MONOCYTES # BLD AUTO: 0.87 K/UL (ref 0–0.8)
MONOCYTES NFR BLD AUTO: 10.7 % (ref 2–6)
MPC BLD CALC-MCNC: 9.1 FL (ref 9.4–12.4)
NEUTROPHILS # BLD AUTO: 4.16 K/UL (ref 1.8–7.7)
NEUTROPHILS NFR BLD AUTO: 51.3 % (ref 53–65)
NRBC # BLD AUTO: 0 X10E3/UL
NRBC, AUTO (.00): 0 %
PLATELET # BLD AUTO: 325 K/UL (ref 150–400)
POTASSIUM SERPL-SCNC: 3.8 MMOL/L (ref 3.5–5.1)
PROT SERPL-MCNC: 5.8 G/DL (ref 6.4–8.2)
RBC # BLD AUTO: 3.57 M/UL (ref 4.6–6.2)
SODIUM SERPL-SCNC: 137 MMOL/L (ref 136–145)
TSH SERPL DL<=0.005 MIU/L-ACNC: 2.14 UIU/ML (ref 0.36–3.74)
WBC # BLD AUTO: 8.11 K/UL (ref 4.5–11)

## 2022-11-11 PROCEDURE — 27000525 HC TRAY ALL

## 2022-11-11 PROCEDURE — 84145 PROCALCITONIN (PCT): CPT | Mod: 90 | Performed by: INTERNAL MEDICINE

## 2022-11-11 PROCEDURE — 63600175 PHARM REV CODE 636 W HCPCS: Performed by: INTERNAL MEDICINE

## 2022-11-11 PROCEDURE — 84443 ASSAY THYROID STIM HORMONE: CPT | Performed by: INTERNAL MEDICINE

## 2022-11-11 PROCEDURE — 25000003 PHARM REV CODE 250: Performed by: INTERNAL MEDICINE

## 2022-11-11 PROCEDURE — A9575 INJ GADOTERATE MEGLUMI 0.1ML: HCPCS | Performed by: FAMILY MEDICINE

## 2022-11-11 PROCEDURE — 99232 PR SUBSEQUENT HOSPITAL CARE,LEVL II: ICD-10-PCS | Mod: ,,, | Performed by: NURSE PRACTITIONER

## 2022-11-11 PROCEDURE — 80053 COMPREHEN METABOLIC PANEL: CPT | Performed by: INTERNAL MEDICINE

## 2022-11-11 PROCEDURE — C1751 CATH, INF, PER/CENT/MIDLINE: HCPCS

## 2022-11-11 PROCEDURE — 97161 PT EVAL LOW COMPLEX 20 MIN: CPT

## 2022-11-11 PROCEDURE — 99232 PR SUBSEQUENT HOSPITAL CARE,LEVL II: ICD-10-PCS | Mod: ,,, | Performed by: FAMILY MEDICINE

## 2022-11-11 PROCEDURE — 76937 US GUIDE VASCULAR ACCESS: CPT

## 2022-11-11 PROCEDURE — 76937 US GUIDE VASCULAR ACCESS: CPT | Mod: TC

## 2022-11-11 PROCEDURE — 36573 INSJ PICC RS&I 5 YR+: CPT

## 2022-11-11 PROCEDURE — 25500020 PHARM REV CODE 255: Performed by: FAMILY MEDICINE

## 2022-11-11 PROCEDURE — 36415 COLL VENOUS BLD VENIPUNCTURE: CPT | Performed by: INTERNAL MEDICINE

## 2022-11-11 PROCEDURE — 87070 CULTURE OTHR SPECIMN AEROBIC: CPT | Performed by: INTERNAL MEDICINE

## 2022-11-11 PROCEDURE — 70450 CT HEAD/BRAIN W/O DYE: CPT

## 2022-11-11 PROCEDURE — 87075 CULTR BACTERIA EXCEPT BLOOD: CPT | Performed by: NURSE PRACTITIONER

## 2022-11-11 PROCEDURE — 99232 SBSQ HOSP IP/OBS MODERATE 35: CPT | Mod: ,,, | Performed by: FAMILY MEDICINE

## 2022-11-11 PROCEDURE — 25000003 PHARM REV CODE 250: Performed by: FAMILY MEDICINE

## 2022-11-11 PROCEDURE — 82962 GLUCOSE BLOOD TEST: CPT

## 2022-11-11 PROCEDURE — 72197 MRI PELVIS W/O & W/DYE: CPT

## 2022-11-11 PROCEDURE — 86140 C-REACTIVE PROTEIN: CPT | Performed by: INTERNAL MEDICINE

## 2022-11-11 PROCEDURE — 63600175 PHARM REV CODE 636 W HCPCS: Performed by: FAMILY MEDICINE

## 2022-11-11 PROCEDURE — 99232 SBSQ HOSP IP/OBS MODERATE 35: CPT | Mod: ,,, | Performed by: NURSE PRACTITIONER

## 2022-11-11 PROCEDURE — 85025 COMPLETE CBC W/AUTO DIFF WBC: CPT | Performed by: INTERNAL MEDICINE

## 2022-11-11 PROCEDURE — 85651 RBC SED RATE NONAUTOMATED: CPT | Performed by: INTERNAL MEDICINE

## 2022-11-11 PROCEDURE — 11000008

## 2022-11-11 RX ORDER — LACTULOSE 10 G/15ML
20 SOLUTION ORAL; RECTAL DAILY
COMMUNITY

## 2022-11-11 RX ORDER — GADOTERATE MEGLUMINE 376.9 MG/ML
19 INJECTION INTRAVENOUS
Status: COMPLETED | OUTPATIENT
Start: 2022-11-11 | End: 2022-11-11

## 2022-11-11 RX ORDER — LOSARTAN POTASSIUM 50 MG/1
50 TABLET ORAL DAILY
COMMUNITY

## 2022-11-11 RX ORDER — HYDROCODONE BITARTRATE AND ACETAMINOPHEN 7.5; 325 MG/1; MG/1
1 TABLET ORAL 2 TIMES DAILY
Status: ON HOLD | COMMUNITY
End: 2022-11-14 | Stop reason: HOSPADM

## 2022-11-11 RX ORDER — INSULIN LISPRO 100 [IU]/ML
4 INJECTION, SOLUTION INTRAVENOUS; SUBCUTANEOUS
COMMUNITY

## 2022-11-11 RX ORDER — METOPROLOL TARTRATE 25 MG/1
12.5 TABLET, FILM COATED ORAL 2 TIMES DAILY
COMMUNITY

## 2022-11-11 RX ORDER — FUROSEMIDE 40 MG/1
40 TABLET ORAL 2 TIMES DAILY
COMMUNITY

## 2022-11-11 RX ORDER — INSULIN LISPRO 100 [IU]/ML
2-5 INJECTION, SOLUTION INTRAVENOUS; SUBCUTANEOUS 2 TIMES DAILY PRN
COMMUNITY

## 2022-11-11 RX ORDER — IPRATROPIUM BROMIDE AND ALBUTEROL SULFATE 2.5; .5 MG/3ML; MG/3ML
3 SOLUTION RESPIRATORY (INHALATION) EVERY 4 HOURS PRN
Status: DISCONTINUED | OUTPATIENT
Start: 2022-11-11 | End: 2022-11-14 | Stop reason: HOSPADM

## 2022-11-11 RX ORDER — HYDROCODONE BITARTRATE AND ACETAMINOPHEN 7.5; 325 MG/1; MG/1
1 TABLET ORAL DAILY
Status: ON HOLD | COMMUNITY
End: 2022-11-14 | Stop reason: HOSPADM

## 2022-11-11 RX ADMIN — PIPERACILLIN AND TAZOBACTAM 4.5 G: 4; .5 INJECTION, POWDER, LYOPHILIZED, FOR SOLUTION INTRAVENOUS; PARENTERAL at 03:11

## 2022-11-11 RX ADMIN — RISPERIDONE 1 MG: 1 TABLET ORAL at 10:11

## 2022-11-11 RX ADMIN — PIPERACILLIN AND TAZOBACTAM 4.5 G: 4; .5 INJECTION, POWDER, LYOPHILIZED, FOR SOLUTION INTRAVENOUS; PARENTERAL at 08:11

## 2022-11-11 RX ADMIN — PIPERACILLIN AND TAZOBACTAM 4.5 G: 4; .5 INJECTION, POWDER, LYOPHILIZED, FOR SOLUTION INTRAVENOUS; PARENTERAL at 11:11

## 2022-11-11 RX ADMIN — MUPIROCIN: 20 OINTMENT TOPICAL at 10:11

## 2022-11-11 RX ADMIN — GADOTERATE MEGLUMINE 19 ML: 376.9 INJECTION, SOLUTION INTRAVENOUS at 03:11

## 2022-11-11 RX ADMIN — POLYETHYLENE GLYCOL 3350 17 G: 17 POWDER, FOR SOLUTION ORAL at 10:11

## 2022-11-11 RX ADMIN — MEMANTINE 10 MG: 5 TABLET ORAL at 08:11

## 2022-11-11 RX ADMIN — OXCARBAZEPINE 300 MG: 150 TABLET, FILM COATED ORAL at 10:11

## 2022-11-11 RX ADMIN — TAMSULOSIN HYDROCHLORIDE 0.4 MG: 0.4 CAPSULE ORAL at 10:11

## 2022-11-11 RX ADMIN — LEVOTHYROXINE SODIUM 50 MCG: 0.05 TABLET ORAL at 06:11

## 2022-11-11 RX ADMIN — APIXABAN 2.5 MG: 2.5 TABLET, FILM COATED ORAL at 10:11

## 2022-11-11 RX ADMIN — RISPERIDONE 1 MG: 1 TABLET ORAL at 08:11

## 2022-11-11 RX ADMIN — VANCOMYCIN HYDROCHLORIDE 2000 MG: 1 INJECTION, POWDER, LYOPHILIZED, FOR SOLUTION INTRAVENOUS at 10:11

## 2022-11-11 RX ADMIN — PANTOPRAZOLE SODIUM 40 MG: 40 TABLET, DELAYED RELEASE ORAL at 08:11

## 2022-11-11 RX ADMIN — POLYETHYLENE GLYCOL 3350 17 G: 17 POWDER, FOR SOLUTION ORAL at 08:11

## 2022-11-11 RX ADMIN — MUPIROCIN: 20 OINTMENT TOPICAL at 08:11

## 2022-11-11 RX ADMIN — APIXABAN 2.5 MG: 2.5 TABLET, FILM COATED ORAL at 08:11

## 2022-11-11 RX ADMIN — PIPERACILLIN AND TAZOBACTAM 4.5 G: 4; .5 INJECTION, POWDER, LYOPHILIZED, FOR SOLUTION INTRAVENOUS; PARENTERAL at 12:11

## 2022-11-11 NOTE — CONSULTS
Pharmacy consulted to dose Vancomycin. Based on pt wt and current renal function, the following therapy will be initiated: Vancomycin 2000 mg IV q 24 hrs.    Trough prior 4th dose on 11/14 AM.     Pharmacy to follow and make necessary adjustments. Thank you.

## 2022-11-11 NOTE — ASSESSMENT & PLAN NOTE
Given marginal blood pressures, will hold off on antihypertensives for now and monitor blood pressure

## 2022-11-11 NOTE — ASSESSMENT & PLAN NOTE
Patient's last known hemoglobin A1c was 6.6.  Will start patient on sliding scale insulin q.a.c. and hs to maintain CBGs in the range of 130s to 180s

## 2022-11-11 NOTE — PLAN OF CARE
Problem: Adult Inpatient Plan of Care  Goal: Plan of Care Review  Outcome: Ongoing, Progressing  Goal: Absence of Hospital-Acquired Illness or Injury  Outcome: Ongoing, Progressing  Goal: Optimal Comfort and Wellbeing  Outcome: Ongoing, Progressing     Problem: Diabetes Comorbidity  Goal: Blood Glucose Level Within Targeted Range  Outcome: Ongoing, Progressing     Problem: Infection  Goal: Absence of Infection Signs and Symptoms  Outcome: Ongoing, Progressing     Problem: Adult Inpatient Plan of Care  Goal: Patient-Specific Goal (Individualized)  Outcome: Ongoing, Not Progressing  Goal: Readiness for Transition of Care  Outcome: Ongoing, Not Progressing     Problem: Impaired Wound Healing  Goal: Optimal Wound Healing  Outcome: Ongoing, Not Progressing     Problem: Skin Injury Risk Increased  Goal: Skin Health and Integrity  Outcome: Ongoing, Not Progressing

## 2022-11-11 NOTE — ASSESSMENT & PLAN NOTE
Worsening of sacral decubitus ulcer with loss of granulation tissue and mildly exposed sacrum is worrisome for presence of osteomyelitis.  Will proceed with MRI of sacrum.  In the meantime, will consult Wound Care and empirically start patient on antibiotics consisting of vancomycin and Zosyn

## 2022-11-11 NOTE — ASSESSMENT & PLAN NOTE
Patient's lungs are clear to auscultation bilaterally at this time.  Will start patient on DuoNeb q.4 PRN

## 2022-11-11 NOTE — H&P
[facility] Medicine  History & Physical    Patient Name: Bhargav Gao  MRN: [id number]  Patient Class: IP- Inpatient  Admission Date: 11/10/2022  Attending Physician: BROOK Nance MD  Primary Care Provider: Kerry Lin MD         Patient information was obtained from ER records.     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  Patient was sent to ED via EMS secondary to generalized weakness, and worsening of sacral wound. No associated symptoms such as fever, chills, cough, wheezing, nausea, vomiting or abdominal pain were reported.  No other history could be gathered at this time as this patient just mumbles a few unintelligible words. On initial presentation, vital signs were stable and patient was afebrile.  Workup including CBC, CMP, chest x-ray, and UA was unremarkable.  However, on physical examination, patient was found to have worsening of sacral ulcer with minimally exposed bone concerning for a presence of osteomyelitis.  Patient will be admitted for further evaluation and intervention      Past Medical History:   Diagnosis Date    Alzheimer's disease     Anticoagulant long-term use     Bipolar disorder     BPH (benign prostatic hyperplasia)     Deep vein thrombosis (DVT) of popliteal vein of right lower extremity     Diabetes mellitus     DVT of deep femoral vein, left     Hyperlipidemia     Hypertension     Hypothyroidism     Idiopathic orofacial dystonia     Iron deficiency anemia secondary to blood loss  (chronic)     Mild intellectual disabilities     Obesity     Sacral wound     05/16 records show culture of wound grew   Collected: 05/05/22 0920 Order Status: Completed Specimen: Wound from Sacral Updated: 05/11/22 1318  Culture, Wound/Abscess Light Growth Acinetobacter baumannii complex/haemolyticus Abnormal    Comment: Corrected result: Previously reported as Gram-negative Bacilli on 5/9/2022 at 1312 CDT.    Light Growth Escherichia coli Abnormal    Light Growth Klebsiel    Schizophrenia 1/28/2022 05/16 -continue trileptal and risperidone       Past Surgical History:   Procedure Laterality Date    COLOSTOMY N/A 5/24/2022    Procedure: CREATION, COLOSTOMY;  Surgeon: Edilma Felix MD;  Location: [facility];  Service: General;  Laterality: N/A;  diverting colostomy dr felix tuesday       Review of patient's allergies indicates:   Allergen Reactions    Metformin     Mycobacterium tuberculosis (tuberculin ppd)     Norvasc [amlodipine]        Current Facility-Administered Medications on File Prior to Encounter   Medication    [DISCONTINUED] apixaban tablet 2.5 mg    [DISCONTINUED] levothyroxine tablet 50 mcg    [DISCONTINUED] memantine tablet 10 mg    [DISCONTINUED] OXcarbazepine tablet 300 mg    [DISCONTINUED] pantoprazole EC tablet 40 mg    [DISCONTINUED] polyethylene glycol packet 17 g    [DISCONTINUED] risperiDONE tablet 1 mg    [DISCONTINUED] tamsulosin 24 hr capsule 0.4 mg     Current Outpatient Medications on File Prior to Encounter   Medication Sig    apixaban (ELIQUIS) 2.5 mg Tab Take 1 tablet (2.5 mg total) by mouth 2 (two) times daily.    calcium carbonate/vitamin D3 (CALTRATE 600 + D ORAL) Take 1 tablet by mouth 2 (two) times a day.    carboxymethylcellulose sodium 0.5 % Drop Place 2 drops into both eyes 2 (two) times daily.    furosemide (LASIX) 20 MG tablet Take 1 tablet (20 mg total) by mouth once daily.    hydrALAZINE (APRESOLINE) 25 MG tablet 1 tablet (25 mg total) by Per G  Tube route every 8 (eight) hours.    levothyroxine (SYNTHROID) 50 MCG tablet Take 1 tablet (50 mcg total) by mouth before breakfast.    losartan (COZAAR) 100 MG tablet Take 1 tablet (100 mg total) by mouth once daily.    memantine (NAMENDA) 10 MG Tab Take 10 mg by mouth nightly.    metoprolol tartrate (LOPRESSOR) 25 MG tablet Take 1 tablet (25 mg total) by mouth 2 (two) times daily.    OXcarbazepine (TRILEPTAL) 300 MG Tab Take 300 mg by mouth once daily.    pantoprazole (PROTONIX) 40 MG tablet Take 1 tablet by mouth every evening.    polyethylene glycol (GLYCOLAX) 17 gram PwPk Take 17 g by mouth 2 (two) times a day.    risperiDONE (RISPERDAL) 1 MG tablet Take 1 mg by mouth 2 (two) times daily.    SITagliptin (JANUVIA) 25 MG Tab Take 1 tablet (25 mg total) by mouth once daily.    tamsulosin (FLOMAX) 0.4 mg Cap Take 1 capsule by mouth once daily.    [DISCONTINUED] metoprolol succinate (TOPROL-XL) 25 MG 24 hr tablet Take 1 tablet by mouth once daily. Hold for SBP < 110 or DBP < 60 or pulse < 50     Family History       Family history is unknown by patient.          Tobacco Use    Smoking status: Never    Smokeless tobacco: Never   Substance and Sexual Activity    Alcohol use: Not Currently    Drug use: Never    Sexual activity: Not Currently     Review of Systems   Reason unable to perform ROS: Patient is only able to mumble few unintelligible words.   Objective:     Vital Signs (Most Recent):  Temp: 97.5 °F (36.4 °C) (11/11/22 0400)  Pulse: 66 (11/11/22 0400)  Resp: 11 (11/11/22 0400)  BP: (!) 105/53 (11/11/22 0400)  SpO2: 100 % (11/11/22 0400)   Vital Signs (24h Range):  Temp:  [97.1 °F (36.2 °C)-99.1 °F (37.3 °C)] 97.5 °F (36.4 °C)  Pulse:  [58-86] 66  Resp:  [10-16] 11  SpO2:  [96 %-100 %] 100 %  BP: (103-137)/(53-70) 105/53     Weight: 95.3 kg (210 lb 1.6 oz)  Body mass index is 28.49 kg/m².    Physical Exam  Vitals reviewed.   Constitutional:       General: He is not in acute distress.      Appearance: He is ill-appearing.   HENT:      Head: Normocephalic and atraumatic.      Right Ear: External ear normal.      Left Ear: External ear normal.   Eyes:      Extraocular Movements: Extraocular movements intact.      Pupils: Pupils are equal, round, and reactive to light.   Cardiovascular:      Rate and Rhythm: Normal rate and regular rhythm.      Pulses: Normal pulses.      Heart sounds: Normal heart sounds. No murmur heard.  Pulmonary:      Effort: Pulmonary effort is normal. No respiratory distress.      Breath sounds: Normal breath sounds. No wheezing.   Chest:      Chest wall: No tenderness.   Abdominal:      General: Abdomen is flat.      Palpations: Abdomen is soft. There is no mass.      Tenderness: There is no abdominal tenderness. There is no right CVA tenderness or left CVA tenderness.   Musculoskeletal:         General: No swelling or tenderness. Normal range of motion.   Skin:     Capillary Refill: Capillary refill takes less than 2 seconds.      Comments: Unstageable sacral wound present with minimally exposed sacral bone.  No associated erythema or purulent drainage were appreciated   Neurological:      General: No focal deficit present.      Mental Status: He is oriented to person, place, and time. Mental status is at baseline.   Psychiatric:         Mood and Affect: Mood normal.         Thought Content: Thought content normal.     Cranial nerves could not be checked due to patient's inability to follow commands     Significant Labs: All pertinent labs within the past 24 hours have been reviewed.    Significant Imaging: I have reviewed all pertinent imaging results/findings within the past 24 hours.    Assessment/Plan:     * Generalized weakness    Patient is severely debilitated with functional quadriplegia and thus I cannot appreciate generalized weakness from his baseline.  In my opinion patient's functional status has not changed from his baseline.  Will consult PT/OT        Sacral  decubitus ulcer    Worsening of sacral decubitus ulcer with loss of granulation tissue and mildly exposed sacrum is worrisome for presence of osteomyelitis.  Will proceed with MRI of sacrum.  In the meantime, will consult Wound Care and empirically start patient on antibiotics consisting of vancomycin and Zosyn      Hypertension    Given marginal blood pressures, will hold off on antihypertensives for now and monitor blood pressure      Diabetes mellitus    Patient's last known hemoglobin A1c was 6.6.  Will start patient on sliding scale insulin q.a.c. and hs to maintain CBGs in the range of 130s to 180s    COPD (chronic obstructive pulmonary disease)    Patient's lungs are clear to auscultation bilaterally at this time.  Will start patient on DuoNeb q.4 PRN     ARMAAN (iron deficiency anemia)    Patient's hemoglobin is actually slightly improved from his baseline to 9.9 g today.  Patient has no signs and symptoms of bleeding or hemolysis.  Will monitor      BPH (benign prostatic hyperplasia)    Patient has chronic Khan and has recurrent UTI as a result.  However, UA performed today was not suggestive of a presence of urinary tract infection.  Will monitor        VTE Risk Mitigation (From admission, onward)         Ordered     apixaban tablet 2.5 mg  2 times daily         11/10/22 8951                   Min KELSIE Manuel MD  Department of Hospital Medicine   [facility]

## 2022-11-11 NOTE — ASSESSMENT & PLAN NOTE
Clean wound with vashe  Apply sensicare around wound  Apply vashe moistened drawtex to wound bed, second layer of drawtex to wick drainage  Cover and secure with abd pad and paper tape  Change daily  Turn every two hours  Low air loss mattress  Keep pressure off wound  TAP system

## 2022-11-11 NOTE — PT/OT/SLP EVAL
"Physical Therapy Evaluation and Discharge Note    Patient Name:  Bhargav Gao   MRN:  [id number]    Recommendations:     Discharge Recommendations:  LTACH (long-term acute care hospital)   Discharge Equipment Recommendations: none   Barriers to discharge: None    Assessment:     Bhargav Gao is a 82 y.o. male admitted with a medical diagnosis of Generalized weakness. .  At this time, patient is functioning at their prior level of function and does not require further acute PT services.     Recent Surgery: * No surgery found *      Plan:     During this hospitalization, patient does not require further acute PT services.  Please re-consult if situation changes.      Subjective     Chief Complaint: sacral wound  Patient/Family Comments/goals: n/a  Pain/Comfort:  Pain Rating 1:  (pt yells out "ouch" before being touched.)    Patients cultural, spiritual, Hoahaoism conflicts given the current situation: no    Living Environment:  Pt resides at SNF.  Prior to admission, patients level of function was total assist with all act.  Equipment used at home: hospital bed, wheelchair, lift device.  DME owned (not currently used):  unknown .  Upon discharge, patient will have assistance from SNF staff.    Objective:     Communicated with Che Parmar RN prior to session.  Patient found HOB elevated with telemetry, pressure relief boots, blood pressure cuff, anne catheter, PICC line upon PT entry to room.    General Precautions: Standard, fall   Orthopedic Precautions:    Braces: N/A   Respiratory Status: Room air    Exams:  Cognitive Exam:  Patient is oriented to Person  Sensation:    -       Intact  Skin Integrity/Edema:      -       Skin integrity: Wound sacral  RLE ROM: Deficits: pt yells out when attempting ROM d/t tightness  RLE Strength: no active movement of any joint  LLE ROM: same as above  LLE Strength: same as above    Functional Mobility:  Bed Mobility:     Rolling Left:  total assistance  Rolling Right: total " assistance  Scooting: total assistance    AM-PAC 6 CLICK MOBILITY  Total Score:6       Treatment and Education:   PT evaluation completed. Pt is total assist with all functional mobility d/t quadriplegia. Pt is at PLOF and PT not warranted at this time.     AM-PAC 6 CLICK MOBILITY  Total Score:6     Patient left HOB elevated with all lines intact, call button in reach, and RN notified.    GOALS:   Multidisciplinary Problems       Physical Therapy Goals       Not on file              Multidisciplinary Problems (Resolved)          Problem: Physical Therapy    Goal Priority Disciplines Outcome Goal Variances Interventions   Physical Therapy Goal   (Resolved)     PT, PT/OT Met     Description: PT evaluation completed with no further PT warranted.                        History:     Past Medical History:   Diagnosis Date    Alzheimer's disease     Anticoagulant long-term use     Bipolar disorder     BPH (benign prostatic hyperplasia)     Deep vein thrombosis (DVT) of popliteal vein of right lower extremity     Diabetes mellitus     DVT of deep femoral vein, left     Hyperlipidemia     Hypertension     Hypothyroidism     Idiopathic orofacial dystonia     Iron deficiency 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    Light Growth Escherichia coli Abnormal    Light Growth Klebsiel    Schizophrenia 1/28/2022 05/16 -continue trileptal and risperidone       Past Surgical History:   Procedure Laterality Date    COLOSTOMY N/A 5/24/2022    Procedure: CREATION, COLOSTOMY;  Surgeon: Edilma Felix MD;  Location: [facility];  Service:  General;  Laterality: N/A;  diverting colostomy dr joseph tuesday       Time Tracking:     PT Received On: 11/11/22  PT Start Time: 1630     PT Stop Time: 1640  PT Total Time (min): 10 min     Billable Minutes: Evaluation low complexity      11/11/2022

## 2022-11-11 NOTE — PLAN OF CARE
Problem: Adult Inpatient Plan of Care  Goal: Plan of Care Review  Outcome: Ongoing, Progressing  Goal: Absence of Hospital-Acquired Illness or Injury  Outcome: Ongoing, Progressing     Problem: Diabetes Comorbidity  Goal: Blood Glucose Level Within Targeted Range  Outcome: Ongoing, Progressing

## 2022-11-11 NOTE — SUBJECTIVE & OBJECTIVE
Past Medical History:   Diagnosis Date    Alzheimer's disease     Anticoagulant long-term use     Bipolar disorder     BPH (benign prostatic hyperplasia)     Deep vein thrombosis (DVT) of popliteal vein of right lower extremity     Diabetes mellitus     DVT of deep femoral vein, left     Hyperlipidemia     Hypertension     Hypothyroidism     Idiopathic orofacial dystonia     Iron deficiency 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    Light Growth Escherichia coli Abnormal    Light Growth Klebsiel    Schizophrenia 1/28/2022 05/16 -continue trileptal and risperidone       Past Surgical History:   Procedure Laterality Date    COLOSTOMY N/A 5/24/2022    Procedure: CREATION, COLOSTOMY;  Surgeon: Edilma Felix MD;  Location: [facility];  Service: General;  Laterality: N/A;  diverting colostomy dr felix tuesday       Review of patient's allergies indicates:   Allergen Reactions    Metformin     Mycobacterium tuberculosis (tuberculin ppd)     Norvasc [amlodipine]        Current Facility-Administered Medications on File Prior to Encounter   Medication    [DISCONTINUED] apixaban tablet 2.5 mg    [DISCONTINUED] levothyroxine tablet 50 mcg    [DISCONTINUED] memantine tablet 10 mg    [DISCONTINUED] OXcarbazepine tablet 300 mg    [DISCONTINUED] pantoprazole EC tablet 40 mg    [DISCONTINUED] polyethylene glycol packet 17 g    [DISCONTINUED] risperiDONE tablet 1 mg    [DISCONTINUED] tamsulosin 24 hr capsule 0.4 mg     Current Outpatient Medications on File Prior to Encounter   Medication Sig    apixaban (ELIQUIS) 2.5 mg Tab Take 1  tablet (2.5 mg total) by mouth 2 (two) times daily.    calcium carbonate/vitamin D3 (CALTRATE 600 + D ORAL) Take 1 tablet by mouth 2 (two) times a day.    carboxymethylcellulose sodium 0.5 % Drop Place 2 drops into both eyes 2 (two) times daily.    furosemide (LASIX) 20 MG tablet Take 1 tablet (20 mg total) by mouth once daily.    hydrALAZINE (APRESOLINE) 25 MG tablet 1 tablet (25 mg total) by Per G Tube route every 8 (eight) hours.    levothyroxine (SYNTHROID) 50 MCG tablet Take 1 tablet (50 mcg total) by mouth before breakfast.    losartan (COZAAR) 100 MG tablet Take 1 tablet (100 mg total) by mouth once daily.    memantine (NAMENDA) 10 MG Tab Take 10 mg by mouth nightly.    metoprolol tartrate (LOPRESSOR) 25 MG tablet Take 1 tablet (25 mg total) by mouth 2 (two) times daily.    OXcarbazepine (TRILEPTAL) 300 MG Tab Take 300 mg by mouth once daily.    pantoprazole (PROTONIX) 40 MG tablet Take 1 tablet by mouth every evening.    polyethylene glycol (GLYCOLAX) 17 gram PwPk Take 17 g by mouth 2 (two) times a day.    risperiDONE (RISPERDAL) 1 MG tablet Take 1 mg by mouth 2 (two) times daily.    SITagliptin (JANUVIA) 25 MG Tab Take 1 tablet (25 mg total) by mouth once daily.    tamsulosin (FLOMAX) 0.4 mg Cap Take 1 capsule by mouth once daily.    [DISCONTINUED] metoprolol succinate (TOPROL-XL) 25 MG 24 hr tablet Take 1 tablet by mouth once daily. Hold for SBP < 110 or DBP < 60 or pulse < 50     Family History       Family history is unknown by patient.          Tobacco Use    Smoking status: Never    Smokeless tobacco: Never   Substance and Sexual Activity    Alcohol use: Not Currently    Drug use: Never    Sexual activity: Not Currently     Review of Systems   Reason unable to perform ROS: Patient is only able to mumble few unintelligible words.   Objective:     Vital Signs (Most Recent):  Temp: 97.5 °F (36.4 °C) (11/11/22 0400)  Pulse: 66 (11/11/22 0400)  Resp: 11 (11/11/22 0400)  BP: (!) 105/53 (11/11/22 0400)  SpO2:  100 % (11/11/22 0400)   Vital Signs (24h Range):  Temp:  [97.1 °F (36.2 °C)-99.1 °F (37.3 °C)] 97.5 °F (36.4 °C)  Pulse:  [58-86] 66  Resp:  [10-16] 11  SpO2:  [96 %-100 %] 100 %  BP: (103-137)/(53-70) 105/53     Weight: 95.3 kg (210 lb 1.6 oz)  Body mass index is 28.49 kg/m².    Physical Exam  Vitals reviewed.   Constitutional:       General: He is not in acute distress.     Appearance: He is ill-appearing.   HENT:      Head: Normocephalic and atraumatic.      Right Ear: External ear normal.      Left Ear: External ear normal.   Eyes:      Extraocular Movements: Extraocular movements intact.      Pupils: Pupils are equal, round, and reactive to light.   Cardiovascular:      Rate and Rhythm: Normal rate and regular rhythm.      Pulses: Normal pulses.      Heart sounds: Normal heart sounds. No murmur heard.  Pulmonary:      Effort: Pulmonary effort is normal. No respiratory distress.      Breath sounds: Normal breath sounds. No wheezing.   Chest:      Chest wall: No tenderness.   Abdominal:      General: Abdomen is flat.      Palpations: Abdomen is soft. There is no mass.      Tenderness: There is no abdominal tenderness. There is no right CVA tenderness or left CVA tenderness.   Musculoskeletal:         General: No swelling or tenderness. Normal range of motion.   Skin:     Capillary Refill: Capillary refill takes less than 2 seconds.      Comments: Unstageable sacral wound present with minimally exposed sacral bone.  No associated erythema or purulent drainage were appreciated   Neurological:      General: No focal deficit present.      Mental Status: He is oriented to person, place, and time. Mental status is at baseline.   Psychiatric:         Mood and Affect: Mood normal.         Thought Content: Thought content normal.     Cranial nerves could not be checked due to patient's inability to follow commands     Significant Labs: All pertinent labs within the past 24 hours have been reviewed.    Significant Imaging:  I have reviewed all pertinent imaging results/findings within the past 24 hours.

## 2022-11-11 NOTE — ASSESSMENT & PLAN NOTE
Patient has chronic Khan and has recurrent UTI as a result.  However, UA performed today was not suggestive of a presence of urinary tract infection.  Will monitor

## 2022-11-11 NOTE — NURSING
Wound care note: 81yo male [facility] with diagnosis of generalized weakness. Assessed by A LIEN Solis. Stage 4 sacral with tan tissue noted, drawtex/vashe, daily. Discoloration to left 5th toe. Bilateral hips with blisters noted, transparent dressing applied.

## 2022-11-11 NOTE — PROGRESS NOTES
PICC line insertion  Performed by A [facility]  Consent obtained for PICC line insertion.  A formal timeout was called all staff present agree patient procedure.  The left upper extremity was prepped with ChloraPrep and sterile field was established.  Both upper extremities are extremely immobile, a small cephalic vein on the left was the only accessible vein.  Under ultrasound guidance the vein was localized and accessed with a micropuncture needle.  An 018 guidewire was passed through the vein to the level of the axilla.  The were not able to gain access to the central venous system.  A venogram was performed.  The catheter was cut at 26 cm and the tip is located in the proximal cephalic vein at the level of the axilla.  The PICC line was then cleaned and secured.  The patient tolerated the procedure well there were no immediate postprocedure complications.  Total fluoroscopy time was 3 minutes 30 seconds.

## 2022-11-11 NOTE — ASSESSMENT & PLAN NOTE
Patient is severely debilitated with functional quadriplegia and thus I cannot appreciate generalized weakness from his baseline.  In my opinion patient's functional status has not changed from his baseline.  Will consult PT/OT

## 2022-11-11 NOTE — ASSESSMENT & PLAN NOTE
Patient's hemoglobin is actually slightly improved from his baseline to 9.9 g today.  Patient has no signs and symptoms of bleeding or hemolysis.  Will monitor

## 2022-11-11 NOTE — ED NOTES
Report given to Katiuska JAMES, pt to transfer to speciality, [facility] [place] [facility]

## 2022-11-11 NOTE — PLAN OF CARE
Problem: Adult Inpatient Plan of Care  Goal: Plan of Care Review  11/11/2022 1655 by Che Parmar RN  Outcome: Ongoing, Progressing  11/11/2022 1648 by Che Parmar RN  Outcome: Ongoing, Progressing  Goal: Absence of Hospital-Acquired Illness or Injury  11/11/2022 1655 by Che Parmar RN  Outcome: Ongoing, Progressing  11/11/2022 1648 by Che Parmar RN  Outcome: Ongoing, Progressing  Goal: Optimal Comfort and Wellbeing  11/11/2022 1655 by Che Parmar RN  Outcome: Ongoing, Progressing  11/11/2022 1648 by Che Parmar RN  Outcome: Ongoing, Progressing     Problem: Diabetes Comorbidity  Goal: Blood Glucose Level Within Targeted Range  11/11/2022 1655 by Che Parmar RN  Outcome: Ongoing, Progressing  11/11/2022 1648 by Che Parmar RN  Outcome: Ongoing, Progressing     Problem: Infection  Goal: Absence of Infection Signs and Symptoms  11/11/2022 1655 by Che Parmar RN  Outcome: Ongoing, Progressing  11/11/2022 1648 by Che Parmar RN  Outcome: Ongoing, Progressing     Problem: Adult Inpatient Plan of Care  Goal: Patient-Specific Goal (Individualized)  11/11/2022 1655 by Che Parmar RN  Outcome: Ongoing, Not Progressing  11/11/2022 1648 by Che Parmar RN  Outcome: Ongoing, Not Progressing  Goal: Readiness for Transition of Care  11/11/2022 1655 by Che Parmar RN  Outcome: Ongoing, Not Progressing  11/11/2022 1648 by Che Parmar RN  Outcome: Ongoing, Not Progressing     Problem: Impaired Wound Healing  Goal: Optimal Wound Healing  11/11/2022 1655 by Che Parmar RN  Outcome: Ongoing, Not Progressing  11/11/2022 1648 by Che Parmar RN  Outcome: Ongoing, Not Progressing     Problem: Skin Injury Risk Increased  Goal: Skin Health and Integrity  11/11/2022 1655 by Che Parmar RN  Outcome: Ongoing, Not Progressing  11/11/2022 1648 by Che Parmar, RN  Outcome: Ongoing, Not Progressing     Problem: Adult Inpatient Plan of Care  Goal:  Patient-Specific Goal (Individualized)  11/11/2022 1655 by Che Parmar RN  Outcome: Ongoing, Not Progressing  11/11/2022 1648 by Che Parmar RN  Outcome: Ongoing, Not Progressing  Goal: Readiness for Transition of Care  11/11/2022 1655 by Che Parmar RN  Outcome: Ongoing, Not Progressing  11/11/2022 1648 by Che Parmar RN  Outcome: Ongoing, Not Progressing     Problem: Impaired Wound Healing  Goal: Optimal Wound Healing  11/11/2022 1655 by Che Parmar RN  Outcome: Ongoing, Not Progressing  11/11/2022 1648 by Che Parmar RN  Outcome: Ongoing, Not Progressing     Problem: Skin Injury Risk Increased  Goal: Skin Health and Integrity  11/11/2022 1655 by Che Parmar RN  Outcome: Ongoing, Not Progressing  11/11/2022 1648 by Che Parmar RN  Outcome: Ongoing, Not Progressing

## 2022-11-11 NOTE — PROGRESS NOTES
"[facility]  Adult Nutrition  First Assessment Note         Reason for Assessment  Reason For Assessment: identified at risk by screening criteria (MST score of 2: unsure of recent weight loss without trying)  Nutrition Risk Screen: large or nonhealing wound, burn or pressure injury    RD assessment due to MST score of 2: unsure of recent weight loss without trying.    Recommend modify diet texture to pureed diet with thin liquids. Facility currently out of Epi - recommend 500mg Vit C BID + 220mg ZnSO4 BID + MVI daily to aid in wound healing.    Recommend modifying diet texture to pureed diet with thin liquids due to 10/20/22 ST evaluation - SLP diagnosis of dysphagia, rec pureed diet with thin liquids.     Per MD note, "Patient is an 82-year-old male with type 2 diabetes on insulin, essential hypertension, hypothyroidism, iron deficiency anemia, DVT involving bilateral lower extremity on chronic anticoagulation with Eliquis, Alzheimer's dementia coupled with bipolar disorder and schizophrenia along with functional quadriplegia who resides at a local nursing home.  Patient was sent to ED via EMS secondary to generalized weakness, and worsening of sacral wound." Also per MD note regarding sacral decubitus ulcer, "Worsening of sacral decubitus ulcer with loss of granulation tissue and mildly exposed sacrum is worrisome for presence of osteomyelitis.  Will proceed with MRI of sacrum.  In the meantime, will consult Wound Care and empirically start patient on antibiotics consisting of vancomycin and Zosyn."    Patient's CBW 11/11 is 210# - patient with weight fluctuations due to edema. Per flowsheets, +3 generalized edema, +4 edema to both R and L hand and to both R and L foot. Needs adjusted due to patient with functional quadriplegia.  Wt Readings from Last 3 Encounters:   11/11/22 0000 95.3 kg (210 lb 1.6 oz)   11/10/22 1630 91.2 kg (201 lb)   11/03/22 1423 99.1 kg (218 lb 7.6 oz) " "  10/30/22 0800 99.1 kg (218 lb 7.6 oz)   10/28/22 1054 102.5 kg (225 lb 15.5 oz)   10/24/22 0400 102.5 kg (225 lb 15.5 oz)   10/21/22 0449 102.7 kg (226 lb 6.6 oz)   10/19/22 1716 97.6 kg (215 lb 2.7 oz)   10/19/22 1615 97.6 kg (215 lb 2.7 oz)     Last BM 11/11 per flowsheets, patient with colostomy. Will continue to monitor PO intake, diet change, labs, meds, weight trend, skin/wounds, updates in patient condition. RD following.    Malnutrition  Is Patient Malnourished: No  Skin (Micronutrient): wounds unhealed  Per MD note: "Unstageable sacral wound present with minimally exposed sacral bone.  No associated erythema or purulent drainage were appreciated "    Nutrition Diagnosis  Increased protein Needs   related to Wound healing as evidenced by worsening unstageable sacral wound present with minimally exposed sacral bone    Nutrition Diagnosis Status: Chronic/ continues    Nutrition Risk  Level of Risk/Frequency of Follow-up: high   Chewing or Swallowing Difficulty?: on 10/20/22, ST laly note: SLP diagnosis of dysphagia, rec pureed diet with thin liquids    Estimated/Assessed Needs    Temp: 97.5 °F (36.4 °C)Axillary  Weight Used For Calorie Calculations: 78.2 kg (172 lb 8 oz) (adjusted body weight)   Energy Need Method: Kcal/kg (30-35kcal/kg adjusted body weight d/t wound) Energy Calorie Requirements (kcal): 2347..59kcal  Weight Used For Protein Calculations: 78.2 kg (172 lb 8 oz) (adjusted body weight)  Protein Requirements: 94..77g pro (1.2-1.4g pro/kg adjusted body weight d/t wound)  Estimated Fluid Requirement Method: RDA Method    RDA Method (mL): 2347.36     Nutrition Prescription / Recommendations  Recommendation/Intervention: Recommend modify diet texture to pureed diet with thin liquids. Facility currently out of Epi - recommend 500mg Vit C BID + 220mg ZnSO4 BID + MVI daily to aid in wound healing.  Goals: PO intake >75%, weight maintenance, wound healing  Nutrition Goal Status: " new  Current Diet Order: Diabetic Diet  Nutrition Order Comments: Recommend pureed diet with thin liquids; on 10/20/22 patient had ST consult and SLP diagnosis of dysphagia, pureed diet with thin liquids was recommended at that time  Recommended Diet: Consistent Carbohydrate 2000 (75g Carbs) and Puree  Recommended Oral Supplement: No Oral Supplements  Is Nutrition Support Recommended: No  Is Education Recommended: No    Monitor and Evaluation  % current Intake: Other: no PO intake % documented  % intake to meet estimated needs: 75 - 100 %  Food and Nutrient Intake: energy intake, food and beverage intake  Food and Nutrient Adminstration: diet order  Knowledge/Beliefs/Attitudes: food and nutrition knowledge/skill, beliefs and attitudes  Physical Activity and Function: nutrition-related ADLs and IADLs, factors affecting access to physical activity  Anthropometric Measurements: weight, weight change, body mass index  Biochemical Data, Medical Tests and Procedures: electrolyte and renal panel, gastrointestinal profile, glucose/endocrine profile, inflammatory profile, lipid profile  Nutrition-Focused Physical Findings: overall appearance, extremities, muscles and bones, head and eyes, skin     Current Medical Diagnosis and Past Medical History  Diagnosis: other (see comments) (generalized weakness)  Past Medical History:   Diagnosis Date    Alzheimer's disease     Anticoagulant long-term use     Bipolar disorder     BPH (benign prostatic hyperplasia)     Deep vein thrombosis (DVT) of popliteal vein of right lower extremity     Diabetes mellitus     DVT of deep femoral vein, left     Hyperlipidemia     Hypertension     Hypothyroidism     Idiopathic orofacial dystonia     Iron deficiency anemia secondary to blood loss (chronic)     Mild intellectual disabilities     Obesity     Sacral wound     05/16 records show culture of wound grew   Collected: 05/05/22 0925 Order Status: Completed Specimen: Wound from Sacral Updated:  05/11/22 1318  Culture, Wound/Abscess Light Growth Acinetobacter baumannii complex/haemolyticus Abnormal    Comment: Corrected result: Previously reported as Gram-negative Bacilli on 5/9/2022 at 1312 CDT.    Light Growth Escherichia coli Abnormal    Light Growth Klebsiel    Schizophrenia 1/28/2022 05/16 -continue trileptal and risperidone     Nutrition/Diet History  Food Allergies: NKFA    Lab/Procedures/Meds  Recent Labs   Lab 11/11/22  0420      K 3.8   BUN 21*   CREATININE 0.62*   GLU 73*   CALCIUM 8.5   ALBUMIN 1.7*      ALT 12*   AST 19     Last A1c:   Lab Results   Component Value Date    HGBA1C 6.6 06/29/2022     Lab Results   Component Value Date    RBC 3.57 (L) 11/11/2022    HGB 9.7 (L) 11/11/2022    HCT 29.4 (L) 11/11/2022    MCV 82.4 11/11/2022    MCH 27.2 11/11/2022    MCHC 33.0 11/11/2022    TIBC 174 (L) 12/09/2021     Pertinent Labs Reviewed: reviewed  BUN 21 (H), Creatinine 0.62 (L), BUN/CREAT RATIO 34 (H), GLU 73 (L), total protein 5.8 (L), albumin 1.7 (L), prealbumin 15 (L), ALT 12 (L), CRP 7.97 (H), total globulin 4.1 (H) - patient with PMH of DM, sacral decubitus ulcer, will continue to monitor renal labs    Pertinent Medications Reviewed: reviewed  Apixaban, levothyroxine, memantine, mupirocin, oxcabazepine, pantoprazole, zosyn, polyethylene glycol, tamsulosin, vancocin    Anthropometrics  Temp: 97.5 °F (36.4 °C)  Height Method: Estimated  Height: 6' (182.9 cm)  Height (inches): 72 in  Weight Method: Bed Scale  Weight: 95.3 kg (210 lb 1.6 oz)  Weight (lb): 210.1 lb  Ideal Body Weight (IBW), Male: 178 lb  % Ideal Body Weight, Male (lb): 118.03 %  BMI (Calculated): 28.5  BMI Grade: 25 - 29.9 - overweight  Tetraplegia (Quadriplegia) Ideal Body Weight (IBW) Adjustment: 160 lb 3.2 oz    Nutrition by Nursing  Diet/Feeding Tolerance: other (see comments)  Last Bowel Movement: 11/11/22    Nutrition Follow-Up  RD Follow-up?: Yes

## 2022-11-11 NOTE — HPI
Patient is an 82-year-old male with type 2 diabetes on insulin, essential hypertension, hypothyroidism, iron deficiency anemia, DVT involving bilateral lower extremity on chronic anticoagulation with Eliquis, Alzheimer's dementia coupled with bipolar disorder and schizophrenia along with functional quadriplegia who resides at a local nursing home.  Patient was sent to ED via EMS secondary to generalized weakness, and worsening of sacral wound. No associated symptoms such as fever, chills, cough, wheezing, nausea, vomiting or abdominal pain were reported.  No other history could be gathered at this time as this patient just mumbles a few unintelligible words. On initial presentation, vital signs were stable and patient was afebrile.  Workup including CBC, CMP, chest x-ray, and UA was unremarkable.  However, on physical examination, patient was found to have worsening of sacral ulcer with minimally exposed bone concerning for a presence of osteomyelitis.  Patient will be admitted for further evaluation and intervention

## 2022-11-12 LAB
ALBUMIN SERPL BCP-MCNC: 1.7 G/DL (ref 3.5–5)
ALBUMIN/GLOB SERPL: 0.5 {RATIO}
ALP SERPL-CCNC: 84 U/L (ref 45–115)
ALT SERPL W P-5'-P-CCNC: 12 U/L (ref 16–61)
ANION GAP SERPL CALCULATED.3IONS-SCNC: 9 MMOL/L (ref 7–16)
AST SERPL W P-5'-P-CCNC: 22 U/L (ref 15–37)
BASOPHILS # BLD AUTO: 0.04 K/UL (ref 0–0.2)
BASOPHILS NFR BLD AUTO: 0.6 % (ref 0–1)
BILIRUB SERPL-MCNC: 0.4 MG/DL (ref ?–1.2)
BUN SERPL-MCNC: 13 MG/DL (ref 7–18)
BUN/CREAT SERPL: 23 (ref 6–20)
CALCIUM SERPL-MCNC: 8.3 MG/DL (ref 8.5–10.1)
CHLORIDE SERPL-SCNC: 101 MMOL/L (ref 98–107)
CO2 SERPL-SCNC: 30 MMOL/L (ref 21–32)
CREAT SERPL-MCNC: 0.57 MG/DL (ref 0.7–1.3)
DIFFERENTIAL METHOD BLD: ABNORMAL
EGFR (NO RACE VARIABLE) (RUSH/TITUS): 98 ML/MIN/1.73M²
EOSINOPHIL # BLD AUTO: 0.7 K/UL (ref 0–0.5)
EOSINOPHIL NFR BLD AUTO: 10.2 % (ref 1–4)
ERYTHROCYTE [DISTWIDTH] IN BLOOD BY AUTOMATED COUNT: 16.4 % (ref 11.5–14.5)
GLOBULIN SER-MCNC: 3.7 G/DL (ref 2–4)
GLUCOSE SERPL-MCNC: 125 MG/DL (ref 70–105)
GLUCOSE SERPL-MCNC: 133 MG/DL (ref 70–105)
GLUCOSE SERPL-MCNC: 146 MG/DL (ref 70–105)
GLUCOSE SERPL-MCNC: 79 MG/DL (ref 70–105)
GLUCOSE SERPL-MCNC: 91 MG/DL (ref 74–106)
HCT VFR BLD AUTO: 29.4 % (ref 40–54)
HGB BLD-MCNC: 9.4 G/DL (ref 13.5–18)
IMM GRANULOCYTES # BLD AUTO: 0.04 K/UL (ref 0–0.04)
IMM GRANULOCYTES NFR BLD: 0.6 % (ref 0–0.4)
LYMPHOCYTES # BLD AUTO: 1.43 K/UL (ref 1–4.8)
LYMPHOCYTES NFR BLD AUTO: 20.8 % (ref 27–41)
MCH RBC QN AUTO: 27.2 PG (ref 27–31)
MCHC RBC AUTO-ENTMCNC: 32 G/DL (ref 32–36)
MCV RBC AUTO: 85.2 FL (ref 80–96)
MONOCYTES # BLD AUTO: 0.62 K/UL (ref 0–0.8)
MONOCYTES NFR BLD AUTO: 9 % (ref 2–6)
MPC BLD CALC-MCNC: 8.7 FL (ref 9.4–12.4)
NEUTROPHILS # BLD AUTO: 4.03 K/UL (ref 1.8–7.7)
NEUTROPHILS NFR BLD AUTO: 58.8 % (ref 53–65)
NRBC # BLD AUTO: 0 X10E3/UL
NRBC, AUTO (.00): 0 %
PLATELET # BLD AUTO: 356 K/UL (ref 150–400)
POTASSIUM SERPL-SCNC: 4.1 MMOL/L (ref 3.5–5.1)
PROT SERPL-MCNC: 5.4 G/DL (ref 6.4–8.2)
RBC # BLD AUTO: 3.45 M/UL (ref 4.6–6.2)
SODIUM SERPL-SCNC: 136 MMOL/L (ref 136–145)
WBC # BLD AUTO: 6.86 K/UL (ref 4.5–11)

## 2022-11-12 PROCEDURE — 80053 COMPREHEN METABOLIC PANEL: CPT | Performed by: INTERNAL MEDICINE

## 2022-11-12 PROCEDURE — 82962 GLUCOSE BLOOD TEST: CPT

## 2022-11-12 PROCEDURE — 11000008

## 2022-11-12 PROCEDURE — 25000003 PHARM REV CODE 250: Performed by: INTERNAL MEDICINE

## 2022-11-12 PROCEDURE — 85025 COMPLETE CBC W/AUTO DIFF WBC: CPT | Performed by: INTERNAL MEDICINE

## 2022-11-12 PROCEDURE — 63600175 PHARM REV CODE 636 W HCPCS: Performed by: FAMILY MEDICINE

## 2022-11-12 PROCEDURE — 99232 SBSQ HOSP IP/OBS MODERATE 35: CPT | Mod: ,,, | Performed by: FAMILY MEDICINE

## 2022-11-12 PROCEDURE — 99232 PR SUBSEQUENT HOSPITAL CARE,LEVL II: ICD-10-PCS | Mod: ,,, | Performed by: FAMILY MEDICINE

## 2022-11-12 PROCEDURE — 36415 COLL VENOUS BLD VENIPUNCTURE: CPT | Performed by: INTERNAL MEDICINE

## 2022-11-12 PROCEDURE — 25000003 PHARM REV CODE 250: Performed by: FAMILY MEDICINE

## 2022-11-12 PROCEDURE — 63600175 PHARM REV CODE 636 W HCPCS: Performed by: INTERNAL MEDICINE

## 2022-11-12 RX ADMIN — PANTOPRAZOLE SODIUM 40 MG: 40 TABLET, DELAYED RELEASE ORAL at 09:11

## 2022-11-12 RX ADMIN — MUPIROCIN: 20 OINTMENT TOPICAL at 09:11

## 2022-11-12 RX ADMIN — RISPERIDONE 1 MG: 1 TABLET ORAL at 08:11

## 2022-11-12 RX ADMIN — PIPERACILLIN AND TAZOBACTAM 4.5 G: 4; .5 INJECTION, POWDER, LYOPHILIZED, FOR SOLUTION INTRAVENOUS; PARENTERAL at 08:11

## 2022-11-12 RX ADMIN — RISPERIDONE 1 MG: 1 TABLET ORAL at 09:11

## 2022-11-12 RX ADMIN — OXCARBAZEPINE 300 MG: 150 TABLET, FILM COATED ORAL at 08:11

## 2022-11-12 RX ADMIN — TAMSULOSIN HYDROCHLORIDE 0.4 MG: 0.4 CAPSULE ORAL at 08:11

## 2022-11-12 RX ADMIN — LEVOTHYROXINE SODIUM 50 MCG: 0.05 TABLET ORAL at 06:11

## 2022-11-12 RX ADMIN — MUPIROCIN: 20 OINTMENT TOPICAL at 08:11

## 2022-11-12 RX ADMIN — PIPERACILLIN AND TAZOBACTAM 4.5 G: 4; .5 INJECTION, POWDER, LYOPHILIZED, FOR SOLUTION INTRAVENOUS; PARENTERAL at 11:11

## 2022-11-12 RX ADMIN — APIXABAN 2.5 MG: 2.5 TABLET, FILM COATED ORAL at 08:11

## 2022-11-12 RX ADMIN — VANCOMYCIN HYDROCHLORIDE 2000 MG: 1 INJECTION, POWDER, LYOPHILIZED, FOR SOLUTION INTRAVENOUS at 09:11

## 2022-11-12 RX ADMIN — APIXABAN 2.5 MG: 2.5 TABLET, FILM COATED ORAL at 09:11

## 2022-11-12 RX ADMIN — PIPERACILLIN AND TAZOBACTAM 4.5 G: 4; .5 INJECTION, POWDER, LYOPHILIZED, FOR SOLUTION INTRAVENOUS; PARENTERAL at 04:11

## 2022-11-12 RX ADMIN — POLYETHYLENE GLYCOL 3350 17 G: 17 POWDER, FOR SOLUTION ORAL at 08:11

## 2022-11-12 RX ADMIN — MEMANTINE 10 MG: 5 TABLET ORAL at 09:11

## 2022-11-12 RX ADMIN — POLYETHYLENE GLYCOL 3350 17 G: 17 POWDER, FOR SOLUTION ORAL at 09:11

## 2022-11-12 NOTE — PROGRESS NOTES
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  Patient was sent to ED via EMS secondary to generalized weakness, and worsening of sacral wound. No associated symptoms such as fever, chills, cough, wheezing, nausea, vomiting or abdominal pain were reported.  No other history could be gathered at this time as this patient just mumbles a few unintelligible words. On initial presentation, vital signs were stable and patient was afebrile.  Workup including CBC, CMP, chest x-ray, and UA was unremarkable.  However, on physical examination, patient was found to have worsening of sacral ulcer with minimally exposed bone concerning for a presence of osteomyelitis.  Patient will be admitted for further evaluation and intervention      Overview/Hospital Course:  No notes on file    Interval History: Minimally verbal. Nurses report no problems.     Review of Systems   Unable to perform ROS: Patient nonverbal   Objective:     Vital Signs (Most Recent):  Temp: 97.7 °F (36.5 °C) (11/11/22 2040)  Pulse: 71 (11/11/22 2004)  Resp: 12 (11/11/22 2004)  BP: (!) 154/73 (11/11/22 2004)  SpO2: 99 % (11/11/22 2004)   Vital Signs (24h Range):  Temp:  [97.1 °F (36.2 °C)-97.9 °F (36.6 °C)] 97.7 °F (36.5 °C)  Pulse:  [52-79] 71  Resp:  [7-22] 12  SpO2:  [96 %-100 %] 99 %  BP:  (102-154)/(53-74) 154/73     Weight: 95.3 kg (210 lb 1.6 oz)  Body mass index is 28.49 kg/m².    Intake/Output Summary (Last 24 hours) at 11/11/2022 2146  Last data filed at 11/11/2022 1915  Gross per 24 hour   Intake 1960 ml   Output 800 ml   Net 1160 ml      Physical Exam  Vitals reviewed.   Constitutional:       General: He is not in acute distress.     Appearance: He is not ill-appearing or toxic-appearing.   HENT:      Head: Normocephalic and atraumatic.      Right Ear: External ear normal.      Left Ear: External ear normal.   Eyes:      General: No scleral icterus.     Extraocular Movements: Extraocular movements intact.      Pupils: Pupils are equal, round, and reactive to light.   Cardiovascular:      Rate and Rhythm: Normal rate and regular rhythm.      Pulses: Normal pulses.      Heart sounds: Normal heart sounds. No murmur heard.  Pulmonary:      Effort: Pulmonary effort is normal. No respiratory distress.      Breath sounds: Normal breath sounds. No wheezing.   Abdominal:      General: Abdomen is flat.      Palpations: Abdomen is soft. There is no mass.      Tenderness: There is no abdominal tenderness.   Musculoskeletal:         General: No swelling or tenderness. Normal range of motion.   Skin:     General: Skin is warm and dry.      Capillary Refill: Capillary refill takes less than 2 seconds.      Comments: Unstageable sacral wound present with minimally exposed sacral bone.  No associated erythema or purulent drainage were appreciated   Neurological:      General: No focal deficit present.      Mental Status: Mental status is at baseline.       Significant Labs: All pertinent labs within the past 24 hours have been reviewed.  BMP:   Recent Labs   Lab 11/10/22  1802 11/11/22  0420   GLU 97 73*   * 137   K 4.2 3.8    102   CO2 28 30   BUN 23* 21*   CREATININE 0.68* 0.62*   CALCIUM 8.7 8.5   MG 2.0  --      CBC:   Recent Labs   Lab 11/10/22  1802 11/11/22  0420   WBC 8.83 8.11   HGB 9.9*  9.7*   HCT 31.9* 29.4*    325       Significant Imaging: I have reviewed all pertinent imaging results/findings within the past 24 hours.      Assessment/Plan:      * Generalized weakness    Patient is severely debilitated with functional quadriplegia and thus I cannot appreciate generalized weakness from his baseline.  In my opinion patient's functional status has not changed from his baseline.  Will consult PT/OT        Pressure ulcer of sacral region, stage 4    Little change from prior admission. Continue would care. Empiric abx for now.    Diabetes mellitus    Patient's last known hemoglobin A1c was 6.6.  Will start patient on sliding scale insulin q.a.c. and hs to maintain CBGs in the range of 130s to 180s    ARMAAN (iron deficiency anemia)    Patient's hemoglobin is actually slightly improved from his baseline to 9.9 g today.  Patient has no signs and symptoms of bleeding or hemolysis.  Will monitor      COPD (chronic obstructive pulmonary disease)    Patient's lungs are clear to auscultation bilaterally at this time.  Will start patient on DuoNeb q.4 PRN     BPH (benign prostatic hyperplasia)    Patient has chronic Khan and has recurrent UTI as a result.  However, UA performed today was not suggestive of a presence of urinary tract infection.  Will monitor      Hypertension    Given marginal blood pressures, will hold off on antihypertensives for now and monitor blood pressure        VTE Risk Mitigation (From admission, onward)         Ordered     apixaban tablet 2.5 mg  2 times daily         11/10/22 2803                Discharge Planning   YOSSI:      Code Status: Prior   Is the patient medically ready for discharge?:     Reason for patient still in hospital (select all that apply): Treatment                     Hipolito Nance Jr, MD  Department of Hospital Medicine   [facility]

## 2022-11-12 NOTE — PLAN OF CARE
Problem: Adult Inpatient Plan of Care  Goal: Plan of Care Review  Outcome: Ongoing, Progressing  Goal: Absence of Hospital-Acquired Illness or Injury  Outcome: Ongoing, Progressing  Goal: Optimal Comfort and Wellbeing  Outcome: Ongoing, Progressing     Problem: Diabetes Comorbidity  Goal: Blood Glucose Level Within Targeted Range  Outcome: Ongoing, Progressing     Problem: Infection  Goal: Absence of Infection Signs and Symptoms  Outcome: Ongoing, Progressing     Problem: Fall Injury Risk  Goal: Absence of Fall and Fall-Related Injury  Outcome: Ongoing, Progressing     Problem: Adult Inpatient Plan of Care  Goal: Readiness for Transition of Care  Outcome: Ongoing, Not Progressing     Problem: Impaired Wound Healing  Goal: Optimal Wound Healing  Outcome: Ongoing, Not Progressing

## 2022-11-12 NOTE — PLAN OF CARE
Problem: Impaired Wound Healing  Goal: Optimal Wound Healing  11/11/2022 2338 by Kenia Lopez RN  Outcome: Ongoing, Progressing  11/11/2022 2334 by Kenia Lopez RN  Outcome: Ongoing, Progressing  Intervention: Promote Wound Healing  Flowsheets (Taken 11/11/2022 2338)  Oral Nutrition Promotion: calorie-dense foods provided  Sleep/Rest Enhancement:   awakenings minimized   noise level reduced   regular sleep/rest pattern promoted   room darkened  Activity Management: Patient unable to perform activities  Pain Management Interventions:   care clustered   position adjusted   pillow support provided   quiet environment facilitated     Problem: Skin Injury Risk Increased  Goal: Skin Health and Integrity  11/11/2022 2338 by Kenia Lopez RN  Outcome: Ongoing, Progressing  11/11/2022 2334 by Kenia Lopez RN  Outcome: Ongoing, Progressing     Problem: Fall Injury Risk  Goal: Absence of Fall and Fall-Related Injury  11/11/2022 2338 by Kenia Lopez RN  Outcome: Ongoing, Progressing  11/11/2022 2334 by Kenia Lopez RN  Outcome: Ongoing, Progressing  Intervention: Identify and Manage Contributors  Flowsheets (Taken 11/11/2022 2338)  Self-Care Promotion: (total care provided) other (see comments)  Medication Review/Management: medications reviewed  Intervention: Promote Injury-Free Environment  Flowsheets (Taken 11/11/2022 2338)  Safety Promotion/Fall Prevention:   bed alarm set   room near unit station   side rails raised x 3

## 2022-11-13 LAB
GLUCOSE SERPL-MCNC: 113 MG/DL (ref 70–105)
GLUCOSE SERPL-MCNC: 123 MG/DL (ref 70–105)
GLUCOSE SERPL-MCNC: 127 MG/DL (ref 70–105)
GLUCOSE SERPL-MCNC: 88 MG/DL (ref 70–105)

## 2022-11-13 PROCEDURE — 63600175 PHARM REV CODE 636 W HCPCS: Performed by: FAMILY MEDICINE

## 2022-11-13 PROCEDURE — 25000003 PHARM REV CODE 250: Performed by: INTERNAL MEDICINE

## 2022-11-13 PROCEDURE — 63600175 PHARM REV CODE 636 W HCPCS: Performed by: INTERNAL MEDICINE

## 2022-11-13 PROCEDURE — 99232 SBSQ HOSP IP/OBS MODERATE 35: CPT | Mod: ,,, | Performed by: FAMILY MEDICINE

## 2022-11-13 PROCEDURE — 82962 GLUCOSE BLOOD TEST: CPT

## 2022-11-13 PROCEDURE — 25000003 PHARM REV CODE 250: Performed by: FAMILY MEDICINE

## 2022-11-13 PROCEDURE — 11000001 HC ACUTE MED/SURG PRIVATE ROOM

## 2022-11-13 PROCEDURE — 99232 PR SUBSEQUENT HOSPITAL CARE,LEVL II: ICD-10-PCS | Mod: ,,, | Performed by: FAMILY MEDICINE

## 2022-11-13 RX ADMIN — TAMSULOSIN HYDROCHLORIDE 0.4 MG: 0.4 CAPSULE ORAL at 09:11

## 2022-11-13 RX ADMIN — PANTOPRAZOLE SODIUM 40 MG: 40 TABLET, DELAYED RELEASE ORAL at 09:11

## 2022-11-13 RX ADMIN — POLYETHYLENE GLYCOL 3350 17 G: 17 POWDER, FOR SOLUTION ORAL at 09:11

## 2022-11-13 RX ADMIN — OXCARBAZEPINE 300 MG: 150 TABLET, FILM COATED ORAL at 09:11

## 2022-11-13 RX ADMIN — MEMANTINE 10 MG: 5 TABLET ORAL at 09:11

## 2022-11-13 RX ADMIN — APIXABAN 2.5 MG: 2.5 TABLET, FILM COATED ORAL at 09:11

## 2022-11-13 RX ADMIN — PIPERACILLIN AND TAZOBACTAM 4.5 G: 4; .5 INJECTION, POWDER, LYOPHILIZED, FOR SOLUTION INTRAVENOUS; PARENTERAL at 01:11

## 2022-11-13 RX ADMIN — RISPERIDONE 1 MG: 1 TABLET ORAL at 09:11

## 2022-11-13 RX ADMIN — LEVOTHYROXINE SODIUM 50 MCG: 0.05 TABLET ORAL at 06:11

## 2022-11-13 RX ADMIN — MUPIROCIN 2 TUBE: 20 OINTMENT TOPICAL at 09:11

## 2022-11-13 RX ADMIN — MUPIROCIN: 20 OINTMENT TOPICAL at 09:11

## 2022-11-13 RX ADMIN — PIPERACILLIN AND TAZOBACTAM 4.5 G: 4; .5 INJECTION, POWDER, LYOPHILIZED, FOR SOLUTION INTRAVENOUS; PARENTERAL at 03:11

## 2022-11-13 RX ADMIN — PIPERACILLIN AND TAZOBACTAM 4.5 G: 4; .5 INJECTION, POWDER, LYOPHILIZED, FOR SOLUTION INTRAVENOUS; PARENTERAL at 09:11

## 2022-11-13 RX ADMIN — VANCOMYCIN HYDROCHLORIDE 2000 MG: 1 INJECTION, POWDER, LYOPHILIZED, FOR SOLUTION INTRAVENOUS at 09:11

## 2022-11-13 NOTE — PROGRESS NOTES
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  Patient was sent to ED via EMS secondary to generalized weakness, and worsening of sacral wound. No associated symptoms such as fever, chills, cough, wheezing, nausea, vomiting or abdominal pain were reported.  No other history could be gathered at this time as this patient just mumbles a few unintelligible words. On initial presentation, vital signs were stable and patient was afebrile.  Workup including CBC, CMP, chest x-ray, and UA was unremarkable.  However, on physical examination, patient was found to have worsening of sacral ulcer with minimally exposed bone concerning for a presence of osteomyelitis.  Patient will be admitted for further evaluation and intervention      Overview/Hospital Course:  No notes on file    Interval History: Non complaints. Minimally verbal. Nurses report no problem.    Review of Systems   Unable to perform ROS: Patient nonverbal   Objective:     Vital Signs (Most Recent):  Temp: 97.7 °F (36.5 °C) (11/13/22 1100)  Pulse: 67 (11/13/22 1400)  Resp: (!) 6 (11/13/22 1400)  BP: 114/72 (11/13/22 1400)  SpO2: 98 % (11/13/22 1400)   Vital Signs (24h Range):  Temp:  [97.4 °F (36.3 °C)-98.1 °F (36.7 °C)] 97.7 °F (36.5 °C)  Pulse:  [58-85] 67  Resp:  [6-15]  6  SpO2:  [97 %-100 %] 98 %  BP: (111-169)/(55-95) 114/72     Weight: 94 kg (207 lb 3.7 oz)  Body mass index is 28.11 kg/m².    Intake/Output Summary (Last 24 hours) at 11/13/2022 1442  Last data filed at 11/13/2022 1310  Gross per 24 hour   Intake 2400 ml   Output 2550 ml   Net -150 ml      Physical Exam  Vitals reviewed.   Constitutional:       General: He is not in acute distress.     Appearance: He is not ill-appearing or toxic-appearing.   HENT:      Head: Normocephalic and atraumatic.   Cardiovascular:      Rate and Rhythm: Normal rate and regular rhythm.      Pulses: Normal pulses.      Heart sounds: Normal heart sounds. No murmur heard.  Pulmonary:      Effort: Pulmonary effort is normal. No respiratory distress.      Breath sounds: Normal breath sounds. No wheezing.   Abdominal:      General: Abdomen is flat.      Palpations: Abdomen is soft. There is no mass.      Tenderness: There is no abdominal tenderness.   Musculoskeletal:         General: No swelling or tenderness. Normal range of motion.   Skin:     General: Skin is warm and dry.      Capillary Refill: Capillary refill takes less than 2 seconds.      Comments: Unstageable sacral wound present with minimally exposed sacral bone.  No associated erythema or purulent drainage were appreciated   Neurological:      General: No focal deficit present.      Mental Status: Mental status is at baseline.       Significant Labs: All pertinent labs within the past 24 hours have been reviewed.  CBC:   Recent Labs   Lab 11/12/22  0527   WBC 6.86   HGB 9.4*   HCT 29.4*        CMP:   Recent Labs   Lab 11/12/22  0527      K 4.1      CO2 30   GLU 91   BUN 13   CREATININE 0.57*   CALCIUM 8.3*   PROT 5.4*   ALBUMIN 1.7*   BILITOT 0.4   ALKPHOS 84   AST 22   ALT 12*   ANIONGAP 9       Blood cx negative at 48 hours.     Significant Imaging: I have reviewed all pertinent imaging results/findings within the past 24 hours.      Assessment/Plan:      *  Pressure ulcer of sacral region, stage 4    By recollection  change from prior admission. Continue would care. Will stop Vanc as only gram negative growing.  Discuss with wound care next week. If nursing facility can provide care and no other intervention planned by wound care will d/c to Nursing Facility early in the week.     Diabetes mellitus    Patient's last known hemoglobin A1c was 6.6.  Will start patient on sliding scale insulin q.a.c. and hs to maintain CBGs in the range of 130s to 180s    11/13 - Glucoses acceptable.     Generalized weakness    Patient is severely debilitated with functional quadriplegia and thus I cannot appreciate generalized weakness from his baseline.  In my opinion patient's functional status has not changed from his baseline.  Will consult PT/OT        ARMAAN (iron deficiency anemia)    Patient's hemoglobin is actually slightly improved from his baseline to 9.9 g today.  Patient has no signs and symptoms of bleeding or hemolysis.  Will monitor    11/13 - Hg stable in mid 9s.       COPD (chronic obstructive pulmonary disease)    Patient's lungs are clear to auscultation bilaterally at this time.  Will start patient on DuoNeb q.4 PRN     BPH (benign prostatic hyperplasia)    Patient has chronic Khan and has recurrent UTI as a result.  However, UA performed today was not suggestive of a presence of urinary tract infection.  Will monitor      Hypertension    Given marginal blood pressures, will hold off on antihypertensives for now and monitor blood pressure.     11/13 - Metoprolol on hold.        VTE Risk Mitigation (From admission, onward)         Ordered     apixaban tablet 2.5 mg  2 times daily         11/10/22 2917                Discharge Planning   YOSSI:      Code Status: Prior   Is the patient medically ready for discharge?:     Reason for patient still in hospital (select all that apply): Treatment                     Hipolito Nance Jr, MD  Department of Hospital Medicine    [facility]

## 2022-11-13 NOTE — SUBJECTIVE & OBJECTIVE
Interval History: Patient minimally verbal. Nursing reports no issues. .     Review of Systems  Objective:     Vital Signs (Most Recent):  Temp: 97.8 °F (36.6 °C) (11/13/22 0400)  Pulse: 66 (11/13/22 0600)  Resp: 11 (11/13/22 0600)  BP: 135/75 (11/13/22 0600)  SpO2: 99 % (11/13/22 0600) Vital Signs (24h Range):  Temp:  [97.4 °F (36.3 °C)-98.5 °F (36.9 °C)] 97.8 °F (36.6 °C)  Pulse:  [54-85] 66  Resp:  [6-20] 11  SpO2:  [94 %-100 %] 99 %  BP: (111-169)/(55-88) 135/75     Weight: 94 kg (207 lb 3.7 oz)  Body mass index is 28.11 kg/m².    Intake/Output Summary (Last 24 hours) at 11/13/2022 0733  Last data filed at 11/13/2022 0625  Gross per 24 hour   Intake 2880 ml   Output 2500 ml   Net 380 ml      Physical Exam  Vitals reviewed.   Constitutional:       General: He is not in acute distress.     Appearance: He is not ill-appearing or toxic-appearing.   HENT:      Head: Normocephalic and atraumatic.   Cardiovascular:      Rate and Rhythm: Normal rate and regular rhythm.      Pulses: Normal pulses.      Heart sounds: Normal heart sounds. No murmur heard.  Pulmonary:      Effort: Pulmonary effort is normal. No respiratory distress.      Breath sounds: Normal breath sounds. No wheezing.   Abdominal:      General: Abdomen is flat.      Palpations: Abdomen is soft. There is no mass.      Tenderness: There is no abdominal tenderness.   Musculoskeletal:         General: No swelling or tenderness. Normal range of motion.   Skin:     General: Skin is warm and dry.      Capillary Refill: Capillary refill takes less than 2 seconds.      Comments: Unstageable sacral wound present with minimally exposed sacral bone.  No associated erythema or purulent drainage were appreciated   Neurological:      General: No focal deficit present.      Mental Status: Mental status is at baseline.       Significant Labs: All pertinent labs within the past 24 hours have been reviewed.    Significant Imaging: I have reviewed all pertinent imaging  results/findings within the past 24 hours.

## 2022-11-13 NOTE — SUBJECTIVE & OBJECTIVE
Interval History: Non complaints. Minimally verbal. Nurses report no problem.    Review of Systems   Unable to perform ROS: Patient nonverbal   Objective:     Vital Signs (Most Recent):  Temp: 97.7 °F (36.5 °C) (11/13/22 1100)  Pulse: 67 (11/13/22 1400)  Resp: (!) 6 (11/13/22 1400)  BP: 114/72 (11/13/22 1400)  SpO2: 98 % (11/13/22 1400)   Vital Signs (24h Range):  Temp:  [97.4 °F (36.3 °C)-98.1 °F (36.7 °C)] 97.7 °F (36.5 °C)  Pulse:  [58-85] 67  Resp:  [6-15] 6  SpO2:  [97 %-100 %] 98 %  BP: (111-169)/(55-95) 114/72     Weight: 94 kg (207 lb 3.7 oz)  Body mass index is 28.11 kg/m².    Intake/Output Summary (Last 24 hours) at 11/13/2022 1442  Last data filed at 11/13/2022 1310  Gross per 24 hour   Intake 2400 ml   Output 2550 ml   Net -150 ml      Physical Exam  Vitals reviewed.   Constitutional:       General: He is not in acute distress.     Appearance: He is not ill-appearing or toxic-appearing.   HENT:      Head: Normocephalic and atraumatic.   Cardiovascular:      Rate and Rhythm: Normal rate and regular rhythm.      Pulses: Normal pulses.      Heart sounds: Normal heart sounds. No murmur heard.  Pulmonary:      Effort: Pulmonary effort is normal. No respiratory distress.      Breath sounds: Normal breath sounds. No wheezing.   Abdominal:      General: Abdomen is flat.      Palpations: Abdomen is soft. There is no mass.      Tenderness: There is no abdominal tenderness.   Musculoskeletal:         General: No swelling or tenderness. Normal range of motion.   Skin:     General: Skin is warm and dry.      Capillary Refill: Capillary refill takes less than 2 seconds.      Comments: Unstageable sacral wound present with minimally exposed sacral bone.  No associated erythema or purulent drainage were appreciated   Neurological:      General: No focal deficit present.      Mental Status: Mental status is at baseline.       Significant Labs: All pertinent labs within the past 24 hours have been reviewed.  CBC:   Recent  Labs   Lab 11/12/22  0527   WBC 6.86   HGB 9.4*   HCT 29.4*        CMP:   Recent Labs   Lab 11/12/22  0527      K 4.1      CO2 30   GLU 91   BUN 13   CREATININE 0.57*   CALCIUM 8.3*   PROT 5.4*   ALBUMIN 1.7*   BILITOT 0.4   ALKPHOS 84   AST 22   ALT 12*   ANIONGAP 9       Blood cx negative at 48 hours.     Significant Imaging: I have reviewed all pertinent imaging results/findings within the past 24 hours.

## 2022-11-13 NOTE — ASSESSMENT & PLAN NOTE
Patient's hemoglobin is actually slightly improved from his baseline to 9.9 g today.  Patient has no signs and symptoms of bleeding or hemolysis.  Will monitor    11/13 - Hg stable in mid 9s.

## 2022-11-13 NOTE — PLAN OF CARE
Problem: Adult Inpatient Plan of Care  Goal: Plan of Care Review  Outcome: Ongoing, Progressing  Goal: Absence of Hospital-Acquired Illness or Injury  Outcome: Ongoing, Progressing  Goal: Optimal Comfort and Wellbeing  Outcome: Ongoing, Progressing     Problem: Diabetes Comorbidity  Goal: Blood Glucose Level Within Targeted Range  Outcome: Ongoing, Progressing     Problem: Infection  Goal: Absence of Infection Signs and Symptoms  Outcome: Ongoing, Progressing     Problem: Fall Injury Risk  Goal: Absence of Fall and Fall-Related Injury  Outcome: Ongoing, Progressing     Problem: Adult Inpatient Plan of Care  Goal: Readiness for Transition of Care  Outcome: Ongoing, Not Progressing     Problem: Impaired Wound Healing  Goal: Optimal Wound Healing  Outcome: Ongoing, Not Progressing     Problem: Skin Injury Risk Increased  Goal: Skin Health and Integrity  Outcome: Ongoing, Not Progressing

## 2022-11-13 NOTE — ASSESSMENT & PLAN NOTE
Patient's last known hemoglobin A1c was 6.6.  Will start patient on sliding scale insulin q.a.c. and hs to maintain CBGs in the range of 130s to 180s    11/13 - Glucoses acceptable.

## 2022-11-13 NOTE — PROGRESS NOTES
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  Patient was sent to ED via EMS secondary to generalized weakness, and worsening of sacral wound. No associated symptoms such as fever, chills, cough, wheezing, nausea, vomiting or abdominal pain were reported.  No other history could be gathered at this time as this patient just mumbles a few unintelligible words. On initial presentation, vital signs were stable and patient was afebrile.  Workup including CBC, CMP, chest x-ray, and UA was unremarkable.  However, on physical examination, patient was found to have worsening of sacral ulcer with minimally exposed bone concerning for a presence of osteomyelitis.  Patient will be admitted for further evaluation and intervention      Overview/Hospital Course:  No notes on file    Interval History: Patient minimally verbal. Nursing reports no issues. .     Review of Systems  Objective:     Vital Signs (Most Recent):  Temp: 97.8 °F (36.6 °C) (11/13/22 0400)  Pulse: 66 (11/13/22 0600)  Resp: 11 (11/13/22 0600)  BP: 135/75 (11/13/22 0600)  SpO2: 99 % (11/13/22 0600) Vital Signs (24h Range):  Temp:  [97.4 °F (36.3 °C)-98.5 °F (36.9 °C)] 97.8 °F (36.6 °C)  Pulse:  [54-85] 66  Resp:  [6-20] 11  SpO2:  [94 %-100 %] 99 %  BP: (111-169)/(55-88) 135/75     Weight: 94 kg  (207 lb 3.7 oz)  Body mass index is 28.11 kg/m².    Intake/Output Summary (Last 24 hours) at 11/13/2022 0733  Last data filed at 11/13/2022 0625  Gross per 24 hour   Intake 2880 ml   Output 2500 ml   Net 380 ml      Physical Exam  Vitals reviewed.   Constitutional:       General: He is not in acute distress.     Appearance: He is not ill-appearing or toxic-appearing.   HENT:      Head: Normocephalic and atraumatic.   Cardiovascular:      Rate and Rhythm: Normal rate and regular rhythm.      Pulses: Normal pulses.      Heart sounds: Normal heart sounds. No murmur heard.  Pulmonary:      Effort: Pulmonary effort is normal. No respiratory distress.      Breath sounds: Normal breath sounds. No wheezing.   Abdominal:      General: Abdomen is flat.      Palpations: Abdomen is soft. There is no mass.      Tenderness: There is no abdominal tenderness.   Musculoskeletal:         General: No swelling or tenderness. Normal range of motion.   Skin:     General: Skin is warm and dry.      Capillary Refill: Capillary refill takes less than 2 seconds.      Comments: Unstageable sacral wound present with minimally exposed sacral bone.  No associated erythema or purulent drainage were appreciated   Neurological:      General: No focal deficit present.      Mental Status: Mental status is at baseline.       Significant Labs: All pertinent labs within the past 24 hours have been reviewed.    Significant Imaging: I have reviewed all pertinent imaging results/findings within the past 24 hours.      Assessment/Plan:      * Generalized weakness    Patient is severely debilitated with functional quadriplegia and thus I cannot appreciate generalized weakness from his baseline.  In my opinion patient's functional status has not changed from his baseline.  Will consult PT/OT        Pressure ulcer of sacral region, stage 4    Little change from prior admission. Continue would care. Empiric abx for now.    Diabetes mellitus    Patient's last  known hemoglobin A1c was 6.6.  Will start patient on sliding scale insulin q.a.c. and hs to maintain CBGs in the range of 130s to 180s    ARMAAN (iron deficiency anemia)    Patient's hemoglobin is actually slightly improved from his baseline to 9.9 g today.  Patient has no signs and symptoms of bleeding or hemolysis.  Will monitor      COPD (chronic obstructive pulmonary disease)    Patient's lungs are clear to auscultation bilaterally at this time.  Will start patient on DuoNeb q.4 PRN     BPH (benign prostatic hyperplasia)    Patient has chronic Khan and has recurrent UTI as a result.  However, UA performed today was not suggestive of a presence of urinary tract infection.  Will monitor      Hypertension    Given marginal blood pressures, will hold off on antihypertensives for now and monitor blood pressure        VTE Risk Mitigation (From admission, onward)         Ordered     apixaban tablet 2.5 mg  2 times daily         11/10/22 7654                Discharge Planning   YOSSI:      Code Status: Prior   Is the patient medically ready for discharge?:     Reason for patient still in hospital (select all that apply): Treatment                     Hipolito Nance Jr, MD  Department of Hospital Medicine   [facility]

## 2022-11-13 NOTE — PLAN OF CARE
Problem: Adult Inpatient Plan of Care  Goal: Plan of Care Review  Outcome: Ongoing, Progressing  Goal: Patient-Specific Goal (Individualized)  Outcome: Ongoing, Progressing  Goal: Absence of Hospital-Acquired Illness or Injury  Outcome: Ongoing, Progressing     Problem: Diabetes Comorbidity  Goal: Blood Glucose Level Within Targeted Range  Outcome: Ongoing, Progressing     Problem: Impaired Wound Healing  Goal: Optimal Wound Healing  Outcome: Ongoing, Progressing     Problem: Skin Injury Risk Increased  Goal: Skin Health and Integrity  Outcome: Ongoing, Progressing

## 2022-11-13 NOTE — ASSESSMENT & PLAN NOTE
Given marginal blood pressures, will hold off on antihypertensives for now and monitor blood pressure.     11/13 - Metoprolol on hold.

## 2022-11-13 NOTE — ASSESSMENT & PLAN NOTE
By recollection  change from prior admission. Continue would care. Will stop Vanc as only gram negative growing.  Discuss with wound care next week. If nursing facility can provide care and no other intervention planned by wound care will d/c to Nursing Facility early in the week.

## 2022-11-14 VITALS
OXYGEN SATURATION: 99 % | BODY MASS INDEX: 28.4 KG/M2 | HEIGHT: 72 IN | RESPIRATION RATE: 18 BRPM | DIASTOLIC BLOOD PRESSURE: 79 MMHG | WEIGHT: 209.69 LBS | SYSTOLIC BLOOD PRESSURE: 154 MMHG | TEMPERATURE: 97 F | HEART RATE: 72 BPM

## 2022-11-14 LAB
BACTERIA SPEC ANAEROBE CULT: ABNORMAL
GLUCOSE SERPL-MCNC: 93 MG/DL (ref 70–105)
GLUCOSE SERPL-MCNC: 98 MG/DL (ref 70–105)
PROCALCITONIN SERPL-MCNC: 0.14 NG/ML
VANCOMYCIN TROUGH SERPL-MCNC: 18.2 ΜG/ML (ref 10–20)

## 2022-11-14 PROCEDURE — 25000003 PHARM REV CODE 250: Performed by: INTERNAL MEDICINE

## 2022-11-14 PROCEDURE — 80202 ASSAY OF VANCOMYCIN: CPT | Performed by: FAMILY MEDICINE

## 2022-11-14 PROCEDURE — 63600175 PHARM REV CODE 636 W HCPCS: Performed by: INTERNAL MEDICINE

## 2022-11-14 PROCEDURE — 82962 GLUCOSE BLOOD TEST: CPT

## 2022-11-14 PROCEDURE — 63600175 PHARM REV CODE 636 W HCPCS: Performed by: FAMILY MEDICINE

## 2022-11-14 PROCEDURE — 25000003 PHARM REV CODE 250: Performed by: FAMILY MEDICINE

## 2022-11-14 RX ADMIN — VANCOMYCIN HYDROCHLORIDE 2000 MG: 1 INJECTION, POWDER, LYOPHILIZED, FOR SOLUTION INTRAVENOUS at 10:11

## 2022-11-14 RX ADMIN — POLYETHYLENE GLYCOL 3350 17 G: 17 POWDER, FOR SOLUTION ORAL at 09:11

## 2022-11-14 RX ADMIN — LEVOTHYROXINE SODIUM 50 MCG: 0.05 TABLET ORAL at 06:11

## 2022-11-14 RX ADMIN — PIPERACILLIN AND TAZOBACTAM 4.5 G: 4; .5 INJECTION, POWDER, LYOPHILIZED, FOR SOLUTION INTRAVENOUS; PARENTERAL at 03:11

## 2022-11-14 RX ADMIN — OXCARBAZEPINE 300 MG: 150 TABLET, FILM COATED ORAL at 09:11

## 2022-11-14 RX ADMIN — APIXABAN 2.5 MG: 2.5 TABLET, FILM COATED ORAL at 09:11

## 2022-11-14 RX ADMIN — RISPERIDONE 1 MG: 1 TABLET ORAL at 09:11

## 2022-11-14 RX ADMIN — MUPIROCIN: 20 OINTMENT TOPICAL at 10:11

## 2022-11-14 RX ADMIN — TAMSULOSIN HYDROCHLORIDE 0.4 MG: 0.4 CAPSULE ORAL at 09:11

## 2022-11-14 NOTE — PLAN OF CARE
TOM spoke with MD, pt is ready for dc today. TOM called pts emergency contact Jennifer again, no answer. TOM called the nursing [facility] [phone] and spoke with Hanane and she has no other contact numbers for pt. Per MD, pt cannot answer questions. Pt has to be at NH by Zan, MD informed. TOM following.

## 2022-11-14 NOTE — PLAN OF CARE
[facility]  Discharge Final Note    Primary Care Provider: Kerry Lin MD    Expected Discharge Date: 11/14/2022    Final Discharge Note (most recent)       Final Note - 11/14/22 1040          Final Note    Assessment Type Final Discharge Note     Anticipated Discharge Disposition half-way Nursing Home   [facility]    What phone number can be called within the next 1-3 days to see how you are doing after discharge? [phone]        Post-Acute Status    Post-Acute Authorization Placement     Post-Acute Placement Status Set-up Complete/Auth obtained     Patient choice form signed by patient/caregiver --   Pt cannot answer questions and SW could not contact Emergency Contact since Friday    Discharge Delays None known at this time                     Important Message from Medicare              Follow-up providers       [facility]   Specialty: Wound Care    [address] [place] [id number]   Phone: [phone]       Next Steps: Follow up in 2 week(s)    Instructions: Appt : 11/29//2022 @ 0900              After-discharge care                Destination       [facility]   Service: Nursing Home    [address] [place] [id number]   Phone: [phone]                             TOM called pts emergency contact again, no answer. Pt to dc back to [facility] today. Pkt made and left with nurse at 0940. TOM informed MD and nurse that pt had to be at NH by 1400. TOM faxed dc orders to NH at [phone]. No other needs.

## 2022-11-16 LAB
BACTERIA BLD CULT: NORMAL
BACTERIA BLD CULT: NORMAL
MICROORGANISM SPEC CULT: ABNORMAL
MICROORGANISM SPEC CULT: ABNORMAL

## 2022-11-30 NOTE — PLAN OF CARE
Problem: Adult Inpatient Plan of Care  Goal: Plan of Care Review  Outcome: Ongoing, Progressing  Goal: Patient-Specific Goal (Individualized)  Outcome: Ongoing, Progressing  Goal: Absence of Hospital-Acquired Illness or Injury  Outcome: Ongoing, Progressing  Goal: Optimal Comfort and Wellbeing  Outcome: Ongoing, Progressing  Goal: Readiness for Transition of Care  Outcome: Ongoing, Progressing     Problem: Diabetes Comorbidity  Goal: Blood Glucose Level Within Targeted Range  Outcome: Ongoing, Progressing     Problem: Impaired Wound Healing  Goal: Optimal Wound Healing  Outcome: Ongoing, Progressing     Problem: Skin Injury Risk Increased  Goal: Skin Health and Integrity  Outcome: Ongoing, Progressing     Problem: Infection  Goal: Absence of Infection Signs and Symptoms  Outcome: Ongoing, Progressing     Problem: Gas Exchange Impaired  Goal: Optimal Gas Exchange  Outcome: Ongoing, Progressing      No

## 2022-12-07 NOTE — DISCHARGE SUMMARY
[facility] Medicine  Discharge Summary      Patient Name: Bhargav Gao  MRN: [id number]  COBY: [id number]  Patient Class: IP- Inpatient  Admission Date: 11/10/2022  Hospital Length of Stay: 4 days  Discharge Date and Time: 11/14/2022 12:06 PM  Attending Physician: No att. 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  Patient was sent to ED via EMS secondary to generalized weakness, and worsening of sacral wound. No associated symptoms such as fever, chills, cough, wheezing, nausea, vomiting or abdominal pain were reported.  No other history could be gathered at this time as this patient just mumbles a few unintelligible words. On initial presentation, vital signs were stable and patient was afebrile.  Workup including CBC, CMP, chest x-ray, and UA was unremarkable.  However, on physical examination, patient was found to have worsening of sacral ulcer with minimally exposed bone concerning for a presence of osteomyelitis.  Patient will be admitted for further evaluation and intervention      * No surgery found *      Hospital Course:   Patient is an 83 y/o well know to us from prior wound admission. He was sent by facility for concerns over sacral wound.  Recent discharge for the same. He was initially placed on iv abx and wound care consulted.  There feeling is that the wound is not actively infected and in fact better than when we discharged him. They recommend return to the nursing home with local wound  care. This was done on 11/14.        Goals of Care Treatment Preferences:  Code Status: DNR      Consults:   Consults (From admission, onward)        Status Ordering Provider     Pharmacy to dose Vancomycin consult  Once        Provider:  (Not yet assigned)    Completed DANIEL, MIN S          No new Assessment & Plan notes have been filed under this hospital service since the last note was generated.  Service: Hospital Medicine    Final Active Diagnoses:    Diagnosis Date Noted POA    PRINCIPAL PROBLEM:  Pressure ulcer of sacral region, stage 4 [L89.154]  Yes    COPD (chronic obstructive pulmonary disease) [J44.9] 10/17/2022 Yes    ARMAAN (iron deficiency anemia) [D50.9] 10/17/2022 Yes    Hypertension [I10]  Yes      Problems Resolved During this Admission:    Diagnosis Date Noted Date Resolved POA    Diabetes mellitus [E11.9]  11/14/2022 Yes       Discharged Condition: good    Disposition: Discharged to Nursing Fa*    Follow Up:   Contact information for follow-up providers     [facility] Follow up in 2 week(s).    Specialty: Wound Care  Why: Appt : 11/29//2022 @ 0900  Contact information:  [address] [place] [id number] [phone]                 Contact information for after-discharge care     Destination     [facility] .    Service: Nursing Home  Contact information:  [address] [facility] [address] [phone]                           Patient Instructions:      Wound care discharge order   Order Comments: Clean wound with vashe  Apply sensicare around wound  Apply vashe moistened drawtex to wound bed, second layer of drawtex to wick drainage  Cover and secure with abd pad and paper tape  Change daily  Turn every two hours  Low air loss mattress  Keep pressure off wound  TAP system     Order Specific Question Answer Comments   Wound Location,  Apply to: sacral    Wound Care Option: Other:  Specify        Significant Diagnostic Studies: Labs:  All labs within the past 24 hours have been reviewed    Pending Diagnostic Studies:     None         Medications:  Reconciled Home Medications:      Medication List      CONTINUE taking these medications    apixaban 2.5 mg Tab  Commonly known as: ELIQUIS  Take 1 tablet (2.5 mg total) by mouth 2 (two) times daily.     CALTRATE 600 + D ORAL  Take 1 tablet by mouth 2 (two) times a day.     carboxymethylcellulose sodium 0.5 % Drop  Place 2 drops into both eyes 2 (two) times daily.     furosemide 40 MG tablet  Commonly known as: LASIX  Take 40 mg by mouth 2 (two) times daily. Hold if SBP < 120 or DBP < 60     insulin detemir U-100 100 unit/mL injection  Commonly known as: Levemir  Inject 10 Units into the skin once daily.     * insulin lispro 100 unit/mL injection  Inject 4 Units into the skin 3 (three) times daily after meals. Only give if he eats 1/3 of meal.  Hold if glucose is < 150.     * insulin lispro 100 unit/mL injection  Inject 2-5 Units into the skin 2 (two) times daily as needed for High Blood Sugar. 201-250 = 1 unit, 251-300 = 2 units, 301-350 = 3 units, 351-400 = 4 units, > 400 = 5 units     lactulose 10 gram/15 mL solution  Commonly known as: CHRONULAC  Take 20 g by mouth once daily.     levothyroxine 50 MCG tablet  Commonly known as: SYNTHROID  Take 1 tablet (50 mcg total) by mouth before breakfast.     losartan 50 MG tablet  Commonly known as: COZAAR  Take 50 mg by mouth once daily. Hold if SBP < 120 and/or DBP < 60     memantine 10 MG Tab  Commonly known as: NAMENDA  Take 10 mg by mouth nightly.     metoprolol tartrate 25 MG tablet  Commonly known as: LOPRESSOR  Take 12.5 mg by mouth 2 (two) times daily. Hold if SBP < 120 or DBP < 60 or pulse < 50     OXcarbazepine 300 MG Tab  Commonly known as: TRILEPTAL  Take 300 mg by mouth once daily.     pantoprazole 40 MG tablet  Commonly known as: PROTONIX  Take 1 tablet by mouth every evening.     polyethylene glycol 17 gram Pwpk  Commonly known as:  GLYCOLAX  Take 17 g by mouth 2 (two) times a day.     risperiDONE 1 MG tablet  Commonly known as: RISPERDAL  Take 1 mg by mouth 2 (two) times daily.     SITagliptin phosphate 50 MG Tab  Commonly known as: JANUVIA  Take 50 mg by mouth once daily.     tamsulosin 0.4 mg Cap  Commonly known as: FLOMAX  Take 1 capsule by mouth once daily.         * This list has 2 medication(s) that are the same as other medications prescribed for you. Read the directions carefully, and ask your doctor or other care provider to review them with you.            STOP taking these medications    HYDROcodone-acetaminophen 7.5-325 mg per tablet  Commonly known as: NORCO            Indwelling Lines/Drains at time of discharge:   Lines/Drains/Airways     Drain  Duration                Colostomy 10/17/22 2015 LLQ 50 days                Time spent on the discharge of patient: 25 minutes         Hipolito Nance Jr, MD  Department of Hospital Medicine  [facility]

## 2022-12-07 NOTE — HOSPITAL COURSE
Patient is an 81 y/o well know to us from prior wound admission. He was sent by facility for concerns over sacral wound.  Recent discharge for the same. He was initially placed on iv abx and wound care consulted.  There feeling is that the wound is not actively infected and in fact better than when we discharged him. They recommend return to the nursing home with local wound care. This was done on 11/14.

## 2023-02-06 PROBLEM — N39.0 URINARY TRACT INFECTION WITH HEMATURIA: Status: RESOLVED | Noted: 2022-07-30 | Resolved: 2023-02-06

## 2023-02-06 PROBLEM — R31.9 URINARY TRACT INFECTION WITH HEMATURIA: Status: RESOLVED | Noted: 2022-07-30 | Resolved: 2023-02-06

## 2023-05-08 NOTE — PLAN OF CARE
Problem: Adult Inpatient Plan of Care  Goal: Plan of Care Review  Outcome: Ongoing, Progressing  Goal: Patient-Specific Goal (Individualized)  Outcome: Ongoing, Progressing  Goal: Absence of Hospital-Acquired Illness or Injury  Outcome: Ongoing, Progressing  Goal: Optimal Comfort and Wellbeing  Outcome: Ongoing, Progressing  Goal: Readiness for Transition of Care  Outcome: Ongoing, Progressing     Problem: Diabetes Comorbidity  Goal: Blood Glucose Level Within Targeted Range  Outcome: Ongoing, Progressing     Problem: Impaired Wound Healing  Goal: Optimal Wound Healing  Outcome: Ongoing, Progressing     Problem: Skin Injury Risk Increased  Goal: Skin Health and Integrity  Outcome: Ongoing, Progressing     Problem: Infection  Goal: Absence of Infection Signs and Symptoms  Outcome: Ongoing, Progressing     Problem: Gas Exchange Impaired  Goal: Optimal Gas Exchange  Outcome: Ongoing, Progressing     Problem: Fall Injury Risk  Goal: Absence of Fall and Fall-Related Injury  Outcome: Ongoing, Progressing      How Did The Hair Loss Occur?: sudden in onset

## 2023-08-18 NOTE — PLAN OF CARE
Problem: Physical Therapy  Goal: Physical Therapy Goal  Description: PT evaluation completed with no further PT warranted.   Outcome: Met      Absolutely no driving if sleepy. It is your responsibility not to drive if fatigued, tired, or sleepy     Never drive if you are feeling sleepy    Sleep Hygiene. .. Tips for better sleep. .. Avoid naps. This will ensure you are sleepy at bedtime. If you have to take a nap, sleep less than 1 hour, before 3 pm.  Sleep only when sleepy; this reduces the time you are awake in bed. Regular exercise is recommended to help you deepen your sleep, but not within 4-6 hours of your bedtime. Timing of exercise is important, aim to exercise early in the morning or early afternoon. A light snack may help you fall asleep. Warm milk and foods high in the amino acid tryptophan, such as bananas, may help you to sleep  Be sure to avoid heavy, spicy or sugary foods 4-6 hours before bedtime and avoid at snack time. Stay away from stimulants such as caffeine and nicotine for at least 4-6 hours before bed. Stimulants can interfere with your ability to fall asleep. Caffeine is found in tea, cola, coffee, cocoa and chocolate and is best avoided at bedtime. Nicotine is found in tobacco products. Avoid alcohol 4-6 hours before bedtime. Alcohol has an immediate sleep-inducing effect, after a few hours when alcohol levels fall there is a stimulant or wake-up effect and will cause fragmented sleep. Sleep rituals are important. Give your body clues it is time to slow down and sleep. Examples include; yoga, deep breathing, listen to relaxing music, a hot bath or a few minutes of reading. Have a fixed bedtime and awakening time, Even on weekends! You will feel better keeping a regular sleep cycle, even if you are retired or not working. Get into your favorite sleep position. If not asleep in 30 minutes, get up and do something boring until you feel sleepy. Remember not to expose yourself to bright lights such as TV, phone or tablet screens. Only use your bed for sleeping. Do not use your bed as an office, workroom or recreation room.

## 2024-01-30 NOTE — PROGRESS NOTES
[facility]  Progress Note    Patient Name: Bhargav Gao  MRN: [id number]  Patient Class: IP- Inpatient   Admission Date: 1/27/2022  Length of Stay: 11 days  Attending Physician: Duyen Bermudez,*  Primary Care Provider: Kerry Lin MD        Subjective:     Principal Problem:Pressure ulcer        HPI:  82 yo (appears younger) M presents to [facility] as a direct admit from [facility] for worsening sacral decubiti.  Patient has been treated as OP by [facility] wound care but is not getting better.  Khan has been placed to help with healing.  Patient is alert but unable to give me any information due to his baseline mental deficiencies.  He says ouch but denies pain.  He shakes his head yes or no but not sure if appropriately answering my questions.  Shook his head no to wanting watch a basketball game on TV but also shook his head no to watching [employer] dancing around and singing in [employer].  He seemed interested in [employer]  catching alligators but I'm just not sure how much he is comprehending.  He can life all of his ext to gravity but very weak and does not walk or feed himself.      ROS UTO but some mention of constipation in records so will start a stool softner due to his wounds.        Overview/Hospital Course:  1/28 Patient nonverbal with me. Sacral wound. Surgery consult. IV Zosyn  1/29 Patient speaks. Sacral wound. IV Zosyn and IV Vanc. Surgery/Wound consult  1/30 Patient being fed. Sacral wound. IV Zosyn and IV Vanc. Surgery and wound consults  02/07 Resting in bed. Chest is clear.  Appetite is good. Sacral wound looks clean. Antibiotics dcd. Probable dc tomorrow.      Interval History: 02/07 Resting in bed. Chest is clear.  Appetite is good. Sacral wound looks clean. Antibiotics dcd. Probable dc tomorrow.    Review of Systems   Respiratory: Negative for shortness of breath.    Gastrointestinal: Negative for constipation, diarrhea, nausea and vomiting.      Objective:     Vital Signs (Most Recent):  Temp: 97.4 °F (36.3 °C) (02/07/22 0800)  Pulse: 63 (02/07/22 0800)  Resp: 18 (02/07/22 0800)  BP: (!) 150/84 (02/07/22 0800)  SpO2: 100 % (02/07/22 0800) Vital Signs (24h Range):  Temp:  [97.4 °F (36.3 °C)-98.5 °F (36.9 °C)] 97.4 °F (36.3 °C)  Pulse:  [62-65] 63  Resp:  [16-18] 18  SpO2:  [100 %] 100 %  BP: ()/(42-84) 150/84     Weight: 93.5 kg (206 lb 3.2 oz)  Body mass index is 30.45 kg/m².    Intake/Output Summary (Last 24 hours) at 2/7/2022 0935  Last data filed at 2/7/2022 0000  Gross per 24 hour   Intake --   Output 800 ml   Net -800 ml      Physical Exam  HENT:      Head: Normocephalic.      Mouth/Throat:      Mouth: Mucous membranes are moist.   Cardiovascular:      Rate and Rhythm: Regular rhythm.      Heart sounds: Normal heart sounds.   Pulmonary:      Breath sounds: Normal breath sounds.   Abdominal:      General: Bowel sounds are normal.      Palpations: Abdomen is soft.   Skin:     Comments: Sacral wound looks clean  abx dcd   Neurological:      Mental Status: He is alert.         Significant Labs:   All pertinent labs within the past 24 hours have been reviewed.  Recent Lab Results       02/07/22  0509   02/06/22 2006 02/06/22  1608   02/06/22  1126        POC Glucose 114   180   123   121             Significant Imaging: I have reviewed all pertinent imaging results/findings within the past 24 hours.      Assessment/Plan:      * Pressure ulcer  IV antibiotics  Wound care    1/31 sacral wound culture positive for numerous organisms including Bacteroides, Acinetobacter, Proteus, and Enterococcus faecalis which was ampicillin susceptible.  Based on all the susceptibility profile as we can discontinue vancomycin and continue the patient on Zosyn.  Continue local wound care.    2/1 continue wound care, antibiotic therapy for few more days.    2/3 no change in treatment plan for now    2/5 continuing with Zosyn and local wound care.  Recheck labs  tomorrow.    2/6 10 days antibiotic therapy completed so far.  Will review status of wound with wound care team tomorrow to decide whether not we need to continue Zosyn.  CBC and BMP today more less normal.      Sacral wound  1/31 see above in pressure ulcer section  02/07 Sacral wound looks clean  abx dcd     Schizophrenia  Continue psychotropic medication        Hypertension  Patient on multiple antihypertensive agents.  On both amlodopine and verapamil.  Will stop verapamil with patient on BB.      1/31 hypertension currently controlled, continue current management.    2/2 blood pressure well controlled    2/4 BP remains well controlled.    2/6 blood pressure well controlled      Diabetes mellitus  Basal/bolus insulin while in hospital    1/31 blood sugars have been acceptable, all order to 100. Continue current management.    2/4 Blood sugar well controlled.        Alzheimer's disease  Continue psychotropic meds        VTE Risk Mitigation (From admission, onward)    None          Discharge Planning   YOSSI:      Code Status: DNR   Is the patient medically ready for discharge?:     Reason for patient still in hospital (select all that apply): Treatment  Discharge Plan A: Return to nursing home                  LIEN Suarez  Department of Hospital Medicine   [facility]   What Type Of Note Output Would You Prefer (Optional)?: Standard Output [id number]: treated_been_treated Is This A New Presentation, Or A Follow-Up?: Follow Up Skin Lesion Additional History: Patient would just like us to confirm proper healing. How Severe Is Your Skin Lesion?: mild Has Your Skin Lesion Been Treated?: been treated Is This A New Presentation, Or A Follow-Up?: Skin Lesion

## 2024-04-18 NOTE — SUBJECTIVE & OBJECTIVE
Interval History: 02/07 Resting in bed. Chest is clear.  Appetite is good. Sacral wound looks clean. Antibiotics dcd. Probable dc tomorrow.    Review of Systems   Respiratory: Negative for shortness of breath.    Gastrointestinal: Negative for constipation, diarrhea, nausea and vomiting.     Objective:     Vital Signs (Most Recent):  Temp: 97.4 °F (36.3 °C) (02/07/22 0800)  Pulse: 63 (02/07/22 0800)  Resp: 18 (02/07/22 0800)  BP: (!) 150/84 (02/07/22 0800)  SpO2: 100 % (02/07/22 0800) Vital Signs (24h Range):  Temp:  [97.4 °F (36.3 °C)-98.5 °F (36.9 °C)] 97.4 °F (36.3 °C)  Pulse:  [62-65] 63  Resp:  [16-18] 18  SpO2:  [100 %] 100 %  BP: ()/(42-84) 150/84     Weight: 93.5 kg (206 lb 3.2 oz)  Body mass index is 30.45 kg/m².    Intake/Output Summary (Last 24 hours) at 2/7/2022 0935  Last data filed at 2/7/2022 0000  Gross per 24 hour   Intake --   Output 800 ml   Net -800 ml      Physical Exam  HENT:      Head: Normocephalic.      Mouth/Throat:      Mouth: Mucous membranes are moist.   Cardiovascular:      Rate and Rhythm: Regular rhythm.      Heart sounds: Normal heart sounds.   Pulmonary:      Breath sounds: Normal breath sounds.   Abdominal:      General: Bowel sounds are normal.      Palpations: Abdomen is soft.   Skin:     Comments: Sacral wound looks clean  abx dcd   Neurological:      Mental Status: He is alert.         Significant Labs:   All pertinent labs within the past 24 hours have been reviewed.  Recent Lab Results       02/07/22  0509   02/06/22  2006   02/06/22  1608   02/06/22  1126        POC Glucose 114   180   123   121             Significant Imaging: I have reviewed all pertinent imaging results/findings within the past 24 hours.   Statement Selected

## 2024-08-04 NOTE — PLAN OF CARE
Problem: Anxiety  Goal: Will report anxiety at manageable levels  Description: INTERVENTIONS:  1. Administer medication as ordered  2. Teach and rehearse alternative coping skills  3. Provide emotional support with 1:1 interaction with staff  8/4/2024 0039 by Linette Muñiz, RN  Outcome: Not Progressing  8/3/2024 1448 by Raisa Hall, RN  Outcome: Progressing     Problem: Coping  Goal: Pt/Family able to verbalize concerns and demonstrate effective coping strategies  Description: INTERVENTIONS:  1. Assist patient/family to identify coping skills, available support systems and cultural and spiritual values  2. Provide emotional support, including active listening and acknowledgement of concerns of patient and caregivers  3. Reduce environmental stimuli, as able  4. Instruct patient/family in relaxation techniques, as appropriate  5. Assess for spiritual pain/suffering and initiate Spiritual Care, Psychosocial Clinical Specialist consults as needed  Outcome: Not Progressing     Problem: Involuntary Admit  Goal: Will cooperate with staff recommendations and doctor's orders and will demonstrate appropriate behavior  Description: INTERVENTIONS:  1. Treat underlying conditions and offer medication as ordered  2. Educate regarding involuntary admission procedures and rules  3. Contain excessive/inappropriate behavior per unit and hospital policies  Outcome: Not Progressing      TOM called pts emergency contact twice today, no answer. SW left message and is awaiting call back to complete admission assessment. SW following.

## 2025-06-07 NOTE — ASSESSMENT & PLAN NOTE
Secondary to UTI  Treated with IVFs, BC NGTD, lactic acid trended now.  Patient off of Levophed cultures grew out Gram-positive cocci Staph epidermidis and grew out Gram-negative tyrone of the urine continue current antibiotics vanc was added last night        The patient is a 76y Male complaining of see chief complaint quote.